# Patient Record
Sex: MALE | Race: BLACK OR AFRICAN AMERICAN | NOT HISPANIC OR LATINO | Employment: OTHER | URBAN - METROPOLITAN AREA
[De-identification: names, ages, dates, MRNs, and addresses within clinical notes are randomized per-mention and may not be internally consistent; named-entity substitution may affect disease eponyms.]

---

## 2019-01-31 ENCOUNTER — HOSPITAL ENCOUNTER (OUTPATIENT)
Dept: CARDIOLOGY | Facility: HOSPITAL | Age: 54
Discharge: HOME | End: 2019-01-31
Attending: SURGERY

## 2019-12-05 ENCOUNTER — OFFICE VISIT CONVERTED (OUTPATIENT)
Dept: SURGERY | Facility: CLINIC | Age: 54
End: 2019-12-05
Attending: SURGERY

## 2019-12-05 ENCOUNTER — CONVERSION ENCOUNTER (OUTPATIENT)
Dept: SURGERY | Facility: CLINIC | Age: 54
End: 2019-12-05

## 2019-12-19 ENCOUNTER — HOSPITAL ENCOUNTER (OUTPATIENT)
Dept: PERIOP | Facility: HOSPITAL | Age: 54
Setting detail: HOSPITAL OUTPATIENT SURGERY
Discharge: HOME | End: 2019-12-19
Attending: SURGERY

## 2019-12-19 LAB
ANION GAP SERPL CALC-SCNC: 20 MMOL/L (ref 8–19)
BASOPHILS # BLD AUTO: 0.06 10*3/UL (ref 0–0.2)
BASOPHILS NFR BLD AUTO: 0.8 % (ref 0–3)
BUN SERPL-MCNC: 77 MG/DL (ref 5–25)
BUN/CREAT SERPL: 12 {RATIO} (ref 6–20)
CALCIUM SERPL-MCNC: 9.7 MG/DL (ref 8.7–10.4)
CHLORIDE SERPL-SCNC: 101 MMOL/L (ref 99–111)
CONV ABS IMM GRAN: 0.02 10*3/UL (ref 0–0.2)
CONV CO2: 18 MMOL/L (ref 22–32)
CONV IMMATURE GRAN: 0.3 % (ref 0–1.8)
CREAT UR-MCNC: 6.26 MG/DL (ref 0.7–1.2)
DEPRECATED RDW RBC AUTO: 57.1 FL (ref 35.1–43.9)
EOSINOPHIL # BLD AUTO: 0.38 10*3/UL (ref 0–0.7)
EOSINOPHIL # BLD AUTO: 4.8 % (ref 0–7)
ERYTHROCYTE [DISTWIDTH] IN BLOOD BY AUTOMATED COUNT: 18.1 % (ref 11.6–14.4)
GFR SERPLBLD BASED ON 1.73 SQ M-ARVRAT: 11 ML/MIN/{1.73_M2}
GLUCOSE BLD-MCNC: 135 MG/DL (ref 70–99)
GLUCOSE SERPL-MCNC: 100 MG/DL (ref 70–99)
HCT VFR BLD AUTO: 25.4 % (ref 42–52)
HGB BLD-MCNC: 7.6 G/DL (ref 14–18)
LYMPHOCYTES # BLD AUTO: 1.38 10*3/UL (ref 1–5)
LYMPHOCYTES NFR BLD AUTO: 17.4 % (ref 20–45)
MCH RBC QN AUTO: 25.8 PG (ref 27–31)
MCHC RBC AUTO-ENTMCNC: 29.9 G/DL (ref 33–37)
MCV RBC AUTO: 86.1 FL (ref 80–96)
MONOCYTES # BLD AUTO: 0.52 10*3/UL (ref 0.2–1.2)
MONOCYTES NFR BLD AUTO: 6.6 % (ref 3–10)
NEUTROPHILS # BLD AUTO: 5.55 10*3/UL (ref 2–8)
NEUTROPHILS NFR BLD AUTO: 70.1 % (ref 30–85)
NRBC CBCN: 0 % (ref 0–0.7)
OSMOLALITY SERPL CALC.SUM OF ELEC: 299 MOSM/KG (ref 273–304)
PLATELET # BLD AUTO: 307 10*3/UL (ref 130–400)
PMV BLD AUTO: 8.5 FL (ref 9.4–12.4)
POTASSIUM SERPL-SCNC: 5.5 MMOL/L (ref 3.5–5.3)
RBC # BLD AUTO: 2.95 10*6/UL (ref 4.7–6.1)
SODIUM SERPL-SCNC: 133 MMOL/L (ref 135–147)
WBC # BLD AUTO: 7.91 10*3/UL (ref 4.8–10.8)

## 2020-12-22 ENCOUNTER — OFFICE VISIT CONVERTED (OUTPATIENT)
Dept: SURGERY | Facility: CLINIC | Age: 55
End: 2020-12-22
Attending: NURSE PRACTITIONER

## 2021-05-06 ENCOUNTER — OFFICE VISIT CONVERTED (OUTPATIENT)
Dept: SURGERY | Facility: CLINIC | Age: 56
End: 2021-05-06
Attending: SURGERY

## 2021-05-10 NOTE — H&P
History and Physical      Patient Name: Chevy Junior   Patient ID: [id number]   Sex: Male   YOB: 1965    Primary Care Provider: Yue COHEN   Referring Provider: Elie Steinberg    Visit Date: December 22, 2020    Provider: VICKI Sloan   Location: [facility] [place] [facility]   Location Address: [address] [place] [address]  [id number]   Location Phone: [phone]          Chief Complaint  · Follow Up      History Of Present Illness  Chevy Junior is a 55 year old /Black male who is here to follow up follow-up exam.      Patient presents today at the request of Dr. Villasenor to have his PD cath check.  Patient reports that he was with a large area around his PD catheter a last week. Reports that pd cath is working well. Patient denied any pain fever or chills.  Ultrasound of this area revealed a small fluid collection that measured 1.3 x 2.5 x 1.5 cm around tube insertion site.  Patient was started on doxycycline for 4 days and he reports that the area has gotten much smaller since starting antibiotics.       Past Medical History  Disease Name Date Onset Notes   Arthritis --  --    Chronic Obstructive Pulmonary Disease --  --    Diabetes --  --    Hepatitis --  --          Past Surgical History  Procedure Name Date Notes   Total knee replacement --  --          Medication List  Name Date Started Instructions   amlodipine 5 mg oral tablet  take 1 tablet by oral route 2 times a day   atenolol 50 mg oral tablet  --    calcitriol 0.25 mcg oral capsule  take 2 capsules by oral route once   Carafate 1 gram oral tablet  take 1 tablet (1 gram) by oral route 2 times per day on an empty stomach   cinacalcet 30 mg oral tablet  take 1 tablet (30 mg) by oral route once daily with food   docusate sodium 100 mg oral capsule  --    doxycycline hyclate 100 mg oral capsule 12/22/2020 take 1 capsule by oral route 2 times a day for 10 days   furosemide 80 mg oral tablet  take 1  tablet (80 mg) by oral route 2 times per day   hydrochlorothiazide 12.5 mg oral tablet  --    Imitrex 25 mg oral tablet  --    Lantus Solostar U-100 Insulin 100 unit/mL (3 mL) subcutaneous insulin pen  --    Lopid 600 mg oral tablet  take 1 tablet (600 mg) by oral route 2 times per day 30 minutes before morning and evening meal   metoclopramide HCl 10 mg oral tablet  take 1 tablet (10 mg) by oral route once daily   metolazone 5 mg oral tablet  take 1 tablet (5 mg) by oral route once daily   metoprolol tartrate 25 mg oral tablet  take 3 tablets by oral route 2 times a day   metoprolol tartrate 50 mg oral tablet  --    Miralax 17 gram/dose oral powder  take 17 gram mixed with 8 oz. water, juice, soda, coffee or tea by oral route once daily   mupirocin 2 % topical ointment  apply a small amount to the affected area by topical route 3 times per day   Nexium 40 mg oral capsule,delayed release(DR/EC)  --    nystatin 500,000 unit oral tablet  take 1 tablet by oral route 4 times a day   rifampin 300 mg oral capsule  take 1 capsule by oral route 2 times a day for 2 days   Suboxone sublingual  Take   Veltassa 8.4 gram oral powder in packet  take 1 packet (8.4 gram) by oral route once daily add to 30 mL water and mix; add additional 60 mL water, stir and drink immediately   Zoloft 25 mg oral tablet  --          Allergy List  Allergen Name Date Reaction Notes   NO KNOWN DRUG ALLERGIES --  --  --        Allergies Reconciled  Family Medical History  Disease Name Relative/Age Notes   Diabetes, unspecified type Father/   Father         Social History  Finding Status Start/Stop Quantity Notes   Tobacco Current every day --/-- --  --          Review of Systems  · Constitutional  o Denies  o : chills, fever  · Eyes  o Denies  o : yellowish discoloration of eyes  · HENT  o Denies  o : difficulty swallowing  · Cardiovascular  o Denies  o : chest pain on exertion  · Respiratory  o Denies  o : shortness of  "breath  · Gastrointestinal  o Denies  o : nausea, vomiting, diarrhea, constipation  · Genitourinary  o Denies  o : abnormal color of urine  · Integument  o Denies  o : rash  · Neurologic  o Denies  o : tingling or numbness  · Musculoskeletal  o Denies  o : joint pain  · Endocrine  o Denies  o : weight gain, weight loss      Vitals  Date Time BP Position Site L\R Cuff Size HR RR TEMP (F) WT  HT  BMI kg/m2 BSA m2 O2 Sat FR L/min FiO2        12/22/2020 02:51 PM       16  201lbs 8oz 5'  7\" 31.56 2.08             Physical Examination  · Constitutional  o Appearance  o : well developed, well-nourished, patient in no apparent distress  · Head and Face  o Head  o :   § Inspection  § : atraumatic, normocephalic  o Face  o :   § Inspection  § : no facial lesions  · Eyes  o Conjunctivae  o : conjunctivae normal  o Sclerae  o : sclerae white  · Neck  o Inspection/Palpation  o : normal appearance, no masses or tenderness, trachea midline  · Respiratory  o Respiratory Effort  o : breathing unlabored  · Skin and Subcutaneous Tissue  o General Inspection  o : no lesions present, no areas of discoloration, skin turgor normal, texture normal  · Neurologic  o Mental Status Examination  o :   § Orientation  § : grossly oriented to person, place and time  § Attention  § : attention normal, concentration abilities normal  § Fund of Knowledge  § : fund of knowledge within normal limits, patient aware of current events  o Gait and Station  o : normal gait, able to stand without difficulty  · Psychiatric  o Judgement and Insight  o : judgment and insight intact  o Mood and Affect  o : mood normal, affect appropriate     Abdomen: soft, non-distended, non-tender. No erythema noted.               Assessment  · Peritoneal dialysis catheter exit site infection, subsequent encounter       Infection and inflammatory reaction due to peritoneal dialysis catheter, subsequent encounter     V58.89/T85.71XD    Problems " Reconciled  Plan  · Medications  o Medications have been Reconciled  o Transition of Care or Provider Policy  · Instructions  o Continue with doxycycline for 10 days. Follow-up with Dr. Gus Whaley in 2 weeks.  o Electronically Identified Patient Education Materials Provided Electronically            Electronically Signed by: VICKI Sloan -Author on December 23, 2020 12:24:23 PM

## 2021-05-14 VITALS — HEIGHT: 67 IN | WEIGHT: 201.5 LBS | BODY MASS INDEX: 31.63 KG/M2 | RESPIRATION RATE: 16 BRPM

## 2021-05-15 VITALS — BODY MASS INDEX: 30.15 KG/M2 | HEIGHT: 67 IN | WEIGHT: 192.12 LBS | RESPIRATION RATE: 14 BRPM

## 2021-06-01 ENCOUNTER — OFFICE VISIT CONVERTED (OUTPATIENT)
Dept: SURGERY | Facility: CLINIC | Age: 56
End: 2021-06-01
Attending: SURGERY

## 2021-06-06 NOTE — PROGRESS NOTES
Progress Note      Patient Name: Chevy Junior   Patient ID: [id number]   Sex: Male   YOB: 1965    Primary Care Provider: Yue COHEN   Referring Provider: Elie Steinberg    Visit Date: May 6, 2021    Provider: Gus Whaley MD   [place] [facility]   Location Address: [address] [place] [address]  [id number]   Location Phone: [phone]          Chief Complaint  · PD Cath concerns      History Of Present Illness  Chevy Junior is a 55 year old /Black male who presents today for a postoperative visit. He follows-up today after a remote history of peritoneal dialysis catheter placement with findings of some infection around the peritoneal dialysis catheter. The patient had been seen in the office a couple of weeks ago with some swelling around the PD catheter. He was given some antibiotics and hat seemed to clear up the issue. Over the course of the past week, he has had the swelling recur and now there is some issue with overlying skin changes. He reports otherwise he is feeling well. No nausea or vomiting. No fever or chills. He is eating and drinking normally and his PD catheter is functioning appropriately.       Past Medical History  Arthritis; Chronic Obstructive Pulmonary Disease; Diabetes; Hepatitis         Past Surgical History  Total knee replacement         Medication List  amlodipine 5 mg oral tablet; atenolol 50 mg oral tablet; Bactrim -160 mg oral tablet; calcitriol 0.25 mcg oral capsule; Carafate 1 gram oral tablet; cinacalcet 30 mg oral tablet; docusate sodium 100 mg oral capsule; doxycycline hyclate 100 mg oral capsule; furosemide 80 mg oral tablet; hydrochlorothiazide 12.5 mg oral tablet; Imitrex 25 mg oral tablet; Lantus Solostar U-100 Insulin 100 unit/mL (3 mL) subcutaneous insulin pen; Lopid 600 mg oral tablet; metoclopramide HCl 10 mg oral tablet; metolazone 5 mg oral tablet; metoprolol tartrate 25 mg oral tablet;  metoprolol tartrate 50 mg oral tablet; Miralax 17 gram/dose oral powder; mupirocin 2 % topical ointment; Nexium 40 mg oral capsule,delayed release(DR/EC); nystatin 500,000 unit oral tablet; rifampin 300 mg oral capsule; Silvadene 1 % topical cream; Suboxone sublingual; Veltassa 8.4 gram oral powder in packet; Zoloft 25 mg oral tablet         Allergy List  NO KNOWN DRUG ALLERGIES       Allergies Reconciled  Family Medical History  Diabetes, unspecified type         Social History  Tobacco (Current every day)         Review of Systems  · Cardiovascular  o Denies  o : chest pain on exertion, shortness of breath, lower extremity swelling  · Respiratory  o Denies  o : shortness of breath, coughing up blood  · Gastrointestinal  o Denies  o : chronic abdominal pain      Physical Examination  · Constitutional  o Appearance  o : well developed, well-nourished, alert and in no acute distress  · Head and Face  o Head  o :   § Inspection  § : no deformities or lesions  · Eyes  o Conjunctivae  o : clear  o Sclerae  o : nonicteric  · Neck  o Inspection/Palpation  o : normal appearance, no masses or tenderness, trachea midline  · Respiratory  o Respiratory Effort  o : breathing unlabored  o Inspection of Chest  o : normal appearance, no retractions  · Cardiovascular  o Heart  o : regular rate and rhythm  · Gastrointestinal  o Abdominal Examination  o :   § Abdomen  § : abdomen is soft. The area around the PD catheter appears normal, where the catheter inserts but just medial to it, there is some swelling and some overlying skin changes with some skin infection. It appears superficial. It does not necessarily appear to be associated with a catheter, as the catheter runs deep and the area around the skin appears to be superficial although it is in the vicinity of the catheter.   · Lymphatic  o Neck  o : no lymphadenopathy present  o Axilla  o : no lymphadenopathy present  o Groin  o : no lymphadenopathy present  · Skin and  Subcutaneous Tissue  o General Inspection  o : no rashes present, no lesions present, no areas of discoloration  · Neurologic  o Cranial Nerves  o : grossly intact  o Sensation  o : grossly intact  o Gait and Station  o :   § Gait Screening  § : normal gait, able to stand without diffculty  o Cerebellar Function  o : no obvious abnormalities  · Psychiatric  o Judgement and Insight  o : judgment and insight intact  o Mood and Affect  o : mood normal, affect appropriate          Assessment  · Encounter for examination following surgery     V67.00/Z09       Patient with infection around the PD catheter although I am not sure it is associated directly with the PD catheter.       Plan  · Medications  o Medications have been Reconciled  o Transition of Care or Provider Policy  · Instructions  o Electronically Identified Patient Education Materials Provided Electronically     I am not exactly why he has this infection here. It certainly could be related to the PD catheter itself but I don't think it is. I think it is more of a skin lesion. It could be an infected sebaceous cyst or a subcutaneous abscess. I don't feel anytning fluctuant and there is nothing draining. It just feels mostly firm around this area. My inclination is to try and treat this with a second course of antibiotics and some topical therapy. If it fails to resolve within one week after this treatment, I will recommend that we take him to the OR for exploration, as I don't want to jeopardize the catheter. I discussed all of this with the patient and his wife in the room extensively. All questions were answered.  They voiced understanding and agreed to proceed with the above plan. He should call if this gets worse, even while on antibiotics.             Electronically Signed by: Lucy Vides-, -Author on May 7, 2021 01:57:22 PM  Electronically Co-signed by: Gus Whaley MD -Reviewer on May 8, 2021 09:51:06 PM

## 2021-06-06 NOTE — PROGRESS NOTES
Progress Note      Patient Name: Chevy Junior   Patient ID: [id number]   Sex: Male   YOB: 1965    Primary Care Provider: Yue COHEN    Visit Date: June 1, 2021    Provider: Gus Whaley MD   [place] [facility]   Location Address: [address] [place] [address]  [id number]   Location Phone: [phone]          Chief Complaint  · Follow Up Office Visit      History Of Present Illness  Chevy Junior is a 55 year old /Black male who presents today for a postoperative visit. He follows-up after an infection in his peritoneal dialysis catheter. The patient reports since the administration of antibiotics, he has done much better. He is not having any drainage from around the catheter. He appears to be healing quite well. There is no erythema. His catheter works appropriately. He is not having any issues.       Past Medical History  Arthritis; Chronic Obstructive Pulmonary Disease; Diabetes; Hepatitis         Past Surgical History  Total knee replacement         Medication List  amlodipine 5 mg oral tablet; atenolol 50 mg oral tablet; Bactrim -160 mg oral tablet; calcitriol 0.25 mcg oral capsule; Carafate 1 gram oral tablet; cinacalcet 30 mg oral tablet; docusate sodium 100 mg oral capsule; doxycycline hyclate 100 mg oral capsule; furosemide 80 mg oral tablet; hydrochlorothiazide 12.5 mg oral tablet; Imitrex 25 mg oral tablet; Lantus Solostar U-100 Insulin 100 unit/mL (3 mL) subcutaneous insulin pen; Lopid 600 mg oral tablet; metoclopramide HCl 10 mg oral tablet; metolazone 5 mg oral tablet; metoprolol tartrate 25 mg oral tablet; metoprolol tartrate 50 mg oral tablet; Miralax 17 gram/dose oral powder; mupirocin 2 % topical ointment; Nexium 40 mg oral capsule,delayed release(DR/EC); nystatin 500,000 unit oral tablet; rifampin 300 mg oral capsule; Silvadene 1 % topical cream; Suboxone sublingual; Veltassa 8.4 gram oral powder in packet;  "Zoloft 25 mg oral tablet         Allergy List  NO KNOWN DRUG ALLERGIES       Allergies Reconciled  Family Medical History  Diabetes, unspecified type         Social History  Tobacco (Current every day)         Review of Systems  · Cardiovascular  o Denies  o : chest pain on exertion, shortness of breath, lower extremity swelling  · Respiratory  o Denies  o : shortness of breath, coughing up blood  · Gastrointestinal  o Denies  o : chronic abdominal pain      Vitals  Date Time BP Position Site L\R Cuff Size HR RR TEMP (F) WT  HT  BMI kg/m2 BSA m2 O2 Sat FR L/min FiO2        06/01/2021 01:47 PM         203lbs 0oz 5'  7\" 31.79 2.09             Physical Examination  · Constitutional  o Appearance  o : well developed, well-nourished, alert and in no acute distress  · Head and Face  o Head  o :   § Inspection  § : no deformities or lesions  · Eyes  o Conjunctivae  o : clear  o Sclerae  o : nonicteric  · Neck  o Inspection/Palpation  o : normal appearance, no masses or tenderness, trachea midline  · Respiratory  o Respiratory Effort  o : breathing unlabored  o Inspection of Chest  o : normal appearance, no retractions  · Cardiovascular  o Heart  o : regular rate and rhythm  · Gastrointestinal  o Abdominal Examination  o :   § Abdomen  § : abdomen is soft. The area around his PD catheter appears much better. There is no purulent drainage. There is no erythema. There is not much induration. I can feel the catheter traversing down towards the abdomen.   · Lymphatic  o Neck  o : no lymphadenopathy present  o Axilla  o : no lymphadenopathy present  o Groin  o : no lymphadenopathy present  · Skin and Subcutaneous Tissue  o General Inspection  o : no rashes present, no lesions present, no areas of discoloration  · Neurologic  o Cranial Nerves  o : grossly intact  o Sensation  o : grossly intact  o Gait and Station  o :   § Gait Screening  § : normal gait, able to stand without diffculty  o Cerebellar Function  o : no obvious " abnormalities  · Psychiatric  o Judgement and Insight  o : judgment and insight intact  o Mood and Affect  o : mood normal, affect appropriate          Assessment  · Encounter for examination following surgery     V67.00/Z09       Patient with a previous infection near his peritoneal dialysis catheter.       Plan  · Medications  o Medications have been Reconciled  o Transition of Care or Provider Policy  · Instructions  o Electronically Identified Patient Education Materials Provided Electronically     The infection seems to be cleared. It doesn't seem to be affecting the catheter and the catheter seems to be functional.     At this point in time, he seems to be improved. If he gets another infection, we may need to consider removing the catheter but he seems to be doing quite well with just the antibiotics. He can follow-up with me as needed. Call with any questions or concerns. I discussed all of this with the patient. All questions were answered. He voiced understanding and agreed to proceed with the above plan.             Electronically Signed by: Lucy Vides-, -Author on June 2, 2021 11:14:45 AM  Electronically Co-signed by: Gus Whaley MD -Reviewer on June 2, 2021 02:44:20 PM

## 2021-07-14 ENCOUNTER — OFFICE VISIT (OUTPATIENT)
Dept: CARDIOLOGY | Facility: CLINIC | Age: 56
End: 2021-07-14

## 2021-07-14 VITALS
HEART RATE: 88 BPM | BODY MASS INDEX: 31.39 KG/M2 | DIASTOLIC BLOOD PRESSURE: 85 MMHG | SYSTOLIC BLOOD PRESSURE: 144 MMHG | OXYGEN SATURATION: 96 % | WEIGHT: 200 LBS | HEIGHT: 67 IN

## 2021-07-14 DIAGNOSIS — I10 ESSENTIAL HYPERTENSION: ICD-10-CM

## 2021-07-14 DIAGNOSIS — I20.9 ANGINA PECTORIS (HCC): Primary | ICD-10-CM

## 2021-07-14 DIAGNOSIS — R06.02 SOB (SHORTNESS OF BREATH): ICD-10-CM

## 2021-07-14 DIAGNOSIS — R07.2 PRECORDIAL PAIN: ICD-10-CM

## 2021-07-14 PROCEDURE — 99204 OFFICE O/P NEW MOD 45 MIN: CPT | Performed by: NURSE PRACTITIONER

## 2021-07-14 RX ORDER — ALBUTEROL SULFATE 90 UG/1
1 AEROSOL, METERED RESPIRATORY (INHALATION) EVERY 4 HOURS PRN
COMMUNITY

## 2021-07-14 RX ORDER — SERTRALINE HYDROCHLORIDE 100 MG/1
100 TABLET, FILM COATED ORAL DAILY
COMMUNITY
Start: 2021-06-16

## 2021-07-14 RX ORDER — CINACALCET 30 MG/1
30 TABLET, FILM COATED ORAL DAILY
COMMUNITY
Start: 2021-07-05

## 2021-07-14 RX ORDER — FUROSEMIDE 80 MG
TABLET ORAL DAILY
COMMUNITY
Start: 2021-06-22 | End: 2022-02-08

## 2021-07-14 RX ORDER — DOCUSATE SODIUM 100 MG/1
100 CAPSULE, LIQUID FILLED ORAL DAILY
COMMUNITY
Start: 2021-06-16

## 2021-07-14 RX ORDER — INSULIN GLARGINE 100 [IU]/ML
30-34 INJECTION, SOLUTION SUBCUTANEOUS DAILY
COMMUNITY
Start: 2021-06-16

## 2021-07-14 RX ORDER — ATORVASTATIN CALCIUM 80 MG/1
TABLET, FILM COATED ORAL DAILY
COMMUNITY
Start: 2021-06-16 | End: 2022-02-08

## 2021-07-14 RX ORDER — FERRIC CITRATE 210 MG/1
TABLET, COATED ORAL 2 TIMES DAILY
COMMUNITY
Start: 2021-06-11 | End: 2022-02-08

## 2021-07-14 RX ORDER — SUCRALFATE 1 G/1
TABLET ORAL
COMMUNITY
Start: 2021-06-16 | End: 2022-02-08

## 2021-07-14 RX ORDER — ESOMEPRAZOLE MAGNESIUM 40 MG/1
40 CAPSULE, DELAYED RELEASE ORAL AS NEEDED
Status: ON HOLD | COMMUNITY
Start: 2021-06-16 | End: 2023-02-13

## 2021-07-14 RX ORDER — POLYETHYLENE GLYCOL 3350 17 G/17G
17 POWDER, FOR SOLUTION ORAL DAILY PRN
COMMUNITY

## 2021-07-14 RX ORDER — AMLODIPINE BESYLATE 5 MG/1
TABLET ORAL 2 TIMES DAILY
COMMUNITY
Start: 2021-06-16 | End: 2022-09-13 | Stop reason: SDUPTHER

## 2021-07-14 RX ORDER — GEMFIBROZIL 600 MG/1
TABLET, FILM COATED ORAL DAILY
COMMUNITY
Start: 2021-06-16 | End: 2022-02-08

## 2021-07-14 RX ORDER — CLONIDINE HYDROCHLORIDE 0.1 MG/1
0.1 TABLET ORAL 3 TIMES DAILY PRN
Qty: 90 TABLET | Refills: 3 | Status: SHIPPED | OUTPATIENT
Start: 2021-07-14 | End: 2022-09-06

## 2021-07-14 RX ORDER — BUPRENORPHINE HYDROCHLORIDE AND NALOXONE HYDROCHLORIDE DIHYDRATE 8; 2 MG/1; MG/1
2 TABLET SUBLINGUAL DAILY
COMMUNITY
Start: 2021-07-01

## 2021-07-14 RX ORDER — METOPROLOL TARTRATE 100 MG/1
100 TABLET ORAL 2 TIMES DAILY
COMMUNITY
Start: 2021-07-12 | End: 2022-09-13 | Stop reason: SDUPTHER

## 2021-07-14 RX ORDER — CALCITRIOL 0.25 UG/1
CAPSULE, LIQUID FILLED ORAL
COMMUNITY
End: 2022-03-09

## 2021-07-14 NOTE — PROGRESS NOTES
Subjective     Chevy Junior is a 55 y.o. male who presents to day for Establish Care (presents for cardiac eval), Chest Pain, Shortness of Breath, and Edema.    CHIEF COMPLIANT  Chief Complaint   Patient presents with   • Establish Care     presents for cardiac eval   • Chest Pain   • Shortness of Breath   • Edema       Active Problems:  Problem List Items Addressed This Visit     None      Visit Diagnoses     Angina pectoris (CMS/HCC)    -  Primary    Relevant Medications    metoprolol tartrate (LOPRESSOR) 25 MG tablet    amLODIPine (NORVASC) 5 MG tablet    Precordial pain        Relevant Orders    Adult Transthoracic Echo Complete W/ Cont if Necessary Per Protocol    Stress Test With Myocardial Perfusion One Day    SOB (shortness of breath)        Relevant Orders    Adult Transthoracic Echo Complete W/ Cont if Necessary Per Protocol    Stress Test With Myocardial Perfusion One Day    Essential hypertension        Relevant Medications    metoprolol tartrate (LOPRESSOR) 25 MG tablet    amLODIPine (NORVASC) 5 MG tablet    furosemide (LASIX) 80 MG tablet    cloNIDine (Catapres) 0.1 MG tablet      Problem list  1.  Chest pain  2.  Shortness of breath  3.  Hypertension  4.  Fatigue    HPI   HPI  Mr. Junior is a 55-year-old male patient who is being seen today to establish care for cardiac evaluation due to chest pain, shortness of breath, and lower extremity edema.  Patient does report chest pain that occurs in his left chest and goes underneath his arms.  This is intermittent and occurs approximately 2 times in no week.  He describes as a pressure.  He says that it occurs both with rest and exertion.  He says he has associated numbness in his bilateral fingers as well.  Patient has been on crutches for over 2 years due to MRSA infection and surgery in his right knee.  However this may be contributing to some of his chest pain but however is gotten to the point where it is scaring him and he actually has been to the  ED 2 times in the last little bit.  Patient does report shortness of breath that occurs with exertion and does not occur at rest.  He does have a history of sleep apnea and a history of COPD.  He also has a history of tobacco use.  He does have right lower extremity edema at the knee and which is contributed to his MSRA infection and surgery.  He does report fatigue where he is tired all the time.  He also has chronic arterial hypertension where his blood pressure is elevated today at 144/85.  He is currently on metoprolol and amlodipine for his chronic arterial hypertension.  He denies any palpitations, dizziness, lightheadedness, headaches, syncope, PND, orthopnea, or other neurological problems.  PRIOR MEDS  Current Outpatient Medications on File Prior to Visit   Medication Sig Dispense Refill   • albuterol sulfate HFA (Ventolin HFA) 108 (90 Base) MCG/ACT inhaler Every 4 (Four) Hours.     • amLODIPine (NORVASC) 5 MG tablet 2 (Two) Times a Day.     • atorvastatin (LIPITOR) 80 MG tablet Daily.     • Auryxia 1  MG(Fe) tablet 2 (two) times a day.     • buprenorphine-naloxone (SUBOXONE) 8-2 MG per SL tablet PLACE 2 TABLET UNDER TONGUE ONCE A DAY AS DIRECTED     • calcitriol (ROCALTROL) 0.25 MCG capsule calcitriol 0.25 mcg oral capsule take 2 capsules by oral route once   Active     • cinacalcet (SENSIPAR) 60 MG tablet Daily.     • Diclofenac Sodium (VOLTAREN) 1 % gel gel      •  MG capsule      • esomeprazole (nexIUM) 40 MG capsule Daily.     • furosemide (LASIX) 80 MG tablet Daily.     • gemfibrozil (LOPID) 600 MG tablet Daily.     • Insulin Glargine (BASAGLAR KWIKPEN) 100 UNIT/ML injection pen      • metoprolol tartrate (LOPRESSOR) 25 MG tablet 3 (Three) Times a Day.     • mupirocin (BACTROBAN) 2 % ointment mupirocin 2 % topical ointment apply a small amount to the affected area by topical route 3 times per day   Active     • Ondansetron 4 MG film Every 8 (Eight) Hours.     • polyethylene glycol  (MiraLax) 17 GM/SCOOP powder Miralax 17 gram oral powder packet   Take 1 packet every day by oral route.     • sertraline (ZOLOFT) 100 MG tablet Daily.     • silver sulfadiazine (SILVADENE, SSD) 1 % cream      • sucralfate (CARAFATE) 1 g tablet        No current facility-administered medications on file prior to visit.       ALLERGIES  Patient has no known allergies.    HISTORY  Past Medical History:   Diagnosis Date   • Chronic kidney disease    • Chronic pain    • COPD (chronic obstructive pulmonary disease) (CMS/[facility])    • Diabetes mellitus (CMS/[facility])    • Hyperlipidemia    • Hypertension    • Osteoarthritis    • Phosphorus metabolic disorder    • Sleep apnea        Social History     Socioeconomic History   • Marital status:      Spouse name: Not on file   • Number of children: Not on file   • Years of education: Not on file   • Highest education level: Not on file   Tobacco Use   • Smoking status: Current Every Day Smoker     Packs/day: 1.00     Years: 35.00     Pack years: 35.00     Types: Cigarettes   • Smokeless tobacco: Never Used   Substance and Sexual Activity   • Alcohol use: Never   • Drug use: Not Currently     Comment: pain pills from previous injuries       Family History   Problem Relation Age of Onset   • Hypertension Mother    • Diabetes Father        Review of Systems   Constitutional: Positive for fatigue. Negative for chills, diaphoresis and fever.   HENT: Negative.         Metal plates in both jaws with exposed wire causing tongue/jaw irritation.   Eyes: Negative.    Respiratory: Positive for apnea (unable to tolerate cpap), shortness of breath (with increased activity) and wheezing. Negative for cough and chest tightness.         Recent rib fracture   Cardiovascular: Positive for chest pain (started after extended use of crutches, from under arms to around chest) and leg swelling (BLE). Negative for palpitations.   Gastrointestinal: Negative.  Negative for blood in stool.  "  Genitourinary: Positive for hematuria.        Dialysis catheter, dialysis 9 hours a day at home   Musculoskeletal: Positive for arthralgias, back pain, gait problem, joint swelling ( R knee, needing 4th replacement), myalgias and neck pain.        Metal plates and rods in both legs. Awaiting 4th knee replacement.   Skin: Negative.    Allergic/Immunologic: Negative.    Neurological: Positive for weakness and numbness (arms, nerve damage from extended use of crutches). Negative for dizziness, syncope, light-headedness and headaches.   Hematological: Negative.  Does not bruise/bleed easily.   Psychiatric/Behavioral: Negative.  Negative for sleep disturbance.       Objective     VITALS: /85 (BP Location: Left arm, Patient Position: Sitting)   Pulse 88   Ht 170.2 cm (67\")   Wt 90.7 kg (200 lb)   SpO2 96%   BMI 31.32 kg/m²     LABS:   Lab Results (most recent)     None          IMAGING:   No Images in the past 120 days found..    EXAM:  Physical Exam  Vitals and nursing note reviewed.   Constitutional:       Appearance: He is well-developed.   HENT:      Head: Normocephalic.   Eyes:      Pupils: Pupils are equal, round, and reactive to light.   Neck:      Thyroid: No thyroid mass.      Vascular: No carotid bruit or JVD.      Trachea: Trachea and phonation normal.   Cardiovascular:      Rate and Rhythm: Normal rate and regular rhythm.      Pulses:           Radial pulses are 2+ on the right side and 2+ on the left side.        Posterior tibial pulses are 2+ on the right side and 2+ on the left side.      Heart sounds: Normal heart sounds. No murmur heard.   No friction rub. No gallop.    Pulmonary:      Effort: Pulmonary effort is normal. No respiratory distress.      Breath sounds: Normal breath sounds. No wheezing or rales.   Abdominal:      General: Bowel sounds are normal.      Palpations: Abdomen is soft.   Musculoskeletal:         General: Swelling present. Normal range of motion.      Cervical back: " Neck supple.   Skin:     General: Skin is warm and dry.      Capillary Refill: Capillary refill takes less than 2 seconds.      Findings: No rash.          Neurological:      Mental Status: He is alert and oriented to person, place, and time.   Psychiatric:         Speech: Speech normal.         Behavior: Behavior normal.         Thought Content: Thought content normal.         Judgment: Judgment normal.         Procedure   Procedures       Assessment/Plan    Diagnosis Plan   1. Angina pectoris (CMS/HCC)     2. Precordial pain  Adult Transthoracic Echo Complete W/ Cont if Necessary Per Protocol    Stress Test With Myocardial Perfusion One Day   3. SOB (shortness of breath)  Adult Transthoracic Echo Complete W/ Cont if Necessary Per Protocol    Stress Test With Myocardial Perfusion One Day   4. Essential hypertension  cloNIDine (Catapres) 0.1 MG tablet   1.  Due to patient's chest pain, shortness of breath, and fatigue as well as comorbidities of hypertension, hyperlipidemia, sleep apnea, and diabetes mellitus I do believe that this warrants an ischemic work-up including a stress test and echocardiogram.  Due to patient's knee in which she has an MRSA infection as well as using crutches over the last 2 years I do not believe he can complete the treadmill portion of the stress test I would like to order a Lexiscan stress test.  The echocardiogram would also help evaluate for hypertensive heart disease.  2.  Due to patient's elevated blood pressure I would like to give him clonidine 0.1 mg 3 times daily as needed as needed for blood pressures greater than 160/90.  We will continue to monitor his blood pressure report any significant highs or lows.  3.  Informed of signs and symptoms of ACS and advised to seek emergent treatment for any new worsening symptoms.  Patient also advised sooner follow-up as needed.  Also advised to follow-up with family doctor as needed  This note is dictated utilizing voice recognition  software.  Although this record has been proof read, transcriptional errors may still be present. If questions occur regarding the content of this record please do not hesitate to call our office.  I have reviewed and confirmed the accuracy of the ROS as documented by the MA/LPN/RN VICKI Ledesma    No follow-ups on file.    Diagnoses and all orders for this visit:    1. Angina pectoris (CMS/HCC) (Primary)    2. Precordial pain  -     Adult Transthoracic Echo Complete W/ Cont if Necessary Per Protocol; Future  -     Stress Test With Myocardial Perfusion One Day; Future    3. SOB (shortness of breath)  -     Adult Transthoracic Echo Complete W/ Cont if Necessary Per Protocol; Future  -     Stress Test With Myocardial Perfusion One Day; Future    4. Essential hypertension  -     cloNIDine (Catapres) 0.1 MG tablet; Take 1 tablet by mouth 3 (Three) Times a Day As Needed for High Blood Pressure (>160/90).  Dispense: 90 tablet; Refill: 3                 MEDS ORDERED DURING VISIT:  New Medications Ordered This Visit   Medications   • cloNIDine (Catapres) 0.1 MG tablet     Sig: Take 1 tablet by mouth 3 (Three) Times a Day As Needed for High Blood Pressure (>160/90).     Dispense:  90 tablet     Refill:  3           This document has been electronically signed by VICKI Ledesma Jr.  July 20, 2021 23:44 EDT

## 2021-07-14 NOTE — PATIENT INSTRUCTIONS
Acute Coronary Syndrome  Acute coronary syndrome (ACS) is a serious problem in which there is suddenly not enough blood and oxygen reaching the heart. ACS can result in chest pain or a heart attack.  This condition is a medical emergency. If you have any symptoms of this condition, get help right away.  What are the causes?  This condition may be caused by:  · A buildup of fat and cholesterol inside the arteries (atherosclerosis). This is the most common cause. The buildup (plaque) can cause blood vessels in the heart (coronary arteries) to become narrow or blocked, which reduces blood flow to the heart. Plaque can also break off and lead to a clot, which can block an artery and cause a heart attack or stroke.  · Sudden tightening of the muscles around the coronary arteries (coronary spasm).  · Tearing of a coronary artery (spontaneous coronary artery dissection).  · Very low blood pressure (hypotension).  · An abnormal heartbeat (arrhythmia).  · Other medical conditions that cause a decrease of oxygen to the heart, such as anemiaorrespiratory failure.  · Using cocaine or methamphetamine.  What increases the risk?  The following factors may make you more likely to develop this condition:  · Age. The risk for ACS increases as you get older.  · History of chest pain, heart attack, peripheral artery disease, or stroke.  · Having taken chemotherapy or immune-suppressing medicines.  · Being male.  · Family history of chest pain, heart disease, or stroke.  · Smoking.  · Not exercising enough.  · Being overweight.  · High cholesterol.  · High blood pressure (hypertension).  · Diabetes.  · Excessive alcohol use.  What are the signs or symptoms?  Common symptoms of this condition include:  · Chest pain. The pain may last a long time, or it may stop and come back (recur). It may feel like:  ? Crushing or squeezing.  ? Tightness, pressure, fullness, or heaviness.  · Arm, neck, jaw, or back pain.  · Heartburn or  indigestion.  · Shortness of breath.  · Nausea.  · Sudden cold sweats.  · Light-headedness.  · Dizziness or passing out.  · Tiredness (fatigue).  Sometimes there are no symptoms.  How is this diagnosed?  This condition may be diagnosed based on:  · Your medical history and symptoms.  · Imaging tests, such as:  ? An electrocardiogram (ECG). This measures the heart's electrical activity.  ? X-rays.  ? CT scan.  ? A coronary angiogram. For this test, dye is injected into the heart arteries and then X-rays are taken.  ? Myocardial perfusion imaging. This test shows how well blood flows through your heart muscle.  · Blood tests. These may be repeated at certain time intervals.  · Exercise stress testing.  · Echocardiogram. This is a test that uses sound waves to produce detailed images of the heart.  How is this treated?  Treatment for this condition may include:  · Oxygen therapy.  · Medicines, such as:  ? Antiplatelet medicines and blood-thinning medicines, such as aspirin. These help prevent blood clots.  ? Medicine that dissolves any blood clots (fibrinolytic therapy).  ? Blood pressure medicines.  ? Nitroglycerin. This helps widen blood vessels to improve blood flow.  ? Pain medicine.  ? Cholesterol-lowering medicine.  · Surgery, such as:  ? Coronary angioplasty with stent placement. This involves placing a small piece of metal that looks like mesh or a spring into a narrow coronary artery. This widens the artery and keeps it open.  ? Coronary artery bypass surgery. This involves taking a section of a blood vessel from a different part of your body and placing it on the blocked coronary artery to allow blood to flow around the blockage.  · Cardiac rehabilitation. This is a program that includes exercise training, education, and counseling to help you recover.  Follow these instructions at home:  Eating and drinking  · Eat a heart-healthy diet that includes whole grains, fruits and vegetables, lean proteins, and  low-fat or nonfat dairy products.  · Limit how much salt (sodium) you eat as told by your health care provider. Follow instructions from your health care provider about any other eating or drinking restrictions, such as limiting foods that are high in fat and processed sugars.  · Use healthy cooking methods such as roasting, grilling, broiling, baking, poaching, steaming, or stir-frying.  · Work with a dietitian to follow a heart-healthy eating plan.  Medicines  · Take over-the-counter and prescription medicines only as told by your health care provider.  · Do not take these medicines unless your health care provider approves:  ? Vitamin supplements that contain vitamin A or vitamin E.  ? NSAIDs, such as ibuprofen, naproxen, or celecoxib.  ? Hormone replacement therapy that contains estrogen.  · If you are taking blood thinners:  ? Talk with your health care provider before you take any medicines that contain aspirin or NSAIDs. These medicines increase your risk for dangerous bleeding.  ? Take your medicine exactly as told, at the same time every day.  ? Avoid activities that could cause injury or bruising, and follow instructions about how to prevent falls.  ? Wear a medical alert bracelet, and carry a card that lists what medicines you take.  Activity  · Follow your cardiac rehabilitation program. Do exercises as told by your physical therapist.  · Ask your health care provider what activities and exercises are safe for you. Follow his or her instructions about lifting, driving, or climbing stairs.  Lifestyle  · Do not use any products that contain nicotine or tobacco, such as cigarettes, e-cigarettes, and chewing tobacco. If you need help quitting, ask your health care provider.  · Do not drink alcohol if your health care provider tells you not to drink.  · If you drink alcohol:  ? Limit how much you have to 0-1 drink a day.  ? Be aware of how much alcohol is in your drink. In the [place] one drink equals one 12 oz  bottle of beer (355 mL), one 5 oz glass of wine (148 mL), or one 1½ oz glass of hard liquor (44 mL).  · Maintain a healthy weight. If you need to lose weight, work with your health care provider to do so safely.  General instructions  · Tell all the health care providers who provide care for you about your heart condition, including your dentist. This may affect the medicines or treatment you receive.  · Manage any other health conditions you have, such as hypertension or diabetes. These conditions affect your heart.  · Pay attention to your mental health. You may be at higher risk for depression.  ? Find ways to manage stress.  ? Talk to your health care provider about depression screening and treatment.  · Keep your vaccinations up to date.  ? Get the flu shot (influenza vaccine) every year.  ? Get the pneumococcal vaccine if you are age 65 or older.  · If directed, monitor your blood pressure at home.  · Keep all follow-up visits as told by your health care provider. This is important.  Contact a health care provider if you:  · Feel overwhelmed or sad.  · Have trouble doing your daily activities.  Get help right away if you:  · Have pain in your chest, neck, arm, jaw, stomach, or back that recurs, and:  ? It lasts for more than a few minutes.  ? It is not relieved by taking the medicineyour health care provider prescribed.  · Have unexplained:  ? Heavy sweating.  ? Heartburn or indigestion.  ? Nausea or vomiting.  ? Shortness of breath.  ? Difficulty breathing.  ? Fatigue.  ? Nervousness or anxiety.  ? Weakness.  ? Diarrhea.  ? Dark stools or blood in your stool.  · Have sudden light-headedness or dizziness.  · Have blood pressure that is higher than 180/120.  · Faint.  · Have thoughts about hurting yourself.  These symptoms may represent a serious problem that is an emergency. Do not wait to see if the symptoms will go away. Get medical help right away. Call your local emergency services (911 in the [place] Do  not drive yourself to the hospital.   Summary  · Acute coronary syndrome (ACS) is when there is not enough blood and oxygen being supplied to the heart. ACS can result in chest pain or a heart attack.  · Acute coronary syndrome is a medical emergency. If you have any symptoms of this condition, get help right away.  · Treatment includes medicines and procedures to open the blocked arteries and restore blood flow.  This information is not intended to replace advice given to you by your health care provider. Make sure you discuss any questions you have with your health care provider.  Document Revised: 05/20/2020 Document Reviewed: 12/30/2019  [employer] Patient Education © 2021 [employer]      Hypertension, Adult  Hypertension is another name for high blood pressure. High blood pressure forces your heart to work harder to pump blood. This can cause problems over time.  There are two numbers in a blood pressure reading. There is a top number (systolic) over a bottom number (diastolic). It is best to have a blood pressure that is below 120/80. Healthy choices can help lower your blood pressure, or you may need medicine to help lower it.  What are the causes?  The cause of this condition is not known. Some conditions may be related to high blood pressure.  What increases the risk?  · Smoking.  · Having type 2 diabetes mellitus, high cholesterol, or both.  · Not getting enough exercise or physical activity.  · Being overweight.  · Having too much fat, sugar, calories, or salt (sodium) in your diet.  · Drinking too much alcohol.  · Having long-term (chronic) kidney disease.  · Having a family history of high blood pressure.  · Age. Risk increases with age.  · Race. You may be at higher risk if you are .  · Gender. Men are at higher risk than women before age 45. After age 65, women are at higher risk than men.  · Having obstructive sleep apnea.  · Stress.  What are the signs or symptoms?  · High blood  pressure may not cause symptoms. Very high blood pressure (hypertensive crisis) may cause:  ? Headache.  ? Feelings of worry or nervousness (anxiety).  ? Shortness of breath.  ? Nosebleed.  ? A feeling of being sick to your stomach (nausea).  ? Throwing up (vomiting).  ? Changes in how you see.  ? Very bad chest pain.  ? Seizures.  How is this treated?  · This condition is treated by making healthy lifestyle changes, such as:  ? Eating healthy foods.  ? Exercising more.  ? Drinking less alcohol.  · Your health care provider may prescribe medicine if lifestyle changes are not enough to get your blood pressure under control, and if:  ? Your top number is above 130.  ? Your bottom number is above 80.  · Your personal target blood pressure may vary.  Follow these instructions at home:  Eating and drinking    · If told, follow the DASH eating plan. To follow this plan:  ? Fill one half of your plate at each meal with fruits and vegetables.  ? Fill one fourth of your plate at each meal with whole grains. Whole grains include whole-wheat pasta, brown rice, and whole-grain bread.  ? Eat or drink low-fat dairy products, such as skim milk or low-fat yogurt.  ? Fill one fourth of your plate at each meal with low-fat (lean) proteins. Low-fat proteins include fish, chicken without skin, eggs, beans, and tofu.  ? Avoid fatty meat, cured and processed meat, or chicken with skin.  ? Avoid pre-made or processed food.  · Eat less than 1,500 mg of salt each day.  · Do not drink alcohol if:  ? Your doctor tells you not to drink.  ? You are pregnant, may be pregnant, or are planning to become pregnant.  · If you drink alcohol:  ? Limit how much you use to:  § 0-1 drink a day for women.  § 0-2 drinks a day for men.  ? Be aware of how much alcohol is in your drink. In the [place] one drink equals one 12 oz bottle of beer (355 mL), one 5 oz glass of wine (148 mL), or one 1½ oz glass of hard liquor (44 mL).  Lifestyle    · Work with your  doctor to stay at a healthy weight or to lose weight. Ask your doctor what the best weight is for you.  · Get at least 30 minutes of exercise most days of the week. This may include walking, swimming, or biking.  · Get at least 30 minutes of exercise that strengthens your muscles (resistance exercise) at least 3 days a week. This may include lifting weights or doing Pilates.  · Do not use any products that contain nicotine or tobacco, such as cigarettes, e-cigarettes, and chewing tobacco. If you need help quitting, ask your doctor.  · Check your blood pressure at home as told by your doctor.  · Keep all follow-up visits as told by your doctor. This is important.  Medicines  · Take over-the-counter and prescription medicines only as told by your doctor. Follow directions carefully.  · Do not skip doses of blood pressure medicine. The medicine does not work as well if you skip doses. Skipping doses also puts you at risk for problems.  · Ask your doctor about side effects or reactions to medicines that you should watch for.  Contact a doctor if you:  · Think you are having a reaction to the medicine you are taking.  · Have headaches that keep coming back (recurring).  · Feel dizzy.  · Have swelling in your ankles.  · Have trouble with your vision.  Get help right away if you:  · Get a very bad headache.  · Start to feel mixed up (confused).  · Feel weak or numb.  · Feel faint.  · Have very bad pain in your:  ? Chest.  ? Belly (abdomen).  · Throw up more than once.  · Have trouble breathing.  Summary  · Hypertension is another name for high blood pressure.  · High blood pressure forces your heart to work harder to pump blood.  · For most people, a normal blood pressure is less than 120/80.  · Making healthy choices can help lower blood pressure. If your blood pressure does not get lower with healthy choices, you may need to take medicine.  This information is not intended to replace advice given to you by your health  care provider. Make sure you discuss any questions you have with your health care provider.  Document Revised: 08/28/2019 Document Reviewed: 08/28/2019  [employer] Patient Education © 2021 [employer]

## 2021-07-15 VITALS — BODY MASS INDEX: 31.86 KG/M2 | HEIGHT: 67 IN | WEIGHT: 203 LBS

## 2021-08-27 ENCOUNTER — APPOINTMENT (OUTPATIENT)
Dept: CARDIOLOGY | Facility: HOSPITAL | Age: 56
End: 2021-08-27

## 2021-11-10 DIAGNOSIS — I10 ESSENTIAL HYPERTENSION: ICD-10-CM

## 2021-11-10 RX ORDER — CLONIDINE HYDROCHLORIDE 0.1 MG/1
TABLET ORAL
Qty: 90 TABLET | Refills: 3 | OUTPATIENT
Start: 2021-11-10

## 2022-02-08 ENCOUNTER — OFFICE VISIT (OUTPATIENT)
Dept: CARDIOLOGY | Facility: CLINIC | Age: 57
End: 2022-02-08

## 2022-02-08 VITALS
OXYGEN SATURATION: 96 % | WEIGHT: 165 LBS | HEIGHT: 67 IN | HEART RATE: 94 BPM | DIASTOLIC BLOOD PRESSURE: 116 MMHG | SYSTOLIC BLOOD PRESSURE: 194 MMHG | BODY MASS INDEX: 25.9 KG/M2

## 2022-02-08 DIAGNOSIS — Z86.16 HISTORY OF COVID-19: ICD-10-CM

## 2022-02-08 DIAGNOSIS — R06.02 SOB (SHORTNESS OF BREATH): ICD-10-CM

## 2022-02-08 DIAGNOSIS — I10 ESSENTIAL HYPERTENSION: Primary | ICD-10-CM

## 2022-02-08 DIAGNOSIS — I20.9 ANGINA PECTORIS: ICD-10-CM

## 2022-02-08 PROCEDURE — 99214 OFFICE O/P EST MOD 30 MIN: CPT | Performed by: NURSE PRACTITIONER

## 2022-02-08 RX ORDER — MINOCYCLINE HYDROCHLORIDE 100 MG/1
100 CAPSULE ORAL DAILY
COMMUNITY
Start: 2022-01-27

## 2022-02-08 RX ORDER — GABAPENTIN 800 MG/1
800 TABLET ORAL 3 TIMES DAILY
COMMUNITY
End: 2023-02-15 | Stop reason: HOSPADM

## 2022-02-08 RX ORDER — PATIROMER 8.4 G/1
8.4 POWDER, FOR SUSPENSION ORAL 3 TIMES WEEKLY
COMMUNITY

## 2022-02-08 RX ORDER — ATORVASTATIN CALCIUM 40 MG/1
40 TABLET, FILM COATED ORAL DAILY
COMMUNITY
End: 2022-09-13 | Stop reason: SDUPTHER

## 2022-02-08 RX ORDER — VANCOMYCIN HCL-SODIUM CHLORIDE IV SOLN 1.25 GM/250ML-0.9% 1.25-0.9/25 GM/ML-%
1250 SOLUTION INTRAVENOUS EVERY 12 HOURS SCHEDULED
COMMUNITY
End: 2022-06-14

## 2022-02-08 RX ORDER — CEFDINIR 300 MG/1
CAPSULE ORAL
COMMUNITY
End: 2022-09-13

## 2022-02-08 RX ORDER — ISOSORBIDE DINITRATE AND HYDRALAZINE HYDROCHLORIDE 37.5; 2 MG/1; MG/1
1 TABLET ORAL 2 TIMES DAILY
Qty: 60 TABLET | Refills: 3 | Status: SHIPPED | OUTPATIENT
Start: 2022-02-08 | End: 2022-02-14 | Stop reason: SDUPTHER

## 2022-02-08 RX ORDER — TAMSULOSIN HYDROCHLORIDE 0.4 MG/1
1 CAPSULE ORAL DAILY
COMMUNITY

## 2022-02-08 RX ORDER — LEVOTHYROXINE SODIUM 0.05 MG/1
50 TABLET ORAL DAILY
COMMUNITY
Start: 2022-02-02

## 2022-02-08 NOTE — PROGRESS NOTES
Subjective     Chevy Junior is a 56 y.o. male who presents to day for Hypertension (presents as hospital f/u), Chest Pain, and Palpitations.    CHIEF COMPLIANT  Chief Complaint   Patient presents with   • Hypertension     presents as hospital f/u   • Chest Pain   • Palpitations       Active Problems:  Problem List Items Addressed This Visit     None      Visit Diagnoses     Essential hypertension    -  Primary    Relevant Orders    Adult Transthoracic Echo Complete W/ Cont if Necessary Per Protocol    Stress Test With Myocardial Perfusion One Day    Angina pectoris (HCC)        Relevant Orders    Adult Transthoracic Echo Complete W/ Cont if Necessary Per Protocol    Stress Test With Myocardial Perfusion One Day    SOB (shortness of breath)        Relevant Orders    Adult Transthoracic Echo Complete W/ Cont if Necessary Per Protocol    Stress Test With Myocardial Perfusion One Day    History of COVID-19        Relevant Orders    Adult Transthoracic Echo Complete W/ Cont if Necessary Per Protocol    Stress Test With Myocardial Perfusion One Day        Problem list  1.  Chest pain  2.  Shortness of breath  3.  Hypertension  4.  Fatigue    HPI  HPI  Mr. Junior is a 56-year-old male patient who is being followed up today for hypertension, chest pain, and palpitations.  Patient is also recently hospitalized which we reviewed the records that identified an EKG with normal sinus rhythm, nonspecific ST changes, and a QTC of 491 ms.  We also reviewed reviewed his labs that identified a microcytic anemia at 13.6 a creatinine of 10.75 which she does home peritoneal dialysis, and a negative troponin.  Patient does report chest pain that mainly occurs in his left arm and radiates into his back and into his neck.  This is occurring intermittently and which she describes as a pressure-like sensation.  He does have associated diaphoresis.  It occurs with rest and activity.  He also has shortness of breath that occurs with minimal  activity but usually does not occur at rest.  He has intermittent palpitations which she describes as a racing-like sensation that occurs in his chest.  He did go under a right leg amputation.  He does report fatigue in which she gets tired easily since he had COVID.  He also has chronic arterial hypertension which is blood pressure is significantly elevated today at 209/121 heart rate of 93.  He was given clonidine in the office and his blood pressure decreased to 159/103 with a heart rate of 95.  He says that lately his blood pressures been going crazy up and down.  He does have associated diaphoresis.  He says 170 mmHg systolically is the best blood pressure that he has had in quite a while.  He is currently being treated with amlodipine, as needed clonidine and metoprolol.  He denies any lower extremity edema, dizziness, lightheadedness, syncope, or strokelike symptoms.  PRIOR MEDS  Current Outpatient Medications on File Prior to Visit   Medication Sig Dispense Refill   • albuterol sulfate HFA (Ventolin HFA) 108 (90 Base) MCG/ACT inhaler Every 4 (Four) Hours.     • amLODIPine (NORVASC) 5 MG tablet 2 (Two) Times a Day.     • atorvastatin (LIPITOR) 40 MG tablet Take 40 mg by mouth Daily.     • buprenorphine-naloxone (SUBOXONE) 8-2 MG per SL tablet PLACE 2 TABLET UNDER TONGUE ONCE A DAY AS DIRECTED     • calcitriol (ROCALTROL) 0.25 MCG capsule calcitriol 0.25 mcg oral capsule take 2 capsules by oral route once   Active     • cefdinir (OMNICEF) 300 MG capsule cefdinir 300 mg capsule     • cinacalcet (SENSIPAR) 60 MG tablet Daily.     • cloNIDine (Catapres) 0.1 MG tablet Take 1 tablet by mouth 3 (Three) Times a Day As Needed for High Blood Pressure (>160/90). 90 tablet 3   • Diclofenac Sodium (VOLTAREN) 1 % gel gel      •  MG capsule      • esomeprazole (nexIUM) 40 MG capsule Daily.     • gabapentin (NEURONTIN) 800 MG tablet 3 (Three) Times a Day.     • Insulin Glargine (BASAGLAR KWIKPEN) 100 UNIT/ML injection  pen      • IRON SUCROSE IV Infuse  into a venous catheter Every 14 (Fourteen) Days. Iron infusion every 2 weeks     • levothyroxine (SYNTHROID, LEVOTHROID) 25 MCG tablet Daily.     • metoprolol tartrate (LOPRESSOR) 25 MG tablet 75 mg 2 (Two) Times a Day.     • minocycline (MINOCIN,DYNACIN) 100 MG capsule Daily.     • mupirocin (BACTROBAN) 2 % ointment mupirocin 2 % topical ointment apply a small amount to the affected area by topical route 3 times per day   Active     • Ondansetron 4 MG film Every 8 (Eight) Hours.     • Patiromer Sorbitex Calcium (Veltassa) 8.4 g pack Take 8.4 g by mouth 3 (Three) Times a Week.     • polyethylene glycol (MiraLax) 17 GM/SCOOP powder Miralax 17 gram oral powder packet   Take 1 packet every day by oral route.     • sertraline (ZOLOFT) 100 MG tablet Daily.     • silver sulfadiazine (SILVADENE, SSD) 1 % cream      • tamsulosin (FLOMAX) 0.4 MG capsule 24 hr capsule Daily.     • vancomycin 1250 mg/ 250mL IVPB Infuse 1,250 mg into a venous catheter Every 12 (Twelve) Hours.       No current facility-administered medications on file prior to visit.       ALLERGIES  Patient has no known allergies.    HISTORY  Past Medical History:   Diagnosis Date   • Anemia    • Chronic kidney disease    • Chronic pain    • COPD (chronic obstructive pulmonary disease) (HCC)    • Diabetes mellitus (HCC)    • Hyperlipidemia    • Hypertension    • Osteoarthritis    • Phosphorus metabolic disorder    • Sleep apnea        Social History     Socioeconomic History   • Marital status:    Tobacco Use   • Smoking status: Current Every Day Smoker     Packs/day: 1.00     Years: 35.00     Pack years: 35.00     Types: Cigarettes   • Smokeless tobacco: Never Used   Substance and Sexual Activity   • Alcohol use: Never   • Drug use: Not Currently     Comment: pain pills from previous injuries       Family History   Problem Relation Age of Onset   • Hypertension Mother    • Diabetes Father        Review of Systems  "  Constitutional: Positive for fatigue (7day stay in hospital for Covid and MRSA). Negative for chills, diaphoresis and fever.   HENT: Positive for rhinorrhea.    Eyes: Negative.  Negative for visual disturbance.   Respiratory: Positive for apnea (unable to tolerate cpap) and shortness of breath (with any activity). Negative for cough, chest tightness and wheezing.         COPD   Cardiovascular: Positive for chest pain (pain radiating to L arm) and palpitations (racing ). Negative for leg swelling.   Gastrointestinal: Positive for constipation and nausea. Negative for abdominal pain, blood in stool, diarrhea and vomiting.   Endocrine: Negative.    Genitourinary: Negative.  Negative for hematuria.        Home dialysis 9 hours a night    Musculoskeletal: Positive for arthralgias, back pain, gait problem (leg amputation due to MRSA in August), myalgias and neck pain.        MRSA in bones   Allergic/Immunologic: Negative.    Neurological: Positive for weakness and numbness (fingertips, nerve damage from extended use of crutches  ). Negative for dizziness, syncope, light-headedness and headaches.   Hematological: Negative.  Does not bruise/bleed easily.   Psychiatric/Behavioral: Positive for sleep disturbance.       Objective     VITALS: BP (!) 194/116 (BP Location: Left arm, Patient Position: Sitting)   Pulse 94   Ht 170.2 cm (67.01\")   Wt 74.8 kg (165 lb)   SpO2 96%   BMI 25.84 kg/m²     LABS:   Lab Results (most recent)     None          IMAGING:   No Images in the past 120 days found..    EXAM:  Physical Exam  Vitals and nursing note reviewed.   Constitutional:       Appearance: He is well-developed.   HENT:      Head: Normocephalic.   Eyes:      Pupils: Pupils are equal, round, and reactive to light.   Neck:      Thyroid: No thyroid mass.      Vascular: No carotid bruit or JVD.      Trachea: Trachea and phonation normal.   Cardiovascular:      Rate and Rhythm: Normal rate and regular rhythm.      Pulses:        "    Radial pulses are 2+ on the right side and 2+ on the left side.        Posterior tibial pulses are 2+ on the left side.      Heart sounds: Normal heart sounds. No murmur heard.  No friction rub. No gallop.    Pulmonary:      Effort: Pulmonary effort is normal. No respiratory distress.      Breath sounds: Normal breath sounds. No wheezing or rales.   Abdominal:      General: Bowel sounds are normal.      Palpations: Abdomen is soft.   Musculoskeletal:         General: Normal range of motion.      Cervical back: Neck supple.   Skin:     General: Skin is warm and dry.      Capillary Refill: Capillary refill takes less than 2 seconds.      Findings: No rash.          Neurological:      Mental Status: He is alert and oriented to person, place, and time.      Motor: Weakness ( Amputation in wheelchair) present.      Gait: Gait abnormal.   Psychiatric:         Speech: Speech normal.         Behavior: Behavior normal.         Thought Content: Thought content normal.         Judgment: Judgment normal.         Procedure   Procedures       Assessment/Plan    Diagnosis Plan   1. Essential hypertension  Adult Transthoracic Echo Complete W/ Cont if Necessary Per Protocol    Stress Test With Myocardial Perfusion One Day   2. Angina pectoris (HCC)  Adult Transthoracic Echo Complete W/ Cont if Necessary Per Protocol    Stress Test With Myocardial Perfusion One Day   3. SOB (shortness of breath)  Adult Transthoracic Echo Complete W/ Cont if Necessary Per Protocol    Stress Test With Myocardial Perfusion One Day   4. History of COVID-19  Adult Transthoracic Echo Complete W/ Cont if Necessary Per Protocol    Stress Test With Myocardial Perfusion One Day   1.  Due to patient's significant elevation of his blood pressure at 209/121 on arrival.  I would like to start patient on BiDil to see if we can better control his blood pressure.  He was giving a log to monitor his blood pressure over the next 2 weeks and will follow him back in  the office in 4 weeks.  #2.  Due to patient's chest pain, shortness of breath, history of COVID, hypertension, hyperlipidemia and sleep apnea I do feel it is appropriate to have patient go under ischemic evaluation including stress test and echocardiogram.  #3.  Informed of signs and symptoms of ACS and advised to seek emergent treatment for any new worsening symptoms.  Patient also advised sooner follow-up as needed.  Also advised to follow-up with family doctor as needed  This note is dictated utilizing voice recognition software.  Although this record has been proof read, transcriptional errors may still be present. If questions occur regarding the content of this record please do not hesitate to call our office.  I have reviewed and confirmed the accuracy of the ROS as documented by the MA/LPN/RN VICKI Ledesma    Return in about 4 weeks (around 3/8/2022), or if symptoms worsen or fail to improve.    Diagnoses and all orders for this visit:    1. Essential hypertension (Primary)  -     Discontinue: isosorbide-hydrALAZINE (BIDIL) 20-37.5 MG per tablet; Take 1 tablet by mouth 2 (Two) Times a Day.  Dispense: 60 tablet; Refill: 3  -     Adult Transthoracic Echo Complete W/ Cont if Necessary Per Protocol; Future  -     Stress Test With Myocardial Perfusion One Day; Future    2. Angina pectoris (HCC)  -     Adult Transthoracic Echo Complete W/ Cont if Necessary Per Protocol; Future  -     Stress Test With Myocardial Perfusion One Day; Future    3. SOB (shortness of breath)  -     Adult Transthoracic Echo Complete W/ Cont if Necessary Per Protocol; Future  -     Stress Test With Myocardial Perfusion One Day; Future    4. History of COVID-19  -     Adult Transthoracic Echo Complete W/ Cont if Necessary Per Protocol; Future  -     Stress Test With Myocardial Perfusion One Day; Future               MEDS ORDERED DURING VISIT:  No orders of the defined types were placed in this encounter.          This document has  been electronically signed by Feng Nielsen Jr., APRN  February 20, 2022 00:19 EST

## 2022-02-14 DIAGNOSIS — I10 ESSENTIAL HYPERTENSION: ICD-10-CM

## 2022-02-14 RX ORDER — ISOSORBIDE MONONITRATE 30 MG/1
30 TABLET, EXTENDED RELEASE ORAL DAILY
Qty: 30 TABLET | Refills: 11 | Status: SHIPPED | OUTPATIENT
Start: 2022-02-14 | End: 2022-09-13 | Stop reason: SDUPTHER

## 2022-02-14 RX ORDER — HYDRALAZINE HYDROCHLORIDE 25 MG/1
25 TABLET, FILM COATED ORAL 3 TIMES DAILY
Qty: 90 TABLET | Refills: 11 | Status: SHIPPED | OUTPATIENT
Start: 2022-02-14 | End: 2022-03-09

## 2022-02-14 RX ORDER — ISOSORBIDE DINITRATE AND HYDRALAZINE HYDROCHLORIDE 37.5; 2 MG/1; MG/1
1 TABLET ORAL 2 TIMES DAILY
Qty: 60 TABLET | Refills: 3 | Status: SHIPPED | OUTPATIENT
Start: 2022-02-14 | End: 2022-02-14

## 2022-02-15 ENCOUNTER — TELEPHONE (OUTPATIENT)
Dept: CARDIOLOGY | Facility: CLINIC | Age: 57
End: 2022-02-15

## 2022-02-15 NOTE — TELEPHONE ENCOUNTER
[place] called about patient he is on Isosorbide and blood pressure 110/66 and heart rate is 70. I tried to ask questions to get further information but she was out in town and did not have time to discuss information. She stated she would follow the [facility] doctors recommendations. She could not tell me what those are. I asked her to call back in am and let us know of any changes.

## 2022-03-09 ENCOUNTER — OFFICE VISIT (OUTPATIENT)
Dept: CARDIOLOGY | Facility: CLINIC | Age: 57
End: 2022-03-09

## 2022-03-09 VITALS — SYSTOLIC BLOOD PRESSURE: 111 MMHG | OXYGEN SATURATION: 98 % | HEART RATE: 82 BPM | DIASTOLIC BLOOD PRESSURE: 72 MMHG

## 2022-03-09 DIAGNOSIS — I10 ESSENTIAL HYPERTENSION: Primary | ICD-10-CM

## 2022-03-09 PROCEDURE — 99212 OFFICE O/P EST SF 10 MIN: CPT | Performed by: NURSE PRACTITIONER

## 2022-03-09 RX ORDER — CALCITRIOL 0.5 UG/1
0.5 CAPSULE, LIQUID FILLED ORAL DAILY
COMMUNITY

## 2022-03-09 RX ORDER — HEPARIN SODIUM 1000 [USP'U]/ML
1000 INJECTION, SOLUTION INTRAVENOUS; SUBCUTANEOUS NIGHTLY
COMMUNITY

## 2022-03-09 RX ORDER — NYSTATIN 500000 [USP'U]/1
1 TABLET, COATED ORAL EVERY 8 HOURS SCHEDULED
Status: ON HOLD | COMMUNITY
End: 2022-11-30

## 2022-03-09 NOTE — PROGRESS NOTES
Subjective     Chevy Junior is a 56 y.o. male who presents to day for Hypertension.    CHIEF COMPLIANT  Chief Complaint   Patient presents with   • Hypertension       Active Problems:  Problem List Items Addressed This Visit    None     Visit Diagnoses     Essential hypertension    -  Primary    Relevant Medications    hydrALAZINE (APRESOLINE) 25 MG tablet         Problem list  1.  Chest pain  2.  Shortness of breath  3.  Hypertension  4.  Fatigue    HPI  HPI  Mr. Junior is a 56-year-old male patient who is being seen today for chronic arterial hypertension.  Patient has a history of chronic arterial hypertension in which she consistently has blood pressures in the 150s and 160s systolically and diastolic pressures there frequently are greater than 100 mmHg.  He was previously placed on isosorbide and hydralazine which did decrease his blood pressure on 2 occasions but did not have his blood pressure completely controlled.  He does have daily peritoneal dialysis at home.  Due to the 2 episodes of hypotension he was advised to stop both the hydralazine and isosorbide.  Today's blood pressure is controlled at 111/72.  He was able to provide a log of his blood pressures in which we will scanned into his chart for further reference.  Other than his blood pressure overall he feels relatively well.  He denies any angina anginal equivalent symptoms.  He denies any chest pain, shortness of breath, lower extremity edema, palpitations, fatigue, dizziness, lightheadedness, syncope, PND, orthopnea, or strokelike symptoms.  PRIOR MEDS  Current Outpatient Medications on File Prior to Visit   Medication Sig Dispense Refill   • albuterol sulfate  (90 Base) MCG/ACT inhaler Every 4 (Four) Hours.     • amLODIPine (NORVASC) 5 MG tablet 2 (Two) Times a Day.     • buprenorphine-naloxone (SUBOXONE) 8-2 MG per SL tablet PLACE 2 TABLET UNDER TONGUE ONCE A DAY AS DIRECTED     • calcitriol (ROCALTROL) 0.5 MCG capsule Take 0.5 mcg by  mouth Daily. 1 Po MWF     • cefdinir (OMNICEF) 300 MG capsule cefdinir 300 mg capsule     • cinacalcet (SENSIPAR) 60 MG tablet Daily.     • cloNIDine (Catapres) 0.1 MG tablet Take 1 tablet by mouth 3 (Three) Times a Day As Needed for High Blood Pressure (>160/90). 90 tablet 3   • Diclofenac Sodium (VOLTAREN) 1 % gel gel      •  MG capsule      • esomeprazole (nexIUM) 40 MG capsule As Needed.     • Ferric Citrate (AURYXIA PO) Take  by mouth.     • gabapentin (NEURONTIN) 800 MG tablet 3 (Three) Times a Day.     • Heparin Sodium, Porcine, (heparin, porcine,) 1000 UNIT/ML injection Infuse 1,000 Units into a venous catheter Every Night.     • Insulin Glargine (BASAGLAR KWIKPEN) 100 UNIT/ML injection pen      • IRON SUCROSE IV Infuse  into a venous catheter Every 14 (Fourteen) Days. Iron infusion every 2 weeks     • isosorbide mononitrate (IMDUR) 30 MG 24 hr tablet Take 1 tablet by mouth Daily. 30 tablet 11   • LACTULOSE PO Take  by mouth.     • levothyroxine (SYNTHROID, LEVOTHROID) 50 MCG tablet 50 mcg Daily.     • metoprolol tartrate (LOPRESSOR) 25 MG tablet 75 mg 2 (Two) Times a Day.     • minocycline (MINOCIN,DYNACIN) 100 MG capsule Daily.     • mupirocin (BACTROBAN) 2 % ointment mupirocin 2 % topical ointment apply a small amount to the affected area by topical route 3 times per day   Active     • nystatin (MYCOSTATIN) 762469 units tablet Take 1 tablet by mouth Every 8 (Eight) Hours.     • Ondansetron 4 MG film Every 8 (Eight) Hours.     • Patiromer Sorbitex Calcium (Veltassa) 8.4 g pack Take 8.4 g by mouth 3 (Three) Times a Week.     • polyethylene glycol (MIRALAX) 17 GM/SCOOP powder Miralax 17 gram oral powder packet   Take 1 packet every day by oral route.     • sertraline (ZOLOFT) 100 MG tablet Daily.     • silver sulfadiazine (SILVADENE, SSD) 1 % cream      • tamsulosin (FLOMAX) 0.4 MG capsule 24 hr capsule Daily.     • vancomycin 1250 mg/ 250mL IVPB Infuse 1,250 mg into a venous catheter Every 12  (Twelve) Hours.     • atorvastatin (LIPITOR) 40 MG tablet Take 40 mg by mouth Daily.       No current facility-administered medications on file prior to visit.       ALLERGIES  Patient has no known allergies.    HISTORY  Past Medical History:   Diagnosis Date   • Anemia    • Chronic kidney disease    • Chronic pain    • COPD (chronic obstructive pulmonary disease) (HCC)    • Diabetes mellitus (HCC)    • Hyperlipidemia    • Hypertension    • Osteoarthritis    • Phosphorus metabolic disorder    • Sleep apnea        Social History     Socioeconomic History   • Marital status:    Tobacco Use   • Smoking status: Current Every Day Smoker     Packs/day: 1.00     Years: 35.00     Pack years: 35.00     Types: Cigarettes   • Smokeless tobacco: Never Used   Substance and Sexual Activity   • Alcohol use: Never   • Drug use: Not Currently     Comment: pain pills from previous injuries       Family History   Problem Relation Age of Onset   • Hypertension Mother    • Diabetes Father        Review of Systems   Constitutional: Negative for activity change, appetite change, chills, fatigue and fever.   HENT: Negative.  Negative for congestion, rhinorrhea, sinus pressure and sinus pain.    Eyes: Negative for visual disturbance.   Respiratory: Negative.  Negative for apnea, cough, chest tightness, shortness of breath and wheezing.    Cardiovascular: Negative.  Negative for chest pain, palpitations and leg swelling.   Gastrointestinal: Negative.  Negative for abdominal pain, blood in stool, constipation, nausea and vomiting.   Endocrine: Negative.  Negative for cold intolerance and heat intolerance.   Genitourinary: Negative.  Negative for difficulty urinating, dysuria, frequency, hematuria and urgency.   Musculoskeletal: Positive for arthralgias (All throughout body), back pain (Lower back pain-Arthritis), gait problem, neck pain (MyRSA in bone) and neck stiffness (Sees Neuro).   Skin: Negative.  Negative for color change, rash  and wound.   Allergic/Immunologic: Negative.  Negative for environmental allergies and food allergies.   Neurological: Negative for dizziness, syncope, weakness, light-headedness and numbness.   Hematological: Does not bruise/bleed easily.   Psychiatric/Behavioral: Negative.  Negative for sleep disturbance.       Objective     VITALS: /72 (BP Location: Left arm, Patient Position: Sitting, Cuff Size: Adult)   Pulse 82   SpO2 98%     LABS:   Lab Results (most recent)     None          IMAGING:   No Images in the past 120 days found..    EXAM:  Physical Exam  Vitals and nursing note reviewed.   Constitutional:       Appearance: He is well-developed.   Eyes:      Pupils: Pupils are equal, round, and reactive to light.   Neck:      Thyroid: No thyroid mass.      Vascular: No carotid bruit or JVD.      Trachea: Trachea and phonation normal.   Cardiovascular:      Rate and Rhythm: Normal rate and regular rhythm.      Pulses:           Radial pulses are 2+ on the right side and 2+ on the left side.      Heart sounds: Normal heart sounds. No murmur heard.    No friction rub. No gallop.   Pulmonary:      Effort: Pulmonary effort is normal. No respiratory distress.      Breath sounds: Normal breath sounds. No wheezing or rales.   Musculoskeletal:         General: Normal range of motion.      Cervical back: Neck supple.   Skin:     General: Skin is warm and dry.      Capillary Refill: Capillary refill takes less than 2 seconds.      Findings: No rash.   Neurological:      Mental Status: He is alert and oriented to person, place, and time.   Psychiatric:         Speech: Speech normal.         Behavior: Behavior normal.         Thought Content: Thought content normal.         Judgment: Judgment normal.         Procedure   Procedures       Assessment/Plan    Diagnosis Plan   1. Essential hypertension  hydrALAZINE (APRESOLINE) 25 MG tablet   1.  Patient does have extensive chronic arterial hypertension.  He has had 2  episodes of hypotension in the recent past and his medications were decreased and advised to be held at the time.  Due to persistent hypertensive readings I do feel that the patient needs antihypertensive therapy.  We will restart patient back on the the hydralazine but with the less frequent dosing.  If patient's blood pressure is low the dose of hydralazine will be held.  They will continue to monitor the blood pressure and keep a log and notify me in 1 to 2 weeks of any significant highs or lows.  2.  Informed of signs and symptoms of ACS and advised to seek emergent treatment for any new worsening symptoms.  Patient also advised sooner follow-up as needed.  Also advised to follow-up with family doctor as needed  This note is dictated utilizing voice recognition software.  Although this record has been proof read, transcriptional errors may still be present. If questions occur regarding the content of this record please do not hesitate to call our office.  I have reviewed and confirmed the accuracy of the ROS as documented by the MA/LPN/RN VICKI Ledesma    No follow-ups on file.    Diagnoses and all orders for this visit:    1. Essential hypertension (Primary)  -     hydrALAZINE (APRESOLINE) 25 MG tablet; Take 1 tablet by mouth 3 (Three) Times a Day.  Dispense: 90 tablet; Refill: 11        Patient brought in medicine list to appointment, it's been reviewed with patient and med list was updated in the chart.          Patient's There is no height or weight on file to calculate BMI.            MEDS ORDERED DURING VISIT:  New Medications Ordered This Visit   Medications   • hydrALAZINE (APRESOLINE) 25 MG tablet     Sig: Take 1 tablet by mouth 3 (Three) Times a Day.     Dispense:  90 tablet     Refill:  11           This document has been electronically signed by VICKI Ledesma Jr.  March 16, 2022 05:39 EDT

## 2022-03-15 ENCOUNTER — HOSPITAL ENCOUNTER (OUTPATIENT)
Dept: CARDIOLOGY | Facility: HOSPITAL | Age: 57
Discharge: HOME | End: 2022-03-15

## 2022-03-15 DIAGNOSIS — I10 ESSENTIAL HYPERTENSION: ICD-10-CM

## 2022-03-15 DIAGNOSIS — R06.02 SOB (SHORTNESS OF BREATH): ICD-10-CM

## 2022-03-15 DIAGNOSIS — I20.9 ANGINA PECTORIS: ICD-10-CM

## 2022-03-15 DIAGNOSIS — Z86.16 HISTORY OF COVID-19: ICD-10-CM

## 2022-03-15 LAB
BH CV REST NUCLEAR ISOTOPE DOSE: 10 MCI
BH CV STRESS COMMENTS STAGE 1: NORMAL
BH CV STRESS DOSE REGADENOSON STAGE 1: 0.4
BH CV STRESS DURATION MIN STAGE 1: 0
BH CV STRESS DURATION SEC STAGE 1: 10
BH CV STRESS NUCLEAR ISOTOPE DOSE: 30 MCI
BH CV STRESS PROTOCOL 1: NORMAL
BH CV STRESS RECOVERY BP: NORMAL MMHG
BH CV STRESS RECOVERY HR: 76 BPM
BH CV STRESS STAGE 1: 1
MAXIMAL PREDICTED HEART RATE: 163 BPM
PERCENT MAX PREDICTED HR: 41.72 %
STRESS BASELINE BP: NORMAL MMHG
STRESS BASELINE HR: 65 BPM
STRESS PERCENT HR: 49 %
STRESS POST PEAK BP: NORMAL MMHG
STRESS POST PEAK HR: 68 BPM
STRESS TARGET HR: 139 BPM

## 2022-03-15 PROCEDURE — 78452 HT MUSCLE IMAGE SPECT MULT: CPT

## 2022-03-15 PROCEDURE — 93018 CV STRESS TEST I&R ONLY: CPT | Performed by: INTERNAL MEDICINE

## 2022-03-15 PROCEDURE — 25010000002 REGADENOSON 0.4 MG/5ML SOLUTION: Performed by: INTERNAL MEDICINE

## 2022-03-15 PROCEDURE — 93306 TTE W/DOPPLER COMPLETE: CPT | Performed by: INTERNAL MEDICINE

## 2022-03-15 PROCEDURE — 78452 HT MUSCLE IMAGE SPECT MULT: CPT | Performed by: INTERNAL MEDICINE

## 2022-03-15 PROCEDURE — A9500 TC99M SESTAMIBI: HCPCS | Performed by: INTERNAL MEDICINE

## 2022-03-15 PROCEDURE — 93306 TTE W/DOPPLER COMPLETE: CPT

## 2022-03-15 PROCEDURE — 0 TECHNETIUM SESTAMIBI: Performed by: INTERNAL MEDICINE

## 2022-03-15 PROCEDURE — 93017 CV STRESS TEST TRACING ONLY: CPT

## 2022-03-15 RX ADMIN — TECHNETIUM TC 99M SESTAMIBI 1 DOSE: 1 INJECTION INTRAVENOUS at 10:02

## 2022-03-15 RX ADMIN — REGADENOSON 0.4 MG: 0.08 INJECTION, SOLUTION INTRAVENOUS at 13:10

## 2022-03-15 RX ADMIN — TECHNETIUM TC 99M SESTAMIBI 1 DOSE: 1 INJECTION INTRAVENOUS at 13:11

## 2022-03-16 RX ORDER — HYDRALAZINE HYDROCHLORIDE 25 MG/1
25 TABLET, FILM COATED ORAL 3 TIMES DAILY
Qty: 90 TABLET | Refills: 11 | Status: SHIPPED | OUTPATIENT
Start: 2022-03-16 | End: 2022-09-13 | Stop reason: SDUPTHER

## 2022-03-17 ENCOUNTER — TELEPHONE (OUTPATIENT)
Dept: CARDIOLOGY | Facility: CLINIC | Age: 57
End: 2022-03-17

## 2022-03-17 NOTE — TELEPHONE ENCOUNTER
Patient returned call and informed of results. Follow up scheduled 3/22 at 10:15, patient not available on 3/23. Princess Simons MA      Left message for patient to return call. Princess Simons MA        Follow up scheduled 3/23 at 3 if patient is available.     ----- Message from VICKI Ledesma sent at 3/17/2022  8:20 AM EDT -----  Positive stress test 1 to 2-week follow-up.  ----- Message -----  From: Hal Wing MD  Sent: 3/15/2022   9:16 PM EDT  To: VICKI Ledesma      Result Text  1.  Scintigraphy demonstrates a moderate sized only mildly dense inferobasilar, basilar diaphragmatic, basilar inferolateral wall ischemic defect.     2.  Preserved post-rest ejection fraction of 56% with mid anterior hypokinesis.     3.  No evidence of pharmacologically induced transient ischemic dilation or increased lung uptake of radiopharmaceutical.

## 2022-03-22 ENCOUNTER — OFFICE VISIT (OUTPATIENT)
Dept: CARDIOLOGY | Facility: CLINIC | Age: 57
End: 2022-03-22

## 2022-03-22 VITALS
WEIGHT: 170 LBS | HEIGHT: 67 IN | OXYGEN SATURATION: 95 % | SYSTOLIC BLOOD PRESSURE: 116 MMHG | HEART RATE: 77 BPM | DIASTOLIC BLOOD PRESSURE: 72 MMHG | BODY MASS INDEX: 26.68 KG/M2

## 2022-03-22 DIAGNOSIS — R06.02 SOB (SHORTNESS OF BREATH): ICD-10-CM

## 2022-03-22 DIAGNOSIS — I10 ESSENTIAL HYPERTENSION: ICD-10-CM

## 2022-03-22 DIAGNOSIS — I20.9 ANGINA PECTORIS: ICD-10-CM

## 2022-03-22 DIAGNOSIS — R94.39 POSITIVE CARDIAC STRESS TEST: Primary | ICD-10-CM

## 2022-03-22 PROCEDURE — 99214 OFFICE O/P EST MOD 30 MIN: CPT | Performed by: NURSE PRACTITIONER

## 2022-03-22 NOTE — PROGRESS NOTES
Subjective     Chevy Junior is a 56 y.o. male who presents to day for Follow-up (Test results ).    CHIEF COMPLIANT  Chief Complaint   Patient presents with   • Follow-up     Test results        Active Problems:  Problem List Items Addressed This Visit    None     Visit Diagnoses     Positive cardiac stress test    -  Primary    Relevant Medications    metoprolol tartrate (LOPRESSOR) 100 MG tablet    nitroglycerin (NITROSTAT) 0.4 MG SL tablet    Other Relevant Orders    CT Angiogram Heart With 3D Image    Essential hypertension        Relevant Medications    metoprolol tartrate (LOPRESSOR) 100 MG tablet    nitroglycerin (NITROSTAT) 0.4 MG SL tablet    Other Relevant Orders    CT Angiogram Heart With 3D Image    Angina pectoris (HCC)        Relevant Medications    metoprolol tartrate (LOPRESSOR) 100 MG tablet    nitroglycerin (NITROSTAT) 0.4 MG SL tablet    Other Relevant Orders    CT Angiogram Heart With 3D Image    SOB (shortness of breath)        Relevant Medications    metoprolol tartrate (LOPRESSOR) 100 MG tablet    nitroglycerin (NITROSTAT) 0.4 MG SL tablet    Other Relevant Orders    CT Angiogram Heart With 3D Image      Problem list  1.  Chest pain  1.1 stress test 3/22: Moderately sized only mildly dense inferior basilar, basilar diaphragmatic, and basilar inferior lateral wall ischemic defect, preserved post-rest EF of 56% with anterior hypokinesis  2.  Shortness of breath  2.1 echocardiogram 3/22: EF 55 to 60%, grade 1A diastolic dysfunction, trivial to mild TR and MR, trivial AI, dilated aortic root of 4.1 cm, pulmonary artery systolic pressures high 20s  3.  Hypertension  4.  Fatigue  5.  Chronic renal failure on daily peritoneal dialysis    HPI  HPI  Mr. Junior is a 56-year-old male patient who is being followed up today for ischemic evaluation and stress test and echocardiogram results.  Patient did have a positive stress test that indicated Moderately sized only mildly dense inferior basilar,  basilar diaphragmatic, and basilar inferior lateral wall ischemic defect, preserved post-rest EF of 56% with anterior hypokinesis.  His echocardiogram was unremarkable.  Patient previously reported chest pain that mainly occurs in his left arm and radiate into his back and his neck.  It occurs intermittently and described as a pressure-like sensation.  He does have associated diaphoresis.  It occurs both with rest and activity.  He also reported shortness of breath that occurs with minimal activity but usually does not occur at rest.  He no longer reports the symptoms at this time.  He denies any fatigue, chest pain, shortness of breath, lower extremity edema, palpitations, dizziness, lightheadedness, syncope, PND, orthopnea, or strokelike symptoms.    PRIOR MEDS  Current Outpatient Medications on File Prior to Visit   Medication Sig Dispense Refill   • albuterol sulfate  (90 Base) MCG/ACT inhaler Every 4 (Four) Hours.     • amLODIPine (NORVASC) 5 MG tablet 2 (Two) Times a Day.     • atorvastatin (LIPITOR) 40 MG tablet Take 40 mg by mouth Daily.     • buprenorphine-naloxone (SUBOXONE) 8-2 MG per SL tablet PLACE 2 TABLET UNDER TONGUE ONCE A DAY AS DIRECTED     • calcitriol (ROCALTROL) 0.5 MCG capsule Take 0.5 mcg by mouth Daily. 1 Po MWF     • cefdinir (OMNICEF) 300 MG capsule cefdinir 300 mg capsule     • cinacalcet (SENSIPAR) 60 MG tablet Daily.     • cloNIDine (Catapres) 0.1 MG tablet Take 1 tablet by mouth 3 (Three) Times a Day As Needed for High Blood Pressure (>160/90). 90 tablet 3   • Diclofenac Sodium (VOLTAREN) 1 % gel gel      •  MG capsule      • esomeprazole (nexIUM) 40 MG capsule As Needed.     • Ferric Citrate (AURYXIA PO) Take  by mouth. 2 tablets twice a day     • gabapentin (NEURONTIN) 800 MG tablet 3 (Three) Times a Day.     • Heparin Sodium, Porcine, (heparin, porcine,) 1000 UNIT/ML injection Infuse 1,000 Units into a venous catheter Every Night.     • hydrALAZINE (APRESOLINE) 25 MG  tablet Take 1 tablet by mouth 3 (Three) Times a Day. 90 tablet 11   • Insulin Glargine (BASAGLAR KWIKPEN) 100 UNIT/ML injection pen Inject 30 Units under the skin into the appropriate area as directed Daily.     • IRON SUCROSE IV Infuse  into a venous catheter Every 14 (Fourteen) Days. Iron infusion every 2 weeks     • isosorbide mononitrate (IMDUR) 30 MG 24 hr tablet Take 1 tablet by mouth Daily. 30 tablet 11   • LACTULOSE PO Take  by mouth.     • levothyroxine (SYNTHROID, LEVOTHROID) 50 MCG tablet 50 mcg Daily.     • metoprolol tartrate (LOPRESSOR) 25 MG tablet 75 mg 2 (Two) Times a Day.     • minocycline (MINOCIN,DYNACIN) 100 MG capsule Daily.     • mupirocin (BACTROBAN) 2 % ointment mupirocin 2 % topical ointment apply a small amount to the affected area by topical route 3 times per day   Active     • NON FORMULARY Velpafa 8.4 gram 2 times a week     • nystatin (MYCOSTATIN) 323450 units tablet Take 1 tablet by mouth Every 8 (Eight) Hours.     • Ondansetron 4 MG film Every 8 (Eight) Hours.     • Patiromer Sorbitex Calcium (Veltassa) 8.4 g pack Take 8.4 g by mouth 3 (Three) Times a Week.     • polyethylene glycol (MIRALAX) 17 GM/SCOOP powder Miralax 17 gram oral powder packet   Take 1 packet every day by oral route.     • sertraline (ZOLOFT) 100 MG tablet Daily.     • silver sulfadiazine (SILVADENE, SSD) 1 % cream      • tamsulosin (FLOMAX) 0.4 MG capsule 24 hr capsule Daily.     • vancomycin 1250 mg/ 250mL IVPB Infuse 1,250 mg into a venous catheter Every 12 (Twelve) Hours.       No current facility-administered medications on file prior to visit.       ALLERGIES  Patient has no known allergies.    HISTORY  Past Medical History:   Diagnosis Date   • Anemia    • Chronic kidney disease    • Chronic pain    • COPD (chronic obstructive pulmonary disease) (HCC)    • Diabetes mellitus (HCC)    • Hyperlipidemia    • Hypertension    • Osteoarthritis    • Phosphorus metabolic disorder    • Sleep apnea        Social  History     Socioeconomic History   • Marital status:    Tobacco Use   • Smoking status: Current Every Day Smoker     Packs/day: 1.00     Years: 35.00     Pack years: 35.00     Types: Cigarettes   • Smokeless tobacco: Never Used   Substance and Sexual Activity   • Alcohol use: Never   • Drug use: Not Currently     Comment: pain pills from previous injuries       Family History   Problem Relation Age of Onset   • Hypertension Mother    • Diabetes Father        Review of Systems   Constitutional: Negative for appetite change, chills, fatigue and fever.   HENT: Negative for congestion, rhinorrhea and sore throat.    Eyes: Negative for visual disturbance.   Respiratory: Negative for cough, chest tightness, shortness of breath and wheezing.    Cardiovascular: Negative for chest pain, palpitations and leg swelling.   Gastrointestinal: Negative for abdominal pain, blood in stool, constipation, diarrhea, nausea and vomiting.   Endocrine: Negative for cold intolerance and heat intolerance.   Genitourinary: Negative for difficulty urinating, dysuria, frequency, hematuria and urgency.   Musculoskeletal: Positive for arthralgias (through out the body ), neck pain (back of the neck ( bone to bone ) he wears a neck collar to help ) and neck stiffness (stiffness when turning the neck either direction to the left and right ). Negative for back pain and joint swelling.   Skin: Negative for color change, pallor, rash and wound.   Allergic/Immunologic: Negative for environmental allergies and food allergies.   Neurological: Positive for weakness (right side weakness ) and numbness (due to the neck and right shoulder ). Negative for dizziness, light-headedness and headaches.   Hematological: Does not bruise/bleed easily.   Psychiatric/Behavioral: Positive for sleep disturbance (cpac machine but can wear the mask ).       Objective     VITALS: /72 (BP Location: Left arm, Patient Position: Sitting)   Pulse 77   Ht 170.2  "cm (67\")   Wt 77.1 kg (170 lb)   SpO2 95%   BMI 26.63 kg/m²     LABS:   Lab Results (most recent)     None          IMAGING:   No Images in the past 120 days found..    EXAM:  Physical Exam  Vitals and nursing note reviewed.   Constitutional:       Appearance: He is well-developed.   HENT:      Head: Normocephalic.   Eyes:      Pupils: Pupils are equal, round, and reactive to light.   Neck:      Thyroid: No thyroid mass.      Vascular: No carotid bruit or JVD.      Trachea: Trachea and phonation normal.   Cardiovascular:      Rate and Rhythm: Normal rate and regular rhythm.      Pulses:           Radial pulses are 2+ on the right side and 2+ on the left side.        Posterior tibial pulses are 2+ on the left side.      Heart sounds: Normal heart sounds. No murmur heard.    No friction rub. No gallop.   Pulmonary:      Effort: Pulmonary effort is normal. No respiratory distress.      Breath sounds: Normal breath sounds. No wheezing or rales.   Abdominal:      General: Bowel sounds are normal.      Palpations: Abdomen is soft.   Musculoskeletal:         General: Normal range of motion.      Cervical back: Neck supple.   Skin:     General: Skin is warm and dry.      Capillary Refill: Capillary refill takes less than 2 seconds.      Findings: No rash.          Neurological:      Mental Status: He is alert and oriented to person, place, and time.   Psychiatric:         Speech: Speech normal.         Behavior: Behavior normal.         Thought Content: Thought content normal.         Judgment: Judgment normal.         Procedure   Procedures       Assessment/Plan    Diagnosis Plan   1. Positive cardiac stress test  CT Angiogram Heart With 3D Image    metoprolol tartrate (LOPRESSOR) 100 MG tablet    nitroglycerin (NITROSTAT) 0.4 MG SL tablet   2. Essential hypertension  CT Angiogram Heart With 3D Image    metoprolol tartrate (LOPRESSOR) 100 MG tablet    nitroglycerin (NITROSTAT) 0.4 MG SL tablet   3. Angina pectoris (HCC) "  CT Angiogram Heart With 3D Image    metoprolol tartrate (LOPRESSOR) 100 MG tablet    nitroglycerin (NITROSTAT) 0.4 MG SL tablet   4. SOB (shortness of breath)  CT Angiogram Heart With 3D Image    metoprolol tartrate (LOPRESSOR) 100 MG tablet    nitroglycerin (NITROSTAT) 0.4 MG SL tablet   1.  Due to positive stress test that identifiedModerately sized only mildly dense inferior basilar, basilar diaphragmatic, and basilar inferior lateral wall ischemic defect, preserved post-rest EF of 56% with anterior hypokinesis we did discuss in detail the options of CTA versus left heart cath and patient wishes to move forth with CTA of the heart.  It was felt that this would deliver a lower contrast load and less likely cause fluid overload.  He is also having minimal symptoms at this time.  Patient has elected to undergo CTA of the heart.  I will prescribe metoprolol 100 mg to be taken the morning of the CTA and to take an additional 100 mg dose with him to the procedure and only take if advised by the radiologist.  Patient also advised to take nitroglycerin with him as well.  In addition I will assess patient's BMP prior to the CTA to evaluate renal function.  2.  Patient's blood pressure is controlled on current blood pressure medication regimen.  No medication changes are warranted at this time.  Patient advised to monitor blood pressure on a daily basis and report any persistent highs or lows.  Set goal blood pressure for patient at 130/80 or below.  3.  Informed of signs and symptoms of ACS and advised to seek emergent treatment for any new worsening symptoms.  Patient also advised sooner follow-up as needed.  Also advised to follow-up with family doctor as needed  This note is dictated utilizing voice recognition software.  Although this record has been proof read, transcriptional errors may still be present. If questions occur regarding the content of this record please do not hesitate to call our office.  I have  reviewed and confirmed the accuracy of the ROS as documented by the MA/LPN/RN VICKI Ledesma      No follow-ups on file.    Diagnoses and all orders for this visit:    1. Positive cardiac stress test (Primary)  -     CT Angiogram Heart With 3D Image; Future  -     metoprolol tartrate (LOPRESSOR) 100 MG tablet; 7am morning of cta and take second dose only if instructed to do so  Dispense: 2 tablet; Refill: 0  -     nitroglycerin (NITROSTAT) 0.4 MG SL tablet; 1 under the tongue as needed for angina, may repeat q5mins for up three doses  Dispense: 30 tablet; Refill: 5    2. Essential hypertension  -     CT Angiogram Heart With 3D Image; Future  -     metoprolol tartrate (LOPRESSOR) 100 MG tablet; 7am morning of cta and take second dose only if instructed to do so  Dispense: 2 tablet; Refill: 0  -     nitroglycerin (NITROSTAT) 0.4 MG SL tablet; 1 under the tongue as needed for angina, may repeat q5mins for up three doses  Dispense: 30 tablet; Refill: 5    3. Angina pectoris (HCC)  -     CT Angiogram Heart With 3D Image; Future  -     metoprolol tartrate (LOPRESSOR) 100 MG tablet; 7am morning of cta and take second dose only if instructed to do so  Dispense: 2 tablet; Refill: 0  -     nitroglycerin (NITROSTAT) 0.4 MG SL tablet; 1 under the tongue as needed for angina, may repeat q5mins for up three doses  Dispense: 30 tablet; Refill: 5    4. SOB (shortness of breath)  -     CT Angiogram Heart With 3D Image; Future  -     metoprolol tartrate (LOPRESSOR) 100 MG tablet; 7am morning of cta and take second dose only if instructed to do so  Dispense: 2 tablet; Refill: 0  -     nitroglycerin (NITROSTAT) 0.4 MG SL tablet; 1 under the tongue as needed for angina, may repeat q5mins for up three doses  Dispense: 30 tablet; Refill: 5        Chevy Junior  reports that he has been smoking cigarettes. He has a 35.00 pack-year smoking history. He has never used smokeless tobacco.. I have educated him on the risk of diseases from  using tobacco products such as cancer, COPD and heart disease.     I advised him to quit and he is not willing to quit.    I spent 3  minutes counseling the patient.                      MEDS ORDERED DURING VISIT:  New Medications Ordered This Visit   Medications   • metoprolol tartrate (LOPRESSOR) 100 MG tablet     [place] morning of cta and take second dose only if instructed to do so     Dispense:  2 tablet     Refill:  0   • nitroglycerin (NITROSTAT) 0.4 MG SL tablet     Si under the tongue as needed for angina, may repeat q5mins for up three doses     Dispense:  30 tablet     Refill:  5           This document has been electronically signed by Feng Nielsen Jr., APRN  2022 00:56 EDT

## 2022-03-27 LAB
BH CV ECHO MEAS - ACS: 2.4 CM
BH CV ECHO MEAS - AO MAX PG (FULL): 7.1 MMHG
BH CV ECHO MEAS - AO MAX PG: 9.5 MMHG
BH CV ECHO MEAS - AO MEAN PG (FULL): 4 MMHG
BH CV ECHO MEAS - AO MEAN PG: 5 MMHG
BH CV ECHO MEAS - AO ROOT AREA (BSA CORRECTED): 2.2
BH CV ECHO MEAS - AO ROOT AREA: 13.2 CM^2
BH CV ECHO MEAS - AO ROOT DIAM: 4.1 CM
BH CV ECHO MEAS - AO V2 MAX: 154.3 CM/SEC
BH CV ECHO MEAS - AO V2 MEAN: 108 CM/SEC
BH CV ECHO MEAS - AO V2 VTI: 30.9 CM
BH CV ECHO MEAS - AVA(I,A): 1.7 CM^2
BH CV ECHO MEAS - AVA(I,D): 1.7 CM^2
BH CV ECHO MEAS - AVA(V,A): 1.6 CM^2
BH CV ECHO MEAS - AVA(V,D): 1.6 CM^2
BH CV ECHO MEAS - BSA(HAYCOCK): 1.9 M^2
BH CV ECHO MEAS - BSA: 1.9 M^2
BH CV ECHO MEAS - BZI_BMI: 25.8 KILOGRAMS/M^2
BH CV ECHO MEAS - BZI_METRIC_HEIGHT: 170.2 CM
BH CV ECHO MEAS - BZI_METRIC_WEIGHT: 74.8 KG
BH CV ECHO MEAS - EDV(CUBED): 55.7 ML
BH CV ECHO MEAS - EDV(MOD-SP4): 80.2 ML
BH CV ECHO MEAS - EDV(TEICH): 62.7 ML
BH CV ECHO MEAS - EF(CUBED): 60.6 %
BH CV ECHO MEAS - EF(MOD-SP4): 66.8 %
BH CV ECHO MEAS - EF(TEICH): 52.9 %
BH CV ECHO MEAS - EF_3D-VOL: 59 %
BH CV ECHO MEAS - ESV(CUBED): 22 ML
BH CV ECHO MEAS - ESV(MOD-SP4): 26.6 ML
BH CV ECHO MEAS - ESV(TEICH): 29.6 ML
BH CV ECHO MEAS - FS: 26.7 %
BH CV ECHO MEAS - IVS/LVPW: 0.94
BH CV ECHO MEAS - IVSD: 1.5 CM
BH CV ECHO MEAS - LA DIMENSION: 3.5 CM
BH CV ECHO MEAS - LA/AO: 0.85
BH CV ECHO MEAS - LV DIASTOLIC VOL/BSA (35-75): 43 ML/M^2
BH CV ECHO MEAS - LV IVRT: 0.11 SEC
BH CV ECHO MEAS - LV MASS(C)D: 226.4 GRAMS
BH CV ECHO MEAS - LV MASS(C)DI: 121.5 GRAMS/M^2
BH CV ECHO MEAS - LV MAX PG: 2.5 MMHG
BH CV ECHO MEAS - LV MEAN PG: 1 MMHG
BH CV ECHO MEAS - LV SYSTOLIC VOL/BSA (12-30): 14.3 ML/M^2
BH CV ECHO MEAS - LV V1 MAX: 78.4 CM/SEC
BH CV ECHO MEAS - LV V1 MEAN: 57.1 CM/SEC
BH CV ECHO MEAS - LV V1 VTI: 16.7 CM
BH CV ECHO MEAS - LVIDD: 3.8 CM
BH CV ECHO MEAS - LVIDS: 2.8 CM
BH CV ECHO MEAS - LVLD AP4: 6.7 CM
BH CV ECHO MEAS - LVLS AP4: 6.7 CM
BH CV ECHO MEAS - LVOT AREA (M): 3.1 CM^2
BH CV ECHO MEAS - LVOT AREA: 3.1 CM^2
BH CV ECHO MEAS - LVOT DIAM: 2 CM
BH CV ECHO MEAS - LVPWD: 1.6 CM
BH CV ECHO MEAS - MV A MAX VEL: 101 CM/SEC
BH CV ECHO MEAS - MV DEC SLOPE: 271.5 CM/SEC^2
BH CV ECHO MEAS - MV E MAX VEL: 60 CM/SEC
BH CV ECHO MEAS - MV E/A: 0.59
BH CV ECHO MEAS - MV MAX PG: 4.2 MMHG
BH CV ECHO MEAS - MV MEAN PG: 2 MMHG
BH CV ECHO MEAS - MV P1/2T MAX VEL: 82.8 CM/SEC
BH CV ECHO MEAS - MV P1/2T: 89.3 MSEC
BH CV ECHO MEAS - MV V2 MAX: 102 CM/SEC
BH CV ECHO MEAS - MV V2 MEAN: 61.1 CM/SEC
BH CV ECHO MEAS - MV V2 VTI: 26.1 CM
BH CV ECHO MEAS - MVA P1/2T LCG: 2.7 CM^2
BH CV ECHO MEAS - MVA(P1/2T): 2.5 CM^2
BH CV ECHO MEAS - MVA(VTI): 2 CM^2
BH CV ECHO MEAS - RAP SYSTOLE: 10 MMHG
BH CV ECHO MEAS - RVDD: 2.5 CM
BH CV ECHO MEAS - RVSP: 28.3 MMHG
BH CV ECHO MEAS - SI(AO): 218.9 ML/M^2
BH CV ECHO MEAS - SI(CUBED): 18.1 ML/M^2
BH CV ECHO MEAS - SI(LVOT): 28.2 ML/M^2
BH CV ECHO MEAS - SI(MOD-SP4): 28.8 ML/M^2
BH CV ECHO MEAS - SI(TEICH): 17.8 ML/M^2
BH CV ECHO MEAS - SV(AO): 408 ML
BH CV ECHO MEAS - SV(CUBED): 33.8 ML
BH CV ECHO MEAS - SV(LVOT): 52.5 ML
BH CV ECHO MEAS - SV(MOD-SP4): 53.6 ML
BH CV ECHO MEAS - SV(TEICH): 33.2 ML
BH CV ECHO MEAS - TR MAX VEL: 214 CM/SEC
MAXIMAL PREDICTED HEART RATE: 163 BPM
STRESS TARGET HR: 139 BPM

## 2022-03-28 RX ORDER — METOPROLOL TARTRATE 100 MG/1
TABLET ORAL
Qty: 2 TABLET | Refills: 0 | Status: SHIPPED | OUTPATIENT
Start: 2022-03-28 | End: 2022-09-13 | Stop reason: SDUPTHER

## 2022-03-28 RX ORDER — NITROGLYCERIN 0.4 MG/1
TABLET SUBLINGUAL
Qty: 30 TABLET | Refills: 5 | Status: SHIPPED | OUTPATIENT
Start: 2022-03-28 | End: 2022-09-13 | Stop reason: SDUPTHER

## 2022-03-29 ENCOUNTER — TELEPHONE (OUTPATIENT)
Dept: CARDIOLOGY | Facility: CLINIC | Age: 57
End: 2022-03-29

## 2022-03-29 DIAGNOSIS — N18.9 ACUTE RENAL FAILURE SUPERIMPOSED ON CHRONIC KIDNEY DISEASE, ON CHRONIC DIALYSIS, UNSPECIFIED ACUTE RENAL FAILURE TYPE: Primary | ICD-10-CM

## 2022-03-29 DIAGNOSIS — Z99.2 ACUTE RENAL FAILURE SUPERIMPOSED ON CHRONIC KIDNEY DISEASE, ON CHRONIC DIALYSIS, UNSPECIFIED ACUTE RENAL FAILURE TYPE: Primary | ICD-10-CM

## 2022-03-29 DIAGNOSIS — N17.9 ACUTE RENAL FAILURE SUPERIMPOSED ON CHRONIC KIDNEY DISEASE, ON CHRONIC DIALYSIS, UNSPECIFIED ACUTE RENAL FAILURE TYPE: Primary | ICD-10-CM

## 2022-03-29 NOTE — TELEPHONE ENCOUNTER
Called pt and left a detailed message (ok per verbal release) advising echo results show no acute findings and keep routine follow up on 06/14/22 @ 1pm.    ----- Message from Kaykay Schwarz sent at 3/28/2022  5:27 PM EDT -----    ----- Message -----  From: Feng Nielsen APRN  Sent: 3/28/2022   9:47 AM EDT  To: Princess Simons MA    There is no acute findings on the echocardiogram.  Keep follow-up.  ----- Message -----  From: Hal Wing MD  Sent: 3/27/2022  10:30 PM EDT  To: VICKI Ledesma

## 2022-03-29 NOTE — PROGRESS NOTES
Called pt and left a detailed message (ok per verbal release) advising echo results show no acute findings and keep routine follow up on 06/14/22 @ 1pm.

## 2022-03-30 DIAGNOSIS — N18.9 ACUTE RENAL FAILURE SUPERIMPOSED ON CHRONIC KIDNEY DISEASE, ON CHRONIC DIALYSIS, UNSPECIFIED ACUTE RENAL FAILURE TYPE: Primary | ICD-10-CM

## 2022-03-30 DIAGNOSIS — R06.02 SOB (SHORTNESS OF BREATH): ICD-10-CM

## 2022-03-30 DIAGNOSIS — N17.9 ACUTE RENAL FAILURE SUPERIMPOSED ON CHRONIC KIDNEY DISEASE, ON CHRONIC DIALYSIS, UNSPECIFIED ACUTE RENAL FAILURE TYPE: Primary | ICD-10-CM

## 2022-03-30 DIAGNOSIS — Z99.2 ACUTE RENAL FAILURE SUPERIMPOSED ON CHRONIC KIDNEY DISEASE, ON CHRONIC DIALYSIS, UNSPECIFIED ACUTE RENAL FAILURE TYPE: Primary | ICD-10-CM

## 2022-03-30 DIAGNOSIS — R94.39 POSITIVE CARDIAC STRESS TEST: ICD-10-CM

## 2022-03-30 DIAGNOSIS — R07.2 PRECORDIAL PAIN: ICD-10-CM

## 2022-03-30 DIAGNOSIS — I10 ESSENTIAL HYPERTENSION: ICD-10-CM

## 2022-03-30 DIAGNOSIS — I20.9 ANGINA PECTORIS: ICD-10-CM

## 2022-04-04 ENCOUNTER — TELEPHONE (OUTPATIENT)
Dept: CARDIOLOGY | Facility: CLINIC | Age: 57
End: 2022-04-04

## 2022-04-04 NOTE — TELEPHONE ENCOUNTER
CALLED [facility] AND ASKED FOR CLEARANCE FOR CTA CORONARY.    Analy Edwards, RegSched Rep

## 2022-04-11 ENCOUNTER — TRANSCRIBE ORDERS (OUTPATIENT)
Dept: VASCULAR SURGERY | Facility: HOSPITAL | Age: 57
End: 2022-04-11

## 2022-04-11 DIAGNOSIS — I65.23 BILATERAL CAROTID ARTERY STENOSIS: Primary | ICD-10-CM

## 2022-04-13 ENCOUNTER — TELEPHONE (OUTPATIENT)
Dept: CARDIOLOGY | Facility: CLINIC | Age: 57
End: 2022-04-13

## 2022-04-13 NOTE — TELEPHONE ENCOUNTER
Patient is on dialysis and was needing Nephrology clearance for a CTA Coronary. I called [facility] and spoke to Kathi last week 04/07, she spoke with Dr. Villasenor and at first he said NO. She was going to get clarification today 04/13/22 because he was going to be in office.  Kathi called back today and Dr. Villasenor said that he had already approved pt to have a CT scan done by [facility]- Dr. Breana Weinberg they seen Carotid stenosis during an eye exam.  Pt is scheduled to have a Caroitd Duplex on 4/26 in [place] and then to see a vascular doctor within [facility] [place] by [facility] directly after, .  Will discuss all this with VICKI ZHENG to see how to proceed.    Kaykay Kim Rep

## 2022-04-19 NOTE — TELEPHONE ENCOUNTER
Need to call patient about proceeding with CATH.    Attempted to call patient on 04/15 and there was no answer, LVM for them to call me back asap.     Attempted to call patient on 04/19 and there was no answer, LVM for them to call back asap.      Kaykay Kim Rep

## 2022-04-21 ENCOUNTER — TELEPHONE (OUTPATIENT)
Dept: CARDIOLOGY | Facility: CLINIC | Age: 57
End: 2022-04-21

## 2022-04-21 DIAGNOSIS — R06.02 SOB (SHORTNESS OF BREATH): ICD-10-CM

## 2022-04-21 DIAGNOSIS — R07.2 PRECORDIAL PAIN: ICD-10-CM

## 2022-04-21 DIAGNOSIS — R94.39 POSITIVE CARDIAC STRESS TEST: Primary | ICD-10-CM

## 2022-04-21 RX ORDER — ASPIRIN 81 MG/1
81 TABLET ORAL DAILY
Qty: 30 TABLET | Refills: 8 | Status: SHIPPED | OUTPATIENT
Start: 2022-04-21 | End: 2022-09-13 | Stop reason: SDUPTHER

## 2022-04-21 RX ORDER — CLOPIDOGREL BISULFATE 75 MG/1
75 TABLET ORAL DAILY
Qty: 30 TABLET | Refills: 11 | Status: SHIPPED | OUTPATIENT
Start: 2022-04-21 | End: 2022-09-13 | Stop reason: SDUPTHER

## 2022-04-25 DIAGNOSIS — I65.23 BILATERAL CAROTID ARTERY STENOSIS: Primary | ICD-10-CM

## 2022-04-26 ENCOUNTER — OFFICE VISIT (OUTPATIENT)
Dept: VASCULAR SURGERY | Facility: HOSPITAL | Age: 57
End: 2022-04-26

## 2022-04-26 ENCOUNTER — HOSPITAL ENCOUNTER (OUTPATIENT)
Dept: CARDIOLOGY | Facility: HOSPITAL | Age: 57
Discharge: HOME | End: 2022-04-26

## 2022-04-26 VITALS
OXYGEN SATURATION: 98 % | HEART RATE: 75 BPM | SYSTOLIC BLOOD PRESSURE: 163 MMHG | TEMPERATURE: 97.4 F | RESPIRATION RATE: 18 BRPM | DIASTOLIC BLOOD PRESSURE: 95 MMHG

## 2022-04-26 DIAGNOSIS — I65.23 BILATERAL CAROTID ARTERY STENOSIS: Primary | ICD-10-CM

## 2022-04-26 DIAGNOSIS — I65.23 BILATERAL CAROTID ARTERY STENOSIS: ICD-10-CM

## 2022-04-26 LAB
BH CV XLRA MEAS LEFT CAROTID BULB EDV: 33 CM/SEC
BH CV XLRA MEAS LEFT CAROTID BULB PSV: 107 CM/SEC
BH CV XLRA MEAS LEFT DIST CCA EDV: 17 CM/SEC
BH CV XLRA MEAS LEFT DIST CCA PSV: 57 CM/SEC
BH CV XLRA MEAS LEFT DIST ICA EDV: 28 CM/SEC
BH CV XLRA MEAS LEFT DIST ICA PSV: 64 CM/SEC
BH CV XLRA MEAS LEFT MID ICA EDV: 39 CM/SEC
BH CV XLRA MEAS LEFT MID ICA PSV: 94 CM/SEC
BH CV XLRA MEAS LEFT PROX CCA EDV: 12 CM/SEC
BH CV XLRA MEAS LEFT PROX CCA PSV: 58 CM/SEC
BH CV XLRA MEAS LEFT PROX ECA EDV: 16 CM/SEC
BH CV XLRA MEAS LEFT PROX ECA PSV: 148 CM/SEC
BH CV XLRA MEAS LEFT PROX ICA EDV: 50 CM/SEC
BH CV XLRA MEAS LEFT PROX ICA PSV: 122 CM/SEC
BH CV XLRA MEAS LEFT VERTEBRAL A EDV: 27 CM/SEC
BH CV XLRA MEAS LEFT VERTEBRAL A PSV: 89 CM/SEC
BH CV XLRA MEAS RIGHT CAROTID BULB EDV: 38 CM/SEC
BH CV XLRA MEAS RIGHT CAROTID BULB PSV: 111 CM/SEC
BH CV XLRA MEAS RIGHT DIST CCA EDV: 21 CM/SEC
BH CV XLRA MEAS RIGHT DIST CCA PSV: 71 CM/SEC
BH CV XLRA MEAS RIGHT DIST ICA EDV: 33 CM/SEC
BH CV XLRA MEAS RIGHT DIST ICA PSV: 68 CM/SEC
BH CV XLRA MEAS RIGHT MID ICA EDV: 41 CM/SEC
BH CV XLRA MEAS RIGHT MID ICA PSV: 98 CM/SEC
BH CV XLRA MEAS RIGHT PROX CCA EDV: 19 CM/SEC
BH CV XLRA MEAS RIGHT PROX CCA PSV: 68 CM/SEC
BH CV XLRA MEAS RIGHT PROX ECA EDV: 13 CM/SEC
BH CV XLRA MEAS RIGHT PROX ECA PSV: 92 CM/SEC
BH CV XLRA MEAS RIGHT PROX ICA EDV: 38 CM/SEC
BH CV XLRA MEAS RIGHT PROX ICA PSV: 94 CM/SEC
BH CV XLRA MEAS RIGHT VERTEBRAL A EDV: 13 CM/SEC
BH CV XLRA MEAS RIGHT VERTEBRAL A PSV: 39 CM/SEC
LEFT ARM BP: NORMAL MMHG
MAXIMAL PREDICTED HEART RATE: 163 BPM
RIGHT ARM BP: NORMAL MMHG
STRESS TARGET HR: 139 BPM

## 2022-04-26 PROCEDURE — 99203 OFFICE O/P NEW LOW 30 MIN: CPT | Performed by: NURSE PRACTITIONER

## 2022-04-26 PROCEDURE — 93880 EXTRACRANIAL BILAT STUDY: CPT | Performed by: SURGERY

## 2022-04-26 PROCEDURE — G0463 HOSPITAL OUTPT CLINIC VISIT: HCPCS | Performed by: NURSE PRACTITIONER

## 2022-04-26 PROCEDURE — 93880 EXTRACRANIAL BILAT STUDY: CPT

## 2022-04-26 NOTE — PROGRESS NOTES
[facility] Vascular Surgery New Patient Office Note     Date of Encounter: 04/26/2022     MRN Number: [id number]  Name: Chevy Junior  Phone Number: [phone]     Referred By: Karey Carballo APRN  PCP: Yue Agrawal APRN    Chief Complaint:    Chief Complaint   Patient presents with   • Carotid Artery Disease     Patient is here as a follow up optometrist to eval symptoms patient was having with peripheral vision.        Subjective      History of Present Illness:    Chevy Junior is a 56 y.o. male presents with a referral from the ophthalmologist and primary for concern of carotid stenosis.  He had a carotid duplex performed today.  He is accompanied today by lady that is considered his significant other.  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  He denies any signs to suggest CVA or TIA.  The lady accompanied with him states that she is currently getting vancomycin daily for MRSA.  He is a current smoker, smokes approximately 1  1/2 packs/day for 35 years.    Review of Systems:  Review of Systems   Constitutional: Negative.   HENT: Negative.    Cardiovascular: Negative.    Respiratory: Negative.    Skin: Negative.    Musculoskeletal:  Right BKA wheelchair and assist for transfer.    Gastrointestinal: Negative.    Neurological: Negative.    Psychiatric/Behavioral: Negative.      I have reviewed the following portions of the patient's history: allergies, current medications, past family history, past medical history, past social history, past surgical history and problem list and confirm it's accurate.    Allergies:  No Known Allergies    Medications:      Current Outpatient Medications:   •  albuterol sulfate  (90 Base) MCG/ACT inhaler, Every 4 (Four) Hours., Disp: , Rfl:   •  amLODIPine (NORVASC) 5 MG tablet, 2 (Two) Times a Day., Disp: , Rfl:   •  aspirin (aspirin) 81 MG EC tablet, Take 1 tablet by mouth Daily., Disp: 30 tablet, Rfl: 8  •   atorvastatin (LIPITOR) 40 MG tablet, Take 40 mg by mouth Daily., Disp: , Rfl:   •  buprenorphine-naloxone (SUBOXONE) 8-2 MG per SL tablet, PLACE 2 TABLET UNDER TONGUE ONCE A DAY AS DIRECTED, Disp: , Rfl:   •  calcitriol (ROCALTROL) 0.5 MCG capsule, Take 0.5 mcg by mouth Daily. 1 Po MWF, Disp: , Rfl:   •  cefdinir (OMNICEF) 300 MG capsule, cefdinir 300 mg capsule, Disp: , Rfl:   •  cinacalcet (SENSIPAR) 60 MG tablet, Daily., Disp: , Rfl:   •  cloNIDine (Catapres) 0.1 MG tablet, Take 1 tablet by mouth 3 (Three) Times a Day As Needed for High Blood Pressure (>160/90)., Disp: 90 tablet, Rfl: 3  •  clopidogrel (PLAVIX) 75 MG tablet, Take 1 tablet by mouth Daily., Disp: 30 tablet, Rfl: 11  •  Diclofenac Sodium (VOLTAREN) 1 % gel gel, , Disp: , Rfl:   •   MG capsule, , Disp: , Rfl:   •  esomeprazole (nexIUM) 40 MG capsule, As Needed., Disp: , Rfl:   •  Ferric Citrate (AURYXIA PO), Take  by mouth. 2 tablets twice a day, Disp: , Rfl:   •  gabapentin (NEURONTIN) 800 MG tablet, 3 (Three) Times a Day., Disp: , Rfl:   •  Heparin Sodium, Porcine, (heparin, porcine,) 1000 UNIT/ML injection, Infuse 1,000 Units into a venous catheter Every Night., Disp: , Rfl:   •  hydrALAZINE (APRESOLINE) 25 MG tablet, Take 1 tablet by mouth 3 (Three) Times a Day., Disp: 90 tablet, Rfl: 11  •  Insulin Glargine (BASAGLAR KWIKPEN) 100 UNIT/ML injection pen, Inject 30 Units under the skin into the appropriate area as directed Daily., Disp: , Rfl:   •  IRON SUCROSE IV, Infuse  into a venous catheter Every 14 (Fourteen) Days. Iron infusion every 2 weeks, Disp: , Rfl:   •  isosorbide mononitrate (IMDUR) 30 MG 24 hr tablet, Take 1 tablet by mouth Daily., Disp: 30 tablet, Rfl: 11  •  LACTULOSE PO, Take  by mouth., Disp: , Rfl:   •  levothyroxine (SYNTHROID, LEVOTHROID) 50 MCG tablet, 50 mcg Daily., Disp: , Rfl:   •  metoprolol tartrate (LOPRESSOR) 100 MG tablet, 7am morning of cta and take second dose only if instructed to do so, Disp: 2 tablet,  Rfl: 0  •  metoprolol tartrate (LOPRESSOR) 25 MG tablet, 75 mg 2 (Two) Times a Day., Disp: , Rfl:   •  minocycline (MINOCIN,DYNACIN) 100 MG capsule, Daily., Disp: , Rfl:   •  mupirocin (BACTROBAN) 2 % ointment, mupirocin 2 % topical ointment apply a small amount to the affected area by topical route 3 times per day   Active, Disp: , Rfl:   •  nitroglycerin (NITROSTAT) 0.4 MG SL tablet, 1 under the tongue as needed for angina, may repeat q5mins for up three doses, Disp: 30 tablet, Rfl: 5  •  NON FORMULARY, Velpafa 8.4 gram 2 times a week, Disp: , Rfl:   •  nystatin (MYCOSTATIN) 621774 units tablet, Take 1 tablet by mouth Every 8 (Eight) Hours., Disp: , Rfl:   •  Ondansetron 4 MG film, Every 8 (Eight) Hours., Disp: , Rfl:   •  Patiromer Sorbitex Calcium (Veltassa) 8.4 g pack, Take 8.4 g by mouth 3 (Three) Times a Week., Disp: , Rfl:   •  polyethylene glycol (MIRALAX) 17 GM/SCOOP powder, Miralax 17 gram oral powder packet  Take 1 packet every day by oral route., Disp: , Rfl:   •  sertraline (ZOLOFT) 100 MG tablet, Daily., Disp: , Rfl:   •  silver sulfadiazine (SILVADENE, SSD) 1 % cream, , Disp: , Rfl:   •  tamsulosin (FLOMAX) 0.4 MG capsule 24 hr capsule, Daily., Disp: , Rfl:   •  vancomycin 1250 mg/ 250mL IVPB, Infuse 1,250 mg into a venous catheter Every 12 (Twelve) Hours., Disp: , Rfl:     History:   Past Medical History:   Diagnosis Date   • Anemia    • Chronic kidney disease    • Chronic pain    • COPD (chronic obstructive pulmonary disease) (HCC)    • Diabetes mellitus (HCC)    • Hyperlipidemia    • Hypertension    • Osteoarthritis    • Phosphorus metabolic disorder    • Sleep apnea        Past Surgical History:   Procedure Laterality Date   • ABOVE KNEE LEG AMPUTATION Right    • HIP SURGERY     • INSERTION PERITONEAL DIALYSIS CATHETER     • LEG SURGERY     • MANDIBLE FRACTURE SURGERY     • REPLACEMENT TOTAL KNEE      x3   • SHOULDER SURGERY         Social History     Socioeconomic History   • Marital status:     Tobacco Use   • Smoking status: Current Every Day Smoker     Packs/day: 1.50     Years: 35.00     Pack years: 52.50     Types: Cigarettes   • Smokeless tobacco: Never Used   Substance and Sexual Activity   • Alcohol use: Never   • Drug use: Not Currently     Comment: pain pills from previous injuries        Family History   Problem Relation Age of Onset   • Hypertension Mother    • Diabetes Father        Objective     Physical Exam:  Vitals:    04/26/22 1113 04/26/22 1115   BP: 155/95 163/95   BP Location: Right arm Left arm   Patient Position: Sitting Sitting   Cuff Size: Large Adult Large Adult   Pulse: 75    Resp: 18    Temp: 97.4 °F (36.3 °C)    TempSrc: Temporal    SpO2: 98%    PainSc:   9    PainLoc: Neck       There is no height or weight on file to calculate BMI.     Physical Exam  Constitutional:       Appearance: Normal appearance.   HENT:      Head: Normocephalic.   Cardiovascular:      Rate and Rhythm: Normal rate.      Pulses: Normal pulses.      Comments: Bilateral upper extremities: +2 palpable radial pulses.  Pulmonary:      Effort: Pulmonary effort is normal.   Musculoskeletal:         General: Normal range of motion.      Cervical back: Normal range of motion.   Skin:     General: Skin is warm and dry.      Capillary Refill: Capillary refill takes less than 2 seconds.   Neurological:      General: No focal deficit present.      Mental Status: He is alert and oriented to person, place, and time.   Psychiatric:         Mood and Affect: Mood normal.         Behavior: Behavior normal.    Imaging/Labs:  I have reviewed the pulmonary results of the carotid duplex performed today, 4/26/2022.  Duplex reveals a velocity of 122 cm/s with end-diastolic of 50 cm/s in the left proximal ICA.  The impression indicates less than 60% stenosis.        Assessment / Plan      Assessment / Plan:  Diagnoses and all orders for this visit:    1. Bilateral carotid artery stenosis (Primary)       Mr. Hess has  mild asymptomatic carotid stenosis, the duplex today indicates less than 60%. Follow-up in 1 year with a repeat carotid duplex, no vascular intervention recommended at this time.  I have explained that should he develop any further concerns or worsening signs or symptoms he may follow-up sooner.  I have answered all of his questions and those of his significant other and they are both in agreement with the plan at this time.    Thank you for allowing me to participate in your patient's care.    Patient Education: Smoking cessation denied at this time.      Follow Up:   No follow-ups on file.   Or sooner for any further concerns or worsening sign and symptoms. If unable to reach us in the office please dial 911 or go to the nearest emergency department.      VICKI Esteban  [facility] Vascular Surgery

## 2022-04-29 ENCOUNTER — TELEPHONE (OUTPATIENT)
Dept: CARDIOLOGY | Facility: CLINIC | Age: 57
End: 2022-04-29

## 2022-04-29 NOTE — TELEPHONE ENCOUNTER
Sofia with [facility] called to report critical lab with creatinine of 12.38. Patient is on dialysis daily. Feng Nielsen made aware and advised I call patient's nephrologist. Analy with Dr Villasenor nephrology made note. Princess Simons MA

## 2022-05-17 ENCOUNTER — OUTSIDE FACILITY SERVICE (OUTPATIENT)
Dept: CARDIOLOGY | Facility: CLINIC | Age: 57
End: 2022-05-17

## 2022-05-17 PROCEDURE — 92943 PRQ TRLUML REVSC CH OCC ANT: CPT | Performed by: INTERNAL MEDICINE

## 2022-05-17 PROCEDURE — 93454 CORONARY ARTERY ANGIO S&I: CPT | Performed by: INTERNAL MEDICINE

## 2022-06-14 ENCOUNTER — OFFICE VISIT (OUTPATIENT)
Dept: CARDIOLOGY | Facility: CLINIC | Age: 57
End: 2022-06-14

## 2022-06-14 VITALS
WEIGHT: 175 LBS | HEART RATE: 81 BPM | DIASTOLIC BLOOD PRESSURE: 78 MMHG | OXYGEN SATURATION: 99 % | SYSTOLIC BLOOD PRESSURE: 129 MMHG | BODY MASS INDEX: 27.47 KG/M2 | HEIGHT: 67 IN

## 2022-06-14 DIAGNOSIS — R53.82 CHRONIC FATIGUE: ICD-10-CM

## 2022-06-14 DIAGNOSIS — Z98.890 STATUS POST LEFT HEART CATHETERIZATION: Primary | ICD-10-CM

## 2022-06-14 DIAGNOSIS — I10 PRIMARY HYPERTENSION: ICD-10-CM

## 2022-06-14 PROCEDURE — 99214 OFFICE O/P EST MOD 30 MIN: CPT | Performed by: NURSE PRACTITIONER

## 2022-06-14 RX ORDER — VALSARTAN 320 MG/1
320 TABLET ORAL DAILY
COMMUNITY
End: 2022-09-13 | Stop reason: SDUPTHER

## 2022-06-14 RX ORDER — FENOFIBRATE 54 MG/1
54 TABLET ORAL DAILY
COMMUNITY
End: 2022-09-13 | Stop reason: SDUPTHER

## 2022-06-14 RX ORDER — FUROSEMIDE 80 MG
80 TABLET ORAL 2 TIMES DAILY
COMMUNITY
End: 2022-09-13 | Stop reason: SDUPTHER

## 2022-06-14 NOTE — PROGRESS NOTES
Subjective     Chevy Junior is a 56 y.o. male who presents to day for cath follow up  and Hypertension.    CHIEF COMPLIANT  Chief Complaint   Patient presents with   • cath follow up    • Hypertension       Active Problems:  Problem List Items Addressed This Visit    None     Visit Diagnoses     Status post left heart catheterization    -  Primary    Primary hypertension        Relevant Medications    furosemide (LASIX) 80 MG tablet    valsartan (DIOVAN) 320 MG tablet    Chronic fatigue          Problem list  1.  Chest pain  1.1 stress test 3/22: Moderately sized only mildly dense inferior basilar, basilar diaphragmatic, and basilar inferior lateral wall ischemic defect, preserved post-rest EF of 56% with anterior hypokinesis   Left heart catheterization 5/22: Left main normal, circumflex diffuse irregularities, LAD diffuse irregularities, RCA  collaterals from LAD, PCI of the RCA with residual 50% stenosis  2.  Shortness of breath  2.1 echocardiogram 3/22: EF 55 to 60%, grade 1A diastolic dysfunction, trivial to mild TR and MR, trivial AI, dilated aortic root of 4.1 cm, pulmonary artery systolic pressures high 20s  3.  Hypertension  4.  Fatigue  5.  Chronic renal failure on daily peritoneal dialysis    HPI  HPI  Mr. Junior is a 56-year-old male patient who is being followed up today status post left heart catheterization where he was found to have diffuse disease of the LAD and the circumflex and a chronic total occlusion of the RCA with collateralization from the LAD.  Dr. Wing performed a PTCA of the RCA establishing 50% residual.  At this time it was determined to avoid further contrast or radiation exposure.  We will allow patient to recover from the procedure and if he had significant improvement further revascularization efforts will be made at revascularization of the RCA.  Patient says that he felt significantly mount better after having the PTCA of the right coronary artery that is chronic totally  occluded.  He says his shortness of breath and fatigue was significantly improved and even felt like a new man.  He denies any complication at the right radial access site.  He denies any numbness or tingling of his hand.  Pulses are palpable proximal and distal to the insertion site.  He does report fatigue where he still gets tired easily.  Patient's blood pressure seems to be relatively controlled today at 129/78 heart rate of 81.  His significant other reports that his blood pressures been running a significantly higher even to the point 200/100.  We did recheck his blood pressures on both his right and left upper extremity.  Right side 115/74, left side 125/81.  He denies any chest pain, shortness of breath, lower extremity edema, palpitations, dizziness, lightheadedness, syncope, PND, orthopnea, or strokelike symptoms.  PRIOR MEDS  Current Outpatient Medications on File Prior to Visit   Medication Sig Dispense Refill   • albuterol sulfate  (90 Base) MCG/ACT inhaler Every 4 (Four) Hours.     • amLODIPine (NORVASC) 5 MG tablet 2 (Two) Times a Day.     • aspirin (aspirin) 81 MG EC tablet Take 1 tablet by mouth Daily. 30 tablet 8   • atorvastatin (LIPITOR) 40 MG tablet Take 40 mg by mouth Daily.     • buprenorphine-naloxone (SUBOXONE) 8-2 MG per SL tablet PLACE 2 TABLET UNDER TONGUE ONCE A DAY AS DIRECTED     • calcitriol (ROCALTROL) 0.5 MCG capsule Take 0.5 mcg by mouth Daily. 1 Po MWF     • cefdinir (OMNICEF) 300 MG capsule cefdinir 300 mg capsule     • cinacalcet (SENSIPAR) 60 MG tablet Daily.     • cloNIDine (Catapres) 0.1 MG tablet Take 1 tablet by mouth 3 (Three) Times a Day As Needed for High Blood Pressure (>160/90). 90 tablet 3   • clopidogrel (PLAVIX) 75 MG tablet Take 1 tablet by mouth Daily. 30 tablet 11   • Diclofenac Sodium (VOLTAREN) 1 % gel gel      •  MG capsule      • esomeprazole (nexIUM) 40 MG capsule As Needed.     • fenofibrate (TRICOR) 54 MG tablet Take 54 mg by mouth Daily.      • Ferric Citrate (AURYXIA PO) Take  by mouth. 2 tablets twice a day     • furosemide (LASIX) 80 MG tablet Take 80 mg by mouth 2 (Two) Times a Day.     • gabapentin (NEURONTIN) 800 MG tablet 3 (Three) Times a Day.     • Heparin Sodium, Porcine, (heparin, porcine,) 1000 UNIT/ML injection Infuse 1,000 Units into a venous catheter Every Night.     • hydrALAZINE (APRESOLINE) 25 MG tablet Take 1 tablet by mouth 3 (Three) Times a Day. 90 tablet 11   • Insulin Glargine (BASAGLAR KWIKPEN) 100 UNIT/ML injection pen Inject 30 Units under the skin into the appropriate area as directed Daily.     • IRON SUCROSE IV Infuse  into a venous catheter Every 14 (Fourteen) Days. Iron infusion every 2 weeks     • isosorbide mononitrate (IMDUR) 30 MG 24 hr tablet Take 1 tablet by mouth Daily. 30 tablet 11   • LACTULOSE PO Take  by mouth.     • levothyroxine (SYNTHROID, LEVOTHROID) 50 MCG tablet 50 mcg Daily.     • metoprolol tartrate (LOPRESSOR) 100 MG tablet 7am morning of cta and take second dose only if instructed to do so 2 tablet 0   • metoprolol tartrate (LOPRESSOR) 25 MG tablet 100 mg 2 (Two) Times a Day.     • minocycline (MINOCIN,DYNACIN) 100 MG capsule Daily.     • mupirocin (BACTROBAN) 2 % ointment mupirocin 2 % topical ointment apply a small amount to the affected area by topical route 3 times per day   Active     • nitroglycerin (NITROSTAT) 0.4 MG SL tablet 1 under the tongue as needed for angina, may repeat q5mins for up three doses 30 tablet 5   • NON FORMULARY Velpafa 8.4 gram 2 times a week     • nystatin (MYCOSTATIN) 187491 units tablet Take 1 tablet by mouth Every 8 (Eight) Hours.     • Ondansetron 4 MG film Every 8 (Eight) Hours.     • Patiromer Sorbitex Calcium (Veltassa) 8.4 g pack Take 8.4 g by mouth 3 (Three) Times a Week.     • polyethylene glycol (MIRALAX) 17 GM/SCOOP powder Miralax 17 gram oral powder packet   Take 1 packet every day by oral route.     • sertraline (ZOLOFT) 100 MG tablet Daily.     • silver  "sulfadiazine (SILVADENE, SSD) 1 % cream      • tamsulosin (FLOMAX) 0.4 MG capsule 24 hr capsule Daily.     • valsartan (DIOVAN) 320 MG tablet Take 320 mg by mouth Daily.       No current facility-administered medications on file prior to visit.       ALLERGIES  Patient has no known allergies.    HISTORY  Past Medical History:   Diagnosis Date   • Anemia    • Chronic kidney disease    • Chronic pain    • COPD (chronic obstructive pulmonary disease) (HCC)    • Diabetes mellitus (HCC)    • Hyperlipidemia    • Hypertension    • Osteoarthritis    • Phosphorus metabolic disorder    • Sleep apnea        Social History     Socioeconomic History   • Marital status:    Tobacco Use   • Smoking status: Current Every Day Smoker     Packs/day: 1.50     Years: 35.00     Pack years: 52.50     Types: Cigarettes   • Smokeless tobacco: Never Used   Substance and Sexual Activity   • Alcohol use: Never   • Drug use: Not Currently     Comment: pain pills from previous injuries       Family History   Problem Relation Age of Onset   • Hypertension Mother    • Diabetes Father        Review of Systems   Constitutional: Positive for fatigue. Negative for chills and fever.   HENT: Positive for rhinorrhea. Negative for congestion and sore throat.    Respiratory: Negative for chest tightness and shortness of breath.    Cardiovascular: Negative for chest pain, palpitations and leg swelling.   Gastrointestinal: Negative for constipation, diarrhea and nausea.   Musculoskeletal: Positive for arthralgias, back pain and neck pain.   Allergic/Immunologic: Positive for environmental allergies. Negative for food allergies.   Neurological: Negative for dizziness, syncope, weakness and light-headedness.   Hematological: Bruises/bleeds easily.   Psychiatric/Behavioral: Negative for sleep disturbance.       Objective     VITALS: /78 (BP Location: Right arm, Patient Position: Sitting)   Pulse 81   Ht 170.2 cm (67.01\")   Wt 79.4 kg (175 lb)   " SpO2 99%   BMI 27.40 kg/m²     LABS:   Lab Results (most recent)     None          IMAGING:   No Images in the past 120 days found..    EXAM:  Physical Exam  Vitals and nursing note reviewed.   Constitutional:       Appearance: He is well-developed.   Eyes:      Pupils: Pupils are equal, round, and reactive to light.   Neck:      Thyroid: No thyroid mass.      Vascular: No carotid bruit or JVD.      Trachea: Trachea and phonation normal.   Cardiovascular:      Rate and Rhythm: Normal rate and regular rhythm.      Pulses:           Radial pulses are 2+ on the right side and 2+ on the left side.      Heart sounds: Normal heart sounds. No murmur heard.    No friction rub. No gallop.   Pulmonary:      Effort: Pulmonary effort is normal. No respiratory distress.      Breath sounds: Normal breath sounds. No wheezing or rales.   Abdominal:      General: Bowel sounds are normal.      Palpations: Abdomen is soft.   Musculoskeletal:         General: Normal range of motion.      Cervical back: Neck supple.   Skin:     General: Skin is warm and dry.      Capillary Refill: Capillary refill takes less than 2 seconds.      Findings: No rash.          Neurological:      Mental Status: He is alert and oriented to person, place, and time.   Psychiatric:         Speech: Speech normal.         Behavior: Behavior normal.         Thought Content: Thought content normal.         Judgment: Judgment normal.         Procedure   Procedures       Assessment & Plan    Diagnosis Plan   1. Status post left heart catheterization     2. Primary hypertension     3. Chronic fatigue     1.  Right radial cath site is without hematoma or exudate.  Right radial pulse is palpable proximal and distal to the insertion site.  Patient denies any loss of sensation, numbness, or tingling.  Capillary refill within 2 seconds.  2.  Due to the fact that patient experienced significant improvement in his fatigue and shortness of breath with PTCA of the RCA it would  be reasonable to revascularize the RCA since a channel has been created with 50% residual.  Patient is in agreements to move forth with known intervention to previous  of the RCA.  3.  Informed of signs and symptoms of ACS and advised to seek emergent treatment for any new worsening symptoms.  Patient also advised sooner follow-up as needed.  Also advised to follow-up with family doctor as needed  This note is dictated utilizing voice recognition software.  Although this record has been proof read, transcriptional errors may still be present. If questions occur regarding the content of this record please do not hesitate to call our office.  I have reviewed and confirmed the accuracy of the ROS as documented by the MA/LPN/RN VICKI Ledesma  No follow-ups on file.    Diagnoses and all orders for this visit:    1. Status post left heart catheterization (Primary)    2. Primary hypertension    3. Chronic fatigue        Chevy Junior  reports that he has been smoking cigarettes. He has a 52.50 pack-year smoking history. He has never used smokeless tobacco.. I have educated him on the risk of diseases from using tobacco products      MEDS ORDERED DURING VISIT:  No orders of the defined types were placed in this encounter.          This document has been electronically signed by Feng Nielsen Jr., VICKI  June 19, 2022 23:03 EDT

## 2022-06-21 ENCOUNTER — TELEPHONE (OUTPATIENT)
Dept: CARDIOLOGY | Facility: CLINIC | Age: 57
End: 2022-06-21

## 2022-06-21 NOTE — TELEPHONE ENCOUNTER
I called and left message on voicemail informing patient about referral to Dr Acevedo at [facility] per Dr Wing  Recommendation. I will wait for return call before moving forward with referral.      Marianna ROBA

## 2022-06-28 ENCOUNTER — TELEPHONE (OUTPATIENT)
Dept: CARDIOLOGY | Facility: CLINIC | Age: 57
End: 2022-06-28

## 2022-06-28 DIAGNOSIS — T82.855D STENOSIS OF CORONARY ARTERY STENT, SUBSEQUENT ENCOUNTER: Primary | ICD-10-CM

## 2022-06-28 NOTE — TELEPHONE ENCOUNTER
[place] Junior called in she is listed on patient hippa form. She would like for us to send in referral to Dr Acevedo for  RCA  Re-approach.

## 2022-09-06 DIAGNOSIS — I10 ESSENTIAL HYPERTENSION: ICD-10-CM

## 2022-09-06 RX ORDER — CLONIDINE HYDROCHLORIDE 0.1 MG/1
TABLET ORAL
Qty: 90 TABLET | Refills: 3 | Status: SHIPPED | OUTPATIENT
Start: 2022-09-06 | End: 2022-09-13 | Stop reason: SDUPTHER

## 2022-09-06 NOTE — TELEPHONE ENCOUNTER
Patients wife called and states patient needs a refill of clondine, as w/o it bp is ranging 210/103. We had discussed ER,She states if it stays elevated she will take him.    Per VICKI Ledesma   Send in medication, if still elevated or if develops sx go to ER.    Patients wife verbally understands.    MAAME FIORE MA    
Yes

## 2022-09-12 PROBLEM — I25.10 CORONARY ARTERY DISEASE INVOLVING NATIVE CORONARY ARTERY OF NATIVE HEART: Status: ACTIVE | Noted: 2022-09-12

## 2022-09-12 PROBLEM — I10 ESSENTIAL HYPERTENSION: Status: ACTIVE | Noted: 2022-09-12

## 2022-09-12 PROBLEM — E10.9 DM (DIABETES MELLITUS), TYPE 1: Status: ACTIVE | Noted: 2022-09-12

## 2022-09-12 PROBLEM — E78.5 HYPERLIPIDEMIA LDL GOAL <70: Status: ACTIVE | Noted: 2022-09-12
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
## 2022-09-13 ENCOUNTER — OFFICE VISIT (OUTPATIENT)
Dept: CARDIOLOGY | Facility: CLINIC | Age: 57
End: 2022-09-13

## 2022-09-13 ENCOUNTER — HOSPITAL ENCOUNTER (OUTPATIENT)
Dept: CARDIOLOGY | Facility: HOSPITAL | Age: 57
Discharge: HOME | End: 2022-09-13

## 2022-09-13 VITALS
DIASTOLIC BLOOD PRESSURE: 84 MMHG | OXYGEN SATURATION: 92 % | WEIGHT: 175 LBS | SYSTOLIC BLOOD PRESSURE: 148 MMHG | HEIGHT: 67 IN | BODY MASS INDEX: 27.47 KG/M2 | HEART RATE: 73 BPM

## 2022-09-13 DIAGNOSIS — E78.5 HYPERLIPIDEMIA LDL GOAL <70: ICD-10-CM

## 2022-09-13 DIAGNOSIS — N18.6 ESRD ON PERITONEAL DIALYSIS: ICD-10-CM

## 2022-09-13 DIAGNOSIS — F17.200 CURRENT SMOKER: ICD-10-CM

## 2022-09-13 DIAGNOSIS — Z79.4 TYPE 2 DIABETES MELLITUS WITH CHRONIC KIDNEY DISEASE ON CHRONIC DIALYSIS, WITH LONG-TERM CURRENT USE OF INSULIN: ICD-10-CM

## 2022-09-13 DIAGNOSIS — E11.22 TYPE 2 DIABETES MELLITUS WITH CHRONIC KIDNEY DISEASE ON CHRONIC DIALYSIS, WITH LONG-TERM CURRENT USE OF INSULIN: ICD-10-CM

## 2022-09-13 DIAGNOSIS — Z99.2 TYPE 2 DIABETES MELLITUS WITH CHRONIC KIDNEY DISEASE ON CHRONIC DIALYSIS, WITH LONG-TERM CURRENT USE OF INSULIN: ICD-10-CM

## 2022-09-13 DIAGNOSIS — Z99.2 ESRD ON PERITONEAL DIALYSIS: ICD-10-CM

## 2022-09-13 DIAGNOSIS — I25.119 CORONARY ARTERY DISEASE INVOLVING NATIVE CORONARY ARTERY OF NATIVE HEART WITH ANGINA PECTORIS: Primary | ICD-10-CM

## 2022-09-13 DIAGNOSIS — I10 ESSENTIAL HYPERTENSION: ICD-10-CM

## 2022-09-13 DIAGNOSIS — N18.6 TYPE 2 DIABETES MELLITUS WITH CHRONIC KIDNEY DISEASE ON CHRONIC DIALYSIS, WITH LONG-TERM CURRENT USE OF INSULIN: ICD-10-CM

## 2022-09-13 DIAGNOSIS — I50.30 HEART FAILURE WITH PRESERVED LEFT VENTRICULAR FUNCTION (HFPEF): ICD-10-CM

## 2022-09-13 DIAGNOSIS — Z00.6 EXAMINATION FOR NORMAL COMPARISON OR CONTROL IN CLINICAL RESEARCH: ICD-10-CM

## 2022-09-13 PROBLEM — E11.9 TYPE 2 DIABETES MELLITUS, WITH LONG-TERM CURRENT USE OF INSULIN: Status: ACTIVE | Noted: 2022-09-12

## 2022-09-13 PROCEDURE — 99214 OFFICE O/P EST MOD 30 MIN: CPT | Performed by: INTERNAL MEDICINE

## 2022-09-13 RX ORDER — ISOSORBIDE MONONITRATE 60 MG/1
60 TABLET, EXTENDED RELEASE ORAL DAILY
Qty: 90 TABLET | Refills: 1 | Status: SHIPPED | OUTPATIENT
Start: 2022-09-13

## 2022-09-13 RX ORDER — FUROSEMIDE 80 MG
80 TABLET ORAL 2 TIMES DAILY
Start: 2022-09-13 | End: 2023-01-10

## 2022-09-13 RX ORDER — AMLODIPINE BESYLATE 5 MG/1
5 TABLET ORAL 2 TIMES DAILY
Qty: 180 TABLET | Refills: 3
Start: 2022-09-13

## 2022-09-13 RX ORDER — NITROGLYCERIN 0.4 MG/1
0.4 TABLET SUBLINGUAL
Qty: 25 TABLET | Refills: 5
Start: 2022-09-13

## 2022-09-13 RX ORDER — CLONIDINE HYDROCHLORIDE 0.1 MG/1
0.1 TABLET ORAL 2 TIMES DAILY
Qty: 180 TABLET | Refills: 3 | Status: ON HOLD
Start: 2022-09-13 | End: 2022-11-30

## 2022-09-13 RX ORDER — VALSARTAN 320 MG/1
320 TABLET ORAL DAILY
Start: 2022-09-13

## 2022-09-13 RX ORDER — FENOFIBRATE 54 MG/1
54 TABLET ORAL DAILY
Status: ON HOLD
Start: 2022-09-13 | End: 2023-02-13

## 2022-09-13 RX ORDER — ATORVASTATIN CALCIUM 40 MG/1
40 TABLET, FILM COATED ORAL DAILY
Qty: 90 TABLET
Start: 2022-09-13

## 2022-09-13 RX ORDER — HYDRALAZINE HYDROCHLORIDE 100 MG/1
100 TABLET, FILM COATED ORAL 3 TIMES DAILY
Qty: 270 TABLET | Refills: 3
Start: 2022-09-13

## 2022-09-13 RX ORDER — METOPROLOL TARTRATE 100 MG/1
100 TABLET ORAL 2 TIMES DAILY
Start: 2022-09-13

## 2022-09-13 RX ORDER — ASPIRIN 81 MG/1
81 TABLET ORAL DAILY
Qty: 30 TABLET | Refills: 8 | Status: SHIPPED | OUTPATIENT
Start: 2022-09-13

## 2022-09-13 RX ORDER — CLOPIDOGREL BISULFATE 75 MG/1
75 TABLET ORAL DAILY
Qty: 30 TABLET | Refills: 11
Start: 2022-09-13 | End: 2023-03-28

## 2022-09-13 NOTE — ASSESSMENT & PLAN NOTE
· Patient had clear improvement in symptoms following his angioplasty in May and I suspect will benefit symptomatically if  PCI is successful  · Patient understands that the primary goal of  PCI is improvement in symptoms.  Possible, but unproven, secondary benefit may be ability to tolerate myocardial infarction in non- arteries if this were to occur.  · Patient understands that if  PCI is successful, he will need to be on DAPT at least for 3-6 months uninterrupted.  · I think patient is at acceptable cardiovascular risk to undergo nonvascular surgery of his buttock with his present coronary anatomy.  I think that  PCI could be attempted both prior to or after buttock surgery, but clopidogrel must be continued at least 3 to 6 months following PCI.  · Will medicate with the patient's surgeon regarding surgical plan

## 2022-09-13 NOTE — ASSESSMENT & PLAN NOTE
· Will contact patient's nephrologist via [employer] text to discuss danger of contrast administration with  PCI   deferred impoverished history of mood disorder hyponatremia

## 2022-09-26 ENCOUNTER — TELEPHONE (OUTPATIENT)
Dept: CARDIOLOGY | Facility: CLINIC | Age: 57
End: 2022-09-26

## 2022-09-26 NOTE — TELEPHONE ENCOUNTER
[facility] is calling for cardiac clearance for a gluteal mass under general anesthesia. You had cleared him at his OV 9/13/2022 when you saw him for possible  PCI. He is on ASA and Plavix. Can he hold temporarily for surgery?

## 2022-09-27 NOTE — TELEPHONE ENCOUNTER
Yes.    H. C. Christopher Acevedo MD FAC, [facility]  Interventional and General Cardiology

## 2022-11-16 ENCOUNTER — TELEPHONE (OUTPATIENT)
Dept: CARDIOLOGY | Facility: CLINIC | Age: 57
End: 2022-11-16

## 2022-11-16 DIAGNOSIS — I25.119 CORONARY ARTERY DISEASE INVOLVING NATIVE CORONARY ARTERY OF NATIVE HEART WITH ANGINA PECTORIS: Primary | ICD-10-CM

## 2022-11-16 NOTE — TELEPHONE ENCOUNTER
Patient's wife called to let you know that the patient ended up not needing his buttocks wound surgery. It had shrunk on its own. They were wanting to proceed with  PCI.     I can place orders. Anything specific you needed for the case?

## 2022-11-18 ENCOUNTER — PREP FOR SURGERY (OUTPATIENT)
Dept: OTHER | Facility: HOSPITAL | Age: 57
End: 2022-11-18

## 2022-11-18 DIAGNOSIS — I20.0 UNSTABLE ANGINA: Primary | ICD-10-CM

## 2022-11-18 RX ORDER — ACETAMINOPHEN 325 MG/1
650 TABLET ORAL EVERY 4 HOURS PRN
Status: CANCELLED | OUTPATIENT
Start: 2022-11-18

## 2022-11-18 RX ORDER — SODIUM CHLORIDE 0.9 % (FLUSH) 0.9 %
10 SYRINGE (ML) INJECTION EVERY 12 HOURS SCHEDULED
Status: CANCELLED | OUTPATIENT
Start: 2022-11-18

## 2022-11-18 RX ORDER — NITROGLYCERIN 0.4 MG/1
0.4 TABLET SUBLINGUAL
Status: CANCELLED | OUTPATIENT
Start: 2022-11-18

## 2022-11-18 RX ORDER — SODIUM CHLORIDE 9 MG/ML
40 INJECTION, SOLUTION INTRAVENOUS AS NEEDED
Status: CANCELLED | OUTPATIENT
Start: 2022-11-18

## 2022-11-18 RX ORDER — SODIUM CHLORIDE 0.9 % (FLUSH) 0.9 %
10 SYRINGE (ML) INJECTION AS NEEDED
Status: CANCELLED | OUTPATIENT
Start: 2022-11-18

## 2022-11-30 ENCOUNTER — HOSPITAL ENCOUNTER (OUTPATIENT)
Facility: HOSPITAL | Age: 57
Discharge: HOME | End: 2022-12-01
Attending: INTERNAL MEDICINE | Admitting: INTERNAL MEDICINE

## 2022-11-30 DIAGNOSIS — I20.0 UNSTABLE ANGINA: ICD-10-CM

## 2022-11-30 DIAGNOSIS — I10 ESSENTIAL HYPERTENSION: Primary | ICD-10-CM

## 2022-11-30 DIAGNOSIS — I25.119 CORONARY ARTERY DISEASE INVOLVING NATIVE CORONARY ARTERY OF NATIVE HEART WITH ANGINA PECTORIS: ICD-10-CM

## 2022-11-30 LAB
ACT BLD: 248 SECONDS (ref 82–152)
ACT BLD: 254 SECONDS (ref 82–152)
ACT BLD: 300 SECONDS (ref 82–152)
ACT BLD: 318 SECONDS (ref 82–152)
ALBUMIN SERPL-MCNC: 3.4 G/DL (ref 3.5–5.2)
ALBUMIN/GLOB SERPL: 0.9 G/DL
ALP SERPL-CCNC: 118 U/L (ref 39–117)
ALT SERPL W P-5'-P-CCNC: <5 U/L (ref 1–41)
ANION GAP SERPL CALCULATED.3IONS-SCNC: 25 MMOL/L (ref 5–15)
AST SERPL-CCNC: 11 U/L (ref 1–40)
BASOPHILS # BLD AUTO: 0.11 10*3/MM3 (ref 0–0.2)
BASOPHILS NFR BLD AUTO: 1.2 % (ref 0–1.5)
BILIRUB SERPL-MCNC: 0.2 MG/DL (ref 0–1.2)
BUN SERPL-MCNC: 70 MG/DL (ref 6–20)
BUN/CREAT SERPL: 4.2 (ref 7–25)
CALCIUM SPEC-SCNC: 8.7 MG/DL (ref 8.6–10.5)
CHLORIDE SERPL-SCNC: 96 MMOL/L (ref 98–107)
CHOLEST SERPL-MCNC: 150 MG/DL (ref 0–200)
CO2 SERPL-SCNC: 21 MMOL/L (ref 22–29)
CREAT SERPL-MCNC: 16.73 MG/DL (ref 0.76–1.27)
DEPRECATED RDW RBC AUTO: 58.9 FL (ref 37–54)
EGFRCR SERPLBLD CKD-EPI 2021: 3 ML/MIN/1.73
EOSINOPHIL # BLD AUTO: 0.25 10*3/MM3 (ref 0–0.4)
EOSINOPHIL NFR BLD AUTO: 2.8 % (ref 0.3–6.2)
ERYTHROCYTE [DISTWIDTH] IN BLOOD BY AUTOMATED COUNT: 16 % (ref 12.3–15.4)
GLOBULIN UR ELPH-MCNC: 4 GM/DL
GLUCOSE BLDC GLUCOMTR-MCNC: 132 MG/DL (ref 70–130)
GLUCOSE SERPL-MCNC: 138 MG/DL (ref 65–99)
HBA1C MFR BLD: 5.7 % (ref 4.8–5.6)
HCT VFR BLD AUTO: 33.2 % (ref 37.5–51)
HDLC SERPL-MCNC: 36 MG/DL (ref 40–60)
HGB BLD-MCNC: 10.2 G/DL (ref 13–17.7)
IMM GRANULOCYTES # BLD AUTO: 0.05 10*3/MM3 (ref 0–0.05)
IMM GRANULOCYTES NFR BLD AUTO: 0.6 % (ref 0–0.5)
LDLC SERPL CALC-MCNC: 84 MG/DL (ref 0–100)
LDLC/HDLC SERPL: 2.18 {RATIO}
LYMPHOCYTES # BLD AUTO: 1 10*3/MM3 (ref 0.7–3.1)
LYMPHOCYTES NFR BLD AUTO: 11 % (ref 19.6–45.3)
MCH RBC QN AUTO: 30.6 PG (ref 26.6–33)
MCHC RBC AUTO-ENTMCNC: 30.7 G/DL (ref 31.5–35.7)
MCV RBC AUTO: 99.7 FL (ref 79–97)
MONOCYTES # BLD AUTO: 0.5 10*3/MM3 (ref 0.1–0.9)
MONOCYTES NFR BLD AUTO: 5.5 % (ref 5–12)
NEUTROPHILS NFR BLD AUTO: 7.15 10*3/MM3 (ref 1.7–7)
NEUTROPHILS NFR BLD AUTO: 78.9 % (ref 42.7–76)
NRBC BLD AUTO-RTO: 0 /100 WBC (ref 0–0.2)
PLATELET # BLD AUTO: 246 10*3/MM3 (ref 140–450)
PMV BLD AUTO: 10.6 FL (ref 6–12)
POTASSIUM SERPL-SCNC: 4.2 MMOL/L (ref 3.5–5.2)
PROT SERPL-MCNC: 7.4 G/DL (ref 6–8.5)
RBC # BLD AUTO: 3.33 10*6/MM3 (ref 4.14–5.8)
SODIUM SERPL-SCNC: 142 MMOL/L (ref 136–145)
TRIGL SERPL-MCNC: 177 MG/DL (ref 0–150)
VLDLC SERPL-MCNC: 30 MG/DL (ref 5–40)
WBC NRBC COR # BLD: 9.06 10*3/MM3 (ref 3.4–10.8)

## 2022-11-30 PROCEDURE — 85347 COAGULATION TIME ACTIVATED: CPT

## 2022-11-30 PROCEDURE — 25010000002 FENTANYL CITRATE (PF) 50 MCG/ML SOLUTION: Performed by: INTERNAL MEDICINE

## 2022-11-30 PROCEDURE — C1753 CATH, INTRAVAS ULTRASOUND: HCPCS | Performed by: INTERNAL MEDICINE

## 2022-11-30 PROCEDURE — C1769 GUIDE WIRE: HCPCS | Performed by: INTERNAL MEDICINE

## 2022-11-30 PROCEDURE — C1725 CATH, TRANSLUMIN NON-LASER: HCPCS | Performed by: INTERNAL MEDICINE

## 2022-11-30 PROCEDURE — C1894 INTRO/SHEATH, NON-LASER: HCPCS | Performed by: INTERNAL MEDICINE

## 2022-11-30 PROCEDURE — 0 IOPAMIDOL PER 1 ML: Performed by: INTERNAL MEDICINE

## 2022-11-30 PROCEDURE — 80061 LIPID PANEL: CPT | Performed by: NURSE PRACTITIONER

## 2022-11-30 PROCEDURE — G0378 HOSPITAL OBSERVATION PER HR: HCPCS

## 2022-11-30 PROCEDURE — S0260 H&P FOR SURGERY: HCPCS | Performed by: PHYSICIAN ASSISTANT

## 2022-11-30 PROCEDURE — 92928 PRQ TCAT PLMT NTRAC ST 1 LES: CPT | Performed by: INTERNAL MEDICINE

## 2022-11-30 PROCEDURE — C9600 PERC DRUG-EL COR STENT SING: HCPCS | Performed by: INTERNAL MEDICINE

## 2022-11-30 PROCEDURE — 92978 ENDOLUMINL IVUS OCT C 1ST: CPT | Performed by: INTERNAL MEDICINE

## 2022-11-30 PROCEDURE — 25010000002 PROTAMINE SULFATE PER 10 MG: Performed by: INTERNAL MEDICINE

## 2022-11-30 PROCEDURE — C1887 CATHETER, GUIDING: HCPCS | Performed by: INTERNAL MEDICINE

## 2022-11-30 PROCEDURE — 85025 COMPLETE CBC W/AUTO DIFF WBC: CPT | Performed by: NURSE PRACTITIONER

## 2022-11-30 PROCEDURE — 83036 HEMOGLOBIN GLYCOSYLATED A1C: CPT | Performed by: NURSE PRACTITIONER

## 2022-11-30 PROCEDURE — 80053 COMPREHEN METABOLIC PANEL: CPT | Performed by: NURSE PRACTITIONER

## 2022-11-30 PROCEDURE — C1874 STENT, COATED/COV W/DEL SYS: HCPCS | Performed by: INTERNAL MEDICINE

## 2022-11-30 PROCEDURE — 25010000002 DIPHENHYDRAMINE PER 50 MG: Performed by: INTERNAL MEDICINE

## 2022-11-30 PROCEDURE — 25010000002 MIDAZOLAM PER 1 MG: Performed by: INTERNAL MEDICINE

## 2022-11-30 PROCEDURE — 82962 GLUCOSE BLOOD TEST: CPT

## 2022-11-30 PROCEDURE — 25010000002 HEPARIN (PORCINE) PER 1000 UNITS: Performed by: INTERNAL MEDICINE

## 2022-11-30 DEVICE — XIENCE SKYPOINT™ EVEROLIMUS ELUTING CORONARY STENT SYSTEM 3.25 MM X 38 MM / RAPID-EXCHANGE
Type: IMPLANTABLE DEVICE | Site: HEART | Status: FUNCTIONAL
Brand: XIENCE SKYPOINT™

## 2022-11-30 DEVICE — XIENCE SKYPOINT™ EVEROLIMUS ELUTING CORONARY STENT SYSTEM 4.00 MM X 33 MM / RAPID-EXCHANGE
Type: IMPLANTABLE DEVICE | Site: HEART | Status: FUNCTIONAL
Brand: XIENCE SKYPOINT™

## 2022-11-30 RX ORDER — ONDANSETRON 2 MG/ML
4 INJECTION INTRAMUSCULAR; INTRAVENOUS EVERY 6 HOURS PRN
Status: DISCONTINUED | OUTPATIENT
Start: 2022-11-30 | End: 2022-12-01 | Stop reason: HOSPADM

## 2022-11-30 RX ORDER — METOCLOPRAMIDE 5 MG/1
5 TABLET ORAL NIGHTLY
COMMUNITY

## 2022-11-30 RX ORDER — ASPIRIN 81 MG/1
81 TABLET ORAL DAILY
Status: DISCONTINUED | OUTPATIENT
Start: 2022-12-01 | End: 2022-12-01 | Stop reason: HOSPADM

## 2022-11-30 RX ORDER — MIDAZOLAM HYDROCHLORIDE 1 MG/ML
INJECTION INTRAMUSCULAR; INTRAVENOUS
Status: DISCONTINUED | OUTPATIENT
Start: 2022-11-30 | End: 2022-11-30 | Stop reason: HOSPADM

## 2022-11-30 RX ORDER — METOCLOPRAMIDE 5 MG/1
5 TABLET ORAL NIGHTLY
Status: DISCONTINUED | OUTPATIENT
Start: 2022-11-30 | End: 2022-12-01 | Stop reason: HOSPADM

## 2022-11-30 RX ORDER — CALCIUM ACETATE 667 MG/1
1334 CAPSULE ORAL
Status: DISCONTINUED | OUTPATIENT
Start: 2022-12-01 | End: 2022-12-01 | Stop reason: HOSPADM

## 2022-11-30 RX ORDER — AMLODIPINE BESYLATE 5 MG/1
5 TABLET ORAL 2 TIMES DAILY
Status: DISCONTINUED | OUTPATIENT
Start: 2022-11-30 | End: 2022-12-01 | Stop reason: HOSPADM

## 2022-11-30 RX ORDER — BUPRENORPHINE HYDROCHLORIDE AND NALOXONE HYDROCHLORIDE DIHYDRATE 8; 2 MG/1; MG/1
1 TABLET SUBLINGUAL DAILY
Status: DISCONTINUED | OUTPATIENT
Start: 2022-12-01 | End: 2022-12-01 | Stop reason: HOSPADM

## 2022-11-30 RX ORDER — NITROGLYCERIN 0.4 MG/1
0.4 TABLET SUBLINGUAL
Status: DISCONTINUED | OUTPATIENT
Start: 2022-11-30 | End: 2022-11-30 | Stop reason: HOSPADM

## 2022-11-30 RX ORDER — CLONIDINE 0.2 MG/24H
1 PATCH, EXTENDED RELEASE TRANSDERMAL WEEKLY
COMMUNITY
End: 2023-01-10

## 2022-11-30 RX ORDER — ISOSORBIDE MONONITRATE 60 MG/1
60 TABLET, EXTENDED RELEASE ORAL DAILY
Status: DISCONTINUED | OUTPATIENT
Start: 2022-12-01 | End: 2022-12-01 | Stop reason: HOSPADM

## 2022-11-30 RX ORDER — SODIUM CHLORIDE 9 MG/ML
3 INJECTION, SOLUTION INTRAVENOUS CONTINUOUS
Status: ACTIVE | OUTPATIENT
Start: 2022-11-30 | End: 2022-11-30

## 2022-11-30 RX ORDER — SODIUM CHLORIDE 0.9 % (FLUSH) 0.9 %
10 SYRINGE (ML) INJECTION EVERY 12 HOURS SCHEDULED
Status: DISCONTINUED | OUTPATIENT
Start: 2022-11-30 | End: 2022-11-30 | Stop reason: HOSPADM

## 2022-11-30 RX ORDER — PROTAMINE SULFATE 10 MG/ML
INJECTION, SOLUTION INTRAVENOUS
Status: DISCONTINUED | OUTPATIENT
Start: 2022-11-30 | End: 2022-11-30 | Stop reason: HOSPADM

## 2022-11-30 RX ORDER — VALSARTAN 160 MG/1
320 TABLET ORAL DAILY
Status: DISCONTINUED | OUTPATIENT
Start: 2022-12-01 | End: 2022-12-01 | Stop reason: HOSPADM

## 2022-11-30 RX ORDER — GABAPENTIN 400 MG/1
800 CAPSULE ORAL EVERY 8 HOURS SCHEDULED
Status: DISCONTINUED | OUTPATIENT
Start: 2022-11-30 | End: 2022-12-01 | Stop reason: HOSPADM

## 2022-11-30 RX ORDER — CLOPIDOGREL BISULFATE 75 MG/1
75 TABLET ORAL DAILY
Status: DISCONTINUED | OUTPATIENT
Start: 2022-12-01 | End: 2022-12-01 | Stop reason: HOSPADM

## 2022-11-30 RX ORDER — CALCITRIOL 0.25 UG/1
0.5 CAPSULE, LIQUID FILLED ORAL DAILY
Status: DISCONTINUED | OUTPATIENT
Start: 2022-11-30 | End: 2022-11-30

## 2022-11-30 RX ORDER — SODIUM CHLORIDE 9 MG/ML
1.5 INJECTION, SOLUTION INTRAVENOUS CONTINUOUS
Status: ACTIVE | OUTPATIENT
Start: 2022-11-30 | End: 2022-11-30

## 2022-11-30 RX ORDER — NICOTINE 21 MG/24HR
1 PATCH, TRANSDERMAL 24 HOURS TRANSDERMAL
Status: DISCONTINUED | OUTPATIENT
Start: 2022-11-30 | End: 2022-12-01 | Stop reason: HOSPADM

## 2022-11-30 RX ORDER — NITROGLYCERIN 20 MG/100ML
10-50 INJECTION INTRAVENOUS
Status: DISCONTINUED | OUTPATIENT
Start: 2022-11-30 | End: 2022-12-01

## 2022-11-30 RX ORDER — SODIUM CHLORIDE 9 MG/ML
40 INJECTION, SOLUTION INTRAVENOUS AS NEEDED
Status: DISCONTINUED | OUTPATIENT
Start: 2022-11-30 | End: 2022-11-30 | Stop reason: HOSPADM

## 2022-11-30 RX ORDER — CINACALCET 30 MG/1
60 TABLET, FILM COATED ORAL
Status: DISCONTINUED | OUTPATIENT
Start: 2022-12-01 | End: 2022-12-01 | Stop reason: HOSPADM

## 2022-11-30 RX ORDER — DIPHENHYDRAMINE HYDROCHLORIDE 50 MG/ML
INJECTION INTRAMUSCULAR; INTRAVENOUS
Status: DISCONTINUED | OUTPATIENT
Start: 2022-11-30 | End: 2022-11-30 | Stop reason: HOSPADM

## 2022-11-30 RX ORDER — HYDRALAZINE HYDROCHLORIDE 50 MG/1
100 TABLET, FILM COATED ORAL 3 TIMES DAILY
Status: DISCONTINUED | OUTPATIENT
Start: 2022-11-30 | End: 2022-12-01 | Stop reason: HOSPADM

## 2022-11-30 RX ORDER — SODIUM CHLORIDE 0.9 % (FLUSH) 0.9 %
10 SYRINGE (ML) INJECTION AS NEEDED
Status: DISCONTINUED | OUTPATIENT
Start: 2022-11-30 | End: 2022-11-30 | Stop reason: HOSPADM

## 2022-11-30 RX ORDER — FUROSEMIDE 40 MG/1
80 TABLET ORAL
Status: DISCONTINUED | OUTPATIENT
Start: 2022-11-30 | End: 2022-12-01 | Stop reason: HOSPADM

## 2022-11-30 RX ORDER — POLYETHYLENE GLYCOL 3350 17 G/17G
17 POWDER, FOR SOLUTION ORAL DAILY
Status: DISCONTINUED | OUTPATIENT
Start: 2022-12-01 | End: 2022-12-01 | Stop reason: HOSPADM

## 2022-11-30 RX ORDER — ACETAMINOPHEN 325 MG/1
650 TABLET ORAL EVERY 4 HOURS PRN
Status: DISCONTINUED | OUTPATIENT
Start: 2022-11-30 | End: 2022-11-30 | Stop reason: HOSPADM

## 2022-11-30 RX ORDER — HEPARIN SODIUM 1000 [USP'U]/ML
INJECTION, SOLUTION INTRAVENOUS; SUBCUTANEOUS
Status: DISCONTINUED | OUTPATIENT
Start: 2022-11-30 | End: 2022-11-30 | Stop reason: HOSPADM

## 2022-11-30 RX ORDER — PANTOPRAZOLE SODIUM 40 MG/1
40 TABLET, DELAYED RELEASE ORAL
Status: DISCONTINUED | OUTPATIENT
Start: 2022-12-01 | End: 2022-12-01 | Stop reason: HOSPADM

## 2022-11-30 RX ORDER — CLOPIDOGREL BISULFATE 75 MG/1
TABLET ORAL
Status: DISCONTINUED | OUTPATIENT
Start: 2022-11-30 | End: 2022-11-30 | Stop reason: HOSPADM

## 2022-11-30 RX ORDER — LEVOTHYROXINE SODIUM 0.05 MG/1
50 TABLET ORAL
Status: DISCONTINUED | OUTPATIENT
Start: 2022-12-01 | End: 2022-12-01 | Stop reason: HOSPADM

## 2022-11-30 RX ORDER — METOPROLOL TARTRATE 100 MG/1
100 TABLET ORAL 2 TIMES DAILY
Status: DISCONTINUED | OUTPATIENT
Start: 2022-11-30 | End: 2022-12-01 | Stop reason: HOSPADM

## 2022-11-30 RX ORDER — ASPIRIN 325 MG
325 TABLET ORAL ONCE
Status: COMPLETED | OUTPATIENT
Start: 2022-11-30 | End: 2022-11-30

## 2022-11-30 RX ORDER — ATORVASTATIN CALCIUM 40 MG/1
40 TABLET, FILM COATED ORAL NIGHTLY
Status: DISCONTINUED | OUTPATIENT
Start: 2022-11-30 | End: 2022-12-01 | Stop reason: HOSPADM

## 2022-11-30 RX ORDER — CLONIDINE 0.2 MG/24H
1 PATCH, EXTENDED RELEASE TRANSDERMAL WEEKLY
Status: DISCONTINUED | OUTPATIENT
Start: 2022-12-07 | End: 2022-12-01 | Stop reason: HOSPADM

## 2022-11-30 RX ORDER — LIDOCAINE HYDROCHLORIDE 10 MG/ML
INJECTION, SOLUTION EPIDURAL; INFILTRATION; INTRACAUDAL; PERINEURAL
Status: DISCONTINUED | OUTPATIENT
Start: 2022-11-30 | End: 2022-11-30 | Stop reason: HOSPADM

## 2022-11-30 RX ORDER — SODIUM CHLORIDE 9 MG/ML
250 INJECTION, SOLUTION INTRAVENOUS ONCE AS NEEDED
Status: DISCONTINUED | OUTPATIENT
Start: 2022-11-30 | End: 2022-12-01

## 2022-11-30 RX ORDER — CALCITRIOL 0.25 UG/1
0.5 CAPSULE, LIQUID FILLED ORAL 3 TIMES WEEKLY
Status: DISCONTINUED | OUTPATIENT
Start: 2022-12-02 | End: 2022-12-01 | Stop reason: HOSPADM

## 2022-11-30 RX ORDER — NITROGLYCERIN 0.4 MG/1
0.4 TABLET SUBLINGUAL
Status: DISCONTINUED | OUTPATIENT
Start: 2022-11-30 | End: 2022-12-01 | Stop reason: HOSPADM

## 2022-11-30 RX ORDER — TAMSULOSIN HYDROCHLORIDE 0.4 MG/1
0.4 CAPSULE ORAL NIGHTLY
Status: DISCONTINUED | OUTPATIENT
Start: 2022-11-30 | End: 2022-12-01 | Stop reason: HOSPADM

## 2022-11-30 RX ORDER — FENTANYL CITRATE 50 UG/ML
INJECTION, SOLUTION INTRAMUSCULAR; INTRAVENOUS
Status: DISCONTINUED | OUTPATIENT
Start: 2022-11-30 | End: 2022-11-30 | Stop reason: HOSPADM

## 2022-11-30 RX ADMIN — ATORVASTATIN CALCIUM 40 MG: 40 TABLET, FILM COATED ORAL at 22:33

## 2022-11-30 RX ADMIN — GABAPENTIN 800 MG: 400 CAPSULE ORAL at 22:33

## 2022-11-30 RX ADMIN — ASPIRIN 325 MG: 325 TABLET ORAL at 14:44

## 2022-11-30 RX ADMIN — Medication 1 PATCH: at 22:38

## 2022-11-30 RX ADMIN — HYDRALAZINE HYDROCHLORIDE 100 MG: 50 TABLET, FILM COATED ORAL at 22:32

## 2022-11-30 RX ADMIN — METOCLOPRAMIDE 5 MG: 5 TABLET ORAL at 22:33

## 2022-11-30 RX ADMIN — FUROSEMIDE 80 MG: 40 TABLET ORAL at 22:33

## 2022-11-30 RX ADMIN — SODIUM CHLORIDE 3 ML/KG/HR: 9 INJECTION, SOLUTION INTRAVENOUS at 13:27

## 2022-11-30 RX ADMIN — AMLODIPINE BESYLATE 5 MG: 5 TABLET ORAL at 22:33

## 2022-11-30 RX ADMIN — METOPROLOL TARTRATE 100 MG: 100 TABLET, FILM COATED ORAL at 22:33

## 2022-11-30 RX ADMIN — TAMSULOSIN HYDROCHLORIDE 0.4 MG: 0.4 CAPSULE ORAL at 22:33

## 2022-12-01 ENCOUNTER — READMISSION MANAGEMENT (OUTPATIENT)
Dept: CALL CENTER | Facility: HOSPITAL | Age: 57
End: 2022-12-01

## 2022-12-01 VITALS
TEMPERATURE: 98.2 F | DIASTOLIC BLOOD PRESSURE: 89 MMHG | WEIGHT: 183.4 LBS | HEIGHT: 67 IN | RESPIRATION RATE: 16 BRPM | HEART RATE: 86 BPM | OXYGEN SATURATION: 95 % | BODY MASS INDEX: 28.79 KG/M2 | SYSTOLIC BLOOD PRESSURE: 133 MMHG

## 2022-12-01 LAB
ACT BLD: 167 SECONDS (ref 82–152)
ANION GAP SERPL CALCULATED.3IONS-SCNC: 24 MMOL/L (ref 5–15)
BUN SERPL-MCNC: 80 MG/DL (ref 6–20)
BUN/CREAT SERPL: 4.7 (ref 7–25)
CALCIUM SPEC-SCNC: 8.9 MG/DL (ref 8.6–10.5)
CHLORIDE SERPL-SCNC: 94 MMOL/L (ref 98–107)
CO2 SERPL-SCNC: 20 MMOL/L (ref 22–29)
CREAT SERPL-MCNC: 17.13 MG/DL (ref 0.76–1.27)
DEPRECATED RDW RBC AUTO: 57.3 FL (ref 37–54)
EGFRCR SERPLBLD CKD-EPI 2021: 2.9 ML/MIN/1.73
ERYTHROCYTE [DISTWIDTH] IN BLOOD BY AUTOMATED COUNT: 15.9 % (ref 12.3–15.4)
GLUCOSE SERPL-MCNC: 120 MG/DL (ref 65–99)
HCT VFR BLD AUTO: 29 % (ref 37.5–51)
HGB BLD-MCNC: 9.1 G/DL (ref 13–17.7)
MCH RBC QN AUTO: 30.8 PG (ref 26.6–33)
MCHC RBC AUTO-ENTMCNC: 31.4 G/DL (ref 31.5–35.7)
MCV RBC AUTO: 98.3 FL (ref 79–97)
PLATELET # BLD AUTO: 214 10*3/MM3 (ref 140–450)
PMV BLD AUTO: 10.7 FL (ref 6–12)
POTASSIUM SERPL-SCNC: 4.5 MMOL/L (ref 3.5–5.2)
RBC # BLD AUTO: 2.95 10*6/MM3 (ref 4.14–5.8)
SODIUM SERPL-SCNC: 138 MMOL/L (ref 136–145)
WBC NRBC COR # BLD: 7.77 10*3/MM3 (ref 3.4–10.8)

## 2022-12-01 PROCEDURE — 63710000001 INSULIN DETEMIR PER 5 UNITS: Performed by: INTERNAL MEDICINE

## 2022-12-01 PROCEDURE — 80048 BASIC METABOLIC PNL TOTAL CA: CPT | Performed by: INTERNAL MEDICINE

## 2022-12-01 PROCEDURE — 85027 COMPLETE CBC AUTOMATED: CPT | Performed by: INTERNAL MEDICINE

## 2022-12-01 PROCEDURE — G0378 HOSPITAL OBSERVATION PER HR: HCPCS

## 2022-12-01 PROCEDURE — 99214 OFFICE O/P EST MOD 30 MIN: CPT | Performed by: INTERNAL MEDICINE

## 2022-12-01 RX ADMIN — GABAPENTIN 800 MG: 400 CAPSULE ORAL at 05:10

## 2022-12-01 RX ADMIN — PANTOPRAZOLE SODIUM 40 MG: 40 TABLET, DELAYED RELEASE ORAL at 05:10

## 2022-12-01 RX ADMIN — INSULIN DETEMIR 34 UNITS: 100 INJECTION, SOLUTION SUBCUTANEOUS at 08:13

## 2022-12-01 RX ADMIN — CLOPIDOGREL BISULFATE 75 MG: 75 TABLET ORAL at 08:16

## 2022-12-01 RX ADMIN — BUPRENORPHINE AND NALOXONE 1 TABLET: 8; 2 TABLET SUBLINGUAL at 08:14

## 2022-12-01 RX ADMIN — ASPIRIN 81 MG: 81 TABLET, COATED ORAL at 08:14

## 2022-12-01 RX ADMIN — VALSARTAN 320 MG: 160 TABLET, FILM COATED ORAL at 08:13

## 2022-12-01 RX ADMIN — Medication 1 PATCH: at 08:17

## 2022-12-01 RX ADMIN — HYDRALAZINE HYDROCHLORIDE 100 MG: 50 TABLET, FILM COATED ORAL at 05:10

## 2022-12-01 RX ADMIN — FUROSEMIDE 80 MG: 40 TABLET ORAL at 08:14

## 2022-12-01 RX ADMIN — METOPROLOL TARTRATE 100 MG: 100 TABLET, FILM COATED ORAL at 08:13

## 2022-12-01 RX ADMIN — CALCIUM ACETATE 1334 MG: 667 CAPSULE ORAL at 08:14

## 2022-12-01 RX ADMIN — LEVOTHYROXINE SODIUM 50 MCG: 50 TABLET ORAL at 05:10

## 2022-12-01 RX ADMIN — NITROGLYCERIN 40 MCG/MIN: 20 INJECTION INTRAVENOUS at 07:25

## 2022-12-01 RX ADMIN — AMLODIPINE BESYLATE 5 MG: 5 TABLET ORAL at 08:14

## 2022-12-01 RX ADMIN — ISOSORBIDE MONONITRATE 60 MG: 60 TABLET, EXTENDED RELEASE ORAL at 08:14

## 2022-12-01 NOTE — NURSING NOTE
Pt. Referred for Phase II Cardiac Rehab. Staff discussed benefits of exercise, program protocol, and educational material provided. Teach back verified.  Patient states he will consider enrolling in [place] if all goes well with his prosthesis for his right AKA.

## 2022-12-01 NOTE — PLAN OF CARE
Goal Outcome Evaluation:  Plan of Care Reviewed With: patient   Pt arrived to the floor at 2218 on 11/30/ Pt denies pain, bilateral groin sites CDI, Pt denies chest pain. Nitro drip continued due to high BP after procedure. Sp02 97% on room air. Pt asking for dose of Suboxone, explained that dose is not due till day shift, pt states that is should be given twice a day.      Progress: improving

## 2022-12-01 NOTE — NURSING NOTE
Pt to be discharged. VSS, room air. No complaints of pain or SOA. IV and telemetry removed. Groin sites CDI, no hematoma, swelling pain or drainage. Discharge instructions provided and pt verbalized understanding.

## 2022-12-01 NOTE — DISCHARGE SUMMARY
Discharge Summary  [place], [place]    Patient Name: Chevy Junior  : 1965  MRN: [id number]    Date of Admission: 2022  Date of Discharge:  2022    IDENTIFICATION:  Chevy Junior is a 57 y.o. male who lives in [facility] Problems    Diagnosis  POA   • **Coronary artery disease involving native coronary artery of native heart with angina pectoris ([facility]) [I25.119]  Yes     Priority: High     · Cardiac catherization at [facility] (2022): Severe 1-vessel CAD with chronic total occlusion of the mid RCA.  Unsuccessful  angioplasty of the mid RCA into the RV marginal branch.  · Cardiac cath (2022): Successful staged PCI of distal RCA  with overlapping JOSE     • Heart failure with preserved left ventricular function (HFpEF) ([facility]) [I50.30]  Yes   • ESRD on peritoneal dialysis ([facility]) [N18.6, Z99.2]  Not Applicable   • Current smoker [F17.200]  Yes   • Essential hypertension [I10]  Yes     • Target blood pressure <130/80 mmHg     • Hyperlipidemia LDL goal <70 [E78.5]  Yes   • Type 2 diabetes mellitus, with long-term current use of insulin ([facility]) [E11.9, Z79.4]  Not Applicable       Summary of Hospitalization:  Patient is a 57-year-old gentleman with end-stage renal disease, and type 2 diabetes mellitus who underwent cardiac catheterization at [facility] in May of this year and went PCI to the RV branch was performed but intervention to the distal chronic occlusion of the right coronary artery was unsuccessful.  He was referred to [facility].  He was referred to [facility] for PCI of the  of the distal RCA which was performed successfully yesterday.  He has been observed overnight and has remained hemodynamically stable.    The patient is on peritoneal dialysis.  He will need to take peritoneal dialysis today when he returns from home.        Procedures Performed  Optical  Coherent Tomography  PCI of proximal to distal RCA         Pertinent Test Results:  Results from last 7 days   Lab Units 12/01/22  0444 11/30/22  1305 11/30/22  1212   WBC 10*3/mm3 7.77  --  9.06   HEMOGLOBIN g/dL 9.1*  --  10.2*   HEMATOCRIT % 29.0*  --  33.2*   PLATELETS 10*3/mm3 214  --  246   SODIUM mmol/L 138 142  --    POTASSIUM mmol/L 4.5 4.2  --    CHLORIDE mmol/L 94* 96*  --    CO2 mmol/L 20.0* 21.0*  --    BUN mg/dL 80* 70*  --    CREATININE mg/dL 17.13* 16.73*  --    GLUCOSE mg/dL 120* 138*  --    CALCIUM mg/dL 8.9 8.7  --      Results from last 7 days   Lab Units 11/30/22  1305   BILIRUBIN mg/dL 0.2   ALK PHOS U/L 118*   ALT (SGPT) U/L <5   AST (SGOT) U/L 11      Results from last 7 days   Lab Units 11/30/22  1305   CHOLESTEROL mg/dL 150   TRIGLYCERIDES mg/dL 177*   HDL CHOL mg/dL 36*   LDL CHOL mg/dL 84     Results from last 7 days   Lab Units 11/30/22  1212   HEMOGLOBIN A1C % 5.70*                Results for orders placed during the hospital encounter of 03/15/22    Adult Transthoracic Echo Complete W/ Cont if Necessary Per Protocol    Interpretation Summary  Excellent technical quality.    1.  LV size, function, and wall motion are normal.  Mild concentric left ventricular hypertrophy.  Visually estimated ejection fraction is 55 to 60%.  3D ejection fraction is 59%.  Grade 1A diastolic dysfunction.  Borderline left atrial enlargement.  Right heart chambers are normal.  No septal defect or intracavitary mass or thrombus.    2.  There is very minimal mitral annular calcification.  There is no mitral stenosis and there is trivial to mild MR.  The aortic valve is tricuspid and mildly sclerotic.  There is mild calcification of the right and noncoronary cusps.  There is no aortic stenosis and there is trivial AI.  Trivial to mild TR is demonstrated from a morphologically normal valve.    3.  There is no pericardial effusion present.  The proximal aortic root is mildly dilated at 4.1 cm with no dissection or  aneurysm.    4.  Pulmonary artery systolic pressures are estimated in the high 20s.        Physical Exam at Discharge    Vital Signs  Temp:  [97.7 °F (36.5 °C)-98.2 °F (36.8 °C)] 98.2 °F (36.8 °C)  Heart Rate:  [] 86  Resp:  [16-20] 16  BP: ()/() 133/89    Constitutional:       Appearance: Healthy appearance. Well-developed.   Eyes:      General: Lids are normal. No scleral icterus.  HENT:      Head: Normocephalic and atraumatic.   Neck:      Thyroid: No thyroid mass.      Vascular: No carotid bruit or JVD. JVD normal.   Pulmonary:      Effort: Pulmonary effort is normal.      Breath sounds: Normal breath sounds.   Cardiovascular:      Normal rate. Regular rhythm.      Murmurs: There is no murmur.      No gallop.      Comments: Bilateral femoral artery access sites without hematoma  Musculoskeletal:      Extremities: No clubbing present.     Right Lower Extremity: Right leg is amputated above knee. Skin:     General: Skin is warm and dry. There is no cyanosis.   Neurological:      General: No focal deficit present.      Mental Status: Alert.   Psychiatric:         Attention and Perception: Attention normal.         Behavior: Behavior normal. Behavior is cooperative.          Discharge Disposition  Home         Discharge Medications      Continue These Medications      Instructions Start Date   albuterol sulfate  (90 Base) MCG/ACT inhaler  Commonly known as: PROVENTIL HFA;VENTOLIN HFA;PROAIR HFA   Every 4 Hours PRN      amLODIPine 5 MG tablet  Commonly known as: NORVASC   5 mg, Oral, 2 Times Daily      aspirin 81 MG EC tablet   81 mg, Oral, Daily      atorvastatin 40 MG tablet  Commonly known as: LIPITOR   40 mg, Oral, Daily      AURYXIA PO   Oral, 2 tablets twice a day      BASAGLAR KWIKPEN 100 UNIT/ML injection pen   34 Units, Subcutaneous, Daily      buprenorphine-naloxone 8-2 MG per SL tablet  Commonly known as: SUBOXONE   PLACE 2 TABLET UNDER TONGUE ONCE A DAY AS DIRECTED      calcitriol  0.5 MCG capsule  Commonly known as: ROCALTROL   0.5 mcg, Oral, Daily, 1 Po MWF      cinacalcet 60 MG tablet  Commonly known as: SENSIPAR   Daily      cloNIDine 0.2 MG/24HR patch  Commonly known as: CATAPRES-TTS   1 patch, Transdermal, Weekly      clopidogrel 75 MG tablet  Commonly known as: PLAVIX   75 mg, Oral, Daily      Diclofenac Sodium 1 % gel gel  Commonly known as: VOLTAREN   As Needed       MG capsule  Generic drug: docusate sodium   100 mg, 2 Times Daily PRN      esomeprazole 40 MG capsule  Commonly known as: nexIUM   As Needed      fenofibrate 54 MG tablet  Commonly known as: TRICOR   54 mg, Oral, Daily      furosemide 80 MG tablet  Commonly known as: LASIX   80 mg, Oral, 2 Times Daily      gabapentin 800 MG tablet  Commonly known as: NEURONTIN   3 Times Daily      heparin (porcine) 1000 UNIT/ML injection   1,000 Units, Intravenous, Nightly      hydrALAZINE 100 MG tablet  Commonly known as: APRESOLINE   100 mg, Oral, 3 Times Daily      IRON SUCROSE IV   Intravenous, Every 14 Days, Iron infusion every 2 weeks       isosorbide mononitrate 60 MG 24 hr tablet  Commonly known as: IMDUR   60 mg, Oral, Daily      LACTULOSE PO   Oral, As Needed      levothyroxine 50 MCG tablet  Commonly known as: SYNTHROID, LEVOTHROID   50 mcg, Daily      metoclopramide 5 MG tablet  Commonly known as: REGLAN   5 mg, Oral, Nightly      metoprolol tartrate 100 MG tablet  Commonly known as: LOPRESSOR   100 mg, Oral, 2 Times Daily      minocycline 100 MG capsule  Commonly known as: MINOCIN,DYNACIN   100 mg, Oral, Daily      mupirocin 2 % ointment  Commonly known as: BACTROBAN   mupirocin 2 % topical ointment apply a small amount to the affected area by topical route 3 times per day   Active      nitroglycerin 0.4 MG SL tablet  Commonly known as: NITROSTAT   0.4 mg, Sublingual, Every 5 Minutes PRN      NON FORMULARY   Velpafa 8.4 gram 2 times a week      polyethylene glycol 17 GM/SCOOP powder  Commonly known as: MIRALAX    Miralax 17 gram oral powder packet   Take 1 packet every day by oral route.      sertraline 100 MG tablet  Commonly known as: ZOLOFT   Daily      tamsulosin 0.4 MG capsule 24 hr capsule  Commonly known as: FLOMAX   Daily      valsartan 320 MG tablet  Commonly known as: DIOVAN   320 mg, Oral, Daily      Veltassa 8.4 g pack  Generic drug: Patiromer Sorbitex Calcium   8.4 g, Oral, 3 Times Weekly             Discharge Diet: Renal    Activity at Discharge: Ad rei.      Additional Instructions for the Follow-ups that You Need to Schedule     Discharge Follow-up with Specialty: Feng Nielsen; 1 Month   As directed      Specialty: Feng Nielsen    Follow Up: 1 Month    Follow Up Details: Hospital FU for PCI                  Electronically signed by River Acevedo IV, MD, 12/01/22, 9:27 AM EST.    Time: Discharge 25 min

## 2022-12-01 NOTE — CASE MANAGEMENT/SOCIAL WORK
Discharge Planning Assessment  [facility]     Patient Name: Chevy Junior  MRN: [id number]  Today's Date: 12/1/2022    Admit Date: 11/30/2022    Plan: Home   Discharge Needs Assessment     Row Name 12/01/22 0910       Living Environment    People in Home other (see comments)  ex-wife    Current Living Arrangements home    Primary Care Provided by self    Provides Primary Care For no one    Family Caregiver if Needed other (see comments)    Quality of Family Relationships unable to assess    Able to Return to Prior Arrangements yes       Resource/Environmental Concerns    Resource/Environmental Concerns none       Transition Planning    Patient/Family Anticipates Transition to home    Patient/Family Anticipated Services at Transition     Transportation Anticipated family or friend will provide       Discharge Needs Assessment    Readmission Within the Last 30 Days no previous admission in last 30 days    Equipment Currently Used at Home wheelchair;other (see comments)  PD supplies    Concerns to be Addressed discharge planning    Anticipated Changes Related to Illness none    Equipment Needed After Discharge none               Discharge Plan     Row Name 12/01/22 0912       Plan    Plan Home    Patient/Family in Agreement with Plan yes    Plan Comments Spoke with patient in room to initiate discharge planning.  He lives with his ex-wife in [facility].  He is independent with ADL's, using a wheelchair for mobility.  He also has a shower chair and bedside commode at home.  He is not current with home health.  His PCP is Yue Agrawal.  He does not have an advanced directive.  Mr. Junior has RX coverage and has his scripts filled at [facility].  He does PD at home.  His plan is to return home at discharge.  He denies any needs at this time.  Cm will continue to follow.    Final Discharge Disposition Code 01 - home or self-care              Continued Care and Services - Admitted Since 11/30/2022     Coordination has not been started for this encounter.       Expected Discharge Date and Time     Expected Discharge Date Expected Discharge Time    Dec 2, 2022          Demographic Summary     Row Name 12/01/22 0910       General Information    Admission Type other (see comments)  outpatient    Arrived From home    Referral Source admission list    Reason for Consult discharge planning    Preferred Language English               Functional Status     Row Name 12/01/22 0910       Functional Status    Usual Activity Tolerance fair    Current Activity Tolerance fair       Functional Status, IADL    Medications assistive equipment    Meal Preparation completely dependent    Housekeeping completely dependent    Laundry completely dependent    Shopping completely dependent               Psychosocial    No documentation.                Abuse/Neglect    No documentation.                Legal    No documentation.                Substance Abuse    No documentation.                Patient Forms    No documentation.                   Vanna Hassan RN

## 2022-12-02 NOTE — OUTREACH NOTE
Prep Survey    Flowsheet Row Responses   [facility] facility patient discharged from? [facility]   Is LACE score < 7 ? Yes   Emergency Room discharge w/ pulse ox? No   Eligibility Readm Mgmt   Discharge diagnosis CAD s/p PCI of proximal to distal RCA   Does the patient have one of the following disease processes/diagnoses(primary or secondary)? Other   Does the patient have Home health ordered? No   Is there a DME ordered? No   Prep survey completed? Yes          MARY RAJPUT - Registered Nurse

## 2022-12-14 ENCOUNTER — HOSPITAL ENCOUNTER (EMERGENCY)
Facility: HOSPITAL | Age: 57
Discharge: HOME | End: 2022-12-15
Attending: EMERGENCY MEDICINE | Admitting: EMERGENCY MEDICINE

## 2022-12-14 ENCOUNTER — APPOINTMENT (OUTPATIENT)
Dept: GENERAL RADIOLOGY | Facility: HOSPITAL | Age: 57
End: 2022-12-14

## 2022-12-14 ENCOUNTER — APPOINTMENT (OUTPATIENT)
Dept: ULTRASOUND IMAGING | Facility: HOSPITAL | Age: 57
End: 2022-12-14

## 2022-12-14 VITALS
HEART RATE: 95 BPM | WEIGHT: 175 LBS | HEIGHT: 67 IN | SYSTOLIC BLOOD PRESSURE: 131 MMHG | DIASTOLIC BLOOD PRESSURE: 79 MMHG | TEMPERATURE: 97.8 F | RESPIRATION RATE: 14 BRPM | BODY MASS INDEX: 27.47 KG/M2 | OXYGEN SATURATION: 90 %

## 2022-12-14 DIAGNOSIS — R11.2 NAUSEA AND VOMITING, UNSPECIFIED VOMITING TYPE: ICD-10-CM

## 2022-12-14 DIAGNOSIS — N18.9 CHRONIC RENAL FAILURE, UNSPECIFIED CKD STAGE: Primary | ICD-10-CM

## 2022-12-14 DIAGNOSIS — R10.9 ABDOMINAL PAIN, UNSPECIFIED ABDOMINAL LOCATION: ICD-10-CM

## 2022-12-14 LAB
ALBUMIN SERPL-MCNC: 3.3 G/DL (ref 3.5–5.2)
ALBUMIN/GLOB SERPL: 0.6 G/DL
ALP SERPL-CCNC: 111 U/L (ref 39–117)
ALT SERPL W P-5'-P-CCNC: 6 U/L (ref 1–41)
ANION GAP SERPL CALCULATED.3IONS-SCNC: 21.1 MMOL/L (ref 5–15)
APPEARANCE FLD: ABNORMAL
APTT PPP: 42.3 SECONDS (ref 24.2–34.2)
ARTERIAL PATENCY WRIST A: POSITIVE
AST SERPL-CCNC: 12 U/L (ref 1–40)
BASE EXCESS BLDA CALC-SCNC: -1.9 MMOL/L (ref -2–2)
BASOPHILS # BLD AUTO: 0.05 10*3/MM3 (ref 0–0.2)
BASOPHILS NFR BLD AUTO: 0.8 % (ref 0–1.5)
BDY SITE: ABNORMAL
BILIRUB SERPL-MCNC: 0.3 MG/DL (ref 0–1.2)
BUN SERPL-MCNC: 71 MG/DL (ref 6–20)
BUN/CREAT SERPL: 4.7 (ref 7–25)
CALCIUM SPEC-SCNC: 9.5 MG/DL (ref 8.6–10.5)
CHLORIDE SERPL-SCNC: 93 MMOL/L (ref 98–107)
CO2 SERPL-SCNC: 23.9 MMOL/L (ref 22–29)
COHGB MFR BLD: 4.6 % (ref 0–1.5)
COLOR FLD: ABNORMAL
CREAT SERPL-MCNC: 14.95 MG/DL (ref 0.76–1.27)
CRP SERPL-MCNC: 15.69 MG/DL (ref 0–0.5)
D-LACTATE SERPL-SCNC: 0.9 MMOL/L (ref 0.5–2)
DEPRECATED RDW RBC AUTO: 57.7 FL (ref 37–54)
EGFRCR SERPLBLD CKD-EPI 2021: 3.4 ML/MIN/1.73
EOSINOPHIL # BLD AUTO: 0.26 10*3/MM3 (ref 0–0.4)
EOSINOPHIL NFR BLD AUTO: 3.9 % (ref 0.3–6.2)
ERYTHROCYTE [DISTWIDTH] IN BLOOD BY AUTOMATED COUNT: 15.7 % (ref 12.3–15.4)
FHHB: 10.2 % (ref 0–5)
GLOBULIN UR ELPH-MCNC: 5.4 GM/DL
GLUCOSE SERPL-MCNC: 131 MG/DL (ref 65–99)
HCO3 BLDA-SCNC: 23.9 MMOL/L (ref 22–26)
HCT VFR BLD AUTO: 29.9 % (ref 37.5–51)
HGB BLD-MCNC: 9.2 G/DL (ref 13–17.7)
HGB BLDA-MCNC: 9.8 G/DL (ref 13.8–16.4)
HOLD SPECIMEN: NORMAL
HOLD SPECIMEN: NORMAL
IMM GRANULOCYTES # BLD AUTO: 0.03 10*3/MM3 (ref 0–0.05)
IMM GRANULOCYTES NFR BLD AUTO: 0.5 % (ref 0–0.5)
INHALED O2 CONCENTRATION: 21 %
INR PPP: 1.6 (ref 0.86–1.15)
LACTATE BLDA-SCNC: ABNORMAL MMOL/L
LYMPHOCYTES # BLD AUTO: 0.71 10*3/MM3 (ref 0.7–3.1)
LYMPHOCYTES NFR BLD AUTO: 10.7 % (ref 19.6–45.3)
LYMPHOCYTES NFR FLD MANUAL: 75 %
MACROPHAGE FLUID: 1 %
MAGNESIUM SERPL-MCNC: 2.1 MG/DL (ref 1.6–2.6)
MCH RBC QN AUTO: 30.6 PG (ref 26.6–33)
MCHC RBC AUTO-ENTMCNC: 30.8 G/DL (ref 31.5–35.7)
MCV RBC AUTO: 99.3 FL (ref 79–97)
METHGB BLD QL: 0.1 % (ref 0–1.5)
MODALITY: ABNORMAL
MONOCYTES # BLD AUTO: 0.38 10*3/MM3 (ref 0.1–0.9)
MONOCYTES NFR BLD AUTO: 5.7 % (ref 5–12)
MONOCYTES NFR FLD: 8 %
NEUTROPHILS NFR BLD AUTO: 5.19 10*3/MM3 (ref 1.7–7)
NEUTROPHILS NFR BLD AUTO: 78.4 % (ref 42.7–76)
NEUTROPHILS NFR FLD MANUAL: 16 %
NRBC BLD AUTO-RTO: 0 /100 WBC (ref 0–0.2)
NT-PROBNP SERPL-MCNC: 942 PG/ML (ref 0–900)
NUC CELL # FLD: 42 /MM3
OXYHGB MFR BLDV: 85.1 % (ref 94–99)
PCO2 BLDA: 45.2 MM HG (ref 35–45)
PH BLDA: 7.34 PH UNITS (ref 7.35–7.45)
PHOSPHATE SERPL-MCNC: 11 MG/DL (ref 2.5–4.5)
PLATELET # BLD AUTO: 206 10*3/MM3 (ref 140–450)
PMV BLD AUTO: 10 FL (ref 6–12)
PO2 BLD: 289 MM[HG] (ref 0–500)
PO2 BLDA: 60.7 MM HG (ref 80–100)
POTASSIUM SERPL-SCNC: 3.8 MMOL/L (ref 3.5–5.2)
PROT SERPL-MCNC: 8.7 G/DL (ref 6–8.5)
PROTHROMBIN TIME: 19.3 SECONDS (ref 11.8–14.9)
RBC # BLD AUTO: 3.01 10*6/MM3 (ref 4.14–5.8)
RBC # FLD AUTO: <2000 /MM3
SAO2 % BLDCOA: 89.3 % (ref 95–99)
SODIUM SERPL-SCNC: 138 MMOL/L (ref 136–145)
WBC NRBC COR # BLD: 6.62 10*3/MM3 (ref 3.4–10.8)
WHOLE BLOOD HOLD COAG: NORMAL
WHOLE BLOOD HOLD SPECIMEN: NORMAL

## 2022-12-14 PROCEDURE — 96365 THER/PROPH/DIAG IV INF INIT: CPT

## 2022-12-14 PROCEDURE — 25010000002 VANCOMYCIN 5 G RECONSTITUTED SOLUTION: Performed by: NURSE PRACTITIONER

## 2022-12-14 PROCEDURE — 76705 ECHO EXAM OF ABDOMEN: CPT

## 2022-12-14 PROCEDURE — 93010 ELECTROCARDIOGRAM REPORT: CPT | Performed by: INTERNAL MEDICINE

## 2022-12-14 PROCEDURE — 71045 X-RAY EXAM CHEST 1 VIEW: CPT

## 2022-12-14 PROCEDURE — 83735 ASSAY OF MAGNESIUM: CPT

## 2022-12-14 PROCEDURE — 86140 C-REACTIVE PROTEIN: CPT

## 2022-12-14 PROCEDURE — 82805 BLOOD GASES W/O2 SATURATION: CPT

## 2022-12-14 PROCEDURE — 83605 ASSAY OF LACTIC ACID: CPT

## 2022-12-14 PROCEDURE — 84100 ASSAY OF PHOSPHORUS: CPT

## 2022-12-14 PROCEDURE — 99284 EMERGENCY DEPT VISIT MOD MDM: CPT

## 2022-12-14 PROCEDURE — 85610 PROTHROMBIN TIME: CPT

## 2022-12-14 PROCEDURE — 83050 HGB METHEMOGLOBIN QUAN: CPT

## 2022-12-14 PROCEDURE — 36600 WITHDRAWAL OF ARTERIAL BLOOD: CPT

## 2022-12-14 PROCEDURE — 85025 COMPLETE CBC W/AUTO DIFF WBC: CPT

## 2022-12-14 PROCEDURE — 82375 ASSAY CARBOXYHB QUANT: CPT

## 2022-12-14 PROCEDURE — 87205 SMEAR GRAM STAIN: CPT | Performed by: EMERGENCY MEDICINE

## 2022-12-14 PROCEDURE — 93005 ELECTROCARDIOGRAM TRACING: CPT | Performed by: EMERGENCY MEDICINE

## 2022-12-14 PROCEDURE — 25010000002 PIPERACILLIN SOD-TAZOBACTAM PER 1 G: Performed by: NURSE PRACTITIONER

## 2022-12-14 PROCEDURE — 25010000002 ONDANSETRON PER 1 MG: Performed by: EMERGENCY MEDICINE

## 2022-12-14 PROCEDURE — 87040 BLOOD CULTURE FOR BACTERIA: CPT

## 2022-12-14 PROCEDURE — 87070 CULTURE OTHR SPECIMN AEROBIC: CPT | Performed by: EMERGENCY MEDICINE

## 2022-12-14 PROCEDURE — 96375 TX/PRO/DX INJ NEW DRUG ADDON: CPT

## 2022-12-14 PROCEDURE — 89051 BODY FLUID CELL COUNT: CPT | Performed by: EMERGENCY MEDICINE

## 2022-12-14 PROCEDURE — 36415 COLL VENOUS BLD VENIPUNCTURE: CPT

## 2022-12-14 PROCEDURE — 80053 COMPREHEN METABOLIC PANEL: CPT

## 2022-12-14 PROCEDURE — 83880 ASSAY OF NATRIURETIC PEPTIDE: CPT | Performed by: NURSE PRACTITIONER

## 2022-12-14 PROCEDURE — 25010000002 MORPHINE PER 10 MG: Performed by: EMERGENCY MEDICINE

## 2022-12-14 PROCEDURE — 93005 ELECTROCARDIOGRAM TRACING: CPT

## 2022-12-14 PROCEDURE — 85730 THROMBOPLASTIN TIME PARTIAL: CPT

## 2022-12-14 RX ORDER — MORPHINE SULFATE 2 MG/ML
2 INJECTION, SOLUTION INTRAMUSCULAR; INTRAVENOUS ONCE
Status: COMPLETED | OUTPATIENT
Start: 2022-12-14 | End: 2022-12-14

## 2022-12-14 RX ORDER — ONDANSETRON 2 MG/ML
4 INJECTION INTRAMUSCULAR; INTRAVENOUS ONCE
Status: COMPLETED | OUTPATIENT
Start: 2022-12-14 | End: 2022-12-14

## 2022-12-14 RX ORDER — SODIUM CHLORIDE 0.9 % (FLUSH) 0.9 %
10 SYRINGE (ML) INJECTION AS NEEDED
Status: DISCONTINUED | OUTPATIENT
Start: 2022-12-14 | End: 2022-12-15 | Stop reason: HOSPADM

## 2022-12-14 RX ADMIN — SODIUM CHLORIDE 1000 ML: 9 INJECTION, SOLUTION INTRAVENOUS at 19:17

## 2022-12-14 RX ADMIN — TAZOBACTAM SODIUM AND PIPERACILLIN SODIUM 3.38 G: 375; 3 INJECTION, SOLUTION INTRAVENOUS at 19:19

## 2022-12-14 RX ADMIN — VANCOMYCIN HYDROCHLORIDE 1500 MG: 5 INJECTION, POWDER, LYOPHILIZED, FOR SOLUTION INTRAVENOUS at 19:46

## 2022-12-14 RX ADMIN — ONDANSETRON 4 MG: 2 INJECTION INTRAMUSCULAR; INTRAVENOUS at 20:42

## 2022-12-14 RX ADMIN — MORPHINE SULFATE 2 MG: 2 INJECTION, SOLUTION INTRAMUSCULAR; INTRAVENOUS at 20:41

## 2022-12-14 NOTE — ED PROVIDER NOTES
Time: 8:06 PM EST  Arrived by: private car  Chief Complaint: abdominal pain  History provided by: pt, family  History is limited by: N/A     History of Present Illness:  Patient is a 57 y.o. year old male that presents to the emergency department with abdominal pain.    Pt has been having intermittent abdominal pain for the past month. He describes the location as in the midde and the left of his abdomen. He has been having more frequent abdominal pain in the past 2 weeks. He has been nauseated without emesis. He denies any myalgias, fever, or rigors. He says he has been fatigued and decreased appetitie. His last BM was a few days ago and was normal. Pt notes normal BM, denies diarrhea, hematochezia and melena.     Pt does not make urine due to kidney failure. Pt is on peritoneal dialysis. He transfers 11 hours at night.    He was seen in the ED yesterday where an ultrasound of his gallbladder was done. He was diagnosed with sludge in the gallbladder. Dr. Villasenor saw the report and wanted the pt to be seen in the ED.           Similar Symptoms Previously: No  Recently seen: Yes, 11/30/22 for HTN      Patient Care Team  Primary Care Provider: Yue Agrawal APRN    Past Medical History:     No Known Allergies  Past Medical History:   Diagnosis Date   • Anemia    • Chronic kidney disease    • Chronic pain    • COPD (chronic obstructive pulmonary disease) (HCC)    • Diabetes mellitus (HCC)    • Hyperlipidemia    • Hypertension    • RIC (obstructive sleep apnea)    • Osteoarthritis    • Phosphorus metabolic disorder    • Sleep apnea      Past Surgical History:   Procedure Laterality Date   • ABOVE KNEE LEG AMPUTATION Right    • CARDIAC CATHETERIZATION     • CARDIAC CATHETERIZATION N/A 11/30/2022    Procedure: Percutaneous Coronary Intervention;  Surgeon: River Acevedo IV, MD;  [facility];  Service: Cardiovascular;  Laterality: N/A;   • CARDIAC CATHETERIZATION N/A 11/30/2022     Procedure: Optical Coherent Tomography;  Surgeon: River Acevedo IV, MD;  Location:  [facility] CATH INVASIVE LOCATION;  Service: Cardiovascular;  Laterality: N/A;   • CARDIAC CATHETERIZATION N/A 11/30/2022    Procedure: Stent JOSE coronary;  Surgeon: River Acevedo IV, MD;  Location:  [facility] CATH INVASIVE LOCATION;  Service: Cardiovascular;  Laterality: N/A;   • CATH LAB PROCEDURE      unable to stent   • HIP SURGERY     • INSERTION PERITONEAL DIALYSIS CATHETER     • LEG SURGERY     • MANDIBLE FRACTURE SURGERY     • REPLACEMENT TOTAL KNEE      x3   • SHOULDER SURGERY       Family History   Problem Relation Age of Onset   • Hypertension Mother    • Diabetes Father        Home Medications:  Prior to Admission medications    Medication Sig Start Date End Date Taking? Authorizing Provider   albuterol sulfate  (90 Base) MCG/ACT inhaler Every 4 (Four) Hours As Needed.    Janie Lynch MD   amLODIPine (NORVASC) 5 MG tablet Take 1 tablet by mouth 2 (Two) Times a Day. 9/13/22   River Acevedo IV, MD   aspirin (aspirin) 81 MG EC tablet Take 1 tablet by mouth Daily. 9/13/22   River Acevedo IV, MD   atorvastatin (LIPITOR) 40 MG tablet Take 1 tablet by mouth Daily. 9/13/22   River Acevedo IV, MD   buprenorphine-naloxone (SUBOXONE) 8-2 MG per SL tablet PLACE 2 TABLET UNDER TONGUE ONCE A DAY AS DIRECTED 7/1/21   Janie Lynch MD   calcitriol (ROCALTROL) 0.5 MCG capsule Take 0.5 mcg by mouth Daily. 1 Po MWF    Janie Lynch MD   cinacalcet (SENSIPAR) 60 MG tablet Daily. 7/5/21   Janie Lynch MD   cloNIDine (CATAPRES-TTS) 0.2 MG/24HR patch Place 1 patch on the skin as directed by provider 1 (One) Time Per Week.    Janie Lynch MD   clopidogrel (PLAVIX) 75 MG tablet Take 1 tablet by mouth Daily. 9/13/22   River Acevedo IV, MD   Diclofenac Sodium (VOLTAREN) 1 % gel gel As Needed. 7/13/21   Janie Lynch MD     MG capsule 100 mg 2 (Two) Times a Day As Needed. 6/16/21   Janie Lynch MD   esomeprazole (nexIUM) 40 MG capsule As Needed. 6/16/21   Janie Lynch MD   fenofibrate (TRICOR) 54 MG tablet Take 1 tablet by mouth Daily. 9/13/22   River Acevedo IV, MD   Ferric Citrate (AURYXIA PO) Take  by mouth. 2 tablets twice a day    Janie Lynch MD   furosemide (LASIX) 80 MG tablet Take 1 tablet by mouth 2 (Two) Times a Day. 9/13/22   River Acevedo IV, MD   gabapentin (NEURONTIN) 800 MG tablet 3 (Three) Times a Day.    Janie Lynch MD   Heparin Sodium, Porcine, (heparin, porcine,) 1000 UNIT/ML injection Infuse 1,000 Units into a venous catheter Every Night.    Janie Lynch MD   hydrALAZINE (APRESOLINE) 100 MG tablet Take 1 tablet by mouth 3 (Three) Times a Day. 9/13/22   River Acevedo IV, MD   Insulin Glargine (BASAGLAR KWIKPEN) 100 UNIT/ML injection pen Inject 34 Units under the skin into the appropriate area as directed Daily. 6/16/21   Janie Lynch MD   IRON SUCROSE IV Infuse  into a venous catheter Every 14 (Fourteen) Days. Iron infusion every 2 weeks    Janie Lynch MD   isosorbide mononitrate (IMDUR) 60 MG 24 hr tablet Take 1 tablet by mouth Daily. 9/13/22   River Acevedo IV, MD   LACTULOSE PO Take  by mouth As Needed.    Janie Lynch MD   levothyroxine (SYNTHROID, LEVOTHROID) 50 MCG tablet 50 mcg Daily. 2/2/22   Janie Lynch MD   metoclopramide (REGLAN) 5 MG tablet Take 5 mg by mouth Every Night.    Janie Lynch MD   metoprolol tartrate (LOPRESSOR) 100 MG tablet Take 1 tablet by mouth 2 (Two) Times a Day. 9/13/22   River Acevedo IV, MD   minocycline (MINOCIN,DYNACIN) 100 MG capsule Take 100 mg by mouth Daily. 1/27/22   Janie Lynch MD   mupirocin (BACTROBAN) 2 % ointment mupirocin 2 % topical ointment apply a small amount to the affected area by topical route 3 times  per day   Active    Janie Lynch MD   nitroglycerin (NITROSTAT) 0.4 MG SL tablet Place 1 tablet under the tongue Every 5 (Five) Minutes As Needed for Chest Pain. 9/13/22   River Acevedo IV, MD   NON FORMULARY Velpafa 8.4 gram 2 times a week    Janie Lynch MD   Patiromer Sorbitex Calcium (Veltassa) 8.4 g pack Take 8.4 g by mouth 3 (Three) Times a Week.    Janie Lynch MD   polyethylene glycol (MIRALAX) 17 GM/SCOOP powder Miralax 17 gram oral powder packet   Take 1 packet every day by oral route.    Janie Lynch MD   sertraline (ZOLOFT) 100 MG tablet Daily. 6/16/21   Janie Lynch MD   tamsulosin (FLOMAX) 0.4 MG capsule 24 hr capsule Daily.    Janie Lynch MD   valsartan (DIOVAN) 320 MG tablet Take 1 tablet by mouth Daily. 9/13/22   River Acveedo IV, MD        Social History:   Social History     Tobacco Use   • Smoking status: Every Day     Packs/day: 1.50     Years: 35.00     Pack years: 52.50     Types: Cigarettes   • Smokeless tobacco: Never   Substance Use Topics   • Alcohol use: Never   • Drug use: Not Currently     Comment: pain pills from previous injuries         Review of Systems:  Review of Systems   Constitutional: Positive for appetite change and fatigue. Negative for chills, diaphoresis and fever.   HENT: Negative for congestion, postnasal drip, rhinorrhea and sore throat.    Eyes: Negative for photophobia.   Respiratory: Negative for cough, chest tightness and shortness of breath.    Cardiovascular: Negative for chest pain, palpitations and leg swelling.   Gastrointestinal: Positive for abdominal pain and nausea. Negative for diarrhea and vomiting.   Genitourinary: Negative for difficulty urinating, dysuria, flank pain, frequency, hematuria and urgency.   Musculoskeletal: Negative for neck pain and neck stiffness.   Skin: Negative for pallor and rash.   Neurological: Negative for dizziness, syncope, weakness, numbness and  "headaches.   Hematological: Negative for adenopathy. Does not bruise/bleed easily.   Psychiatric/Behavioral: Negative.         Physical Exam:  /79   Pulse 95   Temp 97.8 °F (36.6 °C) (Oral)   Resp 14   Ht 170.2 cm (67\")   Wt 79.4 kg (175 lb)   SpO2 90%   BMI 27.41 kg/m²     Physical Exam  Vitals and nursing note reviewed.   Constitutional:       General: He is not in acute distress.     Appearance: Normal appearance. He is not ill-appearing, toxic-appearing or diaphoretic.   HENT:      Head: Normocephalic and atraumatic.      Mouth/Throat:      Mouth: Mucous membranes are dry.   Eyes:      Pupils: Pupils are equal, round, and reactive to light.   Cardiovascular:      Rate and Rhythm: Normal rate and regular rhythm.      Pulses: Decreased pulses.           Carotid pulses are 2+ on the right side and 2+ on the left side.       Radial pulses are 2+ on the right side and 2+ on the left side.        Femoral pulses are 2+ on the right side and 2+ on the left side.       Popliteal pulses are 2+ on the right side and 2+ on the left side.        Dorsalis pedis pulses are 2+ on the right side and 1+ on the left side.        Posterior tibial pulses are 2+ on the right side and 2+ on the left side.      Heart sounds: Normal heart sounds. No murmur heard.  Pulmonary:      Effort: Pulmonary effort is normal. No accessory muscle usage, respiratory distress or retractions.      Breath sounds: Normal breath sounds. No wheezing, rhonchi or rales.   Abdominal:      General: Abdomen is flat. There is no distension.      Palpations: Abdomen is soft. There is no mass.      Tenderness: There is generalized abdominal tenderness. There is no right CVA tenderness, left CVA tenderness, guarding or rebound.      Comments: No rigidity. Peritoneal dialysis catheter in the Right middle abdomen with ascites present.    Musculoskeletal:         General: No swelling, tenderness or deformity.      Cervical back: Neck supple. No " tenderness.      Right lower leg: No edema.      Left lower leg: Edema present.      Comments: Trace edema in Left leg,       Right Lower Extremity: Right leg is amputated above knee.   Skin:     General: Skin is warm and dry.      Capillary Refill: Capillary refill takes less than 2 seconds.      Coloration: Skin is not jaundiced or pale.      Findings: No erythema.   Neurological:      General: No focal deficit present.      Mental Status: He is alert and oriented to person, place, and time. Mental status is at baseline.      Sensory: No sensory deficit.      Motor: No weakness.   Psychiatric:         Mood and Affect: Mood normal.         Behavior: Behavior normal.                Medications in the Emergency Department:  Medications   sodium chloride 0.9 % bolus 1,000 mL (0 mL Intravenous Stopped 12/14/22 1947)   vancomycin 1500 mg/250 mL 0.9% NS IVPB ([facility]) (1,500 mg Intravenous Given 12/14/22 1946)   piperacillin-tazobactam (ZOSYN) 3.375 g in iso-osmotic dextrose 50 ml (premix) (0 g Intravenous Stopped 12/14/22 1949)   morphine injection 2 mg (2 mg Intravenous Given 12/14/22 2041)   ondansetron (ZOFRAN) injection 4 mg (4 mg Intravenous Given 12/14/22 2042)        Labs  Lab Results (last 24 hours)     Procedure Component Value Units Date/Time    Body Fluid Culture - Body Fluid, Peritoneum [[id number]] Collected: 12/14/22 2201    Specimen: Body Fluid from Peritoneum Updated: 12/14/22 2254     Gram Stain No organisms seen    Body Fluid Cell Count With Differential - Body Fluid, Peritoneum [[id number]]  (Abnormal) Collected: 12/14/22 2201    Specimen: Body Fluid from Peritoneum Updated: 12/14/22 2324    Narrative:      The following orders were created for panel order Body Fluid Cell Count With Differential - Body Fluid, Peritoneum.  Procedure                               Abnormality         Status                     ---------                               -----------         ------                     Body fluid  cell count - ...[[id number]]  Abnormal            Final result               Body fluid differential ...[[id number]]                      Final result                 Please view results for these tests on the individual orders.    Body fluid cell count - Body Fluid, Peritoneum [[id number]]  (Abnormal) Collected: 12/14/22 2201    Specimen: Body Fluid from Peritoneum Updated: 12/14/22 2224     Color, Fluid Light Yellow     Appearance, Fluid Slightly Hazy     Nucleated Cells, Fluid 42 /mm3      RBC, Fluid <2,000 /mm3     Narrative:      No reference range established. Physician to interpret results with clinical findings.    Body fluid differential - Body Fluid, Peritoneum [[id number]] Collected: 12/14/22 2201    Specimen: Body Fluid from Peritoneum Updated: 12/14/22 2324     Neutrophils, Fluid 16 %      Lymphocytes, Fluid 75 %      Monocytes, Fluid 8 %      Macrophage, Fluid 1 %            Imaging:  No Radiology Exams Resulted Within Past 24 Hours    Procedures:  Procedures    Progress                            Medical Decision Making:  MDM  Number of Diagnoses or Management Options  Abdominal pain, unspecified abdominal location  Chronic renal failure, unspecified CKD stage  Nausea and vomiting, unspecified vomiting type  Diagnosis management comments:   The patient was afebrile.  The patient's peritoneal fluid demonstrates 42 nucleated cells, less than 2000 red blood cells, 16 neutrophils, 75 lymphocytes, gram stain was negative.  The patient's C-reactive protein was 15,000.  The patient's chemistry demonstrates a BUN of 71 and creatinine of 14.95.  The patient's potassium and bicarb were normal.  The patient had a normal lactate.  Patient's magnesium was normal.  The patient's pro time was 19.3.  Patient's CBC demonstrates a hemoglobin 9.2 and a hematocrit of 29.9.  The patient does have chronic anemia.  The patient's white blood cell count was normal.  The patient had an ultrasound of the gallbladder which  demonstrates no evidence for acute cholecystitis, gallstones there was no biliary ductal dilatation.  There was gallbladder sludge.  Otherwise normal findings.  The patient was pain-free at the time of discharge.  Patient states that he feels much better and feels comfortable go home.  The patient was given very specific instructions on when and why to return to the emergency room.  The patient voiced understanding and felt comfortable with the discharge instructions.  They would return to the emergency room if necessary.  The patient appears appropriate for discharge and outpatient follow-up.        CT Abdomen and Pelvis w/o Contrast Clinical: Noncontributory  COMPARISON: 7/2/2021, 7/15/2020  Findings:   Examination limited secondary to lack of oral and intravenous contrast. Lung bases demonstrate slight increase in interstitial lung markings. Remote right-sided rib fractures are present. Atherosclerotic calcifications are present involving the coronary arteries. There is free air under the hemidiaphragm, adjacent to the liver. The liver demonstrates grossly normal noncontrast attenuation. Gallbladder has increased density. Kidneys are atrophic without hydronephrosis. Right kidney demonstrates 2 rounded areas of low attenuation measuring up to 1.4 cm suggestive of cysts. The adrenal glands, pancreas spleen appear grossly normal. There is a drain present which may represent a peritoneal dialysis catheter within the pelvis. Postsurgical changes are noted of the left hip and right inferior pubic ramus. There is metallic density present adjacent to the inferior pubic rami on the left,. There is soft tissue calcification present posteriorly involving the right gluteal musculature and subcutaneous fat could be secondary to renal dystrophy or myositis ossificans but are nonspecific. Similar calcifications are noted anterior and posterior to the left hip replacement. Vascular calcifications are noted. There is diffuse  sclerosis of the osseous structures. There is soft tissue fullness involving the paraspinal soft tissues involving T7-T12.    IMPRESSION:¨  1. Free air within the abdomen indeterminate etiology of which there is a peritoneal catheter.  2. Increased density present within the gallbladder suggesting sludge or milk of calcium however.  3. Soft tissue calcifications involving the gluteal musculature is on the right and subcutaneous fat of the right gluteal region as well as adjacent to the left hip. Findings may be postsurgical, related to renal failure or posttraumatic. Clinical correlation recommended. Calcifications were present on the prior examination 7/15/2022 and appear unchanged  4. Atrophic kidneys with right renal cysts   5. Diffuse sclerosis of the osseous structures suggestive of renal osteodystrophy.  6. Soft tissue thickening involving the anterior paraspinal musculature of the lower thoracic spine which could be related to extra medullary hematopoiesis or potentially discitis and osteomyelitis. Clinical correlation is recommended. Up-to-date CT equipment and radiation dose reduction techniques were employed. CTDIvol: 14.6 mGy. DLP: 844 mGy-cm.    ¨Electronically signed by: Dr Garrett Puentes MD 12/13/2022 1:44 PM       Amount and/or Complexity of Data Reviewed  Clinical lab tests: reviewed  Tests in the radiology section of CPT®: reviewed  Tests in the medicine section of CPT®: reviewed  Decide to obtain previous medical records or to obtain history from someone other than the patient: yes  Discuss the patient with other providers: yes (I discussed the case with Dr. Hicks, on-call for the patient's primary care physician.  He feels the patient can be managed on outpatient basis now the fact that the patient has had peritonitis ruled out)                 Final diagnoses:   Chronic renal failure, unspecified CKD stage   Abdominal pain, unspecified abdominal location   Nausea and vomiting, unspecified  vomiting type        Disposition:  ED Disposition     ED Disposition   Discharge    Condition   Stable    Comment   --             Documentation assistance provided by Cresencio Tierney acting as scribe for Pranav Johnson DO. Information recorded by the scribe was done at my direction and has been verified and validated by me.        Cresencio Tierney  12/14/22 2017       Pranav Johnson DO  12/15/22 [id number]

## 2022-12-15 RX ORDER — ONDANSETRON 4 MG/1
8 TABLET, ORALLY DISINTEGRATING ORAL EVERY 8 HOURS PRN
Qty: 15 TABLET | Refills: 0 | Status: SHIPPED | OUTPATIENT
Start: 2022-12-15 | End: 2022-12-20

## 2022-12-15 NOTE — ED NOTES
Patient alert and oriented x4, but does take some time for him to recall answers.  Good mood and affect.  Wife at bedside.

## 2022-12-15 NOTE — DISCHARGE INSTRUCTIONS
Liquid diet only for the next 24 hours and then advance your diet very slowly as tolerated    Please push oral fluids      Please follow-up with your doctor tomorrow for serial reexamination the abdomen.  Return to the emergency room immediately for intractable pain, intractable vomiting, fever, shaking chills, muscle aches, near passing out, passing out or any new symptoms you are concerned with

## 2022-12-18 LAB
BACTERIA FLD CULT: NORMAL
GRAM STN SPEC: NORMAL

## 2022-12-19 LAB
BACTERIA SPEC AEROBE CULT: NORMAL
BACTERIA SPEC AEROBE CULT: NORMAL

## 2022-12-21 ENCOUNTER — TELEPHONE (OUTPATIENT)
Dept: SURGERY | Facility: CLINIC | Age: 57
End: 2022-12-21

## 2022-12-21 ENCOUNTER — OFFICE VISIT (OUTPATIENT)
Dept: SURGERY | Facility: CLINIC | Age: 57
End: 2022-12-21

## 2022-12-21 ENCOUNTER — PREP FOR SURGERY (OUTPATIENT)
Dept: OTHER | Facility: HOSPITAL | Age: 57
End: 2022-12-21

## 2022-12-21 VITALS — RESPIRATION RATE: 16 BRPM | HEIGHT: 66 IN | BODY MASS INDEX: 28.12 KG/M2 | WEIGHT: 175 LBS

## 2022-12-21 DIAGNOSIS — R10.13 EPIGASTRIC PAIN: Primary | ICD-10-CM

## 2022-12-21 DIAGNOSIS — R10.9 ABDOMINAL PAIN: Primary | ICD-10-CM

## 2022-12-21 PROCEDURE — 99203 OFFICE O/P NEW LOW 30 MIN: CPT | Performed by: SURGERY

## 2022-12-21 RX ORDER — SODIUM CHLORIDE 0.9 % (FLUSH) 0.9 %
10 SYRINGE (ML) INJECTION AS NEEDED
Status: CANCELLED | OUTPATIENT
Start: 2022-12-21

## 2022-12-21 RX ORDER — SODIUM CHLORIDE 9 MG/ML
40 INJECTION, SOLUTION INTRAVENOUS AS NEEDED
Status: CANCELLED | OUTPATIENT
Start: 2022-12-21

## 2022-12-21 RX ORDER — GABAPENTIN 800 MG/1
800 TABLET ORAL
COMMUNITY
Start: 2022-12-13 | End: 2023-01-10

## 2022-12-21 RX ORDER — SODIUM CHLORIDE 0.9 % (FLUSH) 0.9 %
10 SYRINGE (ML) INJECTION EVERY 12 HOURS SCHEDULED
Status: CANCELLED | OUTPATIENT
Start: 2022-12-21

## 2022-12-21 RX ORDER — LEVOTHYROXINE SODIUM 0.05 MG/1
50 TABLET ORAL
COMMUNITY
Start: 2022-12-13 | End: 2023-01-10

## 2022-12-21 RX ORDER — LANTHANUM CARBONATE 1000 MG/1
1000 TABLET, CHEWABLE ORAL
COMMUNITY
Start: 2022-12-13

## 2022-12-21 NOTE — PROGRESS NOTES
Chief Complaint: Nausea and Vomiting    Subjective         History of Present Illness  Chevy Junior is a 57 y.o. male presents to [facility] GENERAL SURGERY. The patient is to be seen for abdominal pain and gallbladder sludge. He is accompanied by his wife.    Abdominal pain and gallbladder sludge  The patient was seen in the emergency room for abdominal pain. He localizes the pain to the top of his abdomen, which radiates around to his ribs and back. His wife states that the patient screams sometimes when he is moving. She states that the patient is nauseated all the time. She states that the patient goes through [facility] before his prescriptions are due for a refill. She states that the patient was also put on Reglan. She states that the patient had gotten dehydrated, that is the reason why they sent him to the emergency room and she also notes the patient's blood pressure was very low at 68/40mmHg when he first went in. She states that the patient was experiencing high blood pressure until he had 3 stents put in and then it started dropping. She states that the patient was not eating or drinking much and then his pain around his abdomen. She states that the nausea is every single day, but the pain is intermittently every day.The patient spouse states that they were praying to get a scope or something to see, because they did a gallbladder ultrasound and stuff showing a little sludge. She states that according to the doctor over there, the little muscular one would not cause the pain radiating. She states that the patient was checked for peritonitis and he did a bag of fluid and they clifford from it and did a culture and it was not that. She states that the patient had a CT scan as well and it is not cancer. She states that it is like a fatty lipoma. She states that they were going to do surgery before the stents were put in, but the surgeon looked at it and it had shrunk back to the corner.  She states that it is back and it is hurting him. 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 She states that it was quarter size lump a month ago, and now it is larger. She notes that he can hardly sit on the lipoma. She states that it feels like it is gathering on to some tissue. She states that the patient has been on vancomycin for 1 year. The patient spouse states that he just got him on oral antibiotic minocycline and that they are ordering another CT scan every 6 months. She states that they do labs all the time to see if the white blood count levels are going down. She states that the patient has never had MRSA on the outside of his skin. She states that the patient is on Plavix and aspirin.    The patient spouse states that the patient has sees 9 different specialist for different health issues.    Objective     Past Medical History:   Diagnosis Date   • Anemia    • Chronic kidney disease    • Chronic pain    • COPD (chronic obstructive pulmonary disease) (HCC)    • Diabetes mellitus (HCC)    • Hyperlipidemia    • Hypertension    • Infectious viral hepatitis 5 years ago was treated   • RIC (obstructive sleep apnea)    • Osteoarthritis    • Phosphorus metabolic disorder    • Sleep apnea    • Substance abuse (HCC) 20 years ago from [facility]       Past Surgical History:   Procedure Laterality Date   • ABOVE KNEE LEG AMPUTATION Right    • CARDIAC CATHETERIZATION     • CARDIAC CATHETERIZATION N/A 11/30/2022    Procedure: Percutaneous Coronary Intervention;  Surgeon: River Acevedo IV, MD;  Location:  [facility] CATH INVASIVE LOCATION;  Service: Cardiovascular;  Laterality: N/A;   • CARDIAC CATHETERIZATION N/A 11/30/2022    Procedure: Optical Coherent Tomography;  Surgeon: River Acevedo IV, MD;  Location:  [facility] CATH INVASIVE  LOCATION;  Service: Cardiovascular;  Laterality: N/A;   • CARDIAC CATHETERIZATION N/A 11/30/2022    Procedure: Stent JOSE coronary;  Surgeon: River Acevedo IV, MD;  Location: [facility] CATH INVASIVE LOCATION;  Service: Cardiovascular;  Laterality: N/A;   • CATH LAB PROCEDURE      unable to stent   • HIP SURGERY     • INSERTION PERITONEAL DIALYSIS CATHETER     • LEG SURGERY     • MANDIBLE FRACTURE SURGERY     • REPLACEMENT TOTAL KNEE      x3   • SHOULDER SURGERY           Current Outpatient Medications:   •  albuterol sulfate  (90 Base) MCG/ACT inhaler, Every 4 (Four) Hours As Needed., Disp: , Rfl:   •  amLODIPine (NORVASC) 5 MG tablet, Take 1 tablet by mouth 2 (Two) Times a Day., Disp: 180 tablet, Rfl: 3  •  aspirin (aspirin) 81 MG EC tablet, Take 1 tablet by mouth Daily., Disp: 30 tablet, Rfl: 8  •  atorvastatin (LIPITOR) 40 MG tablet, Take 1 tablet by mouth Daily., Disp: 90 tablet, Rfl:   •  buprenorphine-naloxone (SUBOXONE) 8-2 MG per SL tablet, PLACE 2 TABLET UNDER TONGUE ONCE A DAY AS DIRECTED, Disp: , Rfl:   •  calcitriol (ROCALTROL) 0.5 MCG capsule, Take 0.5 mcg by mouth Daily. 1 Po MWF, Disp: , Rfl:   •  cinacalcet (SENSIPAR) 60 MG tablet, Daily., Disp: , Rfl:   •  cloNIDine (CATAPRES-TTS) 0.2 MG/24HR patch, Place 1 patch on the skin as directed by provider 1 (One) Time Per Week., Disp: , Rfl:   •  clopidogrel (PLAVIX) 75 MG tablet, Take 1 tablet by mouth Daily., Disp: 30 tablet, Rfl: 11  •  Diclofenac Sodium (VOLTAREN) 1 % gel gel, As Needed., Disp: , Rfl:   •  Diclofenac Sodium (VOLTAREN) 1 % gel gel, Apply  topically to the appropriate area as directed., Disp: , Rfl:   •   MG capsule, 100 mg 2 (Two) Times a Day As Needed., Disp: , Rfl:   •  esomeprazole (nexIUM) 40 MG capsule, As Needed., Disp: , Rfl:   •  fenofibrate (TRICOR) 54 MG tablet, Take 1 tablet by mouth Daily., Disp: , Rfl:   •  Ferric Citrate (AURYXIA PO), Take  by mouth. 2 tablets twice a day, Disp: , Rfl:   •  furosemide  (LASIX) 80 MG tablet, Take 1 tablet by mouth 2 (Two) Times a Day., Disp: , Rfl:   •  gabapentin (NEURONTIN) 800 MG tablet, 3 (Three) Times a Day., Disp: , Rfl:   •  Heparin Sodium, Porcine, (heparin, porcine,) 1000 UNIT/ML injection, Infuse 1,000 Units into a venous catheter Every Night., Disp: , Rfl:   •  hydrALAZINE (APRESOLINE) 100 MG tablet, Take 1 tablet by mouth 3 (Three) Times a Day., Disp: 270 tablet, Rfl: 3  •  Insulin Glargine (BASAGLAR KWIKPEN) 100 UNIT/ML injection pen, Inject 34 Units under the skin into the appropriate area as directed Daily., Disp: , Rfl:   •  IRON SUCROSE IV, Infuse  into a venous catheter Every 14 (Fourteen) Days. Iron infusion every 2 weeks, Disp: , Rfl:   •  isosorbide mononitrate (IMDUR) 60 MG 24 hr tablet, Take 1 tablet by mouth Daily., Disp: 90 tablet, Rfl: 1  •  LACTULOSE PO, Take  by mouth As Needed., Disp: , Rfl:   •  lanthanum (FOSRENOL) 1000 MG chewable tablet, 1,000 mg., Disp: , Rfl:   •  levothyroxine (SYNTHROID, LEVOTHROID) 50 MCG tablet, 50 mcg Daily., Disp: , Rfl:   •  metoclopramide (REGLAN) 5 MG tablet, Take 5 mg by mouth Every Night., Disp: , Rfl:   •  metoprolol tartrate (LOPRESSOR) 100 MG tablet, Take 1 tablet by mouth 2 (Two) Times a Day., Disp: , Rfl:   •  minocycline (MINOCIN,DYNACIN) 100 MG capsule, Take 100 mg by mouth Daily., Disp: , Rfl:   •  mupirocin (BACTROBAN) 2 % ointment, mupirocin 2 % topical ointment apply a small amount to the affected area by topical route 3 times per day   Active, Disp: , Rfl:   •  nitroglycerin (NITROSTAT) 0.4 MG SL tablet, Place 1 tablet under the tongue Every 5 (Five) Minutes As Needed for Chest Pain., Disp: 25 tablet, Rfl: 5  •  NON FORMULARY, Velpafa 8.4 gram 2 times a week, Disp: , Rfl:   •  polyethylene glycol (MIRALAX) 17 GM/SCOOP powder, Miralax 17 gram oral powder packet  Take 1 packet every day by oral route., Disp: , Rfl:   •  sertraline (ZOLOFT) 100 MG tablet, Daily., Disp: , Rfl:   •  tamsulosin (FLOMAX) 0.4 MG  "capsule 24 hr capsule, Daily., Disp: , Rfl:   •  valsartan (DIOVAN) 320 MG tablet, Take 1 tablet by mouth Daily., Disp: , Rfl:   •  gabapentin (NEURONTIN) 800 MG tablet, Take 800 mg by mouth., Disp: , Rfl:   •  levothyroxine (SYNTHROID, LEVOTHROID) 50 MCG tablet, Take 50 mcg by mouth., Disp: , Rfl:   •  Patiromer Sorbitex Calcium (Veltassa) 8.4 g pack, Take 8.4 g by mouth 3 (Three) Times a Week., Disp: , Rfl:     No Known Allergies     Family History   Problem Relation Age of Onset   • Hypertension Mother    • Arthritis Mother    • Diabetes Father        Social History     Socioeconomic History   • Marital status:    Tobacco Use   • Smoking status: Every Day     Packs/day: 1.50     Years: 40.00     Pack years: 60.00     Types: Cigarettes   • Smokeless tobacco: Never   Substance and Sexual Activity   • Alcohol use: Not Currently     Comment: None   • Drug use: Not Currently     Types: Hydrocodone     Comment: pain pills from previous injuries   • Sexual activity: Not Currently     Partners: Female       Vital Signs:   Resp 16   Ht 167.6 cm (66\")   Wt 79.4 kg (175 lb)   BMI 28.25 kg/m²      Physical Exam   Post Surgical Incision  Surgical wound:    No acute distress. Nonlabored breathing. Abdomen is soft. Mildly tender in the upper abdomen.    Result Review :            Procedures        Assessment and Plan    There are no diagnoses linked to this encounter.     1. Upper abdominal pain and associated nausea.  He has an ultrasound from the ER which shows gallbladder sludge. He has a CT showing no signs of cholecystitis, some mild pneumoperitoneum likely from peritoneal dialysis catheter and buttock lesion. Secondary to the upper abdominal pain, we will plan for an EGD in the future. Risks, benefits, alternatives of that procedure were discussed extensively. All questions were answered. We will also plan on trying to give him some ursodiol for the biliary sludge. We will see if that does not help with some of " his symptoms. If his EGD is negative, we may need to consider pursuing further gallbladder imaging. I discussed with the patient and his wife at bedside. They voiced understanding and agreed to proceed with the above plan.        Follow Up   No follow-ups on file.  Patient was given instructions and counseling regarding his condition or for health maintenance advice. Please see specific information pulled into the AVS if appropriate.       Transcribed from ambient dictation for Gus Whaley MD by Tana Arredondo.  12/21/22   14:09 EST    Patient or patient representative verbalized consent to the visit recording.  I have personally performed the services described in this document as transcribed by the above individual, and it is both accurate and complete.

## 2023-01-05 LAB — QT INTERVAL: 424 MS

## 2023-01-10 ENCOUNTER — OFFICE VISIT (OUTPATIENT)
Dept: CARDIOLOGY | Facility: CLINIC | Age: 58
End: 2023-01-10
Payer: MEDICAID

## 2023-01-10 VITALS
HEIGHT: 66 IN | WEIGHT: 180 LBS | SYSTOLIC BLOOD PRESSURE: 166 MMHG | DIASTOLIC BLOOD PRESSURE: 85 MMHG | HEART RATE: 99 BPM | BODY MASS INDEX: 28.93 KG/M2 | OXYGEN SATURATION: 96 %

## 2023-01-10 DIAGNOSIS — I10 ESSENTIAL HYPERTENSION: ICD-10-CM

## 2023-01-10 DIAGNOSIS — N18.6 ESRD ON PERITONEAL DIALYSIS: ICD-10-CM

## 2023-01-10 DIAGNOSIS — E78.5 HYPERLIPIDEMIA LDL GOAL <70: ICD-10-CM

## 2023-01-10 DIAGNOSIS — I50.30 HEART FAILURE WITH PRESERVED LEFT VENTRICULAR FUNCTION (HFPEF): ICD-10-CM

## 2023-01-10 DIAGNOSIS — Z99.2 ESRD ON PERITONEAL DIALYSIS: ICD-10-CM

## 2023-01-10 DIAGNOSIS — I25.119 CORONARY ARTERY DISEASE INVOLVING NATIVE CORONARY ARTERY OF NATIVE HEART WITH ANGINA PECTORIS: Primary | ICD-10-CM

## 2023-01-10 PROCEDURE — 99214 OFFICE O/P EST MOD 30 MIN: CPT | Performed by: NURSE PRACTITIONER

## 2023-01-10 NOTE — PROGRESS NOTES
Subjective     Chevy Junior is a 57 y.o. male who presents to [facility] for [facility] follow up  ([facility] stenting x 3).    CHIEF COMPLIANT  Chief Complaint   Patient presents with   • [facility] follow up      [facility] stenting x 3       Active Problems:  Problem List Items Addressed This Visit        Cardiac and Vasculature    Coronary artery disease involving native coronary artery of native heart with angina pectoris ([facility]) - Primary    Overview     · Cardiac catherization at [facility] (5/17/2022): Severe 1-vessel CAD with chronic total occlusion of the mid RCA.  Unsuccessful  angioplasty of the mid RCA into the RV marginal branch.  · Cardiac cath (11/30/2022): Successful staged PCI of distal RCA  with overlapping JOSE         Hyperlipidemia LDL goal <70    Essential hypertension    Overview     • Target blood pressure <130/80 mmHg         Heart failure with preserved left ventricular function (HFpEF) ([facility])       Genitourinary and Reproductive     ESRD on peritoneal dialysis ([facility])   Problem list  1.  Chest pain  1.1 stress test 3/22: Moderately sized only mildly dense inferior basilar, basilar diaphragmatic, and basilar inferior lateral wall ischemic defect, preserved post-rest EF of 56% with anterior hypokinesis   Left heart catheterization 5/22: Left main normal, circumflex diffuse irregularities, LAD diffuse irregularities, RCA  collaterals from LAD, PCI of the RCA with residual 50% stenosis  2.  Shortness of breath  2.1 echocardiogram 3/22: EF 55 to 60%, grade 1A diastolic dysfunction, trivial to mild TR and MR, trivial AI, dilated aortic root of 4.1 cm, pulmonary artery systolic pressures high 20s  3.  Hypertension  4.  Fatigue  5.  Chronic renal failure on daily peritoneal dialysis    HPI  The patient is accompanied by an adult female.    Mr. Chevy Junior is a 57-year-old male patient who presents for follow up for coronary artery disease in which he went under a left heart  catheterization with stent x3 to his right coronary artery due to previous .  He did go under left heart catheterization at [facility] in which they were only able to revascularize about 50%.  Due to continued symptoms he was referred for  intervention.     The adult female states the patient has been having complications with his blood pressure. She states that his blood pressure reading last night was normal, but he has not eaten today to be able to take his medication. The adult female states that his blood pressure readings over the past week were 137/90 mm Hg, 180/97 mm Hg, 121/72 mm Hg, 144/93 mm Hg and 113/90 mm Hg. She states that last month the patient became dehydrated and his blood pressure decreased to 99/79 mm Hg, 110/78 mm Hg and 94/67 mm Hg. The adult female states the patient was taken to a hospital in [place] on 12/13/2022 to see if he was indeed dehydrated and they discovered biliary sludge. She states if she takes his blood pressure in the morning and his systolic blood pressure is in the 70s, she does not give him the medication. The adult female states the patient has been prescribed hydralazine, valsartan, amlodipine and isosorbide, but she is \"scared\" to give them to him due to his blood pressure readings. She states he has discontinued clonidine because his blood pressure has not been as elevated. The adult female states the patient was placed on the clonidine patch and his blood pressure decreased to 88/58 mm Hg.    The adult female stated the patient has fallen out of his wheelchair  2X in the last 3 days. She states he is sore all over and he has an appointment scheduled for tomorrow.    The patient states he is able to do more activities after his stent placement. She states that prior to the stents, the patient was depressed because he was unable to ambulate for 2 years. The adult female states they were referred to Dr. Acevedo because the patient's right coronary artery would  not open.  He denies any complications at the right groin access site.      PRIOR MEDS  Current Outpatient Medications on File Prior to Visit   Medication Sig Dispense Refill   • albuterol sulfate  (90 Base) MCG/ACT inhaler Every 4 (Four) Hours As Needed.     • amLODIPine (NORVASC) 5 MG tablet Take 1 tablet by mouth 2 (Two) Times a Day. 180 tablet 3   • aspirin (aspirin) 81 MG EC tablet Take 1 tablet by mouth Daily. 30 tablet 8   • atorvastatin (LIPITOR) 40 MG tablet Take 1 tablet by mouth Daily. 90 tablet    • buprenorphine-naloxone (SUBOXONE) 8-2 MG per SL tablet PLACE 2 TABLET UNDER TONGUE ONCE A DAY AS DIRECTED     • calcitriol (ROCALTROL) 0.5 MCG capsule Take 0.5 mcg by mouth Daily. 1 Po MWF     • cinacalcet (SENSIPAR) 60 MG tablet Daily.     • clopidogrel (PLAVIX) 75 MG tablet Take 1 tablet by mouth Daily. 30 tablet 11   • Diclofenac Sodium (VOLTAREN) 1 % gel gel As Needed.     • Diclofenac Sodium (VOLTAREN) 1 % gel gel Apply  topically to the appropriate area as directed.     •  MG capsule 100 mg 2 (Two) Times a Day As Needed.     • esomeprazole (nexIUM) 40 MG capsule As Needed.     • fenofibrate (TRICOR) 54 MG tablet Take 1 tablet by mouth Daily.     • Ferric Citrate (AURYXIA PO) Take  by mouth. 2 tablets twice a day     • gabapentin (NEURONTIN) 800 MG tablet 3 (Three) Times a Day.     • Heparin Sodium, Porcine, (heparin, porcine,) 1000 UNIT/ML injection Infuse 1,000 Units into a venous catheter Every Night.     • hydrALAZINE (APRESOLINE) 100 MG tablet Take 1 tablet by mouth 3 (Three) Times a Day. 270 tablet 3   • Insulin Glargine (BASAGLAR KWIKPEN) 100 UNIT/ML injection pen Inject 34 Units under the skin into the appropriate area as directed Daily.     • IRON SUCROSE IV Infuse  into a venous catheter Every 14 (Fourteen) Days. Iron infusion every 2 weeks     • isosorbide mononitrate (IMDUR) 60 MG 24 hr tablet Take 1 tablet by mouth Daily. 90 tablet 1   • LACTULOSE PO Take  by mouth As Needed.      • lanthanum (FOSRENOL) 1000 MG chewable tablet 1,000 mg.     • levothyroxine (SYNTHROID, LEVOTHROID) 50 MCG tablet 50 mcg Daily.     • metoclopramide (REGLAN) 5 MG tablet Take 5 mg by mouth Every Night.     • metoprolol tartrate (LOPRESSOR) 100 MG tablet Take 1 tablet by mouth 2 (Two) Times a Day.     • minocycline (MINOCIN,DYNACIN) 100 MG capsule Take 100 mg by mouth Daily.     • mupirocin (BACTROBAN) 2 % ointment mupirocin 2 % topical ointment apply a small amount to the affected area by topical route 3 times per day   Active     • nitroglycerin (NITROSTAT) 0.4 MG SL tablet Place 1 tablet under the tongue Every 5 (Five) Minutes As Needed for Chest Pain. 25 tablet 5   • NON FORMULARY Velpafa 8.4 gram 2 times a week     • Patiromer Sorbitex Calcium (Veltassa) 8.4 g pack Take 8.4 g by mouth 3 (Three) Times a Week.     • polyethylene glycol (MIRALAX) 17 GM/SCOOP powder Miralax 17 gram oral powder packet   Take 1 packet every day by oral route.     • sertraline (ZOLOFT) 100 MG tablet Daily.     • tamsulosin (FLOMAX) 0.4 MG capsule 24 hr capsule Daily.     • valsartan (DIOVAN) 320 MG tablet Take 1 tablet by mouth Daily.     • [DISCONTINUED] cloNIDine (CATAPRES-TTS) 0.2 MG/24HR patch Place 1 patch on the skin as directed by provider 1 (One) Time Per Week.     • [DISCONTINUED] furosemide (LASIX) 80 MG tablet Take 1 tablet by mouth 2 (Two) Times a Day.     • [DISCONTINUED] gabapentin (NEURONTIN) 800 MG tablet Take 800 mg by mouth.     • [DISCONTINUED] levothyroxine (SYNTHROID, LEVOTHROID) 50 MCG tablet Take 50 mcg by mouth.       No current facility-administered medications on file prior to visit.       ALLERGIES  Patient has no known allergies.    HISTORY  Past Medical History:   Diagnosis Date   • Anemia    • Chronic kidney disease    • Chronic pain    • COPD (chronic obstructive pulmonary disease) (HCC)    • Diabetes mellitus (HCC)    • Hyperlipidemia    • Hypertension    • Infectious viral hepatitis 5 years ago was  treated   • RIC (obstructive sleep apnea)    • Osteoarthritis    • Phosphorus metabolic disorder    • Sleep apnea    • Substance abuse (HCC) 20 years ago from [facility]       Social History     Socioeconomic History   • Marital status:    Tobacco Use   • Smoking status: Every Day     Packs/day: 1.50     Years: 40.00     Pack years: 60.00     Types: Cigarettes   • Smokeless tobacco: Never   Substance and Sexual Activity   • Alcohol use: Not Currently     Comment: None   • Drug use: Not Currently     Types: Hydrocodone     Comment: pain pills from previous injuries   • Sexual activity: Not Currently     Partners: Female       Family History   Problem Relation Age of Onset   • Hypertension Mother    • Arthritis Mother    • Diabetes Father        Review of Systems   Constitutional: Positive for fatigue. Negative for chills and fever.   HENT: Positive for rhinorrhea. Negative for congestion and sore throat.    Respiratory: Positive for shortness of breath. Negative for chest tightness.    Cardiovascular: Positive for chest pain (every now and then rubs it and it goes away) and leg swelling (minimal is on dialysis now). Negative for palpitations.   Gastrointestinal: Positive for diarrhea. Negative for constipation and nausea.   Musculoskeletal: Positive for arthralgias, back pain and neck pain.   Allergic/Immunologic: Positive for environmental allergies. Negative for food allergies.   Neurological: Positive for weakness. Negative for dizziness, syncope and light-headedness.   Hematological: Does not bruise/bleed easily.   Psychiatric/Behavioral: Negative for sleep disturbance.       Objective     VITALS: /85 (BP Location: Left arm, Patient Position: Sitting, Cuff Size: Adult)   Pulse 99   Ht 167.6 cm (65.98\")   Wt 81.6 kg (180 lb)   SpO2 96%   BMI 29.07 kg/m²     LABS:   Lab Results (most recent)     None          IMAGING:   US Gallbladder    Result Date: 12/15/2022    No acute cholecystitis. No  gallstones. No biliary ductal dilatation.  Gallbladder sludge is suggested.  Right renal atrophy is seen with cortical scarring and probable multiple renal cysts.  These findings suggest chronic medical renal disease.  There is suspected hepatomegaly.  The other findings are as detailed above.    Please note that portions of this note were completed with a voice recognition program.  LENIN GAYLE JR, MD       Electronically Signed and Approved By: LENIN GAYLE JR, MD on 12/15/2022 at 0:01              XR Chest 1 View    Result Date: 12/14/2022   Low lung volumes with no definite acute abnormality       GISELLE VALLEJO MD       Electronically Signed and Approved By: GISELLE VALLEJO MD on 12/14/2022 at 18:36             CT neck soft tissue with IV contrast    Result Date: 1/5/2023  Collapse with endplate irregularity at C5-C6 and C6-C7 consistent with discitis/osteomyelitis. Images reviewed, interpreted, and dictated by Dr. Jude Arteaga. Transcribed by ZENAIDA Singh (R).      EXAM:  Physical Exam  Vitals and nursing note reviewed.   Constitutional:       General: He is not in acute distress.     Appearance: He is well-developed. He is not diaphoretic.   HENT:      Head: Normocephalic and atraumatic.      Nose: Nose normal.   Eyes:      General:         Right eye: No discharge.         Left eye: No discharge.      Conjunctiva/sclera: Conjunctivae normal.   Neck:      Thyroid: No thyromegaly.      Trachea: No tracheal deviation.   Cardiovascular:      Rate and Rhythm: Normal rate and regular rhythm.      Heart sounds: Normal heart sounds.   Pulmonary:      Effort: Pulmonary effort is normal. No respiratory distress.      Breath sounds: Normal breath sounds. No stridor. No wheezing.   Chest:      Chest wall: No tenderness.   Abdominal:      Comments: Peritoneal dialysis catheter in place   Musculoskeletal:         General: Normal range of motion.      Cervical back: Normal range of motion.      Comments: Right BKA      Lymphadenopathy:      Cervical: No cervical adenopathy.   Skin:     General: Skin is warm and dry.   Neurological:      Mental Status: He is alert and oriented to person, place, and time.      Motor: No abnormal muscle tone.      Gait: Gait abnormal ( Utilizes wheelchair).   Psychiatric:         Behavior: Behavior normal.         Thought Content: Thought content normal.         Judgment: Judgment normal.         Procedure   Procedures       Assessment & Plan    Diagnosis Plan   1. Coronary artery disease involving native coronary artery of native heart with angina pectoris ([facility])        2. Heart failure with preserved left ventricular function (HFpEF) ([facility])        3. Essential hypertension        4. Hyperlipidemia LDL goal <70        5. ESRD on peritoneal dialysis ([facility])             Plan:  1. Advised the adult female to log the patient's blood pressure for the next 2 weeks. Advised her to hold the patient's valsartan, isosorbide and amlodipine for now, but we will not discontinue the medications. Prescribing metoprolol for the patient and sending the prescription to the pharmacy. The patient will take metoprolol daily along with hydralazine. Advised is the patient's blood pressure becomes elevated again, we will reintroduce isosorbide.  2.  Patient denies any complications at the right femoral access site.  He does report improvement in symptoms after revascularization of his RCA.  3.  Patient will continue dual antiplatelet therapy of aspirin and Plavix.  4.  Informed of signs and symptoms of ACS and advised to seek emergent treatment for any new worsening symptoms.  Patient also advised sooner follow-up as needed.  Also advised to follow-up with family doctor as needed  This note is dictated utilizing voice recognition software.  Although this record has been proof read, transcriptional errors may still be present. If questions occur regarding the content of this record please do not hesitate to call our  office.  I have reviewed and confirmed the accuracy of the ROS as documented by the MA/LPN/RN VICKI Ledesma    Assessment:  1. Hypertension     Return in about 3 months (around 4/10/2023), or if symptoms worsen or fail to improve.    Diagnoses and all orders for this visit:    1. Coronary artery disease involving native coronary artery of native heart with angina pectoris (HCC) (Primary)    2. Heart failure with preserved left ventricular function (HFpEF) (HCC)    3. Essential hypertension    4. Hyperlipidemia LDL goal <70    5. ESRD on peritoneal dialysis (HCC)        Chevy Junior  reports that he has been smoking cigarettes. He has a 60.00 pack-year smoking history. He has never used smokeless tobacco.. I have educated him on the risk of diseases from using tobacco products.          MEDS ORDERED DURING VISIT:  No orders of the defined types were placed in this encounter.          This document has been electronically signed by VICKI Ledesma Jr.  January 10, 2023 23:11 EST

## 2023-01-12 ENCOUNTER — TELEPHONE (OUTPATIENT)
Dept: SURGERY | Facility: CLINIC | Age: 58
End: 2023-01-12
Payer: MEDICAID

## 2023-01-12 NOTE — TELEPHONE ENCOUNTER
Gisselle called from [facility].  Patient is scheduled for EGD on 02/03/23, and he wanted them to ask if there is any sooner appointment available.

## 2023-01-12 NOTE — TELEPHONE ENCOUNTER
SPOKE TO PATIENT'S WIFE WHO IS ON RELEASE AND TOLD HER WE DID NOT HAVE ANY SOONER APPOINTMENT'S BUT THAT I COULD TALK TO DR GONZÁLES ON Tuesday AFTERNOON AND SEE IF WE COULD ADD HIM ON SOMEWHERE. SHE STATES HE IS HAVING A LOT OF NAUSEA.

## 2023-01-17 NOTE — TELEPHONE ENCOUNTER
SPOKE WITH PATIENT'S WIFE AND LET HER KNOW THAT PER DR. GONZÁLES, THERE IS NOTHING SOONER AT THIS TIME. ADVISED PATIENT'S WIFE THAT THEY ARE WELCOME TO CALL BACK TO SEE IF THERE HAVE BEEN ANY CANCELLATIONS

## 2023-01-27 ENCOUNTER — DOCUMENTATION (OUTPATIENT)
Dept: SURGERY | Facility: CLINIC | Age: 58
End: 2023-01-27
Payer: MEDICAID

## 2023-01-27 NOTE — LETTER
January 27, 2023     River Acevedo IV, MD  [address]  [address]  [facility] [address]    Patient: Chevy Junior  YOB: 1965        This patient is waiting to have a Esophagogastroduodenoscopy which Dr Gus Whaley  will perform at [facility] on 2/3/23.    Please respond to this request noting your recommendations regarding clearance from a cardiology standpoint.  You may contact our office at [phone] (option 2) with any questions.    I appreciate your prompt response in this matter. Please return this form to our office as soon as possible to [phone].    ____ I approve my patient from a cardiolgy standpoint    ____ I do NOT approve my patient from a cardiology standpoint at this time    Approving physician name (please print):     _____________________________________________    Approving physician signature:       ________________________________      Date:________________          Sincerely,  [facility]   General/Urology surgery- [facility]

## 2023-01-27 NOTE — LETTER
January 27, 2023     Hal Wing MD  [address] [place] [address] [place] [address]    Patient: Chevy Junior  YOB: 1965      This patient is waiting to have a  Esophagogastroduodenoscopy which Dr Gus Cook will perform at [facility] on 2/3/23 .     Our records indicate this patient is currently taking Plavix 75mg. This procedure requires the patient to suspend their anticoagulant medication for 5 days  prior to surgery.     Please respond to this request noting your recommendations. You may contact our office at [phone] Option 2 with any questions. I appreciate your prompt response in this matter.     Please return this form to our office as soon as possible to [phone]    ____ I approve my patient to stop taking their Anticoagulant Therapy medication _____ days prior to the scheduled      procedure.    ____ I do NOT approve my patient to stop taking their Anticoagulant Therapy medication at this time.    ____ I approve my patient from a cardiac standpoint.    ____ I do NOT approve my patient from a cardiac standpoint at this time.    Approving physician name (please print):     _____________________________________________    Approving physician signature:     ________________________________    Date:________________      Sincerely,  [facility]   General/Urololgy surgery [facility]

## 2023-02-01 ENCOUNTER — TELEPHONE (OUTPATIENT)
Dept: CARDIOLOGY | Facility: CLINIC | Age: 58
End: 2023-02-01
Payer: MEDICAID

## 2023-02-01 NOTE — TELEPHONE ENCOUNTER
Received cardiac clearance request from  stating pt has esophagogastroduodenoscopy scheduled for 02/03/2023 and is requiring a cardiac clearance. Placed cardiac clearance request in Marianna's inbox to review and address with provider.

## 2023-02-03 ENCOUNTER — HOSPITAL ENCOUNTER (OUTPATIENT)
Facility: HOSPITAL | Age: 58
Setting detail: HOSPITAL OUTPATIENT SURGERY
Discharge: HOME | End: 2023-02-03
Attending: SURGERY | Admitting: SURGERY
Payer: MEDICAID

## 2023-02-03 ENCOUNTER — ANESTHESIA EVENT (OUTPATIENT)
Dept: GASTROENTEROLOGY | Facility: HOSPITAL | Age: 58
End: 2023-02-03
Payer: MEDICAID

## 2023-02-03 ENCOUNTER — ANESTHESIA (OUTPATIENT)
Dept: GASTROENTEROLOGY | Facility: HOSPITAL | Age: 58
End: 2023-02-03
Payer: MEDICAID

## 2023-02-03 VITALS
BODY MASS INDEX: 28.25 KG/M2 | HEIGHT: 67 IN | SYSTOLIC BLOOD PRESSURE: 114 MMHG | WEIGHT: 180 LBS | HEART RATE: 96 BPM | TEMPERATURE: 97.1 F | OXYGEN SATURATION: 100 % | DIASTOLIC BLOOD PRESSURE: 81 MMHG | RESPIRATION RATE: 13 BRPM

## 2023-02-03 DIAGNOSIS — R10.9 ABDOMINAL PAIN: ICD-10-CM

## 2023-02-03 LAB — GLUCOSE BLDC GLUCOMTR-MCNC: 88 MG/DL (ref 70–99)

## 2023-02-03 PROCEDURE — 25010000002 PROPOFOL 10 MG/ML EMULSION: Performed by: ANESTHESIOLOGY

## 2023-02-03 PROCEDURE — 88305 TISSUE EXAM BY PATHOLOGIST: CPT | Performed by: SURGERY

## 2023-02-03 PROCEDURE — 82962 GLUCOSE BLOOD TEST: CPT

## 2023-02-03 RX ORDER — PANTOPRAZOLE SODIUM 40 MG/1
40 TABLET, DELAYED RELEASE ORAL DAILY
Qty: 30 TABLET | Refills: 1 | Status: SHIPPED | OUTPATIENT
Start: 2023-02-03 | End: 2023-03-28

## 2023-02-03 RX ORDER — SODIUM CHLORIDE 9 MG/ML
40 INJECTION, SOLUTION INTRAVENOUS AS NEEDED
Status: DISCONTINUED | OUTPATIENT
Start: 2023-02-03 | End: 2023-02-03 | Stop reason: HOSPADM

## 2023-02-03 RX ORDER — SODIUM CHLORIDE 0.9 % (FLUSH) 0.9 %
10 SYRINGE (ML) INJECTION EVERY 12 HOURS SCHEDULED
Status: DISCONTINUED | OUTPATIENT
Start: 2023-02-03 | End: 2023-02-03 | Stop reason: HOSPADM

## 2023-02-03 RX ORDER — LIDOCAINE HYDROCHLORIDE 20 MG/ML
INJECTION, SOLUTION EPIDURAL; INFILTRATION; INTRACAUDAL; PERINEURAL AS NEEDED
Status: DISCONTINUED | OUTPATIENT
Start: 2023-02-03 | End: 2023-02-03 | Stop reason: SURG

## 2023-02-03 RX ORDER — SODIUM CHLORIDE, SODIUM LACTATE, POTASSIUM CHLORIDE, CALCIUM CHLORIDE 600; 310; 30; 20 MG/100ML; MG/100ML; MG/100ML; MG/100ML
30 INJECTION, SOLUTION INTRAVENOUS CONTINUOUS
Status: DISCONTINUED | OUTPATIENT
Start: 2023-02-03 | End: 2023-02-03 | Stop reason: HOSPADM

## 2023-02-03 RX ORDER — PROPOFOL 10 MG/ML
VIAL (ML) INTRAVENOUS CONTINUOUS PRN
Status: DISCONTINUED | OUTPATIENT
Start: 2023-02-03 | End: 2023-02-03 | Stop reason: SURG

## 2023-02-03 RX ORDER — PHENYLEPHRINE HCL IN 0.9% NACL 1 MG/10 ML
SYRINGE (ML) INTRAVENOUS AS NEEDED
Status: DISCONTINUED | OUTPATIENT
Start: 2023-02-03 | End: 2023-02-03 | Stop reason: SURG

## 2023-02-03 RX ORDER — SUCRALFATE 1 G/1
1 TABLET ORAL 4 TIMES DAILY
Qty: 120 TABLET | Refills: 1 | Status: SHIPPED | OUTPATIENT
Start: 2023-02-03 | End: 2024-02-03

## 2023-02-03 RX ORDER — SODIUM CHLORIDE 0.9 % (FLUSH) 0.9 %
10 SYRINGE (ML) INJECTION AS NEEDED
Status: DISCONTINUED | OUTPATIENT
Start: 2023-02-03 | End: 2023-02-03 | Stop reason: HOSPADM

## 2023-02-03 RX ADMIN — PROPOFOL 100 MCG/KG/MIN: 10 INJECTION, EMULSION INTRAVENOUS at 10:31

## 2023-02-03 RX ADMIN — Medication 200 MCG: at 10:57

## 2023-02-03 RX ADMIN — SODIUM CHLORIDE, POTASSIUM CHLORIDE, SODIUM LACTATE AND CALCIUM CHLORIDE 30 ML/HR: 600; 310; 30; 20 INJECTION, SOLUTION INTRAVENOUS at 10:14

## 2023-02-03 RX ADMIN — Medication 100 MCG: at 10:47

## 2023-02-03 RX ADMIN — LIDOCAINE HYDROCHLORIDE 100 MG: 20 INJECTION, SOLUTION EPIDURAL; INFILTRATION; INTRACAUDAL; PERINEURAL at 10:31

## 2023-02-03 NOTE — ANESTHESIA POSTPROCEDURE EVALUATION
Patient: Chevy Junior    Procedure Summary     Date: 02/03/23 Room / Location: [facility] END[facility] 5 / [facility]    Anesthesia Start: 1027 Anesthesia Stop: 1104    Procedure: ESOPHAGOGASTRODUODENOSCOPY with biopsy Diagnosis:       Abdominal pain      (Abdominal pain [R10.9])    Surgeons: Gus Whaley MD Provider: Nael Lewis CRNA    Anesthesia Type: general ASA Status: 4          Anesthesia Type: general    Vitals  Vitals Value Taken Time   /81 02/03/23 1129   Temp 36.2 °C (97.1 °F) 02/03/23 1128   Pulse 94 02/03/23 1130   Resp 13 02/03/23 1128   SpO2 96 % 02/03/23 1130   Vitals shown include unvalidated device data.        Post Anesthesia Care and Evaluation    Patient location during evaluation: bedside  Patient participation: complete - patient participated  Level of consciousness: awake  Pain management: adequate    Airway patency: patent  Anesthetic complications: No anesthetic complications  PONV Status: none  Cardiovascular status: acceptable and stable  Respiratory status: acceptable  Hydration status: acceptable    Comments: An Anesthesiologist personally participated in the most demanding procedures (including induction and emergence if applicable) in the anesthesia plan, monitored the course of anesthesia administration at frequent intervals and remained physically present and available for immediate diagnosis and treatment of emergencies.

## 2023-02-03 NOTE — H&P
History of Present Illness  Chevy Junior is a 57 y.o. male presents to [facility] GENERAL SURGERY. The patient is to be seen for abdominal pain and gallbladder sludge. He is accompanied by his wife.     Abdominal pain and gallbladder sludge  The patient was seen in the emergency room for abdominal pain. He localizes the pain to the top of his abdomen, which radiates around to his ribs and back. His wife states that the patient screams sometimes when he is moving. She states that the patient is nauseated all the time. She states that the patient goes through Zofran before his prescriptions are due for a refill. She states that the patient was also put on Reglan. She states that the patient had gotten dehydrated, that is the reason why they sent him to the emergency room and she also notes the patient's blood pressure was very low at 68/40mmHg when he first went in. She states that the patient was experiencing high blood pressure until he had 3 stents put in and then it started dropping. She states that the patient was not eating or drinking much and then his pain around his abdomen. She states that the nausea is every single day, but the pain is intermittently every day.The patient spouse states that they were praying to get a scope or something to see, because they did a gallbladder ultrasound and stuff showing a little sludge. She states that according to the doctor over there, the little muscular one would not cause the pain radiating. She states that the patient was checked for peritonitis and he did a bag of fluid and they clifford from it and did a culture and it was not that. She states that the patient had a CT scan as well and it is not cancer. She states that it is like a fatty lipoma. She states that they were going to do surgery before the stents were put in, but the surgeon looked at it and it had shrunk back to the corner. She states that it is back and it is hurting him. 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 She states that it was quarter size lump a month ago, and now it is larger. She notes that he can hardly sit on the lipoma. She states that it feels like it is gathering on to some tissue. She states that the patient has been on vancomycin for 1 year. The patient spouse states that he just got him on oral antibiotic minocycline and that they are ordering another CT scan every 6 months. She states that they do labs all the time to see if the white blood count levels are going down. She states that the patient has never had MRSA on the outside of his skin. She states that the patient is on Plavix and aspirin.     The patient spouse states that the patient has sees 9 different specialist for different health issues.           Objective         Medical History        Past Medical History:   Diagnosis Date   • Anemia     • Chronic kidney disease     • Chronic pain     • COPD (chronic obstructive pulmonary disease) (HCC)     • Diabetes mellitus (HCC)     • Hyperlipidemia     • Hypertension     • Infectious viral hepatitis 5 years ago was treated   • RIC (obstructive sleep apnea)     • Osteoarthritis     • Phosphorus metabolic disorder     • Sleep apnea     • Substance abuse (HCC) 20 years ago from [facility]            Surgical History         Past Surgical History:   Procedure Laterality Date   • ABOVE KNEE LEG AMPUTATION Right     • CARDIAC CATHETERIZATION       • CARDIAC CATHETERIZATION N/A 11/30/2022     Procedure: Percutaneous Coronary Intervention;  Surgeon: River Acevedo IV, MD;  Location: [facility] INVASIVE LOCATION;  Service: Cardiovascular;  Laterality: N/A;   • CARDIAC CATHETERIZATION N/A 11/30/2022     Procedure: Optical Coherent Tomography;  Surgeon: River Acevedo IV, MD;   Location:  [facility] C[facility] INVASIVE LOCATION;  Service: Cardiovascular;  Laterality: N/A;   • CARDIAC CATHETERIZATION N/A 11/30/2022     Procedure: Stent JOSE coronary;  Surgeon: River Acevedo IV, MD;  Location:  [facility] C[facility] INVASIVE LOCATION;  Service: Cardiovascular;  Laterality: N/A;   • CATH LAB PROCEDURE         unable to stent   • HIP SURGERY       • INSERTION PERITONEAL DIALYSIS CATHETER       • LEG SURGERY       • MANDIBLE FRACTURE SURGERY       • REPLACEMENT TOTAL KNEE         x3   • SHOULDER SURGERY                   Current Outpatient Medications:   •  albuterol sulfate  (90 Base) MCG/ACT inhaler, Every 4 (Four) Hours As Needed., Disp: , Rfl:   •  amLODIPine (NORVASC) 5 MG tablet, Take 1 tablet by mouth 2 (Two) Times a Day., Disp: 180 tablet, Rfl: 3  •  aspirin (aspirin) 81 MG EC tablet, Take 1 tablet by mouth Daily., Disp: 30 tablet, Rfl: 8  •  atorvastatin (LIPITOR) 40 MG tablet, Take 1 tablet by mouth Daily., Disp: 90 tablet, Rfl:   •  buprenorphine-naloxone (SUBOXONE) 8-2 MG per SL tablet, PLACE 2 TABLET UNDER TONGUE ONCE A DAY AS DIRECTED, Disp: , Rfl:   •  calcitriol (ROCALTROL) 0.5 MCG capsule, Take 0.5 mcg by mouth Daily. 1 Po MWF, Disp: , Rfl:   •  cinacalcet (SENSIPAR) 60 MG tablet, Daily., Disp: , Rfl:   •  cloNIDine (CATAPRES-TTS) 0.2 MG/24HR patch, Place 1 patch on the skin as directed by provider 1 (One) Time Per Week., Disp: , Rfl:   •  clopidogrel (PLAVIX) 75 MG tablet, Take 1 tablet by mouth Daily., Disp: 30 tablet, Rfl: 11  •  Diclofenac Sodium (VOLTAREN) 1 % gel gel, As Needed., Disp: , Rfl:   •  Diclofenac Sodium (VOLTAREN) 1 % gel gel, Apply  topically to the appropriate area as directed., Disp: , Rfl:   •   MG capsule, 100 mg 2 (Two) Times a Day As Needed., Disp: , Rfl:   •  esomeprazole (nexIUM) 40 MG capsule, As Needed., Disp: , Rfl:   •  fenofibrate (TRICOR) 54 MG tablet, Take 1 tablet by mouth Daily., Disp: , Rfl:   •  Ferric Citrate (AURYXIA PO), Take  by  mouth. 2 tablets twice a day, Disp: , Rfl:   •  furosemide (LASIX) 80 MG tablet, Take 1 tablet by mouth 2 (Two) Times a Day., Disp: , Rfl:   •  gabapentin (NEURONTIN) 800 MG tablet, 3 (Three) Times a Day., Disp: , Rfl:   •  Heparin Sodium, Porcine, (heparin, porcine,) 1000 UNIT/ML injection, Infuse 1,000 Units into a venous catheter Every Night., Disp: , Rfl:   •  hydrALAZINE (APRESOLINE) 100 MG tablet, Take 1 tablet by mouth 3 (Three) Times a Day., Disp: 270 tablet, Rfl: 3  •  Insulin Glargine (BASAGLAR KWIKPEN) 100 UNIT/ML injection pen, Inject 34 Units under the skin into the appropriate area as directed Daily., Disp: , Rfl:   •  IRON SUCROSE IV, Infuse  into a venous catheter Every 14 (Fourteen) Days. Iron infusion every 2 weeks, Disp: , Rfl:   •  isosorbide mononitrate (IMDUR) 60 MG 24 hr tablet, Take 1 tablet by mouth Daily., Disp: 90 tablet, Rfl: 1  •  LACTULOSE PO, Take  by mouth As Needed., Disp: , Rfl:   •  lanthanum (FOSRENOL) 1000 MG chewable tablet, 1,000 mg., Disp: , Rfl:   •  levothyroxine (SYNTHROID, LEVOTHROID) 50 MCG tablet, 50 mcg Daily., Disp: , Rfl:   •  metoclopramide (REGLAN) 5 MG tablet, Take 5 mg by mouth Every Night., Disp: , Rfl:   •  metoprolol tartrate (LOPRESSOR) 100 MG tablet, Take 1 tablet by mouth 2 (Two) Times a Day., Disp: , Rfl:   •  minocycline (MINOCIN,DYNACIN) 100 MG capsule, Take 100 mg by mouth Daily., Disp: , Rfl:   •  mupirocin (BACTROBAN) 2 % ointment, mupirocin 2 % topical ointment apply a small amount to the affected area by topical route 3 times per day   Active, Disp: , Rfl:   •  nitroglycerin (NITROSTAT) 0.4 MG SL tablet, Place 1 tablet under the tongue Every 5 (Five) Minutes As Needed for Chest Pain., Disp: 25 tablet, Rfl: 5  •  NON FORMULARY, Velpafa 8.4 gram 2 times a week, Disp: , Rfl:   •  polyethylene glycol (MIRALAX) 17 GM/SCOOP powder, Miralax 17 gram oral powder packet  Take 1 packet every day by oral route., Disp: , Rfl:   •  sertraline (ZOLOFT) 100 MG  "tablet, Daily., Disp: , Rfl:   •  tamsulosin (FLOMAX) 0.4 MG capsule 24 hr capsule, Daily., Disp: , Rfl:   •  valsartan (DIOVAN) 320 MG tablet, Take 1 tablet by mouth Daily., Disp: , Rfl:   •  gabapentin (NEURONTIN) 800 MG tablet, Take 800 mg by mouth., Disp: , Rfl:   •  levothyroxine (SYNTHROID, LEVOTHROID) 50 MCG tablet, Take 50 mcg by mouth., Disp: , Rfl:   •  Patiromer Sorbitex Calcium (Veltassa) 8.4 g pack, Take 8.4 g by mouth 3 (Three) Times a Week., Disp: , Rfl:      No Known Allergies            Family History   Problem Relation Age of Onset   • Hypertension Mother     • Arthritis Mother     • Diabetes Father           Social History   Social History            Socioeconomic History   • Marital status:    Tobacco Use   • Smoking status: Every Day       Packs/day: 1.50       Years: 40.00       Pack years: 60.00       Types: Cigarettes   • Smokeless tobacco: Never   Substance and Sexual Activity   • Alcohol use: Not Currently       Comment: None   • Drug use: Not Currently       Types: Hydrocodone       Comment: pain pills from previous injuries   • Sexual activity: Not Currently       Partners: Female            Vital Signs:   Resp 16   Ht 167.6 cm (66\")   Wt 79.4 kg (175 lb)   BMI 28.25 kg/m²      Physical Exam   Post Surgical Incision  Surgical wound:     No acute distress. Nonlabored breathing. Abdomen is soft. Mildly tender in the upper abdomen.           Result Review    :               Procedures            Assessment      Assessment and Plan    There are no diagnoses linked to this encounter.      1. Upper abdominal pain and associated nausea.  He has an ultrasound from the ER which shows gallbladder sludge. He has a CT showing no signs of cholecystitis, some mild pneumoperitoneum likely from peritoneal dialysis catheter and buttock lesion. Secondary to the upper abdominal pain, we will plan for an EGD in the future. Risks, benefits, alternatives of that procedure were discussed extensively. " All questions were answered. We will also plan on trying to give him some ursodiol for the biliary sludge. We will see if that does not help with some of his symptoms. If his EGD is negative, we may need to consider pursuing further gallbladder imaging. I discussed with the patient and his wife at bedside. They voiced understanding and agreed to proceed with the above plan.           Follow Up   No follow-ups on file.  Patient was given instructions and counseling regarding his condition or for health maintenance advice. Please see specific information pulled into the AVS if appropriate.         Transcribed from ambient dictation for Gus Whaley MD by Tana Arredondo.  12/21/22   14:09 EST     Patient or patient representative verbalized consent to the visit recording.  I have personally performed the services described in this document as transcribed by the above individual, and it is both accurate and complete.

## 2023-02-03 NOTE — ANESTHESIA PREPROCEDURE EVALUATION
Anesthesia Evaluation     Patient summary reviewed and Nursing notes reviewed   no history of anesthetic complications:  NPO Solid Status: > 8 hours  NPO Liquid Status: > 2 hours           Airway   Mallampati: III  TM distance: >3 FB  Neck ROM: full  Possible difficult intubation  Dental    (+) edentulous    Pulmonary - normal exam    breath sounds clear to auscultation  (+) COPD, sleep apnea,   Cardiovascular - normal exam  Exercise tolerance: good (4-7 METS)    Rhythm: regular  Rate: normal    (+) hypertension, cardiac stents within the past 12 months       Neuro/Psych- negative ROS  GI/Hepatic/Renal/Endo    (+)   hepatitis, renal disease CRI, diabetes mellitus,     Musculoskeletal (-) negative ROS    Abdominal    Substance History - negative use     OB/GYN negative ob/gyn ROS         Other - negative ROS       ROS/Med Hx Other: PAT Nursing Notes unavailable.                   Anesthesia Plan    ASA 4     general       Anesthetic plan, risks, benefits, and alternatives have been provided, discussed and informed consent has been obtained with: patient and other.        CODE STATUS:

## 2023-02-03 NOTE — PROGRESS NOTES
SURGERY    In preop area family updated me that imaging for back pain showed evidence of possible metastatic disease. I only learned this in preop area. Will proceed with EGD to r/o abd pain issues and now possible UGI cancers.

## 2023-02-03 NOTE — H&P
History of Present Illness  Chevy Junior is a 57 y.o. male presents to [facility] GENERAL SURGERY. The patient is to be seen for abdominal pain and gallbladder sludge. He is accompanied by his wife.     Abdominal pain and gallbladder sludge  The patient was seen in the emergency room for abdominal pain. He localizes the pain to the top of his abdomen, which radiates around to his ribs and back. His wife states that the patient screams sometimes when he is moving. She states that the patient is nauseated all the time. She states that the patient goes through Zofran before his prescriptions are due for a refill. She states that the patient was also put on Reglan. She states that the patient had gotten dehydrated, that is the reason why they sent him to the emergency room and she also notes the patient's blood pressure was very low at 68/40mmHg when he first went in. She states that the patient was experiencing high blood pressure until he had 3 stents put in and then it started dropping. She states that the patient was not eating or drinking much and then his pain around his abdomen. She states that the nausea is every single day, but the pain is intermittently every day.The patient spouse states that they were praying to get a scope or something to see, because they did a gallbladder ultrasound and stuff showing a little sludge. She states that according to the doctor over there, the little muscular one would not cause the pain radiating. She states that the patient was checked for peritonitis and he did a bag of fluid and they clifford from it and did a culture and it was not that. She states that the patient had a CT scan as well and it is not cancer. She states that it is like a fatty lipoma. She states that they were going to do surgery before the stents were put in, but the surgeon looked at it and it had shrunk back to the corner. She states that it is back and it is hurting him. 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 She states that it was quarter size lump a month ago, and now it is larger. She notes that he can hardly sit on the lipoma. She states that it feels like it is gathering on to some tissue. She states that the patient has been on vancomycin for 1 year. The patient spouse states that he just got him on oral antibiotic minocycline and that they are ordering another CT scan every 6 months. She states that they do labs all the time to see if the white blood count levels are going down. She states that the patient has never had MRSA on the outside of his skin. She states that the patient is on Plavix and aspirin.     The patient spouse states that the patient has sees 9 different specialist for different health issues.           Objective         Medical History        Past Medical History:   Diagnosis Date   • Anemia     • Chronic kidney disease     • Chronic pain     • COPD (chronic obstructive pulmonary disease) (HCC)     • Diabetes mellitus (HCC)     • Hyperlipidemia     • Hypertension     • Infectious viral hepatitis 5 years ago was treated   • RIC (obstructive sleep apnea)     • Osteoarthritis     • Phosphorus metabolic disorder     • Sleep apnea     • Substance abuse (HCC) 20 years ago from [facility]            Surgical History         Past Surgical History:   Procedure Laterality Date   • ABOVE KNEE LEG AMPUTATION Right     • CARDIAC CATHETERIZATION       • CARDIAC CATHETERIZATION N/A 11/30/2022     Procedure: Percutaneous Coronary Intervention;  Surgeon: River Acevedo IV, MD;  Location: [facility] INVASIVE LOCATION;  Service: Cardiovascular;  Laterality: N/A;   • CARDIAC CATHETERIZATION N/A 11/30/2022     Procedure: Optical Coherent Tomography;  Surgeon: River Acevedo IV, MD;   Location:  [facility] CATH INVASIVE LOCATION;  Service: Cardiovascular;  Laterality: N/A;   • CARDIAC CATHETERIZATION N/A 11/30/2022     Procedure: Stent JOSE coronary;  Surgeon: River Acevedo IV, MD;  Location:  [facility] CATH INVASIVE LOCATION;  Service: Cardiovascular;  Laterality: N/A;   • CATH LAB PROCEDURE         unable to stent   • HIP SURGERY       • INSERTION PERITONEAL DIALYSIS CATHETER       • LEG SURGERY       • MANDIBLE FRACTURE SURGERY       • REPLACEMENT TOTAL KNEE         x3   • SHOULDER SURGERY                   Current Outpatient Medications:   •  albuterol sulfate  (90 Base) MCG/ACT inhaler, Every 4 (Four) Hours As Needed., Disp: , Rfl:   •  amLODIPine (NORVASC) 5 MG tablet, Take 1 tablet by mouth 2 (Two) Times a Day., Disp: 180 tablet, Rfl: 3  •  aspirin (aspirin) 81 MG EC tablet, Take 1 tablet by mouth Daily., Disp: 30 tablet, Rfl: 8  •  atorvastatin (LIPITOR) 40 MG tablet, Take 1 tablet by mouth Daily., Disp: 90 tablet, Rfl:   •  buprenorphine-naloxone (SUBOXONE) 8-2 MG per SL tablet, PLACE 2 TABLET UNDER TONGUE ONCE A DAY AS DIRECTED, Disp: , Rfl:   •  calcitriol (ROCALTROL) 0.5 MCG capsule, Take 0.5 mcg by mouth Daily. 1 Po MWF, Disp: , Rfl:   •  cinacalcet (SENSIPAR) 60 MG tablet, Daily., Disp: , Rfl:   •  cloNIDine (CATAPRES-TTS) 0.2 MG/24HR patch, Place 1 patch on the skin as directed by provider 1 (One) Time Per Week., Disp: , Rfl:   •  clopidogrel (PLAVIX) 75 MG tablet, Take 1 tablet by mouth Daily., Disp: 30 tablet, Rfl: 11  •  Diclofenac Sodium (VOLTAREN) 1 % gel gel, As Needed., Disp: , Rfl:   •  Diclofenac Sodium (VOLTAREN) 1 % gel gel, Apply  topically to the appropriate area as directed., Disp: , Rfl:   •   MG capsule, 100 mg 2 (Two) Times a Day As Needed., Disp: , Rfl:   •  esomeprazole (nexIUM) 40 MG capsule, As Needed., Disp: , Rfl:   •  fenofibrate (TRICOR) 54 MG tablet, Take 1 tablet by mouth Daily., Disp: , Rfl:   •  Ferric Citrate (AURYXIA PO), Take  by  mouth. 2 tablets twice a day, Disp: , Rfl:   •  furosemide (LASIX) 80 MG tablet, Take 1 tablet by mouth 2 (Two) Times a Day., Disp: , Rfl:   •  gabapentin (NEURONTIN) 800 MG tablet, 3 (Three) Times a Day., Disp: , Rfl:   •  Heparin Sodium, Porcine, (heparin, porcine,) 1000 UNIT/ML injection, Infuse 1,000 Units into a venous catheter Every Night., Disp: , Rfl:   •  hydrALAZINE (APRESOLINE) 100 MG tablet, Take 1 tablet by mouth 3 (Three) Times a Day., Disp: 270 tablet, Rfl: 3  •  Insulin Glargine (BASAGLAR KWIKPEN) 100 UNIT/ML injection pen, Inject 34 Units under the skin into the appropriate area as directed Daily., Disp: , Rfl:   •  IRON SUCROSE IV, Infuse  into a venous catheter Every 14 (Fourteen) Days. Iron infusion every 2 weeks, Disp: , Rfl:   •  isosorbide mononitrate (IMDUR) 60 MG 24 hr tablet, Take 1 tablet by mouth Daily., Disp: 90 tablet, Rfl: 1  •  LACTULOSE PO, Take  by mouth As Needed., Disp: , Rfl:   •  lanthanum (FOSRENOL) 1000 MG chewable tablet, 1,000 mg., Disp: , Rfl:   •  levothyroxine (SYNTHROID, LEVOTHROID) 50 MCG tablet, 50 mcg Daily., Disp: , Rfl:   •  metoclopramide (REGLAN) 5 MG tablet, Take 5 mg by mouth Every Night., Disp: , Rfl:   •  metoprolol tartrate (LOPRESSOR) 100 MG tablet, Take 1 tablet by mouth 2 (Two) Times a Day., Disp: , Rfl:   •  minocycline (MINOCIN,DYNACIN) 100 MG capsule, Take 100 mg by mouth Daily., Disp: , Rfl:   •  mupirocin (BACTROBAN) 2 % ointment, mupirocin 2 % topical ointment apply a small amount to the affected area by topical route 3 times per day   Active, Disp: , Rfl:   •  nitroglycerin (NITROSTAT) 0.4 MG SL tablet, Place 1 tablet under the tongue Every 5 (Five) Minutes As Needed for Chest Pain., Disp: 25 tablet, Rfl: 5  •  NON FORMULARY, Velpafa 8.4 gram 2 times a week, Disp: , Rfl:   •  polyethylene glycol (MIRALAX) 17 GM/SCOOP powder, Miralax 17 gram oral powder packet  Take 1 packet every day by oral route., Disp: , Rfl:   •  sertraline (ZOLOFT) 100 MG  "tablet, Daily., Disp: , Rfl:   •  tamsulosin (FLOMAX) 0.4 MG capsule 24 hr capsule, Daily., Disp: , Rfl:   •  valsartan (DIOVAN) 320 MG tablet, Take 1 tablet by mouth Daily., Disp: , Rfl:   •  gabapentin (NEURONTIN) 800 MG tablet, Take 800 mg by mouth., Disp: , Rfl:   •  levothyroxine (SYNTHROID, LEVOTHROID) 50 MCG tablet, Take 50 mcg by mouth., Disp: , Rfl:   •  Patiromer Sorbitex Calcium (Veltassa) 8.4 g pack, Take 8.4 g by mouth 3 (Three) Times a Week., Disp: , Rfl:      No Known Allergies            Family History   Problem Relation Age of Onset   • Hypertension Mother     • Arthritis Mother     • Diabetes Father           Social History   Social History            Socioeconomic History   • Marital status:    Tobacco Use   • Smoking status: Every Day       Packs/day: 1.50       Years: 40.00       Pack years: 60.00       Types: Cigarettes   • Smokeless tobacco: Never   Substance and Sexual Activity   • Alcohol use: Not Currently       Comment: None   • Drug use: Not Currently       Types: Hydrocodone       Comment: pain pills from previous injuries   • Sexual activity: Not Currently       Partners: Female            Vital Signs:   Resp 16   Ht 167.6 cm (66\")   Wt 79.4 kg (175 lb)   BMI 28.25 kg/m²      Physical Exam   Post Surgical Incision  Surgical wound:     No acute distress. Nonlabored breathing. Abdomen is soft. Mildly tender in the upper abdomen.           Result Review    :               Procedures            Assessment      Assessment and Plan    There are no diagnoses linked to this encounter.      1. Upper abdominal pain and associated nausea.  He has an ultrasound from the ER which shows gallbladder sludge. He has a CT showing no signs of cholecystitis, some mild pneumoperitoneum likely from peritoneal dialysis catheter and buttock lesion. Secondary to the upper abdominal pain, we will plan for an EGD in the future. Risks, benefits, alternatives of that procedure were discussed extensively. " All questions were answered. We will also plan on trying to give him some ursodiol for the biliary sludge. We will see if that does not help with some of his symptoms. If his EGD is negative, we may need to consider pursuing further gallbladder imaging. I discussed with the patient and his wife at bedside. They voiced understanding and agreed to proceed with the above plan.           Follow Up   No follow-ups on file.  Patient was given instructions and counseling regarding his condition or for health maintenance advice. Please see specific information pulled into the AVS if appropriate.         Transcribed from ambient dictation for Gus Whaley MD by Tana Arredondo.  12/21/22   14:09 EST     Patient or patient representative verbalized consent to the visit recording.  I have personally performed the services described in this document as transcribed by the above individual, and it is both accurate and complete.

## 2023-02-06 LAB
CYTO UR: NORMAL
LAB AP CASE REPORT: NORMAL
LAB AP CLINICAL INFORMATION: NORMAL
PATH REPORT.FINAL DX SPEC: NORMAL
PATH REPORT.GROSS SPEC: NORMAL

## 2023-02-07 ENCOUNTER — OFFICE VISIT (OUTPATIENT)
Dept: NEUROSURGERY | Facility: CLINIC | Age: 58
End: 2023-02-07
Payer: MEDICAID

## 2023-02-07 VITALS
WEIGHT: 190 LBS | HEIGHT: 67 IN | DIASTOLIC BLOOD PRESSURE: 77 MMHG | SYSTOLIC BLOOD PRESSURE: 119 MMHG | BODY MASS INDEX: 29.82 KG/M2 | HEART RATE: 91 BPM

## 2023-02-07 DIAGNOSIS — M84.58XA PATHOLOGICAL FRACTURE OF VERTEBRA DUE TO NEOPLASTIC DISEASE, INITIAL ENCOUNTER: Primary | ICD-10-CM

## 2023-02-07 PROCEDURE — 99215 OFFICE O/P EST HI 40 MIN: CPT | Performed by: NURSE PRACTITIONER

## 2023-02-07 NOTE — PROGRESS NOTES
Chief Complaint  Back Pain (Low back pain. MRSA in cervical bones)    Subjective          Chevy Junior who is a 57 y.o. year old male who presents to [facility] for evaluation of neck and back pain.     Pt presenting for evaluation of progressing neck and back pain. He has a history of multiple comorbid conditions, ESRD on dialysis, DMII, heart disease. In August of last year he underwent right TKA in [place]. He had concerns of pain and swelling. 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 Treated with IV Vancomycin. Infection did not approve and required amputation. He was then found to have discitis in the cervical spine. He has continue Vancomycin for this. He is followed by infectious disease.    Over the past three months he has had worsening pain in the neck and back. Pain is severe. He is treated with Suboxone and Gabapentin for his chronic pain. This is not providing any relief for the current neck and back pain. Wife will apply topical lidocaine which provides transient relief. Wife is concerned that over the past few weeks he has had a change in mental status. He is hallucinating and speaking incoherently. He is falling out of his wheelchair. The wife took him to the ED at [facility]. She reports they gave him fluids and his labs were stable. We do not have the ED report. He had a CT Thoracic Spine demonstrating multiple fractures throughout the cervical and thoracic spine with diffuse sclerosis, consistent with possible metastatic disease.     History of Previous Spinal Surgery?: No    Nicotine use: smoker  (1.5 ppd)    BMI: Body mass index is 29.76 kg/m².      Review of Systems   Musculoskeletal: Positive for arthralgias, back pain, myalgias, neck pain and neck stiffness.   Neurological: Positive for weakness and confusion.   Psychiatric/Behavioral: Positive for hallucinations.   All other systems reviewed and are negative.       Objective   Vital  "Signs:   /77 (BP Location: Left arm, Patient Position: Sitting, Cuff Size: Adult)   Pulse 91   Ht 170.2 cm (67\")   Wt 86.2 kg (190 lb)   BMI 29.76 kg/m²       Physical Exam  Vitals reviewed.   Constitutional:       Appearance: Normal appearance.   Musculoskeletal:      Right shoulder: No tenderness. Normal range of motion.      Left shoulder: No tenderness. Normal range of motion.      Cervical back: Tenderness present. Pain with movement present. Decreased range of motion.      Lumbar back: No tenderness. Negative right straight leg raise test and negative left straight leg raise test.      Right hip: No tenderness. Normal range of motion.      Left hip: No tenderness. Normal range of motion.   Neurological:      Mental Status: He is alert and oriented to person, place, and time.      Motor: Motor strength is normal.      Deep Tendon Reflexes:      Reflex Scores:       Tricep reflexes are 2+ on the right side and 2+ on the left side.       Bicep reflexes are 2+ on the right side and 2+ on the left side.       Brachioradialis reflexes are 2+ on the right side and 2+ on the left side.       Patellar reflexes are 2+ on the left side.       Achilles reflexes are 2+ on the left side.       Neurologic Exam     Mental Status   Oriented to person, place, and time.   Level of consciousness: alert    Motor Exam   Muscle bulk: normal  Overall muscle tone: normal    Strength   Strength 5/5 throughout.     Sensory Exam   Light touch normal.     Gait, Coordination, and Reflexes     Reflexes   Right brachioradialis: 2+  Left brachioradialis: 2+  Right biceps: 2+  Left biceps: 2+  Right triceps: 2+  Left triceps: 2+  Right patellar reflex grade: Right AKA.  Left patellar: 2+  Right achilles reflex grade: AKA.  Left achilles: 2+Wheelchair bound         Result Review :       Data reviewed: Radiologic studies CT Thoracic Spine on 2/1/23 at [facility] personally reviewed. Multiple compression fractures in the lower " cervical and thoracic spine with diffuce sclerosis consistent with possible extensive metastatic disease.          Assessment and Plan    Diagnoses and all orders for this visit:    1. 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 We reviewed his CT Thoracic Spine, demonstrating multiple pathological fractures throughout the cervical and thoracic spine. Will proceed with stat whole body bone scan to evaluate for possible metastatic disease. We discussed that he is high risk for pathologic fracture due to his history of chronic dialysis, nicotine use, and recent discitis/osteomyelitis. He is in severe pain, unrelieved by his current medications of Suboxone and Gabapentin. We discussed that should his symptoms worsen he should go to the emergency department in [place] or [place] for further evaluation and management.      I spent 45 minutes caring for Chevy on this date of service. This time includes time spent by me in the following activities:preparing for the visit, reviewing tests, obtaining and/or reviewing a separately obtained history, performing a medically appropriate examination and/or evaluation , counseling and educating the patient/family/caregiver, ordering medications, tests, or procedures, documenting information in the medical record and independently interpreting results and communicating that information with the patient/family/caregiver.    Follow Up   Return if symptoms worsen or fail to improve.  Patient was given instructions and counseling regarding his condition or for health maintenance advice.

## 2023-02-10 ENCOUNTER — TELEPHONE (OUTPATIENT)
Dept: SURGERY | Facility: CLINIC | Age: 58
End: 2023-02-10
Payer: MEDICAID

## 2023-02-10 NOTE — TELEPHONE ENCOUNTER
SPOKE WITH TYE HESTER AND LET HER KNOW THAT THE PATIENT WOULD NEED TO COME IN FOR HIS FOLLOW UP ON HIS EGD AND AT THAT TIME COULD DISCUSS A COLONOSCOPY WITH DR GONZÁLES. I ALSO EXPLAINED TO HER THAT [facility] WAS BOOKED OUT TIL MAY FOR COLONOSCOPY'S AND THAT THE PATIENT WOULD HAVE TO HAVE CARDIAC CLEARANCE ALSO.

## 2023-02-10 NOTE — TELEPHONE ENCOUNTER
TYE CALLED FROM [facility].      PATIENT IS SCHEDULED FOR 2-WEEK F/U FROM EGD ON 02/15/23.  SHE WANTS TO KNOW IF THIS CAN BE DONE AS TELEHEALTH.    SHE WANTS TO KNOW IF DR. GONZÁLES WILL DO A COLONOSCOPY FOR PATIENT, AND SET THIS UP VIA PHONE.  SHE SAID DR. ZARATE WANTS HIM TO HAVE DONE.  CONCERN FOR CANCER.  PATIENT HAD PET CT.

## 2023-02-13 ENCOUNTER — HOSPITAL ENCOUNTER (OUTPATIENT)
Dept: NUCLEAR MEDICINE | Facility: HOSPITAL | Age: 58
Discharge: HOME | End: 2023-02-13
Payer: MEDICAID

## 2023-02-13 ENCOUNTER — TELEPHONE (OUTPATIENT)
Dept: NEUROSURGERY | Facility: CLINIC | Age: 58
End: 2023-02-13

## 2023-02-13 ENCOUNTER — HOSPITAL ENCOUNTER (OUTPATIENT)
Facility: HOSPITAL | Age: 58
Discharge: HOME | End: 2023-02-15
Attending: INTERNAL MEDICINE | Admitting: INTERNAL MEDICINE
Payer: MEDICAID

## 2023-02-13 DIAGNOSIS — R10.13 EPIGASTRIC PAIN: Primary | ICD-10-CM

## 2023-02-13 DIAGNOSIS — R97.0 ELEVATED CARCINOEMBRYONIC ANTIGEN (CEA): ICD-10-CM

## 2023-02-13 DIAGNOSIS — R10.84 GENERALIZED ABDOMINAL PAIN: ICD-10-CM

## 2023-02-13 DIAGNOSIS — M84.58XA PATHOLOGICAL FRACTURE OF VERTEBRA DUE TO NEOPLASTIC DISEASE, INITIAL ENCOUNTER: ICD-10-CM

## 2023-02-13 PROBLEM — R63.4 WEIGHT LOSS: Status: ACTIVE | Noted: 2023-02-13

## 2023-02-13 LAB
ALBUMIN SERPL-MCNC: 3.1 G/DL (ref 3.5–5.2)
ALBUMIN/GLOB SERPL: 0.6 G/DL
ALP SERPL-CCNC: 139 U/L (ref 39–117)
ALT SERPL W P-5'-P-CCNC: <5 U/L (ref 1–41)
ANION GAP SERPL CALCULATED.3IONS-SCNC: 20.6 MMOL/L (ref 5–15)
AST SERPL-CCNC: 9 U/L (ref 1–40)
BASOPHILS # BLD AUTO: 0.06 10*3/MM3 (ref 0–0.2)
BASOPHILS NFR BLD AUTO: 0.9 % (ref 0–1.5)
BILIRUB SERPL-MCNC: 0.3 MG/DL (ref 0–1.2)
BUN SERPL-MCNC: 51 MG/DL (ref 6–20)
BUN/CREAT SERPL: 4.3 (ref 7–25)
CALCIUM SPEC-SCNC: 8.7 MG/DL (ref 8.6–10.5)
CEA SERPL-MCNC: 9.01 NG/ML
CHLORIDE SERPL-SCNC: 92 MMOL/L (ref 98–107)
CO2 SERPL-SCNC: 23.4 MMOL/L (ref 22–29)
CREAT SERPL-MCNC: 11.91 MG/DL (ref 0.76–1.27)
DEPRECATED RDW RBC AUTO: 58.6 FL (ref 37–54)
EGFRCR SERPLBLD CKD-EPI 2021: 4.5 ML/MIN/1.73
EOSINOPHIL # BLD AUTO: 0.27 10*3/MM3 (ref 0–0.4)
EOSINOPHIL NFR BLD AUTO: 4.2 % (ref 0.3–6.2)
ERYTHROCYTE [DISTWIDTH] IN BLOOD BY AUTOMATED COUNT: 16.8 % (ref 12.3–15.4)
GLOBULIN UR ELPH-MCNC: 4.9 GM/DL
GLUCOSE SERPL-MCNC: 160 MG/DL (ref 65–99)
HCT VFR BLD AUTO: 24.4 % (ref 37.5–51)
HGB BLD-MCNC: 7.7 G/DL (ref 13–17.7)
IMM GRANULOCYTES # BLD AUTO: 0.07 10*3/MM3 (ref 0–0.05)
IMM GRANULOCYTES NFR BLD AUTO: 1.1 % (ref 0–0.5)
LYMPHOCYTES # BLD AUTO: 0.69 10*3/MM3 (ref 0.7–3.1)
LYMPHOCYTES NFR BLD AUTO: 10.6 % (ref 19.6–45.3)
MCH RBC QN AUTO: 31.2 PG (ref 26.6–33)
MCHC RBC AUTO-ENTMCNC: 31.6 G/DL (ref 31.5–35.7)
MCV RBC AUTO: 98.8 FL (ref 79–97)
MONOCYTES # BLD AUTO: 0.34 10*3/MM3 (ref 0.1–0.9)
MONOCYTES NFR BLD AUTO: 5.2 % (ref 5–12)
NEUTROPHILS NFR BLD AUTO: 5.07 10*3/MM3 (ref 1.7–7)
NEUTROPHILS NFR BLD AUTO: 78 % (ref 42.7–76)
NRBC BLD AUTO-RTO: 0 /100 WBC (ref 0–0.2)
PLATELET # BLD AUTO: 186 10*3/MM3 (ref 140–450)
PMV BLD AUTO: 9.9 FL (ref 6–12)
POTASSIUM SERPL-SCNC: 3.4 MMOL/L (ref 3.5–5.2)
PROT SERPL-MCNC: 8 G/DL (ref 6–8.5)
RBC # BLD AUTO: 2.47 10*6/MM3 (ref 4.14–5.8)
SODIUM SERPL-SCNC: 136 MMOL/L (ref 136–145)
WBC NRBC COR # BLD: 6.5 10*3/MM3 (ref 3.4–10.8)

## 2023-02-13 PROCEDURE — 82378 CARCINOEMBRYONIC ANTIGEN: CPT | Performed by: INTERNAL MEDICINE

## 2023-02-13 PROCEDURE — G0257 UNSCHED DIALYSIS ESRD PT HOS: HCPCS

## 2023-02-13 PROCEDURE — A9503 TC99M MEDRONATE: HCPCS | Performed by: NURSE PRACTITIONER

## 2023-02-13 PROCEDURE — 85025 COMPLETE CBC W/AUTO DIFF WBC: CPT | Performed by: INTERNAL MEDICINE

## 2023-02-13 PROCEDURE — 80053 COMPREHEN METABOLIC PANEL: CPT | Performed by: INTERNAL MEDICINE

## 2023-02-13 PROCEDURE — 78306 BONE IMAGING WHOLE BODY: CPT

## 2023-02-13 PROCEDURE — G0378 HOSPITAL OBSERVATION PER HR: HCPCS

## 2023-02-13 PROCEDURE — 0 TECHNETIUM MEDRONATE KIT: Performed by: NURSE PRACTITIONER

## 2023-02-13 RX ORDER — POLYETHYLENE GLYCOL 3350 17 G/17G
17 POWDER, FOR SOLUTION ORAL DAILY PRN
Status: DISCONTINUED | OUTPATIENT
Start: 2023-02-13 | End: 2023-02-14

## 2023-02-13 RX ORDER — ACETAMINOPHEN 650 MG/1
650 SUPPOSITORY RECTAL EVERY 4 HOURS PRN
Status: DISCONTINUED | OUTPATIENT
Start: 2023-02-13 | End: 2023-02-14

## 2023-02-13 RX ORDER — SODIUM CHLORIDE 0.9 % (FLUSH) 0.9 %
10 SYRINGE (ML) INJECTION EVERY 12 HOURS SCHEDULED
Status: DISCONTINUED | OUTPATIENT
Start: 2023-02-13 | End: 2023-02-15 | Stop reason: HOSPADM

## 2023-02-13 RX ORDER — ENOXAPARIN SODIUM 100 MG/ML
30 INJECTION SUBCUTANEOUS EVERY 24 HOURS
Status: DISCONTINUED | OUTPATIENT
Start: 2023-02-13 | End: 2023-02-13

## 2023-02-13 RX ORDER — ACETAMINOPHEN 325 MG/1
650 TABLET ORAL EVERY 4 HOURS PRN
Status: DISCONTINUED | OUTPATIENT
Start: 2023-02-13 | End: 2023-02-14

## 2023-02-13 RX ORDER — CHOLECALCIFEROL (VITAMIN D3) 125 MCG
5 CAPSULE ORAL NIGHTLY PRN
Status: DISCONTINUED | OUTPATIENT
Start: 2023-02-13 | End: 2023-02-14

## 2023-02-13 RX ORDER — FAMOTIDINE 20 MG/1
40 TABLET, FILM COATED ORAL DAILY
Status: DISCONTINUED | OUTPATIENT
Start: 2023-02-13 | End: 2023-02-14

## 2023-02-13 RX ORDER — POTASSIUM CHLORIDE 750 MG/1
10 TABLET, EXTENDED RELEASE ORAL DAILY
COMMUNITY

## 2023-02-13 RX ORDER — ONDANSETRON 4 MG/1
4 TABLET, ORALLY DISINTEGRATING ORAL EVERY 6 HOURS PRN
COMMUNITY

## 2023-02-13 RX ORDER — SODIUM CHLORIDE, SODIUM LACTATE, CALCIUM CHLORIDE, MAGNESIUM CHLORIDE AND DEXTROSE 2.5; 538; 448; 18.3; 5.08 G/100ML; MG/100ML; MG/100ML; MG/100ML; MG/100ML
2000 INJECTION, SOLUTION INTRAPERITONEAL EVERY 6 HOURS SCHEDULED
Status: DISCONTINUED | OUTPATIENT
Start: 2023-02-13 | End: 2023-02-15 | Stop reason: HOSPADM

## 2023-02-13 RX ORDER — BISACODYL 10 MG
10 SUPPOSITORY, RECTAL RECTAL DAILY PRN
Status: DISCONTINUED | OUTPATIENT
Start: 2023-02-13 | End: 2023-02-14

## 2023-02-13 RX ORDER — ACETAMINOPHEN 160 MG/5ML
650 SOLUTION ORAL EVERY 4 HOURS PRN
Status: DISCONTINUED | OUTPATIENT
Start: 2023-02-13 | End: 2023-02-14

## 2023-02-13 RX ORDER — ALUMINA, MAGNESIA, AND SIMETHICONE 2400; 2400; 240 MG/30ML; MG/30ML; MG/30ML
15 SUSPENSION ORAL EVERY 6 HOURS PRN
Status: DISCONTINUED | OUTPATIENT
Start: 2023-02-13 | End: 2023-02-14

## 2023-02-13 RX ORDER — AMOXICILLIN 250 MG
2 CAPSULE ORAL 2 TIMES DAILY
Status: DISCONTINUED | OUTPATIENT
Start: 2023-02-13 | End: 2023-02-14

## 2023-02-13 RX ORDER — ONDANSETRON 2 MG/ML
4 INJECTION INTRAMUSCULAR; INTRAVENOUS EVERY 6 HOURS PRN
Status: DISCONTINUED | OUTPATIENT
Start: 2023-02-13 | End: 2023-02-15 | Stop reason: HOSPADM

## 2023-02-13 RX ORDER — SODIUM CHLORIDE 9 MG/ML
40 INJECTION, SOLUTION INTRAVENOUS AS NEEDED
Status: DISCONTINUED | OUTPATIENT
Start: 2023-02-13 | End: 2023-02-15 | Stop reason: HOSPADM

## 2023-02-13 RX ORDER — FUROSEMIDE 80 MG
80 TABLET ORAL 2 TIMES DAILY
COMMUNITY

## 2023-02-13 RX ORDER — SODIUM CHLORIDE, SODIUM LACTATE, CALCIUM CHLORIDE, MAGNESIUM CHLORIDE AND DEXTROSE 2.5; 538; 448; 18.3; 5.08 G/100ML; MG/100ML; MG/100ML; MG/100ML; MG/100ML
2000 INJECTION, SOLUTION INTRAPERITONEAL AS NEEDED
Status: DISCONTINUED | OUTPATIENT
Start: 2023-02-13 | End: 2023-02-13

## 2023-02-13 RX ORDER — CLONIDINE 0.2 MG/24H
1 PATCH, EXTENDED RELEASE TRANSDERMAL WEEKLY
Status: ON HOLD | COMMUNITY
End: 2023-02-13

## 2023-02-13 RX ORDER — TC 99M MEDRONATE 20 MG/10ML
21.1 INJECTION, POWDER, LYOPHILIZED, FOR SOLUTION INTRAVENOUS
Status: COMPLETED | OUTPATIENT
Start: 2023-02-13 | End: 2023-02-13

## 2023-02-13 RX ORDER — SODIUM CHLORIDE 0.9 % (FLUSH) 0.9 %
10 SYRINGE (ML) INJECTION AS NEEDED
Status: DISCONTINUED | OUTPATIENT
Start: 2023-02-13 | End: 2023-02-15 | Stop reason: HOSPADM

## 2023-02-13 RX ORDER — HYDROCODONE BITARTRATE AND ACETAMINOPHEN 7.5; 325 MG/1; MG/1
1 TABLET ORAL EVERY 6 HOURS PRN
Status: DISCONTINUED | OUTPATIENT
Start: 2023-02-13 | End: 2023-02-14

## 2023-02-13 RX ORDER — BISACODYL 5 MG/1
5 TABLET, DELAYED RELEASE ORAL DAILY PRN
Status: DISCONTINUED | OUTPATIENT
Start: 2023-02-13 | End: 2023-02-14

## 2023-02-13 RX ADMIN — Medication 10 ML: at 13:00

## 2023-02-13 RX ADMIN — POLYETHYLENE GLYCOL 3350, SODIUM SULFATE ANHYDROUS, SODIUM BICARBONATE, SODIUM CHLORIDE, POTASSIUM CHLORIDE 4000 ML: 236; 22.74; 6.74; 5.86; 2.97 POWDER, FOR SOLUTION ORAL at 13:01

## 2023-02-13 RX ADMIN — Medication 10 ML: at 20:17

## 2023-02-13 RX ADMIN — HYDROCODONE BITARTRATE AND ACETAMINOPHEN 1 TABLET: 7.5; 325 TABLET ORAL at 13:15

## 2023-02-13 RX ADMIN — FAMOTIDINE 40 MG: 20 TABLET, FILM COATED ORAL at 13:00

## 2023-02-13 RX ADMIN — SENNOSIDES AND DOCUSATE SODIUM 2 TABLET: 8.6; 5 TABLET ORAL at 13:00

## 2023-02-13 RX ADMIN — TC 99M MEDRONATE 21.1 MILLICURIE: 20 INJECTION, POWDER, LYOPHILIZED, FOR SOLUTION INTRAVENOUS at 09:15

## 2023-02-13 RX ADMIN — SODIUM CHLORIDE, SODIUM LACTATE, CALCIUM CHLORIDE, MAGNESIUM CHLORIDE AND DEXTROSE 2000 ML: 2.5; 538; 448; 18.3; 5.08 INJECTION, SOLUTION INTRAPERITONEAL at 17:29

## 2023-02-13 RX ADMIN — SODIUM CHLORIDE, SODIUM LACTATE, CALCIUM CHLORIDE, MAGNESIUM CHLORIDE AND DEXTROSE 2000 ML: 2.5; 538; 448; 18.3; 5.08 INJECTION, SOLUTION INTRAPERITONEAL at 12:58

## 2023-02-13 RX ADMIN — HYDROCODONE BITARTRATE AND ACETAMINOPHEN 1 TABLET: 7.5; 325 TABLET ORAL at 20:36

## 2023-02-13 NOTE — TELEPHONE ENCOUNTER
RECEIVED AND INDEXED THE FOLLOWING RADIOLOGY REPORTS TO PATIENT CHART, RECEIVED VIA FAX.    FROM [facility] (PH: [phone] FX: [phone]).    ATTN: DR. JARRET REAL / SUSANA LIEBERMAN APRN FOLLOWING 2-7-2023 NEW PATIENT APPT:    CT SOFT TISSUE NECK WITH CON_[id number]_34-    CT CERVICAL SPINE WO_[id number]_12-    CR MYELOGRAM CERVICAL SPINE WITH_[id number]_42-    CT CSPINE WITH_[id number]-    NO FOLLOW-UP ACTION WAS REQUESTED. REPORTS ARE NOW AVAILABLE IN PATIENT'S CHART > IMAGING.

## 2023-02-14 ENCOUNTER — ANESTHESIA (OUTPATIENT)
Dept: GASTROENTEROLOGY | Facility: HOSPITAL | Age: 58
End: 2023-02-14
Payer: MEDICAID

## 2023-02-14 ENCOUNTER — ANESTHESIA EVENT (OUTPATIENT)
Dept: GASTROENTEROLOGY | Facility: HOSPITAL | Age: 58
End: 2023-02-14
Payer: MEDICAID

## 2023-02-14 LAB — GLUCOSE BLDC GLUCOMTR-MCNC: 148 MG/DL (ref 70–99)

## 2023-02-14 PROCEDURE — G0378 HOSPITAL OBSERVATION PER HR: HCPCS

## 2023-02-14 PROCEDURE — 82962 GLUCOSE BLOOD TEST: CPT

## 2023-02-14 PROCEDURE — 25010000002 ONDANSETRON PER 1 MG: Performed by: INTERNAL MEDICINE

## 2023-02-14 PROCEDURE — 25010000002 PROPOFOL 10 MG/ML EMULSION

## 2023-02-14 PROCEDURE — G0257 UNSCHED DIALYSIS ESRD PT HOS: HCPCS

## 2023-02-14 RX ORDER — PROPOFOL 10 MG/ML
VIAL (ML) INTRAVENOUS AS NEEDED
Status: DISCONTINUED | OUTPATIENT
Start: 2023-02-14 | End: 2023-02-14 | Stop reason: SURG

## 2023-02-14 RX ORDER — CHOLECALCIFEROL (VITAMIN D3) 125 MCG
5 CAPSULE ORAL NIGHTLY PRN
Status: DISCONTINUED | OUTPATIENT
Start: 2023-02-14 | End: 2023-02-15

## 2023-02-14 RX ORDER — BISACODYL 10 MG
10 SUPPOSITORY, RECTAL RECTAL DAILY PRN
Status: DISCONTINUED | OUTPATIENT
Start: 2023-02-14 | End: 2023-02-14

## 2023-02-14 RX ORDER — LEVOTHYROXINE SODIUM 0.05 MG/1
50 TABLET ORAL DAILY
Status: DISCONTINUED | OUTPATIENT
Start: 2023-02-14 | End: 2023-02-15

## 2023-02-14 RX ORDER — FAMOTIDINE 20 MG/1
40 TABLET, FILM COATED ORAL DAILY
Status: DISCONTINUED | OUTPATIENT
Start: 2023-02-15 | End: 2023-02-15

## 2023-02-14 RX ORDER — SODIUM CHLORIDE 9 MG/ML
9 INJECTION, SOLUTION INTRAVENOUS CONTINUOUS
Status: DISCONTINUED | OUTPATIENT
Start: 2023-02-14 | End: 2023-02-14

## 2023-02-14 RX ORDER — SERTRALINE HYDROCHLORIDE 25 MG/1
100 TABLET, FILM COATED ORAL DAILY
Status: DISCONTINUED | OUTPATIENT
Start: 2023-02-14 | End: 2023-02-14

## 2023-02-14 RX ORDER — ACETAMINOPHEN 650 MG/1
650 SUPPOSITORY RECTAL EVERY 4 HOURS PRN
Status: DISCONTINUED | OUTPATIENT
Start: 2023-02-14 | End: 2023-02-15

## 2023-02-14 RX ORDER — BISACODYL 10 MG
10 SUPPOSITORY, RECTAL RECTAL DAILY PRN
Status: DISCONTINUED | OUTPATIENT
Start: 2023-02-14 | End: 2023-02-15

## 2023-02-14 RX ORDER — POLYETHYLENE GLYCOL 3350 17 G/17G
17 POWDER, FOR SOLUTION ORAL DAILY PRN
Status: DISCONTINUED | OUTPATIENT
Start: 2023-02-14 | End: 2023-02-15

## 2023-02-14 RX ORDER — ACETAMINOPHEN 325 MG/1
650 TABLET ORAL EVERY 4 HOURS PRN
Status: DISCONTINUED | OUTPATIENT
Start: 2023-02-14 | End: 2023-02-15

## 2023-02-14 RX ORDER — HYDROCODONE BITARTRATE AND ACETAMINOPHEN 7.5; 325 MG/1; MG/1
1 TABLET ORAL EVERY 6 HOURS PRN
Status: DISCONTINUED | OUTPATIENT
Start: 2023-02-14 | End: 2023-02-15

## 2023-02-14 RX ORDER — AMOXICILLIN 250 MG
2 CAPSULE ORAL 2 TIMES DAILY
Status: DISCONTINUED | OUTPATIENT
Start: 2023-02-14 | End: 2023-02-15

## 2023-02-14 RX ORDER — POLYETHYLENE GLYCOL 3350 17 G/17G
17 POWDER, FOR SOLUTION ORAL DAILY PRN
Status: DISCONTINUED | OUTPATIENT
Start: 2023-02-14 | End: 2023-02-14

## 2023-02-14 RX ORDER — ALUMINA, MAGNESIA, AND SIMETHICONE 2400; 2400; 240 MG/30ML; MG/30ML; MG/30ML
15 SUSPENSION ORAL EVERY 6 HOURS PRN
Status: DISCONTINUED | OUTPATIENT
Start: 2023-02-14 | End: 2023-02-15

## 2023-02-14 RX ORDER — LEVOTHYROXINE SODIUM 0.05 MG/1
50 TABLET ORAL DAILY
Status: DISCONTINUED | OUTPATIENT
Start: 2023-02-14 | End: 2023-02-14

## 2023-02-14 RX ORDER — ACETAMINOPHEN 160 MG/5ML
650 SOLUTION ORAL EVERY 4 HOURS PRN
Status: DISCONTINUED | OUTPATIENT
Start: 2023-02-14 | End: 2023-02-15

## 2023-02-14 RX ORDER — AMOXICILLIN 250 MG
2 CAPSULE ORAL 2 TIMES DAILY
Status: DISCONTINUED | OUTPATIENT
Start: 2023-02-14 | End: 2023-02-14

## 2023-02-14 RX ORDER — SERTRALINE HYDROCHLORIDE 25 MG/1
100 TABLET, FILM COATED ORAL DAILY
Status: DISCONTINUED | OUTPATIENT
Start: 2023-02-14 | End: 2023-02-15

## 2023-02-14 RX ORDER — PHENYLEPHRINE HCL IN 0.9% NACL 1 MG/10 ML
SYRINGE (ML) INTRAVENOUS AS NEEDED
Status: DISCONTINUED | OUTPATIENT
Start: 2023-02-14 | End: 2023-02-14 | Stop reason: SURG

## 2023-02-14 RX ADMIN — HYDROCODONE BITARTRATE AND ACETAMINOPHEN 1 TABLET: 7.5; 325 TABLET ORAL at 16:03

## 2023-02-14 RX ADMIN — PROPOFOL 50 MG: 10 INJECTION, EMULSION INTRAVENOUS at 09:11

## 2023-02-14 RX ADMIN — HYDROCODONE BITARTRATE AND ACETAMINOPHEN 1 TABLET: 7.5; 325 TABLET ORAL at 06:29

## 2023-02-14 RX ADMIN — Medication 100 MCG: at 09:16

## 2023-02-14 RX ADMIN — SODIUM CHLORIDE, SODIUM LACTATE, CALCIUM CHLORIDE, MAGNESIUM CHLORIDE AND DEXTROSE 2000 ML: 2.5; 538; 448; 18.3; 5.08 INJECTION, SOLUTION INTRAPERITONEAL at 06:24

## 2023-02-14 RX ADMIN — HYDROCODONE BITARTRATE AND ACETAMINOPHEN 1 TABLET: 7.5; 325 TABLET ORAL at 22:16

## 2023-02-14 RX ADMIN — FAMOTIDINE 40 MG: 20 TABLET, FILM COATED ORAL at 11:52

## 2023-02-14 RX ADMIN — POLYETHYLENE GLYCOL 3350, SODIUM SULFATE ANHYDROUS, SODIUM BICARBONATE, SODIUM CHLORIDE, POTASSIUM CHLORIDE 2000 ML: 236; 22.74; 6.74; 5.86; 2.97 POWDER, FOR SOLUTION ORAL at 16:19

## 2023-02-14 RX ADMIN — METOPROLOL TARTRATE 50 MG: 25 TABLET, FILM COATED ORAL at 19:10

## 2023-02-14 RX ADMIN — POLYETHYLENE GLYCOL 3350, SODIUM SULFATE ANHYDROUS, SODIUM BICARBONATE, SODIUM CHLORIDE, POTASSIUM CHLORIDE 2000 ML: 236; 22.74; 6.74; 5.86; 2.97 POWDER, FOR SOLUTION ORAL at 21:01

## 2023-02-14 RX ADMIN — Medication 100 MCG: at 09:15

## 2023-02-14 RX ADMIN — Medication 100 MCG: at 09:19

## 2023-02-14 RX ADMIN — Medication 100 MCG: at 09:21

## 2023-02-14 RX ADMIN — ONDANSETRON 4 MG: 2 INJECTION INTRAMUSCULAR; INTRAVENOUS at 19:04

## 2023-02-14 RX ADMIN — SODIUM CHLORIDE: 9 INJECTION, SOLUTION INTRAVENOUS at 09:08

## 2023-02-14 RX ADMIN — SERTRALINE 100 MG: 25 TABLET, FILM COATED ORAL at 18:24

## 2023-02-14 RX ADMIN — SODIUM CHLORIDE, SODIUM LACTATE, CALCIUM CHLORIDE, MAGNESIUM CHLORIDE AND DEXTROSE 2000 ML: 2.5; 538; 448; 18.3; 5.08 INJECTION, SOLUTION INTRAPERITONEAL at 18:21

## 2023-02-14 RX ADMIN — Medication 100 MCG: at 09:13

## 2023-02-14 RX ADMIN — SODIUM CHLORIDE, SODIUM LACTATE, CALCIUM CHLORIDE, MAGNESIUM CHLORIDE AND DEXTROSE 2000 ML: 2.5; 538; 448; 18.3; 5.08 INJECTION, SOLUTION INTRAPERITONEAL at 00:36

## 2023-02-14 RX ADMIN — PROPOFOL 100 MCG/KG/MIN: 10 INJECTION, EMULSION INTRAVENOUS at 09:11

## 2023-02-14 RX ADMIN — Medication 10 ML: at 21:01

## 2023-02-14 RX ADMIN — LEVOTHYROXINE SODIUM 50 MCG: 50 TABLET ORAL at 18:24

## 2023-02-14 RX ADMIN — SODIUM CHLORIDE, SODIUM LACTATE, CALCIUM CHLORIDE, MAGNESIUM CHLORIDE AND DEXTROSE 2000 ML: 2.5; 538; 448; 18.3; 5.08 INJECTION, SOLUTION INTRAPERITONEAL at 11:53

## 2023-02-14 RX ADMIN — SENNOSIDES AND DOCUSATE SODIUM 2 TABLET: 8.6; 5 TABLET ORAL at 11:52

## 2023-02-14 NOTE — ANESTHESIA PREPROCEDURE EVALUATION
Anesthesia Evaluation     Patient summary reviewed and Nursing notes reviewed   no history of anesthetic complications:  NPO Solid Status: > 8 hours  NPO Liquid Status: > 2 hours           Airway   Mallampati: II  TM distance: >3 FB  Neck ROM: full  No difficulty expected  Dental    (+) edentulous    Pulmonary - normal exam    breath sounds clear to auscultation  (+) COPD, sleep apnea,   Cardiovascular - normal exam  Exercise tolerance: good (4-7 METS)    Rhythm: regular  Rate: normal    (+) hypertension, CAD, cardiac stents hyperlipidemia,       Neuro/Psych- negative ROS  GI/Hepatic/Renal/Endo    (+)  GI bleeding , liver disease, renal disease (PD) ESRD and dialysis, diabetes mellitus,     Musculoskeletal     Abdominal    Substance History      OB/GYN          Other        ROS/Med Hx Other: PAT Nursing Notes unavailable.            Excellent technical quality.     1.  LV size, function, and wall motion are normal.  Mild concentric left ventricular hypertrophy.  Visually estimated ejection fraction is 55 to 60%.  3D ejection fraction is 59%.  Grade 1A diastolic dysfunction.  Borderline left atrial enlargement.  Right heart chambers are normal.  No septal defect or intracavitary mass or thrombus.     2.  There is very minimal mitral annular calcification.  There is no mitral stenosis and there is trivial to mild MR.  The aortic valve is tricuspid and mildly sclerotic.  There is mild calcification of the right and noncoronary cusps.  There is no aortic stenosis and there is trivial AI.  Trivial to mild TR is demonstrated from a morphologically normal valve.     3.  There is no pericardial effusion present.  The proximal aortic root is mildly dilated at 4.1 cm with no dissection or aneurysm.     4.  Pulmonary artery systolic pressures are estimated in the high 20s.         Anesthesia Plan    ASA 4     general   total IV anesthesia    Anesthetic plan, risks, benefits, and alternatives have been provided, discussed and  informed consent has been obtained with: patient.        CODE STATUS:    Code Status (Patient has no pulse and is not breathing): CPR (Attempt to Resuscitate)  Medical Interventions (Patient has pulse or is breathing): Full Support

## 2023-02-15 ENCOUNTER — TELEPHONE (OUTPATIENT)
Dept: NEUROSURGERY | Facility: CLINIC | Age: 58
End: 2023-02-15
Payer: MEDICAID

## 2023-02-15 ENCOUNTER — READMISSION MANAGEMENT (OUTPATIENT)
Dept: CALL CENTER | Facility: HOSPITAL | Age: 58
End: 2023-02-15
Payer: MEDICAID

## 2023-02-15 ENCOUNTER — ANESTHESIA EVENT (OUTPATIENT)
Dept: GASTROENTEROLOGY | Facility: HOSPITAL | Age: 58
End: 2023-02-15
Payer: MEDICAID

## 2023-02-15 ENCOUNTER — ANESTHESIA (OUTPATIENT)
Dept: GASTROENTEROLOGY | Facility: HOSPITAL | Age: 58
End: 2023-02-15
Payer: MEDICAID

## 2023-02-15 VITALS
TEMPERATURE: 97.7 F | WEIGHT: 176.81 LBS | RESPIRATION RATE: 16 BRPM | BODY MASS INDEX: 27.75 KG/M2 | HEART RATE: 97 BPM | SYSTOLIC BLOOD PRESSURE: 130 MMHG | OXYGEN SATURATION: 98 % | HEIGHT: 67 IN | DIASTOLIC BLOOD PRESSURE: 90 MMHG

## 2023-02-15 PROCEDURE — 25010000002 AMPICILLIN PER 500 MG: Performed by: INTERNAL MEDICINE

## 2023-02-15 PROCEDURE — G0257 UNSCHED DIALYSIS ESRD PT HOS: HCPCS

## 2023-02-15 PROCEDURE — G0378 HOSPITAL OBSERVATION PER HR: HCPCS

## 2023-02-15 PROCEDURE — 25010000002 ONDANSETRON PER 1 MG: Performed by: INTERNAL MEDICINE

## 2023-02-15 PROCEDURE — 25010000002 PROPOFOL 10 MG/ML EMULSION

## 2023-02-15 PROCEDURE — 88305 TISSUE EXAM BY PATHOLOGIST: CPT | Performed by: INTERNAL MEDICINE

## 2023-02-15 PROCEDURE — 45385 COLONOSCOPY W/LESION REMOVAL: CPT | Performed by: INTERNAL MEDICINE

## 2023-02-15 RX ORDER — SODIUM CHLORIDE, SODIUM LACTATE, POTASSIUM CHLORIDE, CALCIUM CHLORIDE 600; 310; 30; 20 MG/100ML; MG/100ML; MG/100ML; MG/100ML
30 INJECTION, SOLUTION INTRAVENOUS CONTINUOUS
Status: DISCONTINUED | OUTPATIENT
Start: 2023-02-15 | End: 2023-02-15 | Stop reason: HOSPADM

## 2023-02-15 RX ORDER — CHOLECALCIFEROL (VITAMIN D3) 125 MCG
5 CAPSULE ORAL NIGHTLY PRN
Status: DISCONTINUED | OUTPATIENT
Start: 2023-02-15 | End: 2023-02-15 | Stop reason: HOSPADM

## 2023-02-15 RX ORDER — PROPOFOL 10 MG/ML
VIAL (ML) INTRAVENOUS AS NEEDED
Status: DISCONTINUED | OUTPATIENT
Start: 2023-02-15 | End: 2023-02-15 | Stop reason: SURG

## 2023-02-15 RX ORDER — LIDOCAINE HYDROCHLORIDE 20 MG/ML
INJECTION, SOLUTION EPIDURAL; INFILTRATION; INTRACAUDAL; PERINEURAL AS NEEDED
Status: DISCONTINUED | OUTPATIENT
Start: 2023-02-15 | End: 2023-02-15 | Stop reason: SURG

## 2023-02-15 RX ORDER — POLYETHYLENE GLYCOL 3350 17 G/17G
17 POWDER, FOR SOLUTION ORAL DAILY PRN
Status: DISCONTINUED | OUTPATIENT
Start: 2023-02-15 | End: 2023-02-15 | Stop reason: HOSPADM

## 2023-02-15 RX ORDER — ALUMINA, MAGNESIA, AND SIMETHICONE 2400; 2400; 240 MG/30ML; MG/30ML; MG/30ML
15 SUSPENSION ORAL EVERY 6 HOURS PRN
Status: DISCONTINUED | OUTPATIENT
Start: 2023-02-15 | End: 2023-02-15 | Stop reason: HOSPADM

## 2023-02-15 RX ORDER — ACETAMINOPHEN 160 MG/5ML
650 SOLUTION ORAL EVERY 4 HOURS PRN
Status: DISCONTINUED | OUTPATIENT
Start: 2023-02-15 | End: 2023-02-15 | Stop reason: HOSPADM

## 2023-02-15 RX ORDER — ACETAMINOPHEN 325 MG/1
650 TABLET ORAL EVERY 4 HOURS PRN
Status: DISCONTINUED | OUTPATIENT
Start: 2023-02-15 | End: 2023-02-15 | Stop reason: HOSPADM

## 2023-02-15 RX ORDER — AMOXICILLIN 250 MG
2 CAPSULE ORAL 2 TIMES DAILY
Status: DISCONTINUED | OUTPATIENT
Start: 2023-02-15 | End: 2023-02-15 | Stop reason: HOSPADM

## 2023-02-15 RX ORDER — HYDROCODONE BITARTRATE AND ACETAMINOPHEN 7.5; 325 MG/1; MG/1
1 TABLET ORAL EVERY 6 HOURS PRN
Status: DISCONTINUED | OUTPATIENT
Start: 2023-02-15 | End: 2023-02-15 | Stop reason: HOSPADM

## 2023-02-15 RX ORDER — SERTRALINE HYDROCHLORIDE 25 MG/1
100 TABLET, FILM COATED ORAL DAILY
Status: DISCONTINUED | OUTPATIENT
Start: 2023-02-15 | End: 2023-02-15 | Stop reason: HOSPADM

## 2023-02-15 RX ORDER — EPHEDRINE SULFATE 50 MG/ML
INJECTION, SOLUTION INTRAVENOUS AS NEEDED
Status: DISCONTINUED | OUTPATIENT
Start: 2023-02-15 | End: 2023-02-15 | Stop reason: SURG

## 2023-02-15 RX ORDER — LEVOTHYROXINE SODIUM 0.05 MG/1
50 TABLET ORAL DAILY
Status: DISCONTINUED | OUTPATIENT
Start: 2023-02-15 | End: 2023-02-15 | Stop reason: HOSPADM

## 2023-02-15 RX ORDER — PHENYLEPHRINE HCL IN 0.9% NACL 1 MG/10 ML
SYRINGE (ML) INTRAVENOUS AS NEEDED
Status: DISCONTINUED | OUTPATIENT
Start: 2023-02-15 | End: 2023-02-15 | Stop reason: SURG

## 2023-02-15 RX ORDER — BISACODYL 10 MG
10 SUPPOSITORY, RECTAL RECTAL DAILY PRN
Status: DISCONTINUED | OUTPATIENT
Start: 2023-02-15 | End: 2023-02-15 | Stop reason: HOSPADM

## 2023-02-15 RX ORDER — FAMOTIDINE 20 MG/1
40 TABLET, FILM COATED ORAL DAILY
Status: DISCONTINUED | OUTPATIENT
Start: 2023-02-15 | End: 2023-02-15 | Stop reason: HOSPADM

## 2023-02-15 RX ORDER — ACETAMINOPHEN 650 MG/1
650 SUPPOSITORY RECTAL EVERY 4 HOURS PRN
Status: DISCONTINUED | OUTPATIENT
Start: 2023-02-15 | End: 2023-02-15 | Stop reason: HOSPADM

## 2023-02-15 RX ADMIN — ONDANSETRON 4 MG: 2 INJECTION INTRAMUSCULAR; INTRAVENOUS at 09:02

## 2023-02-15 RX ADMIN — SODIUM CHLORIDE, POTASSIUM CHLORIDE, SODIUM LACTATE AND CALCIUM CHLORIDE: 600; 310; 30; 20 INJECTION, SOLUTION INTRAVENOUS at 08:07

## 2023-02-15 RX ADMIN — SODIUM CHLORIDE, SODIUM LACTATE, CALCIUM CHLORIDE, MAGNESIUM CHLORIDE AND DEXTROSE 2000 ML: 2.5; 538; 448; 18.3; 5.08 INJECTION, SOLUTION INTRAPERITONEAL at 00:11

## 2023-02-15 RX ADMIN — PROPOFOL 100 MCG/KG/MIN: 10 INJECTION, EMULSION INTRAVENOUS at 08:07

## 2023-02-15 RX ADMIN — SODIUM CHLORIDE, SODIUM LACTATE, CALCIUM CHLORIDE, MAGNESIUM CHLORIDE AND DEXTROSE 2000 ML: 2.5; 538; 448; 18.3; 5.08 INJECTION, SOLUTION INTRAPERITONEAL at 06:07

## 2023-02-15 RX ADMIN — PROPOFOL 50 MG: 10 INJECTION, EMULSION INTRAVENOUS at 08:07

## 2023-02-15 RX ADMIN — EPHEDRINE SULFATE 5 MG: 50 INJECTION INTRAVENOUS at 08:37

## 2023-02-15 RX ADMIN — Medication 100 MCG: at 08:18

## 2023-02-15 RX ADMIN — Medication 200 MCG: at 08:34

## 2023-02-15 RX ADMIN — Medication 200 MCG: at 08:15

## 2023-02-15 RX ADMIN — LIDOCAINE HYDROCHLORIDE 50 MG: 20 INJECTION, SOLUTION EPIDURAL; INFILTRATION; INTRACAUDAL; PERINEURAL at 08:07

## 2023-02-15 RX ADMIN — Medication 200 MCG: at 08:28

## 2023-02-15 RX ADMIN — Medication 100 MCG: at 08:20

## 2023-02-15 NOTE — ANESTHESIA PREPROCEDURE EVALUATION
Anesthesia Evaluation     Patient summary reviewed and Nursing notes reviewed   no history of anesthetic complications:  NPO Solid Status: > 8 hours  NPO Liquid Status: > 2 hours           Airway   Mallampati: II  TM distance: >3 FB  Neck ROM: full  No difficulty expected  Dental    (+) edentulous    Pulmonary - normal exam    breath sounds clear to auscultation  (+) a smoker Current Abstained day of surgery, COPD, sleep apnea,   Cardiovascular - normal exam  Exercise tolerance: poor (<4 METS)    ECG reviewed  Rhythm: regular  Rate: normal    (+) hypertension, CAD, cardiac stents (new stent 11/2022) Drug eluting stent within the past 12 months angina, hyperlipidemia,     ROS comment: Sinus rhythm  Prolonged QT interval    Neuro/Psych- negative ROS  GI/Hepatic/Renal/Endo    (+)   hepatitis, liver disease (hx alcohol use-- sober over 10 years), renal disease (peritoneal) dialysis and ESRD, diabetes mellitus type 2,     Musculoskeletal     Abdominal    Substance History   (+) alcohol use (sober over 10 years),      OB/GYN negative ob/gyn ROS         Other   arthritis,      ROS/Med Hx Other: PAT Nursing Notes unavailable.                 Anesthesia Plan    ASA 4     general     (Patient understands anesthesia not responsible for dental damage.)  intravenous induction     Anesthetic plan, risks, benefits, and alternatives have been provided, discussed and informed consent has been obtained with: patient.  Pre-procedure education provided  Use of blood products discussed with patient .   Plan discussed with CRNA.        CODE STATUS:    Code Status (Patient has no pulse and is not breathing): CPR (Attempt to Resuscitate)  Medical Interventions (Patient has pulse or is breathing): Full Support

## 2023-02-15 NOTE — TELEPHONE ENCOUNTER
Bone scan showed no evidence of acute fracture or concern for metastatic disease.   WIFE, BRENDA INFORMED.    Patient has been inpatient since Monday, apparently due to some lab work there was concern for colon cancer, they performed colonoscopy and removed 2 polyps to send to pathology. Wife said they ran other tests, unsure what. She is going to follow up with PCP,  And go from there. Discussed possible MRI, but concerns with metal. Wife states that was the knee replacement, but is no long in. She will discuss this with PCP.

## 2023-02-15 NOTE — ANESTHESIA POSTPROCEDURE EVALUATION
Patient: Chevy Junior    Procedure Summary     Date: 02/14/23 Room / Location: [facility] ENDOSCOPY 5 / [facility]    Anesthesia Start: 0908 Anesthesia Stop: 0930    Procedure: COLONOSCOPY Diagnosis:       Epigastric pain      Elevated carcinoembryonic antigen (CEA)      (Epigastric pain [R10.13])      (Elevated carcinoembryonic antigen (CEA) [R97.0])    Surgeons: David Glass MD Provider: Pranav Matos MD    Anesthesia Type: general ASA Status: 4          Anesthesia Type: general    Vitals  Vitals Value Taken Time   /95 02/14/23 0945   Temp 36.8 °C (98.3 °F) 02/14/23 0945   Pulse 96 02/14/23 0946   Resp 17 02/14/23 0945   SpO2 100 % 02/14/23 0946   Vitals shown include unvalidated device data.        Post Anesthesia Care and Evaluation    Patient location during evaluation: bedside  Patient participation: complete - patient participated  Level of consciousness: awake  Pain management: adequate    Airway patency: patent  Anesthetic complications: No anesthetic complications  PONV Status: none  Cardiovascular status: acceptable and stable  Respiratory status: acceptable  Hydration status: acceptable    Comments: An Anesthesiologist personally participated in the most demanding procedures (including induction and emergence if applicable) in the anesthesia plan, monitored the course of anesthesia administration at frequent intervals and remained physically present and available for immediate diagnosis and treatment of emergencies.

## 2023-02-15 NOTE — ANESTHESIA POSTPROCEDURE EVALUATION
Patient: Chevy Junior    Procedure Summary     Date: 02/15/23 Room / Location: [facility] ENDOSCOPY 5 / [facility] ENDOSCOPY    Anesthesia Start: 0802 Anesthesia Stop: 0843    Procedure: COLONOSCOPY WITH POLYPECTOMY Diagnosis:       Elevated carcinoembryonic antigen (CEA)      Generalized abdominal pain      (Elevated carcinoembryonic antigen (CEA) [R97.0])      (Generalized abdominal pain [R10.84])    Surgeons: David Glass MD Provider: Reyes, Mirabelle, DO    Anesthesia Type: general ASA Status: 4          Anesthesia Type: general    Vitals  Vitals Value Taken Time   /65 02/15/23 0847   Temp     Pulse 89 02/15/23 0851   Resp     SpO2 100 % 02/15/23 0850   Vitals shown include unvalidated device data.        Post Anesthesia Care and Evaluation    Patient location during evaluation: bedside  Patient participation: complete - patient participated  Level of consciousness: awake  Pain management: adequate    Airway patency: patent  PONV Status: controlled  Cardiovascular status: acceptable  Respiratory status: acceptable  Hydration status: acceptable

## 2023-02-16 NOTE — OUTREACH NOTE
Prep Survey    Flowsheet Row Responses   [facility] facility patient discharged from? [facility]   Is LACE score < 7 ? No   Eligibility Readm Mgmt   Discharge diagnosis Abdominal pain   Does the patient have one of the following disease processes/diagnoses(primary or secondary)? Other   Does the patient have Home health ordered? No   Is there a DME ordered? No   Prep survey completed? Yes          Ginny MARIA - Registered Nurse

## 2023-02-20 ENCOUNTER — TELEPHONE (OUTPATIENT)
Dept: NEUROSURGERY | Facility: CLINIC | Age: 58
End: 2023-02-20
Payer: MEDICAID

## 2023-02-20 NOTE — TELEPHONE ENCOUNTER
Returned Osvaldo's call. Patients wife asked her to call. Wanting to know if they should follow up here or what was going to be done about the fractures. I told her we offered an MRI if he is capable of doing this (possible metal, multiple places in his body) but his wife told me she will follow up with PCP to find out what to do next. Due to chronic fractures this was not surgical. I told them to call the office if they needed something further. No follow up necessary at this time.

## 2023-02-23 ENCOUNTER — READMISSION MANAGEMENT (OUTPATIENT)
Dept: CALL CENTER | Facility: HOSPITAL | Age: 58
End: 2023-02-23
Payer: MEDICAID

## 2023-02-23 NOTE — OUTREACH NOTE
Medical Week 2 Survey    Flowsheet Row Responses   [facility] facility patient discharged from? [facility]   Does the patient have one of the following disease processes/diagnoses(primary or secondary)? Other   Week 2 attempt successful? No   Unsuccessful attempts Attempt 1          WILLIAM Trejo Registered Nurse

## 2023-02-28 ENCOUNTER — READMISSION MANAGEMENT (OUTPATIENT)
Dept: CALL CENTER | Facility: HOSPITAL | Age: 58
End: 2023-02-28
Payer: MEDICAID

## 2023-02-28 ENCOUNTER — APPOINTMENT (OUTPATIENT)
Dept: CT IMAGING | Facility: HOSPITAL | Age: 58
End: 2023-02-28
Payer: MEDICAID

## 2023-02-28 ENCOUNTER — HOSPITAL ENCOUNTER (OUTPATIENT)
Facility: HOSPITAL | Age: 58
Discharge: HOME | End: 2023-03-01
Attending: INTERNAL MEDICINE | Admitting: INTERNAL MEDICINE
Payer: MEDICAID

## 2023-02-28 DIAGNOSIS — Z99.2 ESRD ON PERITONEAL DIALYSIS: Primary | ICD-10-CM

## 2023-02-28 DIAGNOSIS — N18.6 ESRD ON PERITONEAL DIALYSIS: Primary | ICD-10-CM

## 2023-02-28 PROBLEM — R62.51 FAILURE TO THRIVE (CHILD): Status: ACTIVE | Noted: 2023-02-28

## 2023-02-28 LAB
ALBUMIN SERPL-MCNC: 2.5 G/DL (ref 3.5–5.2)
ALBUMIN/GLOB SERPL: 0.5 G/DL
ALP SERPL-CCNC: 162 U/L (ref 39–117)
ALT SERPL W P-5'-P-CCNC: 7 U/L (ref 1–41)
ANION GAP SERPL CALCULATED.3IONS-SCNC: 16.6 MMOL/L (ref 5–15)
APTT PPP: 57.9 SECONDS (ref 24.2–34.2)
AST SERPL-CCNC: 9 U/L (ref 1–40)
BASOPHILS # BLD AUTO: 0.01 10*3/MM3 (ref 0–0.2)
BASOPHILS NFR BLD AUTO: 0.2 % (ref 0–1.5)
BILIRUB SERPL-MCNC: 0.3 MG/DL (ref 0–1.2)
BUN SERPL-MCNC: 43 MG/DL (ref 6–20)
BUN/CREAT SERPL: 6 (ref 7–25)
CALCIUM SPEC-SCNC: 8.6 MG/DL (ref 8.6–10.5)
CHLORIDE SERPL-SCNC: 88 MMOL/L (ref 98–107)
CO2 SERPL-SCNC: 25.4 MMOL/L (ref 22–29)
CREAT SERPL-MCNC: 7.22 MG/DL (ref 0.76–1.27)
DEPRECATED RDW RBC AUTO: 52.4 FL (ref 37–54)
EGFRCR SERPLBLD CKD-EPI 2021: 8.1 ML/MIN/1.73
EOSINOPHIL # BLD AUTO: 0.06 10*3/MM3 (ref 0–0.4)
EOSINOPHIL NFR BLD AUTO: 1.4 % (ref 0.3–6.2)
ERYTHROCYTE [DISTWIDTH] IN BLOOD BY AUTOMATED COUNT: 15.1 % (ref 12.3–15.4)
GLOBULIN UR ELPH-MCNC: 4.9 GM/DL
GLUCOSE SERPL-MCNC: 117 MG/DL (ref 65–99)
HCT VFR BLD AUTO: 25.6 % (ref 37.5–51)
HGB BLD-MCNC: 8.3 G/DL (ref 13–17.7)
HYPOCHROMIA BLD QL: NORMAL
IMM GRANULOCYTES # BLD AUTO: 0.02 10*3/MM3 (ref 0–0.05)
IMM GRANULOCYTES NFR BLD AUTO: 0.5 % (ref 0–0.5)
INR PPP: 1.73 (ref 0.86–1.15)
LARGE PLATELETS: NORMAL
LYMPHOCYTES # BLD AUTO: 0.24 10*3/MM3 (ref 0.7–3.1)
LYMPHOCYTES NFR BLD AUTO: 5.6 % (ref 19.6–45.3)
MCH RBC QN AUTO: 30.5 PG (ref 26.6–33)
MCHC RBC AUTO-ENTMCNC: 32.4 G/DL (ref 31.5–35.7)
MCV RBC AUTO: 94.1 FL (ref 79–97)
MONOCYTES # BLD AUTO: 0.23 10*3/MM3 (ref 0.1–0.9)
MONOCYTES NFR BLD AUTO: 5.3 % (ref 5–12)
NEUTROPHILS NFR BLD AUTO: 3.76 10*3/MM3 (ref 1.7–7)
NEUTROPHILS NFR BLD AUTO: 87 % (ref 42.7–76)
NRBC BLD AUTO-RTO: 0 /100 WBC (ref 0–0.2)
PLATELET # BLD AUTO: 261 10*3/MM3 (ref 140–450)
PMV BLD AUTO: 9.2 FL (ref 6–12)
POTASSIUM SERPL-SCNC: 3.8 MMOL/L (ref 3.5–5.2)
PROT SERPL-MCNC: 7.4 G/DL (ref 6–8.5)
PROTHROMBIN TIME: 20.5 SECONDS (ref 11.8–14.9)
RBC # BLD AUTO: 2.72 10*6/MM3 (ref 4.14–5.8)
SMALL PLATELETS BLD QL SMEAR: ADEQUATE
SODIUM SERPL-SCNC: 130 MMOL/L (ref 136–145)
WBC MORPH BLD: NORMAL
WBC NRBC COR # BLD: 4.32 10*3/MM3 (ref 3.4–10.8)

## 2023-02-28 PROCEDURE — 99214 OFFICE O/P EST MOD 30 MIN: CPT | Performed by: SURGERY

## 2023-02-28 PROCEDURE — 74177 CT ABD & PELVIS W/CONTRAST: CPT

## 2023-02-28 PROCEDURE — 85025 COMPLETE CBC W/AUTO DIFF WBC: CPT | Performed by: INTERNAL MEDICINE

## 2023-02-28 PROCEDURE — G0378 HOSPITAL OBSERVATION PER HR: HCPCS

## 2023-02-28 PROCEDURE — 85730 THROMBOPLASTIN TIME PARTIAL: CPT | Performed by: SURGERY

## 2023-02-28 PROCEDURE — 25510000001 IOPAMIDOL PER 1 ML: Performed by: INTERNAL MEDICINE

## 2023-02-28 PROCEDURE — 85610 PROTHROMBIN TIME: CPT | Performed by: SURGERY

## 2023-02-28 PROCEDURE — 85007 BL SMEAR W/DIFF WBC COUNT: CPT | Performed by: INTERNAL MEDICINE

## 2023-02-28 PROCEDURE — 86900 BLOOD TYPING SEROLOGIC ABO: CPT

## 2023-02-28 PROCEDURE — 0 IOHEXOL 12 MG/ML SOLUTION: Performed by: INTERNAL MEDICINE

## 2023-02-28 PROCEDURE — 0 PHYTONADIONE 10 MG/ML SOLUTION 1 ML AMPULE: Performed by: INTERNAL MEDICINE

## 2023-02-28 PROCEDURE — 80053 COMPREHEN METABOLIC PANEL: CPT | Performed by: SURGERY

## 2023-02-28 PROCEDURE — 86901 BLOOD TYPING SEROLOGIC RH(D): CPT

## 2023-02-28 RX ORDER — ACETAMINOPHEN 325 MG/1
650 TABLET ORAL EVERY 4 HOURS PRN
Status: DISCONTINUED | OUTPATIENT
Start: 2023-02-28 | End: 2023-03-01 | Stop reason: HOSPADM

## 2023-02-28 RX ORDER — SODIUM CHLORIDE 0.9 % (FLUSH) 0.9 %
10 SYRINGE (ML) INJECTION AS NEEDED
Status: DISCONTINUED | OUTPATIENT
Start: 2023-02-28 | End: 2023-03-01 | Stop reason: HOSPADM

## 2023-02-28 RX ORDER — ONDANSETRON 2 MG/ML
4 INJECTION INTRAMUSCULAR; INTRAVENOUS EVERY 6 HOURS PRN
Status: DISCONTINUED | OUTPATIENT
Start: 2023-02-28 | End: 2023-03-01 | Stop reason: HOSPADM

## 2023-02-28 RX ORDER — ACETAMINOPHEN 160 MG/5ML
650 SOLUTION ORAL EVERY 4 HOURS PRN
Status: DISCONTINUED | OUTPATIENT
Start: 2023-02-28 | End: 2023-03-01 | Stop reason: HOSPADM

## 2023-02-28 RX ORDER — ALUMINA, MAGNESIA, AND SIMETHICONE 2400; 2400; 240 MG/30ML; MG/30ML; MG/30ML
15 SUSPENSION ORAL EVERY 6 HOURS PRN
Status: DISCONTINUED | OUTPATIENT
Start: 2023-02-28 | End: 2023-03-01 | Stop reason: HOSPADM

## 2023-02-28 RX ORDER — CHOLECALCIFEROL (VITAMIN D3) 125 MCG
5 CAPSULE ORAL NIGHTLY PRN
Status: DISCONTINUED | OUTPATIENT
Start: 2023-02-28 | End: 2023-03-01 | Stop reason: HOSPADM

## 2023-02-28 RX ORDER — SODIUM CHLORIDE 9 MG/ML
40 INJECTION, SOLUTION INTRAVENOUS AS NEEDED
Status: DISCONTINUED | OUTPATIENT
Start: 2023-02-28 | End: 2023-03-01 | Stop reason: HOSPADM

## 2023-02-28 RX ORDER — BISACODYL 10 MG
10 SUPPOSITORY, RECTAL RECTAL DAILY PRN
Status: DISCONTINUED | OUTPATIENT
Start: 2023-02-28 | End: 2023-03-01 | Stop reason: HOSPADM

## 2023-02-28 RX ORDER — FAMOTIDINE 20 MG/1
40 TABLET, FILM COATED ORAL DAILY
Status: DISCONTINUED | OUTPATIENT
Start: 2023-02-28 | End: 2023-03-01 | Stop reason: HOSPADM

## 2023-02-28 RX ORDER — OXYCODONE AND ACETAMINOPHEN 7.5; 325 MG/1; MG/1
1 TABLET ORAL EVERY 6 HOURS PRN
Status: DISCONTINUED | OUTPATIENT
Start: 2023-02-28 | End: 2023-03-01 | Stop reason: HOSPADM

## 2023-02-28 RX ORDER — ENOXAPARIN SODIUM 100 MG/ML
30 INJECTION SUBCUTANEOUS EVERY 24 HOURS
Status: DISCONTINUED | OUTPATIENT
Start: 2023-02-28 | End: 2023-03-01 | Stop reason: HOSPADM

## 2023-02-28 RX ORDER — SODIUM CHLORIDE 0.9 % (FLUSH) 0.9 %
10 SYRINGE (ML) INJECTION EVERY 12 HOURS SCHEDULED
Status: DISCONTINUED | OUTPATIENT
Start: 2023-02-28 | End: 2023-03-01 | Stop reason: HOSPADM

## 2023-02-28 RX ORDER — NICOTINE 21 MG/24HR
1 PATCH, TRANSDERMAL 24 HOURS TRANSDERMAL
Status: DISCONTINUED | OUTPATIENT
Start: 2023-02-28 | End: 2023-03-01 | Stop reason: HOSPADM

## 2023-02-28 RX ORDER — POLYETHYLENE GLYCOL 3350 17 G/17G
17 POWDER, FOR SOLUTION ORAL DAILY PRN
Status: DISCONTINUED | OUTPATIENT
Start: 2023-02-28 | End: 2023-03-01 | Stop reason: HOSPADM

## 2023-02-28 RX ORDER — BISACODYL 5 MG/1
5 TABLET, DELAYED RELEASE ORAL DAILY PRN
Status: DISCONTINUED | OUTPATIENT
Start: 2023-02-28 | End: 2023-03-01 | Stop reason: HOSPADM

## 2023-02-28 RX ORDER — AMOXICILLIN 250 MG
2 CAPSULE ORAL 2 TIMES DAILY
Status: DISCONTINUED | OUTPATIENT
Start: 2023-02-28 | End: 2023-03-01 | Stop reason: HOSPADM

## 2023-02-28 RX ORDER — ACETAMINOPHEN 650 MG/1
650 SUPPOSITORY RECTAL EVERY 4 HOURS PRN
Status: DISCONTINUED | OUTPATIENT
Start: 2023-02-28 | End: 2023-03-01 | Stop reason: HOSPADM

## 2023-02-28 RX ADMIN — Medication 10 ML: at 12:09

## 2023-02-28 RX ADMIN — Medication 1 PATCH: at 13:38

## 2023-02-28 RX ADMIN — PHYTONADIONE 10 MG: 10 INJECTION, EMULSION INTRAMUSCULAR; INTRAVENOUS; SUBCUTANEOUS at 13:58

## 2023-02-28 RX ADMIN — IOPAMIDOL 100 ML: 755 INJECTION, SOLUTION INTRAVENOUS at 12:48

## 2023-02-28 RX ADMIN — IOHEXOL 500 ML: 12 SOLUTION ORAL at 10:21

## 2023-02-28 RX ADMIN — OXYCODONE HYDROCHLORIDE AND ACETAMINOPHEN 1 TABLET: 7.5; 325 TABLET ORAL at 15:48

## 2023-02-28 RX ADMIN — Medication 10 ML: at 22:10

## 2023-02-28 RX ADMIN — ACETAMINOPHEN 650 MG: 325 TABLET ORAL at 13:00

## 2023-02-28 RX ADMIN — OXYCODONE HYDROCHLORIDE AND ACETAMINOPHEN 1 TABLET: 7.5; 325 TABLET ORAL at 22:10

## 2023-03-01 ENCOUNTER — APPOINTMENT (OUTPATIENT)
Dept: GENERAL RADIOLOGY | Facility: HOSPITAL | Age: 58
End: 2023-03-01
Payer: MEDICAID

## 2023-03-01 VITALS
TEMPERATURE: 97.4 F | HEART RATE: 108 BPM | WEIGHT: 180.56 LBS | SYSTOLIC BLOOD PRESSURE: 151 MMHG | RESPIRATION RATE: 18 BRPM | BODY MASS INDEX: 28.34 KG/M2 | OXYGEN SATURATION: 96 % | DIASTOLIC BLOOD PRESSURE: 80 MMHG | HEIGHT: 67 IN

## 2023-03-01 LAB
ABO GROUP BLD: NORMAL
ABO GROUP BLD: NORMAL
ALBUMIN SERPL-MCNC: 2.2 G/DL (ref 3.5–5.2)
ALBUMIN/GLOB SERPL: 0.5 G/DL
ALP SERPL-CCNC: 164 U/L (ref 39–117)
ALT SERPL W P-5'-P-CCNC: 6 U/L (ref 1–41)
ANION GAP SERPL CALCULATED.3IONS-SCNC: 17.3 MMOL/L (ref 5–15)
AST SERPL-CCNC: 9 U/L (ref 1–40)
BILIRUB SERPL-MCNC: 0.3 MG/DL (ref 0–1.2)
BUN SERPL-MCNC: 55 MG/DL (ref 6–20)
BUN/CREAT SERPL: 6.8 (ref 7–25)
CALCIUM SPEC-SCNC: 8.1 MG/DL (ref 8.6–10.5)
CHLORIDE SERPL-SCNC: 86 MMOL/L (ref 98–107)
CO2 SERPL-SCNC: 22.7 MMOL/L (ref 22–29)
CREAT SERPL-MCNC: 8.08 MG/DL (ref 0.76–1.27)
EGFRCR SERPLBLD CKD-EPI 2021: 7.1 ML/MIN/1.73
GLOBULIN UR ELPH-MCNC: 4.3 GM/DL
GLUCOSE SERPL-MCNC: 115 MG/DL (ref 65–99)
HAV IGM SERPL QL IA: ABNORMAL
HBV CORE IGM SERPL QL IA: ABNORMAL
HBV SURFACE AG SERPL QL IA: ABNORMAL
HCV AB SER DONR QL: REACTIVE
INR PPP: 1.65 (ref 0.86–1.15)
IRON 24H UR-MRATE: 26 MCG/DL (ref 59–158)
IRON SATN MFR SERPL: 19 % (ref 20–50)
POTASSIUM SERPL-SCNC: 4.5 MMOL/L (ref 3.5–5.2)
PROT SERPL-MCNC: 6.5 G/DL (ref 6–8.5)
PROTHROMBIN TIME: 19.5 SECONDS (ref 11.8–14.9)
RH BLD: POSITIVE
RH BLD: POSITIVE
SODIUM SERPL-SCNC: 126 MMOL/L (ref 136–145)
TIBC SERPL-MCNC: 134 MCG/DL (ref 298–536)
TRANSFERRIN SERPL-MCNC: 90 MG/DL (ref 200–360)

## 2023-03-01 PROCEDURE — 77001 FLUOROGUIDE FOR VEIN DEVICE: CPT | Performed by: SURGERY

## 2023-03-01 PROCEDURE — P9059 PLASMA, FRZ BETWEEN 8-24HOUR: HCPCS

## 2023-03-01 PROCEDURE — 76937 US GUIDE VASCULAR ACCESS: CPT | Performed by: SURGERY

## 2023-03-01 PROCEDURE — 86901 BLOOD TYPING SEROLOGIC RH(D): CPT | Performed by: SURGERY

## 2023-03-01 PROCEDURE — 85610 PROTHROMBIN TIME: CPT | Performed by: INTERNAL MEDICINE

## 2023-03-01 PROCEDURE — 71045 X-RAY EXAM CHEST 1 VIEW: CPT

## 2023-03-01 PROCEDURE — G0257 UNSCHED DIALYSIS ESRD PT HOS: HCPCS

## 2023-03-01 PROCEDURE — 84466 ASSAY OF TRANSFERRIN: CPT | Performed by: INTERNAL MEDICINE

## 2023-03-01 PROCEDURE — C1750 CATH, HEMODIALYSIS,LONG-TERM: HCPCS | Performed by: SURGERY

## 2023-03-01 PROCEDURE — 80074 ACUTE HEPATITIS PANEL: CPT | Performed by: INTERNAL MEDICINE

## 2023-03-01 PROCEDURE — 83540 ASSAY OF IRON: CPT | Performed by: INTERNAL MEDICINE

## 2023-03-01 PROCEDURE — C1769 GUIDE WIRE: HCPCS | Performed by: SURGERY

## 2023-03-01 PROCEDURE — C1894 INTRO/SHEATH, NON-LASER: HCPCS | Performed by: SURGERY

## 2023-03-01 PROCEDURE — 86900 BLOOD TYPING SEROLOGIC ABO: CPT | Performed by: SURGERY

## 2023-03-01 PROCEDURE — 99152 MOD SED SAME PHYS/QHP 5/>YRS: CPT | Performed by: SURGERY

## 2023-03-01 PROCEDURE — 36558 INSERT TUNNELED CV CATH: CPT | Performed by: SURGERY

## 2023-03-01 PROCEDURE — 25010000002 MIDAZOLAM PER 1 MG: Performed by: SURGERY

## 2023-03-01 PROCEDURE — 80053 COMPREHEN METABOLIC PANEL: CPT | Performed by: INTERNAL MEDICINE

## 2023-03-01 PROCEDURE — 86927 PLASMA FRESH FROZEN: CPT

## 2023-03-01 PROCEDURE — 36430 TRANSFUSION BLD/BLD COMPNT: CPT

## 2023-03-01 PROCEDURE — 99153 MOD SED SAME PHYS/QHP EA: CPT | Performed by: SURGERY

## 2023-03-01 PROCEDURE — G0378 HOSPITAL OBSERVATION PER HR: HCPCS

## 2023-03-01 PROCEDURE — 25010000002 CEFAZOLIN PER 500 MG: Performed by: SURGERY

## 2023-03-01 PROCEDURE — 25010000002 FENTANYL CITRATE (PF) 50 MCG/ML SOLUTION: Performed by: SURGERY

## 2023-03-01 RX ORDER — LIDOCAINE HYDROCHLORIDE 20 MG/ML
INJECTION, SOLUTION INFILTRATION; PERINEURAL
Status: DISCONTINUED | OUTPATIENT
Start: 2023-03-01 | End: 2023-03-01 | Stop reason: HOSPADM

## 2023-03-01 RX ORDER — MIDAZOLAM HYDROCHLORIDE 1 MG/ML
INJECTION INTRAMUSCULAR; INTRAVENOUS
Status: DISCONTINUED | OUTPATIENT
Start: 2023-03-01 | End: 2023-03-01 | Stop reason: HOSPADM

## 2023-03-01 RX ORDER — BUPIVACAINE HCL/0.9 % NACL/PF 0.1 %
2 PLASTIC BAG, INJECTION (ML) EPIDURAL ONCE
Status: COMPLETED | OUTPATIENT
Start: 2023-03-01 | End: 2023-03-01

## 2023-03-01 RX ORDER — FENTANYL CITRATE 50 UG/ML
INJECTION, SOLUTION INTRAMUSCULAR; INTRAVENOUS
Status: DISCONTINUED | OUTPATIENT
Start: 2023-03-01 | End: 2023-03-01 | Stop reason: HOSPADM

## 2023-03-01 RX ADMIN — Medication 2 G: at 10:09

## 2023-03-01 RX ADMIN — Medication 1 PATCH: at 12:57

## 2023-03-01 NOTE — NURSING NOTE
Pt educated against leaving AMA. Pt refused second and third bag of FFP. Educated pt on risk of bleeding with INR of 1.65. Notified 's NP of pt leaving AMA.   Tessa Casiano RN

## 2023-03-01 NOTE — CASE MANAGEMENT/SOCIAL WORK
Discharge Planning Assessment  [facility]     Patient Name: Chevy Junior  MRN: [id number]  Today's Date: 3/1/2023    Admit Date: 2/28/2023    Plan: Patient with a history of ESRD on peritoneal dialysis. RNCM met with patient at bedside, facesheet reviewed, RN CM role and discharge planning discussed. Patient lives at home with his significant other, does report he needs help with ADL's. Patient reports his children also are of help. Patient does use a wheelchair and os usually a 1 assist for most task, significant other handles shopping, cleaning, cooking, nd total homecare. Patient will need HD outpatient diaylsys set up, SW chava aware. RNCM will follow   Discharge Needs Assessment     Row Name 03/01/23 1139       Discharge Needs Assessment    Equipment Currently Used at Home grab bar;shower chair    Row Name 03/01/23 1129       Living Environment    People in Home significant other    Current Living Arrangements home    Primary Care Provided by self    Provides Primary Care For no one, unable/limited ability to care for self    Family Caregiver if Needed significant other    Quality of Family Relationships helpful;involved;supportive    Able to Return to Prior Arrangements yes       Resource/Environmental Concerns    Resource/Environmental Concerns none    Transportation Concerns none       Food Insecurity    Within the past 12 months, you worried that your food would run out before you got the money to buy more. Never true    Within the past 12 months, the food you bought just didn't last and you didn't have money to get more. Never true       Transition Planning    Patient/Family Anticipates Transition to home with family    Patient/Family Anticipated Services at Transition ;other (see comments)    Transportation Anticipated family or friend will provide       Discharge Needs Assessment    Readmission Within the Last 30 Days no previous admission in last 30 days    Equipment Currently Used at  Home bath bench;shower chair;wheelchair;ramp    Concerns to be Addressed discharge planning    Anticipated Changes Related to Illness none    Equipment Needed After Discharge none    Provided Post Acute Provider List? N/A    Provided Post Acute Provider Quality & Resource List? N/A               Discharge Plan     Row Name 03/01/23 1136       Plan    Plan Patient with a history of ESRD on peritoneal dialysis. RNCM met with patient at bedside, facesheet reviewed, RN CM role and discharge planning discussed. Patient lives at home with his significant other, does report he needs help with ADL's. Patient reports his children also are of help. Patient does use a wheelchair and os usually a 1 assist for most task, significant other handles shopping, cleaning, cooking, nd total homecare. Patient will need HD outpatient diaylsys set up, SW chava aware. RNCM will follow    Final Discharge Disposition Code 01 - home or self-care              Continued Care and Services - Admitted Since 2/28/2023    Coordination has not been started for this encounter.       Expected Discharge Date and Time     Expected Discharge Date Expected Discharge Time    Mar 1, 2023          Demographic Summary     Row Name 03/01/23 1125       General Information    Admission Type observation    Arrived From home;emergency department    Referral Source admission list    Reason for Consult discharge planning    Preferred Language English       Contact Information    Permission Granted to Share Info With family/designee    Contact Information Obtained for                Functional Status     Row Name 03/01/23 1125       Functional Status    Usual Activity Tolerance fair    Current Activity Tolerance poor       Assessment of Health Literacy    How often do you have someone help you read hospital materials? Occasionally    How often do you have problems learning about your medical condition because of difficulty understanding written information?  Never    How often do you have a problem understanding what is told to you about your medical condition? Occasionally    How confident are you filling out medical forms by yourself? Quite a bit    Health Literacy Moderate       Functional Status, IADL    Medications independent;assistive person    Meal Preparation assistive person    Housekeeping assistive person    Laundry assistive person    Shopping assistive person       Mental Status    General Appearance WDL WDL       Mental Status Summary    Recent Changes in Mental Status/Cognitive Functioning no changes               Psychosocial    No documentation.                Abuse/Neglect    No documentation.                Legal    No documentation.                Substance Abuse    No documentation.                Patient Forms    No documentation.                   Caitlin Tavarez RN

## 2023-03-01 NOTE — NURSING NOTE
"Treatment terminated by pt ama with 1:46 remaining, stating his \"back hurts\" but denies the need for pain med. 700 ml uf, last bp 148/94 hr 108. Dr. Villasenor notified.   "

## 2023-03-01 NOTE — DISCHARGE SUMMARY
[facility]   DISCHARGE SUMMARY    Patient Name: Chevy Junior  : 1965  MRN: [id number]    Date of Admission: 2023  Date of Discharge: 3/1/2023  Primary Care Physician: Janeth Le APRN    Consults     Date and Time Order Name Status Description    2023 11:52 AM Inpatient Vascular Surgery Consult Completed           Hospital Course     Presenting Problem:   Abdominal pain [R10.9]    Active and resolved problems  Principal Problem:    Abdominal pain  Overview:  Active Problems:    Coronary artery disease involving native coronary artery of native heart with angina pectoris (HCC)  Overview:    ESRD on peritoneal dialysis (HCC)  Overview:    Failure to thrive (child)  Overview:  Resolved Problems:    * No resolved hospital problems. *       Hospital Course:  Chevy Junior is a 57 y.o. male With past medical history significant for ESRD on peritoneal dialysis has lost significant amount of weight and the details are given H&P.  Patient had CT of the abdomen pelvis with IV and p.o. contrast which showed ileus and it has improved today.  Patient is being switched to hemodialysis to see how he does as on peritoneal dialysis he is not doing well.  We will keep the peritoneal dialysis catheter in for next 3 to 4 weeks and see if patient does better on hemodialysis then he will be permanently switched to hemodialysis however if it does not make a difference then we might go back on peritoneal dialysis  Patient will get first dialysis treatment in the hospital      Vital Signs:  Temp:  [97.5 °F (36.4 °C)-98.8 °F (37.1 °C)] 97.5 °F (36.4 °C)  Heart Rate:  [101-113] 101  Resp:  [16-18] 16  BP: (146-171)/(85-94) 171/94  Flow (L/min):  [3] 3      Discharge Details        Discharge Medications      Continue These Medications      Instructions Start Date   albuterol sulfate  (90 Base) MCG/ACT inhaler  Commonly known as: PROVENTIL HFA;VENTOLIN HFA;PROAIR HFA   1 puff, Inhalation, Every 4  Hours PRN      amLODIPine 5 MG tablet  Commonly known as: NORVASC   5 mg, Oral, 2 Times Daily      aspirin 81 MG EC tablet   81 mg, Oral, Daily      atorvastatin 40 MG tablet  Commonly known as: LIPITOR   40 mg, Oral, Daily      AURYXIA PO   Oral, 2 tablets twice a day      BASAGLAR KWIKPEN 100 UNIT/ML injection pen   30-34 Units, Subcutaneous, Daily      buprenorphine-naloxone 8-2 MG per SL tablet  Commonly known as: SUBOXONE   2 tablets, Sublingual, Daily      calcitriol 0.5 MCG capsule  Commonly known as: ROCALTROL   0.5 mcg, Oral, Daily      cinacalcet 30 MG tablet  Commonly known as: SENSIPAR   30 mg, Oral, Daily      clopidogrel 75 MG tablet  Commonly known as: PLAVIX   75 mg, Oral, Daily      Diclofenac Sodium 1 % gel gel  Commonly known as: VOLTAREN   2 g, Topical, 4 Times Daily PRN       MG capsule  Generic drug: docusate sodium   100 mg, Oral, Daily      furosemide 80 MG tablet  Commonly known as: LASIX   80 mg, Oral, 2 Times Daily      heparin (porcine) 1000 UNIT/ML injection   1,000 Units, Intravenous, Nightly      hydrALAZINE 100 MG tablet  Commonly known as: APRESOLINE   100 mg, Oral, 3 Times Daily      IRON SUCROSE IV   Intravenous, Every 14 Days, Iron infusion every 2 weeks       isosorbide mononitrate 60 MG 24 hr tablet  Commonly known as: IMDUR   60 mg, Oral, Daily      LACTULOSE PO   Oral, As Needed      lanthanum 1000 MG chewable tablet  Commonly known as: FOSRENOL   1,000 mg, Oral, 3 Times Daily With Meals      levothyroxine 50 MCG tablet  Commonly known as: SYNTHROID, LEVOTHROID   50 mcg, Oral, Daily      metoclopramide 5 MG tablet  Commonly known as: REGLAN   5 mg, Oral, Nightly      metoprolol tartrate 100 MG tablet  Commonly known as: LOPRESSOR   100 mg, Oral, 2 Times Daily      minocycline 100 MG capsule  Commonly known as: MINOCIN,DYNACIN   100 mg, Oral, Daily      nitroglycerin 0.4 MG SL tablet  Commonly known as: NITROSTAT   0.4 mg, Sublingual, Every 5 Minutes PRN      ondansetron  ODT 4 MG disintegrating tablet  Commonly known as: ZOFRAN-ODT   4 mg, Translingual, Every 6 Hours PRN      pantoprazole 40 MG EC tablet  Commonly known as: Protonix   40 mg, Oral, Daily      polyethylene glycol 17 GM/SCOOP powder  Commonly known as: MIRALAX   Take 17 g by mouth Daily As Needed.      potassium chloride 10 MEQ CR tablet  Commonly known as: K-DUR,KLOR-CON   10 mEq, Oral, Daily      sertraline 100 MG tablet  Commonly known as: ZOLOFT   100 mg, Oral, Daily      sucralfate 1 g tablet  Commonly known as: Carafate   1 g, Oral, 4 Times Daily      tamsulosin 0.4 MG capsule 24 hr capsule  Commonly known as: FLOMAX   1 capsule, Oral, Daily      valsartan 320 MG tablet  Commonly known as: DIOVAN   320 mg, Oral, Daily      Veltassa 8.4 g pack  Generic drug: Patiromer Sorbitex Calcium   8.4 g, Oral, 3 Times Weekly             No Known Allergies      Discharge Disposition:  Home or Self Care    Diet:        Discharge Activity:         CODE STATUS:    Code Status and Medical Interventions:   Ordered at: 02/28/23 1317     Code Status (Patient has no pulse and is not breathing):    CPR (Attempt to Resuscitate)     Medical Interventions (Patient has pulse or is breathing):    Full Support     Release to patient:    Routine Release         Future Appointments   Date Time Provider Department Center   4/20/2023  1:15 PM Feng Nielsen APRN [facility]           Time spent on Discharge including face to face service:  35 minutes    Electronically signed by Nikita Villasenor MD, 03/01/23, 10:52 AM EST.

## 2023-03-01 NOTE — SIGNIFICANT NOTE
03/01/23 1425   Plan   Plan MD notified ANDRA that chair time for hemo dialysis does not need to be arranged at this time. [facility] ANDRA confirmed that patient has an appointment with them on Friday at 11:00AM. ANDRA notified RN and RN to notify family.   Final Discharge Disposition Code 01 - home or self-care

## 2023-03-01 NOTE — PROGRESS NOTES
Tunneled dialysis catheter placement performed.  Catheter is okay for use.  Please transfuse FFP's x3.

## 2023-03-02 ENCOUNTER — READMISSION MANAGEMENT (OUTPATIENT)
Dept: CALL CENTER | Facility: HOSPITAL | Age: 58
End: 2023-03-02
Payer: MEDICAID

## 2023-03-02 NOTE — OUTREACH NOTE
Prep Survey    Flowsheet Row Responses   [facility] facility patient discharged from? [facility]   Is LACE score < 7 ? No   Eligibility Readm Mgmt   Discharge diagnosis Failure to thrive    Does the patient have one of the following disease processes/diagnoses(primary or secondary)? Other   Does the patient have Home health ordered? No   Is there a DME ordered? No   Prep survey completed? Yes          ZACKERY DODSON - Registered Nurse

## 2023-03-03 LAB
BH BB BLOOD EXPIRATION DATE: NORMAL
BH BB BLOOD EXPIRATION DATE: NORMAL
BH BB BLOOD TYPE BARCODE: 5100
BH BB BLOOD TYPE BARCODE: 9500
BH BB DISPENSE STATUS: NORMAL
BH BB DISPENSE STATUS: NORMAL
BH BB PRODUCT CODE: NORMAL
BH BB PRODUCT CODE: NORMAL
BH BB UNIT NUMBER: NORMAL
BH BB UNIT NUMBER: NORMAL
UNIT  ABO: NORMAL
UNIT  ABO: NORMAL
UNIT  RH: NORMAL
UNIT  RH: NORMAL

## 2023-03-21 ENCOUNTER — READMISSION MANAGEMENT (OUTPATIENT)
Dept: CALL CENTER | Facility: HOSPITAL | Age: 58
End: 2023-03-21
Payer: MEDICAID

## 2023-03-21 NOTE — OUTREACH NOTE
Medical Week 3 Survey    Flowsheet Row Responses   [facility] patient discharged from? [facility]   Does the patient have one of the following disease processes/diagnoses(primary or secondary)? Other   Week 3 attempt successful? Yes   Call start time 1117   Call end time 1118   Is patient permission given to speak with other caregiver? Yes   List who call center can speak with BRENDA FNAG   Person spoke with today (if not patient) and relationship BRENDA FANG- WIFE   Is the patient taking all medications as directed (includes completed medication regime)? Yes   Does the patient have a primary care provider?  Yes   Has the patient kept scheduled appointments due by today? Yes   What is the patient's perception of their health status since discharge? Improving   Week 3 Call Completed? Yes   Revoked No further contact(revokes)-requires comment   Graduated/Revoked comments WIFE KEPT CALL VERY BRIEF, STATING HE IS DOING WELL AND NO NEED FOR THESE CALLS.           Whitney MARIA - Licensed Nurse

## 2023-03-28 DIAGNOSIS — I25.119 CORONARY ARTERY DISEASE INVOLVING NATIVE CORONARY ARTERY OF NATIVE HEART WITH ANGINA PECTORIS: ICD-10-CM

## 2023-03-28 RX ORDER — PANTOPRAZOLE SODIUM 40 MG/1
40 TABLET, DELAYED RELEASE ORAL DAILY
Qty: 30 TABLET | Refills: 1 | Status: SHIPPED | OUTPATIENT
Start: 2023-03-28 | End: 2024-03-27

## 2023-03-28 RX ORDER — CLOPIDOGREL BISULFATE 75 MG/1
75 TABLET ORAL DAILY
Qty: 30 TABLET | Refills: 11 | Status: SHIPPED | OUTPATIENT
Start: 2023-03-28

## 2023-04-03 ENCOUNTER — TRANSCRIBE ORDERS (OUTPATIENT)
Dept: VASCULAR SURGERY | Facility: HOSPITAL | Age: 58
End: 2023-04-03
Payer: MEDICAID

## 2023-04-03 DIAGNOSIS — I65.23 BILATERAL CAROTID ARTERY OCCLUSION: Primary | ICD-10-CM

## 2023-04-10 DIAGNOSIS — I25.119 CORONARY ARTERY DISEASE INVOLVING NATIVE CORONARY ARTERY OF NATIVE HEART WITH ANGINA PECTORIS: ICD-10-CM

## 2023-04-10 RX ORDER — CLOPIDOGREL BISULFATE 75 MG/1
75 TABLET ORAL DAILY
Qty: 30 TABLET | Refills: 11 | Status: SHIPPED | OUTPATIENT
Start: 2023-04-10

## 2023-04-11 RX ORDER — PANTOPRAZOLE SODIUM 40 MG/1
40 TABLET, DELAYED RELEASE ORAL DAILY
Qty: 30 TABLET | Refills: 1 | Status: SHIPPED | OUTPATIENT
Start: 2023-04-11 | End: 2024-04-10

## 2023-05-09 ENCOUNTER — HOSPITAL ENCOUNTER (OUTPATIENT)
Dept: CARDIOLOGY | Facility: HOSPITAL | Age: 58
Discharge: HOME | End: 2023-05-09
Payer: MEDICAID

## 2023-05-09 ENCOUNTER — OFFICE VISIT (OUTPATIENT)
Dept: VASCULAR SURGERY | Facility: HOSPITAL | Age: 58
End: 2023-05-09
Payer: MEDICAID

## 2023-05-09 VITALS
DIASTOLIC BLOOD PRESSURE: 82 MMHG | TEMPERATURE: 97.4 F | HEART RATE: 54 BPM | RESPIRATION RATE: 18 BRPM | OXYGEN SATURATION: 98 % | SYSTOLIC BLOOD PRESSURE: 141 MMHG

## 2023-05-09 DIAGNOSIS — I65.23 BILATERAL CAROTID ARTERY OCCLUSION: ICD-10-CM

## 2023-05-09 DIAGNOSIS — I65.23 BILATERAL CAROTID ARTERY STENOSIS: Primary | ICD-10-CM

## 2023-05-09 LAB
BH CV XLRA MEAS LEFT CAROTID BULB EDV: 17 CM/SEC
BH CV XLRA MEAS LEFT CAROTID BULB PSV: 47 CM/SEC
BH CV XLRA MEAS LEFT DIST CCA EDV: 15.9 CM/SEC
BH CV XLRA MEAS LEFT DIST CCA PSV: 50.5 CM/SEC
BH CV XLRA MEAS LEFT DIST ICA EDV: 27.3 CM/SEC
BH CV XLRA MEAS LEFT DIST ICA PSV: 71.5 CM/SEC
BH CV XLRA MEAS LEFT ICA/CCA RATIO: -1.33
BH CV XLRA MEAS LEFT MID ICA EDV: -27.3 CM/SEC
BH CV XLRA MEAS LEFT MID ICA PSV: -56 CM/SEC
BH CV XLRA MEAS LEFT PROX CCA EDV: -13.2 CM/SEC
BH CV XLRA MEAS LEFT PROX CCA PSV: -61.5 CM/SEC
BH CV XLRA MEAS LEFT PROX ECA EDV: -7 CM/SEC
BH CV XLRA MEAS LEFT PROX ECA PSV: -91.1 CM/SEC
BH CV XLRA MEAS LEFT PROX ICA EDV: -28.7 CM/SEC
BH CV XLRA MEAS LEFT PROX ICA PSV: -67.3 CM/SEC
BH CV XLRA MEAS LEFT VERTEBRAL A EDV: 25.9 CM/SEC
BH CV XLRA MEAS LEFT VERTEBRAL A PSV: 60.2 CM/SEC
BH CV XLRA MEAS RIGHT CAROTID BULB EDV: 21 CM/SEC
BH CV XLRA MEAS RIGHT CAROTID BULB PSV: 69 CM/SEC
BH CV XLRA MEAS RIGHT DIST CCA EDV: 16.5 CM/SEC
BH CV XLRA MEAS RIGHT DIST CCA PSV: 60.9 CM/SEC
BH CV XLRA MEAS RIGHT DIST ICA EDV: -29.6 CM/SEC
BH CV XLRA MEAS RIGHT DIST ICA PSV: -65.9 CM/SEC
BH CV XLRA MEAS RIGHT ICA/CCA RATIO: -1.06
BH CV XLRA MEAS RIGHT MID ICA EDV: -20.3 CM/SEC
BH CV XLRA MEAS RIGHT MID ICA PSV: -50.5 CM/SEC
BH CV XLRA MEAS RIGHT PROX CCA EDV: 19.3 CM/SEC
BH CV XLRA MEAS RIGHT PROX CCA PSV: 57.8 CM/SEC
BH CV XLRA MEAS RIGHT PROX ECA EDV: -10.4 CM/SEC
BH CV XLRA MEAS RIGHT PROX ECA PSV: -81.8 CM/SEC
BH CV XLRA MEAS RIGHT PROX ICA EDV: -15.4 CM/SEC
BH CV XLRA MEAS RIGHT PROX ICA PSV: -64.8 CM/SEC
BH CV XLRA MEAS RIGHT VERTEBRAL A EDV: 12.1 CM/SEC
BH CV XLRA MEAS RIGHT VERTEBRAL A PSV: 45 CM/SEC
LEFT ARM BP: NORMAL MMHG
MAXIMAL PREDICTED HEART RATE: 162 BPM
RIGHT ARM BP: NORMAL MMHG
STRESS TARGET HR: 138 BPM

## 2023-05-09 PROCEDURE — 93880 EXTRACRANIAL BILAT STUDY: CPT

## 2023-05-09 RX ORDER — HYDROCODONE BITARTRATE AND ACETAMINOPHEN 10; 325 MG/1; MG/1
1 TABLET ORAL EVERY 6 HOURS PRN
COMMUNITY

## 2023-05-09 NOTE — PROGRESS NOTES
[facility] Vascular Surgery Office Follow Up Note     Date of Encounter: 05/09/2023     MRN Number: [id number]  Name: Chevy Junior  Phone Number: [phone]     Referred By: Karey Carballo APRN  PCP: Janeth Le APRN    Chief Complaint:    Chief Complaint   Patient presents with   • Carotid Artery Disease     PATIENT IS HERE AS AN ANNUAL FOLLOW UP WITH CAROTID STUDIES TODAY.        Subjective      History of Present Illness:    Chevy Junior is a 57 y.o. male presents for scheduled annual follow-up for carotid stenosis.  He denies any current signs/symptoms to suggest CVA, TIA or amorous fugax.  He has an extensive medical history, he has a right AKA, compression fractures and chronic diabetes.  He had a TDC placed by Dr. Phillips.  He uses electric wheelchair for mobility.  He is a everyday smoker smokes approximately 1 to 1/2 pack/day.     Review of Systems:  ROS  Review of Systems   Constitutional: Negative.   HENT: Negative.    Cardiovascular: Negative.    Respiratory: Negative.    Skin: Negative.    Musculoskeletal:  Back and neck pain.    Gastrointestinal: Negative.    Neurological: Negative.    Psychiatric/Behavioral: Negative.      I have reviewed the following portions of the patient's history: allergies, current medications, past family history, past medical history, past social history, past surgical history and problem list and confirm it's accurate.    Allergies:  No Known Allergies    Medications:      Current Outpatient Medications:   •  albuterol sulfate  (90 Base) MCG/ACT inhaler, Inhale 1 puff Every 4 (Four) Hours As Needed for Wheezing or Shortness of Air., Disp: , Rfl:   •  amLODIPine (NORVASC) 5 MG tablet, Take 1 tablet by mouth 2 (Two) Times a Day., Disp: 180 tablet, Rfl: 3  •  aspirin (aspirin) 81 MG EC tablet, Take 1 tablet by mouth Daily., Disp: 30 tablet, Rfl: 8  •  atorvastatin (LIPITOR) 40 MG tablet, Take 1 tablet by mouth Daily., Disp: 90 tablet,  Rfl:   •  calcitriol (ROCALTROL) 0.5 MCG capsule, Take 1 capsule by mouth Daily., Disp: , Rfl:   •  cinacalcet (SENSIPAR) 30 MG tablet, Take 1 tablet by mouth Daily., Disp: , Rfl:   •  clopidogrel (PLAVIX) 75 MG tablet, TAKE 1 TABLET BY MOUTH DAILY., Disp: 30 tablet, Rfl: 11  •  Diclofenac Sodium (VOLTAREN) 1 % gel gel, Apply 2 g topically to the appropriate area as directed 4 (Four) Times a Day As Needed., Disp: , Rfl:   •   MG capsule, Take 1 capsule by mouth Daily., Disp: , Rfl:   •  Ferric Citrate (AURYXIA PO), Take  by mouth. 2 tablets twice a day, Disp: , Rfl:   •  furosemide (LASIX) 80 MG tablet, Take 1 tablet by mouth 2 (Two) Times a Day., Disp: , Rfl:   •  Heparin Sodium, Porcine, (heparin, porcine,) 1000 UNIT/ML injection, Infuse 1 mL into a venous catheter Every Night., Disp: , Rfl:   •  hydrALAZINE (APRESOLINE) 100 MG tablet, Take 1 tablet by mouth 3 (Three) Times a Day., Disp: 270 tablet, Rfl: 3  •  HYDROcodone-acetaminophen (NORCO)  MG per tablet, Take 1 tablet by mouth Every 6 (Six) Hours As Needed for Moderate Pain., Disp: , Rfl:   •  Insulin Glargine (BASAGLAR KWIKPEN) 100 UNIT/ML injection pen, Inject 30-34 Units under the skin into the appropriate area as directed Daily., Disp: , Rfl:   •  IRON SUCROSE IV, Infuse  into a venous catheter Every 14 (Fourteen) Days. Iron infusion every 2 weeks, Disp: , Rfl:   •  isosorbide mononitrate (IMDUR) 60 MG 24 hr tablet, Take 1 tablet by mouth Daily., Disp: 90 tablet, Rfl: 1  •  LACTULOSE PO, Take  by mouth As Needed., Disp: , Rfl:   •  lanthanum (FOSRENOL) 1000 MG chewable tablet, Chew 1 tablet 3 (Three) Times a Day With Meals., Disp: , Rfl:   •  levothyroxine (SYNTHROID, LEVOTHROID) 50 MCG tablet, Take 1 tablet by mouth Daily., Disp: , Rfl:   •  metoclopramide (REGLAN) 5 MG tablet, Take 1 tablet by mouth Every Night., Disp: , Rfl:   •  metoprolol tartrate (LOPRESSOR) 100 MG tablet, Take 1 tablet by mouth 2 (Two) Times a Day., Disp: , Rfl:   •   nitroglycerin (NITROSTAT) 0.4 MG SL tablet, Place 1 tablet under the tongue Every 5 (Five) Minutes As Needed for Chest Pain., Disp: 25 tablet, Rfl: 5  •  ondansetron ODT (ZOFRAN-ODT) 4 MG disintegrating tablet, Place 1 tablet on the tongue Every 6 (Six) Hours As Needed for Nausea or Vomiting., Disp: , Rfl:   •  pantoprazole (PROTONIX) 40 MG EC tablet, TAKE 1 TABLET BY MOUTH DAILY, Disp: 30 tablet, Rfl: 1  •  Patiromer Sorbitex Calcium (Veltassa) 8.4 g pack, Take 1 packet by mouth 3 (Three) Times a Week., Disp: , Rfl:   •  polyethylene glycol (MIRALAX) 17 GM/SCOOP powder, Take 17 g by mouth Daily As Needed., Disp: , Rfl:   •  potassium chloride (K-DUR,KLOR-CON) 10 MEQ CR tablet, Take 1 tablet by mouth Daily., Disp: , Rfl:   •  sertraline (ZOLOFT) 100 MG tablet, Take 1 tablet by mouth Daily., Disp: , Rfl:   •  sucralfate (Carafate) 1 g tablet, Take 1 tablet by mouth 4 (Four) Times a Day., Disp: 120 tablet, Rfl: 1  •  tamsulosin (FLOMAX) 0.4 MG capsule 24 hr capsule, Take 1 capsule by mouth Daily., Disp: , Rfl:   •  valsartan (DIOVAN) 320 MG tablet, Take 1 tablet by mouth Daily., Disp: , Rfl:   •  buprenorphine-naloxone (SUBOXONE) 8-2 MG per SL tablet, Place 2 tablets under the tongue Daily. (Patient not taking: Reported on 5/9/2023), Disp: , Rfl:   •  minocycline (MINOCIN,DYNACIN) 100 MG capsule, Take 1 capsule by mouth Daily., Disp: , Rfl:     History:   Past Medical History:   Diagnosis Date   • Anemia    • Chronic kidney disease    • Chronic pain    • COPD (chronic obstructive pulmonary disease)    • Diabetes mellitus    • Hyperlipidemia    • Hypertension    • Infectious viral hepatitis 5 years ago was treated   • RIC (obstructive sleep apnea)    • Osteoarthritis    • Phosphorus metabolic disorder    • Sleep apnea    • Substance abuse 20 years ago from [facility]       Past Surgical History:   Procedure Laterality Date   • ABOVE KNEE LEG AMPUTATION Right    • CARDIAC CATHETERIZATION     • CARDIAC CATHETERIZATION N/A  11/30/2022    Procedure: Percutaneous Coronary Intervention;  Surgeon: River Acevedo IV, MD;  Location: [facility] CATH INVASIVE LOCATION;  Service: Cardiovascular;  Laterality: N/A;   • CARDIAC CATHETERIZATION N/A 11/30/2022    Procedure: Optical Coherent Tomography;  Surgeon: River Acevedo IV, MD;  Location: [facility] CATH INVASIVE LOCATION;  Service: Cardiovascular;  Laterality: N/A;   • CARDIAC CATHETERIZATION N/A 11/30/2022    Procedure: Stent JOSE coronary;  Surgeon: River Acevedo IV, MD;  Location: [facility] CATH INVASIVE LOCATION;  Service: Cardiovascular;  Laterality: N/A;   • CATH LAB PROCEDURE      unable to stent   • COLONOSCOPY N/A 2/15/2023    Procedure: COLONOSCOPY WITH POLYPECTOMY;  Surgeon: David Glass MD;  Location: [facility] ENDOSCOPY;  Service: Gastroenterology;  Laterality: N/A;  COLON POLYP, MELANOSIS COLI   • ENDOSCOPY N/A 2/3/2023    Procedure: ESOPHAGOGASTRODUODENOSCOPY with biopsy;  Surgeon: Gus Whaley MD;  Location: [facility] ENDOSCOPY;  Service: General;  Laterality: N/A;  gastritis,    • HIP SURGERY     • INSERTION PERITONEAL DIALYSIS CATHETER     • LEG SURGERY     • MANDIBLE FRACTURE SURGERY     • REPLACEMENT TOTAL KNEE      x3   • SHOULDER SURGERY         Social History     Socioeconomic History   • Marital status:    Tobacco Use   • Smoking status: Every Day     Packs/day: 1.50     Years: 40.00     Pack years: 60.00     Types: Cigarettes   • Smokeless tobacco: Never   Vaping Use   • Vaping Use: Never used   Substance and Sexual Activity   • Alcohol use: Not Currently     Comment: None   • Drug use: Not Currently     Types: Hydrocodone     Comment: pain pills from previous injuries   • Sexual activity: Not Currently     Partners: Female        Family History   Problem Relation Age of Onset   • Hypertension Mother    • Arthritis Mother    • Diabetes Father        Objective     Physical Exam:  Vitals:    05/09/23 1503 05/09/23 1504   BP: 141/87  141/82   BP Location: Right arm Left arm   Patient Position: Lying Sitting   Cuff Size: Large Adult Large Adult   Pulse: 54    Resp: 18    Temp: 97.4 °F (36.3 °C)    TempSrc: Temporal    SpO2: 98%    PainSc: 0-No pain       There is no height or weight on file to calculate BMI.    Physical Exam  Physical Exam  Constitutional:       Appearance: Normal appearance.   HENT:      Head: Normocephalic.   Cardiovascular:      Rate and Rhythm: Normal rate.      Pulses: Normal pulses.   Pulmonary:      Effort: Pulmonary effort is normal.   Musculoskeletal:         General: Normal range of motion.      Cervical back: Normal range of motion.   Skin:     General: Skin is warm and dry.      Capillary Refill: Capillary refill takes less than 2 seconds.      Comments:   Neurological:      General: No focal deficit present.      Mental Status: Alert and oriented to person, place, and time.   Psychiatric:         Mood and Affect: Mood normal.         Behavior: Behavior normal.  Imaging/Labs:  I have reviewed the preliminary results of the carotid duplex performed today, 5/9/2023.  The duplex reveals no significant changes when compared to the previous study performed in April 2022.  In 2022 it was recommended to repeat the carotid duplex in 1 year due to the dense amount of plaque noted.        Assessment / Plan      Assessment / Plan:  Diagnoses and all orders for this visit:    1. Bilateral carotid artery stenosis (Primary)     Mr. Junior has mild asymptomatic carotid stenosis, the duplex today does not replicate the velocities shown in the previous duplex that in April 2022.  He was originally referred by his ophthalmologist.  I recommend a follow-up in 1 year with a repeat carotid duplex.    Thank you for allowing me to participate in your patient's care.    Patient Education: Smoking cessation declined        Follow Up:   Return for Annual Carotid.   Or sooner for any further concerns or worsening sign and symptoms. If unable to  reach us in the office please dial 911 or go to the nearest emergency department.      Karey Carballo APRERIKA  [facility] Vascular Surgery

## 2023-10-18 ENCOUNTER — OFFICE VISIT (OUTPATIENT)
Dept: VASCULAR SURGERY | Facility: HOSPITAL | Age: 58
End: 2023-10-18
Payer: MEDICAID

## 2023-10-18 VITALS
TEMPERATURE: 98.2 F | SYSTOLIC BLOOD PRESSURE: 110 MMHG | RESPIRATION RATE: 18 BRPM | DIASTOLIC BLOOD PRESSURE: 70 MMHG | OXYGEN SATURATION: 98 % | HEART RATE: 60 BPM

## 2023-10-18 DIAGNOSIS — I70.269 ATHEROSCLEROSIS OF NATIVE ARTERY OF LOWER EXTREMITY WITH GANGRENE, UNSPECIFIED LATERALITY: Primary | ICD-10-CM

## 2023-10-18 RX ORDER — CARVEDILOL 6.25 MG/1
6.25 TABLET ORAL 2 TIMES DAILY WITH MEALS
COMMUNITY

## 2023-10-18 RX ORDER — OXYCODONE HYDROCHLORIDE 10 MG/1
20 TABLET ORAL EVERY 4 HOURS PRN
COMMUNITY

## 2023-10-18 RX ORDER — GABAPENTIN 800 MG/1
800 TABLET ORAL 3 TIMES DAILY
COMMUNITY

## 2023-10-18 RX ORDER — AMIODARONE HYDROCHLORIDE 200 MG/1
200 TABLET ORAL DAILY
COMMUNITY

## 2023-10-18 NOTE — PROGRESS NOTES
[facility]   Follow up Office    Patient Name: Chevy Junior  : 1965  MRN: [id number]  Primary Care Physician:  Janeth Le APRN      Subjective   Subjective     HPI:    Chevy Junior is a 58 y.o. male in end-stage renal disease undergoing hemodialysis every .  He was recently very sick and in the hospital with sepsis and intubated on pressors.  He developed gangrenous changes of the left fifth toe.  He comes to see us for further evaluation of his toe.  He has previously undergone a right above-knee amputation.      Objective     Vitals:   Temp:  [98.2 °F (36.8 °C)] 98.2 °F (36.8 °C)  Heart Rate:  [60] 60  Resp:  [18] 18  BP: (110)/(70) 110/70    Physical Exam      General: Alert, no acute distress.  HEENT: PERRLA  Abdomen: Benign  Extremities: Status post right AKA, left fifth toe gangrene..  Neuro: No gross deficits   Pulses: Nonpalpable right femoral pulse, nonpalpable left pedal pulse.    Assessment & Plan   Assessment / Plan     Diagnoses and all orders for this visit:    1. Atherosclerosis of native artery of lower extremity with gangrene, unspecified laterality (Primary)  -     CT angio abdominal aorta bilat iliofem runoff w wo contrast; Future       Assessment/Plan:   Mr. Junior has evidence of significant peripheral vascular disease.  I would recommend that we proceed to angiography with possible endovascular intervention however I am unable to palpate his right femoral pulse.  At this time I am recommending that we obtain instead a CTA to better define his status and plan further management.  He will follow-up with me after the above.        Electronically signed by Luis Fernando Rawls MD, 10/18/23, 10:32 AM EDT.

## 2023-10-19 ENCOUNTER — TELEPHONE (OUTPATIENT)
Dept: VASCULAR SURGERY | Facility: HOSPITAL | Age: 58
End: 2023-10-19
Payer: MEDICAID

## 2023-10-19 NOTE — TELEPHONE ENCOUNTER
Caller: BRENDA FANG    Relationship: Emergency Contact    Best call back number: [phone] (home)       What form or medical record are you requesting: TRANSPORTATION REFERRAL     Who is requesting this form or medical record from you: [facility] TRANSPORT[facility]    How would you like to receive the form or medical records (pick-up, mail, fax): FAX  If fax, what is the fax number: UNKNOWN      Timeframe paperwork needed: PRIOR TO 10/26/23    Additional notes:     PT'S WIFE CALLED AND SAID SHE NEEDS A REFERRAL FOR TRANSPORTATION TO BE FAXED TO [facility]SP[facility] SO THE PT HAS TRANSPORTATION TO [facility]PT[facility] ON 10/31 AND 11/01/23. DOCUMENT MUST HAVE THE ADDRESS, DATE AND TIME LISTED FOR BOTH UPCOMING APPTS. CALL PT HOME NUMBER WITH ANY QUESTIONS.

## 2023-10-19 NOTE — TELEPHONE ENCOUNTER
Called patient to respond to inquiry regarding referral to transportation company. This office can print the reminders of the visits and fax to the company. Need fax number.

## 2023-10-31 ENCOUNTER — HOSPITAL ENCOUNTER (OUTPATIENT)
Dept: CT IMAGING | Facility: HOSPITAL | Age: 58
Discharge: HOME | End: 2023-10-31
Admitting: SURGERY
Payer: MEDICAID

## 2023-10-31 DIAGNOSIS — I70.269 ATHEROSCLEROSIS OF NATIVE ARTERY OF LOWER EXTREMITY WITH GANGRENE, UNSPECIFIED LATERALITY: ICD-10-CM

## 2023-10-31 PROCEDURE — 75635 CT ANGIO ABDOMINAL ARTERIES: CPT

## 2023-10-31 PROCEDURE — 25510000001 IOPAMIDOL PER 1 ML: Performed by: SURGERY

## 2023-10-31 RX ADMIN — IOPAMIDOL 100 ML: 755 INJECTION, SOLUTION INTRAVENOUS at 13:19

## 2023-11-01 ENCOUNTER — OFFICE VISIT (OUTPATIENT)
Dept: VASCULAR SURGERY | Facility: HOSPITAL | Age: 58
End: 2023-11-01
Payer: MEDICAID

## 2023-11-01 VITALS
DIASTOLIC BLOOD PRESSURE: 88 MMHG | OXYGEN SATURATION: 98 % | SYSTOLIC BLOOD PRESSURE: 132 MMHG | HEART RATE: 97 BPM | TEMPERATURE: 96.9 F | RESPIRATION RATE: 18 BRPM

## 2023-11-01 DIAGNOSIS — I70.269 ATHEROSCLEROSIS OF NATIVE ARTERY OF LOWER EXTREMITY WITH GANGRENE, UNSPECIFIED LATERALITY: Primary | ICD-10-CM

## 2023-11-01 PROCEDURE — 99214 OFFICE O/P EST MOD 30 MIN: CPT | Performed by: SURGERY

## 2023-11-01 PROCEDURE — 1160F RVW MEDS BY RX/DR IN RCRD: CPT | Performed by: SURGERY

## 2023-11-01 PROCEDURE — G0463 HOSPITAL OUTPT CLINIC VISIT: HCPCS | Performed by: SURGERY

## 2023-11-01 PROCEDURE — 1159F MED LIST DOCD IN RCRD: CPT | Performed by: SURGERY

## 2023-11-01 PROCEDURE — 3079F DIAST BP 80-89 MM HG: CPT | Performed by: SURGERY

## 2023-11-01 PROCEDURE — 3075F SYST BP GE 130 - 139MM HG: CPT | Performed by: SURGERY

## 2023-11-01 RX ORDER — CEFAZOLIN SODIUM 2 G/100ML
2000 INJECTION, SOLUTION INTRAVENOUS ONCE
OUTPATIENT
Start: 2023-11-01 | End: 2023-11-01

## 2023-11-01 NOTE — PROGRESS NOTES
[facility]   Follow up Office    Patient Name: Chevy Junior  : 1965  MRN: [id number]  Primary Care Physician:  Janeth Le APRN      Subjective   Subjective     HPI:    Chevy Junior is a 58 y.o. male here for follow-up after a CTA.  He has left fifth toe gangrene.      Objective     Vitals:   Temp:  [96.9 °F (36.1 °C)] 96.9 °F (36.1 °C)  Heart Rate:  [97] 97  Resp:  [18] 18  BP: (132)/(88) 132/88    Physical Exam      General: Alert, no acute distress.  HEENT: PERRLA  Abdomen: Benign  Extremities: Status post right AKA, left fifth toe dry gangrene.  Neuro: No gross deficits    Diagnostic studies: A CTA dated 10/31/2023 has been reviewed.  The right common femoral artery is patent which was a concern for access.  Severe left SFA disease, left popliteal artery occlusion.  Diffuse calcified vessels.    Assessment & Plan   Assessment / Plan     Diagnoses and all orders for this visit:    1. Atherosclerosis of native artery of lower extremity with gangrene, unspecified laterality (Primary)  -     Case Request; Standing  -     ceFAZolin in dextrose (ANCEF) IVPB solution 2,000 mg  -     Case Request    Other orders  -     Follow Anesthesia Guidelines / Protocol; Future  -     Follow Anesthesia Guidelines / Protocol; Standing  -     Verify NPO Status; Standing  -     Obtain Informed Consent; Standing  -     CBC & Differential; Standing  -     Basic Metabolic Panel; Standing  -     Insert Peripheral IV; Standing  -     Saline Lock & Maintain IV Access; Standing       Assessment/Plan:   Mr. Junior has dry gangrene of the left fifth toe.  Plan angiography with possible endovascular intervention.  I have discussed with the patient in detail the mechanics of the procedure, the indications, benefits, risks, alternatives as well as potential complications to include but not limited to infection, bleeding, inability to cross the occlusion, vascular injury requiring surgical repair.  He appears to  understand and desires to proceed.        Electronically signed by Luis Fernando Rawls MD, 11/01/23, 9:03 AM EDT.

## 2023-11-08 ENCOUNTER — DOCUMENTATION (OUTPATIENT)
Dept: VASCULAR SURGERY | Facility: HOSPITAL | Age: 58
End: 2023-11-08
Payer: MEDICAID

## 2023-11-08 NOTE — H&P (VIEW-ONLY)
[facility]   HISTORY AND PHYSICAL    Patient Name: Chevy Junior  : 1965  MRN: [id number]  Primary Care Physician:  Janeth Le APRN  Date of admission: (Not on file)    Subjective   Subjective     Chief Complaint: Gangrene of left fifth toe    HPI:    Chevy Junior is a 58 y.o. male with gangrene of the left fifth toe.    Review of Systems    Non contributory except for the History of Present Illness    Personal History     Past Medical History:   Diagnosis Date    Anemia     Chronic kidney disease     Chronic pain     COPD (chronic obstructive pulmonary disease)     Diabetes mellitus     Hyperlipidemia     Hypertension     Infectious viral hepatitis 5 years ago was treated    RIC (obstructive sleep apnea)     Osteoarthritis     Phosphorus metabolic disorder     Sleep apnea     Substance abuse 20 years ago from [facility]       Past Surgical History:   Procedure Laterality Date    ABOVE KNEE LEG AMPUTATION Right     CARDIAC CATHETERIZATION      CARDIAC CATHETERIZATION N/A 2022    Procedure: Percutaneous Coronary Intervention;  Surgeon: River Acevedo IV, MD;  Location: [facility] CATH INVASIVE LOCATION;  Service: Cardiovascular;  Laterality: N/A;    CARDIAC CATHETERIZATION N/A 2022    Procedure: Optical Coherent Tomography;  Surgeon: River Acevedo IV, MD;  Location: [facility] CATH INVASIVE LOCATION;  Service: Cardiovascular;  Laterality: N/A;    CARDIAC CATHETERIZATION N/A 2022    Procedure: Stent JOSE coronary;  Surgeon: River Acevedo IV, MD;  Location: [facility] CATH INVASIVE LOCATION;  Service: Cardiovascular;  Laterality: N/A;    CATH LAB PROCEDURE      unable to stent    COLONOSCOPY N/A 2/15/2023    Procedure: COLONOSCOPY WITH POLYPECTOMY;  Surgeon: David Glass MD;  Location: [facility] ENDOSCOPY;  Service: Gastroenterology;  Laterality: N/A;  COLON POLYP, MELANOSIS COLI    ENDOSCOPY N/A 2/3/2023    Procedure: ESOPHAGOGASTRODUODENOSCOPY  with biopsy;  Surgeon: Gus Whaley MD;  Location: [facility] ENDOSCOPY;  Service: General;  Laterality: N/A;  gastritis,     HIP SURGERY      INSERTION PERITONEAL DIALYSIS CATHETER      LEG SURGERY      MANDIBLE FRACTURE SURGERY      REPLACEMENT TOTAL KNEE      x3    SHOULDER SURGERY         Family History: family history includes Arthritis in his mother; Diabetes in his father; Hypertension in his mother. Otherwise pertinent FHx was reviewed and not pertinent to current issue.    Social History:  reports that he has been smoking cigarettes. He has a 80.00 pack-year smoking history. He has never used smokeless tobacco. He reports that he does not currently use alcohol. He reports that he does not currently use drugs after having used the following drugs: Hydrocodone.    Home Medications:  Current Outpatient Medications on File Prior to Visit   Medication Sig    albuterol sulfate  (90 Base) MCG/ACT inhaler Inhale 1 puff Every 4 (Four) Hours As Needed for Wheezing or Shortness of Air.    amiodarone (PACERONE) 200 MG tablet Take 1 tablet by mouth Daily.    amLODIPine (NORVASC) 5 MG tablet Take 1 tablet by mouth 2 (Two) Times a Day.    aspirin (aspirin) 81 MG EC tablet Take 1 tablet by mouth Daily.    atorvastatin (LIPITOR) 40 MG tablet Take 1 tablet by mouth Daily.    buprenorphine-naloxone (SUBOXONE) 8-2 MG per SL tablet Place 2 tablets under the tongue Daily. (Patient not taking: Reported on 5/9/2023)    calcitriol (ROCALTROL) 0.5 MCG capsule Take 1 capsule by mouth Daily.    carvedilol (COREG) 6.25 MG tablet Take 1 tablet by mouth 2 (Two) Times a Day With Meals.    cinacalcet (SENSIPAR) 30 MG tablet Take 1 tablet by mouth Daily.    clopidogrel (PLAVIX) 75 MG tablet TAKE 1 TABLET BY MOUTH DAILY.    Diclofenac Sodium (VOLTAREN) 1 % gel gel Apply 2 g topically to the appropriate area as directed 4 (Four) Times a Day As Needed.     MG capsule Take 1 capsule by mouth Daily.    Ferric Citrate (AURYXIA PO)  Take  by mouth. 2 tablets twice a day    furosemide (LASIX) 80 MG tablet Take 1 tablet by mouth 2 (Two) Times a Day.    gabapentin (NEURONTIN) 800 MG tablet Take 1 tablet by mouth 3 (Three) Times a Day.    Heparin Sodium, Porcine, (heparin, porcine,) 1000 UNIT/ML injection Infuse 1 mL into a venous catheter Every Night.    hydrALAZINE (APRESOLINE) 100 MG tablet Take 1 tablet by mouth 3 (Three) Times a Day.    HYDROcodone-acetaminophen (NORCO)  MG per tablet Take 1 tablet by mouth Every 6 (Six) Hours As Needed for Moderate Pain.    Insulin Glargine (BASAGLAR KWIKPEN) 100 UNIT/ML injection pen Inject 30-34 Units under the skin into the appropriate area as directed Daily.    IRON SUCROSE IV Infuse  into a venous catheter Every 14 (Fourteen) Days. Iron infusion every 2 weeks    isosorbide mononitrate (IMDUR) 60 MG 24 hr tablet Take 1 tablet by mouth Daily.    LACTULOSE PO Take  by mouth As Needed.    lanthanum (FOSRENOL) 1000 MG chewable tablet Chew 1 tablet 3 (Three) Times a Day With Meals.    levothyroxine (SYNTHROID, LEVOTHROID) 50 MCG tablet Take 1 tablet by mouth Daily.    metoclopramide (REGLAN) 5 MG tablet Take 1 tablet by mouth Every Night.    metoprolol tartrate (LOPRESSOR) 100 MG tablet Take 1 tablet by mouth 2 (Two) Times a Day.    minocycline (MINOCIN,DYNACIN) 100 MG capsule Take 1 capsule by mouth Daily.    nitroglycerin (NITROSTAT) 0.4 MG SL tablet Place 1 tablet under the tongue Every 5 (Five) Minutes As Needed for Chest Pain.    ondansetron ODT (ZOFRAN-ODT) 4 MG disintegrating tablet Place 1 tablet on the tongue Every 6 (Six) Hours As Needed for Nausea or Vomiting.    oxyCODONE (ROXICODONE) 10 MG tablet Take 2 tablets by mouth Every 4 (Four) Hours As Needed for Moderate Pain.    pantoprazole (PROTONIX) 40 MG EC tablet TAKE 1 TABLET BY MOUTH DAILY    Patiromer Sorbitex Calcium (Veltassa) 8.4 g pack Take 1 packet by mouth 3 (Three) Times a Week.    polyethylene glycol (MIRALAX) 17 GM/SCOOP powder  Take 17 g by mouth Daily As Needed.    potassium chloride (K-DUR,KLOR-CON) 10 MEQ CR tablet Take 1 tablet by mouth Daily.    sertraline (ZOLOFT) 100 MG tablet Take 1 tablet by mouth Daily.    sucralfate (Carafate) 1 g tablet Take 1 tablet by mouth 4 (Four) Times a Day.    tamsulosin (FLOMAX) 0.4 MG capsule 24 hr capsule Take 1 capsule by mouth Daily.    valsartan (DIOVAN) 320 MG tablet Take 1 tablet by mouth Daily.     No current facility-administered medications on file prior to visit.          Allergies:  No Known Allergies    Objective   Objective     Vitals:        Physical Exam    General: Awake, alert, NAD   Eyes:  YUNIER   Neck: Supple   Lungs: Clear   Heart: RRR   Abdomen: benign   Musculoskeletal:  normal motor tone, symmetric   Skin: warm, normal turgor   Neuro: strength 5/5 all extremities, status post right AKA.   Pulses: Nonpalpable left pedal pulses.    Diagnostic studies:   A CTA dated 10/31/2023 has been reviewed.  The right common femoral artery is patent which was a concern for access.  Severe left SFA disease, left popliteal artery occlusion.  Diffuse calcified vessels.     Assessment & Plan   Assessment / Plan     Active Hospital Problems:  There are no active hospital problems to display for this patient.      There are no diagnoses linked to this encounter.    Assessment/plan:   Mr. Junior has dry gangrene of the left fifth toe.  Plan angiography with possible endovascular intervention.  I have discussed with the patient in detail the mechanics of the procedure, the indications, benefits, risks, alternatives as well as potential complications to include but not limited to infection, bleeding, inability to cross the occlusion, vascular injury requiring surgical repair.  He appears to understand and desires to proceed.       Electronically signed by Luis Fernando Rawls MD, 11/08/23, 1:06 PM EST.

## 2023-11-08 NOTE — PROGRESS NOTES
[facility]   HISTORY AND PHYSICAL    Patient Name: Chevy Junior  : 1965  MRN: [id number]  Primary Care Physician:  Janeth Le APRN  Date of admission: (Not on file)    Subjective   Subjective     Chief Complaint: Gangrene of left fifth toe    HPI:    Chevy Junior is a 58 y.o. male with gangrene of the left fifth toe.    Review of Systems    Non contributory except for the History of Present Illness    Personal History     Past Medical History:   Diagnosis Date    Anemia     Chronic kidney disease     Chronic pain     COPD (chronic obstructive pulmonary disease)     Diabetes mellitus     Hyperlipidemia     Hypertension     Infectious viral hepatitis 5 years ago was treated    RIC (obstructive sleep apnea)     Osteoarthritis     Phosphorus metabolic disorder     Sleep apnea     Substance abuse 20 years ago from [facility]       Past Surgical History:   Procedure Laterality Date    ABOVE KNEE LEG AMPUTATION Right     CARDIAC CATHETERIZATION      CARDIAC CATHETERIZATION N/A 2022    Procedure: Percutaneous Coronary Intervention;  Surgeon: River Acevedo IV, MD;  Location: [facility] CATH INVASIVE LOCATION;  Service: Cardiovascular;  Laterality: N/A;    CARDIAC CATHETERIZATION N/A 2022    Procedure: Optical Coherent Tomography;  Surgeon: River Acevedo IV, MD;  Location: [facility] CATH INVASIVE LOCATION;  Service: Cardiovascular;  Laterality: N/A;    CARDIAC CATHETERIZATION N/A 2022    Procedure: Stent JOSE coronary;  Surgeon: River Acevedo IV, MD;  Location: [facility] CATH INVASIVE LOCATION;  Service: Cardiovascular;  Laterality: N/A;    CATH LAB PROCEDURE      unable to stent    COLONOSCOPY N/A 2/15/2023    Procedure: COLONOSCOPY WITH POLYPECTOMY;  Surgeon: David Glass MD;  Location: [facility] ENDOSCOPY;  Service: Gastroenterology;  Laterality: N/A;  COLON POLYP, MELANOSIS COLI    ENDOSCOPY N/A 2/3/2023    Procedure: ESOPHAGOGASTRODUODENOSCOPY  with biopsy;  Surgeon: Gus Whaley MD;  Location: [facility] ENDOSCOPY;  Service: General;  Laterality: N/A;  gastritis,     HIP SURGERY      INSERTION PERITONEAL DIALYSIS CATHETER      LEG SURGERY      MANDIBLE FRACTURE SURGERY      REPLACEMENT TOTAL KNEE      x3    SHOULDER SURGERY         Family History: family history includes Arthritis in his mother; Diabetes in his father; Hypertension in his mother. Otherwise pertinent FHx was reviewed and not pertinent to current issue.    Social History:  reports that he has been smoking cigarettes. He has a 80.00 pack-year smoking history. He has never used smokeless tobacco. He reports that he does not currently use alcohol. He reports that he does not currently use drugs after having used the following drugs: Hydrocodone.    Home Medications:  Current Outpatient Medications on File Prior to Visit   Medication Sig    albuterol sulfate  (90 Base) MCG/ACT inhaler Inhale 1 puff Every 4 (Four) Hours As Needed for Wheezing or Shortness of Air.    amiodarone (PACERONE) 200 MG tablet Take 1 tablet by mouth Daily.    amLODIPine (NORVASC) 5 MG tablet Take 1 tablet by mouth 2 (Two) Times a Day.    aspirin (aspirin) 81 MG EC tablet Take 1 tablet by mouth Daily.    atorvastatin (LIPITOR) 40 MG tablet Take 1 tablet by mouth Daily.    buprenorphine-naloxone (SUBOXONE) 8-2 MG per SL tablet Place 2 tablets under the tongue Daily. (Patient not taking: Reported on 5/9/2023)    calcitriol (ROCALTROL) 0.5 MCG capsule Take 1 capsule by mouth Daily.    carvedilol (COREG) 6.25 MG tablet Take 1 tablet by mouth 2 (Two) Times a Day With Meals.    cinacalcet (SENSIPAR) 30 MG tablet Take 1 tablet by mouth Daily.    clopidogrel (PLAVIX) 75 MG tablet TAKE 1 TABLET BY MOUTH DAILY.    Diclofenac Sodium (VOLTAREN) 1 % gel gel Apply 2 g topically to the appropriate area as directed 4 (Four) Times a Day As Needed.     MG capsule Take 1 capsule by mouth Daily.    Ferric Citrate (AURYXIA PO)  Take  by mouth. 2 tablets twice a day    furosemide (LASIX) 80 MG tablet Take 1 tablet by mouth 2 (Two) Times a Day.    gabapentin (NEURONTIN) 800 MG tablet Take 1 tablet by mouth 3 (Three) Times a Day.    Heparin Sodium, Porcine, (heparin, porcine,) 1000 UNIT/ML injection Infuse 1 mL into a venous catheter Every Night.    hydrALAZINE (APRESOLINE) 100 MG tablet Take 1 tablet by mouth 3 (Three) Times a Day.    HYDROcodone-acetaminophen (NORCO)  MG per tablet Take 1 tablet by mouth Every 6 (Six) Hours As Needed for Moderate Pain.    Insulin Glargine (BASAGLAR KWIKPEN) 100 UNIT/ML injection pen Inject 30-34 Units under the skin into the appropriate area as directed Daily.    IRON SUCROSE IV Infuse  into a venous catheter Every 14 (Fourteen) Days. Iron infusion every 2 weeks    isosorbide mononitrate (IMDUR) 60 MG 24 hr tablet Take 1 tablet by mouth Daily.    LACTULOSE PO Take  by mouth As Needed.    lanthanum (FOSRENOL) 1000 MG chewable tablet Chew 1 tablet 3 (Three) Times a Day With Meals.    levothyroxine (SYNTHROID, LEVOTHROID) 50 MCG tablet Take 1 tablet by mouth Daily.    metoclopramide (REGLAN) 5 MG tablet Take 1 tablet by mouth Every Night.    metoprolol tartrate (LOPRESSOR) 100 MG tablet Take 1 tablet by mouth 2 (Two) Times a Day.    minocycline (MINOCIN,DYNACIN) 100 MG capsule Take 1 capsule by mouth Daily.    nitroglycerin (NITROSTAT) 0.4 MG SL tablet Place 1 tablet under the tongue Every 5 (Five) Minutes As Needed for Chest Pain.    ondansetron ODT (ZOFRAN-ODT) 4 MG disintegrating tablet Place 1 tablet on the tongue Every 6 (Six) Hours As Needed for Nausea or Vomiting.    oxyCODONE (ROXICODONE) 10 MG tablet Take 2 tablets by mouth Every 4 (Four) Hours As Needed for Moderate Pain.    pantoprazole (PROTONIX) 40 MG EC tablet TAKE 1 TABLET BY MOUTH DAILY    Patiromer Sorbitex Calcium (Veltassa) 8.4 g pack Take 1 packet by mouth 3 (Three) Times a Week.    polyethylene glycol (MIRALAX) 17 GM/SCOOP powder  Take 17 g by mouth Daily As Needed.    potassium chloride (K-DUR,KLOR-CON) 10 MEQ CR tablet Take 1 tablet by mouth Daily.    sertraline (ZOLOFT) 100 MG tablet Take 1 tablet by mouth Daily.    sucralfate (Carafate) 1 g tablet Take 1 tablet by mouth 4 (Four) Times a Day.    tamsulosin (FLOMAX) 0.4 MG capsule 24 hr capsule Take 1 capsule by mouth Daily.    valsartan (DIOVAN) 320 MG tablet Take 1 tablet by mouth Daily.     No current facility-administered medications on file prior to visit.          Allergies:  No Known Allergies    Objective   Objective     Vitals:        Physical Exam    General: Awake, alert, NAD   Eyes:  YUNIER   Neck: Supple   Lungs: Clear   Heart: RRR   Abdomen: benign   Musculoskeletal:  normal motor tone, symmetric   Skin: warm, normal turgor   Neuro: strength 5/5 all extremities, status post right AKA.   Pulses: Nonpalpable left pedal pulses.    Diagnostic studies:   A CTA dated 10/31/2023 has been reviewed.  The right common femoral artery is patent which was a concern for access.  Severe left SFA disease, left popliteal artery occlusion.  Diffuse calcified vessels.     Assessment & Plan   Assessment / Plan     Active Hospital Problems:  There are no active hospital problems to display for this patient.      There are no diagnoses linked to this encounter.    Assessment/plan:   Mr. Junior has dry gangrene of the left fifth toe.  Plan angiography with possible endovascular intervention.  I have discussed with the patient in detail the mechanics of the procedure, the indications, benefits, risks, alternatives as well as potential complications to include but not limited to infection, bleeding, inability to cross the occlusion, vascular injury requiring surgical repair.  He appears to understand and desires to proceed.       Electronically signed by Luis Fernando Rawls MD, 11/08/23, 1:06 PM EST.

## 2023-11-09 ENCOUNTER — HOSPITAL ENCOUNTER (OUTPATIENT)
Facility: HOSPITAL | Age: 58
Setting detail: HOSPITAL OUTPATIENT SURGERY
Discharge: LEFT BEFORE DISCHARGE | End: 2023-11-09
Attending: SURGERY | Admitting: SURGERY
Payer: MEDICAID

## 2023-11-09 VITALS
HEIGHT: 67 IN | SYSTOLIC BLOOD PRESSURE: 125 MMHG | RESPIRATION RATE: 16 BRPM | HEART RATE: 91 BPM | WEIGHT: 175 LBS | DIASTOLIC BLOOD PRESSURE: 84 MMHG | TEMPERATURE: 96.9 F | BODY MASS INDEX: 27.47 KG/M2 | OXYGEN SATURATION: 97 %

## 2023-11-09 DIAGNOSIS — I70.269 ATHEROSCLEROSIS OF NATIVE ARTERY OF LOWER EXTREMITY WITH GANGRENE, UNSPECIFIED LATERALITY: ICD-10-CM

## 2023-11-09 LAB
ANION GAP SERPL CALCULATED.3IONS-SCNC: 14.4 MMOL/L (ref 5–15)
BASOPHILS # BLD AUTO: 0.07 10*3/MM3 (ref 0–0.2)
BASOPHILS NFR BLD AUTO: 1.2 % (ref 0–1.5)
BUN SERPL-MCNC: 40 MG/DL (ref 6–20)
BUN/CREAT SERPL: 7.6 (ref 7–25)
CALCIUM SPEC-SCNC: 9.6 MG/DL (ref 8.6–10.5)
CHLORIDE SERPL-SCNC: 95 MMOL/L (ref 98–107)
CO2 SERPL-SCNC: 25.6 MMOL/L (ref 22–29)
CREAT SERPL-MCNC: 5.28 MG/DL (ref 0.76–1.27)
DEPRECATED RDW RBC AUTO: 75.2 FL (ref 37–54)
EGFRCR SERPLBLD CKD-EPI 2021: 11.8 ML/MIN/1.73
EOSINOPHIL # BLD AUTO: 0.2 10*3/MM3 (ref 0–0.4)
EOSINOPHIL NFR BLD AUTO: 3.4 % (ref 0.3–6.2)
ERYTHROCYTE [DISTWIDTH] IN BLOOD BY AUTOMATED COUNT: 21.1 % (ref 12.3–15.4)
GLUCOSE SERPL-MCNC: 140 MG/DL (ref 65–99)
HCT VFR BLD AUTO: 37 % (ref 37.5–51)
HGB BLD-MCNC: 11.3 G/DL (ref 13–17.7)
IMM GRANULOCYTES # BLD AUTO: 0.03 10*3/MM3 (ref 0–0.05)
IMM GRANULOCYTES NFR BLD AUTO: 0.5 % (ref 0–0.5)
LYMPHOCYTES # BLD AUTO: 1.12 10*3/MM3 (ref 0.7–3.1)
LYMPHOCYTES NFR BLD AUTO: 19.2 % (ref 19.6–45.3)
MCH RBC QN AUTO: 29.9 PG (ref 26.6–33)
MCHC RBC AUTO-ENTMCNC: 30.5 G/DL (ref 31.5–35.7)
MCV RBC AUTO: 97.9 FL (ref 79–97)
MONOCYTES # BLD AUTO: 0.48 10*3/MM3 (ref 0.1–0.9)
MONOCYTES NFR BLD AUTO: 8.2 % (ref 5–12)
NEUTROPHILS NFR BLD AUTO: 3.93 10*3/MM3 (ref 1.7–7)
NEUTROPHILS NFR BLD AUTO: 67.5 % (ref 42.7–76)
NRBC BLD AUTO-RTO: 0 /100 WBC (ref 0–0.2)
PLATELET # BLD AUTO: 247 10*3/MM3 (ref 140–450)
PMV BLD AUTO: 11.3 FL (ref 6–12)
POTASSIUM SERPL-SCNC: 4.4 MMOL/L (ref 3.5–5.2)
RBC # BLD AUTO: 3.78 10*6/MM3 (ref 4.14–5.8)
SODIUM SERPL-SCNC: 135 MMOL/L (ref 136–145)
WBC NRBC COR # BLD: 5.83 10*3/MM3 (ref 3.4–10.8)

## 2023-11-09 PROCEDURE — C1894 INTRO/SHEATH, NON-LASER: HCPCS | Performed by: SURGERY

## 2023-11-09 PROCEDURE — 76937 US GUIDE VASCULAR ACCESS: CPT | Performed by: SURGERY

## 2023-11-09 PROCEDURE — 36246 INS CATH ABD/L-EXT ART 2ND: CPT | Performed by: SURGERY

## 2023-11-09 PROCEDURE — 85025 COMPLETE CBC W/AUTO DIFF WBC: CPT | Performed by: SURGERY

## 2023-11-09 PROCEDURE — C1769 GUIDE WIRE: HCPCS | Performed by: SURGERY

## 2023-11-09 PROCEDURE — 80048 BASIC METABOLIC PNL TOTAL CA: CPT | Performed by: SURGERY

## 2023-11-09 PROCEDURE — 75710 ARTERY X-RAYS ARM/LEG: CPT | Performed by: SURGERY

## 2023-11-09 PROCEDURE — 25010000002 MIDAZOLAM PER 1MG: Performed by: SURGERY

## 2023-11-09 PROCEDURE — 99152 MOD SED SAME PHYS/QHP 5/>YRS: CPT | Performed by: SURGERY

## 2023-11-09 PROCEDURE — C1887 CATHETER, GUIDING: HCPCS | Performed by: SURGERY

## 2023-11-09 PROCEDURE — 0 IODIXANOL PER 1 ML: Performed by: SURGERY

## 2023-11-09 PROCEDURE — 25010000002 CEFAZOLIN IN DEXTROSE 2000 MG/ 100 ML SOLUTION: Performed by: SURGERY

## 2023-11-09 PROCEDURE — 25010000002 FENTANYL CITRATE (PF) 50 MCG/ML SOLUTION: Performed by: SURGERY

## 2023-11-09 RX ORDER — CEFAZOLIN SODIUM 2 G/100ML
2000 INJECTION, SOLUTION INTRAVENOUS ONCE
Status: DISCONTINUED | OUTPATIENT
Start: 2023-11-09 | End: 2023-11-09 | Stop reason: HOSPADM

## 2023-11-09 RX ORDER — MIDAZOLAM HYDROCHLORIDE 2 MG/2ML
INJECTION, SOLUTION INTRAMUSCULAR; INTRAVENOUS
Status: DISCONTINUED | OUTPATIENT
Start: 2023-11-09 | End: 2023-11-09 | Stop reason: HOSPADM

## 2023-11-09 RX ORDER — FENTANYL CITRATE 50 UG/ML
INJECTION, SOLUTION INTRAMUSCULAR; INTRAVENOUS
Status: DISCONTINUED | OUTPATIENT
Start: 2023-11-09 | End: 2023-11-09 | Stop reason: HOSPADM

## 2023-11-09 RX ORDER — IODIXANOL 320 MG/ML
INJECTION, SOLUTION INTRAVASCULAR
Status: DISCONTINUED | OUTPATIENT
Start: 2023-11-09 | End: 2023-11-09 | Stop reason: HOSPADM

## 2023-11-09 RX ORDER — LIDOCAINE HYDROCHLORIDE 20 MG/ML
INJECTION, SOLUTION INFILTRATION; PERINEURAL
Status: DISCONTINUED | OUTPATIENT
Start: 2023-11-09 | End: 2023-11-09 | Stop reason: HOSPADM

## 2023-11-09 RX ORDER — ENOXAPARIN SODIUM 100 MG/ML
40 INJECTION SUBCUTANEOUS DAILY
Status: DISCONTINUED | OUTPATIENT
Start: 2023-11-10 | End: 2023-11-09

## 2023-11-09 RX ORDER — CEFAZOLIN SODIUM 2 G/100ML
INJECTION, SOLUTION INTRAVENOUS
Status: COMPLETED | OUTPATIENT
Start: 2023-11-09 | End: 2023-11-09

## 2023-11-09 NOTE — NURSING NOTE
Patient requested to leave AMA. Spouse and patient were unhappy about the time it took for the procedure and lack of information regarding the time the patient would need to be at the hospital. Mainly because they had been transported via commercial service. Educated patient about risk of leaving early. Contacted MD who also agreed patient would be leaving AMA. Patient and spouse agreed and signed AMA papers.

## 2023-11-09 NOTE — Clinical Note
Hemostasis started on the right femoral artery. Manual pressure applied to vessel. Manual pressure was held for 20 min. Hemostasis achieved successfully.

## 2023-11-09 NOTE — PROGRESS NOTES
Angiogram performed.  Severe occlusive disease not amenable to endovascular intervention.  Severe left common femoral artery stenosis, focal.  Extensive SFA disease with long segment occlusion.  Reconstitutes in the proximal above-the-knee popliteal.  Satisfactory popliteal and via the peroneal artery.  For details find full report under chart review, cardiology tab.

## 2023-11-14 ENCOUNTER — TELEPHONE (OUTPATIENT)
Dept: CARDIOLOGY | Facility: CLINIC | Age: 58
End: 2023-11-14
Payer: MEDICAID

## 2023-11-14 NOTE — TELEPHONE ENCOUNTER
Patient would be at high risk but not prohibitive risk.  Patient is on dual antiplatelet therapy of aspirin and Plavix.  If deemed necessary he could hold the Plavix for 5 to 7 days and restart ASAP.  He would need to continue aspirin.

## 2023-11-14 NOTE — LETTER
November 14, 2023       Patient: Chevy Junior   YOB: 1965   Date of Visit: 11/14/2023     To Whom It May Concern:    We build our practice on integrity and patient care. The highest compliment we can receive is the referral of friends, family and patients to our office. We want to take this time to thank you for considering our group as part of your care team.    The medical records for Chevy Junior 1965 were reviewed for pre-operative clearance on 11/14/23. It has been determined that this patient has:  Increased, but not prohibitive risk.     If deemed necessary, he can hold his Plavix for 5-7 days prior to procedure. He would need to restart Plavix ASAP after procedure.     This pre-operative evaluation has been completed based on patient reported medical conditions at the date of this letter, this evaluation may have considered in part results of additional testing, completion of an EKG and patient reported symptoms at the time of their visit.    Chevy Junior's pre-operative clearance is good for thirty(30) days from the date of this letter with no reported changes in health, symptoms or diagnosis from the patient, their primary care provider or your office.    Your office has reported the patient will under go leg bypass on an undetermined date with Dr. Rawls. If this appointment is changed or moved outside of thirty(30) days from 11/14/23 this pre-operative clearance is void.    If you have any further questions please give our office a call.    Thank you for your trust,      VICKI Ledesma

## 2023-11-14 NOTE — TELEPHONE ENCOUNTER
"Received a call from vascular surgery stating pt needs clearance for leg bypass.   For additional info review OV note from 11/9/23 w/ Dr. Rawls.   OV note states:  \"Assessment/plan:   Mr. Junior has dry gangrene of the left fifth toe.  Plan angiography with possible endovascular intervention.  I have discussed with the patient in detail the mechanics of the procedure, the indications, benefits, risks, alternatives as well as potential complications to include but not limited to infection, bleeding, inability to cross the occlusion, vascular injury requiring surgical repair.  He appears to understand and desires to proceed\"      Need ASAP due to gangrene on foot.   Fax to [phone] or contact Srikanth \"Froyaln\" Vicki through [employer] once clearance letter is ready.     "

## 2023-11-27 ENCOUNTER — PREP FOR SURGERY (OUTPATIENT)
Dept: OTHER | Facility: HOSPITAL | Age: 58
End: 2023-11-27
Payer: MEDICAID

## 2023-11-27 DIAGNOSIS — I70.269 ATHEROSCLEROSIS OF NATIVE ARTERY OF LOWER EXTREMITY WITH GANGRENE, UNSPECIFIED LATERALITY: Primary | ICD-10-CM

## 2023-11-27 RX ORDER — CEFAZOLIN SODIUM 2 G/100ML
2 INJECTION, SOLUTION INTRAVENOUS ONCE
OUTPATIENT
Start: 2023-11-27 | End: 2023-11-27

## 2023-12-08 RX ORDER — OXYCODONE HYDROCHLORIDE 20 MG/1
20 TABLET ORAL EVERY 4 HOURS PRN
COMMUNITY

## 2023-12-08 RX ORDER — GABAPENTIN 800 MG/1
800 TABLET ORAL 3 TIMES DAILY
COMMUNITY

## 2023-12-08 RX ORDER — FENTANYL 50 UG/1
1 PATCH TRANSDERMAL
COMMUNITY

## 2023-12-08 NOTE — PRE-PROCEDURE INSTRUCTIONS
IMPORTANT INSTRUCTIONS - PRE-ADMISSION TESTING  DO NOT EAT OR CHEW anything after midnight the night before your procedure.    You may have CLEAR liquids up to 2 hours prior to ARRIVAL time.   Take the following medications the morning of your procedure with JUST A SIP OF WATER: SERTRALINE, TAMSULOSIN, PLAVIX, METOPROLOL IF NEEDED, INHALER, CARVEDILOL, ZOFRAN IF NEEDED PROTONIX, AMIODARONE, AMLODIPINE IF NEEDED, 1/2 DOSE BASAGLAR, ASPIRIN, ATORVASTATIN, FENTANYL PATCH, ISOSORBIDE IF NEEDED, GABAPENTIN, HYDRALAZINE IF NEEDED, LEVOTHYROXINE,  OXYCODONE IF NEEDED    DO NOT BRING your medications to the hospital with you, UNLESS something has changed since your PRE-Admission Testing appointment.  Hold all vitamins, supplements, and NSAIDS (Non- steroidal anti-inflammatory meds) for one week prior to surgery (you MAY take Tylenol or Acetaminophen).  If you are diabetic, check your blood sugar the morning of your procedure. If it is less than 70 or if you are feeling symptomatic, call the following number for further instructions: [phone] SAME DAY SURGERY.  Use your inhalers/nebulizers as usual, the morning of your procedure. BRING YOUR INHALERS with you.   Bring your CPAP or BIPAP to hospital, ONLY IF YOU WILL BE SPENDING THE NIGHT.   Make sure you have a ride home and have someone who will stay with you the day of your procedure after you go home.  If you have any questions, please call your Pre-Admission Testing [facility] at [phone]_.   Per anesthesia request, do not smoke for 24 hours before your procedure or as instructed by your surgeon.  Clear Liquid Diet        Find out when you need to start a clear liquid diet.   Think of “clear liquids” as anything you could read a newspaper through. This includes things like water, broth, sports drinks, or tea WITHOUT any kind of milk or cream.           Once you are told to start a clear liquid diet, only drink these things until 2 hours before arrival to the  hospital or when the hospital says to stop. Total volume limitation: 8 oz.       Clear liquids you CAN drink:   Water   Clear broth: beef, chicken, vegetable, or bone broth with nothing in it   Gatorade   Lemonade or Howard-aid   Soda   Tea, coffee (NO cream or honey)   Jell-O (without fruit)   Popsicles (without fruit or cream)   Italian ices   Juice without pulp: apple, white, grape   You may use salt, pepper, and sugar    Do NOT drink:   Milk or cream   Soy milk, almond milk, coconut milk, or other non-dairy drinks and   creamers   Milkshakes or smoothies   Tomato juice   Orange juice   Grapefruit juice   Cream soups or any other than broth         Clear Liquid Diet:  Do NOT eat any solid food.  Do NOT eat or suck on mints or candy.  Do NOT chew gum.  Do NOT drink thick liquids like milk or juice with pulp in it.  Do NOT add milk, cream, or anything like soy milk or almond milk to coffee or tea.   PREOPERATIVE (BEFORE SURGERY)              BATHING INSTRUCTIONS  Instructions:    You will need to shower 1 TIME   Wash your hair and face with normal shampoo and soap, rinse it well before using the surgical soap.      In the shower, wet the skin completely with water from your neck to your feet. Apply the cleanser to your   body ONLY FROM THE NECK TO YOUR FEET.     Do NOT USE THE CLEANSER ON YOUR FACE, HEAD, OR GENITAL (PRIVATE) AREAS.   Keep it out of your eyes, ears, and mouth because of the risk of injury to those areas.      Scrub with a clean washcloth for each bath utilizing the soap provided from the top of your body to the   bottom starting at the neck area.      Pay close attention to your armpits, groin area, and the site of surgery.      Wash your body gently for 5 minutes. Stand outside the stream or turn off the water while scrubbing your   body. Do NOT wash with your regular soap after the surgical cleanser is used.      RINSE THE CLEANSER OFF COMPLETELY with plenty of water. Rinse the area again  thoroughly.      Dry off with a clean towel. The surgical soap can cause dryness; however do NOT APPLY LOTION,   CREAM, POWDER, and/or DEODORANT AFTER SHOWERING.     Be sure to where clean clothes after showering.      Ensure CLEAN BED LINENS AFTER FIRST wash with the surgical soap.      NO PETS ALLOWED IN THE BED with you after utilizing the surgical soap.

## 2023-12-11 PROCEDURE — S0260 H&P FOR SURGERY: HCPCS | Performed by: SURGERY

## 2023-12-11 NOTE — H&P
[facility]   HISTORY AND PHYSICAL    Patient Name: Chevy Junior  : 1965  MRN: [id number]  Primary Care Physician:  Janeth Le APRN  Date of admission: (Not on file)    Subjective   Subjective     Chief Complaint: Left fifth toe gangrene    HPI:    Chevy Junior is a 58 y.o. male with left fifth toe gangrene.    Review of Systems    Non contributory except for the History of Present Illness    Personal History     Past Medical History:   Diagnosis Date    Anemia     Atherosclerosis of native artery of left lower extremity with gangrene     Chronic pain     COPD (chronic obstructive pulmonary disease)     INHALER    Coronary artery disease involving native coronary artery of native heart with angina pectoris 2022    Diabetes mellitus     ESRD on dialysis     M,W,F-FISTULA LEFT ARM    Heart failure with preserved left ventricular function (HFpEF) 2022    FOLLOWS W/REECE-NO CP    Hyperlipidemia     Hypertension     WIFE STATED B/P HAS BEEN RUNNING LOW LATELY    Infectious viral hepatitis 5 years ago was treated    RIC (obstructive sleep apnea)     Osteoarthritis     Phosphorus metabolic disorder     Sleep apnea     NO MACHINE    Substance abuse 20 years ago from [facility]       Past Surgical History:   Procedure Laterality Date    ABOVE KNEE LEG AMPUTATION Right     CARDIAC CATHETERIZATION      CARDIAC CATHETERIZATION N/A 2022    Procedure: Percutaneous Coronary Intervention;  Surgeon: River Acevedo IV, MD;  Location:  [facility] CATH INVASIVE LOCATION;  Service: Cardiovascular;  Laterality: N/A;    CARDIAC CATHETERIZATION N/A 2022    Procedure: Optical Coherent Tomography;  Surgeon: River Acevedo IV, MD;  Location:  [facility] CATH INVASIVE LOCATION;  Service: Cardiovascular;  Laterality: N/A;    CARDIAC CATHETERIZATION N/A 2022    Procedure: Stent JOSE coronary;  Surgeon: River Acevedo IV, MD;  Location:  [facility] CATH INVASIVE LOCATION;   Service: Cardiovascular;  Laterality: N/A;    CARDIAC CATHETERIZATION Left 11/9/2023    Procedure: Aortogram with left leg angiogram, possible angioplasty or stenting;  Surgeon: Luis Fernando Rawls MD;  Location: [facility] CATH INVASIVE LOCATION;  Service: Vascular;  Laterality: Left;    CATH LAB PROCEDURE      unable to stent    COLONOSCOPY N/A 2/15/2023    Procedure: COLONOSCOPY WITH POLYPECTOMY;  Surgeon: David Glass MD;  Location: [facility] ENDOSCOPY;  Service: Gastroenterology;  Laterality: N/A;  COLON POLYP, MELANOSIS COLI    ENDOSCOPY N/A 2/3/2023    Procedure: ESOPHAGOGASTRODUODENOSCOPY with biopsy;  Surgeon: Gus Whaley MD;  Location: [facility] ENDOSCOPY;  Service: General;  Laterality: N/A;  gastritis,     HIP SURGERY      INSERTION PERITONEAL DIALYSIS CATHETER      LEG SURGERY      MANDIBLE FRACTURE SURGERY      REPLACEMENT TOTAL KNEE      x3    SHOULDER SURGERY         Family History: family history includes Arthritis in his mother; Diabetes in his father; Hypertension in his mother. Otherwise pertinent FHx was reviewed and not pertinent to current issue.    Social History:  reports that he has been smoking cigarettes. He has a 80.00 pack-year smoking history. He has never used smokeless tobacco. He reports that he does not currently use alcohol. He reports that he does not currently use drugs after having used the following drugs: Hydrocodone.    Home Medications:  No current facility-administered medications on file prior to encounter.     Current Outpatient Medications on File Prior to Encounter   Medication Sig    albuterol sulfate  (90 Base) MCG/ACT inhaler Inhale 1 puff Every 4 (Four) Hours As Needed for Wheezing or Shortness of Air.    amiodarone (PACERONE) 200 MG tablet Take 1 tablet by mouth Daily.    amLODIPine (NORVASC) 5 MG tablet Take 1 tablet by mouth 2 (Two) Times a Day.    aspirin (aspirin) 81 MG EC tablet Take 1 tablet by mouth Daily.    atorvastatin (LIPITOR) 40 MG tablet Take  1 tablet by mouth Daily.    calcitriol (ROCALTROL) 0.5 MCG capsule Take 1 capsule by mouth Daily.    carvedilol (COREG) 6.25 MG tablet Take 1 tablet by mouth 2 (Two) Times a Day With Meals.    cinacalcet (SENSIPAR) 30 MG tablet Take 1 tablet by mouth Daily.    clopidogrel (PLAVIX) 75 MG tablet TAKE 1 TABLET BY MOUTH DAILY.    Diclofenac Sodium (VOLTAREN) 1 % gel gel Apply 2 g topically to the appropriate area as directed 4 (Four) Times a Day As Needed.     MG capsule Take 1 capsule by mouth Daily.    fentaNYL (DURAGESIC) 50 MCG/HR patch Place 1 patch on the skin as directed by provider Every 72 (Seventy-Two) Hours.    Ferric Citrate (AURYXIA PO) Take  by mouth. 2 tablets twice a day    furosemide (LASIX) 80 MG tablet Take 1 tablet by mouth 2 (Two) Times a Day.    gabapentin (NEURONTIN) 600 MG tablet Take 1 tablet by mouth 2 (Two) Times a Day.    Heparin Sodium, Porcine, (heparin, porcine,) 1000 UNIT/ML injection Infuse 1 mL into a venous catheter Every Night.    hydrALAZINE (APRESOLINE) 100 MG tablet Take 1 tablet by mouth 3 (Three) Times a Day.    Insulin Glargine (BASAGLAR KWIKPEN) 100 UNIT/ML injection pen Inject 15 Units under the skin into the appropriate area as directed Daily.    IRON SUCROSE IV Infuse  into a venous catheter Every 14 (Fourteen) Days. Iron infusion every 2 weeks    isosorbide mononitrate (IMDUR) 60 MG 24 hr tablet Take 1 tablet by mouth Daily.    LACTULOSE PO Take  by mouth As Needed.    lanthanum (FOSRENOL) 1000 MG chewable tablet Chew 1 tablet 3 (Three) Times a Day With Meals.    levothyroxine (SYNTHROID, LEVOTHROID) 50 MCG tablet Take 1 tablet by mouth Daily.    metoclopramide (REGLAN) 5 MG tablet Take 1 tablet by mouth Every Night.    metoprolol tartrate (LOPRESSOR) 100 MG tablet Take 1 tablet by mouth 2 (Two) Times a Day.    nitroglycerin (NITROSTAT) 0.4 MG SL tablet Place 1 tablet under the tongue Every 5 (Five) Minutes As Needed for Chest Pain. (Patient not taking: Reported on  11/9/2023)    ondansetron ODT (ZOFRAN-ODT) 4 MG disintegrating tablet Place 1 tablet on the tongue Every 6 (Six) Hours As Needed for Nausea or Vomiting.    oxyCODONE (ROXICODONE) 20 MG tablet Take 1 tablet by mouth Every 4 (Four) Hours As Needed for Moderate Pain.    pantoprazole (PROTONIX) 40 MG EC tablet TAKE 1 TABLET BY MOUTH DAILY    Patiromer Sorbitex Calcium (Veltassa) 8.4 g pack Take 1 packet by mouth 3 (Three) Times a Week.    polyethylene glycol (MIRALAX) 17 GM/SCOOP powder Take 17 g by mouth Daily As Needed.    potassium chloride (K-DUR,KLOR-CON) 10 MEQ CR tablet Take 1 tablet by mouth Daily. (Patient not taking: Reported on 12/8/2023)    sertraline (ZOLOFT) 100 MG tablet Take 1 tablet by mouth Daily.    sucralfate (Carafate) 1 g tablet Take 1 tablet by mouth 4 (Four) Times a Day.    tamsulosin (FLOMAX) 0.4 MG capsule 24 hr capsule Take 1 capsule by mouth Daily.    valsartan (DIOVAN) 320 MG tablet Take 1 tablet by mouth Daily.          Allergies:  No Known Allergies    Objective   Objective     Vitals:        Physical Exam    General: Awake, alert, NAD   Eyes:  YUNIER   Neck: Supple   Lungs: Clear   Heart: RRR   Abdomen: benign   Musculoskeletal:  normal motor tone, symmetric   Skin: warm, normal turgor   Neuro: strength 5/5 all extremities   Pulses: Nonpalpable left pedal pulses.    Diagnostic studies:   Angiography dated 11/9/2023 demonstrates severe left common femoral artery stenosis, extensive left SFA disease and lox segment occlusion.  Satisfactory below the knee popliteal for target.    Assessment & Plan   Assessment / Plan     Active Hospital Problems:  Active Hospital Problems    Diagnosis     **Atherosclerosis of native artery of lower extremity with gangrene        Diagnoses and all orders for this visit:    1. Encounter for preadmission testing (Primary)  -     Comprehensive metabolic panel; Future    Other orders  -     Follow Anesthesia Guidelines / Protocol; Standing        Assessment/plan:    Mr. Junior has severe peripheral vascular disease of the left lower extremity resulting in left fifth toe gangrene.  At this time I am recommending that we proceed to a left femoral to below the knee popliteal bypass graft.  I have discussed with him in detail the mechanics of the procedure, the indications, benefits, risks, alternatives, as well as potential complications to include but not limited to infection, bleeding, hematoma, transfusion, reoperation.  He appears to understand and desires to proceed.      Electronically signed by Luis Fernando Rawls MD, 12/11/23, 10:13 AM EST.

## 2023-12-12 ENCOUNTER — ANESTHESIA EVENT (OUTPATIENT)
Dept: PERIOP | Facility: HOSPITAL | Age: 58
End: 2023-12-12
Payer: MEDICAID

## 2023-12-12 ENCOUNTER — ANESTHESIA (OUTPATIENT)
Dept: PERIOP | Facility: HOSPITAL | Age: 58
End: 2023-12-12
Payer: MEDICAID

## 2023-12-12 ENCOUNTER — HOSPITAL ENCOUNTER (INPATIENT)
Facility: HOSPITAL | Age: 58
LOS: 1 days | Discharge: HOME | DRG: 270 | End: 2023-12-13
Attending: SURGERY | Admitting: SURGERY
Payer: MEDICAID

## 2023-12-12 DIAGNOSIS — Z01.818 ENCOUNTER FOR PREADMISSION TESTING: Primary | ICD-10-CM

## 2023-12-12 DIAGNOSIS — I70.269 ATHEROSCLEROSIS OF NATIVE ARTERY OF LOWER EXTREMITY WITH GANGRENE, UNSPECIFIED LATERALITY: ICD-10-CM

## 2023-12-12 LAB
ABO GROUP BLD: NORMAL
ALBUMIN SERPL-MCNC: 3.8 G/DL (ref 3.5–5.2)
ALBUMIN/GLOB SERPL: 0.9 G/DL
ALP SERPL-CCNC: 128 U/L (ref 39–117)
ALT SERPL W P-5'-P-CCNC: 7 U/L (ref 1–41)
ANION GAP SERPL CALCULATED.3IONS-SCNC: 12.9 MMOL/L (ref 5–15)
AST SERPL-CCNC: 11 U/L (ref 1–40)
BILIRUB SERPL-MCNC: 0.3 MG/DL (ref 0–1.2)
BLD GP AB SCN SERPL QL: POSITIVE
BUN SERPL-MCNC: 33 MG/DL (ref 6–20)
BUN/CREAT SERPL: 5.8 (ref 7–25)
CALCIUM SPEC-SCNC: 9.2 MG/DL (ref 8.6–10.5)
CHLORIDE SERPL-SCNC: 98 MMOL/L (ref 98–107)
CO2 SERPL-SCNC: 25.1 MMOL/L (ref 22–29)
CREAT SERPL-MCNC: 5.72 MG/DL (ref 0.76–1.27)
DEPRECATED RDW RBC AUTO: 67.1 FL (ref 37–54)
EGFRCR SERPLBLD CKD-EPI 2021: 10.7 ML/MIN/1.73
ERYTHROCYTE [DISTWIDTH] IN BLOOD BY AUTOMATED COUNT: 19 % (ref 12.3–15.4)
GLOBULIN UR ELPH-MCNC: 4.4 GM/DL
GLUCOSE BLDC GLUCOMTR-MCNC: 142 MG/DL (ref 70–99)
GLUCOSE BLDC GLUCOMTR-MCNC: 157 MG/DL (ref 70–99)
GLUCOSE BLDC GLUCOMTR-MCNC: 86 MG/DL (ref 70–99)
GLUCOSE SERPL-MCNC: 134 MG/DL (ref 65–99)
HCT VFR BLD AUTO: 38.2 % (ref 37.5–51)
HGB BLD-MCNC: 11.3 G/DL (ref 13–17.7)
MCH RBC QN AUTO: 28 PG (ref 26.6–33)
MCHC RBC AUTO-ENTMCNC: 29.6 G/DL (ref 31.5–35.7)
MCV RBC AUTO: 94.8 FL (ref 79–97)
PLATELET # BLD AUTO: 141 10*3/MM3 (ref 140–450)
PMV BLD AUTO: 9.5 FL (ref 6–12)
POTASSIUM SERPL-SCNC: 4.1 MMOL/L (ref 3.5–5.2)
PROT SERPL-MCNC: 8.2 G/DL (ref 6–8.5)
RBC # BLD AUTO: 4.03 10*6/MM3 (ref 4.14–5.8)
RH BLD: POSITIVE
SODIUM SERPL-SCNC: 136 MMOL/L (ref 136–145)
T&S EXPIRATION DATE: NORMAL
WBC NRBC COR # BLD AUTO: 4.76 10*3/MM3 (ref 3.4–10.8)

## 2023-12-12 PROCEDURE — 25010000002 MIDAZOLAM PER 1MG: Performed by: ANESTHESIOLOGY

## 2023-12-12 PROCEDURE — 25010000002 FENTANYL CITRATE (PF) 50 MCG/ML SOLUTION

## 2023-12-12 PROCEDURE — 25010000002 CEFAZOLIN PER 500 MG: Performed by: SURGERY

## 2023-12-12 PROCEDURE — 35656 BPG FEMORAL-POPLITEAL: CPT | Performed by: SURGERY

## 2023-12-12 PROCEDURE — 25010000002 CEFAZOLIN IN DEXTROSE 2-4 GM/100ML-% SOLUTION: Performed by: SURGERY

## 2023-12-12 PROCEDURE — 86870 RBC ANTIBODY IDENTIFICATION: CPT

## 2023-12-12 PROCEDURE — 35656 BPG FEMORAL-POPLITEAL: CPT

## 2023-12-12 PROCEDURE — 25010000002 DEXAMETHASONE PER 1 MG

## 2023-12-12 PROCEDURE — 86850 RBC ANTIBODY SCREEN: CPT

## 2023-12-12 PROCEDURE — 25010000002 PROPOFOL 10 MG/ML EMULSION

## 2023-12-12 PROCEDURE — 88307 TISSUE EXAM BY PATHOLOGIST: CPT | Performed by: SURGERY

## 2023-12-12 PROCEDURE — 80053 COMPREHEN METABOLIC PANEL: CPT | Performed by: SURGERY

## 2023-12-12 PROCEDURE — C1768 GRAFT, VASCULAR: HCPCS | Performed by: SURGERY

## 2023-12-12 PROCEDURE — 04CJ0ZZ EXTIRPATION OF MATTER FROM LEFT EXTERNAL ILIAC ARTERY, OPEN APPROACH: ICD-10-PCS | Performed by: SURGERY

## 2023-12-12 PROCEDURE — 86901 BLOOD TYPING SEROLOGIC RH(D): CPT | Performed by: SURGERY

## 2023-12-12 PROCEDURE — 04CL0ZZ EXTIRPATION OF MATTER FROM LEFT FEMORAL ARTERY, OPEN APPROACH: ICD-10-PCS | Performed by: SURGERY

## 2023-12-12 PROCEDURE — 25010000002 LIDOCAINE 1 % SOLUTION 20 ML VIAL: Performed by: SURGERY

## 2023-12-12 PROCEDURE — 86906 BLD TYPING SEROLOGIC RH PHNT: CPT

## 2023-12-12 PROCEDURE — 04UL0JZ SUPPLEMENT LEFT FEMORAL ARTERY WITH SYNTHETIC SUBSTITUTE, OPEN APPROACH: ICD-10-PCS | Performed by: SURGERY

## 2023-12-12 PROCEDURE — 25010000002 PHENYLEPHRINE 10 MG/ML SOLUTION 1 ML VIAL

## 2023-12-12 PROCEDURE — 25010000002 SUGAMMADEX 200 MG/2ML SOLUTION: Performed by: ANESTHESIOLOGY

## 2023-12-12 PROCEDURE — 25010000002 HYDROMORPHONE 1 MG/ML SOLUTION: Performed by: SURGERY

## 2023-12-12 PROCEDURE — 86900 BLOOD TYPING SEROLOGIC ABO: CPT

## 2023-12-12 PROCEDURE — 25010000002 ONDANSETRON PER 1 MG

## 2023-12-12 PROCEDURE — 63710000001 INSULIN LISPRO (HUMAN) PER 5 UNITS: Performed by: SURGERY

## 2023-12-12 PROCEDURE — 25010000002 HYDROMORPHONE 1 MG/ML SOLUTION

## 2023-12-12 PROCEDURE — 25010000002 HEPARIN (PORCINE) PER 1000 UNITS: Performed by: SURGERY

## 2023-12-12 PROCEDURE — 85027 COMPLETE CBC AUTOMATED: CPT | Performed by: SURGERY

## 2023-12-12 PROCEDURE — 25010000002 ESMOLOL 100 MG/10ML SOLUTION

## 2023-12-12 PROCEDURE — 25810000003 SODIUM CHLORIDE 0.9 % SOLUTION 250 ML FLEX CONT

## 2023-12-12 PROCEDURE — 88311 DECALCIFY TISSUE: CPT | Performed by: SURGERY

## 2023-12-12 PROCEDURE — 88304 TISSUE EXAM BY PATHOLOGIST: CPT | Performed by: SURGERY

## 2023-12-12 PROCEDURE — 82948 REAGENT STRIP/BLOOD GLUCOSE: CPT

## 2023-12-12 PROCEDURE — 25010000002 HEPARIN (PORCINE) PER 1000 UNITS

## 2023-12-12 PROCEDURE — 86850 RBC ANTIBODY SCREEN: CPT | Performed by: SURGERY

## 2023-12-12 PROCEDURE — 25010000002 BUPIVACAINE (PF) 0.5 % SOLUTION 10 ML VIAL: Performed by: SURGERY

## 2023-12-12 PROCEDURE — 86900 BLOOD TYPING SEROLOGIC ABO: CPT | Performed by: SURGERY

## 2023-12-12 PROCEDURE — 25810000003 SODIUM CHLORIDE 0.9 % SOLUTION 1,000 ML FLEX CONT: Performed by: ANESTHESIOLOGY

## 2023-12-12 PROCEDURE — 86860 RBC ANTIBODY ELUTION: CPT

## 2023-12-12 PROCEDURE — 25010000002 CEFAZOLIN IN DEXTROSE 2000 MG/ 100 ML SOLUTION: Performed by: SURGERY

## 2023-12-12 PROCEDURE — 25010000002 VASOPRESSIN 20 UNIT/ML SOLUTION

## 2023-12-12 PROCEDURE — 041L0JL BYPASS LEFT FEMORAL ARTERY TO POPLITEAL ARTERY WITH SYNTHETIC SUBSTITUTE, OPEN APPROACH: ICD-10-PCS | Performed by: SURGERY

## 2023-12-12 PROCEDURE — 86870 RBC ANTIBODY IDENTIFICATION: CPT | Performed by: SURGERY

## 2023-12-12 DEVICE — LIGACLIP MCA MULTIPLE CLIP APPLIERS, 20 MEDIUM CLIPS
Type: IMPLANTABLE DEVICE | Site: LEG | Status: FUNCTIONAL
Brand: LIGACLIP

## 2023-12-12 DEVICE — LIGACLIP MCA MULTIPLE CLIP APPLIERS, 20 SMALL CLIPS
Type: IMPLANTABLE DEVICE | Site: LEG | Status: FUNCTIONAL
Brand: LIGACLIP

## 2023-12-12 DEVICE — GRFT VASC PROPAT THNSTRCH REMVRNG6X70X60: Type: IMPLANTABLE DEVICE | Site: LEG | Status: FUNCTIONAL

## 2023-12-12 RX ORDER — VASOPRESSIN 20 U/ML
INJECTION PARENTERAL AS NEEDED
Status: DISCONTINUED | OUTPATIENT
Start: 2023-12-12 | End: 2023-12-12 | Stop reason: SURG

## 2023-12-12 RX ORDER — METOCLOPRAMIDE 5 MG/1
5 TABLET ORAL NIGHTLY
Status: DISCONTINUED | OUTPATIENT
Start: 2023-12-12 | End: 2023-12-13 | Stop reason: HOSPADM

## 2023-12-12 RX ORDER — DEXMEDETOMIDINE HYDROCHLORIDE 100 UG/ML
INJECTION, SOLUTION INTRAVENOUS AS NEEDED
Status: DISCONTINUED | OUTPATIENT
Start: 2023-12-12 | End: 2023-12-12 | Stop reason: SURG

## 2023-12-12 RX ORDER — LANTHANUM CARBONATE 500 MG/1
1000 TABLET, CHEWABLE ORAL
Status: DISCONTINUED | OUTPATIENT
Start: 2023-12-13 | End: 2023-12-13 | Stop reason: HOSPADM

## 2023-12-12 RX ORDER — FENTANYL CITRATE 50 UG/ML
INJECTION, SOLUTION INTRAMUSCULAR; INTRAVENOUS AS NEEDED
Status: DISCONTINUED | OUTPATIENT
Start: 2023-12-12 | End: 2023-12-12 | Stop reason: SURG

## 2023-12-12 RX ORDER — NITROGLYCERIN 0.4 MG/1
0.4 TABLET SUBLINGUAL
Status: DISCONTINUED | OUTPATIENT
Start: 2023-12-12 | End: 2023-12-13 | Stop reason: HOSPADM

## 2023-12-12 RX ORDER — HEPARIN SODIUM 5000 [USP'U]/ML
INJECTION, SOLUTION INTRAVENOUS; SUBCUTANEOUS AS NEEDED
Status: DISCONTINUED | OUTPATIENT
Start: 2023-12-12 | End: 2023-12-12 | Stop reason: SURG

## 2023-12-12 RX ORDER — ROCURONIUM BROMIDE 10 MG/ML
INJECTION, SOLUTION INTRAVENOUS AS NEEDED
Status: DISCONTINUED | OUTPATIENT
Start: 2023-12-12 | End: 2023-12-12 | Stop reason: SURG

## 2023-12-12 RX ORDER — PHENYLEPHRINE HCL IN 0.9% NACL 1 MG/10 ML
SYRINGE (ML) INTRAVENOUS AS NEEDED
Status: DISCONTINUED | OUTPATIENT
Start: 2023-12-12 | End: 2023-12-12 | Stop reason: SURG

## 2023-12-12 RX ORDER — OXYCODONE AND ACETAMINOPHEN 10; 325 MG/1; MG/1
1 TABLET ORAL EVERY 6 HOURS PRN
Status: DISCONTINUED | OUTPATIENT
Start: 2023-12-12 | End: 2023-12-13 | Stop reason: HOSPADM

## 2023-12-12 RX ORDER — CEFAZOLIN SODIUM 2 G/100ML
2 INJECTION, SOLUTION INTRAVENOUS EVERY 8 HOURS
Qty: 200 ML | Refills: 0 | Status: COMPLETED | OUTPATIENT
Start: 2023-12-12 | End: 2023-12-13

## 2023-12-12 RX ORDER — OXYCODONE HYDROCHLORIDE AND ACETAMINOPHEN 5; 325 MG/1; MG/1
1 TABLET ORAL EVERY 6 HOURS PRN
Status: DISCONTINUED | OUTPATIENT
Start: 2023-12-12 | End: 2023-12-13 | Stop reason: HOSPADM

## 2023-12-12 RX ORDER — SODIUM CHLORIDE, SODIUM LACTATE, POTASSIUM CHLORIDE, CALCIUM CHLORIDE 600; 310; 30; 20 MG/100ML; MG/100ML; MG/100ML; MG/100ML
9 INJECTION, SOLUTION INTRAVENOUS CONTINUOUS PRN
Status: DISCONTINUED | OUTPATIENT
Start: 2023-12-12 | End: 2023-12-12 | Stop reason: HOSPADM

## 2023-12-12 RX ORDER — LIDOCAINE HYDROCHLORIDE 20 MG/ML
INJECTION, SOLUTION EPIDURAL; INFILTRATION; INTRACAUDAL; PERINEURAL AS NEEDED
Status: DISCONTINUED | OUTPATIENT
Start: 2023-12-12 | End: 2023-12-12 | Stop reason: SURG

## 2023-12-12 RX ORDER — CALCITRIOL 0.25 UG/1
0.5 CAPSULE, LIQUID FILLED ORAL DAILY
Status: DISCONTINUED | OUTPATIENT
Start: 2023-12-13 | End: 2023-12-13 | Stop reason: HOSPADM

## 2023-12-12 RX ORDER — DEXAMETHASONE SODIUM PHOSPHATE 4 MG/ML
INJECTION, SOLUTION INTRA-ARTICULAR; INTRALESIONAL; INTRAMUSCULAR; INTRAVENOUS; SOFT TISSUE AS NEEDED
Status: DISCONTINUED | OUTPATIENT
Start: 2023-12-12 | End: 2023-12-12 | Stop reason: SURG

## 2023-12-12 RX ORDER — ACETAMINOPHEN 500 MG
1000 TABLET ORAL ONCE
Status: COMPLETED | OUTPATIENT
Start: 2023-12-12 | End: 2023-12-12

## 2023-12-12 RX ORDER — PROPOFOL 10 MG/ML
VIAL (ML) INTRAVENOUS AS NEEDED
Status: DISCONTINUED | OUTPATIENT
Start: 2023-12-12 | End: 2023-12-12 | Stop reason: SURG

## 2023-12-12 RX ORDER — FUROSEMIDE 80 MG
80 TABLET ORAL 2 TIMES DAILY
Status: DISCONTINUED | OUTPATIENT
Start: 2023-12-12 | End: 2023-12-13 | Stop reason: HOSPADM

## 2023-12-12 RX ORDER — HEPARIN SODIUM 5000 [USP'U]/ML
5000 INJECTION, SOLUTION INTRAVENOUS; SUBCUTANEOUS EVERY 12 HOURS SCHEDULED
Status: DISCONTINUED | OUTPATIENT
Start: 2023-12-13 | End: 2023-12-13 | Stop reason: HOSPADM

## 2023-12-12 RX ORDER — ONDANSETRON 2 MG/ML
INJECTION INTRAMUSCULAR; INTRAVENOUS AS NEEDED
Status: DISCONTINUED | OUTPATIENT
Start: 2023-12-12 | End: 2023-12-12 | Stop reason: SURG

## 2023-12-12 RX ORDER — ONDANSETRON 2 MG/ML
4 INJECTION INTRAMUSCULAR; INTRAVENOUS ONCE AS NEEDED
Status: DISCONTINUED | OUTPATIENT
Start: 2023-12-12 | End: 2023-12-12 | Stop reason: HOSPADM

## 2023-12-12 RX ORDER — DOCUSATE SODIUM 100 MG/1
100 CAPSULE, LIQUID FILLED ORAL DAILY
Status: DISCONTINUED | OUTPATIENT
Start: 2023-12-13 | End: 2023-12-13 | Stop reason: HOSPADM

## 2023-12-12 RX ORDER — DEXTROSE MONOHYDRATE 25 G/50ML
25 INJECTION, SOLUTION INTRAVENOUS
Status: DISCONTINUED | OUTPATIENT
Start: 2023-12-12 | End: 2023-12-13 | Stop reason: HOSPADM

## 2023-12-12 RX ORDER — NICOTINE 21 MG/24HR
1 PATCH, TRANSDERMAL 24 HOURS TRANSDERMAL
Status: DISCONTINUED | OUTPATIENT
Start: 2023-12-12 | End: 2023-12-13 | Stop reason: HOSPADM

## 2023-12-12 RX ORDER — SUCRALFATE 1 G/1
1 TABLET ORAL 4 TIMES DAILY
Status: DISCONTINUED | OUTPATIENT
Start: 2023-12-12 | End: 2023-12-13

## 2023-12-12 RX ORDER — LEVOTHYROXINE SODIUM 0.05 MG/1
50 TABLET ORAL
Status: DISCONTINUED | OUTPATIENT
Start: 2023-12-13 | End: 2023-12-13 | Stop reason: HOSPADM

## 2023-12-12 RX ORDER — CLOPIDOGREL BISULFATE 75 MG/1
75 TABLET ORAL DAILY
Status: DISCONTINUED | OUTPATIENT
Start: 2023-12-13 | End: 2023-12-13 | Stop reason: HOSPADM

## 2023-12-12 RX ORDER — ALBUTEROL SULFATE 2.5 MG/3ML
2.5 SOLUTION RESPIRATORY (INHALATION) EVERY 4 HOURS PRN
Status: DISCONTINUED | OUTPATIENT
Start: 2023-12-12 | End: 2023-12-13 | Stop reason: HOSPADM

## 2023-12-12 RX ORDER — POLYETHYLENE GLYCOL 3350 17 G/17G
17 POWDER, FOR SOLUTION ORAL DAILY PRN
Status: DISCONTINUED | OUTPATIENT
Start: 2023-12-12 | End: 2023-12-13 | Stop reason: HOSPADM

## 2023-12-12 RX ORDER — PROMETHAZINE HYDROCHLORIDE 25 MG/1
25 SUPPOSITORY RECTAL ONCE AS NEEDED
Status: DISCONTINUED | OUTPATIENT
Start: 2023-12-12 | End: 2023-12-12 | Stop reason: HOSPADM

## 2023-12-12 RX ORDER — PROMETHAZINE HYDROCHLORIDE 12.5 MG/1
25 TABLET ORAL ONCE AS NEEDED
Status: DISCONTINUED | OUTPATIENT
Start: 2023-12-12 | End: 2023-12-12 | Stop reason: HOSPADM

## 2023-12-12 RX ORDER — HYDRALAZINE HYDROCHLORIDE 20 MG/ML
10 INJECTION INTRAMUSCULAR; INTRAVENOUS EVERY 6 HOURS PRN
Status: DISCONTINUED | OUTPATIENT
Start: 2023-12-12 | End: 2023-12-13 | Stop reason: HOSPADM

## 2023-12-12 RX ORDER — LABETALOL HYDROCHLORIDE 5 MG/ML
10 INJECTION, SOLUTION INTRAVENOUS EVERY 6 HOURS PRN
Status: DISCONTINUED | OUTPATIENT
Start: 2023-12-13 | End: 2023-12-12 | Stop reason: SDUPTHER

## 2023-12-12 RX ORDER — CARVEDILOL 6.25 MG/1
6.25 TABLET ORAL 2 TIMES DAILY WITH MEALS
Status: DISCONTINUED | OUTPATIENT
Start: 2023-12-12 | End: 2023-12-13 | Stop reason: HOSPADM

## 2023-12-12 RX ORDER — NICOTINE 21 MG/24HR
1 PATCH, TRANSDERMAL 24 HOURS TRANSDERMAL
Status: DISCONTINUED | OUTPATIENT
Start: 2023-12-12 | End: 2023-12-12

## 2023-12-12 RX ORDER — INSULIN LISPRO 100 [IU]/ML
2-9 INJECTION, SOLUTION INTRAVENOUS; SUBCUTANEOUS
Status: DISCONTINUED | OUTPATIENT
Start: 2023-12-12 | End: 2023-12-13 | Stop reason: HOSPADM

## 2023-12-12 RX ORDER — TAMSULOSIN HYDROCHLORIDE 0.4 MG/1
0.4 CAPSULE ORAL DAILY
Status: DISCONTINUED | OUTPATIENT
Start: 2023-12-13 | End: 2023-12-13 | Stop reason: HOSPADM

## 2023-12-12 RX ORDER — LABETALOL HYDROCHLORIDE 5 MG/ML
10 INJECTION, SOLUTION INTRAVENOUS EVERY 6 HOURS PRN
Status: DISCONTINUED | OUTPATIENT
Start: 2023-12-12 | End: 2023-12-13

## 2023-12-12 RX ORDER — OXYCODONE HYDROCHLORIDE 5 MG/1
5 TABLET ORAL
Status: DISCONTINUED | OUTPATIENT
Start: 2023-12-12 | End: 2023-12-12 | Stop reason: HOSPADM

## 2023-12-12 RX ORDER — SODIUM CHLORIDE 9 MG/ML
40 INJECTION, SOLUTION INTRAVENOUS AS NEEDED
Status: DISCONTINUED | OUTPATIENT
Start: 2023-12-12 | End: 2023-12-12 | Stop reason: HOSPADM

## 2023-12-12 RX ORDER — SERTRALINE HYDROCHLORIDE 100 MG/1
100 TABLET, FILM COATED ORAL DAILY
Status: DISCONTINUED | OUTPATIENT
Start: 2023-12-13 | End: 2023-12-13 | Stop reason: HOSPADM

## 2023-12-12 RX ORDER — FENTANYL 50 UG/1
1 PATCH TRANSDERMAL
Qty: 3 PATCH | Refills: 0 | Status: DISCONTINUED | OUTPATIENT
Start: 2023-12-15 | End: 2023-12-13 | Stop reason: HOSPADM

## 2023-12-12 RX ORDER — LACTULOSE 10 G/15ML
10 SOLUTION ORAL 2 TIMES DAILY PRN
Status: DISCONTINUED | OUTPATIENT
Start: 2023-12-12 | End: 2023-12-13 | Stop reason: HOSPADM

## 2023-12-12 RX ORDER — ATORVASTATIN CALCIUM 40 MG/1
40 TABLET, FILM COATED ORAL NIGHTLY
Status: DISCONTINUED | OUTPATIENT
Start: 2023-12-12 | End: 2023-12-13 | Stop reason: HOSPADM

## 2023-12-12 RX ORDER — ASPIRIN 81 MG/1
81 TABLET ORAL DAILY
Status: DISCONTINUED | OUTPATIENT
Start: 2023-12-13 | End: 2023-12-13 | Stop reason: HOSPADM

## 2023-12-12 RX ORDER — MIDAZOLAM HYDROCHLORIDE 2 MG/2ML
1 INJECTION, SOLUTION INTRAMUSCULAR; INTRAVENOUS ONCE
Status: COMPLETED | OUTPATIENT
Start: 2023-12-12 | End: 2023-12-12

## 2023-12-12 RX ORDER — CINACALCET 30 MG/1
30 TABLET, FILM COATED ORAL DAILY
Status: DISCONTINUED | OUTPATIENT
Start: 2023-12-13 | End: 2023-12-13 | Stop reason: HOSPADM

## 2023-12-12 RX ORDER — GABAPENTIN 300 MG/1
600 CAPSULE ORAL EVERY 12 HOURS SCHEDULED
Status: DISCONTINUED | OUTPATIENT
Start: 2023-12-12 | End: 2023-12-13 | Stop reason: HOSPADM

## 2023-12-12 RX ORDER — POTASSIUM CHLORIDE 750 MG/1
10 CAPSULE, EXTENDED RELEASE ORAL DAILY
Status: DISCONTINUED | OUTPATIENT
Start: 2023-12-13 | End: 2023-12-13

## 2023-12-12 RX ORDER — EPHEDRINE SULFATE 50 MG/ML
INJECTION, SOLUTION INTRAVENOUS AS NEEDED
Status: DISCONTINUED | OUTPATIENT
Start: 2023-12-12 | End: 2023-12-12 | Stop reason: SURG

## 2023-12-12 RX ORDER — METOPROLOL TARTRATE 1 MG/ML
INJECTION, SOLUTION INTRAVENOUS AS NEEDED
Status: DISCONTINUED | OUTPATIENT
Start: 2023-12-12 | End: 2023-12-12 | Stop reason: SURG

## 2023-12-12 RX ORDER — ONDANSETRON 4 MG/1
4 TABLET, ORALLY DISINTEGRATING ORAL EVERY 6 HOURS PRN
Status: DISCONTINUED | OUTPATIENT
Start: 2023-12-12 | End: 2023-12-13 | Stop reason: HOSPADM

## 2023-12-12 RX ORDER — IBUPROFEN 600 MG/1
1 TABLET ORAL
Status: DISCONTINUED | OUTPATIENT
Start: 2023-12-12 | End: 2023-12-13 | Stop reason: HOSPADM

## 2023-12-12 RX ORDER — CEFAZOLIN SODIUM 2 G/100ML
2 INJECTION, SOLUTION INTRAVENOUS ONCE
Status: COMPLETED | OUTPATIENT
Start: 2023-12-12 | End: 2023-12-12

## 2023-12-12 RX ORDER — PANTOPRAZOLE SODIUM 40 MG/1
40 TABLET, DELAYED RELEASE ORAL DAILY
Status: DISCONTINUED | OUTPATIENT
Start: 2023-12-13 | End: 2023-12-13 | Stop reason: HOSPADM

## 2023-12-12 RX ORDER — ESMOLOL HYDROCHLORIDE 10 MG/ML
INJECTION INTRAVENOUS AS NEEDED
Status: DISCONTINUED | OUTPATIENT
Start: 2023-12-12 | End: 2023-12-12 | Stop reason: SURG

## 2023-12-12 RX ORDER — NICOTINE POLACRILEX 4 MG
15 LOZENGE BUCCAL
Status: DISCONTINUED | OUTPATIENT
Start: 2023-12-12 | End: 2023-12-13 | Stop reason: HOSPADM

## 2023-12-12 RX ORDER — AMIODARONE HYDROCHLORIDE 200 MG/1
200 TABLET ORAL DAILY
Status: DISCONTINUED | OUTPATIENT
Start: 2023-12-13 | End: 2023-12-13

## 2023-12-12 RX ORDER — ISOSORBIDE MONONITRATE 60 MG/1
60 TABLET, EXTENDED RELEASE ORAL DAILY
Status: DISCONTINUED | OUTPATIENT
Start: 2023-12-13 | End: 2023-12-13 | Stop reason: HOSPADM

## 2023-12-12 RX ADMIN — HYDROMORPHONE HYDROCHLORIDE 0.5 MG: 1 INJECTION, SOLUTION INTRAMUSCULAR; INTRAVENOUS; SUBCUTANEOUS at 22:08

## 2023-12-12 RX ADMIN — ATORVASTATIN CALCIUM 40 MG: 40 TABLET, FILM COATED ORAL at 21:49

## 2023-12-12 RX ADMIN — METOPROLOL TARTRATE 1 MG: 1 INJECTION, SOLUTION INTRAVENOUS at 18:22

## 2023-12-12 RX ADMIN — ONDANSETRON 4 MG: 2 INJECTION INTRAMUSCULAR; INTRAVENOUS at 16:27

## 2023-12-12 RX ADMIN — SODIUM CHLORIDE 700 ML: 9 INJECTION, SOLUTION INTRAVENOUS at 19:11

## 2023-12-12 RX ADMIN — ESMOLOL HYDROCHLORIDE 20 MG: 100 INJECTION, SOLUTION INTRAVENOUS at 18:06

## 2023-12-12 RX ADMIN — HYDROMORPHONE HYDROCHLORIDE 0.5 MG: 1 INJECTION, SOLUTION INTRAMUSCULAR; INTRAVENOUS; SUBCUTANEOUS at 17:14

## 2023-12-12 RX ADMIN — PHENYLEPHRINE HYDROCHLORIDE 2 MCG/KG/MIN: 10 INJECTION INTRAVENOUS at 16:05

## 2023-12-12 RX ADMIN — ESMOLOL HYDROCHLORIDE 10 MG: 100 INJECTION, SOLUTION INTRAVENOUS at 17:42

## 2023-12-12 RX ADMIN — Medication 200 MCG: at 16:04

## 2023-12-12 RX ADMIN — Medication 200 MCG: at 19:28

## 2023-12-12 RX ADMIN — PROPOFOL 150 MG: 10 INJECTION, EMULSION INTRAVENOUS at 15:37

## 2023-12-12 RX ADMIN — INSULIN LISPRO 2 UNITS: 100 INJECTION, SOLUTION INTRAVENOUS; SUBCUTANEOUS at 22:13

## 2023-12-12 RX ADMIN — VASOPRESSIN 2 UNITS: 20 INJECTION INTRAVENOUS at 16:04

## 2023-12-12 RX ADMIN — ESMOLOL HYDROCHLORIDE 20 MG: 100 INJECTION, SOLUTION INTRAVENOUS at 17:28

## 2023-12-12 RX ADMIN — DEXMEDETOMIDINE 20 MCG: 100 INJECTION, SOLUTION, CONCENTRATE INTRAVENOUS at 16:06

## 2023-12-12 RX ADMIN — ESMOLOL HYDROCHLORIDE 10 MG: 100 INJECTION, SOLUTION INTRAVENOUS at 17:55

## 2023-12-12 RX ADMIN — ACETAMINOPHEN 1000 MG: 500 TABLET ORAL at 14:43

## 2023-12-12 RX ADMIN — ROCURONIUM BROMIDE 20 MG: 50 INJECTION INTRAVENOUS at 18:12

## 2023-12-12 RX ADMIN — Medication 100 MCG: at 15:57

## 2023-12-12 RX ADMIN — ROCURONIUM BROMIDE 50 MG: 50 INJECTION INTRAVENOUS at 15:37

## 2023-12-12 RX ADMIN — SODIUM CHLORIDE 1000 ML: 9 INJECTION, SOLUTION INTRAVENOUS at 19:26

## 2023-12-12 RX ADMIN — SODIUM CHLORIDE 40 ML: 9 INJECTION, SOLUTION INTRAVENOUS at 14:45

## 2023-12-12 RX ADMIN — CEFAZOLIN SODIUM 2 G: 2 INJECTION, SOLUTION INTRAVENOUS at 22:47

## 2023-12-12 RX ADMIN — CARVEDILOL 6.25 MG: 6.25 TABLET, FILM COATED ORAL at 21:49

## 2023-12-12 RX ADMIN — Medication 200 MCG: at 16:03

## 2023-12-12 RX ADMIN — LIDOCAINE HYDROCHLORIDE 100 MG: 20 INJECTION, SOLUTION EPIDURAL; INFILTRATION; INTRACAUDAL; PERINEURAL at 16:06

## 2023-12-12 RX ADMIN — Medication 200 MCG: at 16:02

## 2023-12-12 RX ADMIN — Medication 100 MCG: at 15:54

## 2023-12-12 RX ADMIN — DEXMEDETOMIDINE 20 MCG: 100 INJECTION, SOLUTION, CONCENTRATE INTRAVENOUS at 15:29

## 2023-12-12 RX ADMIN — HEPARIN SODIUM 1000 UNITS: 5000 INJECTION INTRAVENOUS; SUBCUTANEOUS at 17:47

## 2023-12-12 RX ADMIN — FENTANYL CITRATE 100 MCG: 50 INJECTION, SOLUTION INTRAMUSCULAR; INTRAVENOUS at 16:13

## 2023-12-12 RX ADMIN — VASOPRESSIN 2 UNITS: 20 INJECTION INTRAVENOUS at 16:07

## 2023-12-12 RX ADMIN — DEXAMETHASONE SODIUM PHOSPHATE 4 MG: 4 INJECTION, SOLUTION INTRAMUSCULAR; INTRAVENOUS at 16:27

## 2023-12-12 RX ADMIN — VASOPRESSIN 2 UNITS: 20 INJECTION INTRAVENOUS at 16:10

## 2023-12-12 RX ADMIN — Medication 200 MCG: at 16:05

## 2023-12-12 RX ADMIN — HEPARIN SODIUM 7000 UNITS: 5000 INJECTION INTRAVENOUS; SUBCUTANEOUS at 16:52

## 2023-12-12 RX ADMIN — PROPOFOL 40 MG: 10 INJECTION, EMULSION INTRAVENOUS at 17:27

## 2023-12-12 RX ADMIN — DEXMEDETOMIDINE 20 MCG: 100 INJECTION, SOLUTION, CONCENTRATE INTRAVENOUS at 17:56

## 2023-12-12 RX ADMIN — LIDOCAINE HYDROCHLORIDE 100 MG: 20 INJECTION, SOLUTION EPIDURAL; INFILTRATION; INTRACAUDAL; PERINEURAL at 15:37

## 2023-12-12 RX ADMIN — ROCURONIUM BROMIDE 20 MG: 50 INJECTION INTRAVENOUS at 17:18

## 2023-12-12 RX ADMIN — SUGAMMADEX 20 MG: 100 INJECTION, SOLUTION INTRAVENOUS at 19:20

## 2023-12-12 RX ADMIN — METOCLOPRAMIDE 5 MG: 5 TABLET ORAL at 21:49

## 2023-12-12 RX ADMIN — GABAPENTIN 600 MG: 300 CAPSULE ORAL at 21:49

## 2023-12-12 RX ADMIN — FUROSEMIDE 80 MG: 80 TABLET ORAL at 21:49

## 2023-12-12 RX ADMIN — MIDAZOLAM HYDROCHLORIDE 1 MG: 1 INJECTION, SOLUTION INTRAMUSCULAR; INTRAVENOUS at 14:43

## 2023-12-12 RX ADMIN — EPHEDRINE SULFATE 10 MG: 50 INJECTION INTRAVENOUS at 15:57

## 2023-12-12 RX ADMIN — FENTANYL CITRATE 100 MCG: 50 INJECTION, SOLUTION INTRAMUSCULAR; INTRAVENOUS at 15:37

## 2023-12-12 RX ADMIN — METOPROLOL TARTRATE 2 MG: 1 INJECTION, SOLUTION INTRAVENOUS at 18:24

## 2023-12-12 RX ADMIN — CEFAZOLIN SODIUM 2 G: 2 INJECTION, SOLUTION INTRAVENOUS at 15:44

## 2023-12-12 RX ADMIN — NICOTINE 1 PATCH: 21 PATCH, EXTENDED RELEASE TRANSDERMAL at 22:07

## 2023-12-12 RX ADMIN — VASOPRESSIN 2 UNITS: 20 INJECTION INTRAVENOUS at 16:14

## 2023-12-12 RX ADMIN — METOPROLOL TARTRATE 2 MG: 1 INJECTION, SOLUTION INTRAVENOUS at 18:26

## 2023-12-12 NOTE — ANESTHESIA PREPROCEDURE EVALUATION
Anesthesia Evaluation     Patient summary reviewed and Nursing notes reviewed   no history of anesthetic complications:   NPO Solid Status: > 8 hours  NPO Liquid Status: > 2 hours           Airway   Mallampati: II  TM distance: >3 FB  Neck ROM: full  No difficulty expected  Dental    (+) edentulous    Pulmonary - normal exam    breath sounds clear to auscultation  (+) a smoker Current, Abstained day of surgery, COPD,sleep apnea  Cardiovascular - normal exam  Exercise tolerance: poor (<4 METS)    ECG reviewed  Rhythm: regular  Rate: normal    (+) hypertension, CAD, cardiac stents (new stent 11/2022) Drug eluting stent within the past 12 months , angina, PVD, hyperlipidemia    ROS comment: Sinus rhythm  Prolonged QT interval    Neuro/Psych- negative ROS  GI/Hepatic/Renal/Endo    (+) hepatitis, liver disease (hx alcohol use-- sober over 10 years), renal disease (peritoneal)- dialysis and ESRD, diabetes mellitus type 2    Musculoskeletal     Abdominal    Substance History   (+) alcohol use (sober over 10 years)     OB/GYN negative ob/gyn ROS         Other   arthritis,     ROS/Med Hx Other: PAT Nursing Notes unavailable.                     Anesthesia Plan    ASA 4     general and Ralph     (Patient understands anesthesia not responsible for dental damage.)  intravenous induction     Anesthetic plan, risks, benefits, and alternatives have been provided, discussed and informed consent has been obtained with: patient.  Pre-procedure education provided  Use of blood products discussed with patient .        CODE STATUS:

## 2023-12-12 NOTE — NURSING NOTE
Left Groin Lymph Node taken for specimen during procedure, pathologist gone for day.  Sera, Charge Nurse called Abdiel, the pathologist on call and stated to put lymph node in formalin. Taken to lab by OTONIEL Weber. Placed in Formalin by lab at 1700.   Renard Eckert RN

## 2023-12-13 ENCOUNTER — READMISSION MANAGEMENT (OUTPATIENT)
Dept: CALL CENTER | Facility: HOSPITAL | Age: 58
End: 2023-12-13
Payer: MEDICAID

## 2023-12-13 VITALS
BODY MASS INDEX: 29.2 KG/M2 | TEMPERATURE: 97.4 F | OXYGEN SATURATION: 97 % | SYSTOLIC BLOOD PRESSURE: 111 MMHG | HEART RATE: 103 BPM | WEIGHT: 186.07 LBS | DIASTOLIC BLOOD PRESSURE: 71 MMHG | HEIGHT: 67 IN | RESPIRATION RATE: 13 BRPM

## 2023-12-13 LAB
ANION GAP SERPL CALCULATED.3IONS-SCNC: 16.6 MMOL/L (ref 5–15)
BASOPHILS # BLD AUTO: 0.01 10*3/MM3 (ref 0–0.2)
BASOPHILS NFR BLD AUTO: 0.1 % (ref 0–1.5)
BUN SERPL-MCNC: 41 MG/DL (ref 6–20)
BUN/CREAT SERPL: 6.5 (ref 7–25)
CALCIUM SPEC-SCNC: 8.6 MG/DL (ref 8.6–10.5)
CHLORIDE SERPL-SCNC: 98 MMOL/L (ref 98–107)
CO2 SERPL-SCNC: 21.4 MMOL/L (ref 22–29)
CREAT SERPL-MCNC: 6.33 MG/DL (ref 0.76–1.27)
DEPRECATED RDW RBC AUTO: 64.9 FL (ref 37–54)
EGFRCR SERPLBLD CKD-EPI 2021: 9.5 ML/MIN/1.73
EOSINOPHIL # BLD AUTO: 0.03 10*3/MM3 (ref 0–0.4)
EOSINOPHIL NFR BLD AUTO: 0.4 % (ref 0.3–6.2)
ERYTHROCYTE [DISTWIDTH] IN BLOOD BY AUTOMATED COUNT: 18.8 % (ref 12.3–15.4)
GLUCOSE BLDC GLUCOMTR-MCNC: 116 MG/DL (ref 70–99)
GLUCOSE BLDC GLUCOMTR-MCNC: 122 MG/DL (ref 70–99)
GLUCOSE SERPL-MCNC: 123 MG/DL (ref 65–99)
HBV SURFACE AG SERPL QL IA: NORMAL
HCT VFR BLD AUTO: 35.2 % (ref 37.5–51)
HGB BLD-MCNC: 10.6 G/DL (ref 13–17.7)
IMM GRANULOCYTES # BLD AUTO: 0.05 10*3/MM3 (ref 0–0.05)
IMM GRANULOCYTES NFR BLD AUTO: 0.6 % (ref 0–0.5)
LYMPHOCYTES # BLD AUTO: 0.56 10*3/MM3 (ref 0.7–3.1)
LYMPHOCYTES NFR BLD AUTO: 6.5 % (ref 19.6–45.3)
MAGNESIUM SERPL-MCNC: 1.9 MG/DL (ref 1.6–2.6)
MCH RBC QN AUTO: 28.4 PG (ref 26.6–33)
MCHC RBC AUTO-ENTMCNC: 30.1 G/DL (ref 31.5–35.7)
MCV RBC AUTO: 94.4 FL (ref 79–97)
MONOCYTES # BLD AUTO: 0.46 10*3/MM3 (ref 0.1–0.9)
MONOCYTES NFR BLD AUTO: 5.4 % (ref 5–12)
NEUTROPHILS NFR BLD AUTO: 7.46 10*3/MM3 (ref 1.7–7)
NEUTROPHILS NFR BLD AUTO: 87 % (ref 42.7–76)
NRBC BLD AUTO-RTO: 0 /100 WBC (ref 0–0.2)
PHOSPHATE SERPL-MCNC: 7 MG/DL (ref 2.5–4.5)
PLATELET # BLD AUTO: 150 10*3/MM3 (ref 140–450)
PMV BLD AUTO: 10 FL (ref 6–12)
POTASSIUM SERPL-SCNC: 4.8 MMOL/L (ref 3.5–5.2)
RBC # BLD AUTO: 3.73 10*6/MM3 (ref 4.14–5.8)
SODIUM SERPL-SCNC: 136 MMOL/L (ref 136–145)
WBC NRBC COR # BLD AUTO: 8.57 10*3/MM3 (ref 3.4–10.8)

## 2023-12-13 PROCEDURE — 87340 HEPATITIS B SURFACE AG IA: CPT | Performed by: STUDENT IN AN ORGANIZED HEALTH CARE EDUCATION/TRAINING PROGRAM

## 2023-12-13 PROCEDURE — 25010000002 HEPARIN (PORCINE) PER 1000 UNITS: Performed by: SURGERY

## 2023-12-13 PROCEDURE — 85025 COMPLETE CBC W/AUTO DIFF WBC: CPT | Performed by: SURGERY

## 2023-12-13 PROCEDURE — 83735 ASSAY OF MAGNESIUM: CPT | Performed by: SURGERY

## 2023-12-13 PROCEDURE — 84100 ASSAY OF PHOSPHORUS: CPT | Performed by: SURGERY

## 2023-12-13 PROCEDURE — 99222 1ST HOSP IP/OBS MODERATE 55: CPT | Performed by: STUDENT IN AN ORGANIZED HEALTH CARE EDUCATION/TRAINING PROGRAM

## 2023-12-13 PROCEDURE — 25010000002 CEFAZOLIN IN DEXTROSE 2-4 GM/100ML-% SOLUTION: Performed by: SURGERY

## 2023-12-13 PROCEDURE — 99024 POSTOP FOLLOW-UP VISIT: CPT | Performed by: SURGERY

## 2023-12-13 PROCEDURE — 82948 REAGENT STRIP/BLOOD GLUCOSE: CPT

## 2023-12-13 PROCEDURE — 80048 BASIC METABOLIC PNL TOTAL CA: CPT | Performed by: SURGERY

## 2023-12-13 PROCEDURE — 25010000002 HYDROMORPHONE 1 MG/ML SOLUTION: Performed by: SURGERY

## 2023-12-13 PROCEDURE — 5A1D70Z PERFORMANCE OF URINARY FILTRATION, INTERMITTENT, LESS THAN 6 HOURS PER DAY: ICD-10-PCS | Performed by: STUDENT IN AN ORGANIZED HEALTH CARE EDUCATION/TRAINING PROGRAM

## 2023-12-13 RX ORDER — LACTULOSE 10 G/15ML
20 SOLUTION ORAL 2 TIMES DAILY PRN
COMMUNITY

## 2023-12-13 RX ORDER — RIFAXIMIN 550 MG/1
550 TABLET ORAL EVERY 12 HOURS SCHEDULED
COMMUNITY
Start: 2023-12-01

## 2023-12-13 RX ORDER — OXYCODONE HYDROCHLORIDE AND ACETAMINOPHEN 5; 325 MG/1; MG/1
1 TABLET ORAL EVERY 6 HOURS PRN
Qty: 24 TABLET | Refills: 0 | Status: SHIPPED | OUTPATIENT
Start: 2023-12-13 | End: 2023-12-19

## 2023-12-13 RX ORDER — HYDRALAZINE HYDROCHLORIDE 10 MG/1
10 TABLET, FILM COATED ORAL 3 TIMES DAILY
COMMUNITY

## 2023-12-13 RX ORDER — NALOXONE HYDROCHLORIDE 4 MG/.1ML
4 SPRAY NASAL TAKE AS DIRECTED
COMMUNITY

## 2023-12-13 RX ADMIN — ASPIRIN 81 MG: 81 TABLET, COATED ORAL at 12:47

## 2023-12-13 RX ADMIN — DOCUSATE SODIUM 100 MG: 100 CAPSULE, LIQUID FILLED ORAL at 08:20

## 2023-12-13 RX ADMIN — CEFAZOLIN SODIUM 2 G: 2 INJECTION, SOLUTION INTRAVENOUS at 06:32

## 2023-12-13 RX ADMIN — TAMSULOSIN HYDROCHLORIDE 0.4 MG: 0.4 CAPSULE ORAL at 08:20

## 2023-12-13 RX ADMIN — PANTOPRAZOLE SODIUM 40 MG: 40 TABLET, DELAYED RELEASE ORAL at 08:20

## 2023-12-13 RX ADMIN — LEVOTHYROXINE SODIUM 50 MCG: 50 TABLET ORAL at 06:32

## 2023-12-13 RX ADMIN — GABAPENTIN 600 MG: 300 CAPSULE ORAL at 08:20

## 2023-12-13 RX ADMIN — LANTHANUM CARBONATE 1000 MG: 500 TABLET, CHEWABLE ORAL at 12:49

## 2023-12-13 RX ADMIN — LANTHANUM CARBONATE 1000 MG: 500 TABLET, CHEWABLE ORAL at 08:20

## 2023-12-13 RX ADMIN — CINACALCET 30 MG: 30 TABLET, FILM COATED ORAL at 08:20

## 2023-12-13 RX ADMIN — CALCITRIOL CAPSULES 0.25 MCG 0.5 MCG: 0.25 CAPSULE ORAL at 12:47

## 2023-12-13 RX ADMIN — FUROSEMIDE 80 MG: 80 TABLET ORAL at 08:20

## 2023-12-13 RX ADMIN — OXYCODONE AND ACETAMINOPHEN 1 TABLET: 10; 325 TABLET ORAL at 09:13

## 2023-12-13 RX ADMIN — OXYCODONE AND ACETAMINOPHEN 1 TABLET: 10; 325 TABLET ORAL at 00:59

## 2023-12-13 RX ADMIN — SERTRALINE HYDROCHLORIDE 100 MG: 100 TABLET ORAL at 08:20

## 2023-12-13 RX ADMIN — CLOPIDOGREL BISULFATE 75 MG: 75 TABLET ORAL at 08:20

## 2023-12-13 RX ADMIN — HYDROMORPHONE HYDROCHLORIDE 0.5 MG: 1 INJECTION, SOLUTION INTRAMUSCULAR; INTRAVENOUS; SUBCUTANEOUS at 05:10

## 2023-12-13 RX ADMIN — HEPARIN SODIUM 5000 UNITS: 5000 INJECTION INTRAVENOUS; SUBCUTANEOUS at 08:19

## 2023-12-13 NOTE — CONSULTS
Pulmonary / Critical Care Consult Note      Patient Name: Chevy Junior  : 1965  MRN: [id number]  Primary Care Physician:  Janeth Le APRN  Referring Physician: Luis Fernando Ralws MD  Date of admission: 2023    Subjective   Subjective     Reason for Consult/ Chief Complaint: Postoperative hypotension requiring critical care management    HPI:  Chevy Junior is a 58 y.o. male with past medical history of peripheral vascular disease, COPD, CAD, ESRD on HD, diabetes mellitus, heart failure with preserved ejection fraction, RIC who presented to vascular surgery as outpatient with left fifth toe gangrene for evaluation.  Patient's treatment recommendations were to proceed with left femoral to below the knee popliteal bypass graft.  Patient was in agreement with this procedure.  Patient had scheduled procedure on .  Postoperatively patient transferred to CCU for critical care management postoperatively.  Patient was noted overnight to be with hypotension.  Patient did not require pressor therapy.  Oral home hypertension medications were placed on hold.  Pulmonary critical care has been consulted while in CCU for ongoing critical care management    Review of Systems  General: Fatigue, weakness; otherwise denied complaints  HEENT: Denied complaints  Respiratory: Denied complaints  Cardiovascular: MALIK; otherwise denied complaints  GI: Denied complaints  Neurologic: Denied complaints  Skin: Gangrene left fifth toe      Personal History     Past Medical History:   Diagnosis Date    Anemia     Atherosclerosis of native artery of left lower extremity with gangrene     CHF (congestive heart failure)     Chronic pain     COPD (chronic obstructive pulmonary disease)     INHALER    Coronary artery disease involving native coronary artery of native heart with angina pectoris 2022    Diabetes mellitus     ESRD on dialysis     M,W,F-FISTULA LEFT ARM    Heart failure with preserved left ventricular  function (HFpEF) 09/13/2022    FOLLOWS JUNAID-NO CP    Hyperlipidemia     Hypertension     WIFE STATED B/P HAS BEEN RUNNING LOW LATELY    Infectious viral hepatitis 5 years ago was treated    RIC (obstructive sleep apnea)     Osteoarthritis     Phosphorus metabolic disorder     Sleep apnea     NO MACHINE    Substance abuse 20 years ago from [facility]       Past Surgical History:   Procedure Laterality Date    ABOVE KNEE LEG AMPUTATION Right     CARDIAC CATHETERIZATION      CARDIAC CATHETERIZATION N/A 11/30/2022    Procedure: Percutaneous Coronary Intervention;  Surgeon: River Acevedo IV, MD;  Location:  [facility] CATH INVASIVE LOCATION;  Service: Cardiovascular;  Laterality: N/A;    CARDIAC CATHETERIZATION N/A 11/30/2022    Procedure: Optical Coherent Tomography;  Surgeon: River Acevedo IV, MD;  Location:  [facility] CATH INVASIVE LOCATION;  Service: Cardiovascular;  Laterality: N/A;    CARDIAC CATHETERIZATION N/A 11/30/2022    Procedure: Stent JOSE coronary;  Surgeon: River Acevedo IV, MD;  Location:  [facility] CATH INVASIVE LOCATION;  Service: Cardiovascular;  Laterality: N/A;    CARDIAC CATHETERIZATION Left 11/09/2023    Procedure: Aortogram with left leg angiogram, possible angioplasty or stenting;  Surgeon: Luis Fernando Rawls MD;  Location: [facility] CATH INVASIVE LOCATION;  Service: Vascular;  Laterality: Left;    CATH LAB PROCEDURE      unable to stent    COLONOSCOPY N/A 02/15/2023    Procedure: COLONOSCOPY WITH POLYPECTOMY;  Surgeon: David Glass MD;  Location: [facility] ENDOSCOPY;  Service: Gastroenterology;  Laterality: N/A;  COLON POLYP, MELANOSIS COLI    ENDOSCOPY N/A 02/03/2023    Procedure: ESOPHAGOGASTRODUODENOSCOPY with biopsy;  Surgeon: Gus Whaley MD;  Location: [facility] ENDOSCOPY;  Service: General;  Laterality: N/A;  gastritis,     FEMORAL POPLITEAL BYPASS Left 12/12/2023    Procedure: Left femoral edarterectomy, left femoral to below the knee popliteal bypass graft;   Surgeon: Luis Fernando Rawls MD;  Location: [facility] MAIN OR;  Service: Vascular;  Laterality: Left;    HIP SURGERY      INSERTION PERITONEAL DIALYSIS CATHETER      LEG SURGERY      MANDIBLE FRACTURE SURGERY      REPLACEMENT TOTAL KNEE Right     x3    SHOULDER SURGERY Right     SPINE SURGERY      unknown procudure- completed at [facility] in [facility].       Family History: family history includes Arthritis in his mother; Diabetes in his father; Hypertension in his mother. Otherwise pertinent FHx was reviewed and not pertinent to current issue.    Social History:  reports that he has been smoking cigarettes. He has a 80.00 pack-year smoking history. He has never used smokeless tobacco. He reports that he does not currently use alcohol. He reports that he does not currently use drugs after having used the following drugs: Hydrocodone.    Home Medications:  BASAGLAR KWIKPEN, Diclofenac Sodium, Iron Sucrose, albuterol sulfate HFA, amLODIPine, aspirin, atorvastatin, cinacalcet, clopidogrel, docusate sodium, fentaNYL, furosemide, gabapentin, heparin (porcine), hydrALAZINE, isosorbide mononitrate, lactulose, lanthanum, levothyroxine, metoclopramide, metoprolol tartrate, nitroglycerin, ondansetron ODT, oxyCODONE, pantoprazole, polyethylene glycol, potassium chloride, riFAXIMin, sertraline, sucralfate, tamsulosin, and valsartan    Allergies:  No Known Allergies    Objective    Objective     Vitals:   Temp:  [97.2 °F (36.2 °C)-99 °F (37.2 °C)] 97.9 °F (36.6 °C)  Heart Rate:  [80-99] 91  Resp:  [10-20] 13  BP: ()/(45-90) 100/66  Flow (L/min):  [0-2] 0    Physical Exam:  Vital Signs Reviewed   General:  Alert, NAD.    HEENT:  PERRL, EOMI.    Neck:  No JVD, no thyromegaly  Lymph: no axillary, cervical, supraclavicular lymphadenopathy noted bilaterally  Chest:  Clear to auscultation bilaterally, no work of breathing noted  CV: RRR, no M/G/R, pulses 2+  Abd:  Soft, NT, ND, +BS  EXT:  no clubbing, no cyanosis, angriness left fifth toe,  right BKA, incision site without erythema or drainage  Neuro:  A&Ox3, CN grossly intact, no focal deficits.  Skin: No rashes or lesions noted    Result Review    Result Review:  I have personally reviewed the results from the time of this admission to 12/13/2023 11:14 EST and agree with these findings:  []  Laboratory  []  Microbiology  []  Radiology  []  EKG/Telemetry   []  Cardiology/Vascular   []  Pathology  []  Old records   []  Other:  Most notable findings include:       Lab 12/13/23  0306 12/12/23  1323 12/12/23  1322   WBC 8.57  --  4.76   HEMOGLOBIN 10.6*  --  11.3*   HEMATOCRIT 35.2*  --  38.2   PLATELETS 150  --  141   SODIUM 136 136  --    POTASSIUM 4.8 4.1  --    CHLORIDE 98 98  --    CO2 21.4* 25.1  --    BUN 41* 33*  --    CREATININE 6.33* 5.72*  --    GLUCOSE 123* 134*  --    CALCIUM 8.6 9.2  --    PHOSPHORUS 7.0*  --   --    TOTAL PROTEIN  --  8.2  --    ALBUMIN  --  3.8  --    GLOBULIN  --  4.4  --          Assessment & Plan   Assessment / Plan     Active Hospital Problems:  Active Hospital Problems    Diagnosis     **Atherosclerosis of native artery of lower extremity with gangrene     Atherosclerosis of native arteries of the extremities with gangrene     ESRD on hemodialysis     Heart failure with preserved left ventricular function (HFpEF)     Essential hypertension          Impression:    Hypertension at baseline requiring outpatient treatment regimen, currently hypotensive with BP meds on hold  Foot gangrene status post left external iliac and left femoral endarterectomy, left femoral to below the knee popliteal bypass graft  Peripheral vascular disease  ESRD on HD  COPD without exacerbation  Congestive heart failure with preserved ejection fraction  Anemia of chronic disease  Diabetes mellitus  Hyperlipidemia  RIC, does not use NIPPV at home    Plan:    Nephrology on board.  HD per them.    Home hypertensive medicines on hold, except Lasix which has been continued.  Further BP management per  them.  Appreciate their assistance  Has not required pressor therapy  Vascular surgery on board.  Postop care per them  Continue ASA, statin, Plavix, levothyroxine  Continue HEMPHILL and SSI to optimize glucose control  Trend electrolytes.  Replace as needed  GI prophylaxis with Protonix  DVT prophylaxis with heparin    This patient was seen by both a physician and a NP. I, Wei Steele MD, spent >50% of time in accordance with split shared billing. This included personally reviewing all pertinent labs, imaging, microbiology and documentation. Also discussing the case with the patient and any available family, the admitting physician and any available ancillary staff.   Electronically signed by Wei Steele MD, 12/13/23, 3:30 PM EST.

## 2023-12-13 NOTE — DISCHARGE SUMMARY
[facility]         DISCHARGE SUMMARY    Patient Name: Chevy Junior  : 1965  MRN: [id number]    Date of Admission: 2023  Date of Discharge:  2023  Primary Care Physician: Janeth Le APRN    Consults       Date and Time Order Name Status Description    2023  8:49 PM Inpatient Nephrology Consult      2023  8:14 PM Inpatient Nephrology Consult Completed             Presenting Problem:   Atherosclerosis of native artery of lower extremity with gangrene, unspecified laterality [I70.269]  Atherosclerosis of native arteries of the extremities with gangrene [I70.269]    Active and Resolved Hospital Problems:  Active Hospital Problems    Diagnosis POA    **Atherosclerosis of native artery of lower extremity with gangrene [I70.269] Unknown    Atherosclerosis of native arteries of the extremities with gangrene [I70.269] Yes    ESRD on hemodialysis [N18.6, Z99.2] Not Applicable    Heart failure with preserved left ventricular function (HFpEF) [I50.30] Yes    Essential hypertension [I10] Yes      Resolved Hospital Problems   No resolved problems to display.         Hospital Course     Hospital Course:  Chevy Junior is a 58 y.o. male electively admitted for left leg revascularization due to gangrene.  Surgery was uneventful.  He was transferred postoperatively to recovery room then ICU overnight.  Dialysis performed in the morning of postop day #1 as he is a dialysis patient.  No significant events.  Wants to go home.            Day of Discharge     Vital Signs:  Temp:  [97.2 °F (36.2 °C)-99 °F (37.2 °C)] 97.9 °F (36.6 °C)  Heart Rate:  [80-99] 91  Resp:  [10-20] 13  BP: ()/(45-90) 100/66  Flow (L/min):  [0-2] 0    Physical Exam:  General: Alert, no acute distress  Wounds: Healing well, no signs of infection.  Left foot: Warm, well-perfused.      Pertinent  and/or Most Recent Results     LAB RESULTS:      Lab 23  0306 23  1322   WBC 8.57 4.76    HEMOGLOBIN 10.6* 11.3*   HEMATOCRIT 35.2* 38.2   PLATELETS 150 141   NEUTROS ABS 7.46*  --    IMMATURE GRANS (ABS) 0.05  --    LYMPHS ABS 0.56*  --    MONOS ABS 0.46  --    EOS ABS 0.03  --    MCV 94.4 94.8         Lab 12/13/23  0306 12/12/23  1323   SODIUM 136 136   POTASSIUM 4.8 4.1   CHLORIDE 98 98   CO2 21.4* 25.1   ANION GAP 16.6* 12.9   BUN 41* 33*   CREATININE 6.33* 5.72*   EGFR 9.5* 10.7*   GLUCOSE 123* 134*   CALCIUM 8.6 9.2   MAGNESIUM 1.9  --    PHOSPHORUS 7.0*  --          Lab 12/12/23  1323   TOTAL PROTEIN 8.2   ALBUMIN 3.8   GLOBULIN 4.4   ALT (SGPT) 7   AST (SGOT) 11   BILIRUBIN 0.3   ALK PHOS 128*                 Lab 12/12/23  1321   ABO TYPING O   RH TYPING Positive   ANTIBODY SCREEN Positive         Brief Urine Lab Results       None          Microbiology Results (last 10 days)       ** No results found for the last 240 hours. **                 Results for orders placed during the hospital encounter of 05/09/23    Duplex Carotid Ultrasound CAR    Interpretation Summary    Proximal right internal carotid artery plaque without significant stenosis.    Proximal left internal carotid artery plaque without significant stenosis.    Dense calcified plaque is present in the bifurcations bilaterally.  No stenosis detected.    Vertebral flow is antegrade bilaterally.    No significant change from prior study dated 4/26/2022.      Results for orders placed during the hospital encounter of 05/09/23    Duplex Carotid Ultrasound CAR    Interpretation Summary    Proximal right internal carotid artery plaque without significant stenosis.    Proximal left internal carotid artery plaque without significant stenosis.    Dense calcified plaque is present in the bifurcations bilaterally.  No stenosis detected.    Vertebral flow is antegrade bilaterally.    No significant change from prior study dated 4/26/2022.      Results for orders placed during the hospital encounter of 03/15/22    Adult Transthoracic Echo  Complete W/ Cont if Necessary Per Protocol    Interpretation Summary  Excellent technical quality.    1.  LV size, function, and wall motion are normal.  Mild concentric left ventricular hypertrophy.  Visually estimated ejection fraction is 55 to 60%.  3D ejection fraction is 59%.  Grade 1A diastolic dysfunction.  Borderline left atrial enlargement.  Right heart chambers are normal.  No septal defect or intracavitary mass or thrombus.    2.  There is very minimal mitral annular calcification.  There is no mitral stenosis and there is trivial to mild MR.  The aortic valve is tricuspid and mildly sclerotic.  There is mild calcification of the right and noncoronary cusps.  There is no aortic stenosis and there is trivial AI.  Trivial to mild TR is demonstrated from a morphologically normal valve.    3.  There is no pericardial effusion present.  The proximal aortic root is mildly dilated at 4.1 cm with no dissection or aneurysm.    4.  Pulmonary artery systolic pressures are estimated in the high 20s.      Labs Pending at Discharge:  Pending Labs       Order Current Status    BB REFERENCE LAB SENDOUT In process    Hepatitis B Surface Antigen In process    Tissue Pathology Exam In process              Discharge Details        Discharge Medications        New Medications        Instructions Start Date   oxyCODONE-acetaminophen 5-325 MG per tablet  Commonly known as: PERCOCET   1 tablet, Oral, Every 6 Hours PRN             Changes to Medications        Instructions Start Date   atorvastatin 40 MG tablet  Commonly known as: LIPITOR  What changed: when to take this   40 mg, Oral, Daily             Continue These Medications        Instructions Start Date   albuterol sulfate  (90 Base) MCG/ACT inhaler  Commonly known as: PROVENTIL HFA;VENTOLIN HFA;PROAIR HFA   1 puff, Inhalation, Every 4 Hours PRN      amLODIPine 5 MG tablet  Commonly known as: NORVASC   5 mg, Oral, 2 Times Daily      aspirin 81 MG EC tablet   81 mg,  Oral, Daily      BASAGLAR KWIKPEN 100 UNIT/ML injection pen   15 Units, Subcutaneous, Daily      cinacalcet 30 MG tablet  Commonly known as: SENSIPAR   30 mg, Oral, Daily      clopidogrel 75 MG tablet  Commonly known as: PLAVIX   75 mg, Oral, Daily      Diclofenac Sodium 1 % gel gel  Commonly known as: VOLTAREN   2 g, Topical, 4 Times Daily PRN      docusate sodium 100 MG capsule  Commonly known as: COLACE   100 mg, Oral, 2 Times Daily PRN      fentaNYL 50 MCG/HR patch  Commonly known as: DURAGESIC   1 patch, Transdermal, Every 72 Hours      furosemide 80 MG tablet  Commonly known as: LASIX   80 mg, Oral, 2 Times Daily      gabapentin 800 MG tablet  Commonly known as: NEURONTIN   800 mg, Oral, 3 Times Daily      heparin (porcine) 1000 UNIT/ML injection   1,000 Units, Intravenous, Nightly      hydrALAZINE 10 MG tablet  Commonly known as: APRESOLINE   10 mg, Oral, 3 Times Daily      IRON SUCROSE IV   Intravenous, Every 14 Days, Iron infusion every 2 weeks       isosorbide mononitrate 60 MG 24 hr tablet  Commonly known as: IMDUR   60 mg, Oral, Daily      lactulose 10 GM/15ML solution  Commonly known as: CHRONULAC   20 g, Oral, 2 Times Daily PRN      lanthanum 1000 MG chewable tablet  Commonly known as: FOSRENOL   1,000 mg, Oral, 3 Times Daily With Meals      levothyroxine 75 MCG tablet  Commonly known as: SYNTHROID, LEVOTHROID   75 mcg, Oral, Daily      metoclopramide 5 MG tablet  Commonly known as: REGLAN   5 mg, Oral, Nightly      metoprolol tartrate 100 MG tablet  Commonly known as: LOPRESSOR   100 mg, Oral, 2 Times Daily      naloxone 4 MG/0.1ML nasal spray  Commonly known as: NARCAN   4 mg, Nasal, Take As Directed      nitroglycerin 0.4 MG SL tablet  Commonly known as: NITROSTAT   0.4 mg, Sublingual, Every 5 Minutes PRN      ondansetron ODT 4 MG disintegrating tablet  Commonly known as: ZOFRAN-ODT   4 mg, Translingual, Every 6 Hours PRN      oxyCODONE 20 MG tablet  Commonly known as: ROXICODONE   20 mg, Oral,  Every 4 Hours PRN      pantoprazole 40 MG EC tablet  Commonly known as: PROTONIX   40 mg, Oral, Daily      polyethylene glycol 17 GM/SCOOP powder  Commonly known as: MIRALAX   Take 17 g by mouth Daily As Needed.      potassium chloride 10 MEQ CR tablet  Commonly known as: K-DUR,KLOR-CON   10 mEq, Oral, Daily      sertraline 100 MG tablet  Commonly known as: ZOLOFT   150 mg, Oral, Daily      sucralfate 1 g tablet  Commonly known as: Carafate   1 g, Oral, 4 Times Daily      tamsulosin 0.4 MG capsule 24 hr capsule  Commonly known as: FLOMAX   1 capsule, Oral, Daily      valsartan 320 MG tablet  Commonly known as: DIOVAN   320 mg, Oral, Daily      Xifaxan 550 MG tablet  Generic drug: riFAXIMin   550 mg, Oral, Every 12 Hours Scheduled               No Known Allergies      Discharge Disposition:  Home or Self Care    Diet:    Resume home diet.    Condition:  Satisfactory      Discharge Activity:     As tolerated    CODE STATUS:  Code Status and Medical Interventions:   Ordered at: 12/12/23 2049     Level Of Support Discussed With:    Patient     Code Status (Patient has no pulse and is not breathing):    CPR (Attempt to Resuscitate)     Medical Interventions (Patient has pulse or is breathing):    Full         No future appointments.    Additional Instructions for the Follow-ups that You Need to Schedule       Comprehensive metabolic panel    Dec 12, 2023 (Approximate)      Release to patient: Routine Release                  Electronically signed by Luis Fernando Rawls MD, 12/13/23, 12:43 PM EST.

## 2023-12-13 NOTE — NURSING NOTE
Upon arrival to see patient for wound consult, Patient is already dressing and up in scooter awaiting transport for discharge.   Patient has been treating wound to left 5th toe prior to surgery with what he believes is an ointment. He is uncertain who prescribed the ointment or who the instructions were from.   Encouraged patient to reach out once home to [facility] and inquire about preferred treatment to toe going forward to ensure continued care while recovering from surgery.   He verbalized understanding.   Amara Daley RN
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
## 2023-12-13 NOTE — ANESTHESIA POSTPROCEDURE EVALUATION
Patient: Chevy Junior    Procedure Summary       Date: 12/12/23 Room / Location: [facility] OR 01 / [facility] MA[facility] OR    Anesthesia Start: 1526 Anesthesia Stop: 1951    Procedure: Left femoral edarterectomy, left femoral to below the knee popliteal bypass graft (Left: Thigh) Diagnosis:       Atherosclerosis of native artery of lower extremity with gangrene, unspecified laterality      (Atherosclerosis of native artery of lower extremity with gangrene, unspecified laterality [I70.269])    Surgeons: Luis Fernando Rawls MD Provider: Judi Goss MD    Anesthesia Type: general, Delphine ASA Status: 4            Anesthesia Type: general, Delphine    Vitals  Vitals Value Taken Time   BP 97/69 12/12/23 2011   Temp     Pulse 94 12/12/23 2015   Resp     SpO2 95 % 12/12/23 2015   Vitals shown include unfiled device data.        Post Anesthesia Care and Evaluation    Patient location during evaluation: bedside  Patient participation: complete - patient participated  Level of consciousness: awake  Pain management: adequate    Airway patency: patent  PONV Status: none  Cardiovascular status: acceptable and stable  Respiratory status: acceptable  Hydration status: acceptable    Comments: An Anesthesiologist personally participated in the most demanding procedures (including induction and emergence if applicable) in the anesthesia plan, monitored the course of anesthesia administration at frequent intervals and remained physically present and available for immediate diagnosis and treatment of emergencies.          
No

## 2023-12-13 NOTE — DISCHARGE INSTRUCTIONS
May shower beginning Thursday, 12/14/2023.  Follow-up in the office in 2 weeks, call for appointment.  Resume home diet.  Resume home medications.  No lifting greater than 15 pounds for 2 weeks.

## 2023-12-13 NOTE — OUTREACH NOTE
Prep Survey      Flowsheet Row Responses   [facility] facility patient discharged from? [facility]   Is LACE score < 7 ? No   Eligibility Readm Mgmt   Discharge diagnosis Left femoral edarterectomy, left femoral to below the knee popliteal bypass graft, ESRD on HD   Does the patient have one of the following disease processes/diagnoses(primary or secondary)? General Surgery   Does the patient have Home health ordered? No   Is there a DME ordered? No   Prep survey completed? Yes            Kina Trejo Registered Nurse

## 2023-12-13 NOTE — PLAN OF CARE
Goal Outcome Evaluation:              Outcome Evaluation: VSS on room air, complaint of pain- prn medications given. Incision wnl, pulses present, dialysis called and left message for 12/13/2023 treatment. No other complaints this evening. Patient hoping to go home soon.

## 2023-12-13 NOTE — CONSULTS
[facility]   Consult Note    Patient Name: Chevy Junior  : 1965  MRN: [id number]  Primary Care Physician:  Janeth Le APRN  Referring Physician: Luis Fernando Rawls MD  Date of admission: 2023    Subjective   Subjective     Reason for Consult/ Chief Complaint: ESRD    HPI:  Chevy Junior is a 58 y.o. male 58-year-old male with past medical history of ESRD on hemodialysis, peripheral vascular disease, hypothyroidism, hyperlipidemia, GERD, who comes in for planned endarterectomy and popliteal bypass graft due to gangrene of his fifth toe who post surgically was hypotensive and being monitored in the ICU.    This morning patient appears to be stable.  His blood pressure was 100/60.  Does not have any active complaints.  Nephrology has been consulted for ESRD management    Review of Systems  Constitutional:        Weakness tiredness fatigue  Eyes:                       No blurry vision, eye discharge, eye irritation, eye pain  HEENT:                   No acute hair loss, earache and discharge, nasal congestion or discharge, sore throat, postnasal drip  Respiratory:           No shortness of breath coughing sputum production wheezing hemoptysis pleuritic chest pain  Cardiovascular:     No chest pain, orthopnea, PND, dizziness, palpitation, lower extremity edema  Gastrointestinal:   No nausea vomiting diarrhea abdominal pain constipation  Genitourinary:       No urinary incontinence, hesitancy, frequency, urgency, dysuria  Neurological:        No confusion, headache, focal weakness, numbness, dysphasia  Hematologic:         No bruising, bleeding, pallor, lymphadenopathy  Endocrine:            No coldness, hot flashes, polyuria, abnormal hair growth  Musculoskeletal:  No body pains, aches, arthritic pains, muscle pain ,muscle wasting  Psychiatric:          No low or high mood, anxiety, hallucinations, delusions  Skin.                      No rash, ulcers, bruising, itching    Personal  History     Past Medical History:   Diagnosis Date    Anemia     Atherosclerosis of native artery of left lower extremity with gangrene     CHF (congestive heart failure)     Chronic pain     COPD (chronic obstructive pulmonary disease)     INHALER    Coronary artery disease involving native coronary artery of native heart with angina pectoris 09/12/2022    Diabetes mellitus     ESRD on dialysis     M,W,F-FISTULA LEFT ARM    Heart failure with preserved left ventricular function (HFpEF) 09/13/2022    FOLLOWS W/REECE-NO CP    Hyperlipidemia     Hypertension     WIFE STATED B/P HAS BEEN RUNNING LOW LATELY    Infectious viral hepatitis 5 years ago was treated    RIC (obstructive sleep apnea)     Osteoarthritis     Phosphorus metabolic disorder     Sleep apnea     NO MACHINE    Substance abuse 20 years ago from [employer]       Past Surgical History:   Procedure Laterality Date    ABOVE KNEE LEG AMPUTATION Right     CARDIAC CATHETERIZATION      CARDIAC CATHETERIZATION N/A 11/30/2022    Procedure: Percutaneous Coronary Intervention;  Surgeon: River Acevedo IV, MD;  Location:  [facility];  Service: Cardiovascular;  Laterality: N/A;    CARDIAC CATHETERIZATION N/A 11/30/2022    Procedure: Optical Coherent Tomography;  Surgeon: River Acevedo IV, MD;  Location:  [facility];  Service: Cardiovascular;  Laterality: N/A;    CARDIAC CATHETERIZATION N/A 11/30/2022    Procedure: Stent JOSE coronary;  Surgeon: River Acevedo IV, MD;  Location:  [facility];  Service: Cardiovascular;  Laterality: N/A;    CARDIAC CATHETERIZATION Left 11/09/2023    Procedure: Aortogram with left leg angiogram, possible angioplasty or stenting;  Surgeon: Luis Fernando Rawls MD;  Location:  [facility];  Service: Vascular;  Laterality: Left;    CATH LAB PROCEDURE      unable to stent    COLONOSCOPY N/A 02/15/2023    Procedure: COLONOSCOPY WITH POLYPECTOMY;   Surgeon: David Glass MD;  Location: [facility] ENDOSCOPY;  Service: Gastroenterology;  Laterality: N/A;  COLON POLYP, MELANOSIS COLI    ENDOSCOPY N/A 02/03/2023    Procedure: ESOPHAGOGASTRODUODENOSCOPY with biopsy;  Surgeon: Gus Whaley MD;  Location: [facility] ENDOSCOPY;  Service: General;  Laterality: N/A;  gastritis,     FEMORAL POPLITEAL BYPASS Left 12/12/2023    Procedure: Left femoral edarterectomy, left femoral to below the knee popliteal bypass graft;  Surgeon: Luis Fernando Rawls MD;  Location: [facility] MAIN OR;  Service: Vascular;  Laterality: Left;    HIP SURGERY      INSERTION PERITONEAL DIALYSIS CATHETER      LEG SURGERY      MANDIBLE FRACTURE SURGERY      REPLACEMENT TOTAL KNEE Right     x3    SHOULDER SURGERY Right     SPINE SURGERY      unknown procudure- completed at [facility] in [facility].       Family History: family history includes Arthritis in his mother; Diabetes in his father; Hypertension in his mother. Otherwise pertinent FHx was reviewed and not pertinent to current issue.    Social History:  reports that he has been smoking cigarettes. He has a 80.00 pack-year smoking history. He has never used smokeless tobacco. He reports that he does not currently use alcohol. He reports that he does not currently use drugs after having used the following drugs: Hydrocodone.    Home Medications:  BASAGLAR KWIKPEN, Diclofenac Sodium, Ferric Citrate, Iron Sucrose, Lactulose, Patiromer Sorbitex Calcium, albuterol sulfate HFA, amLODIPine, amiodarone, aspirin, atorvastatin, calcitriol, carvedilol, cinacalcet, clopidogrel, docusate sodium, fentaNYL, furosemide, gabapentin, heparin (porcine), hydrALAZINE, isosorbide mononitrate, lanthanum, levothyroxine, metoclopramide, metoprolol tartrate, nitroglycerin, ondansetron ODT, oxyCODONE, pantoprazole, polyethylene glycol, potassium chloride, sertraline, sucralfate, tamsulosin, and valsartan    Allergies:  No Known Allergies    Objective    Objective     Vitals:    Temp:  [97.2 °F (36.2 °C)-99 °F (37.2 °C)] 97.9 °F (36.6 °C)  Heart Rate:  [80-99] 87  Resp:  [10-20] 13  BP: ()/(45-72) 80/53  Flow (L/min):  [0-2] 0    Physical Exam:             Constitutional:         Awake, alert responsive, conversant, no obvious distress   Eyes:                       PERRLA, sclerae anicteric, no conjunctival injection   HEENT:                   Moist mucous membranes, no nasal or eye discharge, no throat congestion   Neck:                      Supple, no thyromegaly, no lymphadenopathy, trachea midline, no elevated JVD   Respiratory:           Clear to auscultation bilaterally, nonlabored respirations    Cardiovascular:     RRR, no murmurs, rubs, or gallops, palpable pedal pulses bilaterally, No bilateral ankle edema   Gastrointestinal:   Positive bowel sounds, soft, nontender, non-distended, no organomegaly   Musculoskeletal:  No clubbing or cyanosis to extremities, muscle wasting, joint swelling, muscle weakness   Psychiatric:              Appropriate affect, cooperative   Neurologic:            Awake alert, oriented x 3, strength symmetric in all extremities, Cranial Nerves grossly intact to confrontation, speech clear   Skin:                      No rashes, bruising, skin ulcers, petechiae or ecchymosis    Result Review    Result Review:  I have personally reviewed the results from the time of this admission to 12/13/2023 08:12 EST and agree with these findings:  []  Laboratory  []  Microbiology  []  Radiology  []  EKG/Telemetry   []  Cardiology/Vascular   []  Pathology  []  Old records  []  Other:      Assessment & Plan   Assessment / Plan     Active Hospital Problems:  Active Hospital Problems    Diagnosis     **Atherosclerosis of native artery of lower extremity with gangrene     Atherosclerosis of native arteries of the extremities with gangrene     ESRD on hemodialysis     Heart failure with preserved left ventricular function (HFpEF)     Essential hypertension       58-year-old male with past medical history of ESRD on hemodialysis, peripheral vascular disease, hypothyroidism, hyperlipidemia, GERD, who comes in for planned endarterectomy and popliteal bypass graft due to gangrene of his fifth toe who post surgically was hypotensive and being monitored in the ICU.    Plan:   Will plan to dialyze the patient Monday Wednesday Friday while inpatient.  Plan to remove 0 to 2 L with dialysis  Patient's hemoglobin is stable and will hold off on EPO at this time  Renal diet when able to tolerate  Lanthanum 1 g with meals and sucralfate 1 g 4 times daily  Continue Sensipar 60 with meals as well as calcitriol 0.5 mg daily  Discontinue potassium  Hold off antihypertensives but would continue Lasix 80 mg twice daily

## 2023-12-13 NOTE — PROGRESS NOTES
[facility]     Progress Note    Patient Name: Chevy Junior  : 1965  MRN: [id number]  Primary Care Physician:  Janeth Le APRN  Date of admission: 2023    Subjective   Subjective     POD #1 status post left iliofemoral endarterectomy, left femoropopliteal bypass graft    Doing well.  No significant events overnight.  Wants to go home.    Objective   Objective     Vitals:   Temp:  [97.2 °F (36.2 °C)-99 °F (37.2 °C)] 97.9 °F (36.6 °C)  Heart Rate:  [80-99] 91  Resp:  [10-20] 13  BP: ()/(45-90) 100/66  Flow (L/min):  [0-2] 0    Physical Exam   General: Alert, no acute distress  Wounds: Healing well, no signs of infection.  Left foot: Warm, well-perfused.    Hgb: 10.6        Assessment & Plan   Assessment / Plan     Assessment/Plan:    Satisfactory progress following left leg revascularization.  Home.    Active Hospital Problems:  Active Hospital Problems    Diagnosis     **Atherosclerosis of native artery of lower extremity with gangrene     Atherosclerosis of native arteries of the extremities with gangrene     ESRD on hemodialysis     Heart failure with preserved left ventricular function (HFpEF)     Essential hypertension            Electronically signed by Luis Fernando Rawls MD, 23, 12:38 PM EST.

## 2023-12-14 LAB
ANTI-C: NORMAL
ANTI-CW: NORMAL
ANTI-E: NORMAL
CYTO UR: NORMAL
LAB AP CASE REPORT: NORMAL
LAB AP CLINICAL INFORMATION: NORMAL
NONSPECIFIC ANTIBODY: NORMAL
PATH REPORT.FINAL DX SPEC: NORMAL
PATH REPORT.GROSS SPEC: NORMAL

## 2023-12-19 ENCOUNTER — READMISSION MANAGEMENT (OUTPATIENT)
Dept: CALL CENTER | Facility: HOSPITAL | Age: 58
End: 2023-12-19
Payer: MEDICAID

## 2023-12-19 NOTE — OUTREACH NOTE
"General Surgery Week 1 Survey      Flowsheet Row Responses   [facility] patient discharged from? [facility]   Does the patient have one of the following disease processes/diagnoses(primary or secondary)? General Surgery   Week 1 attempt successful? Yes   Call start time 1243   Call end time 1245   Discharge diagnosis Left femoral edarterectomy, left femoral to below the knee popliteal bypass graft, ESRD on HD   Person spoke with today (if not patient) and relationship [place]   Is the patient taking all medications as directed (includes completed medication regime)? Yes   Medication comments States he was already on pain medication and didn't pick anything new up.   Does the patient have a follow up appointment scheduled with their surgeon? No   What is preventing the patient from scheduling follow up appointments? Haven't had time   Nursing Interventions Advised patient to make appointment   Has the patient kept scheduled appointments due by today? N/A   Psychosocial issues? No   Did the patient receive a copy of their discharge instructions? Yes   Nursing interventions Reviewed instructions with patient   What is the patient's perception of their health status since discharge? Improving   Is the patient/caregiver able to teach back signs and symptoms of incisional infection? Fever, Pus or odor from incision, Incisional warmth, Increased drainage or bleeding, Increased redness, swelling or pain at the incisonal site   Is the patient/caregiver able to teach back the hierarchy of who to call/visit for symptoms/problems? PCP, Specialist, Home health nurse, Urgent Care, ED, 911 Yes   Additional teach back comments Call was brief with wife.  Pt was resting.  States he is doing \"ok\"   Week 1 call completed? Yes   Call end time 1245            Kimberly ALMONTE - Licensed Nurse  "

## 2023-12-22 LAB — BB REFERENCE LAB SENDOUT: NORMAL

## 2023-12-29 ENCOUNTER — READMISSION MANAGEMENT (OUTPATIENT)
Dept: CALL CENTER | Facility: HOSPITAL | Age: 58
End: 2023-12-29
Payer: MEDICAID

## 2023-12-29 NOTE — OUTREACH NOTE
General Surgery Week 2 Survey      Flowsheet Row Responses   [facility] patient discharged from? [facility]   Does the patient have one of the following disease processes/diagnoses(primary or secondary)? General Surgery   Week 2 attempt successful? Yes   Call start time 0930   Call end time 0931   Discharge diagnosis Left femoral edarterectomy, left femoral to below the knee popliteal bypass graft, ESRD on HD   Person spoke with today (if not patient) and relationship [place]   Meds reviewed with patient/caregiver? Yes   Is the patient having any side effects they believe may be caused by any medication additions or changes? No   Does the patient have all medications related to this admission filled (includes all antibiotics, pain medications, etc.) Yes   Is the patient taking all medications as directed (includes completed medication regime)? Yes   Does the patient have a follow up appointment scheduled with their surgeon? Yes   Has the patient kept scheduled appointments due by today? N/A   Comments Wife will call surgeons office to schedule followup   Psychosocial issues? No   Did the patient receive a copy of their discharge instructions? Yes   Nursing interventions Reviewed instructions with patient   What is the patient's perception of their health status since discharge? Improving   Is the patient/caregiver able to teach back signs and symptoms of incisional infection? Fever, Pus or odor from incision, Incisional warmth, Increased drainage or bleeding, Increased redness, swelling or pain at the incisonal site   Is the patient/caregiver able to teach back steps to recovery at home? Set small, achievable goals for return to baseline health, Rest and rebuild strength, gradually increase activity   Is the patient/caregiver able to teach back the hierarchy of who to call/visit for symptoms/problems? PCP, Specialist, Home health nurse, Urgent Care, ED, 911 Yes   Week 2 call completed? Yes   Graduated Yes   Is the  patient interested in additional calls from an ambulatory ? No   Would this patient benefit from a Referral to [facility] Social Work? No   Call end time 0931            Mati ESQUEDA - Registered Nurse

## 2024-01-03 LAB — BB REFERENCE LAB SENDOUT: NORMAL

## 2024-01-10 ENCOUNTER — OFFICE VISIT (OUTPATIENT)
Dept: VASCULAR SURGERY | Facility: HOSPITAL | Age: 59
End: 2024-01-10
Payer: MEDICAID

## 2024-01-10 VITALS
TEMPERATURE: 97.4 F | DIASTOLIC BLOOD PRESSURE: 72 MMHG | SYSTOLIC BLOOD PRESSURE: 128 MMHG | HEART RATE: 103 BPM | OXYGEN SATURATION: 98 % | RESPIRATION RATE: 18 BRPM

## 2024-01-10 DIAGNOSIS — I70.269 ATHEROSCLEROSIS OF NATIVE ARTERY OF LOWER EXTREMITY WITH GANGRENE, UNSPECIFIED LATERALITY: Primary | ICD-10-CM

## 2024-01-10 DIAGNOSIS — Z95.828 S/P VASCULAR BYPASS: ICD-10-CM

## 2024-01-10 PROCEDURE — G0463 HOSPITAL OUTPT CLINIC VISIT: HCPCS | Performed by: SURGERY

## 2024-01-10 PROCEDURE — 99024 POSTOP FOLLOW-UP VISIT: CPT | Performed by: SURGERY

## 2024-01-10 PROCEDURE — 1160F RVW MEDS BY RX/DR IN RCRD: CPT | Performed by: SURGERY

## 2024-01-10 PROCEDURE — 1159F MED LIST DOCD IN RCRD: CPT | Performed by: SURGERY

## 2024-01-10 PROCEDURE — 3078F DIAST BP <80 MM HG: CPT | Performed by: SURGERY

## 2024-01-10 PROCEDURE — 3074F SYST BP LT 130 MM HG: CPT | Performed by: SURGERY

## 2024-01-10 RX ORDER — CEPHALEXIN 500 MG/1
500 CAPSULE ORAL 3 TIMES DAILY
COMMUNITY
Start: 2024-01-03

## 2024-01-10 NOTE — PROGRESS NOTES
[facility]   Follow up Office    Patient Name: Chevy Junior  : 1965  MRN: [id number]  Primary Care Physician:  Janeth Le APRN      Subjective   Subjective     HPI:    Chevy Junior is a 58 y.o. male here for follow-up after left leg bypass graft 2023.  Doing well.  No complaints.      Objective     Vitals:   Temp:  [97.4 °F (36.3 °C)] 97.4 °F (36.3 °C)  Heart Rate:  [103] 103  Resp:  [18] 18  BP: (128)/(72) 128/72    Physical Exam      General: Alert, no acute distress  Wounds: Small area of superficial separation in the groin, no surrounding erythema, no tenderness, no drainage.  Left calf with satisfactory healing, no evidence of infection.  Left foot: Warm, well-perfused.    Assessment & Plan   Assessment / Plan     Diagnoses and all orders for this visit:    1. Atherosclerosis of native artery of lower extremity with gangrene, unspecified laterality (Primary)  -     Duplex Lower Extremity Art / Grafts - Left CAR; Future    2. S/P vascular bypass  -     Duplex Lower Extremity Art / Grafts - Left CAR; Future       Assessment/Plan:   Satisfactory progress following left leg bypass graft.  Wound care to left groin, Neosporin, cover, daily.  Follow-up with a left leg duplex in 2 months.        Electronically signed by Luis Fernando Rawls MD, 01/10/24, 8:53 AM EST.

## 2024-02-13 ENCOUNTER — OFFICE VISIT (OUTPATIENT)
Dept: CARDIOLOGY | Facility: CLINIC | Age: 59
End: 2024-02-13
Payer: MEDICAID

## 2024-02-13 ENCOUNTER — TELEPHONE (OUTPATIENT)
Dept: CARDIOLOGY | Facility: CLINIC | Age: 59
End: 2024-02-13
Payer: MEDICAID

## 2024-02-13 ENCOUNTER — TELEPHONE (OUTPATIENT)
Dept: CARDIOLOGY | Facility: CLINIC | Age: 59
End: 2024-02-13

## 2024-02-13 VITALS
SYSTOLIC BLOOD PRESSURE: 120 MMHG | DIASTOLIC BLOOD PRESSURE: 70 MMHG | WEIGHT: 200 LBS | HEART RATE: 95 BPM | HEIGHT: 67 IN | BODY MASS INDEX: 31.39 KG/M2 | OXYGEN SATURATION: 96 %

## 2024-02-13 DIAGNOSIS — N18.6 ESRD ON HEMODIALYSIS: ICD-10-CM

## 2024-02-13 DIAGNOSIS — I47.20 VT (VENTRICULAR TACHYCARDIA): ICD-10-CM

## 2024-02-13 DIAGNOSIS — I50.30 HEART FAILURE WITH PRESERVED LEFT VENTRICULAR FUNCTION (HFPEF): ICD-10-CM

## 2024-02-13 DIAGNOSIS — I25.119 CORONARY ARTERY DISEASE INVOLVING NATIVE CORONARY ARTERY OF NATIVE HEART WITH ANGINA PECTORIS: ICD-10-CM

## 2024-02-13 DIAGNOSIS — I10 ESSENTIAL HYPERTENSION: Primary | ICD-10-CM

## 2024-02-13 DIAGNOSIS — Z99.2 ESRD ON HEMODIALYSIS: ICD-10-CM

## 2024-02-13 PROCEDURE — 1160F RVW MEDS BY RX/DR IN RCRD: CPT | Performed by: NURSE PRACTITIONER

## 2024-02-13 PROCEDURE — 1159F MED LIST DOCD IN RCRD: CPT | Performed by: NURSE PRACTITIONER

## 2024-02-13 PROCEDURE — 3074F SYST BP LT 130 MM HG: CPT | Performed by: NURSE PRACTITIONER

## 2024-02-13 PROCEDURE — 3078F DIAST BP <80 MM HG: CPT | Performed by: NURSE PRACTITIONER

## 2024-02-13 PROCEDURE — 99214 OFFICE O/P EST MOD 30 MIN: CPT | Performed by: NURSE PRACTITIONER

## 2024-02-13 PROCEDURE — 93000 ELECTROCARDIOGRAM COMPLETE: CPT | Performed by: NURSE PRACTITIONER

## 2024-02-13 RX ORDER — AMIODARONE HYDROCHLORIDE 200 MG/1
200 TABLET ORAL DAILY
COMMUNITY

## 2024-02-13 RX ORDER — CARVEDILOL 6.25 MG/1
6.25 TABLET ORAL 2 TIMES DAILY WITH MEALS
COMMUNITY

## 2024-02-14 ENCOUNTER — TELEPHONE (OUTPATIENT)
Dept: CARDIOLOGY | Facility: CLINIC | Age: 59
End: 2024-02-14
Payer: MEDICAID

## 2024-02-22 ENCOUNTER — TELEPHONE (OUTPATIENT)
Dept: CARDIOLOGY | Facility: CLINIC | Age: 59
End: 2024-02-22
Payer: MEDICAID

## 2024-02-22 NOTE — TELEPHONE ENCOUNTER
Called [facility], tried to schedule transportation for our basement for 3/5/24 at 3pm. They stated they need a new referral from whomever initially referred pt before they can schedule the transportation.       I called PCP's office and they will send an updated referral w/ the above info to [facility] on our behalf.     Called [employer] and made her aware that the referral/order if being updated for pt, she verbalized understanding.

## 2024-02-22 NOTE — TELEPHONE ENCOUNTER
Caller: BRENDA FANG    Relationship: Emergency Contact    Best call back number: [phone]    What is the best time to reach you: ANYTIME    Who are you requesting to speak with (clinical staff, provider,  specific staff member): CLINICAL    What was the call regarding: PATIENT JUAN FANG IS CALLING TO LET VICKI FERREIRA'S OFFICE KNOW PATIENT WILL NEED THE  REFERRING DR TO SEND IN A REQUEST FOR TRANSPORTATION TO [facility] PHONE NUMBER:[phone] THIS NEEDS TO BE DONE 3 DAYS PRIOR TO APPOINTMENT AND PATIENTS PHYSICAL ADDRESS: [address] [place] [address] . THIS IS FOR PATIENTS TESTING APPOINTMENT ON 3-5-24  @ 3 PM.    Is it okay if the provider responds through [employer]: PREFER A CALL.

## 2024-03-12 ENCOUNTER — HOSPITAL ENCOUNTER (OUTPATIENT)
Dept: CARDIOLOGY | Facility: HOSPITAL | Age: 59
Discharge: HOME | End: 2024-03-12
Admitting: SURGERY
Payer: MEDICAID

## 2024-03-12 DIAGNOSIS — Z95.828 S/P VASCULAR BYPASS: ICD-10-CM

## 2024-03-12 DIAGNOSIS — I70.269 ATHEROSCLEROSIS OF NATIVE ARTERY OF LOWER EXTREMITY WITH GANGRENE, UNSPECIFIED LATERALITY: ICD-10-CM

## 2024-03-12 LAB
BH CV GRAFT 1 - DISTAL ANASTAMOSIS PSV-LEFT: 62 CM/S
BH CV GRAFT 1 - DISTAL GRAFT PSV-LEFT: 75 CM/S
BH CV GRAFT 1 - INFLOW ARTERY PSV- LEFT: 101 CM/S
BH CV GRAFT 1 - MID DISTAL GRAFT PSV-LEFT: 62 CM/S
BH CV GRAFT 1 - MID GRAFT PSV-LEFT: 49 CM/S
BH CV GRAFT 1 - PROX GRAFT PSV-LEFT: 52 CM/S
BH CV GRAFT 1 - PROX MID GRAFT PSV-LEFT: 43 CM/S
BH CV GRAFT 1 - PROXIMAL ANASTAMOSIS PSV-LEFT: 81 CM/S
BH CV GRAFT 1- DISTAL ANASTAMOSIS EDV-LEFT: 33 CM/S
BH CV GRAFT 1- DISTAL GRAFT EDV-LEFT: 39 CM/S
BH CV GRAFT 1- INFLOW ARTERY EDV- LEFT: 40 CM/S
BH CV GRAFT 1- MID DISTAL GRAFT EDV-LEFT: 34 CM/S
BH CV GRAFT 1- MID GRAFT EDV-LEFT: 223 CM/S
BH CV GRAFT 1- PROX GRAFT EDV-LEFT: 28 CM/S
BH CV GRAFT 1- PROX MID GRAFT EDV-LEFT: 22 CM/S
BH CV GRAFT 1- PROXIMAL ANASTAMOSIS EDV-LEFT: 17 CM/S
BH CV GRAFT BRACHIAL PRESSURE RIGHT: 100 MMHG
BH CV LEA LEFT CFA DISTAL EDV: 26 CM/S
BH CV LEA LEFT CFA DISTAL PSV: 76 CM/S
BH CV LEA LEFT CFA PROX EDV: 40 CM/S
BH CV LEA LEFT CFA PROX PSV: 101 CM/S
BH CV LEA LEFT DFA PROX EDV: 16 CM/S
BH CV LEA LEFT DFA PROX PSV: 57 CM/S
BH CV LEA LEFT DPA PRESSURE: 62 MMHG
BH CV LEA LEFT PERONEAL  MID EDV: 10.5 CM/S
BH CV LEA LEFT PERONEAL  MID PSV: 15.8 CM/S
BH CV LEA LEFT POPITEAL A  DISTAL EDV: 15.4 CM/S
BH CV LEA LEFT POPITEAL A  DISTAL PSV: 31.2 CM/S
BH CV LEA LEFT POPITEAL A  PROX EDV: 28.5 CM/S
BH CV LEA LEFT POPITEAL A  PROX PSV: 61.9 CM/S
BH CV LEA LEFT PTA DISTAL EDV: 6.1 CM/S
BH CV LEA LEFT PTA DISTAL PSV: 18.4 CM/S
BH CV LEA LEFT PTA MID EDV: 7.5 CM/S
BH CV LEA LEFT PTA MID PSV: 16.2 CM/S
BH CV LEA LEFT PTA PRESSURE: 79 MMHG
BH CV LEA LEFT PTA PROX EDV: 16 CM/S
BH CV LEA LEFT PTA PROX PSV: 55 CM/S
BH CV LEA LEFT SFA DISTAL EDV: 0 CM/S
BH CV LEA LEFT SFA DISTAL PSV: 0 CM/S
BH CV LEA LEFT SFA MID EDV: 0 CM/S
BH CV LEA LEFT SFA MID PSV: 0 CM/S
BH CV LEA LEFT SFA PROX EDV: -15.8 CM/S
BH CV LEA LEFT SFA PROX PSV: -48.7 CM/S
BH CV LOWER ARTERIAL LEFT ABI RATIO: 0.79
BH CV VAS LEA LEFT HIDDEN GRAFT ALERT: 33
LEFT GROIN CFA SYS: 101.3 CM/SEC

## 2024-03-12 PROCEDURE — 93926 LOWER EXTREMITY STUDY: CPT | Performed by: SURGERY

## 2024-03-12 PROCEDURE — 93926 LOWER EXTREMITY STUDY: CPT

## 2024-03-20 ENCOUNTER — TELEPHONE (OUTPATIENT)
Dept: VASCULAR SURGERY | Facility: HOSPITAL | Age: 59
End: 2024-03-20
Payer: MEDICAID

## 2024-03-20 DIAGNOSIS — Z95.828 S/P VASCULAR BYPASS: ICD-10-CM

## 2024-03-20 DIAGNOSIS — I70.269 ATHEROSCLEROSIS OF NATIVE ARTERY OF LOWER EXTREMITY WITH GANGRENE, UNSPECIFIED LATERALITY: Primary | ICD-10-CM

## 2024-03-20 NOTE — TELEPHONE ENCOUNTER
Called Mr. Junior to inform him of his  Duplex results from 3/12/2024.  Was not able to reach him, left a voicemail.  Indicated that the duplex appears satisfactory with a patent bypass graft and without any evidence of stenosis or problems.  Will schedule him for a follow-up with a repeat duplex in 3 months.

## 2024-03-25 ENCOUNTER — TELEPHONE (OUTPATIENT)
Dept: CARDIOLOGY | Facility: CLINIC | Age: 59
End: 2024-03-25
Payer: MEDICAID

## 2024-03-25 NOTE — TELEPHONE ENCOUNTER
HOLTER  Pt notified of no acute findings. Provider will discuss results at f/u. Pt reminded of appt date and time.  ----- Message from Omayra Rebolledo MA sent at 3/25/2024  8:41 AM EDT -----  Regarding: FW:    ----- Message -----  From: Feng Nielsen APRN  Sent: 3/24/2024   9:50 PM EDT  To: Omayra Rebolledo MA  Subject: FW:                                              No reported symptoms.  No sustained ectopy, dysrhythmia, or block.  Keep follow-up.  ----- Message -----  From: Hal Wing MD  Sent: 3/24/2024   1:45 PM EDT  To: VICKI Ledesma

## 2024-03-29 DIAGNOSIS — I25.119 CORONARY ARTERY DISEASE INVOLVING NATIVE CORONARY ARTERY OF NATIVE HEART WITH ANGINA PECTORIS: ICD-10-CM

## 2024-03-29 RX ORDER — CLOPIDOGREL BISULFATE 75 MG/1
75 TABLET ORAL DAILY
Qty: 30 TABLET | Refills: 11 | Status: SHIPPED | OUTPATIENT
Start: 2024-03-29

## 2024-04-18 ENCOUNTER — HOSPITAL ENCOUNTER (INPATIENT)
Facility: HOSPITAL | Age: 59
LOS: 7 days | Discharge: HOME | End: 2024-04-25
Attending: EMERGENCY MEDICINE | Admitting: STUDENT IN AN ORGANIZED HEALTH CARE EDUCATION/TRAINING PROGRAM

## 2024-04-18 ENCOUNTER — APPOINTMENT (OUTPATIENT)
Dept: GENERAL RADIOLOGY | Facility: HOSPITAL | Age: 59
End: 2024-04-18

## 2024-04-18 DIAGNOSIS — S81.801A OPEN WOUND OF RIGHT LOWER EXTREMITY, INITIAL ENCOUNTER: Primary | ICD-10-CM

## 2024-04-18 DIAGNOSIS — Z79.4 TYPE 2 DIABETES MELLITUS WITH CHRONIC KIDNEY DISEASE ON CHRONIC DIALYSIS, WITH LONG-TERM CURRENT USE OF INSULIN: ICD-10-CM

## 2024-04-18 DIAGNOSIS — R26.2 DIFFICULTY WALKING: ICD-10-CM

## 2024-04-18 DIAGNOSIS — E11.22 TYPE 2 DIABETES MELLITUS WITH CHRONIC KIDNEY DISEASE ON CHRONIC DIALYSIS, WITH LONG-TERM CURRENT USE OF INSULIN: ICD-10-CM

## 2024-04-18 DIAGNOSIS — Z99.2 TYPE 2 DIABETES MELLITUS WITH CHRONIC KIDNEY DISEASE ON CHRONIC DIALYSIS, WITH LONG-TERM CURRENT USE OF INSULIN: ICD-10-CM

## 2024-04-18 DIAGNOSIS — N18.6 TYPE 2 DIABETES MELLITUS WITH CHRONIC KIDNEY DISEASE ON CHRONIC DIALYSIS, WITH LONG-TERM CURRENT USE OF INSULIN: ICD-10-CM

## 2024-04-18 DIAGNOSIS — T87.50: ICD-10-CM

## 2024-04-18 PROBLEM — S81.809A OPEN LEG WOUND: Status: ACTIVE | Noted: 2024-04-18

## 2024-04-18 PROBLEM — S81.809A: Status: ACTIVE | Noted: 2024-04-18

## 2024-04-18 PROBLEM — E87.1 HYPONATREMIA: Status: ACTIVE | Noted: 2024-04-18

## 2024-04-18 LAB
ALBUMIN SERPL-MCNC: 3.2 G/DL (ref 3.5–5.2)
ALBUMIN/GLOB SERPL: 0.6 G/DL
ALP SERPL-CCNC: 162 U/L (ref 39–117)
ALT SERPL W P-5'-P-CCNC: 10 U/L (ref 1–41)
ANION GAP SERPL CALCULATED.3IONS-SCNC: 16.4 MMOL/L (ref 5–15)
AST SERPL-CCNC: 14 U/L (ref 1–40)
BASOPHILS # BLD AUTO: 0.06 10*3/MM3 (ref 0–0.2)
BASOPHILS NFR BLD AUTO: 0.9 % (ref 0–1.5)
BILIRUB SERPL-MCNC: 0.2 MG/DL (ref 0–1.2)
BUN SERPL-MCNC: 43 MG/DL (ref 6–20)
BUN/CREAT SERPL: 6.4 (ref 7–25)
CALCIUM SPEC-SCNC: 8.8 MG/DL (ref 8.6–10.5)
CHLORIDE SERPL-SCNC: 91 MMOL/L (ref 98–107)
CO2 SERPL-SCNC: 24.6 MMOL/L (ref 22–29)
CREAT SERPL-MCNC: 6.71 MG/DL (ref 0.76–1.27)
D-LACTATE SERPL-SCNC: 1.1 MMOL/L (ref 0.5–2)
DEPRECATED RDW RBC AUTO: 57.1 FL (ref 37–54)
EGFRCR SERPLBLD CKD-EPI 2021: 8.8 ML/MIN/1.73
EOSINOPHIL # BLD AUTO: 0.32 10*3/MM3 (ref 0–0.4)
EOSINOPHIL NFR BLD AUTO: 4.8 % (ref 0.3–6.2)
ERYTHROCYTE [DISTWIDTH] IN BLOOD BY AUTOMATED COUNT: 18.2 % (ref 12.3–15.4)
GLOBULIN UR ELPH-MCNC: 5.3 GM/DL
GLUCOSE BLDC GLUCOMTR-MCNC: 122 MG/DL (ref 70–99)
GLUCOSE BLDC GLUCOMTR-MCNC: 148 MG/DL (ref 70–99)
GLUCOSE SERPL-MCNC: 110 MG/DL (ref 65–99)
HCT VFR BLD AUTO: 34.5 % (ref 37.5–51)
HGB BLD-MCNC: 10.2 G/DL (ref 13–17.7)
HOLD SPECIMEN: NORMAL
HOLD SPECIMEN: NORMAL
IMM GRANULOCYTES # BLD AUTO: 0.03 10*3/MM3 (ref 0–0.05)
IMM GRANULOCYTES NFR BLD AUTO: 0.5 % (ref 0–0.5)
LYMPHOCYTES # BLD AUTO: 0.82 10*3/MM3 (ref 0.7–3.1)
LYMPHOCYTES NFR BLD AUTO: 12.4 % (ref 19.6–45.3)
MCH RBC QN AUTO: 26 PG (ref 26.6–33)
MCHC RBC AUTO-ENTMCNC: 29.6 G/DL (ref 31.5–35.7)
MCV RBC AUTO: 88 FL (ref 79–97)
MONOCYTES # BLD AUTO: 0.38 10*3/MM3 (ref 0.1–0.9)
MONOCYTES NFR BLD AUTO: 5.7 % (ref 5–12)
NEUTROPHILS NFR BLD AUTO: 5.02 10*3/MM3 (ref 1.7–7)
NEUTROPHILS NFR BLD AUTO: 75.7 % (ref 42.7–76)
NRBC BLD AUTO-RTO: 0 /100 WBC (ref 0–0.2)
PLATELET # BLD AUTO: 207 10*3/MM3 (ref 140–450)
PMV BLD AUTO: 10.1 FL (ref 6–12)
POTASSIUM SERPL-SCNC: 4.9 MMOL/L (ref 3.5–5.2)
PROT SERPL-MCNC: 8.5 G/DL (ref 6–8.5)
RBC # BLD AUTO: 3.92 10*6/MM3 (ref 4.14–5.8)
SODIUM SERPL-SCNC: 132 MMOL/L (ref 136–145)
VANCOMYCIN SERPL-MCNC: 7.42 MCG/ML (ref 5–40)
WBC NRBC COR # BLD AUTO: 6.63 10*3/MM3 (ref 3.4–10.8)
WHOLE BLOOD HOLD COAG: NORMAL
WHOLE BLOOD HOLD SPECIMEN: NORMAL

## 2024-04-18 PROCEDURE — 80053 COMPREHEN METABOLIC PANEL: CPT

## 2024-04-18 PROCEDURE — 85025 COMPLETE CBC W/AUTO DIFF WBC: CPT

## 2024-04-18 PROCEDURE — 80202 ASSAY OF VANCOMYCIN: CPT | Performed by: EMERGENCY MEDICINE

## 2024-04-18 PROCEDURE — 63710000001 INSULIN DETEMIR PER 5 UNITS: Performed by: STUDENT IN AN ORGANIZED HEALTH CARE EDUCATION/TRAINING PROGRAM

## 2024-04-18 PROCEDURE — 87077 CULTURE AEROBIC IDENTIFY: CPT | Performed by: EMERGENCY MEDICINE

## 2024-04-18 PROCEDURE — 83605 ASSAY OF LACTIC ACID: CPT

## 2024-04-18 PROCEDURE — 99285 EMERGENCY DEPT VISIT HI MDM: CPT

## 2024-04-18 PROCEDURE — 99222 1ST HOSP IP/OBS MODERATE 55: CPT | Performed by: STUDENT IN AN ORGANIZED HEALTH CARE EDUCATION/TRAINING PROGRAM

## 2024-04-18 PROCEDURE — 25010000002 HEPARIN (PORCINE) PER 1000 UNITS: Performed by: STUDENT IN AN ORGANIZED HEALTH CARE EDUCATION/TRAINING PROGRAM

## 2024-04-18 PROCEDURE — 82948 REAGENT STRIP/BLOOD GLUCOSE: CPT

## 2024-04-18 PROCEDURE — 25010000002 ONDANSETRON PER 1 MG: Performed by: EMERGENCY MEDICINE

## 2024-04-18 PROCEDURE — 87205 SMEAR GRAM STAIN: CPT | Performed by: EMERGENCY MEDICINE

## 2024-04-18 PROCEDURE — 87186 SC STD MICRODIL/AGAR DIL: CPT | Performed by: EMERGENCY MEDICINE

## 2024-04-18 PROCEDURE — 63710000001 INSULIN LISPRO (HUMAN) PER 5 UNITS: Performed by: STUDENT IN AN ORGANIZED HEALTH CARE EDUCATION/TRAINING PROGRAM

## 2024-04-18 PROCEDURE — 87070 CULTURE OTHR SPECIMN AEROBIC: CPT | Performed by: EMERGENCY MEDICINE

## 2024-04-18 PROCEDURE — 87040 BLOOD CULTURE FOR BACTERIA: CPT

## 2024-04-18 PROCEDURE — 25010000002 VANCOMYCIN 5 G RECONSTITUTED SOLUTION: Performed by: EMERGENCY MEDICINE

## 2024-04-18 PROCEDURE — 73552 X-RAY EXAM OF FEMUR 2/>: CPT

## 2024-04-18 PROCEDURE — 25010000002 HYDROMORPHONE 1 MG/ML SOLUTION: Performed by: EMERGENCY MEDICINE

## 2024-04-18 PROCEDURE — 36415 COLL VENOUS BLD VENIPUNCTURE: CPT

## 2024-04-18 RX ORDER — ONDANSETRON 2 MG/ML
4 INJECTION INTRAMUSCULAR; INTRAVENOUS EVERY 6 HOURS PRN
Status: DISCONTINUED | OUTPATIENT
Start: 2024-04-18 | End: 2024-04-25 | Stop reason: SDUPTHER

## 2024-04-18 RX ORDER — BISACODYL 10 MG
10 SUPPOSITORY, RECTAL RECTAL DAILY PRN
Status: DISCONTINUED | OUTPATIENT
Start: 2024-04-18 | End: 2024-04-18

## 2024-04-18 RX ORDER — BISACODYL 10 MG
10 SUPPOSITORY, RECTAL RECTAL DAILY PRN
Status: DISCONTINUED | OUTPATIENT
Start: 2024-04-18 | End: 2024-04-25 | Stop reason: HOSPADM

## 2024-04-18 RX ORDER — DEXTROSE MONOHYDRATE 25 G/50ML
10-50 INJECTION, SOLUTION INTRAVENOUS
Status: DISCONTINUED | OUTPATIENT
Start: 2024-04-18 | End: 2024-04-25 | Stop reason: HOSPADM

## 2024-04-18 RX ORDER — SODIUM CHLORIDE 0.9 % (FLUSH) 0.9 %
10 SYRINGE (ML) INJECTION EVERY 12 HOURS SCHEDULED
Status: DISCONTINUED | OUTPATIENT
Start: 2024-04-18 | End: 2024-04-25 | Stop reason: HOSPADM

## 2024-04-18 RX ORDER — IBUPROFEN 600 MG/1
1 TABLET ORAL
Status: DISCONTINUED | OUTPATIENT
Start: 2024-04-18 | End: 2024-04-18

## 2024-04-18 RX ORDER — ACETAMINOPHEN 325 MG/1
650 TABLET ORAL EVERY 4 HOURS PRN
Status: DISCONTINUED | OUTPATIENT
Start: 2024-04-18 | End: 2024-04-25 | Stop reason: SDUPTHER

## 2024-04-18 RX ORDER — INSULIN LISPRO 100 [IU]/ML
1-200 INJECTION, SOLUTION INTRAVENOUS; SUBCUTANEOUS
Status: DISCONTINUED | OUTPATIENT
Start: 2024-04-18 | End: 2024-04-25 | Stop reason: HOSPADM

## 2024-04-18 RX ORDER — AMOXICILLIN 250 MG
2 CAPSULE ORAL 2 TIMES DAILY PRN
Status: DISCONTINUED | OUTPATIENT
Start: 2024-04-18 | End: 2024-04-25

## 2024-04-18 RX ORDER — SODIUM CHLORIDE 0.9 % (FLUSH) 0.9 %
10 SYRINGE (ML) INJECTION AS NEEDED
Status: DISCONTINUED | OUTPATIENT
Start: 2024-04-18 | End: 2024-04-18

## 2024-04-18 RX ORDER — MIDODRINE HYDROCHLORIDE 10 MG/1
10 TABLET ORAL AS NEEDED
COMMUNITY
Start: 2024-02-23 | End: 2024-04-25 | Stop reason: HOSPADM

## 2024-04-18 RX ORDER — NICOTINE POLACRILEX 4 MG
15 LOZENGE BUCCAL
Status: DISCONTINUED | OUTPATIENT
Start: 2024-04-18 | End: 2024-04-25 | Stop reason: HOSPADM

## 2024-04-18 RX ORDER — HEPARIN SODIUM 5000 [USP'U]/ML
5000 INJECTION, SOLUTION INTRAVENOUS; SUBCUTANEOUS EVERY 12 HOURS SCHEDULED
Status: DISCONTINUED | OUTPATIENT
Start: 2024-04-18 | End: 2024-04-18

## 2024-04-18 RX ORDER — INSULIN LISPRO 100 [IU]/ML
1-200 INJECTION, SOLUTION INTRAVENOUS; SUBCUTANEOUS AS NEEDED
Status: DISCONTINUED | OUTPATIENT
Start: 2024-04-18 | End: 2024-04-25 | Stop reason: HOSPADM

## 2024-04-18 RX ORDER — SODIUM CHLORIDE 9 MG/ML
40 INJECTION, SOLUTION INTRAVENOUS AS NEEDED
Status: DISCONTINUED | OUTPATIENT
Start: 2024-04-18 | End: 2024-04-18

## 2024-04-18 RX ORDER — ONDANSETRON 2 MG/ML
4 INJECTION INTRAMUSCULAR; INTRAVENOUS ONCE
Status: COMPLETED | OUTPATIENT
Start: 2024-04-18 | End: 2024-04-18

## 2024-04-18 RX ORDER — SODIUM CHLORIDE 9 MG/ML
40 INJECTION, SOLUTION INTRAVENOUS AS NEEDED
Status: DISCONTINUED | OUTPATIENT
Start: 2024-04-18 | End: 2024-04-25 | Stop reason: HOSPADM

## 2024-04-18 RX ORDER — BISACODYL 5 MG/1
5 TABLET, DELAYED RELEASE ORAL DAILY PRN
Status: DISCONTINUED | OUTPATIENT
Start: 2024-04-18 | End: 2024-04-25 | Stop reason: HOSPADM

## 2024-04-18 RX ORDER — LIDOCAINE AND PRILOCAINE 25; 25 MG/G; MG/G
1 CREAM TOPICAL AS NEEDED
COMMUNITY
Start: 2024-04-15

## 2024-04-18 RX ORDER — ACETAMINOPHEN 160 MG/5ML
650 SOLUTION ORAL EVERY 4 HOURS PRN
Status: DISCONTINUED | OUTPATIENT
Start: 2024-04-18 | End: 2024-04-25 | Stop reason: HOSPADM

## 2024-04-18 RX ORDER — DEXTROSE MONOHYDRATE 25 G/50ML
10-50 INJECTION, SOLUTION INTRAVENOUS
Status: DISCONTINUED | OUTPATIENT
Start: 2024-04-18 | End: 2024-04-18

## 2024-04-18 RX ORDER — IBUPROFEN 600 MG/1
1 TABLET ORAL
Status: DISCONTINUED | OUTPATIENT
Start: 2024-04-18 | End: 2024-04-25 | Stop reason: HOSPADM

## 2024-04-18 RX ORDER — SODIUM CHLORIDE 0.9 % (FLUSH) 0.9 %
10 SYRINGE (ML) INJECTION EVERY 12 HOURS SCHEDULED
Status: DISCONTINUED | OUTPATIENT
Start: 2024-04-18 | End: 2024-04-18

## 2024-04-18 RX ORDER — FERRIC CITRATE 210 MG/1
420 TABLET, COATED ORAL 3 TIMES DAILY
Status: ON HOLD | COMMUNITY
Start: 2024-03-29 | End: 2024-04-25

## 2024-04-18 RX ORDER — ONDANSETRON 4 MG/1
4 TABLET, ORALLY DISINTEGRATING ORAL EVERY 6 HOURS PRN
Status: DISCONTINUED | OUTPATIENT
Start: 2024-04-18 | End: 2024-04-25 | Stop reason: HOSPADM

## 2024-04-18 RX ORDER — HEPARIN SODIUM 5000 [USP'U]/ML
5000 INJECTION, SOLUTION INTRAVENOUS; SUBCUTANEOUS EVERY 12 HOURS SCHEDULED
Status: DISCONTINUED | OUTPATIENT
Start: 2024-04-18 | End: 2024-04-25 | Stop reason: HOSPADM

## 2024-04-18 RX ORDER — SODIUM CHLORIDE 0.9 % (FLUSH) 0.9 %
10 SYRINGE (ML) INJECTION AS NEEDED
Status: DISCONTINUED | OUTPATIENT
Start: 2024-04-18 | End: 2024-04-25 | Stop reason: HOSPADM

## 2024-04-18 RX ORDER — NICOTINE POLACRILEX 4 MG
15 LOZENGE BUCCAL
Status: DISCONTINUED | OUTPATIENT
Start: 2024-04-18 | End: 2024-04-18

## 2024-04-18 RX ORDER — POLYETHYLENE GLYCOL 3350 17 G/17G
17 POWDER, FOR SOLUTION ORAL DAILY PRN
Status: DISCONTINUED | OUTPATIENT
Start: 2024-04-18 | End: 2024-04-25 | Stop reason: HOSPADM

## 2024-04-18 RX ORDER — AMOXICILLIN 250 MG
2 CAPSULE ORAL 2 TIMES DAILY PRN
Status: DISCONTINUED | OUTPATIENT
Start: 2024-04-18 | End: 2024-04-18

## 2024-04-18 RX ORDER — POLYETHYLENE GLYCOL 3350 17 G/17G
17 POWDER, FOR SOLUTION ORAL DAILY PRN
Status: DISCONTINUED | OUTPATIENT
Start: 2024-04-18 | End: 2024-04-18

## 2024-04-18 RX ORDER — SERTRALINE HYDROCHLORIDE 100 MG/1
1 TABLET, FILM COATED ORAL DAILY
COMMUNITY
End: 2024-04-18

## 2024-04-18 RX ORDER — BISACODYL 5 MG/1
5 TABLET, DELAYED RELEASE ORAL DAILY PRN
Status: DISCONTINUED | OUTPATIENT
Start: 2024-04-18 | End: 2024-04-18

## 2024-04-18 RX ORDER — NALOXONE HCL 0.4 MG/ML
0.4 VIAL (ML) INJECTION
Status: DISCONTINUED | OUTPATIENT
Start: 2024-04-18 | End: 2024-04-25 | Stop reason: HOSPADM

## 2024-04-18 RX ORDER — INSULIN LISPRO 100 [IU]/ML
1-200 INJECTION, SOLUTION INTRAVENOUS; SUBCUTANEOUS
Status: DISCONTINUED | OUTPATIENT
Start: 2024-04-18 | End: 2024-04-18

## 2024-04-18 RX ORDER — NICOTINE 21 MG/24HR
1 PATCH, TRANSDERMAL 24 HOURS TRANSDERMAL ONCE
Status: COMPLETED | OUTPATIENT
Start: 2024-04-18 | End: 2024-04-19

## 2024-04-18 RX ORDER — ACETAMINOPHEN 650 MG/1
650 SUPPOSITORY RECTAL EVERY 4 HOURS PRN
Status: DISCONTINUED | OUTPATIENT
Start: 2024-04-18 | End: 2024-04-25 | Stop reason: HOSPADM

## 2024-04-18 RX ORDER — INSULIN LISPRO 100 [IU]/ML
1-200 INJECTION, SOLUTION INTRAVENOUS; SUBCUTANEOUS AS NEEDED
Status: DISCONTINUED | OUTPATIENT
Start: 2024-04-18 | End: 2024-04-18

## 2024-04-18 RX ADMIN — VANCOMYCIN HYDROCHLORIDE 500 MG: 5 INJECTION, POWDER, LYOPHILIZED, FOR SOLUTION INTRAVENOUS at 16:39

## 2024-04-18 RX ADMIN — ONDANSETRON 4 MG: 2 INJECTION INTRAMUSCULAR; INTRAVENOUS at 17:03

## 2024-04-18 RX ADMIN — HYDROMORPHONE HYDROCHLORIDE 0.5 MG: 1 INJECTION, SOLUTION INTRAMUSCULAR; INTRAVENOUS; SUBCUTANEOUS at 17:03

## 2024-04-18 RX ADMIN — INSULIN DETEMIR 23 UNITS: 100 INJECTION, SOLUTION SUBCUTANEOUS at 22:46

## 2024-04-18 RX ADMIN — NICOTINE 1 PATCH: 21 PATCH, EXTENDED RELEASE TRANSDERMAL at 16:45

## 2024-04-18 RX ADMIN — INSULIN LISPRO 3 UNITS: 100 INJECTION, SOLUTION INTRAVENOUS; SUBCUTANEOUS at 22:56

## 2024-04-18 RX ADMIN — Medication 10 ML: at 22:48

## 2024-04-18 RX ADMIN — HEPARIN SODIUM 5000 UNITS: 5000 INJECTION INTRAVENOUS; SUBCUTANEOUS at 22:47

## 2024-04-18 NOTE — Clinical Note
Level of Care: Med/Surg [1]   Diagnosis: Open leg wound [667010]   Admitting Physician: LC BERRY [[id number]]   Certification: I Certify That Inpatient Hospital Services Are Medically Necessary For Greater Than 2 Midnights

## 2024-04-18 NOTE — CONSULTS
[facility]   Consult Note    Patient Name: Chevy Junior  : 1965  MRN: [id number]  Primary Care Physician:  Janeth Le APRN  Referring Physician: No ref. provider found  Date of admission: 2024    Subjective   Subjective     Reason for Consult/ Chief Complaint: wound    HPI:  Chevy Junior is a 58 y.o. male with PMH significant for ESRD on HD, CHF, COPD, DM, HTN, HLD, Right AKA.  He presented to ER due to a wound to his right LE stump.  Patient was being transported by a transport service a in February and fell out of his electric w/c causing an abrasion to his right stump.  The wound has progressively gotten worse and he was seen at dialysis yesterday, wound was open with large amount of necrotic tissue, area of tunneling and foul smelling drainage.  He also has a surgical wound to his right buttocks that is open and requiring dressing changes but it is clean. he had been ordered Vanc however he has been ending HD early due to pain and not receiving his doses.  Patient was advised to go to ER for further evaluation and management of wounds.    Labs in ER chemistry consistent with ESRD, Lactic 1.1, WBC 6.6, Hgb 10.2  Xray of the right femur with no evidence of osteomyelitis  Right stump is necrotic and very foul-smelling    Review of Systems  Review of Systems   Constitutional:  Positive for activity change and fatigue. Negative for appetite change and fever.   Cardiovascular:  Positive for leg swelling.   Musculoskeletal:  Positive for arthralgias and myalgias.   Skin:  Positive for wound.   Neurological:  Positive for weakness.   All other systems reviewed and are negative.       Personal History     Past Medical History:   Diagnosis Date    Anemia     Atherosclerosis of native artery of left lower extremity with gangrene     Cellulitis and abscess of buttock     CHF (congestive heart failure)     Chronic pain     COPD (chronic obstructive pulmonary disease)     INHALER     Coronary artery disease involving native coronary artery of native heart with angina pectoris 09/12/2022    Diabetes mellitus     ESRD on dialysis     M,W,F-FISTULA LEFT ARM    Heart failure with preserved left ventricular function (HFpEF) 09/13/2022    FOLLOWS W/REECE-NO CP    Hyperlipidemia     Hypertension     WIFE STATED B/P HAS BEEN RUNNING LOW LATELY    Infectious viral hepatitis 5 years ago was treated    RIC (obstructive sleep apnea)     Osteoarthritis     Phosphorus metabolic disorder     Sleep apnea     NO MACHINE    Substance abuse 20 years ago from [facility]       Past Surgical History:   Procedure Laterality Date    ABOVE KNEE LEG AMPUTATION Right     CARDIAC CATHETERIZATION      CARDIAC CATHETERIZATION N/A 11/30/2022    Procedure: Percutaneous Coronary Intervention;  Surgeon: River Acevedo IV, MD;  Location: [facility] CATH INVASIVE LOCATION;  Service: Cardiovascular;  Laterality: N/A;    CARDIAC CATHETERIZATION N/A 11/30/2022    Procedure: Optical Coherent Tomography;  Surgeon: River Acevedo IV, MD;  Location: [facility] CATH INVASIVE LOCATION;  Service: Cardiovascular;  Laterality: N/A;    CARDIAC CATHETERIZATION N/A 11/30/2022    Procedure: Stent JOSE coronary;  Surgeon: River Acevedo IV, MD;  Location: [facility] CATH INVASIVE LOCATION;  Service: Cardiovascular;  Laterality: N/A;    CARDIAC CATHETERIZATION Left 11/09/2023    Procedure: Aortogram with left leg angiogram, possible angioplasty or stenting;  Surgeon: Luis Fernando Rawls MD;  Location: [facility] CATH INVASIVE LOCATION;  Service: Vascular;  Laterality: Left;    CATH LAB PROCEDURE      unable to stent    COLONOSCOPY N/A 02/15/2023    Procedure: COLONOSCOPY WITH POLYPECTOMY;  Surgeon: David Glass MD;  Location: [facility] ENDOSCOPY;  Service: Gastroenterology;  Laterality: N/A;  COLON POLYP, MELANOSIS COLI    ENDOSCOPY N/A 02/03/2023    Procedure: ESOPHAGOGASTRODUODENOSCOPY with biopsy;  Surgeon: Gus Whaley MD;   Location: [facility] ENDOSCOPY;  Service: General;  Laterality: N/A;  gastritis,     FEMORAL POPLITEAL BYPASS Left 12/12/2023    Procedure: Left femoral edarterectomy, left femoral to below the knee popliteal bypass graft;  Surgeon: Luis Fernando Rawls MD;  Location: [facility] MAIN OR;  Service: Vascular;  Laterality: Left;    HIP SURGERY      INSERTION PERITONEAL DIALYSIS CATHETER      LEG SURGERY      MANDIBLE FRACTURE SURGERY      REPLACEMENT TOTAL KNEE Right     x3    SHOULDER SURGERY Right     SPINE SURGERY      unknown procudure- completed at [facility] in [facility].       Family History: family history includes Arthritis in his mother; Diabetes in his father; Hypertension in his mother. Otherwise pertinent FHx was reviewed and not pertinent to current issue.    Social History:  reports that he has been smoking cigarettes. He has a 80 pack-year smoking history. He has never used smokeless tobacco. He reports that he does not currently use alcohol. He reports that he does not currently use drugs after having used the following drugs: Hydrocodone.    Home Medications:  BASAGLAR KWIKPEN, Ferric Citrate, Iron Sucrose, albuterol sulfate HFA, aspirin, atorvastatin, cinacalcet, clopidogrel, docusate sodium, gabapentin, heparin (porcine), lactulose, lanthanum, levothyroxine, lidocaine-prilocaine, metoclopramide, midodrine, naloxone, nitroglycerin, ondansetron ODT, oxyCODONE, and sertraline    Allergies:  No Known Allergies    Objective    Objective     Vitals:   Temp:  [98.6 °F (37 °C)] 98.6 °F (37 °C)  Heart Rate:  [] 101  Resp:  [18] 18  BP: (113-149)/(60-89) 149/89    Physical Exam:             Constitutional:         Awake, alert responsive, conversant, no obvious distress   Eyes:                       PERRLA, sclerae anicteric, no conjunctival injection   HEENT:                   Moist mucous membranes, no nasal or eye discharge, no throat congestion   Neck:                      Supple, no thyromegaly, no lymphadenopathy,  trachea midline, no elevated JVD   Respiratory:           Clear to auscultation bilaterally, nonlabored respirations    Cardiovascular:     RRR, no murmurs, rubs, or gallops, palpable pedal pulses bilaterally, No bilateral ankle edema   Gastrointestinal:   Positive bowel sounds, soft, nontender, non-distended, no organomegaly   Musculoskeletal:  No clubbing or cyanosis to extremities, muscle wasting, joint swelling, muscle weakness   Psychiatric:              Appropriate affect, cooperative   Neurologic:            Awake alert, oriented x 3, strength symmetric in all extremities, Cranial Nerves grossly intact to confrontation, speech clear   Skin:                      No rashes, bruising, wound to right stump and right buttocks    Result Review    Result Review:  I have personally reviewed the results from the time of this admission to 4/18/2024 19:36 EDT and agree with these findings:  []  Laboratory  []  Microbiology  []  Radiology  []  EKG/Telemetry   []  Cardiology/Vascular   []  Pathology  []  Old records  []  Other:    Results from last 7 days   Lab Units 04/18/24  1432   WBC 10*3/mm3 6.63   HEMOGLOBIN g/dL 10.2*   PLATELETS 10*3/mm3 207     Results from last 7 days   Lab Units 04/18/24  1432   SODIUM mmol/L 132*   POTASSIUM mmol/L 4.9   CHLORIDE mmol/L 91*   CO2 mmol/L 24.6   ANION GAP mmol/L 16.4*   BUN mg/dL 43*   CREATININE mg/dL 6.71*   GLUCOSE mg/dL 110*       Assessment & Plan   Assessment / Plan     Active Hospital Problems:  Active Hospital Problems    Diagnosis     **Open leg wound     Open leg wound, unspecified laterality, initial encounter     Amputation stump necrosis     ESRD on hemodialysis     Heart failure with preserved left ventricular function (HFpEF)     Current smoker     Type 2 diabetes mellitus, with long-term current use of insulin     Hyperlipidemia LDL goal <70     Coronary artery disease involving native coronary artery of native heart with angina pectoris     Essential  hypertension        Plan:   Patient will need vascular surgery evaluation, possible debridement  Broad-spectrum IV antibiotics  Hemodialysis Monday Wednesday Friday  Pain control      Electronically signed by Nikita Villasenor MD, 04/18/24, 4:28 PM EDT.

## 2024-04-18 NOTE — Clinical Note
The left DP pulse is detected w/ doppler. The left PT pulse is non-palpable. Patient has AKA on right

## 2024-04-18 NOTE — Clinical Note
Hemostasis started on the left femoral artery. Manual pressure applied to vessel. Manual pressure was held by Yannick. Manual pressure was held for 15 min. Hemostasis achieved successfully.

## 2024-04-18 NOTE — ED PROVIDER NOTES
Time: 3:58 PM EDT  Date of encounter:  4/18/2024  Independent Historian/Clinical History and Information was obtained by:   Patient    History is limited by: N/A    Chief Complaint: Wound right leg      History of Present Illness:  Patient is a 58 y.o. year old male who presents to the emergency department for evaluation of right leg wound.  Patient has a right above-the-knee amputation.  He has been seeing his primary care for longstanding wound on the stump of the right leg.  Patient has been on antibiotics but the wound is worsening.  There is no eschar present and increasing pain with erythema and warmth concerning for infection.  Patient is a dialysis patient under Dr. Rose and has been receiving vancomycin for his Monday, Wednesday, Friday dialysis treatments.  Patient was unable to receive his vancomycin on the last 2 dialysis treatments because they were both cut short due to the patient complaining of increasing pain of his leg.    HPI    Patient Care Team  Primary Care Provider: Janeth Le APRN    Past Medical History:     No Known Allergies  Past Medical History:   Diagnosis Date    Anemia     Atherosclerosis of native artery of left lower extremity with gangrene     Cellulitis and abscess of buttock 2023    CHF (congestive heart failure)     Chronic pain     COPD (chronic obstructive pulmonary disease)     INHALER    Coronary artery disease involving native coronary artery of native heart with angina pectoris 09/12/2022    Diabetes mellitus     ESRD on dialysis     M,W,F-FISTULA LEFT ARM    Heart failure with preserved left ventricular function (HFpEF) 09/13/2022    FOLLOWS JUNAID-NO CP    Hyperlipidemia     Hypertension     WIFE STATED B/P HAS BEEN RUNNING LOW LATELY    Infectious viral hepatitis 5 years ago was treated    RIC (obstructive sleep apnea)     Osteoarthritis     Phosphorus metabolic disorder     Sleep apnea     NO MACHINE    Substance abuse 20 years ago from [facility]     Past Surgical  History:   Procedure Laterality Date    ABOVE KNEE LEG AMPUTATION Right     CARDIAC CATHETERIZATION      CARDIAC CATHETERIZATION N/A 11/30/2022    Procedure: Percutaneous Coronary Intervention;  Surgeon: River Acevedo IV, MD;  Location: [facility] CATH INVASIVE LOCATION;  Service: Cardiovascular;  Laterality: N/A;    CARDIAC CATHETERIZATION N/A 11/30/2022    Procedure: Optical Coherent Tomography;  Surgeon: River Acevedo IV, MD;  Location: [facility] CATH INVASIVE LOCATION;  Service: Cardiovascular;  Laterality: N/A;    CARDIAC CATHETERIZATION N/A 11/30/2022    Procedure: Stent JOSE coronary;  Surgeon: River Acevedo IV, MD;  Location: [facility] CATH INVASIVE LOCATION;  Service: Cardiovascular;  Laterality: N/A;    CARDIAC CATHETERIZATION Left 11/09/2023    Procedure: Aortogram with left leg angiogram, possible angioplasty or stenting;  Surgeon: Luis Fernando Rawls MD;  Location: [facility] CATH INVASIVE LOCATION;  Service: Vascular;  Laterality: Left;    CATH LAB PROCEDURE      unable to stent    COLONOSCOPY N/A 02/15/2023    Procedure: COLONOSCOPY WITH POLYPECTOMY;  Surgeon: David Glass MD;  Location: [facility] ENDOSCOPY;  Service: Gastroenterology;  Laterality: N/A;  COLON POLYP, MELANOSIS COLI    ENDOSCOPY N/A 02/03/2023    Procedure: ESOPHAGOGASTRODUODENOSCOPY with biopsy;  Surgeon: Gus Whaley MD;  Location: [facility] ENDOSCOPY;  Service: General;  Laterality: N/A;  gastritis,     FEMORAL POPLITEAL BYPASS Left 12/12/2023    Procedure: Left femoral edarterectomy, left femoral to below the knee popliteal bypass graft;  Surgeon: Luis Fernando Rawls MD;  Location: [facility] MAIN OR;  Service: Vascular;  Laterality: Left;    HIP SURGERY      INSERTION PERITONEAL DIALYSIS CATHETER      LEG SURGERY      MANDIBLE FRACTURE SURGERY      REPLACEMENT TOTAL KNEE Right     x3    SHOULDER SURGERY Right     SPINE SURGERY      unknown procudure- completed at [facility] in [place].     Family History   Problem  Relation Age of Onset    Hypertension Mother     Arthritis Mother     Diabetes Father        Home Medications:  Prior to Admission medications    Medication Sig Start Date End Date Taking? Authorizing Provider   albuterol sulfate  (90 Base) MCG/ACT inhaler Inhale 1 puff Every 4 (Four) Hours As Needed for Wheezing or Shortness of Air.    Janie Lynch MD   amiodarone (PACERONE) 200 MG tablet Take 1 tablet by mouth Daily.    Janie Lynch MD   amLODIPine (NORVASC) 5 MG tablet Take 1 tablet by mouth 2 (Two) Times a Day. 9/13/22   River Acevedo IV, MD   aspirin (aspirin) 81 MG EC tablet Take 1 tablet by mouth Daily. 9/13/22   River Acevedo IV, MD   atorvastatin (LIPITOR) 40 MG tablet Take 1 tablet by mouth Daily.  Patient taking differently: Take 1 tablet by mouth Every Night. 9/13/22   River Acevedo IV, MD   carvedilol (COREG) 6.25 MG tablet Take 1 tablet by mouth 2 (Two) Times a Day With Meals.    Janie Lynch MD   cephalexin (KEFLEX) 500 MG capsule Take 1 capsule by mouth 3 (Three) Times a Day. 1/3/24   Janie Lynch MD   cinacalcet (SENSIPAR) 30 MG tablet Take 1 tablet by mouth Daily. 7/5/21   Janie Lynch MD   clopidogrel (PLAVIX) 75 MG tablet TAKE 1 TABLET BY MOUTH DAILY 3/29/24   Feng Nielsen APRN   Diclofenac Sodium (VOLTAREN) 1 % gel gel Apply 2 g topically to the appropriate area as directed 4 (Four) Times a Day As Needed. 7/13/21   Janie Lynch MD   docusate sodium (COLACE) 100 MG capsule Take 1 capsule by mouth 2 (Two) Times a Day As Needed for Constipation. 6/16/21   Janie Lynch MD   fentaNYL (DURAGESIC) 50 MCG/HR patch Place 1 patch on the skin as directed by provider Every 72 (Seventy-Two) Hours.    Janie Lynch MD   furosemide (LASIX) 80 MG tablet Take 1 tablet by mouth 2 (Two) Times a Day.    Janie Lynch MD   gabapentin (NEURONTIN) 800 MG tablet Take 1 tablet by mouth 3 (Three)  Times a Day.    Janie Lynch MD   Heparin Sodium, Porcine, (heparin, porcine,) 1000 UNIT/ML injection Infuse 1 mL into a venous catheter Every Night.    Janie Lynch MD   hydrALAZINE (APRESOLINE) 10 MG tablet Take 1 tablet by mouth 3 (Three) Times a Day.    Janie Lynch MD   Insulin Glargine (BASAGLAR KWIKPEN) 100 UNIT/ML injection pen Inject 15 Units under the skin into the appropriate area as directed Daily. 6/16/21   Janie Lynch MD   IRON SUCROSE IV Infuse  into a venous catheter Every 14 (Fourteen) Days. Iron infusion every 2 weeks    Janie Lynch MD   isosorbide mononitrate (IMDUR) 60 MG 24 hr tablet Take 1 tablet by mouth Daily. 9/13/22   River Acevedo IV, MD   lactulose (CHRONULAC) 10 GM/15ML solution Take 30 mL by mouth 2 (Two) Times a Day As Needed.    Janie Lynch MD   lanthanum (FOSRENOL) 1000 MG chewable tablet Chew 1 tablet 3 (Three) Times a Day With Meals. 12/13/22   Janie Lynch MD   levothyroxine (SYNTHROID, LEVOTHROID) 75 MCG tablet Take 1 tablet by mouth Daily. 2/2/22   Janie Lynch MD   metoclopramide (REGLAN) 5 MG tablet Take 1 tablet by mouth Every Night.    Janie Lynch MD   metoprolol tartrate (LOPRESSOR) 100 MG tablet Take 1 tablet by mouth 2 (Two) Times a Day. 9/13/22   River Acevedo IV, MD   naloxone (NARCAN) 4 MG/0.1ML nasal spray 1 spray into the nostril(s) as directed by provider Take As Directed.    Janie Lynch MD   nitroglycerin (NITROSTAT) 0.4 MG SL tablet Place 1 tablet under the tongue Every 5 (Five) Minutes As Needed for Chest Pain. 9/13/22   River Acevedo IV, MD   ondansetron ODT (ZOFRAN-ODT) 4 MG disintegrating tablet Place 1 tablet on the tongue Every 6 (Six) Hours As Needed for Nausea or Vomiting.    Janie Lynch MD   oxyCODONE (ROXICODONE) 20 MG tablet Take 1 tablet by mouth Every 4 (Four) Hours As Needed for Moderate Pain.    Syed  "MD Janie   polyethylene glycol (MIRALAX) 17 GM/SCOOP powder Take 17 g by mouth Daily As Needed.    Janie Lynch MD   potassium chloride (K-DUR,KLOR-CON) 10 MEQ CR tablet Take 1 tablet by mouth Daily.    Janie Lynch MD   sertraline (ZOLOFT) 100 MG tablet Take 1.5 tablets by mouth Daily. 6/16/21   Janie Lynch MD   tamsulosin (FLOMAX) 0.4 MG capsule 24 hr capsule Take 1 capsule by mouth Daily.    Janie Lynch MD   valsartan (DIOVAN) 320 MG tablet Take 1 tablet by mouth Daily. 9/13/22   River Acevedo IV, MD   Xifaxan 550 MG tablet Take 1 tablet by mouth Every 12 (Twelve) Hours. 12/1/23   Janie Lynch MD        Social History:   Social History     Tobacco Use    Smoking status: Every Day     Current packs/day: 2.00     Average packs/day: 2.0 packs/day for 40.0 years (80.0 ttl pk-yrs)     Types: Cigarettes    Smokeless tobacco: Never   Vaping Use    Vaping status: Never Used   Substance Use Topics    Alcohol use: Not Currently     Comment: None    Drug use: Not Currently     Types: Hydrocodone     Comment: pain pills from previous injuries         Review of Systems:  Review of Systems   Musculoskeletal:  Positive for myalgias.        Physical Exam:  /88 (BP Location: Right arm, Patient Position: Lying)   Pulse 101   Temp 98.9 °F (37.2 °C) (Oral)   Resp 16   Ht 170.2 cm (67\")   Wt 90.7 kg (199 lb 15.3 oz)   SpO2 95%   BMI 31.32 kg/m²     Physical Exam  Vitals and nursing note reviewed.   Constitutional:       General: He is not in acute distress.  Cardiovascular:      Rate and Rhythm: Normal rate and regular rhythm.   Pulmonary:      Effort: Pulmonary effort is normal. No respiratory distress.      Breath sounds: Normal breath sounds.   Abdominal:      Palpations: Abdomen is soft.   Musculoskeletal:         General: Normal range of motion.      Cervical back: Normal range of motion and neck supple.      Comments: Right above knee amputation.  " There is an open wound with purulent drainage overlying the distal stump.  There is warmth, tenderness and erythema present.   Skin:     General: Skin is warm and dry.   Neurological:      Mental Status: He is alert and oriented to person, place, and time.                  Procedures:  Procedures      Medical Decision Making:      Comorbidities that affect care:    Congestive Heart Failure, COPD, Coronary Artery Disease, Hypertension    External Notes reviewed:    Previous Clinic Note: Patient seen 2/13/2024 for primary care visit for hypertension      The following orders were placed and all results were independently analyzed by me:  Orders Placed This Encounter   Procedures    Wound Culture - Wound, Leg, Right    Blood Culture - Blood,    Blood Culture - Blood,    XR Femur 2 View Right    Comprehensive Metabolic Panel    [place] Draw    CBC Auto Differential    Lactic Acid, Plasma    Vancomycin, Random    Basic Metabolic Panel    CBC Auto Differential    Diet: Cardiac, Diabetic; Healthy Heart (2-3 Na+); Consistent Carbohydrate; Fluid Consistency: Thin (IDDSI 0)    Vital Signs    Intake & Output    Weigh Patient    Oral Care    Saline Lock & Maintain IV Access    Up in Chair    Notify Provider (With Default Parameters)    Initiate Glucommander™ SQ    RN to Order STAT Glucose (Lab Performed) for POC Glucose <10 or >600    Discontinue Cardiac Monitoring    Vital Signs Per Hospital Policy    Weigh Patient Before & After Each Dialysis Treatment    Central Line Care    Code Status and Medical Interventions:    Inpatient Nephrology Consult    Inpatient Hospitalist Consult    Inpatient Vascular Surgery Consult    Patient is on Glucommander    Pulse Oximetry,  Spot    POC Glucose Once    POC Glucose PRN    POC Glucose 4x Daily Before Meals & at Bedtime    POC Glucose Once    Insert Peripheral IV    Hemodialysis Inpatient    Inpatient Admission    CBC & Differential    Green Top (Gel)    Lavender Top    Gold Top - SST     Light Blue Top    CBC & Differential       Medications Given in the Emergency Department:  Medications   nicotine (NICODERM CQ) 21 MG/24HR patch 1 patch (1 patch Transdermal Medication Applied 4/18/24 0085)   sodium chloride 0.9 % flush 10 mL (has no administration in time range)   sodium chloride 0.9 % flush 10 mL (has no administration in time range)   sodium chloride 0.9 % infusion 40 mL (has no administration in time range)   heparin (porcine) 5000 UNIT/ML injection 5,000 Units (has no administration in time range)   sennosides-docusate (PERICOLACE) 8.6-50 MG per tablet 2 tablet (has no administration in time range)     And   polyethylene glycol (MIRALAX) packet 17 g (has no administration in time range)     And   bisacodyl (DULCOLAX) EC tablet 5 mg (has no administration in time range)     And   bisacodyl (DULCOLAX) suppository 10 mg (has no administration in time range)   acetaminophen (TYLENOL) tablet 650 mg (has no administration in time range)     Or   acetaminophen (TYLENOL) 160 MG/5ML oral solution 650 mg (has no administration in time range)     Or   acetaminophen (TYLENOL) suppository 650 mg (has no administration in time range)   HYDROmorphone (DILAUDID) injection 1 mg (has no administration in time range)     And   naloxone (NARCAN) injection 0.4 mg (has no administration in time range)   ondansetron ODT (ZOFRAN-ODT) disintegrating tablet 4 mg (has no administration in time range)     Or   ondansetron (ZOFRAN) injection 4 mg (has no administration in time range)   insulin detemir (LEVEMIR) injection 1-200 Units (has no administration in time range)   insulin lispro (humaLOG) injection 1-200 Units (has no administration in time range)   insulin lispro (humaLOG) injection 1-200 Units (has no administration in time range)   dextrose (GLUTOSE) oral gel 15 g (has no administration in time range)   dextrose (D50W) (25 g/50 mL) IV injection 10-50 mL (has no administration in time range)   glucagon (GLUCAGEN)  injection 1 mg (has no administration in time range)   vancomycin 500 mg/100 mL 0.9% NS IVPB ([facility]) (0 mg Intravenous Stopped 4/18/24 1728)   HYDROmorphone (DILAUDID) injection 0.5 mg (0.5 mg Intravenous Given 4/18/24 1703)   ondansetron (ZOFRAN) injection 4 mg (4 mg Intravenous Given 4/18/24 1703)        ED Course:         Labs:    Lab Results (last 24 hours)       Procedure Component Value Units Date/Time    Blood Culture - Blood, Arm, Right [[id number]] Collected: 04/18/24 1357    Specimen: Blood from Arm, Right Updated: 04/18/24 1409    CBC & Differential [[id number]]  (Abnormal) Collected: 04/18/24 1432    Specimen: Blood Updated: 04/18/24 1505    Narrative:      The following orders were created for panel order CBC & Differential.  Procedure                               Abnormality         Status                     ---------                               -----------         ------                     CBC Auto Differential[[id number]]        Abnormal            Final result                 Please view results for these tests on the individual orders.    Comprehensive Metabolic Panel [[id number]]  (Abnormal) Collected: 04/18/24 1432    Specimen: Blood Updated: 04/18/24 1509     Glucose 110 mg/dL      BUN 43 mg/dL      Creatinine 6.71 mg/dL      Sodium 132 mmol/L      Potassium 4.9 mmol/L      Comment: Slight hemolysis detected by analyzer. Result may be falsely elevated.        Chloride 91 mmol/L      CO2 24.6 mmol/L      Calcium 8.8 mg/dL      Total Protein 8.5 g/dL      Albumin 3.2 g/dL      ALT (SGPT) 10 U/L      AST (SGOT) 14 U/L      Alkaline Phosphatase 162 U/L      Total Bilirubin 0.2 mg/dL      Globulin 5.3 gm/dL      A/G Ratio 0.6 g/dL      BUN/Creatinine Ratio 6.4     Anion Gap 16.4 mmol/L      eGFR 8.8 mL/min/1.73      Comment: <15 Indicative of kidney failure       Narrative:      GFR Normal >60  Chronic Kidney Disease <60  Kidney Failure <15      CBC Auto Differential [[id number]]  (Abnormal) Collected:  04/18/24 1432    Specimen: Blood Updated: 04/18/24 1505     WBC 6.63 10*3/mm3      RBC 3.92 10*6/mm3      Hemoglobin 10.2 g/dL      Hematocrit 34.5 %      MCV 88.0 fL      MCH 26.0 pg      MCHC 29.6 g/dL      RDW 18.2 %      RDW-SD 57.1 fl      MPV 10.1 fL      Platelets 207 10*3/mm3      Neutrophil % 75.7 %      Lymphocyte % 12.4 %      Monocyte % 5.7 %      Eosinophil % 4.8 %      Basophil % 0.9 %      Immature Grans % 0.5 %      Neutrophils, Absolute 5.02 10*3/mm3      Lymphocytes, Absolute 0.82 10*3/mm3      Monocytes, Absolute 0.38 10*3/mm3      Eosinophils, Absolute 0.32 10*3/mm3      Basophils, Absolute 0.06 10*3/mm3      Immature Grans, Absolute 0.03 10*3/mm3      nRBC 0.0 /100 WBC     Lactic Acid, Plasma [[id number]]  (Normal) Collected: 04/18/24 1432    Specimen: Blood Updated: 04/18/24 1506     Lactate 1.1 mmol/L     Vancomycin, Random [[id number]]  (Normal) Collected: 04/18/24 1432    Specimen: Blood Updated: 04/18/24 1618     Vancomycin Random 7.42 mcg/mL     Narrative:      Therapeutic Ranges for Vancomycin    Vancomycin Random   5.0-40.0 mcg/mL  Vancomycin Trough   5.0-20.0 mcg/mL  Vancomycin Peak     20.0-40.0 mcg/mL    Blood Culture - Blood, Arm, Right [[id number]] Collected: 04/18/24 1507    Specimen: Blood from Arm, Right Updated: 04/18/24 1511    Wound Culture - Wound, Leg, Right [[id number]] Collected: 04/18/24 1536    Specimen: Wound from Leg, Right Updated: 04/18/24 1647     Gram Stain Few (2+) WBCs seen      Moderate (3+) Gram negative bacilli    POC Glucose Once [[id number]]  (Abnormal) Collected: 04/18/24 1731    Specimen: Blood Updated: 04/18/24 1736     Glucose 122 mg/dL      Comment: Serial Number: [id number]:  [id number]       POC Glucose Once [[id number]]  (Abnormal) Collected: 04/18/24 2034    Specimen: Blood Updated: 04/18/24 2035     Glucose 148 mg/dL      Comment: Serial Number: [id number]:  [id number]                Imaging:    XR Femur 2 View Right    Result Date:  4/18/2024  XR FEMUR 2 VW RIGHT-  Date of Exam: 4/18/2024 2:18 PM  Indication: stump wound- possible infection  Comparison: None available.  Findings: Patient is status post above-knee amputation. There is metallic plate and multiple surgical screws in place along the right acetabulum. There is no evidence of acute fracture or dislocation. There is heterotopic bone formation at the amputation site along the distal femur. Dystrophic lobular calcifications are seen within the soft tissues near the right ischial tuberosity. Vascular calcifications are also seen within the soft tissues.      Impression:  1. No conventional radiographic evidence of osteomyelitis. 2. Status post above-knee amputation with heterotopic bone formation at the amputation site.   Electronically Signed By-Edd Adams MD On:4/18/2024 2:24 PM         Differential Diagnosis and Discussion:    Extremity Pain: Differential diagnosis includes but is not limited to soft tissue sprain, tendonitis, tendon injury, dislocation, fracture, deep vein thrombosis, arterial insufficiency, osteoarthritis, bursitis, and ligamentous damage.    All labs were reviewed and interpreted by me.  All X-rays impressions were independently interpreted by me.    MDM  Patient started on IV antibiotics.  He will be admitted to the hospitalist for failed outpatient antibiotic treatment        Patient Care Considerations:          Consultants/Shared Management Plan:    Hospitalist: I have discussed the case with Dr. Jimenes who agrees to accept the patient for admission.    Patient discussed with Dr. Rose, nephrology, who will arrange for dialysis    Social Determinants of Health:    Patient is independent, reliable, and has access to care.       Disposition and Care Coordination:    Admit:   Through independent evaluation of the patient's history, physical, and imperical data, the patient meets criteria for inpatient admission to the hospital.      Final diagnoses:   Open  wound of right lower extremity, initial encounter        ED Disposition       ED Disposition   Decision to Admit    Condition   --    Comment   Level of Care: Med/Surg [1]   Diagnosis: Open leg wound [731272]   Admitting Physician: LC BERRY [[id number]]   Isolate for COVID?: No [0]   Certification: I Certify That Inpatient Hospital Services Are Medically Necessary For Greater Than 2 Midnights                 This medical record created using voice recognition software.             Bridger Allen DO  04/18/24 1204

## 2024-04-19 LAB
ANION GAP SERPL CALCULATED.3IONS-SCNC: 16.4 MMOL/L (ref 5–15)
BASOPHILS # BLD AUTO: 0.05 10*3/MM3 (ref 0–0.2)
BASOPHILS NFR BLD AUTO: 0.7 % (ref 0–1.5)
BUN SERPL-MCNC: 51 MG/DL (ref 6–20)
BUN/CREAT SERPL: 6.8 (ref 7–25)
CALCIUM SPEC-SCNC: 8.7 MG/DL (ref 8.6–10.5)
CHLORIDE SERPL-SCNC: 94 MMOL/L (ref 98–107)
CO2 SERPL-SCNC: 22.6 MMOL/L (ref 22–29)
CREAT SERPL-MCNC: 7.5 MG/DL (ref 0.76–1.27)
DEPRECATED RDW RBC AUTO: 57.8 FL (ref 37–54)
EGFRCR SERPLBLD CKD-EPI 2021: 7.7 ML/MIN/1.73
EOSINOPHIL # BLD AUTO: 0.23 10*3/MM3 (ref 0–0.4)
EOSINOPHIL NFR BLD AUTO: 3.3 % (ref 0.3–6.2)
ERYTHROCYTE [DISTWIDTH] IN BLOOD BY AUTOMATED COUNT: 17.7 % (ref 12.3–15.4)
GLUCOSE BLDC GLUCOMTR-MCNC: 106 MG/DL (ref 70–99)
GLUCOSE BLDC GLUCOMTR-MCNC: 116 MG/DL (ref 70–99)
GLUCOSE BLDC GLUCOMTR-MCNC: 122 MG/DL (ref 70–99)
GLUCOSE BLDC GLUCOMTR-MCNC: 127 MG/DL (ref 70–99)
GLUCOSE BLDC GLUCOMTR-MCNC: 80 MG/DL (ref 70–99)
GLUCOSE SERPL-MCNC: 66 MG/DL (ref 65–99)
HCT VFR BLD AUTO: 33.6 % (ref 37.5–51)
HGB BLD-MCNC: 9.7 G/DL (ref 13–17.7)
IMM GRANULOCYTES # BLD AUTO: 0.02 10*3/MM3 (ref 0–0.05)
IMM GRANULOCYTES NFR BLD AUTO: 0.3 % (ref 0–0.5)
LYMPHOCYTES # BLD AUTO: 0.87 10*3/MM3 (ref 0.7–3.1)
LYMPHOCYTES NFR BLD AUTO: 12.5 % (ref 19.6–45.3)
MCH RBC QN AUTO: 25.8 PG (ref 26.6–33)
MCHC RBC AUTO-ENTMCNC: 28.9 G/DL (ref 31.5–35.7)
MCV RBC AUTO: 89.4 FL (ref 79–97)
MONOCYTES # BLD AUTO: 0.38 10*3/MM3 (ref 0.1–0.9)
MONOCYTES NFR BLD AUTO: 5.5 % (ref 5–12)
NEUTROPHILS NFR BLD AUTO: 5.4 10*3/MM3 (ref 1.7–7)
NEUTROPHILS NFR BLD AUTO: 77.7 % (ref 42.7–76)
NRBC BLD AUTO-RTO: 0 /100 WBC (ref 0–0.2)
PLATELET # BLD AUTO: 169 10*3/MM3 (ref 140–450)
PMV BLD AUTO: 9 FL (ref 6–12)
POTASSIUM SERPL-SCNC: 5.5 MMOL/L (ref 3.5–5.2)
RBC # BLD AUTO: 3.76 10*6/MM3 (ref 4.14–5.8)
SODIUM SERPL-SCNC: 133 MMOL/L (ref 136–145)
VANCOMYCIN SERPL-MCNC: 14.36 MCG/ML (ref 5–40)
WBC NRBC COR # BLD AUTO: 6.95 10*3/MM3 (ref 3.4–10.8)

## 2024-04-19 PROCEDURE — 94799 UNLISTED PULMONARY SVC/PX: CPT

## 2024-04-19 PROCEDURE — 99221 1ST HOSP IP/OBS SF/LOW 40: CPT | Performed by: SURGERY

## 2024-04-19 PROCEDURE — 80202 ASSAY OF VANCOMYCIN: CPT | Performed by: STUDENT IN AN ORGANIZED HEALTH CARE EDUCATION/TRAINING PROGRAM

## 2024-04-19 PROCEDURE — 80048 BASIC METABOLIC PNL TOTAL CA: CPT | Performed by: STUDENT IN AN ORGANIZED HEALTH CARE EDUCATION/TRAINING PROGRAM

## 2024-04-19 PROCEDURE — 25010000002 CEFEPIME PER 500 MG: Performed by: INTERNAL MEDICINE

## 2024-04-19 PROCEDURE — 25010000002 HEPARIN (PORCINE) PER 1000 UNITS: Performed by: STUDENT IN AN ORGANIZED HEALTH CARE EDUCATION/TRAINING PROGRAM

## 2024-04-19 PROCEDURE — 25010000002 HYDROMORPHONE 1 MG/ML SOLUTION: Performed by: STUDENT IN AN ORGANIZED HEALTH CARE EDUCATION/TRAINING PROGRAM

## 2024-04-19 PROCEDURE — 63710000001 INSULIN LISPRO (HUMAN) PER 5 UNITS: Performed by: STUDENT IN AN ORGANIZED HEALTH CARE EDUCATION/TRAINING PROGRAM

## 2024-04-19 PROCEDURE — 94761 N-INVAS EAR/PLS OXIMETRY MLT: CPT

## 2024-04-19 PROCEDURE — 63710000001 INSULIN DETEMIR PER 5 UNITS: Performed by: STUDENT IN AN ORGANIZED HEALTH CARE EDUCATION/TRAINING PROGRAM

## 2024-04-19 PROCEDURE — 85025 COMPLETE CBC W/AUTO DIFF WBC: CPT | Performed by: STUDENT IN AN ORGANIZED HEALTH CARE EDUCATION/TRAINING PROGRAM

## 2024-04-19 PROCEDURE — 94760 N-INVAS EAR/PLS OXIMETRY 1: CPT

## 2024-04-19 PROCEDURE — 99233 SBSQ HOSP IP/OBS HIGH 50: CPT | Performed by: INTERNAL MEDICINE

## 2024-04-19 PROCEDURE — 82948 REAGENT STRIP/BLOOD GLUCOSE: CPT | Performed by: STUDENT IN AN ORGANIZED HEALTH CARE EDUCATION/TRAINING PROGRAM

## 2024-04-19 PROCEDURE — 25010000002 VANCOMYCIN 5 G RECONSTITUTED SOLUTION: Performed by: STUDENT IN AN ORGANIZED HEALTH CARE EDUCATION/TRAINING PROGRAM

## 2024-04-19 PROCEDURE — 82948 REAGENT STRIP/BLOOD GLUCOSE: CPT

## 2024-04-19 RX ORDER — SERTRALINE HYDROCHLORIDE 100 MG/1
100 TABLET, FILM COATED ORAL DAILY
Status: DISCONTINUED | OUTPATIENT
Start: 2024-04-19 | End: 2024-04-25 | Stop reason: HOSPADM

## 2024-04-19 RX ORDER — METOCLOPRAMIDE 5 MG/1
5 TABLET ORAL NIGHTLY
Status: DISCONTINUED | OUTPATIENT
Start: 2024-04-19 | End: 2024-04-25 | Stop reason: HOSPADM

## 2024-04-19 RX ORDER — ASPIRIN 81 MG/1
81 TABLET ORAL DAILY
Status: DISCONTINUED | OUTPATIENT
Start: 2024-04-19 | End: 2024-04-25 | Stop reason: HOSPADM

## 2024-04-19 RX ORDER — OXYCODONE HYDROCHLORIDE 5 MG/1
20 TABLET ORAL EVERY 4 HOURS PRN
Status: DISCONTINUED | OUTPATIENT
Start: 2024-04-19 | End: 2024-04-25 | Stop reason: HOSPADM

## 2024-04-19 RX ORDER — MIDODRINE HYDROCHLORIDE 10 MG/1
10 TABLET ORAL
Status: DISCONTINUED | OUTPATIENT
Start: 2024-04-19 | End: 2024-04-25 | Stop reason: HOSPADM

## 2024-04-19 RX ORDER — GABAPENTIN 400 MG/1
800 CAPSULE ORAL EVERY 8 HOURS SCHEDULED
Status: DISCONTINUED | OUTPATIENT
Start: 2024-04-19 | End: 2024-04-21

## 2024-04-19 RX ORDER — ATORVASTATIN CALCIUM 40 MG/1
40 TABLET, FILM COATED ORAL NIGHTLY
Status: DISCONTINUED | OUTPATIENT
Start: 2024-04-19 | End: 2024-04-25 | Stop reason: HOSPADM

## 2024-04-19 RX ORDER — LEVOTHYROXINE SODIUM 0.07 MG/1
75 TABLET ORAL
Status: DISCONTINUED | OUTPATIENT
Start: 2024-04-20 | End: 2024-04-25 | Stop reason: HOSPADM

## 2024-04-19 RX ORDER — CLOPIDOGREL BISULFATE 75 MG/1
75 TABLET ORAL DAILY
Status: DISCONTINUED | OUTPATIENT
Start: 2024-04-19 | End: 2024-04-25 | Stop reason: HOSPADM

## 2024-04-19 RX ORDER — AMMONIUM LACTATE 12 G/100G
LOTION TOPICAL
Status: DISCONTINUED | OUTPATIENT
Start: 2024-04-19 | End: 2024-04-25 | Stop reason: HOSPADM

## 2024-04-19 RX ADMIN — MIDODRINE HYDROCHLORIDE 10 MG: 10 TABLET ORAL at 16:30

## 2024-04-19 RX ADMIN — HEPARIN SODIUM 5000 UNITS: 5000 INJECTION INTRAVENOUS; SUBCUTANEOUS at 22:57

## 2024-04-19 RX ADMIN — INSULIN LISPRO 2 UNITS: 100 INJECTION, SOLUTION INTRAVENOUS; SUBCUTANEOUS at 18:28

## 2024-04-19 RX ADMIN — Medication: at 23:42

## 2024-04-19 RX ADMIN — OXYCODONE HYDROCHLORIDE 20 MG: 5 TABLET ORAL at 17:35

## 2024-04-19 RX ADMIN — HYDROMORPHONE HYDROCHLORIDE 1 MG: 1 INJECTION, SOLUTION INTRAMUSCULAR; INTRAVENOUS; SUBCUTANEOUS at 07:32

## 2024-04-19 RX ADMIN — INSULIN DETEMIR 16 UNITS: 100 INJECTION, SOLUTION SUBCUTANEOUS at 22:57

## 2024-04-19 RX ADMIN — Medication 10 ML: at 22:59

## 2024-04-19 RX ADMIN — CEFEPIME HYDROCHLORIDE 1000 MG: 1 INJECTION, POWDER, FOR SOLUTION INTRAMUSCULAR; INTRAVENOUS at 14:51

## 2024-04-19 RX ADMIN — OXYCODONE HYDROCHLORIDE 20 MG: 5 TABLET ORAL at 21:46

## 2024-04-19 RX ADMIN — CEFEPIME HYDROCHLORIDE 1000 MG: 1 INJECTION, POWDER, FOR SOLUTION INTRAMUSCULAR; INTRAVENOUS at 23:42

## 2024-04-19 RX ADMIN — VANCOMYCIN HYDROCHLORIDE 500 MG: 5 INJECTION, POWDER, LYOPHILIZED, FOR SOLUTION INTRAVENOUS at 22:57

## 2024-04-19 RX ADMIN — Medication 10 ML: at 08:00

## 2024-04-19 RX ADMIN — ASPIRIN 81 MG: 81 TABLET, COATED ORAL at 16:25

## 2024-04-19 RX ADMIN — HYDROMORPHONE HYDROCHLORIDE 1 MG: 1 INJECTION, SOLUTION INTRAMUSCULAR; INTRAVENOUS; SUBCUTANEOUS at 16:25

## 2024-04-19 RX ADMIN — ATORVASTATIN CALCIUM 40 MG: 40 TABLET, FILM COATED ORAL at 22:57

## 2024-04-19 RX ADMIN — HYDROMORPHONE HYDROCHLORIDE 1 MG: 1 INJECTION, SOLUTION INTRAMUSCULAR; INTRAVENOUS; SUBCUTANEOUS at 14:05

## 2024-04-19 RX ADMIN — GABAPENTIN 800 MG: 400 CAPSULE ORAL at 22:57

## 2024-04-19 RX ADMIN — CLOPIDOGREL BISULFATE 75 MG: 75 TABLET, FILM COATED ORAL at 16:25

## 2024-04-19 RX ADMIN — SERTRALINE HYDROCHLORIDE 100 MG: 100 TABLET, FILM COATED ORAL at 16:25

## 2024-04-19 RX ADMIN — HYDROMORPHONE HYDROCHLORIDE 1 MG: 1 INJECTION, SOLUTION INTRAMUSCULAR; INTRAVENOUS; SUBCUTANEOUS at 00:42

## 2024-04-19 RX ADMIN — METOCLOPRAMIDE 5 MG: 5 TABLET ORAL at 22:57
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
## 2024-04-19 NOTE — PROGRESS NOTES
"[facility] Clinical Pharmacy Services: Vancomycin Pharmacokinetic Initial Consult Note    Chevy Junior is a 58 y.o. male who is on day 2 of pharmacy to dose vancomycin for Skin and Soft Tissue.    Consult Information  Consulting Provider: Gus Jimenes MD   Planned Duration of Therapy: 14 days (end date of 5/3/24)  Was Patient Receiving Prior to Admission/Consult?: Yes, last dose received 500 mg at 1639.  PK/PD Target: Dose by Levels  Relevant ID History: Patient receiving outpatient vancomycin with dialysis prior to admission       Imaging Reviewed?: Yes    Microbiology Data  MRSA PCR performed: No; Result: Not ordered due to excluded indication or presence of suspected abscess  Culture/Source:   -Wound culture: Preliminary result   -Blood culture(s): In process     Vitals/Labs  Ht: 170.2 cm (67\"); Wt: 77.4 kg (170 lb 10.2 oz)  Temp (24hrs), Av.3 °F (36.8 °C), Min:97.3 °F (36.3 °C), Max:98.9 °F (37.2 °C)   Estimated Creatinine Clearance: 11.6 mL/min (A) (by C-G formula based on SCr of 7.5 mg/dL (H)).  Hemodialysis - MWF schedule     Results from last 7 days   Lab Units 24  0509 24  1432   VANCOMYCIN RM mcg/mL 14.36 7.42   CREATININE mg/dL 7.50* 6.71*   WBC 10*3/mm3 6.95 6.63     Assessment/Plan:    Patient received 500 mg dose yesterday. Random level this morning of 14.36 mcg/mL as pre-hemodialysis level. Hemodialysis scheduled for today. Will schedule one time dose of 500 mg to be given after hemodialysis     Vancomycin Dose: 500 mg ordered for after hemodialysis today  No level currently ordered. Pre-hemodialysis level planned prior to next HD session   Patient has order for Basic Metabolic Panel    Pharmacy will follow patient's kidney function and will adjust doses and obtain levels as necessary. Thank you for involving pharmacy in this patient's care. Please contact pharmacy with any questions or concerns.                           Cale Calvert [facility]  Clinical Pharmacist "
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
## 2024-04-19 NOTE — PLAN OF CARE
Goal Outcome Evaluation:  Plan of Care Reviewed With: patient           Outcome Evaluation: Patient off to unit going dialysis. Report given to Rosita LEES.

## 2024-04-19 NOTE — NURSING NOTE
Duration of Treatment 4.0 Hours                               Completed treatment   Access Site AVF                                          Sites held 5 min each post treatment   Dialyzer Revaclear    mL/min   Dialysate Temperature (C) 36   BFR-As tolerated to a maximum of: 400 mL/min   Dialysate Solution Bath: K+ = 2 mEq, Ca = 2.5mEq   Bicarb 30 mEq   Na+ 138 mEq   Fluid Removal: Remove 2-4 L if tolerates            Able to remove 3L     Patient treatment completed. While on treatment discussed with patient the importance of being compliant with out patient regimen so that he is able to receive the antibiotic therapy that he needs for his stump. Also to not touch or scratch open stump area without wearing a glove due to the amount of bacteria that could be on his hands. Patient verbalized understanding.     Post treatment access de cannulated and sites held for about 5 minutes each. Each access dressing C/D/I. Post treatment B/P was 132/81.     Report given via secure chat to NABEEL Hu.

## 2024-04-19 NOTE — PLAN OF CARE
Goal Outcome Evaluation:  Plan of Care Reviewed With: patient           Outcome Evaluation: Patient Alert oriented x3, disoriented  to time. VSS. Patient 2L N/C. Pt on continues pulse ox.. Prn dilaudid given once for C/0 of right AKA stump 8/10 pain. Patient expresses no other needs at this time. Call light within reach                                 gait, locomotion, and balance/aerobic capacity/endurance/muscle strength aerobic capacity/endurance/gross motor/gait, locomotion, and balance/muscle strength/ROM
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
## 2024-04-19 NOTE — PROGRESS NOTES
[facility]     Progress Note    Patient Name: Chevy Junior  : 1965  MRN: [id number]  Primary Care Physician:  Janeth Le APRN  Date of admission: 2024    Subjective patient is fully awake alert responsive interactive not in any acute distress.  He is afebrile.  No leukocytosis    Review of Systems  All review of systems are negative except as mentioned in subjective complaints.    Objective   Objective     Vitals:   Temp:  [97.3 °F (36.3 °C)-98.9 °F (37.2 °C)] 98.2 °F (36.8 °C)  Heart Rate:  [] 105  Resp:  [16-18] 18  BP: (113-150)/(60-89) 143/89  Flow (L/min):  [1-2] 1  Physical Exam    Constitutional: Awake, alert responsive, conversant, no obvious distress              Psychiatric:  Appropriate affect, cooperative   Neurologic:  Awake alert ,oriented x 3, strength symmetric in all extremities, Cranial Nerves grossly intact to confrontation, speech clear   Eyes:   PERRLA, sclerae anicteric, no conjunctival injection   HEENT:  Moist mucous membranes, no nasal or eye discharge, no throat congestion   Neck:   Supple, no thyromegaly, no lymphadenopathy, trachea midline, no elevated JVD   Respiratory:  Clear to auscultation bilaterally, nonlabored respirations    Cardiovascular: RRR, no murmurs, rubs, or gallops, palpable pedal pulses bilaterally, No bilateral ankle edema   Gastrointestinal: Positive bowel sounds, soft, nontender, nondistended, no organomegaly   Musculoskeletal:  No clubbing or cyanosis to extremities,muscle wasting, joint swelling, muscle weakness             Skin:                      No rashes, bruising, skin ulcers, petechiae or ecchymosis    Result Review    Result Review:  I have personally reviewed the results from the time of this admission to 2024 08:12 EDT and agree with these findings:  []  Laboratory  []  Microbiology  []  Radiology  []  EKG/Telemetry   []  Cardiology/Vascular   []  Pathology  []  Old records  []  Other:    Results from last 7 days    Lab Units 04/19/24  0509 04/18/24  1432   WBC 10*3/mm3 6.95 6.63   HEMOGLOBIN g/dL 9.7* 10.2*   PLATELETS 10*3/mm3 169 207     Results from last 7 days   Lab Units 04/19/24  0509 04/18/24  1432   SODIUM mmol/L 133* 132*   POTASSIUM mmol/L 5.5* 4.9   CHLORIDE mmol/L 94* 91*   CO2 mmol/L 22.6 24.6   ANION GAP mmol/L 16.4* 16.4*   BUN mg/dL 51* 43*   CREATININE mg/dL 7.50* 6.71*   GLUCOSE mg/dL 66 110*       Assessment & Plan   Assessment / Plan       Active Hospital Problems:    Active Hospital Problems    Diagnosis  POA    **Open leg wound [S81.809A]  Yes    Open leg wound, unspecified laterality, initial encounter [S81.809A]  Unknown    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]  Unknown     Foul-smelling discharge  Most likely need debridement      Hyponatremia [E87.1]  Unknown    ESRD on hemodialysis [N18.6, Z99.2]  Not Applicable    Heart failure with preserved left ventricular function (HFpEF) [I50.30]  Yes    Current smoker [F17.200]  Yes    Type 2 diabetes mellitus, with long-term current use of insulin [E11.9, Z79.4]  Not Applicable    Hyperlipidemia LDL goal <70 [E78.5]  Yes    Coronary artery disease involving native coronary artery of native heart with angina pectoris [I25.119]  Yes     Cardiac catherization at [facility] (5/17/2022): Severe 1-vessel CAD with chronic total occlusion of the mid RCA.  Unsuccessful  angioplasty of the mid RCA into the RV marginal branch.  Cardiac cath (11/30/2022): Successful staged PCI of distal RCA  with overlapping JOSE      Essential hypertension [I10]  Yes     Target blood pressure <130/80 mmHg         Plan:   Hemodialysis today that will take care of hyperkalemia  Patient need debridement of foul-smelling right necrotic stump  Continue antibiotics       Electronically signed by Nikita Villasenor MD, 04/19/24, 8:12 AM EDT.

## 2024-04-20 LAB
ALBUMIN SERPL-MCNC: 3 G/DL (ref 3.5–5.2)
ALBUMIN/GLOB SERPL: 0.5 G/DL
ALP SERPL-CCNC: 172 U/L (ref 39–117)
ALT SERPL W P-5'-P-CCNC: 8 U/L (ref 1–41)
ANION GAP SERPL CALCULATED.3IONS-SCNC: 17.2 MMOL/L (ref 5–15)
AST SERPL-CCNC: 13 U/L (ref 1–40)
BACTERIA SPEC AEROBE CULT: ABNORMAL
BACTERIA SPEC AEROBE CULT: ABNORMAL
BASOPHILS # BLD AUTO: 0.06 10*3/MM3 (ref 0–0.2)
BASOPHILS NFR BLD AUTO: 0.7 % (ref 0–1.5)
BILIRUB SERPL-MCNC: 0.3 MG/DL (ref 0–1.2)
BUN SERPL-MCNC: 38 MG/DL (ref 6–20)
BUN/CREAT SERPL: 6.8 (ref 7–25)
CALCIUM SPEC-SCNC: 9.5 MG/DL (ref 8.6–10.5)
CHLORIDE SERPL-SCNC: 99 MMOL/L (ref 98–107)
CO2 SERPL-SCNC: 16.8 MMOL/L (ref 22–29)
CREAT SERPL-MCNC: 5.57 MG/DL (ref 0.76–1.27)
DEPRECATED RDW RBC AUTO: 58.3 FL (ref 37–54)
EGFRCR SERPLBLD CKD-EPI 2021: 11 ML/MIN/1.73
EOSINOPHIL # BLD AUTO: 0.25 10*3/MM3 (ref 0–0.4)
EOSINOPHIL NFR BLD AUTO: 3 % (ref 0.3–6.2)
ERYTHROCYTE [DISTWIDTH] IN BLOOD BY AUTOMATED COUNT: 17.8 % (ref 12.3–15.4)
GLOBULIN UR ELPH-MCNC: 5.6 GM/DL
GLUCOSE BLDC GLUCOMTR-MCNC: 100 MG/DL (ref 70–99)
GLUCOSE BLDC GLUCOMTR-MCNC: 89 MG/DL (ref 70–99)
GLUCOSE BLDC GLUCOMTR-MCNC: 91 MG/DL (ref 70–99)
GLUCOSE BLDC GLUCOMTR-MCNC: 92 MG/DL (ref 70–99)
GLUCOSE SERPL-MCNC: 90 MG/DL (ref 65–99)
GRAM STN SPEC: ABNORMAL
GRAM STN SPEC: ABNORMAL
HCT VFR BLD AUTO: 37.3 % (ref 37.5–51)
HGB BLD-MCNC: 10.6 G/DL (ref 13–17.7)
IMM GRANULOCYTES # BLD AUTO: 0.03 10*3/MM3 (ref 0–0.05)
IMM GRANULOCYTES NFR BLD AUTO: 0.4 % (ref 0–0.5)
LYMPHOCYTES # BLD AUTO: 0.89 10*3/MM3 (ref 0.7–3.1)
LYMPHOCYTES NFR BLD AUTO: 10.5 % (ref 19.6–45.3)
MAGNESIUM SERPL-MCNC: 2.3 MG/DL (ref 1.6–2.6)
MCH RBC QN AUTO: 25.4 PG (ref 26.6–33)
MCHC RBC AUTO-ENTMCNC: 28.4 G/DL (ref 31.5–35.7)
MCV RBC AUTO: 89.4 FL (ref 79–97)
MONOCYTES # BLD AUTO: 0.52 10*3/MM3 (ref 0.1–0.9)
MONOCYTES NFR BLD AUTO: 6.1 % (ref 5–12)
NEUTROPHILS NFR BLD AUTO: 6.71 10*3/MM3 (ref 1.7–7)
NEUTROPHILS NFR BLD AUTO: 79.3 % (ref 42.7–76)
NRBC BLD AUTO-RTO: 0 /100 WBC (ref 0–0.2)
PHOSPHATE SERPL-MCNC: 5.4 MG/DL (ref 2.5–4.5)
PLATELET # BLD AUTO: 195 10*3/MM3 (ref 140–450)
PMV BLD AUTO: 9.4 FL (ref 6–12)
POTASSIUM SERPL-SCNC: 5.5 MMOL/L (ref 3.5–5.2)
PROT SERPL-MCNC: 8.6 G/DL (ref 6–8.5)
RBC # BLD AUTO: 4.17 10*6/MM3 (ref 4.14–5.8)
SODIUM SERPL-SCNC: 133 MMOL/L (ref 136–145)
WBC NRBC COR # BLD AUTO: 8.46 10*3/MM3 (ref 3.4–10.8)

## 2024-04-20 PROCEDURE — 63710000001 INSULIN DETEMIR PER 5 UNITS: Performed by: STUDENT IN AN ORGANIZED HEALTH CARE EDUCATION/TRAINING PROGRAM

## 2024-04-20 PROCEDURE — 25010000002 HYDROMORPHONE 1 MG/ML SOLUTION: Performed by: STUDENT IN AN ORGANIZED HEALTH CARE EDUCATION/TRAINING PROGRAM

## 2024-04-20 PROCEDURE — 82948 REAGENT STRIP/BLOOD GLUCOSE: CPT | Performed by: STUDENT IN AN ORGANIZED HEALTH CARE EDUCATION/TRAINING PROGRAM

## 2024-04-20 PROCEDURE — 80053 COMPREHEN METABOLIC PANEL: CPT | Performed by: INTERNAL MEDICINE

## 2024-04-20 PROCEDURE — 84100 ASSAY OF PHOSPHORUS: CPT | Performed by: INTERNAL MEDICINE

## 2024-04-20 PROCEDURE — 25010000002 HEPARIN (PORCINE) PER 1000 UNITS: Performed by: STUDENT IN AN ORGANIZED HEALTH CARE EDUCATION/TRAINING PROGRAM

## 2024-04-20 PROCEDURE — 25010000002 CEFEPIME PER 500 MG: Performed by: INTERNAL MEDICINE

## 2024-04-20 PROCEDURE — 63710000001 INSULIN LISPRO (HUMAN) PER 5 UNITS: Performed by: STUDENT IN AN ORGANIZED HEALTH CARE EDUCATION/TRAINING PROGRAM

## 2024-04-20 PROCEDURE — 99233 SBSQ HOSP IP/OBS HIGH 50: CPT | Performed by: INTERNAL MEDICINE

## 2024-04-20 PROCEDURE — 94761 N-INVAS EAR/PLS OXIMETRY MLT: CPT

## 2024-04-20 PROCEDURE — 94799 UNLISTED PULMONARY SVC/PX: CPT

## 2024-04-20 PROCEDURE — 85025 COMPLETE CBC W/AUTO DIFF WBC: CPT | Performed by: STUDENT IN AN ORGANIZED HEALTH CARE EDUCATION/TRAINING PROGRAM

## 2024-04-20 PROCEDURE — 82948 REAGENT STRIP/BLOOD GLUCOSE: CPT

## 2024-04-20 PROCEDURE — 83735 ASSAY OF MAGNESIUM: CPT | Performed by: INTERNAL MEDICINE

## 2024-04-20 RX ORDER — POLYETHYLENE GLYCOL 3350 17 G
4 POWDER IN PACKET (EA) ORAL
Status: DISCONTINUED | OUTPATIENT
Start: 2024-04-20 | End: 2024-04-25 | Stop reason: HOSPADM

## 2024-04-20 RX ORDER — LORAZEPAM 2 MG/ML
1 INJECTION INTRAMUSCULAR EVERY 4 HOURS PRN
Status: ACTIVE | OUTPATIENT
Start: 2024-04-20 | End: 2024-04-25

## 2024-04-20 RX ORDER — NICOTINE 21 MG/24HR
1 PATCH, TRANSDERMAL 24 HOURS TRANSDERMAL
Status: DISCONTINUED | OUTPATIENT
Start: 2024-04-20 | End: 2024-04-25 | Stop reason: HOSPADM

## 2024-04-20 RX ADMIN — GABAPENTIN 800 MG: 400 CAPSULE ORAL at 13:21

## 2024-04-20 RX ADMIN — OXYCODONE HYDROCHLORIDE 20 MG: 5 TABLET ORAL at 06:05

## 2024-04-20 RX ADMIN — Medication: at 10:59

## 2024-04-20 RX ADMIN — HEPARIN SODIUM 5000 UNITS: 5000 INJECTION INTRAVENOUS; SUBCUTANEOUS at 22:28

## 2024-04-20 RX ADMIN — NICOTINE 1 PATCH: 21 PATCH, EXTENDED RELEASE TRANSDERMAL at 09:12

## 2024-04-20 RX ADMIN — INSULIN LISPRO 1 UNITS: 100 INJECTION, SOLUTION INTRAVENOUS; SUBCUTANEOUS at 18:14

## 2024-04-20 RX ADMIN — ASPIRIN 81 MG: 81 TABLET, COATED ORAL at 09:12

## 2024-04-20 RX ADMIN — METOCLOPRAMIDE 5 MG: 5 TABLET ORAL at 22:28

## 2024-04-20 RX ADMIN — Medication: at 22:29

## 2024-04-20 RX ADMIN — SERTRALINE HYDROCHLORIDE 100 MG: 100 TABLET, FILM COATED ORAL at 09:12

## 2024-04-20 RX ADMIN — HEPARIN SODIUM 5000 UNITS: 5000 INJECTION INTRAVENOUS; SUBCUTANEOUS at 09:12

## 2024-04-20 RX ADMIN — INSULIN LISPRO 2 UNITS: 100 INJECTION, SOLUTION INTRAVENOUS; SUBCUTANEOUS at 13:21

## 2024-04-20 RX ADMIN — OXYCODONE HYDROCHLORIDE 20 MG: 5 TABLET ORAL at 01:50

## 2024-04-20 RX ADMIN — CLOPIDOGREL BISULFATE 75 MG: 75 TABLET, FILM COATED ORAL at 09:12

## 2024-04-20 RX ADMIN — Medication 10 ML: at 22:29

## 2024-04-20 RX ADMIN — OXYCODONE HYDROCHLORIDE 20 MG: 5 TABLET ORAL at 16:42

## 2024-04-20 RX ADMIN — OXYCODONE HYDROCHLORIDE 20 MG: 5 TABLET ORAL at 10:56

## 2024-04-20 RX ADMIN — LEVOTHYROXINE SODIUM 75 MCG: 0.07 TABLET ORAL at 06:05

## 2024-04-20 RX ADMIN — BISACODYL 10 MG: 10 SUPPOSITORY RECTAL at 09:12

## 2024-04-20 RX ADMIN — GABAPENTIN 800 MG: 400 CAPSULE ORAL at 06:05

## 2024-04-20 RX ADMIN — INSULIN DETEMIR 13 UNITS: 100 INJECTION, SOLUTION SUBCUTANEOUS at 22:28

## 2024-04-20 RX ADMIN — GABAPENTIN 800 MG: 400 CAPSULE ORAL at 22:28

## 2024-04-20 RX ADMIN — CEFEPIME HYDROCHLORIDE 1000 MG: 1 INJECTION, POWDER, FOR SOLUTION INTRAMUSCULAR; INTRAVENOUS at 22:27

## 2024-04-20 RX ADMIN — INSULIN LISPRO 2 UNITS: 100 INJECTION, SOLUTION INTRAVENOUS; SUBCUTANEOUS at 09:13

## 2024-04-20 RX ADMIN — ATORVASTATIN CALCIUM 40 MG: 40 TABLET, FILM COATED ORAL at 22:28

## 2024-04-20 RX ADMIN — MIDODRINE HYDROCHLORIDE 10 MG: 10 TABLET ORAL at 06:43

## 2024-04-20 RX ADMIN — OXYCODONE HYDROCHLORIDE 20 MG: 5 TABLET ORAL at 22:27

## 2024-04-20 RX ADMIN — HYDROMORPHONE HYDROCHLORIDE 1 MG: 1 INJECTION, SOLUTION INTRAMUSCULAR; INTRAVENOUS; SUBCUTANEOUS at 03:21

## 2024-04-20 NOTE — PLAN OF CARE
Goal Outcome Evaluation:               Vitals stable during shift. Complaints of pain. See MAR. Wound care completed per orders. Pt transferred to bedside commode. Tolerated well. Family at bedside throughout shift. Continue care plan.

## 2024-04-20 NOTE — PROGRESS NOTES
[facility]     Progress Note    Patient Name: Chevy Junior  : 1965  MRN: [id number]  Primary Care Physician:  Janeth Le APRN  Date of admission: 2024    Subjective patient was very irritable this morning and he is insisting to go home.  I have discussion with his wife and patient will stay here as she is not going to take him home and I have talked to him he needs IV antibiotics and debridement of his stump    Review of Systems  All review of systems are negative except as mentioned in subjective complaints.    Objective   Objective     Vitals:   Temp:  [98.2 °F (36.8 °C)-99.1 °F (37.3 °C)] 98.6 °F (37 °C)  Heart Rate:  [106-123] 107  Resp:  [18-20] 20  BP: ()/(64-92) 155/90  Flow (L/min):  [1] 1  Physical Exam    Constitutional: Awake, alert responsive, conversant, no obvious distress              Psychiatric:  Appropriate affect, cooperative   Neurologic:  Awake alert ,oriented x 3, strength symmetric in all extremities, Cranial Nerves grossly intact to confrontation, speech clear   Eyes:   PERRLA, sclerae anicteric, no conjunctival injection   HEENT:  Moist mucous membranes, no nasal or eye discharge, no throat congestion   Neck:   Supple, no thyromegaly, no lymphadenopathy, trachea midline, no elevated JVD   Respiratory:  Clear to auscultation bilaterally, nonlabored respirations    Cardiovascular: RRR, no murmurs, rubs, or gallops, palpable pedal pulses bilaterally, No bilateral ankle edema   Gastrointestinal: Positive bowel sounds, soft, nontender, nondistended, no organomegaly   Musculoskeletal:  No clubbing or cyanosis to extremities,muscle wasting, joint swelling, muscle weakness             Skin:                      Patient has skin necrosis of right above-knee amputation stump site with a foul-smelling discharge  Result Review    Result Review:  I have personally reviewed the results from the time of this admission to 2024 10:09 EDT and agree with these  findings:  []  Laboratory  []  Microbiology  []  Radiology  []  EKG/Telemetry   []  Cardiology/Vascular   []  Pathology  []  Old records  []  Other:    Results from last 7 days   Lab Units 04/20/24  0610 04/19/24  0509 04/18/24  1432   WBC 10*3/mm3 8.46 6.95 6.63   HEMOGLOBIN g/dL 10.6* 9.7* 10.2*   PLATELETS 10*3/mm3 195 169 207     Results from last 7 days   Lab Units 04/20/24  0610 04/19/24  0509 04/18/24  1432   SODIUM mmol/L 133* 133* 132*   POTASSIUM mmol/L 5.5* 5.5* 4.9   CHLORIDE mmol/L 99 94* 91*   CO2 mmol/L 16.8* 22.6 24.6   ANION GAP mmol/L 17.2* 16.4* 16.4*   BUN mg/dL 38* 51* 43*   CREATININE mg/dL 5.57* 7.50* 6.71*   GLUCOSE mg/dL 90 66 110*       Assessment & Plan   Assessment / Plan       Active Hospital Problems:    Active Hospital Problems    Diagnosis  POA    **Open leg wound [S81.809A]  Yes    Open leg wound, unspecified laterality, initial encounter [S81.809A]  Unknown    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]  Unknown     Foul-smelling discharge  Most likely need debridement      Hyponatremia [E87.1]  Unknown    ESRD on hemodialysis [N18.6, Z99.2]  Not Applicable    Heart failure with preserved left ventricular function (HFpEF) [I50.30]  Yes    Current smoker [F17.200]  Yes    Type 2 diabetes mellitus, with long-term current use of insulin [E11.9, Z79.4]  Not Applicable    Hyperlipidemia LDL goal <70 [E78.5]  Yes    Coronary artery disease involving native coronary artery of native heart with angina pectoris [I25.119]  Yes     Cardiac catherization at [facility] (5/17/2022): Severe 1-vessel CAD with chronic total occlusion of the mid RCA.  Unsuccessful  angioplasty of the mid RCA into the RV marginal branch.  Cardiac cath (11/30/2022): Successful staged PCI of distal RCA  with overlapping JOSE      Essential hypertension [I10]  Yes     Target blood pressure <130/80 mmHg         Plan:   Try nicotine patch  Give Ativan 2 get him relax  Continue antibiotics  Hopefully debridement will  be done soon as patient has necrotic tissue       Electronically signed by Nikita Villasenor MD, 04/20/24, 10:09 AM EDT.
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
## 2024-04-21 LAB
ALBUMIN SERPL-MCNC: 3 G/DL (ref 3.5–5.2)
ALBUMIN/GLOB SERPL: 0.6 G/DL
ALP SERPL-CCNC: 154 U/L (ref 39–117)
ALT SERPL W P-5'-P-CCNC: 5 U/L (ref 1–41)
ANION GAP SERPL CALCULATED.3IONS-SCNC: 17.4 MMOL/L (ref 5–15)
AST SERPL-CCNC: 10 U/L (ref 1–40)
BASOPHILS # BLD AUTO: 0.06 10*3/MM3 (ref 0–0.2)
BASOPHILS NFR BLD AUTO: 0.8 % (ref 0–1.5)
BILIRUB SERPL-MCNC: 0.3 MG/DL (ref 0–1.2)
BUN SERPL-MCNC: 56 MG/DL (ref 6–20)
BUN/CREAT SERPL: 7.8 (ref 7–25)
CALCIUM SPEC-SCNC: 9.1 MG/DL (ref 8.6–10.5)
CHLORIDE SERPL-SCNC: 95 MMOL/L (ref 98–107)
CO2 SERPL-SCNC: 17.6 MMOL/L (ref 22–29)
CREAT SERPL-MCNC: 7.16 MG/DL (ref 0.76–1.27)
DEPRECATED RDW RBC AUTO: 59.8 FL (ref 37–54)
EGFRCR SERPLBLD CKD-EPI 2021: 8.2 ML/MIN/1.73
EOSINOPHIL # BLD AUTO: 0.32 10*3/MM3 (ref 0–0.4)
EOSINOPHIL NFR BLD AUTO: 4.5 % (ref 0.3–6.2)
ERYTHROCYTE [DISTWIDTH] IN BLOOD BY AUTOMATED COUNT: 18 % (ref 12.3–15.4)
GLOBULIN UR ELPH-MCNC: 5 GM/DL
GLUCOSE BLDC GLUCOMTR-MCNC: 124 MG/DL (ref 70–99)
GLUCOSE BLDC GLUCOMTR-MCNC: 172 MG/DL (ref 70–99)
GLUCOSE BLDC GLUCOMTR-MCNC: 73 MG/DL (ref 70–99)
GLUCOSE BLDC GLUCOMTR-MCNC: 86 MG/DL (ref 70–99)
GLUCOSE SERPL-MCNC: 79 MG/DL (ref 65–99)
HCT VFR BLD AUTO: 35.2 % (ref 37.5–51)
HGB BLD-MCNC: 9.9 G/DL (ref 13–17.7)
IMM GRANULOCYTES # BLD AUTO: 0.02 10*3/MM3 (ref 0–0.05)
IMM GRANULOCYTES NFR BLD AUTO: 0.3 % (ref 0–0.5)
LYMPHOCYTES # BLD AUTO: 0.81 10*3/MM3 (ref 0.7–3.1)
LYMPHOCYTES NFR BLD AUTO: 11.3 % (ref 19.6–45.3)
MAGNESIUM SERPL-MCNC: 2.4 MG/DL (ref 1.6–2.6)
MCH RBC QN AUTO: 25.6 PG (ref 26.6–33)
MCHC RBC AUTO-ENTMCNC: 28.1 G/DL (ref 31.5–35.7)
MCV RBC AUTO: 91 FL (ref 79–97)
MONOCYTES # BLD AUTO: 0.48 10*3/MM3 (ref 0.1–0.9)
MONOCYTES NFR BLD AUTO: 6.7 % (ref 5–12)
NEUTROPHILS NFR BLD AUTO: 5.45 10*3/MM3 (ref 1.7–7)
NEUTROPHILS NFR BLD AUTO: 76.4 % (ref 42.7–76)
NRBC BLD AUTO-RTO: 0 /100 WBC (ref 0–0.2)
PHOSPHATE SERPL-MCNC: 6.5 MG/DL (ref 2.5–4.5)
PLATELET # BLD AUTO: 187 10*3/MM3 (ref 140–450)
PMV BLD AUTO: 8.9 FL (ref 6–12)
POTASSIUM SERPL-SCNC: 5.6 MMOL/L (ref 3.5–5.2)
PROT SERPL-MCNC: 8 G/DL (ref 6–8.5)
RBC # BLD AUTO: 3.87 10*6/MM3 (ref 4.14–5.8)
SODIUM SERPL-SCNC: 130 MMOL/L (ref 136–145)
WBC NRBC COR # BLD AUTO: 7.14 10*3/MM3 (ref 3.4–10.8)

## 2024-04-21 PROCEDURE — 94799 UNLISTED PULMONARY SVC/PX: CPT

## 2024-04-21 PROCEDURE — 85025 COMPLETE CBC W/AUTO DIFF WBC: CPT | Performed by: INTERNAL MEDICINE

## 2024-04-21 PROCEDURE — 99233 SBSQ HOSP IP/OBS HIGH 50: CPT | Performed by: INTERNAL MEDICINE

## 2024-04-21 PROCEDURE — 25010000002 CEFEPIME PER 500 MG: Performed by: INTERNAL MEDICINE

## 2024-04-21 PROCEDURE — 82948 REAGENT STRIP/BLOOD GLUCOSE: CPT | Performed by: STUDENT IN AN ORGANIZED HEALTH CARE EDUCATION/TRAINING PROGRAM

## 2024-04-21 PROCEDURE — 63710000001 INSULIN DETEMIR PER 5 UNITS: Performed by: STUDENT IN AN ORGANIZED HEALTH CARE EDUCATION/TRAINING PROGRAM

## 2024-04-21 PROCEDURE — 83735 ASSAY OF MAGNESIUM: CPT | Performed by: INTERNAL MEDICINE

## 2024-04-21 PROCEDURE — 82948 REAGENT STRIP/BLOOD GLUCOSE: CPT

## 2024-04-21 PROCEDURE — 25010000002 HEPARIN (PORCINE) PER 1000 UNITS: Performed by: STUDENT IN AN ORGANIZED HEALTH CARE EDUCATION/TRAINING PROGRAM

## 2024-04-21 PROCEDURE — 84100 ASSAY OF PHOSPHORUS: CPT | Performed by: INTERNAL MEDICINE

## 2024-04-21 PROCEDURE — 63710000001 INSULIN LISPRO (HUMAN) PER 5 UNITS: Performed by: STUDENT IN AN ORGANIZED HEALTH CARE EDUCATION/TRAINING PROGRAM

## 2024-04-21 PROCEDURE — 80053 COMPREHEN METABOLIC PANEL: CPT | Performed by: INTERNAL MEDICINE

## 2024-04-21 PROCEDURE — 94761 N-INVAS EAR/PLS OXIMETRY MLT: CPT

## 2024-04-21 RX ORDER — GABAPENTIN 400 MG/1
400 CAPSULE ORAL EVERY 8 HOURS SCHEDULED
Status: DISCONTINUED | OUTPATIENT
Start: 2024-04-21 | End: 2024-04-23

## 2024-04-21 RX ADMIN — Medication: at 23:18

## 2024-04-21 RX ADMIN — Medication 10 ML: at 23:17

## 2024-04-21 RX ADMIN — INSULIN DETEMIR 10 UNITS: 100 INJECTION, SOLUTION SUBCUTANEOUS at 23:15

## 2024-04-21 RX ADMIN — HEPARIN SODIUM 5000 UNITS: 5000 INJECTION INTRAVENOUS; SUBCUTANEOUS at 08:38

## 2024-04-21 RX ADMIN — SERTRALINE HYDROCHLORIDE 100 MG: 100 TABLET, FILM COATED ORAL at 08:38

## 2024-04-21 RX ADMIN — CEFEPIME HYDROCHLORIDE 1000 MG: 1 INJECTION, POWDER, FOR SOLUTION INTRAMUSCULAR; INTRAVENOUS at 23:14

## 2024-04-21 RX ADMIN — GABAPENTIN 800 MG: 400 CAPSULE ORAL at 06:43

## 2024-04-21 RX ADMIN — ASPIRIN 81 MG: 81 TABLET, COATED ORAL at 08:38

## 2024-04-21 RX ADMIN — GABAPENTIN 400 MG: 400 CAPSULE ORAL at 23:14

## 2024-04-21 RX ADMIN — OXYCODONE HYDROCHLORIDE 20 MG: 5 TABLET ORAL at 02:33

## 2024-04-21 RX ADMIN — ATORVASTATIN CALCIUM 40 MG: 40 TABLET, FILM COATED ORAL at 23:16

## 2024-04-21 RX ADMIN — GABAPENTIN 800 MG: 400 CAPSULE ORAL at 13:31

## 2024-04-21 RX ADMIN — MIDODRINE HYDROCHLORIDE 10 MG: 10 TABLET ORAL at 06:43

## 2024-04-21 RX ADMIN — INSULIN LISPRO 1 UNITS: 100 INJECTION, SOLUTION INTRAVENOUS; SUBCUTANEOUS at 23:15

## 2024-04-21 RX ADMIN — LEVOTHYROXINE SODIUM 75 MCG: 0.07 TABLET ORAL at 06:43

## 2024-04-21 RX ADMIN — HEPARIN SODIUM 5000 UNITS: 5000 INJECTION INTRAVENOUS; SUBCUTANEOUS at 23:16

## 2024-04-21 RX ADMIN — OXYCODONE HYDROCHLORIDE 20 MG: 5 TABLET ORAL at 06:42

## 2024-04-21 RX ADMIN — OXYCODONE HYDROCHLORIDE 20 MG: 5 TABLET ORAL at 20:52

## 2024-04-21 RX ADMIN — NICOTINE 1 PATCH: 21 PATCH, EXTENDED RELEASE TRANSDERMAL at 08:37

## 2024-04-21 RX ADMIN — CLOPIDOGREL BISULFATE 75 MG: 75 TABLET, FILM COATED ORAL at 08:38

## 2024-04-21 RX ADMIN — Medication 10 ML: at 08:38

## 2024-04-21 RX ADMIN — Medication: at 13:31

## 2024-04-21 RX ADMIN — METOCLOPRAMIDE 5 MG: 5 TABLET ORAL at 23:16

## 2024-04-21 RX ADMIN — INSULIN LISPRO 3 UNITS: 100 INJECTION, SOLUTION INTRAVENOUS; SUBCUTANEOUS at 13:31
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
## 2024-04-21 NOTE — PROGRESS NOTES
[facility]     Progress Note    Patient Name: Chevy Junior  : 1965  MRN: [id number]  Primary Care Physician:  Janeth Le APRN  Date of admission: 2024    Subjective patient is in much better mood today and he is not restless and is not insisting to go home.  He was eating his breakfast and has no new issues    Review of Systems  All review of systems are negative except as mentioned in subjective complaints.    Objective   Objective     Vitals:   Temp:  [97.7 °F (36.5 °C)-98.6 °F (37 °C)] 98.1 °F (36.7 °C)  Heart Rate:  [] 103  Resp:  [18-20] 18  BP: (134-162)/(82-89) 134/84  Physical Exam    Constitutional: Awake, alert responsive, conversant, no obvious distress              Psychiatric:  Appropriate affect, cooperative   Neurologic:  Awake alert ,oriented x 3, strength symmetric in all extremities, Cranial Nerves grossly intact to confrontation, speech clear   Eyes:   PERRLA, sclerae anicteric, no conjunctival injection   HEENT:  Moist mucous membranes, no nasal or eye discharge, no throat congestion   Neck:   Supple, no thyromegaly, no lymphadenopathy, trachea midline, no elevated JVD   Respiratory:  Clear to auscultation bilaterally, nonlabored respirations    Cardiovascular: RRR, no murmurs, rubs, or gallops, palpable pedal pulses bilaterally, No bilateral ankle edema   Gastrointestinal: Positive bowel sounds, soft, nontender, nondistended, no organomegaly   Musculoskeletal:  No clubbing or cyanosis to extremities,muscle wasting, joint swelling, muscle weakness             Skin:                      No rashes, bruising, skin ulcers, petechiae or ecchymosis    Result Review    Result Review:  I have personally reviewed the results from the time of this admission to 2024 10:04 EDT and agree with these findings:  []  Laboratory  []  Microbiology  []  Radiology  []  EKG/Telemetry   []  Cardiology/Vascular   []  Pathology  []  Old records  []  Other:    Results from last 7  days   Lab Units 04/21/24  0618 04/20/24  0610 04/19/24  0509 04/18/24  1432   WBC 10*3/mm3 7.14 8.46 6.95 6.63   HEMOGLOBIN g/dL 9.9* 10.6* 9.7* 10.2*   PLATELETS 10*3/mm3 187 195 169 207     Results from last 7 days   Lab Units 04/21/24  0618 04/20/24  0610 04/19/24  0509 04/18/24  1432   SODIUM mmol/L 130* 133* 133* 132*   POTASSIUM mmol/L 5.6* 5.5* 5.5* 4.9   CHLORIDE mmol/L 95* 99 94* 91*   CO2 mmol/L 17.6* 16.8* 22.6 24.6   ANION GAP mmol/L 17.4* 17.2* 16.4* 16.4*   BUN mg/dL 56* 38* 51* 43*   CREATININE mg/dL 7.16* 5.57* 7.50* 6.71*   GLUCOSE mg/dL 79 90 66 110*       Assessment & Plan   Assessment / Plan       Active Hospital Problems:    Active Hospital Problems    Diagnosis  POA    **Open leg wound [S81.809A]  Yes    Open leg wound, unspecified laterality, initial encounter [S81.809A]  Unknown    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]  Unknown     Foul-smelling discharge  Most likely need debridement      Hyponatremia [E87.1]  Unknown    ESRD on hemodialysis [N18.6, Z99.2]  Not Applicable    Heart failure with preserved left ventricular function (HFpEF) [I50.30]  Yes    Current smoker [F17.200]  Yes    Type 2 diabetes mellitus, with long-term current use of insulin [E11.9, Z79.4]  Not Applicable    Hyperlipidemia LDL goal <70 [E78.5]  Yes    Coronary artery disease involving native coronary artery of native heart with angina pectoris [I25.119]  Yes     Cardiac catherization at [facility] (5/17/2022): Severe 1-vessel CAD with chronic total occlusion of the mid RCA.  Unsuccessful  angioplasty of the mid RCA into the RV marginal branch.  Cardiac cath (11/30/2022): Successful staged PCI of distal RCA  with overlapping JOSE      Essential hypertension [I10]  Yes     Target blood pressure <130/80 mmHg     Cultures positive for E. coli patient is on cefepime and vancomycin    Plan:   Hemodialysis tomorrow  Continue antibiotics  Patient need right stump intervention specially  debridement    Electronically signed by Nikita Villasenor MD, 04/21/24, 10:04 AM EDT.

## 2024-04-21 NOTE — PLAN OF CARE
Goal Outcome Evaluation:   No issues this shift. Wound care complete per order. POA updated on plan of care.

## 2024-04-22 LAB
ALBUMIN SERPL-MCNC: 3.3 G/DL (ref 3.5–5.2)
ALBUMIN/GLOB SERPL: 0.6 G/DL
ALP SERPL-CCNC: 163 U/L (ref 39–117)
ALT SERPL W P-5'-P-CCNC: 9 U/L (ref 1–41)
ANION GAP SERPL CALCULATED.3IONS-SCNC: 18 MMOL/L (ref 5–15)
AST SERPL-CCNC: 18 U/L (ref 1–40)
BASOPHILS # BLD AUTO: 0.06 10*3/MM3 (ref 0–0.2)
BASOPHILS NFR BLD AUTO: 0.8 % (ref 0–1.5)
BILIRUB SERPL-MCNC: 0.2 MG/DL (ref 0–1.2)
BUN SERPL-MCNC: 73 MG/DL (ref 6–20)
BUN/CREAT SERPL: 8.8 (ref 7–25)
CALCIUM SPEC-SCNC: 9.1 MG/DL (ref 8.6–10.5)
CHLORIDE SERPL-SCNC: 97 MMOL/L (ref 98–107)
CO2 SERPL-SCNC: 18 MMOL/L (ref 22–29)
CREAT SERPL-MCNC: 8.27 MG/DL (ref 0.76–1.27)
DEPRECATED RDW RBC AUTO: 58.3 FL (ref 37–54)
EGFRCR SERPLBLD CKD-EPI 2021: 6.9 ML/MIN/1.73
EOSINOPHIL # BLD AUTO: 0.33 10*3/MM3 (ref 0–0.4)
EOSINOPHIL NFR BLD AUTO: 4.6 % (ref 0.3–6.2)
ERYTHROCYTE [DISTWIDTH] IN BLOOD BY AUTOMATED COUNT: 17.7 % (ref 12.3–15.4)
GLOBULIN UR ELPH-MCNC: 5.6 GM/DL
GLUCOSE BLDC GLUCOMTR-MCNC: 100 MG/DL (ref 70–99)
GLUCOSE BLDC GLUCOMTR-MCNC: 123 MG/DL (ref 70–99)
GLUCOSE BLDC GLUCOMTR-MCNC: 132 MG/DL (ref 70–99)
GLUCOSE BLDC GLUCOMTR-MCNC: 94 MG/DL (ref 70–99)
GLUCOSE BLDC GLUCOMTR-MCNC: 99 MG/DL (ref 70–99)
GLUCOSE SERPL-MCNC: 102 MG/DL (ref 65–99)
HCT VFR BLD AUTO: 33.5 % (ref 37.5–51)
HGB BLD-MCNC: 9.5 G/DL (ref 13–17.7)
IMM GRANULOCYTES # BLD AUTO: 0.04 10*3/MM3 (ref 0–0.05)
IMM GRANULOCYTES NFR BLD AUTO: 0.6 % (ref 0–0.5)
LYMPHOCYTES # BLD AUTO: 0.93 10*3/MM3 (ref 0.7–3.1)
LYMPHOCYTES NFR BLD AUTO: 12.9 % (ref 19.6–45.3)
MAGNESIUM SERPL-MCNC: 2.5 MG/DL (ref 1.6–2.6)
MCH RBC QN AUTO: 25.5 PG (ref 26.6–33)
MCHC RBC AUTO-ENTMCNC: 28.4 G/DL (ref 31.5–35.7)
MCV RBC AUTO: 90.1 FL (ref 79–97)
MONOCYTES # BLD AUTO: 0.47 10*3/MM3 (ref 0.1–0.9)
MONOCYTES NFR BLD AUTO: 6.5 % (ref 5–12)
NEUTROPHILS NFR BLD AUTO: 5.4 10*3/MM3 (ref 1.7–7)
NEUTROPHILS NFR BLD AUTO: 74.6 % (ref 42.7–76)
NRBC BLD AUTO-RTO: 0 /100 WBC (ref 0–0.2)
PHOSPHATE SERPL-MCNC: 7.4 MG/DL (ref 2.5–4.5)
PLATELET # BLD AUTO: 177 10*3/MM3 (ref 140–450)
PMV BLD AUTO: 9 FL (ref 6–12)
POTASSIUM SERPL-SCNC: 6 MMOL/L (ref 3.5–5.2)
PROT SERPL-MCNC: 8.9 G/DL (ref 6–8.5)
RBC # BLD AUTO: 3.72 10*6/MM3 (ref 4.14–5.8)
SODIUM SERPL-SCNC: 133 MMOL/L (ref 136–145)
VANCOMYCIN SERPL-MCNC: 13.5 MCG/ML (ref 5–40)
WBC NRBC COR # BLD AUTO: 7.23 10*3/MM3 (ref 3.4–10.8)

## 2024-04-22 PROCEDURE — 94761 N-INVAS EAR/PLS OXIMETRY MLT: CPT

## 2024-04-22 PROCEDURE — 82948 REAGENT STRIP/BLOOD GLUCOSE: CPT

## 2024-04-22 PROCEDURE — 25010000002 EPOETIN ALFA PER 1000 UNITS: Performed by: STUDENT IN AN ORGANIZED HEALTH CARE EDUCATION/TRAINING PROGRAM

## 2024-04-22 PROCEDURE — 99232 SBSQ HOSP IP/OBS MODERATE 35: CPT | Performed by: SURGERY

## 2024-04-22 PROCEDURE — 25010000002 VANCOMYCIN 5 G RECONSTITUTED SOLUTION: Performed by: INTERNAL MEDICINE

## 2024-04-22 PROCEDURE — 25810000003 SODIUM CHLORIDE 0.9 % SOLUTION: Performed by: INTERNAL MEDICINE

## 2024-04-22 PROCEDURE — 80202 ASSAY OF VANCOMYCIN: CPT | Performed by: STUDENT IN AN ORGANIZED HEALTH CARE EDUCATION/TRAINING PROGRAM

## 2024-04-22 PROCEDURE — 94799 UNLISTED PULMONARY SVC/PX: CPT

## 2024-04-22 PROCEDURE — 63710000001 INSULIN LISPRO (HUMAN) PER 5 UNITS: Performed by: STUDENT IN AN ORGANIZED HEALTH CARE EDUCATION/TRAINING PROGRAM

## 2024-04-22 PROCEDURE — 83735 ASSAY OF MAGNESIUM: CPT | Performed by: INTERNAL MEDICINE

## 2024-04-22 PROCEDURE — 63710000001 INSULIN DETEMIR PER 5 UNITS: Performed by: STUDENT IN AN ORGANIZED HEALTH CARE EDUCATION/TRAINING PROGRAM

## 2024-04-22 PROCEDURE — 99233 SBSQ HOSP IP/OBS HIGH 50: CPT | Performed by: INTERNAL MEDICINE

## 2024-04-22 PROCEDURE — 80053 COMPREHEN METABOLIC PANEL: CPT | Performed by: INTERNAL MEDICINE

## 2024-04-22 PROCEDURE — 25010000002 HYDROMORPHONE 1 MG/ML SOLUTION: Performed by: STUDENT IN AN ORGANIZED HEALTH CARE EDUCATION/TRAINING PROGRAM

## 2024-04-22 PROCEDURE — 25010000002 CEFEPIME PER 500 MG: Performed by: INTERNAL MEDICINE

## 2024-04-22 PROCEDURE — 85025 COMPLETE CBC W/AUTO DIFF WBC: CPT | Performed by: INTERNAL MEDICINE

## 2024-04-22 PROCEDURE — 25010000002 HEPARIN (PORCINE) PER 1000 UNITS: Performed by: STUDENT IN AN ORGANIZED HEALTH CARE EDUCATION/TRAINING PROGRAM

## 2024-04-22 PROCEDURE — 82948 REAGENT STRIP/BLOOD GLUCOSE: CPT | Performed by: STUDENT IN AN ORGANIZED HEALTH CARE EDUCATION/TRAINING PROGRAM

## 2024-04-22 PROCEDURE — 84100 ASSAY OF PHOSPHORUS: CPT | Performed by: INTERNAL MEDICINE

## 2024-04-22 RX ADMIN — OXYCODONE HYDROCHLORIDE 20 MG: 5 TABLET ORAL at 05:30

## 2024-04-22 RX ADMIN — NICOTINE 1 PATCH: 21 PATCH, EXTENDED RELEASE TRANSDERMAL at 08:28

## 2024-04-22 RX ADMIN — LEVOTHYROXINE SODIUM 75 MCG: 0.07 TABLET ORAL at 05:31

## 2024-04-22 RX ADMIN — Medication 1 APPLICATION: at 22:40

## 2024-04-22 RX ADMIN — CEFEPIME HYDROCHLORIDE 1000 MG: 1 INJECTION, POWDER, FOR SOLUTION INTRAMUSCULAR; INTRAVENOUS at 20:52

## 2024-04-22 RX ADMIN — Medication: at 15:21

## 2024-04-22 RX ADMIN — ATORVASTATIN CALCIUM 40 MG: 40 TABLET, FILM COATED ORAL at 20:52

## 2024-04-22 RX ADMIN — Medication 10 ML: at 08:28

## 2024-04-22 RX ADMIN — INSULIN LISPRO 3 UNITS: 100 INJECTION, SOLUTION INTRAVENOUS; SUBCUTANEOUS at 08:27

## 2024-04-22 RX ADMIN — ASPIRIN 81 MG: 81 TABLET, COATED ORAL at 08:21

## 2024-04-22 RX ADMIN — HEPARIN SODIUM 5000 UNITS: 5000 INJECTION INTRAVENOUS; SUBCUTANEOUS at 08:28

## 2024-04-22 RX ADMIN — HYDROMORPHONE HYDROCHLORIDE 1 MG: 1 INJECTION, SOLUTION INTRAMUSCULAR; INTRAVENOUS; SUBCUTANEOUS at 08:27

## 2024-04-22 RX ADMIN — OXYCODONE HYDROCHLORIDE 20 MG: 5 TABLET ORAL at 15:19

## 2024-04-22 RX ADMIN — INSULIN LISPRO 5 UNITS: 100 INJECTION, SOLUTION INTRAVENOUS; SUBCUTANEOUS at 14:23

## 2024-04-22 RX ADMIN — INSULIN DETEMIR 8 UNITS: 100 INJECTION, SOLUTION SUBCUTANEOUS at 22:40

## 2024-04-22 RX ADMIN — OXYCODONE HYDROCHLORIDE 20 MG: 5 TABLET ORAL at 01:22

## 2024-04-22 RX ADMIN — SERTRALINE HYDROCHLORIDE 100 MG: 100 TABLET, FILM COATED ORAL at 08:21

## 2024-04-22 RX ADMIN — ERYTHROPOIETIN 10000 UNITS: 10000 INJECTION, SOLUTION INTRAVENOUS; SUBCUTANEOUS at 08:27

## 2024-04-22 RX ADMIN — Medication 10 ML: at 20:52

## 2024-04-22 RX ADMIN — INSULIN LISPRO 5 UNITS: 100 INJECTION, SOLUTION INTRAVENOUS; SUBCUTANEOUS at 17:21

## 2024-04-22 RX ADMIN — CLOPIDOGREL BISULFATE 75 MG: 75 TABLET, FILM COATED ORAL at 08:22

## 2024-04-22 RX ADMIN — METOCLOPRAMIDE 5 MG: 5 TABLET ORAL at 20:52

## 2024-04-22 RX ADMIN — VANCOMYCIN HYDROCHLORIDE 750 MG: 5 INJECTION, POWDER, LYOPHILIZED, FOR SOLUTION INTRAVENOUS at 22:39

## 2024-04-22 RX ADMIN — OXYCODONE HYDROCHLORIDE 20 MG: 5 TABLET ORAL at 20:52

## 2024-04-22 RX ADMIN — INSULIN LISPRO 1 UNITS: 100 INJECTION, SOLUTION INTRAVENOUS; SUBCUTANEOUS at 22:39

## 2024-04-22 RX ADMIN — HEPARIN SODIUM 5000 UNITS: 5000 INJECTION INTRAVENOUS; SUBCUTANEOUS at 20:53

## 2024-04-22 NOTE — SIGNIFICANT NOTE
04/22/24 1000   OTHER   Discipline physical therapist   Rehab Time/Intention   Session Not Performed patient unavailable for evaluation  (out of his room for dialysis)
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
## 2024-04-22 NOTE — NURSING NOTE
Duration of Treatment 4.0 Hours Pt  ran entire tx    Access Site AVF Pt access ran good no issues   Dialyzer Revaclear    mL/min   Dialysate Temperature (C) 36   BFR-As tolerated to a maximum of: 400 mL/min   Dialysate Solution Bath: K+ = 2 mEq, Ca = 2.5mEq   Bicarb 30 mEq   Na+ 138 mEq   Fluid Removal: Removed 2 to 3 L if tolerates  Pt remove 2 liters of fluid.   Pt tolerated tx well pt b/p was within limits post b/p was 128/78  lil elevated RN notified. Blood returned hemostasis achieved pt voice no issues blood sugar taken it was 100. Report given to Cristin LEES. Pt removed 2 liters of fluid.

## 2024-04-22 NOTE — PROGRESS NOTES
"[facility] Clinical Pharmacy Services: Vancomycin Monitoring Note    Chevy Junior is a 58 y.o. male who is on day  of pharmacy to dose vancomycin for Skin and Soft Tissue.    Previous Vancomycin Dose:   500 mg IV   @22:57  Imaging Reviewed?: Yes    Updated Cultures and Sensitivities:    wound right leg-E coli, Proteus mirabilis   blood cx  NGTD    Vitals/Labs  Ht: 170.2 cm (67\"); Wt: 77.4 kg (170 lb 10.2 oz)   Temp (24hrs), Av.7 °F (37.1 °C), Min:98.1 °F (36.7 °C), Max:99.9 °F (37.7 °C)   Estimated Creatinine Clearance: 10.5 mL/min (A) (by C-G formula based on SCr of 8.27 mg/dL (H)).   HD -MWF  Results from last 7 days   Lab Units 24  0518 24  0618 24  0610 24  0509 24  1432   VANCOMYCIN RM mcg/mL 13.50  --   --  14.36 7.42   CREATININE mg/dL 8.27* 7.16* 5.57* 7.50* 6.71*   WBC 10*3/mm3 7.23 7.14 8.46 6.95 6.63     Assessment/Plan  Vancomycin level today reported as 13.5.   Will provide vancomycin 750 mg once one post HD today.  Recommend follow up level prior to next HD.     Thank you for involving pharmacy in this patient's care. Please contact pharmacy with any questions or concerns.    Amandeep Freeman  Clinical Pharmacist  "

## 2024-04-22 NOTE — PROGRESS NOTES
[facility]     Progress Note    Patient Name: Chevy Junior  : 1965  MRN: [id number]  Primary Care Physician:  Janeth Le APRN  Date of admission: 2024    Subjective   No major acute events overnight  Plan for hemodialysis this morning    Scheduled Meds:ammonium lactate, , Topical, 2 times per day  aspirin, 81 mg, Oral, Daily  atorvastatin, 40 mg, Oral, Nightly  cefepime, 1,000 mg, Intravenous, Q24H  clopidogrel, 75 mg, Oral, Daily  gabapentin, 400 mg, Oral, Q8H  heparin (porcine), 5,000 Units, Subcutaneous, Q12H  insulin detemir, 1-200 Units, Subcutaneous, Nightly - Glucommander  insulin lispro, 1-200 Units, Subcutaneous, 4x Daily With Meals & Nightly  levothyroxine, 75 mcg, Oral, Q AM  metoclopramide, 5 mg, Oral, Nightly  midodrine, 10 mg, Oral, TID AC  nicotine, 1 patch, Transdermal, Q24H  sertraline, 100 mg, Oral, Daily  sodium chloride, 10 mL, Intravenous, Q12H  Vancomycin Pharmacy Intermittent/Pulse Dosing, , Does not apply, Daily      Continuous Infusions:Pharmacy to Dose Cefepime,   Pharmacy to dose vancomycin,       PRN Meds:.  acetaminophen **OR** acetaminophen **OR** acetaminophen    senna-docusate sodium **AND** polyethylene glycol **AND** bisacodyl **AND** bisacodyl    dextrose    dextrose    glucagon (human recombinant)    HYDROmorphone **AND** naloxone    insulin lispro    LORazepam    nicotine polacrilex    ondansetron ODT **OR** ondansetron    oxyCODONE    Pharmacy to Dose Cefepime    Pharmacy to dose vancomycin    sodium chloride    sodium chloride       Review of Systems  Constitutional:        Weakness tiredness fatigue  Eyes:                       No blurry vision, eye discharge, eye irritation, eye pain  HEENT:                   No acute hair loss, earache and discharge, nasal congestion or discharge, sore throat, postnasal drip  Respiratory:           No shortness of breath coughing sputum production wheezing hemoptysis pleuritic chest pain  Cardiovascular:     No  chest pain, orthopnea, PND, dizziness, palpitation, lower extremity edema  Gastrointestinal:   No nausea vomiting diarrhea abdominal pain constipation  Genitourinary:       No urinary incontinence, hesitancy, frequency, urgency, dysuria  Hematologic:         No bruising, bleeding, pallor, lymphadenopathy  Endocrine:            No coldness, hot flashes, polyuria, abnormal hair growth  Musculoskeletal:    No body pains, aches, arthritic pains, muscle pain ,muscle wasting  Psychiatric:          No low or high mood, anxiety, hallucinations, delusions  Skin.                      No rash, ulcers, bruising, itching  Neurological:        No confusion, headache, focal weakness, numbness, dysphasia    Objective   Objective     Vitals:   Temp:  [98.1 °F (36.7 °C)-99.9 °F (37.7 °C)] 99.9 °F (37.7 °C)  Heart Rate:  [] 95  Resp:  [18-20] 20  BP: (134-162)/() 143/85  Physical Exam    Constitutional: Awake, alert responsive, conversant, no obvious distress              Psychiatric:  Appropriate affect, cooperative   Neurologic:  Awake alert ,oriented x 3, strength symmetric in all extremities, Cranial Nerves grossly intact to confrontation, speech clear   Eyes:   PERRLA, sclerae anicteric, no conjunctival injection   HEENT:  Moist mucous membranes, no nasal or eye discharge, no throat congestion   Neck:   Supple, no thyromegaly, no lymphadenopathy, trachea midline, no elevated JVD   Respiratory:  Clear to auscultation bilaterally, nonlabored respirations    Cardiovascular: RRR, no murmurs, rubs, or gallops, palpable pedal pulses bilaterally, No bilateral ankle edema   Gastrointestinal: Positive bowel sounds, soft, nontender, nondistended, no organomegaly   Musculoskeletal:  No clubbing or cyanosis to extremities,muscle wasting, joint swelling, muscle weakness             Skin:                      No rashes, bruising, skin ulcers, petechiae or ecchymosis    Result Review    Result Review:  I have personally reviewed the  results from the time of this admission to 4/22/2024 07:23 EDT and agree with these findings:  []  Laboratory  []  Microbiology  []  Radiology  []  EKG/Telemetry   []  Cardiology/Vascular   []  Pathology  []  Old records  []  Other:    Assessment & Plan   Assessment / Plan       Active Hospital Problems:  Active Hospital Problems    Diagnosis     **Open leg wound     Open leg wound, unspecified laterality, initial encounter     Amputation stump necrosis     Hyponatremia     ESRD on hemodialysis     Heart failure with preserved left ventricular function (HFpEF)     Current smoker     Type 2 diabetes mellitus, with long-term current use of insulin     Hyperlipidemia LDL goal <70     Coronary artery disease involving native coronary artery of native heart with angina pectoris     Essential hypertension      58-year-old male with past medical history of ESRD on hemodialysis, hypothyroidism, peripheral vascular disease status post amputation, hypothyroidism, hyperlipidemia, GERD, diabetes who was admitted due to infection of his right stump with progressively worsening wound seen on dialysis with large amount of necrotic tissue tunneling and foul drainage.  Wound cultures growing E. coli and Proteus    Plan:   Will dialyze the patient on Monday Wednesday Friday.  Will dialyze the patient for 2 to 3 L UF on 2K bath  Patient on antibiotics for wound infection  Patient may need debridement  Will hold off on midodrine  Renal diet as tolerated  EPO 10K units with dialysis    Patient seen on dialysis at 10:14 AM    Electronically signed by Roxanna Hicks MD, 04/22/24, 7:23 AM EDT.

## 2024-04-22 NOTE — PROGRESS NOTES
[facility]     Progress Note    Patient Name: Chevy Junior  : 1965  MRN: [id number]  Primary Care Physician:  Janeth Le APRN  Date of admission: 2024    Subjective   Subjective     The patient has decided to stay and undergo wound care and other necessary interventions.    Doing okay.  No complaints.    Objective   Objective     Vitals:   Temp:  [97.2 °F (36.2 °C)-99.9 °F (37.7 °C)] 97.7 °F (36.5 °C)  Heart Rate:  [] 96  Resp:  [16-20] 16  BP: (137-162)/() 141/62    Physical Exam   General: Alert, no acute distress.  HEENT: PERRLA  Abdomen: Benign  Right AKA: Dressed.  Neuro: No gross deficits        Assessment & Plan   Assessment / Plan     Assessment/Plan:    Mr. Junior has a that his right AKA stump wound with areas of necrotic tissue.  He has known significant peripheral vascular disease.  Plan angiography with possible endovascular intervention.  I have discussed with the patient in detail the mechanics of the procedure, the indications, benefits, risks, alternatives as well as potential complications to include but not limited to infection, bleeding, inability to cross the occlusion, vascular injury requiring surgical repair.  He appears to understand and desires to proceed.  Currently scheduled for Thursday, 2024.    Active Hospital Problems:  Active Hospital Problems    Diagnosis     **Open leg wound     Open leg wound, unspecified laterality, initial encounter     Amputation stump necrosis     Hyponatremia     ESRD on hemodialysis     Heart failure with preserved left ventricular function (HFpEF)     Current smoker     Type 2 diabetes mellitus, with long-term current use of insulin     Hyperlipidemia LDL goal <70     Coronary artery disease involving native coronary artery of native heart with angina pectoris     Essential hypertension            Electronically signed by Luis Fernando Rawls MD, 24, 9:40 AM EDT.

## 2024-04-22 NOTE — PLAN OF CARE
Goal Outcome Evaluation:   VSS. Patient treated for pain twice during shift. Patient had no other complaints or acute events. Patient tolerated dialysis well. Patient tolerated dressing change. Patient had no other complaints or acute events. Blood sugar  during shift.

## 2024-04-23 LAB
ALBUMIN SERPL-MCNC: 3.3 G/DL (ref 3.5–5.2)
ALBUMIN/GLOB SERPL: 0.6 G/DL
ALP SERPL-CCNC: 170 U/L (ref 39–117)
ALT SERPL W P-5'-P-CCNC: 6 U/L (ref 1–41)
ANION GAP SERPL CALCULATED.3IONS-SCNC: 19.4 MMOL/L (ref 5–15)
AST SERPL-CCNC: 11 U/L (ref 1–40)
BACTERIA SPEC AEROBE CULT: NORMAL
BACTERIA SPEC AEROBE CULT: NORMAL
BASOPHILS # BLD AUTO: 0.06 10*3/MM3 (ref 0–0.2)
BASOPHILS NFR BLD AUTO: 1 % (ref 0–1.5)
BILIRUB SERPL-MCNC: 0.3 MG/DL (ref 0–1.2)
BUN SERPL-MCNC: 37 MG/DL (ref 6–20)
BUN/CREAT SERPL: 6.3 (ref 7–25)
CALCIUM SPEC-SCNC: 9.4 MG/DL (ref 8.6–10.5)
CHLORIDE SERPL-SCNC: 99 MMOL/L (ref 98–107)
CO2 SERPL-SCNC: 16.6 MMOL/L (ref 22–29)
CREAT SERPL-MCNC: 5.85 MG/DL (ref 0.76–1.27)
DEPRECATED RDW RBC AUTO: 58.8 FL (ref 37–54)
EGFRCR SERPLBLD CKD-EPI 2021: 10.4 ML/MIN/1.73
EOSINOPHIL # BLD AUTO: 0.21 10*3/MM3 (ref 0–0.4)
EOSINOPHIL NFR BLD AUTO: 3.3 % (ref 0.3–6.2)
ERYTHROCYTE [DISTWIDTH] IN BLOOD BY AUTOMATED COUNT: 17.8 % (ref 12.3–15.4)
GLOBULIN UR ELPH-MCNC: 5.2 GM/DL
GLUCOSE BLDC GLUCOMTR-MCNC: 109 MG/DL (ref 70–99)
GLUCOSE BLDC GLUCOMTR-MCNC: 110 MG/DL (ref 70–99)
GLUCOSE BLDC GLUCOMTR-MCNC: 110 MG/DL (ref 70–99)
GLUCOSE BLDC GLUCOMTR-MCNC: 123 MG/DL (ref 70–99)
GLUCOSE SERPL-MCNC: 85 MG/DL (ref 65–99)
HCT VFR BLD AUTO: 37.6 % (ref 37.5–51)
HGB BLD-MCNC: 10.5 G/DL (ref 13–17.7)
IMM GRANULOCYTES # BLD AUTO: 0.04 10*3/MM3 (ref 0–0.05)
IMM GRANULOCYTES NFR BLD AUTO: 0.6 % (ref 0–0.5)
LYMPHOCYTES # BLD AUTO: 0.84 10*3/MM3 (ref 0.7–3.1)
LYMPHOCYTES NFR BLD AUTO: 13.3 % (ref 19.6–45.3)
MAGNESIUM SERPL-MCNC: 2.1 MG/DL (ref 1.6–2.6)
MCH RBC QN AUTO: 25.1 PG (ref 26.6–33)
MCHC RBC AUTO-ENTMCNC: 27.9 G/DL (ref 31.5–35.7)
MCV RBC AUTO: 89.7 FL (ref 79–97)
MONOCYTES # BLD AUTO: 0.39 10*3/MM3 (ref 0.1–0.9)
MONOCYTES NFR BLD AUTO: 6.2 % (ref 5–12)
NEUTROPHILS NFR BLD AUTO: 4.77 10*3/MM3 (ref 1.7–7)
NEUTROPHILS NFR BLD AUTO: 75.6 % (ref 42.7–76)
NRBC BLD AUTO-RTO: 0 /100 WBC (ref 0–0.2)
PHOSPHATE SERPL-MCNC: 4.9 MG/DL (ref 2.5–4.5)
PLATELET # BLD AUTO: 194 10*3/MM3 (ref 140–450)
PMV BLD AUTO: 8.9 FL (ref 6–12)
POTASSIUM SERPL-SCNC: 5.2 MMOL/L (ref 3.5–5.2)
PROT SERPL-MCNC: 8.5 G/DL (ref 6–8.5)
RBC # BLD AUTO: 4.19 10*6/MM3 (ref 4.14–5.8)
SODIUM SERPL-SCNC: 135 MMOL/L (ref 136–145)
WBC NRBC COR # BLD AUTO: 6.31 10*3/MM3 (ref 3.4–10.8)

## 2024-04-23 PROCEDURE — 25010000002 CEFEPIME PER 500 MG: Performed by: INTERNAL MEDICINE

## 2024-04-23 PROCEDURE — 99232 SBSQ HOSP IP/OBS MODERATE 35: CPT | Performed by: INTERNAL MEDICINE

## 2024-04-23 PROCEDURE — 97161 PT EVAL LOW COMPLEX 20 MIN: CPT

## 2024-04-23 PROCEDURE — 85025 COMPLETE CBC W/AUTO DIFF WBC: CPT | Performed by: INTERNAL MEDICINE

## 2024-04-23 PROCEDURE — 83735 ASSAY OF MAGNESIUM: CPT | Performed by: INTERNAL MEDICINE

## 2024-04-23 PROCEDURE — 80053 COMPREHEN METABOLIC PANEL: CPT | Performed by: INTERNAL MEDICINE

## 2024-04-23 PROCEDURE — 25010000002 HEPARIN (PORCINE) PER 1000 UNITS: Performed by: STUDENT IN AN ORGANIZED HEALTH CARE EDUCATION/TRAINING PROGRAM

## 2024-04-23 PROCEDURE — 63710000001 INSULIN LISPRO (HUMAN) PER 5 UNITS: Performed by: STUDENT IN AN ORGANIZED HEALTH CARE EDUCATION/TRAINING PROGRAM

## 2024-04-23 PROCEDURE — 82948 REAGENT STRIP/BLOOD GLUCOSE: CPT | Performed by: STUDENT IN AN ORGANIZED HEALTH CARE EDUCATION/TRAINING PROGRAM

## 2024-04-23 PROCEDURE — 84100 ASSAY OF PHOSPHORUS: CPT | Performed by: INTERNAL MEDICINE

## 2024-04-23 RX ORDER — GABAPENTIN 100 MG/1
100 CAPSULE ORAL NIGHTLY
Status: DISCONTINUED | OUTPATIENT
Start: 2024-04-23 | End: 2024-04-25 | Stop reason: HOSPADM

## 2024-04-23 RX ADMIN — HEPARIN SODIUM 5000 UNITS: 5000 INJECTION INTRAVENOUS; SUBCUTANEOUS at 23:58

## 2024-04-23 RX ADMIN — ASPIRIN 81 MG: 81 TABLET, COATED ORAL at 08:30

## 2024-04-23 RX ADMIN — CEFEPIME HYDROCHLORIDE 1000 MG: 1 INJECTION, POWDER, FOR SOLUTION INTRAMUSCULAR; INTRAVENOUS at 23:58

## 2024-04-23 RX ADMIN — METOCLOPRAMIDE 5 MG: 5 TABLET ORAL at 23:58

## 2024-04-23 RX ADMIN — INSULIN LISPRO 1 UNITS: 100 INJECTION, SOLUTION INTRAVENOUS; SUBCUTANEOUS at 09:04

## 2024-04-23 RX ADMIN — Medication 10 ML: at 23:56

## 2024-04-23 RX ADMIN — NICOTINE 1 PATCH: 21 PATCH, EXTENDED RELEASE TRANSDERMAL at 08:33

## 2024-04-23 RX ADMIN — Medication 10 ML: at 08:28

## 2024-04-23 RX ADMIN — SERTRALINE HYDROCHLORIDE 100 MG: 100 TABLET, FILM COATED ORAL at 08:31

## 2024-04-23 RX ADMIN — LEVOTHYROXINE SODIUM 75 MCG: 0.07 TABLET ORAL at 05:50

## 2024-04-23 RX ADMIN — DOCUSATE SODIUM 50MG AND SENNOSIDES 8.6MG 2 TABLET: 8.6; 5 TABLET, FILM COATED ORAL at 08:30

## 2024-04-23 RX ADMIN — OXYCODONE HYDROCHLORIDE 20 MG: 5 TABLET ORAL at 04:04

## 2024-04-23 RX ADMIN — INSULIN LISPRO 3 UNITS: 100 INJECTION, SOLUTION INTRAVENOUS; SUBCUTANEOUS at 13:00

## 2024-04-23 RX ADMIN — Medication: at 11:19

## 2024-04-23 RX ADMIN — Medication: at 23:59

## 2024-04-23 RX ADMIN — OXYCODONE HYDROCHLORIDE 20 MG: 5 TABLET ORAL at 14:54

## 2024-04-23 RX ADMIN — HEPARIN SODIUM 5000 UNITS: 5000 INJECTION INTRAVENOUS; SUBCUTANEOUS at 08:28

## 2024-04-23 RX ADMIN — ATORVASTATIN CALCIUM 40 MG: 40 TABLET, FILM COATED ORAL at 23:57

## 2024-04-23 RX ADMIN — OXYCODONE HYDROCHLORIDE 20 MG: 5 TABLET ORAL at 08:29

## 2024-04-23 RX ADMIN — CLOPIDOGREL BISULFATE 75 MG: 75 TABLET, FILM COATED ORAL at 08:30

## 2024-04-23 NOTE — PROGRESS NOTES
[facility]     Progress Note    Patient Name: Chevy Junior  : 1965  MRN: [id number]  Primary Care Physician:  Janeth Le APRN  Date of admission: 2024    Subjective   Patient tolerated hemodialysis well  Dialysis flowsheet reviewed  No major acute events overnight      Scheduled Meds:ammonium lactate, , Topical, 2 times per day  aspirin, 81 mg, Oral, Daily  atorvastatin, 40 mg, Oral, Nightly  cefepime, 1,000 mg, Intravenous, Q24H  clopidogrel, 75 mg, Oral, Daily  epoetin jacoby/jacoby-epbx, 10,000 Units, Subcutaneous, Once per day on   gabapentin, 400 mg, Oral, Q8H  heparin (porcine), 5,000 Units, Subcutaneous, Q12H  insulin detemir, 1-200 Units, Subcutaneous, Nightly - Glucommander  insulin lispro, 1-200 Units, Subcutaneous, 4x Daily With Meals & Nightly  levothyroxine, 75 mcg, Oral, Q AM  metoclopramide, 5 mg, Oral, Nightly  [Held by provider] midodrine, 10 mg, Oral, TID AC  nicotine, 1 patch, Transdermal, Q24H  sertraline, 100 mg, Oral, Daily  sodium chloride, 10 mL, Intravenous, Q12H  Vancomycin Pharmacy Intermittent/Pulse Dosing, , Does not apply, Daily      Continuous Infusions:Pharmacy to Dose Cefepime,   Pharmacy to dose vancomycin,       PRN Meds:.  acetaminophen **OR** acetaminophen **OR** acetaminophen    senna-docusate sodium **AND** polyethylene glycol **AND** bisacodyl **AND** bisacodyl    dextrose    dextrose    glucagon (human recombinant)    HYDROmorphone **AND** naloxone    insulin lispro    LORazepam    nicotine polacrilex    ondansetron ODT **OR** ondansetron    oxyCODONE    Pharmacy to Dose Cefepime    Pharmacy to dose vancomycin    sodium chloride    sodium chloride       Review of Systems  Constitutional:        Weakness tiredness fatigue  Eyes:                       No blurry vision, eye discharge, eye irritation, eye pain  HEENT:                   No acute hair loss, earache and discharge, nasal congestion or discharge, sore throat, postnasal  drip  Respiratory:           No shortness of breath coughing sputum production wheezing hemoptysis pleuritic chest pain  Cardiovascular:     No chest pain, orthopnea, PND, dizziness, palpitation, lower extremity edema  Gastrointestinal:   No nausea vomiting diarrhea abdominal pain constipation  Genitourinary:       No urinary incontinence, hesitancy, frequency, urgency, dysuria  Hematologic:         No bruising, bleeding, pallor, lymphadenopathy  Endocrine:            No coldness, hot flashes, polyuria, abnormal hair growth  Musculoskeletal:    No body pains, aches, arthritic pains, muscle pain ,muscle wasting  Psychiatric:          No low or high mood, anxiety, hallucinations, delusions  Skin.                      No rash, ulcers, bruising, itching  Neurological:        No confusion, headache, focal weakness, numbness, dysphasia    Objective   Objective     Vitals:   Temp:  [97.7 °F (36.5 °C)-98.5 °F (36.9 °C)] 98.4 °F (36.9 °C)  Heart Rate:  [] 101  Resp:  [16-18] 18  BP: ()/(57-98) 164/88  Physical Exam    Constitutional: Awake, alert responsive, conversant, no obvious distress              Psychiatric:  Appropriate affect, cooperative   Neurologic:  Awake alert ,oriented x 3, strength symmetric in all extremities, Cranial Nerves grossly intact to confrontation, speech clear   Eyes:   PERRLA, sclerae anicteric, no conjunctival injection   HEENT:  Moist mucous membranes, no nasal or eye discharge, no throat congestion   Neck:   Supple, no thyromegaly, no lymphadenopathy, trachea midline, no elevated JVD   Respiratory:  Clear to auscultation bilaterally, nonlabored respirations    Cardiovascular: RRR, no murmurs, rubs, or gallops, palpable pedal pulses bilaterally, No bilateral ankle edema   Gastrointestinal: Positive bowel sounds, soft, nontender, nondistended, no organomegaly   Musculoskeletal:  No clubbing or cyanosis to extremities,muscle wasting, joint swelling, muscle weakness             Skin:                       No rashes, bruising, skin ulcers, petechiae or ecchymosis    Result Review    Result Review:  I have personally reviewed the results from the time of this admission to 4/23/2024 07:30 EDT and agree with these findings:  []  Laboratory  []  Microbiology  []  Radiology  []  EKG/Telemetry   []  Cardiology/Vascular   []  Pathology  []  Old records  []  Other:    Assessment & Plan   Assessment / Plan       Active Hospital Problems:  Active Hospital Problems    Diagnosis     **Open leg wound     Open leg wound, unspecified laterality, initial encounter     Amputation stump necrosis     Hyponatremia     ESRD on hemodialysis     Heart failure with preserved left ventricular function (HFpEF)     Current smoker     Type 2 diabetes mellitus, with long-term current use of insulin     Hyperlipidemia LDL goal <70     Coronary artery disease involving native coronary artery of native heart with angina pectoris     Essential hypertension      58-year-old male with past medical history of ESRD on hemodialysis, hypothyroidism, peripheral vascular disease status post amputation, hypothyroidism, hyperlipidemia, GERD, diabetes who was admitted due to infection of his right stump with progressively worsening wound seen on dialysis with large amount of necrotic tissue tunneling and foul drainage.  Wound cultures growing E. coli and Proteus. Plan for angiogram on 4/25    Plan:   Will dialyze the patient on Monday Wednesday Friday.   Patient on antibiotics for wound infection  Vascular on board and plan for intervention  Will hold off on midodrine  Renal diet as tolerated  EPO 10K units with dialysis    Electronically signed by Roxanna Hicks MD, 04/22/24, 7:23 AM EDT.

## 2024-04-23 NOTE — PLAN OF CARE
Goal Outcome Evaluation:  Progress: no change  Outcome Evaluation: At start of shift pt's BP was elevated at 182/92 with HR of 108, pt's pain at that time was rated 8/10. PRN Oxycodone was given at that time and follow up BP was 150/98. Pt received two doses of PRN Oxycodone during the shift. Pt is refusing scheduled doses of Neurontin as he feels that medication may be inducing his muscle jerks/spastic muscle activity. Pt appeared to rest well between care. O2 sats in mid to upper 90s on room air.

## 2024-04-23 NOTE — PLAN OF CARE
Goal Outcome Evaluation:  Plan of Care Reviewed With: patient, other (see comments) (Ex-wife)        Progress: no change  Outcome Evaluation: Pt presents with limitations that impede their ability to safely and independently transfer and ambulate. The skills of a therapist will be required to safely and effectively implement the following treatment plan to restore maximal level of function.      Anticipated Discharge Disposition (PT): sub acute care setting
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
## 2024-04-23 NOTE — PLAN OF CARE
Goal Outcome Evaluation:  Plan of Care Reviewed With: patient Blood glucose range 109 - 123 this shift. Wound care completed. No s/s of distress noted. Continue POC.

## 2024-04-24 LAB
ALBUMIN SERPL-MCNC: 3.4 G/DL (ref 3.5–5.2)
ALBUMIN/GLOB SERPL: 0.6 G/DL
ALP SERPL-CCNC: 156 U/L (ref 39–117)
ALT SERPL W P-5'-P-CCNC: 6 U/L (ref 1–41)
ANION GAP SERPL CALCULATED.3IONS-SCNC: 16.4 MMOL/L (ref 5–15)
AST SERPL-CCNC: 10 U/L (ref 1–40)
BASOPHILS # BLD AUTO: 0.05 10*3/MM3 (ref 0–0.2)
BASOPHILS NFR BLD AUTO: 0.9 % (ref 0–1.5)
BILIRUB SERPL-MCNC: 0.2 MG/DL (ref 0–1.2)
BUN SERPL-MCNC: 59 MG/DL (ref 6–20)
BUN/CREAT SERPL: 8.4 (ref 7–25)
CALCIUM SPEC-SCNC: 9.4 MG/DL (ref 8.6–10.5)
CHLORIDE SERPL-SCNC: 99 MMOL/L (ref 98–107)
CO2 SERPL-SCNC: 17.6 MMOL/L (ref 22–29)
CREAT SERPL-MCNC: 7.06 MG/DL (ref 0.76–1.27)
DEPRECATED RDW RBC AUTO: 57.4 FL (ref 37–54)
EGFRCR SERPLBLD CKD-EPI 2021: 8.3 ML/MIN/1.73
EOSINOPHIL # BLD AUTO: 0.3 10*3/MM3 (ref 0–0.4)
EOSINOPHIL NFR BLD AUTO: 5.3 % (ref 0.3–6.2)
ERYTHROCYTE [DISTWIDTH] IN BLOOD BY AUTOMATED COUNT: 18.2 % (ref 12.3–15.4)
GLOBULIN UR ELPH-MCNC: 5.3 GM/DL
GLUCOSE BLDC GLUCOMTR-MCNC: 127 MG/DL (ref 70–99)
GLUCOSE BLDC GLUCOMTR-MCNC: 129 MG/DL (ref 70–99)
GLUCOSE BLDC GLUCOMTR-MCNC: 146 MG/DL (ref 70–99)
GLUCOSE BLDC GLUCOMTR-MCNC: 148 MG/DL (ref 70–99)
GLUCOSE BLDC GLUCOMTR-MCNC: 88 MG/DL (ref 70–99)
GLUCOSE SERPL-MCNC: 116 MG/DL (ref 65–99)
HCT VFR BLD AUTO: 33.3 % (ref 37.5–51)
HGB BLD-MCNC: 9.9 G/DL (ref 13–17.7)
IMM GRANULOCYTES # BLD AUTO: 0.03 10*3/MM3 (ref 0–0.05)
IMM GRANULOCYTES NFR BLD AUTO: 0.5 % (ref 0–0.5)
LYMPHOCYTES # BLD AUTO: 1.12 10*3/MM3 (ref 0.7–3.1)
LYMPHOCYTES NFR BLD AUTO: 19.8 % (ref 19.6–45.3)
MAGNESIUM SERPL-MCNC: 2.4 MG/DL (ref 1.6–2.6)
MCH RBC QN AUTO: 25.8 PG (ref 26.6–33)
MCHC RBC AUTO-ENTMCNC: 29.7 G/DL (ref 31.5–35.7)
MCV RBC AUTO: 86.9 FL (ref 79–97)
MONOCYTES # BLD AUTO: 0.47 10*3/MM3 (ref 0.1–0.9)
MONOCYTES NFR BLD AUTO: 8.3 % (ref 5–12)
NEUTROPHILS NFR BLD AUTO: 3.68 10*3/MM3 (ref 1.7–7)
NEUTROPHILS NFR BLD AUTO: 65.2 % (ref 42.7–76)
NRBC BLD AUTO-RTO: 0 /100 WBC (ref 0–0.2)
PHOSPHATE SERPL-MCNC: 6.2 MG/DL (ref 2.5–4.5)
PLATELET # BLD AUTO: 182 10*3/MM3 (ref 140–450)
PMV BLD AUTO: 9.3 FL (ref 6–12)
POTASSIUM SERPL-SCNC: 5.9 MMOL/L (ref 3.5–5.2)
PROT SERPL-MCNC: 8.7 G/DL (ref 6–8.5)
RBC # BLD AUTO: 3.83 10*6/MM3 (ref 4.14–5.8)
SODIUM SERPL-SCNC: 133 MMOL/L (ref 136–145)
VANCOMYCIN SERPL-MCNC: 21.19 MCG/ML (ref 5–40)
WBC NRBC COR # BLD AUTO: 5.65 10*3/MM3 (ref 3.4–10.8)

## 2024-04-24 PROCEDURE — 82948 REAGENT STRIP/BLOOD GLUCOSE: CPT

## 2024-04-24 PROCEDURE — 63710000001 INSULIN LISPRO (HUMAN) PER 5 UNITS: Performed by: STUDENT IN AN ORGANIZED HEALTH CARE EDUCATION/TRAINING PROGRAM

## 2024-04-24 PROCEDURE — 85025 COMPLETE CBC W/AUTO DIFF WBC: CPT | Performed by: INTERNAL MEDICINE

## 2024-04-24 PROCEDURE — 82948 REAGENT STRIP/BLOOD GLUCOSE: CPT | Performed by: STUDENT IN AN ORGANIZED HEALTH CARE EDUCATION/TRAINING PROGRAM

## 2024-04-24 PROCEDURE — 25010000002 CEFEPIME PER 500 MG: Performed by: INTERNAL MEDICINE

## 2024-04-24 PROCEDURE — 99232 SBSQ HOSP IP/OBS MODERATE 35: CPT | Performed by: INTERNAL MEDICINE

## 2024-04-24 PROCEDURE — 25010000002 HEPARIN (PORCINE) PER 1000 UNITS: Performed by: STUDENT IN AN ORGANIZED HEALTH CARE EDUCATION/TRAINING PROGRAM

## 2024-04-24 PROCEDURE — 80202 ASSAY OF VANCOMYCIN: CPT | Performed by: STUDENT IN AN ORGANIZED HEALTH CARE EDUCATION/TRAINING PROGRAM

## 2024-04-24 PROCEDURE — 84100 ASSAY OF PHOSPHORUS: CPT | Performed by: INTERNAL MEDICINE

## 2024-04-24 PROCEDURE — 25010000002 EPOETIN ALFA PER 1000 UNITS: Performed by: STUDENT IN AN ORGANIZED HEALTH CARE EDUCATION/TRAINING PROGRAM

## 2024-04-24 PROCEDURE — 25010000002 VANCOMYCIN 5 G RECONSTITUTED SOLUTION: Performed by: STUDENT IN AN ORGANIZED HEALTH CARE EDUCATION/TRAINING PROGRAM

## 2024-04-24 PROCEDURE — 80053 COMPREHEN METABOLIC PANEL: CPT | Performed by: INTERNAL MEDICINE

## 2024-04-24 PROCEDURE — 83735 ASSAY OF MAGNESIUM: CPT | Performed by: INTERNAL MEDICINE

## 2024-04-24 PROCEDURE — 63710000001 INSULIN DETEMIR PER 5 UNITS: Performed by: STUDENT IN AN ORGANIZED HEALTH CARE EDUCATION/TRAINING PROGRAM

## 2024-04-24 RX ORDER — SEVELAMER CARBONATE 800 MG/1
1600 TABLET, FILM COATED ORAL
Status: DISCONTINUED | OUTPATIENT
Start: 2024-04-24 | End: 2024-04-25 | Stop reason: HOSPADM

## 2024-04-24 RX ADMIN — Medication: at 14:46

## 2024-04-24 RX ADMIN — INSULIN DETEMIR 6 UNITS: 100 INJECTION, SOLUTION SUBCUTANEOUS at 21:58

## 2024-04-24 RX ADMIN — INSULIN DETEMIR 6 UNITS: 100 INJECTION, SOLUTION SUBCUTANEOUS at 00:20

## 2024-04-24 RX ADMIN — Medication: at 21:48

## 2024-04-24 RX ADMIN — CLOPIDOGREL BISULFATE 75 MG: 75 TABLET, FILM COATED ORAL at 14:43

## 2024-04-24 RX ADMIN — ERYTHROPOIETIN 10000 UNITS: 10000 INJECTION, SOLUTION INTRAVENOUS; SUBCUTANEOUS at 09:31

## 2024-04-24 RX ADMIN — OXYCODONE HYDROCHLORIDE 20 MG: 5 TABLET ORAL at 21:58

## 2024-04-24 RX ADMIN — NICOTINE 1 PATCH: 21 PATCH, EXTENDED RELEASE TRANSDERMAL at 09:33

## 2024-04-24 RX ADMIN — OXYCODONE HYDROCHLORIDE 20 MG: 5 TABLET ORAL at 11:39

## 2024-04-24 RX ADMIN — METOCLOPRAMIDE 5 MG: 5 TABLET ORAL at 21:47

## 2024-04-24 RX ADMIN — SERTRALINE HYDROCHLORIDE 100 MG: 100 TABLET, FILM COATED ORAL at 14:43

## 2024-04-24 RX ADMIN — INSULIN LISPRO 1 UNITS: 100 INJECTION, SOLUTION INTRAVENOUS; SUBCUTANEOUS at 07:43

## 2024-04-24 RX ADMIN — Medication 10 ML: at 14:49

## 2024-04-24 RX ADMIN — ASPIRIN 81 MG: 81 TABLET, COATED ORAL at 14:43

## 2024-04-24 RX ADMIN — INSULIN LISPRO 3 UNITS: 100 INJECTION, SOLUTION INTRAVENOUS; SUBCUTANEOUS at 14:42

## 2024-04-24 RX ADMIN — OXYCODONE HYDROCHLORIDE 20 MG: 5 TABLET ORAL at 03:57

## 2024-04-24 RX ADMIN — SEVELAMER CARBONATE 1600 MG: 800 TABLET, FILM COATED ORAL at 18:09

## 2024-04-24 RX ADMIN — LEVOTHYROXINE SODIUM 75 MCG: 0.07 TABLET ORAL at 05:36

## 2024-04-24 RX ADMIN — VANCOMYCIN HYDROCHLORIDE 500 MG: 5 INJECTION, POWDER, LYOPHILIZED, FOR SOLUTION INTRAVENOUS at 15:51

## 2024-04-24 RX ADMIN — SEVELAMER CARBONATE 1600 MG: 800 TABLET, FILM COATED ORAL at 15:25

## 2024-04-24 RX ADMIN — ATORVASTATIN CALCIUM 40 MG: 40 TABLET, FILM COATED ORAL at 21:47

## 2024-04-24 RX ADMIN — INSULIN LISPRO 2 UNITS: 100 INJECTION, SOLUTION INTRAVENOUS; SUBCUTANEOUS at 18:08

## 2024-04-24 RX ADMIN — CEFEPIME HYDROCHLORIDE 1000 MG: 1 INJECTION, POWDER, FOR SOLUTION INTRAMUSCULAR; INTRAVENOUS at 21:58

## 2024-04-24 RX ADMIN — OXYCODONE HYDROCHLORIDE 20 MG: 5 TABLET ORAL at 07:32

## 2024-04-24 RX ADMIN — SEVELAMER CARBONATE 1600 MG: 800 TABLET, FILM COATED ORAL at 07:41

## 2024-04-24 RX ADMIN — Medication 10 ML: at 21:48

## 2024-04-24 RX ADMIN — OXYCODONE HYDROCHLORIDE 20 MG: 5 TABLET ORAL at 15:52

## 2024-04-24 RX ADMIN — HEPARIN SODIUM 5000 UNITS: 5000 INJECTION INTRAVENOUS; SUBCUTANEOUS at 21:47

## 2024-04-24 NOTE — NURSING NOTE
Duration of Treatment 4.0 Hours                       Completed entire tx   Access Site AVF   Dialyzer Revaclear    mL/min   Dialysate Temperature (C) 36   BFR-As tolerated to a maximum of: 400 mL/min   Dialysate Solution Bath: K+ 2 mEq, Ca 2.5mEq   Bicarb 30 mEq   Na+ 138 mEq   Fluid Removal: 2-3                                   Removed 3L       Patient completed 4 hour dialysis treatment. Removed 3L. AVF functioned well. No complaints from patient. Post /83, . Gave pain meds x1.     Educated on fluid management.     Report given to NABEEL Gimenez.

## 2024-04-24 NOTE — PLAN OF CARE
Goal Outcome Evaluation:  Plan of Care Reviewed With: patient has been up throughout the night asking for coffee, Pt dressing has been changed on this shift, all new lines has been replaced and flush bags hung. Plan of care on going.

## 2024-04-24 NOTE — SIGNIFICANT NOTE
04/24/24 0825   OTHER   Discipline occupational therapist   Rehab Time/Intention   Session Not Performed patient unavailable for evaluation  (dialysis)

## 2024-04-24 NOTE — PROGRESS NOTES
[facility]     Progress Note    Patient Name: Chevy Junior  : 1965  MRN: [id number]  Primary Care Physician:  Janeth Le APRN  Date of admission: 2024    Subjective   No recurrence overnight  Plan for hemodialysis today    Scheduled Meds:ammonium lactate, , Topical, 2 times per day  aspirin, 81 mg, Oral, Daily  atorvastatin, 40 mg, Oral, Nightly  cefepime, 1,000 mg, Intravenous, Q24H  clopidogrel, 75 mg, Oral, Daily  epoetin jacoby/jacoby-epbx, 10,000 Units, Subcutaneous, Once per day on   gabapentin, 100 mg, Oral, Nightly  heparin (porcine), 5,000 Units, Subcutaneous, Q12H  insulin detemir, 1-200 Units, Subcutaneous, Nightly - Glucommander  insulin lispro, 1-200 Units, Subcutaneous, 4x Daily With Meals & Nightly  levothyroxine, 75 mcg, Oral, Q AM  metoclopramide, 5 mg, Oral, Nightly  [Held by provider] midodrine, 10 mg, Oral, TID AC  nicotine, 1 patch, Transdermal, Q24H  sertraline, 100 mg, Oral, Daily  sodium chloride, 10 mL, Intravenous, Q12H  Vancomycin Pharmacy Intermittent/Pulse Dosing, , Does not apply, Daily      Continuous Infusions:Pharmacy to Dose Cefepime,   Pharmacy to dose vancomycin,       PRN Meds:.  acetaminophen **OR** acetaminophen **OR** acetaminophen    senna-docusate sodium **AND** polyethylene glycol **AND** bisacodyl **AND** bisacodyl    dextrose    dextrose    glucagon (human recombinant)    insulin lispro    LORazepam    [] HYDROmorphone **AND** naloxone    nicotine polacrilex    ondansetron ODT **OR** ondansetron    oxyCODONE    Pharmacy to Dose Cefepime    Pharmacy to dose vancomycin    sodium chloride    sodium chloride       Review of Systems  Constitutional:        Weakness tiredness fatigue  Eyes:                       No blurry vision, eye discharge, eye irritation, eye pain  HEENT:                   No acute hair loss, earache and discharge, nasal congestion or discharge, sore throat, postnasal drip  Respiratory:           No  shortness of breath coughing sputum production wheezing hemoptysis pleuritic chest pain  Cardiovascular:     No chest pain, orthopnea, PND, dizziness, palpitation, lower extremity edema  Gastrointestinal:   No nausea vomiting diarrhea abdominal pain constipation  Genitourinary:       No urinary incontinence, hesitancy, frequency, urgency, dysuria  Hematologic:         No bruising, bleeding, pallor, lymphadenopathy  Endocrine:            No coldness, hot flashes, polyuria, abnormal hair growth  Musculoskeletal:    No body pains, aches, arthritic pains, muscle pain ,muscle wasting  Psychiatric:          No low or high mood, anxiety, hallucinations, delusions  Skin.                      No rash, ulcers, bruising, itching  Neurological:        No confusion, headache, focal weakness, numbness, dysphasia    Objective   Objective     Vitals:   Temp:  [97.6 °F (36.4 °C)-97.9 °F (36.6 °C)] 97.9 °F (36.6 °C)  Heart Rate:  [] 96  Resp:  [18] 18  BP: (100-170)/(72-85) 149/81  Flow (L/min):  [1] 1  Physical Exam    Constitutional: Awake, alert responsive, conversant, no obvious distress              Psychiatric:  Appropriate affect, cooperative   Neurologic:  Awake alert ,oriented x 3, strength symmetric in all extremities, Cranial Nerves grossly intact to confrontation, speech clear   Eyes:   PERRLA, sclerae anicteric, no conjunctival injection   HEENT:  Moist mucous membranes, no nasal or eye discharge, no throat congestion   Neck:   Supple, no thyromegaly, no lymphadenopathy, trachea midline, no elevated JVD   Respiratory:  Clear to auscultation bilaterally, nonlabored respirations    Cardiovascular: RRR, no murmurs, rubs, or gallops, palpable pedal pulses bilaterally, No bilateral ankle edema   Gastrointestinal: Positive bowel sounds, soft, nontender, nondistended, no organomegaly   Musculoskeletal:  No clubbing or cyanosis to extremities,muscle wasting, joint swelling, muscle weakness             Skin:                       No rashes, bruising, skin ulcers, petechiae or ecchymosis    Result Review    Result Review:  I have personally reviewed the results from the time of this admission to 4/24/2024 07:31 EDT and agree with these findings:  []  Laboratory  []  Microbiology  []  Radiology  []  EKG/Telemetry   []  Cardiology/Vascular   []  Pathology  []  Old records  []  Other:    Assessment & Plan   Assessment / Plan       Active Hospital Problems:  Active Hospital Problems    Diagnosis     **Open leg wound     Open leg wound, unspecified laterality, initial encounter     Amputation stump necrosis     Hyponatremia     ESRD on hemodialysis     Heart failure with preserved left ventricular function (HFpEF)     Current smoker     Type 2 diabetes mellitus, with long-term current use of insulin     Hyperlipidemia LDL goal <70     Coronary artery disease involving native coronary artery of native heart with angina pectoris     Essential hypertension      58-year-old male with past medical history of ESRD on hemodialysis, hypothyroidism, peripheral vascular disease status post amputation, hypothyroidism, hyperlipidemia, GERD, diabetes who was admitted due to infection of his right stump with progressively worsening wound seen on dialysis with large amount of necrotic tissue tunneling and foul drainage.  Wound cultures growing E. coli and Proteus. Plan for angiogram on 4/25    Plan:   Will dialyze the patient on Monday Wednesday Friday.  Plan to dialyze the patient for 2 to 3 L UF  Patient on antibiotics for wound infection  Vascular on board and plan for intervention  Will hold off on midodrine  Renal diet as tolerated  EPO 10K units with dialysis    Patient seen on dialysis at 1002 AM

## 2024-04-24 NOTE — PLAN OF CARE
Goal Outcome Evaluation:  Plan of Care Reviewed With: patient   Dialysis completed this shift. Pt refuses DSG change. No acute change this shift. Continue POC.

## 2024-04-24 NOTE — PROGRESS NOTES
"[facility]: Vancomycin Monitoring Note    hCevy Junior is a 58 y.o. male who is on day  of pharmacy to dose vancomycin for Skin and Soft Tissue.    Previous Vancomycin Dose:   750 mg IV administered @  Imaging Reviewed?: Yes  Updated Cultures and Sensitivities:    Blood cx: NGTD   Wound cx, right leg: E.coli, Proteus mirabilis    Vitals/Labs  Ht: 170.2 cm (67\"); Wt: 75.8 kg (167 lb 1.7 oz)   Temp (24hrs), Av.8 °F (36.6 °C), Min:97.6 °F (36.4 °C), Max:97.9 °F (36.6 °C)   Estimated Creatinine Clearance: 12.1 mL/min (A) (by C-G formula based on SCr of 7.06 mg/dL (H)).   Hemodialysis - MWF schedule    Results from last 7 days   Lab Units 24  0538 24  0550 24  0518 24  0610 24  0509 24  1432   VANCOMYCIN RM mcg/mL 21.19  --  13.50  --  14.36 7.42   CREATININE mg/dL 7.06* 5.85* 8.27*   < > 7.50* 6.71*   WBC 10*3/mm3 5.65 6.31 7.23   < > 6.95 6.63    < > = values in this interval not displayed.     Assessment/Plan    Current Vancomycin Dose: Pulse dosing; patient completed 4 hour dialysis session today , ordering 500 mg vanc based off pre-HD level.  Next Vanc Random ordered for  at AM labs (prior to next anticipated HD session)  We will continue to monitor patient changes and renal function     Thank you for involving pharmacy in this patient's care. Please contact pharmacy with any questions or concerns.    Prabhakar [facility]  Clinical Pharmacist    "
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
## 2024-04-25 ENCOUNTER — READMISSION MANAGEMENT (OUTPATIENT)
Dept: CALL CENTER | Facility: HOSPITAL | Age: 59
End: 2024-04-25
Payer: MEDICAID

## 2024-04-25 VITALS
OXYGEN SATURATION: 100 % | SYSTOLIC BLOOD PRESSURE: 149 MMHG | WEIGHT: 167.11 LBS | BODY MASS INDEX: 26.23 KG/M2 | HEIGHT: 67 IN | DIASTOLIC BLOOD PRESSURE: 90 MMHG | HEART RATE: 97 BPM | RESPIRATION RATE: 16 BRPM | TEMPERATURE: 98.2 F

## 2024-04-25 LAB
ALBUMIN SERPL-MCNC: 3.3 G/DL (ref 3.5–5.2)
ALBUMIN/GLOB SERPL: 0.6 G/DL
ALP SERPL-CCNC: 155 U/L (ref 39–117)
ALT SERPL W P-5'-P-CCNC: 5 U/L (ref 1–41)
ANION GAP SERPL CALCULATED.3IONS-SCNC: 14.4 MMOL/L (ref 5–15)
AST SERPL-CCNC: 8 U/L (ref 1–40)
BASOPHILS # BLD AUTO: 0.06 10*3/MM3 (ref 0–0.2)
BASOPHILS NFR BLD AUTO: 1 % (ref 0–1.5)
BILIRUB SERPL-MCNC: 0.2 MG/DL (ref 0–1.2)
BUN SERPL-MCNC: 34 MG/DL (ref 6–20)
BUN/CREAT SERPL: 6.2 (ref 7–25)
CALCIUM SPEC-SCNC: 9.7 MG/DL (ref 8.6–10.5)
CHLORIDE SERPL-SCNC: 99 MMOL/L (ref 98–107)
CO2 SERPL-SCNC: 19.6 MMOL/L (ref 22–29)
CREAT SERPL-MCNC: 5.46 MG/DL (ref 0.76–1.27)
DEPRECATED RDW RBC AUTO: 57.3 FL (ref 37–54)
EGFRCR SERPLBLD CKD-EPI 2021: 11.3 ML/MIN/1.73
EOSINOPHIL # BLD AUTO: 0.3 10*3/MM3 (ref 0–0.4)
EOSINOPHIL NFR BLD AUTO: 5.1 % (ref 0.3–6.2)
ERYTHROCYTE [DISTWIDTH] IN BLOOD BY AUTOMATED COUNT: 17.9 % (ref 12.3–15.4)
GLOBULIN UR ELPH-MCNC: 5.1 GM/DL
GLUCOSE BLDC GLUCOMTR-MCNC: 101 MG/DL (ref 70–99)
GLUCOSE BLDC GLUCOMTR-MCNC: 104 MG/DL (ref 70–99)
GLUCOSE SERPL-MCNC: 94 MG/DL (ref 65–99)
HCT VFR BLD AUTO: 36 % (ref 37.5–51)
HGB BLD-MCNC: 10.3 G/DL (ref 13–17.7)
IMM GRANULOCYTES # BLD AUTO: 0.04 10*3/MM3 (ref 0–0.05)
IMM GRANULOCYTES NFR BLD AUTO: 0.7 % (ref 0–0.5)
LYMPHOCYTES # BLD AUTO: 1.01 10*3/MM3 (ref 0.7–3.1)
LYMPHOCYTES NFR BLD AUTO: 17.1 % (ref 19.6–45.3)
MAGNESIUM SERPL-MCNC: 2.1 MG/DL (ref 1.6–2.6)
MCH RBC QN AUTO: 25.4 PG (ref 26.6–33)
MCHC RBC AUTO-ENTMCNC: 28.6 G/DL (ref 31.5–35.7)
MCV RBC AUTO: 88.7 FL (ref 79–97)
MONOCYTES # BLD AUTO: 0.52 10*3/MM3 (ref 0.1–0.9)
MONOCYTES NFR BLD AUTO: 8.8 % (ref 5–12)
NEUTROPHILS NFR BLD AUTO: 3.99 10*3/MM3 (ref 1.7–7)
NEUTROPHILS NFR BLD AUTO: 67.3 % (ref 42.7–76)
NRBC BLD AUTO-RTO: 0 /100 WBC (ref 0–0.2)
PHOSPHATE SERPL-MCNC: 4.9 MG/DL (ref 2.5–4.5)
PLATELET # BLD AUTO: 197 10*3/MM3 (ref 140–450)
PMV BLD AUTO: 9.8 FL (ref 6–12)
POTASSIUM SERPL-SCNC: 5.1 MMOL/L (ref 3.5–5.2)
PROT SERPL-MCNC: 8.4 G/DL (ref 6–8.5)
RBC # BLD AUTO: 4.06 10*6/MM3 (ref 4.14–5.8)
SODIUM SERPL-SCNC: 133 MMOL/L (ref 136–145)
WBC NRBC COR # BLD AUTO: 5.92 10*3/MM3 (ref 3.4–10.8)

## 2024-04-25 PROCEDURE — 94761 N-INVAS EAR/PLS OXIMETRY MLT: CPT

## 2024-04-25 PROCEDURE — 97165 OT EVAL LOW COMPLEX 30 MIN: CPT

## 2024-04-25 PROCEDURE — 99152 MOD SED SAME PHYS/QHP 5/>YRS: CPT | Performed by: SURGERY

## 2024-04-25 PROCEDURE — 84100 ASSAY OF PHOSPHORUS: CPT | Performed by: INTERNAL MEDICINE

## 2024-04-25 PROCEDURE — 25010000002 MIDAZOLAM PER 1MG: Performed by: SURGERY

## 2024-04-25 PROCEDURE — 75716 ARTERY X-RAYS ARMS/LEGS: CPT | Performed by: SURGERY

## 2024-04-25 PROCEDURE — 76937 US GUIDE VASCULAR ACCESS: CPT | Performed by: SURGERY

## 2024-04-25 PROCEDURE — C1769 GUIDE WIRE: HCPCS | Performed by: SURGERY

## 2024-04-25 PROCEDURE — C1894 INTRO/SHEATH, NON-LASER: HCPCS | Performed by: SURGERY

## 2024-04-25 PROCEDURE — 0 IODIXANOL PER 1 ML: Performed by: SURGERY

## 2024-04-25 PROCEDURE — 83735 ASSAY OF MAGNESIUM: CPT | Performed by: INTERNAL MEDICINE

## 2024-04-25 PROCEDURE — 99153 MOD SED SAME PHYS/QHP EA: CPT | Performed by: SURGERY

## 2024-04-25 PROCEDURE — 36200 PLACE CATHETER IN AORTA: CPT | Performed by: SURGERY

## 2024-04-25 PROCEDURE — B41F1ZZ FLUOROSCOPY OF RIGHT LOWER EXTREMITY ARTERIES USING LOW OSMOLAR CONTRAST: ICD-10-PCS | Performed by: INTERNAL MEDICINE

## 2024-04-25 PROCEDURE — C1887 CATHETER, GUIDING: HCPCS | Performed by: SURGERY

## 2024-04-25 PROCEDURE — 25810000003 SODIUM CHLORIDE 0.9 % SOLUTION: Performed by: SURGERY

## 2024-04-25 PROCEDURE — 82948 REAGENT STRIP/BLOOD GLUCOSE: CPT

## 2024-04-25 PROCEDURE — 85025 COMPLETE CBC W/AUTO DIFF WBC: CPT | Performed by: INTERNAL MEDICINE

## 2024-04-25 PROCEDURE — 99239 HOSP IP/OBS DSCHRG MGMT >30: CPT | Performed by: INTERNAL MEDICINE

## 2024-04-25 PROCEDURE — B41D1ZZ FLUOROSCOPY OF AORTA AND BILATERAL LOWER EXTREMITY ARTERIES USING LOW OSMOLAR CONTRAST: ICD-10-PCS | Performed by: INTERNAL MEDICINE

## 2024-04-25 PROCEDURE — 94799 UNLISTED PULMONARY SVC/PX: CPT

## 2024-04-25 PROCEDURE — 82948 REAGENT STRIP/BLOOD GLUCOSE: CPT | Performed by: STUDENT IN AN ORGANIZED HEALTH CARE EDUCATION/TRAINING PROGRAM

## 2024-04-25 PROCEDURE — 25010000002 FENTANYL CITRATE (PF) 50 MCG/ML SOLUTION: Performed by: SURGERY

## 2024-04-25 PROCEDURE — 75625 CONTRAST EXAM ABDOMINL AORTA: CPT | Performed by: SURGERY

## 2024-04-25 PROCEDURE — 80053 COMPREHEN METABOLIC PANEL: CPT | Performed by: INTERNAL MEDICINE

## 2024-04-25 RX ORDER — AMOXICILLIN 250 MG
1 CAPSULE ORAL 2 TIMES DAILY
Status: DISCONTINUED | OUTPATIENT
Start: 2024-04-25 | End: 2024-04-25 | Stop reason: HOSPADM

## 2024-04-25 RX ORDER — FERRIC CITRATE 210 MG/1
210 TABLET, COATED ORAL
Start: 2024-04-25

## 2024-04-25 RX ORDER — SEVELAMER CARBONATE 800 MG/1
1600 TABLET, FILM COATED ORAL
Qty: 180 TABLET | Refills: 0 | Status: SHIPPED | OUTPATIENT
Start: 2024-04-25 | End: 2024-05-25

## 2024-04-25 RX ORDER — MIDAZOLAM HYDROCHLORIDE 2 MG/2ML
INJECTION, SOLUTION INTRAMUSCULAR; INTRAVENOUS
Status: DISCONTINUED | OUTPATIENT
Start: 2024-04-25 | End: 2024-04-25 | Stop reason: HOSPADM

## 2024-04-25 RX ORDER — SODIUM CHLORIDE 9 MG/ML
50 INJECTION, SOLUTION INTRAVENOUS CONTINUOUS
Status: DISCONTINUED | OUTPATIENT
Start: 2024-04-25 | End: 2024-04-25 | Stop reason: HOSPADM

## 2024-04-25 RX ORDER — ONDANSETRON 2 MG/ML
4 INJECTION INTRAMUSCULAR; INTRAVENOUS EVERY 4 HOURS PRN
Status: DISCONTINUED | OUTPATIENT
Start: 2024-04-25 | End: 2024-04-25 | Stop reason: HOSPADM

## 2024-04-25 RX ORDER — CHOLECALCIFEROL (VITAMIN D3) 125 MCG
5 CAPSULE ORAL NIGHTLY PRN
Status: DISCONTINUED | OUTPATIENT
Start: 2024-04-25 | End: 2024-04-25 | Stop reason: HOSPADM

## 2024-04-25 RX ORDER — ALUMINA, MAGNESIA, AND SIMETHICONE 2400; 2400; 240 MG/30ML; MG/30ML; MG/30ML
15 SUSPENSION ORAL EVERY 6 HOURS PRN
Status: DISCONTINUED | OUTPATIENT
Start: 2024-04-25 | End: 2024-04-25 | Stop reason: HOSPADM

## 2024-04-25 RX ORDER — SACCHAROMYCES BOULARDII 250 MG
250 CAPSULE ORAL 2 TIMES DAILY
Qty: 20 CAPSULE | Refills: 0 | Status: SHIPPED | OUTPATIENT
Start: 2024-04-25 | End: 2024-05-05

## 2024-04-25 RX ORDER — LIDOCAINE 4 G/G
1 PATCH TOPICAL DAILY PRN
Status: DISCONTINUED | OUTPATIENT
Start: 2024-04-25 | End: 2024-04-25 | Stop reason: HOSPADM

## 2024-04-25 RX ORDER — CEFDINIR 300 MG/1
300 CAPSULE ORAL DAILY
Qty: 7 CAPSULE | Refills: 0 | Status: SHIPPED | OUTPATIENT
Start: 2024-04-25 | End: 2024-05-02

## 2024-04-25 RX ORDER — NICOTINE 21 MG/24HR
1 PATCH, TRANSDERMAL 24 HOURS TRANSDERMAL
Qty: 60 EACH | Refills: 1 | Status: SHIPPED | OUTPATIENT
Start: 2024-04-26

## 2024-04-25 RX ORDER — HYDROXYZINE HYDROCHLORIDE 25 MG/1
25 TABLET, FILM COATED ORAL 3 TIMES DAILY PRN
Status: DISCONTINUED | OUTPATIENT
Start: 2024-04-25 | End: 2024-04-25 | Stop reason: HOSPADM

## 2024-04-25 RX ORDER — SACCHAROMYCES BOULARDII 250 MG
250 CAPSULE ORAL 2 TIMES DAILY
Status: DISCONTINUED | OUTPATIENT
Start: 2024-04-25 | End: 2024-04-25 | Stop reason: HOSPADM

## 2024-04-25 RX ORDER — ACETAMINOPHEN 325 MG/1
650 TABLET ORAL EVERY 6 HOURS PRN
Status: DISCONTINUED | OUTPATIENT
Start: 2024-04-25 | End: 2024-04-25 | Stop reason: HOSPADM

## 2024-04-25 RX ORDER — GABAPENTIN 100 MG/1
100 CAPSULE ORAL NIGHTLY
Qty: 30 CAPSULE | Refills: 0 | Status: SHIPPED | OUTPATIENT
Start: 2024-04-25 | End: 2024-05-25

## 2024-04-25 RX ORDER — IODIXANOL 320 MG/ML
INJECTION, SOLUTION INTRAVASCULAR
Status: DISCONTINUED | OUTPATIENT
Start: 2024-04-25 | End: 2024-04-25 | Stop reason: HOSPADM

## 2024-04-25 RX ORDER — AMMONIUM LACTATE 12 G/100G
LOTION TOPICAL 2 TIMES DAILY
Qty: 225 G | Refills: 0 | Status: SHIPPED | OUTPATIENT
Start: 2024-04-25 | End: 2024-05-25

## 2024-04-25 RX ORDER — LIDOCAINE HYDROCHLORIDE 20 MG/ML
INJECTION, SOLUTION INFILTRATION; PERINEURAL
Status: DISCONTINUED | OUTPATIENT
Start: 2024-04-25 | End: 2024-04-25 | Stop reason: HOSPADM

## 2024-04-25 RX ORDER — FENTANYL CITRATE 50 UG/ML
INJECTION, SOLUTION INTRAMUSCULAR; INTRAVENOUS
Status: DISCONTINUED | OUTPATIENT
Start: 2024-04-25 | End: 2024-04-25 | Stop reason: HOSPADM

## 2024-04-25 RX ADMIN — Medication: at 10:35

## 2024-04-25 RX ADMIN — OXYCODONE HYDROCHLORIDE 20 MG: 5 TABLET ORAL at 10:35

## 2024-04-25 RX ADMIN — OXYCODONE HYDROCHLORIDE 20 MG: 5 TABLET ORAL at 01:45

## 2024-04-25 RX ADMIN — Medication 250 MG: at 10:35

## 2024-04-25 RX ADMIN — SODIUM CHLORIDE 50 ML/HR: 9 INJECTION, SOLUTION INTRAVENOUS at 14:55

## 2024-04-25 RX ADMIN — OXYCODONE HYDROCHLORIDE 20 MG: 5 TABLET ORAL at 15:53

## 2024-04-25 RX ADMIN — OXYCODONE HYDROCHLORIDE 20 MG: 5 TABLET ORAL at 06:34

## 2024-04-25 RX ADMIN — NICOTINE 1 PATCH: 21 PATCH, EXTENDED RELEASE TRANSDERMAL at 08:52

## 2024-04-25 RX ADMIN — SERTRALINE HYDROCHLORIDE 100 MG: 100 TABLET, FILM COATED ORAL at 08:52

## 2024-04-25 RX ADMIN — LEVOTHYROXINE SODIUM 75 MCG: 0.07 TABLET ORAL at 06:33

## 2024-04-25 RX ADMIN — Medication 10 ML: at 08:52
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
## 2024-04-25 NOTE — PROGRESS NOTES
"[facility] Clinical Pharmacy Services: Vancomycin Monitoring Note    Chevy Junior is a 58 y.o. male who is on day  of pharmacy to dose vancomycin for Skin and Soft Tissue.    Previous Vancomycin Dose:   750 mg IV administered @  Imaging Reviewed?: Yes  Updated Cultures and Sensitivities:    Blood cx: NGTD   Wound cx, right leg: E.coli, Proteus mirabilis    Vitals/Labs  Ht: 170.2 cm (67\"); Wt: 75.8 kg (167 lb 1.7 oz)   Temp (24hrs), Av.3 °F (36.8 °C), Min:97.6 °F (36.4 °C), Max:98.7 °F (37.1 °C)   Estimated Creatinine Clearance: 15.6 mL/min (A) (by C-G formula based on SCr of 5.46 mg/dL (H)).   Hemodialysis - MWF schedule    Results from last 7 days   Lab Units 24  0557 24  0538 24  0550 24  0518 24  0610 24  0509 24  1432   VANCOMYCIN RM mcg/mL  --  21.19  --  13.50  --  14.36 7.42   CREATININE mg/dL 5.46* 7.06* 5.85* 8.27*   < > 7.50* 6.71*   WBC 10*3/mm3 5.92 5.65 6.31 7.23   < > 6.95 6.63    < > = values in this interval not displayed.     Assessment/Plan  Next Vanc Random ordered for  at AM labs (prior to next anticipated HD session)    We will continue to monitor patient changes and renal function     Thank you for involving pharmacy in this patient's care. Please contact pharmacy with any questions or concerns.    Amandeep Freeman  Clinical Pharmacist  "

## 2024-04-25 NOTE — PLAN OF CARE
Problem: Adult Inpatient Plan of Care  Goal: Plan of Care Review  Outcome: Met  Flowsheets (Taken 4/25/2024 1658)  Progress: no change  Plan of Care Reviewed With: patient  Outcome Evaluation: Patient discharging home.  Goal: Patient-Specific Goal (Individualized)  Outcome: Met  Goal: Absence of Hospital-Acquired Illness or Injury  Outcome: Met  Intervention: Identify and Manage Fall Risk  Recent Flowsheet Documentation  Taken 4/25/2024 1553 by Kristel Joe RN  Safety Promotion/Fall Prevention:   nonskid shoes/slippers when out of bed   safety round/check completed  Taken 4/25/2024 1455 by Kristel Joe RN  Safety Promotion/Fall Prevention:   nonskid shoes/slippers when out of bed   safety round/check completed  Taken 4/25/2024 1151 by Kristel Joe RN  Safety Promotion/Fall Prevention:   nonskid shoes/slippers when out of bed   safety round/check completed  Taken 4/25/2024 1036 by Kristel Joe RN  Safety Promotion/Fall Prevention:   nonskid shoes/slippers when out of bed   safety round/check completed  Taken 4/25/2024 0856 by Kristel Joe RN  Safety Promotion/Fall Prevention:   assistive device/personal items within reach   clutter free environment maintained   fall prevention program maintained   lighting adjusted   nonskid shoes/slippers when out of bed   safety round/check completed   room organization consistent  Taken 4/25/2024 0715 by Kristel Joe RN  Safety Promotion/Fall Prevention:   nonskid shoes/slippers when out of bed   safety round/check completed  Intervention: Prevent Infection  Recent Flowsheet Documentation  Taken 4/25/2024 0856 by Kristel Joe RN  Infection Prevention:   cohorting utilized   environmental surveillance performed   equipment surfaces disinfected   hand hygiene promoted   personal protective equipment utilized   rest/sleep promoted   single patient room provided  Goal: Optimal Comfort and Wellbeing  Outcome: Met  Intervention: Monitor Pain and Promote  Comfort  Recent Flowsheet Documentation  Taken 4/25/2024 1553 by Kristel Joe RN  Pain Management Interventions:   care clustered   pain management plan reviewed with patient/caregiver   quiet environment facilitated   see MAR  Taken 4/25/2024 1036 by Kristel Joe RN  Pain Management Interventions:   care clustered   pain management plan reviewed with patient/caregiver   quiet environment facilitated   see MAR  Intervention: Provide Person-Centered Care  Recent Flowsheet Documentation  Taken 4/25/2024 0855 by Kristel Joe RN  Trust Relationship/Rapport:   care explained   choices provided   emotional support provided   empathic listening provided   questions answered   questions encouraged   reassurance provided   thoughts/feelings acknowledged  Goal: Readiness for Transition of Care  Outcome: Met     Problem: Skin Injury Risk Increased  Goal: Skin Health and Integrity  Outcome: Met  Intervention: Optimize Skin Protection  Recent Flowsheet Documentation  Taken 4/25/2024 1553 by Kristel Joe RN  Head of Bed (HOB) Positioning: HOB elevated  Taken 4/25/2024 1455 by Kristel Joe RN  Head of Bed (HOB) Positioning: HOB elevated  Taken 4/25/2024 1151 by Kristel Joe RN  Head of Bed (HOB) Positioning: HOB elevated  Taken 4/25/2024 1036 by Kristel Joe RN  Head of Bed (HOB) Positioning: HOB elevated  Taken 4/25/2024 0715 by Kristel Joe RN  Head of Bed (HOB) Positioning: HOB elevated     Problem: Pain Acute  Goal: Acceptable Pain Control and Functional Ability  Outcome: Met  Intervention: Prevent or Manage Pain  Recent Flowsheet Documentation  Taken 4/25/2024 0856 by Kristel Joe RN  Medication Review/Management:   medications reviewed   high-risk medications identified  Intervention: Develop Pain Management Plan  Recent Flowsheet Documentation  Taken 4/25/2024 1553 by Kristel Joe RN  Pain Management Interventions:   care clustered   pain management plan reviewed with  patient/caregiver   quiet environment facilitated   see MAR  Taken 4/25/2024 1036 by Kristel Joe RN  Pain Management Interventions:   care clustered   pain management plan reviewed with patient/caregiver   quiet environment facilitated   see MAR     Problem: Infection  Goal: Absence of Infection Signs and Symptoms  Outcome: Met     Problem: Fall Injury Risk  Goal: Absence of Fall and Fall-Related Injury  Outcome: Met  Intervention: Identify and Manage Contributors  Recent Flowsheet Documentation  Taken 4/25/2024 0856 by Kristel Joe RN  Medication Review/Management:   medications reviewed   high-risk medications identified  Intervention: Promote Injury-Free Environment  Recent Flowsheet Documentation  Taken 4/25/2024 1553 by Kristel Joe RN  Safety Promotion/Fall Prevention:   nonskid shoes/slippers when out of bed   safety round/check completed  Taken 4/25/2024 1455 by Kristel Joe RN  Safety Promotion/Fall Prevention:   nonskid shoes/slippers when out of bed   safety round/check completed  Taken 4/25/2024 1151 by Kristel Joe RN  Safety Promotion/Fall Prevention:   nonskid shoes/slippers when out of bed   safety round/check completed  Taken 4/25/2024 1036 by Kristel Joe RN  Safety Promotion/Fall Prevention:   nonskid shoes/slippers when out of bed   safety round/check completed  Taken 4/25/2024 0856 by Kristel Joe RN  Safety Promotion/Fall Prevention:   assistive device/personal items within reach   clutter free environment maintained   fall prevention program maintained   lighting adjusted   nonskid shoes/slippers when out of bed   safety round/check completed   room organization consistent  Taken 4/25/2024 0715 by Kristel Joe RN  Safety Promotion/Fall Prevention:   nonskid shoes/slippers when out of bed   safety round/check completed     Problem: Diabetic Ketoacidosis  Goal: Fluid and Electrolyte Balance with Absence of Ketosis  Outcome: Met     Problem: Device-Related  Complication Risk (Hemodialysis)  Goal: Safe, Effective Therapy Delivery  Outcome: Met  Intervention: Optimize Device Care and Function  Recent Flowsheet Documentation  Taken 4/25/2024 0856 by Kristel Joe, RN  Medication Review/Management:   medications reviewed   high-risk medications identified     Problem: Hemodynamic Instability (Hemodialysis)  Goal: Effective Tissue Perfusion  Outcome: Met     Problem: Infection (Hemodialysis)  Goal: Absence of Infection Signs and Symptoms  Outcome: Met     Problem: Impaired Wound Healing  Goal: Optimal Wound Healing  Outcome: Met  Intervention: Promote Wound Healing  Recent Flowsheet Documentation  Taken 4/25/2024 1553 by Kristel Joe, RN  Pain Management Interventions:   care clustered   pain management plan reviewed with patient/caregiver   quiet environment facilitated   see MAR  Taken 4/25/2024 1036 by Kristel Joe, RN  Pain Management Interventions:   care clustered   pain management plan reviewed with patient/caregiver   quiet environment facilitated   see MAR   Goal Outcome Evaluation:  Plan of Care Reviewed With: patient        Progress: no change  Outcome Evaluation: Patient discharging home.

## 2024-04-25 NOTE — PLAN OF CARE
Goal Outcome Evaluation:  Plan of Care Reviewed With: patient has been up all night on call light for pain medication , PRN pain medication has been giving, plan of care on going.

## 2024-04-25 NOTE — PROGRESS NOTES
Angiogram performed.  Non-reconstructible vascular disease of the right lower extremity.  For details find full report under chart review, cardiology tab.  Patient may need a right hip disarticulation which is not available in our facility.

## 2024-04-25 NOTE — PROGRESS NOTES
[facility]     Progress Note    Patient Name: Chevy Junior  : 1965  MRN: [id number]  Primary Care Physician:  Janeth Le APRN  Date of admission: 2024    Subjective   No major acute events overnight  Patient tolerated hemodialysis well  Plan for possible intervention today    Scheduled Meds:ammonium lactate, , Topical, 2 times per day  aspirin, 81 mg, Oral, Daily  atorvastatin, 40 mg, Oral, Nightly  cefepime, 1,000 mg, Intravenous, Q24H  clopidogrel, 75 mg, Oral, Daily  epoetin jacoby/jacoby-epbx, 10,000 Units, Subcutaneous, Once per day on   gabapentin, 100 mg, Oral, Nightly  heparin (porcine), 5,000 Units, Subcutaneous, Q12H  insulin detemir, 1-200 Units, Subcutaneous, Nightly - Glucommander  insulin lispro, 1-200 Units, Subcutaneous, 4x Daily With Meals & Nightly  levothyroxine, 75 mcg, Oral, Q AM  metoclopramide, 5 mg, Oral, Nightly  [Held by provider] midodrine, 10 mg, Oral, TID AC  nicotine, 1 patch, Transdermal, Q24H  sertraline, 100 mg, Oral, Daily  sevelamer, 1,600 mg, Oral, TID With Meals  sodium chloride, 10 mL, Intravenous, Q12H  Vancomycin Pharmacy Intermittent/Pulse Dosing, , Does not apply, Daily      Continuous Infusions:Pharmacy to Dose Cefepime,   Pharmacy to dose vancomycin,       PRN Meds:.  acetaminophen **OR** acetaminophen **OR** acetaminophen    senna-docusate sodium **AND** polyethylene glycol **AND** bisacodyl **AND** bisacodyl    dextrose    dextrose    glucagon (human recombinant)    insulin lispro    LORazepam    [] HYDROmorphone **AND** naloxone    nicotine polacrilex    ondansetron ODT **OR** ondansetron    oxyCODONE    Pharmacy to Dose Cefepime    Pharmacy to dose vancomycin    sodium chloride    sodium chloride       Review of Systems  Constitutional:        Weakness tiredness fatigue  Eyes:                       No blurry vision, eye discharge, eye irritation, eye pain  HEENT:                   No acute hair loss, earache and  discharge, nasal congestion or discharge, sore throat, postnasal drip  Respiratory:           No shortness of breath coughing sputum production wheezing hemoptysis pleuritic chest pain  Cardiovascular:     No chest pain, orthopnea, PND, dizziness, palpitation, lower extremity edema  Gastrointestinal:   No nausea vomiting diarrhea abdominal pain constipation  Genitourinary:       No urinary incontinence, hesitancy, frequency, urgency, dysuria  Hematologic:         No bruising, bleeding, pallor, lymphadenopathy  Endocrine:            No coldness, hot flashes, polyuria, abnormal hair growth  Musculoskeletal:    No body pains, aches, arthritic pains, muscle pain ,muscle wasting  Psychiatric:          No low or high mood, anxiety, hallucinations, delusions  Skin.                      No rash, ulcers, bruising, itching  Neurological:        No confusion, headache, focal weakness, numbness, dysphasia    Objective   Objective     Vitals:   Temp:  [97.6 °F (36.4 °C)-98.7 °F (37.1 °C)] 98.2 °F (36.8 °C)  Heart Rate:  [] 99  Resp:  [16-18] 18  BP: (126-172)/(64-93) 163/80  Flow (L/min):  [1] 1  Physical Exam    Constitutional: Awake, alert responsive, conversant, no obvious distress              Psychiatric:  Appropriate affect, cooperative   Neurologic:  Awake alert ,oriented x 3, strength symmetric in all extremities, Cranial Nerves grossly intact to confrontation, speech clear   Eyes:   PERRLA, sclerae anicteric, no conjunctival injection   HEENT:  Moist mucous membranes, no nasal or eye discharge, no throat congestion   Neck:   Supple, no thyromegaly, no lymphadenopathy, trachea midline, no elevated JVD   Respiratory:  Clear to auscultation bilaterally, nonlabored respirations    Cardiovascular: RRR, no murmurs, rubs, or gallops, palpable pedal pulses bilaterally, No bilateral ankle edema   Gastrointestinal: Positive bowel sounds, soft, nontender, nondistended, no organomegaly   Musculoskeletal:  No clubbing or  cyanosis to extremities,muscle wasting, joint swelling, muscle weakness             Skin:                      No rashes, bruising, skin ulcers, petechiae or ecchymosis    Result Review    Result Review:  I have personally reviewed the results from the time of this admission to 4/25/2024 07:41 EDT and agree with these findings:  []  Laboratory  []  Microbiology  []  Radiology  []  EKG/Telemetry   []  Cardiology/Vascular   []  Pathology  []  Old records  []  Other:    Assessment & Plan   Assessment / Plan       Active Hospital Problems:  Active Hospital Problems    Diagnosis     **Open leg wound     Open leg wound, unspecified laterality, initial encounter     Amputation stump necrosis     Hyponatremia     ESRD on hemodialysis     Heart failure with preserved left ventricular function (HFpEF)     Current smoker     Type 2 diabetes mellitus, with long-term current use of insulin     Hyperlipidemia LDL goal <70     Coronary artery disease involving native coronary artery of native heart with angina pectoris     Essential hypertension      58-year-old male with past medical history of ESRD on hemodialysis, hypothyroidism, peripheral vascular disease status post amputation, hypothyroidism, hyperlipidemia, GERD, diabetes who was admitted due to infection of his right stump with progressively worsening wound seen on dialysis with large amount of necrotic tissue tunneling and foul drainage.  Wound cultures growing E. coli and Proteus. Plan for angiogram on 4/25    Plan:   Will dialyze the patient on Monday Wednesday Friday.  Plan to dialyze the patient for 2 to 3 L UF  Patient on antibiotics for wound infection  Vascular on board and plan for intervention  Will hold off on midodrine  Renal diet as tolerated  EPO 10K units with dialysis

## 2024-04-25 NOTE — THERAPY EVALUATION
Patient Name: Chevy Junior  : 1965    MRN: [id number]                              Today's Date: 2024       Admit Date: 2024    Visit Dx:     ICD-10-CM ICD-9-CM   1. Open wound of right lower extremity, initial encounter  S81.801A 891.0   2. Difficulty walking  R26.2 719.7     Patient Active Problem List   Diagnosis    Coronary artery disease involving native coronary artery of native heart with angina pectoris    Hyperlipidemia LDL goal <70    Essential hypertension    Type 2 diabetes mellitus, with long-term current use of insulin    Heart failure with preserved left ventricular function (HFpEF)    Current smoker    ESRD on hemodialysis    Abdominal pain    Weight loss    Elevated carcinoembryonic antigen (CEA)    Failure to thrive (child)    Atherosclerosis of native artery of lower extremity with gangrene    Atherosclerosis of native arteries of the extremities with gangrene    Open leg wound, unspecified laterality, initial encounter    Open leg wound    Amputation stump necrosis    Hyponatremia     Past Medical History:   Diagnosis Date    Anemia     Atherosclerosis of native artery of left lower extremity with gangrene     Cellulitis and abscess of buttock     CHF (congestive heart failure)     Chronic pain     COPD (chronic obstructive pulmonary disease)     INHALER    Coronary artery disease involving native coronary artery of native heart with angina pectoris 2022    Diabetes mellitus     ESRD on dialysis     M,W,F-FISTULA LEFT ARM    Heart failure with preserved left ventricular function (HFpEF) 2022    FOLLOWS JUNAID-NO CP    Hyperlipidemia     Hypertension     WIFE STATED B/P HAS BEEN RUNNING LOW LATELY    Infectious viral hepatitis 5 years ago was treated    RIC (obstructive sleep apnea)     Osteoarthritis     Phosphorus metabolic disorder     Sleep apnea     NO MACHINE    Substance abuse 20 years ago from [place]     Past Surgical History:   Procedure  Laterality Date    ABOVE KNEE LEG AMPUTATION Right     CARDIAC CATHETERIZATION      CARDIAC CATHETERIZATION N/A 11/30/2022    Procedure: Percutaneous Coronary Intervention;  Surgeon: River Acevedo IV, MD;  Location: [facility] CATH INVASIVE LOCATION;  Service: Cardiovascular;  Laterality: N/A;    CARDIAC CATHETERIZATION N/A 11/30/2022    Procedure: Optical Coherent Tomography;  Surgeon: River Acevedo IV, MD;  Location:  [place] CATH INVASIVE LOCATION;  Service: Cardiovascular;  Laterality: N/A;    CARDIAC CATHETERIZATION N/A 11/30/2022    Procedure: Stent JOSE coronary;  Surgeon: River Acevedo IV, MD;  Location: [facility] CATH INVASIVE LOCATION;  Service: Cardiovascular;  Laterality: N/A;    CARDIAC CATHETERIZATION Left 11/09/2023    Procedure: Aortogram with left leg angiogram, possible angioplasty or stenting;  Surgeon: Luis Fernando Rawls MD;  Location: [facility] CATH INVASIVE LOCATION;  Service: Vascular;  Laterality: Left;    CATH LAB PROCEDURE      unable to stent    COLONOSCOPY N/A 02/15/2023    Procedure: COLONOSCOPY WITH POLYPECTOMY;  Surgeon: David Glass MD;  Location: [facility] ENDOSCOPY;  Service: Gastroenterology;  Laterality: N/A;  COLON POLYP, MELANOSIS COLI    ENDOSCOPY N/A 02/03/2023    Procedure: ESOPHAGOGASTRODUODENOSCOPY with biopsy;  Surgeon: Gus Whaley MD;  Location: [facility] ENDOSCOPY;  Service: General;  Laterality: N/A;  gastritis,     FEMORAL POPLITEAL BYPASS Left 12/12/2023    Procedure: Left femoral edarterectomy, left femoral to below the knee popliteal bypass graft;  Surgeon: Luis Fernando Rwals MD;  Location: [facility] MAIN OR;  Service: Vascular;  Laterality: Left;    HIP SURGERY      INSERTION PERITONEAL DIALYSIS CATHETER      LEG SURGERY      MANDIBLE FRACTURE SURGERY      REPLACEMENT TOTAL KNEE Right     x3    SHOULDER SURGERY Right     SPINE SURGERY      unknown procudure- completed at [facility] in [place].      General Information       Row Name 04/25/24 0906           OT Time and Intention    Document Type evaluation  -PG     Mode of Treatment occupational therapy;individual therapy  -PG       Row Name 04/25/24 0906          General Information    Patient Profile Reviewed --  Lives with ex-wife.  Patient reports he receives some assistance with his ADLs and transfers.  -PG     Prior Level of Function transfer;ADL's;min assist:  -PG     Existing Precautions/Restrictions fall  -PG     Barriers to Rehab none identified  -PG       Row Name 04/25/24 0906          Occupational Profile    Reason for Services/Referral (Occupational Profile) Patient is a 58-year-old male admitted for open leg wound.  Patient is being evaluated by Occupational Therapy due to recent decline in ADL function.  No previous OT services identified  -PG       Row Name 04/25/24 0906          Living Environment    People in Home other (see comments)  Ex spouse  -PG       Row Name 04/25/24 0906          Cognition    Orientation Status (Cognition) oriented x 3  -PG       Row Name 04/25/24 0906          Safety Issues, Functional Mobility    Impairments Affecting Function (Mobility) endurance/activity tolerance;balance;pain;strength  -PG               User Key  (r) = Recorded By, (t) = Taken By, (c) = Cosigned By      Initials Name Provider Type    PG Adrian Oglesby, OT Occupational Therapist                     Mobility/ADL's       Row Name 04/25/24 0907          Transfers    Comment, (Transfers) Declined TF  -PG       Row Name 04/25/24 0907          Activities of Daily Living    BADL Assessment/Intervention upper body dressing;bathing;lower body dressing;grooming;toileting  -PG       Row Name 04/25/24 0907          Bathing Assessment/Intervention    [place] Level (Bathing) bathing skills;moderate assist (50% patient effort)  -PG       Row Name 04/25/24 0907          Upper Body Dressing Assessment/Training    [place] Level (Upper Body Dressing) upper body dressing skills;set up  -PG       Row Name  04/25/24 0907          Lower Body Dressing Assessment/Training    [place] Level (Lower Body Dressing) lower body dressing skills;dependent (less than 25% patient effort)  -PG       Row Name 04/25/24 0907          Grooming Assessment/Training    [place] Level (Grooming) grooming skills;set up  -PG       Row Name 04/25/24 0907          Toileting Assessment/Training    [place] Level (Toileting) toileting skills;dependent (less than 25% patient effort)  -PG               User Key  (r) = Recorded By, (t) = Taken By, (c) = Cosigned By      Initials Name Provider Type    PG Adrian Oglesby OT Occupational Therapist                   Obj/Interventions       Row Name 04/25/24 0911          Sensory Assessment (Somatosensory)    Sensory Assessment (Somatosensory) sensation intact  -PG       Row Name 04/25/24 0911          Vision Assessment/Intervention    Visual Impairment/Limitations WFL  -PG       Row Name 04/25/24 0911          Range of Motion Comprehensive    General Range of Motion no range of motion deficits identified  -PG       Row Name 04/25/24 0911          Strength Comprehensive (MMT)    General Manual Muscle Testing (MMT) Assessment no strength deficits identified  -PG       Row Name 04/25/24 0911          Motor Skills    Motor Skills coordination;functional endurance  -PG     Coordination WFL  -PG     Functional Endurance Fair  -PG               User Key  (r) = Recorded By, (t) = Taken By, (c) = Cosigned By      Initials Name Provider Type    PG Adrian Oglesby OT Occupational Therapist                   Goals/Plan    No documentation.                  Clinical Impression       Row Name 04/25/24 0911          Pain Assessment    Pretreatment Pain Rating 0/10 - no pain  -PG     Posttreatment Pain Rating 0/10 - no pain  -PG       Row Name 04/25/24 0911          Plan of Care Review    Plan of Care Reviewed With patient  -PG     Progress no change  -PG     Outcome Evaluation Patient currently at his  baseline of function for Occupational Therapy.  Wife assists at home with all self-care and transfers.  Patient agreeable that no additional OT services are necessary at this time  -PG       Row Name 04/25/24 0911          Therapy Assessment/Plan (OT)    Criteria for Skilled Therapeutic Interventions Met (OT) no;does not meet criteria for skilled intervention  -PG     Therapy Frequency (OT) evaluation only  -PG       Row Name 04/25/24 0911          Therapy Plan Review/Discharge Plan (OT)    Anticipated Discharge Disposition (OT) home with assist  -PG               User Key  (r) = Recorded By, (t) = Taken By, (c) = Cosigned By      Initials Name Provider Type    PG Adrian Oglesby, OT Occupational Therapist                   Outcome Measures       Row Name 04/25/24 0912          How much help from another is currently needed...    Putting on and taking off regular lower body clothing? 2  -PG     Bathing (including washing, rinsing, and drying) 2  -PG     Toileting (which includes using toilet bed pan or urinal) 2  -PG     Putting on and taking off regular upper body clothing 3  -PG     Taking care of personal grooming (such as brushing teeth) 3  -PG     Eating meals 4  -PG     AM-PAC 6 Clicks Score (OT) 16  -PG       Row Name 04/24/24 2200          How much help from another person do you currently need...    Turning from your back to your side while in flat bed without using bedrails? 2  -AD     Moving from lying on back to sitting on the side of a flat bed without bedrails? 2  -AD     Moving to and from a bed to a chair (including a wheelchair)? 1  -AD     Standing up from a chair using your arms (e.g., wheelchair, bedside chair)? 1  -AD     Climbing 3-5 steps with a railing? 1  -AD     To walk in hospital room? 1  -AD     AM-PAC 6 Clicks Score (PT) 8  -AD     Highest Level of Mobility Goal 3 --> Sit at edge of bed  -AD       Row Name 04/25/24 0912          Functional Assessment    Outcome Measure Options AM-PAC 6  Clicks Daily Activity (OT);Optimal Instrument  -PG       Row Name 04/25/24 0912          Optimal Instrument    Optimal Instrument Optimal - 3  -PG     Bending/Stooping 5  -PG     Standing 5  -PG     Reaching 1  -PG     From the list, choose the 3 activities you would most like to be able to do without any difficulty Bending/stooping;Standing;Reaching  -PG     Total Score Optimal - 3 11  -PG               User Key  (r) = Recorded By, (t) = Taken By, (c) = Cosigned By      Initials Name Provider Type    Yissel Amaya, RN Registered Nurse    Adrian Ramirez OT Occupational Therapist                      OT Recommendation and Plan  Therapy Frequency (OT): evaluation only  Plan of Care Review  Plan of Care Reviewed With: patient  Progress: no change  Outcome Evaluation: Patient currently at his baseline of function for Occupational Therapy.  Wife assists at home with all self-care and transfers.  Patient agreeable that no additional OT services are necessary at this time     Time Calculation:   Evaluation Complexity (OT)  Review Occupational Profile/Medical/Therapy History Complexity: brief/low complexity  Assessment, Occupational Performance/Identification of Deficit Complexity: 3-5 performance deficits  Clinical Decision Making Complexity (OT): problem focused assessment/low complexity  Overall Complexity of Evaluation (OT): low complexity     Time Calculation- OT       Row Name 04/25/24 0913             Time Calculation- OT    OT Received On 04/25/24  -PG         Untimed Charges    OT Eval/Re-eval Minutes 30  -PG         Total Minutes    Untimed Charges Total Minutes 30  -PG       Total Minutes 30  -PG                User Key  (r) = Recorded By, (t) = Taken By, (c) = Cosigned By      Initials Name Provider Type    Adrian Ramirez OT Occupational Therapist                  Therapy Charges for Today       Code Description Service Date Service Provider Modifiers Qty    [id number]  OT EVAL LOW COMPLEXITY 2  4/25/2024 Adrian Oglesby, OT GO 1                 Adrian Oglesby, OT  4/25/2024

## 2024-04-26 NOTE — OUTREACH NOTE
Prep Survey      Flowsheet Row Responses   [facility] facility patient discharged from? [facility]   Is LACE score < 7 ? No   Eligibility Readm Mgmt   Discharge diagnosis Open leg wound   Does the patient have one of the following disease processes/diagnoses(primary or secondary)? Other   Does the patient have Home health ordered? No   Is there a DME ordered? No   General alerts for this patient ESRD on hemodialysis with poor adherence   Prep survey completed? Yes            DARRELL ESQUEDA - Registered Nurse

## 2024-04-29 ENCOUNTER — READMISSION MANAGEMENT (OUTPATIENT)
Dept: CALL CENTER | Facility: HOSPITAL | Age: 59
End: 2024-04-29
Payer: MEDICAID

## 2024-04-29 NOTE — OUTREACH NOTE
Medical Week 1 Survey      Flowsheet Row Responses   [facility] facility patient discharged from? [facility]   Does the patient have one of the following disease processes/diagnoses(primary or secondary)? Other   Week 1 attempt successful? No   Unsuccessful attempts Attempt 1            Katharina Trejo Registered Nurse

## 2024-05-02 ENCOUNTER — TELEPHONE (OUTPATIENT)
Dept: VASCULAR SURGERY | Facility: HOSPITAL | Age: 59
End: 2024-05-02
Payer: MEDICAID

## 2024-05-02 ENCOUNTER — READMISSION MANAGEMENT (OUTPATIENT)
Dept: CALL CENTER | Facility: HOSPITAL | Age: 59
End: 2024-05-02
Payer: MEDICAID

## 2024-05-02 NOTE — OUTREACH NOTE
Medical Week 1 Survey      Flowsheet Row Responses   [facility] patient discharged from? [facility]   Does the patient have one of the following disease processes/diagnoses(primary or secondary)? Other   Week 1 attempt successful? Yes   Call start time 1338   Call end time 1345   General alerts for this patient ESRD on hemodialysis with poor adherence   Discharge diagnosis Open leg wound   Person spoke with today (if not patient) and relationship Georgia, KRISHNA, ROBERT and ex-wife   Meds reviewed with patient/caregiver? Yes   Is the patient having any side effects they believe may be caused by any medication additions or changes? No   Does the patient have all medications ordered at discharge? Yes   Is the patient taking all medications as directed (includes completed medication regime)? Yes   Medication comments Pt has one more day remaining of oral atbs   Does the patient have a primary care provider?  Yes   Has the patient kept scheduled appointments due by today? Yes   Comments Pt has been compliant [facility] HD appts and has been waiting a call from  regarding surgery scheduling.  Reports he declined transferred during admission but was assured he would be called to arrange appropriate f/u and it has been one week and no arrangements have been made.  POA has called vascular office to discuss and was expecting a call back with referral/assistance but so far no return call,  will send a message to office.   Has home health visited the patient within 72 hours of discharge? N/A   Psychosocial issues? No   Comments Wife is packing his wound daily with betadine   Did the patient receive a copy of their discharge instructions? Yes   Nursing interventions Reviewed instructions with patient   What is the patient's perception of their health status since discharge? Same  [Wife reports wound is red, painful and she has concerns as this is last day of ATBS and no arrangements yet with surgeon in [place] have been made.  Will  send a message.  Wife aware of return precautions,  wife has been in contact w/ vascular & nephro]   Is the patient/caregiver able to teach back signs and symptoms related to disease process for when to call PCP? Yes   Is the patient/caregiver able to teach back signs and symptoms related to disease process for when to call 911? Yes   Week 1 call completed? Yes   Call end time 1345            WILLIAM RAJPUT - Registered Nurse

## 2024-05-02 NOTE — TELEPHONE ENCOUNTER
HELPED DIALYSIS GET PATIENT SCHEDULED TO SEE DR BARRIOS.  DID FAX HOSPITAL DISCHARGE SUMMARY AND XRAY REPORT    MADE DIALYSIS AWARE OF DATE AND TIME FOR HIS APPT.     4/28/24 AT 1415  [address] [facility] [place]

## 2024-05-06 NOTE — TELEPHONE ENCOUNTER
SAVITA CALLED AND EXPLAINED THAT MR FANG COULD NOT BE SEEN FOR ANOTHER MONTH AT PROVIDER WE REFERRED TO. SHE EXPLAINED THAT THE DR THAT DID HIS FIRST AMPUTATION COULD DO THE SURGERY NEEDED. LAST OFFICE NOTE AND ED NOTE SENT TO DR PAREDES OFFICE SO SHE COULD MAKE SOONER ARRAIGNMENTS WITH THEM

## 2024-05-13 RX ORDER — CEFDINIR 300 MG/1
300 CAPSULE ORAL DAILY
Qty: 30 CAPSULE | Refills: 0 | Status: SHIPPED | OUTPATIENT
Start: 2024-05-13

## 2024-05-14 ENCOUNTER — READMISSION MANAGEMENT (OUTPATIENT)
Dept: CALL CENTER | Facility: HOSPITAL | Age: 59
End: 2024-05-14
Payer: MEDICAID

## 2024-05-14 NOTE — OUTREACH NOTE
Medical Week 3 Survey      Flowsheet Row Responses   [facility] patient discharged from? [facility]   Does the patient have one of the following disease processes/diagnoses(primary or secondary)? Other   Week 3 attempt successful? No   Unsuccessful attempts Attempt 1            Kimberly ALMONTE - Licensed Nurse

## 2024-05-16 ENCOUNTER — READMISSION MANAGEMENT (OUTPATIENT)
Dept: CALL CENTER | Facility: HOSPITAL | Age: 59
End: 2024-05-16
Payer: MEDICAID

## 2024-05-16 NOTE — OUTREACH NOTE
Medical Week 3 Survey      Flowsheet Row Responses   [facility] patient discharged from? [place]   Does the patient have one of the following disease processes/diagnoses(primary or secondary)? Other   Week 3 attempt successful? Yes   Call start time 1610   Call end time 1611   General alerts for this patient ESRD on hemodialysis with poor adherence   Discharge diagnosis Open leg wound   Is patient permission given to speak with other caregiver? Yes   List who call center can speak with [place]   Person spoke with today (if not patient) and relationship [place]   Meds reviewed with patient/caregiver? Yes   Is the patient taking all medications as directed (includes completed medication regime)? Yes   Does the patient have a primary care provider?  Yes   Has the patient kept scheduled appointments due by today? Yes   What is the patient's perception of their health status since discharge? Improving   Is the patient/caregiver able to teach back the hierarchy of who to call/visit for symptoms/problems? PCP, Specialist, Home health nurse, Urgent Care, ED, 911 Yes   Week 3 Call Completed? Yes   Graduated Yes   Graduated/Revoked comments Brief call-[place] states they are attending their apts and will continue to do so-no questions during the call   Call end time 1611            Maye SCHROEDER - Registered Nurse

## 2024-08-26 ENCOUNTER — HOSPITAL ENCOUNTER (OUTPATIENT)
Dept: CARDIOLOGY | Facility: HOSPITAL | Age: 59
Discharge: HOME | End: 2024-08-26
Payer: MEDICAID

## 2024-08-26 ENCOUNTER — OFFICE VISIT (OUTPATIENT)
Dept: VASCULAR SURGERY | Facility: HOSPITAL | Age: 59
End: 2024-08-26
Payer: MEDICAID

## 2024-08-26 VITALS
SYSTOLIC BLOOD PRESSURE: 112 MMHG | DIASTOLIC BLOOD PRESSURE: 60 MMHG | RESPIRATION RATE: 18 BRPM | OXYGEN SATURATION: 98 % | TEMPERATURE: 98.9 F | HEART RATE: 60 BPM

## 2024-08-26 DIAGNOSIS — I70.269 ATHEROSCLEROSIS OF NATIVE ARTERY OF LOWER EXTREMITY WITH GANGRENE, UNSPECIFIED LATERALITY: ICD-10-CM

## 2024-08-26 DIAGNOSIS — Z95.828 S/P VASCULAR BYPASS: Primary | ICD-10-CM

## 2024-08-26 DIAGNOSIS — I70.25 ATHEROSCLEROSIS OF NATIVE ARTERIES OF THE EXTREMITIES WITH ULCERATION: ICD-10-CM

## 2024-08-26 DIAGNOSIS — Z95.828 S/P VASCULAR BYPASS: ICD-10-CM

## 2024-08-26 LAB
BH CV GRAFT 1 - DISTAL ANASTAMOSIS PSV-LEFT: 158.51 CM/S
BH CV GRAFT 1 - DISTAL GRAFT PSV-LEFT: 64.26 CM/S
BH CV GRAFT 1 - INFLOW ARTERY PSV- LEFT: 70.34 CM/S
BH CV GRAFT 1 - MID DISTAL GRAFT PSV-LEFT: 42.44 CM/S
BH CV GRAFT 1 - MID GRAFT PSV-LEFT: 74.12 CM/S
BH CV GRAFT 1 - OUTFLOW ARTERY PSV-LEFT: 108.53 CM/S
BH CV GRAFT 1 - PROX GRAFT PSV-LEFT: 78.79 CM/S
BH CV GRAFT 1 - PROX MID GRAFT PSV-LEFT: 72.35 CM/S
BH CV GRAFT 1 - PROXIMAL ANASTAMOSIS PSV-LEFT: 77.52 CM/S
BH CV GRAFT 1- DISTAL ANASTAMOSIS EDV-LEFT: 87.11 CM/S
BH CV GRAFT 1- DISTAL GRAFT EDV-LEFT: 29.75 CM/S
BH CV GRAFT 1- INFLOW ARTERY EDV- LEFT: 29.62 CM/S
BH CV GRAFT 1- MID DISTAL GRAFT EDV-LEFT: 22.21 CM/S
BH CV GRAFT 1- MID GRAFT EDV-LEFT: 42.16 CM/S
BH CV GRAFT 1- OUTFLOW ARTERY EDV-LEFT: 58.55 CM/S
BH CV GRAFT 1- PROX GRAFT EDV-LEFT: 36.44 CM/S
BH CV GRAFT 1- PROX MID GRAFT EDV-LEFT: 43.32 CM/S
BH CV GRAFT 1- PROXIMAL ANASTAMOSIS EDV-LEFT: 40.29 CM/S
BH CV GRAFT BRACHIAL PRESSURE RIGHT: 112 MMHG
BH CV LEA LEFT ANT TIBIAL A DISTAL EDV: 25.27 CM/S
BH CV LEA LEFT ANT TIBIAL A DISTAL PSV: 46.87 CM/S
BH CV LEA LEFT ANT TIBIAL A MID EDV: 30.21 CM/S
BH CV LEA LEFT ANT TIBIAL A MID PSV: 56.57 CM/S
BH CV LEA LEFT ANT TIBIAL A PROX EDV: 22.31 CM/S
BH CV LEA LEFT ANT TIBIAL A PROX PSV: 42.84 CM/S
BH CV LEA LEFT CFA DISTAL EDV: 25.13 CM/S
BH CV LEA LEFT CFA DISTAL PSV: 63.41 CM/S
BH CV LEA LEFT CFA PROX EDV: 28.56 CM/S
BH CV LEA LEFT CFA PROX PSV: 85.11 CM/S
BH CV LEA LEFT DFA PROX EDV: 34.27 CM/S
BH CV LEA LEFT DFA PROX PSV: 73.69 CM/S
BH CV LEA LEFT DPA PRESSURE: 59 MMHG
BH CV LEA LEFT PERONEAL  DISTAL EDV: 24.1 CM/S
BH CV LEA LEFT PERONEAL  DISTAL PSV: 37.04 CM/S
BH CV LEA LEFT PERONEAL  MID EDV: 30.86 CM/S
BH CV LEA LEFT PERONEAL  MID PSV: 54.36 CM/S
BH CV LEA LEFT PERONEAL  PROX EDV: 0 CM/S
BH CV LEA LEFT PERONEAL  PROX PSV: 0 CM/S
BH CV LEA LEFT POPITEAL A  DISTAL EDV: 14.49 CM/S
BH CV LEA LEFT POPITEAL A  DISTAL PSV: 49.26 CM/S
BH CV LEA LEFT POPITEAL A  MID EDV: 16.56 CM/S
BH CV LEA LEFT POPITEAL A  MID PSV: 40.56 CM/S
BH CV LEA LEFT POPITEAL A  PROX EDV: 29.18 CM/S
BH CV LEA LEFT POPITEAL A  PROX PSV: 63.95 CM/S
BH CV LEA LEFT PTA DISTAL EDV: 9.18 CM/S
BH CV LEA LEFT PTA DISTAL PSV: 19.38 CM/S
BH CV LEA LEFT PTA MID EDV: 12.2 CM/S
BH CV LEA LEFT PTA MID PSV: 20.77 CM/S
BH CV LEA LEFT PTA PROX EDV: 26.29 CM/S
BH CV LEA LEFT PTA PROX PSV: 50.77 CM/S
BH CV LEA LEFT SFA DISTAL EDV: 0 CM/S
BH CV LEA LEFT SFA DISTAL PSV: 0 CM/S
BH CV LEA LEFT SFA MID EDV: 0 CM/S
BH CV LEA LEFT SFA MID PSV: 0 CM/S
BH CV LEA LEFT SFA PROX EDV: 9.31 CM/S
BH CV LEA LEFT SFA PROX PSV: 26.7 CM/S
BH CV LEA LEFT TIBEOPERONEAL EDV: 0 CM/S
BH CV LEA LEFT TIBEOPERONEAL PSV: 0 CM/S
BH CV LOWER ARTERIAL LEFT ABI RATIO: 0.53

## 2024-08-26 PROCEDURE — 93926 LOWER EXTREMITY STUDY: CPT

## 2024-08-26 PROCEDURE — 99213 OFFICE O/P EST LOW 20 MIN: CPT | Performed by: SURGERY

## 2024-08-26 PROCEDURE — G0463 HOSPITAL OUTPT CLINIC VISIT: HCPCS | Performed by: SURGERY

## 2024-08-26 NOTE — PROGRESS NOTES
[facility]   Follow up Office    Patient Name: Chevy Junior  : 1965  MRN: [id number]  Primary Care Physician:  Janeth Le APRN      Subjective   Subjective     HPI:    Chevy Junior is a 59 y.o. male here for follow-up after left leg bypass graft 2023.  Doing okay.  He had recently had deterioration of his right above-knee amputation stump and it was felt that he would benefit from a right hip disarticulation which is not offered in our facility.  He was transferred to [facility] where he underwent an endovascular intervention associated with debridement of the AKA stump.  He appears to be doing well from that standpoint.      Objective     Vitals:   Temp:  [98.9 °F (37.2 °C)] 98.9 °F (37.2 °C)  Heart Rate:  [60] 60  Resp:  [18] 18  BP: (112)/(60) 112/60    Physical Exam      General: Alert, no acute distress, sitting in a wheelchair.  HEENT: PERRLA  Abdomen: Benign  Extremities: Status post right AKA.  Neuro: No gross deficits    Diagnostic studies: A duplex of the left leg graft in the office today demonstrates elevated velocities at the distal anastomosis and outflow vessel with a decreased MARIFER down to 0.59.    Assessment & Plan   Assessment / Plan     Diagnoses and all orders for this visit:    1. S/P vascular bypass (Primary)  -     CT angio abdominal aorta bilat iliofem runoff w wo contrast; Future    2. Atherosclerosis of native arteries of the extremities with ulceration  -     CT angio abdominal aorta bilat iliofem runoff w wo contrast; Future       Assessment/Plan:   Mr. Junior's left leg bypass graft appears threatened.  I am recommending that we obtain a CTA of the aorta and runoff.  Follow-up with me after the CTA.        Electronically signed by Luis Fernando Rawls MD, 24, 9:52 AM EDT.

## 2024-09-10 ENCOUNTER — HOSPITAL ENCOUNTER (OUTPATIENT)
Dept: CT IMAGING | Facility: HOSPITAL | Age: 59
Discharge: HOME | End: 2024-09-10
Admitting: SURGERY
Payer: MEDICAID

## 2024-09-10 DIAGNOSIS — I70.25 ATHEROSCLEROSIS OF NATIVE ARTERIES OF THE EXTREMITIES WITH ULCERATION: ICD-10-CM

## 2024-09-10 DIAGNOSIS — Z95.828 S/P VASCULAR BYPASS: ICD-10-CM

## 2024-09-10 PROCEDURE — 75635 CT ANGIO ABDOMINAL ARTERIES: CPT

## 2024-09-10 PROCEDURE — 25510000001 IOPAMIDOL PER 1 ML: Performed by: SURGERY

## 2024-09-10 RX ORDER — IOPAMIDOL 755 MG/ML
100 INJECTION, SOLUTION INTRAVASCULAR
Status: COMPLETED | OUTPATIENT
Start: 2024-09-10 | End: 2024-09-10

## 2024-09-10 RX ADMIN — IOPAMIDOL 100 ML: 755 INJECTION, SOLUTION INTRAVENOUS at 13:40

## 2024-09-23 ENCOUNTER — OFFICE VISIT (OUTPATIENT)
Dept: VASCULAR SURGERY | Facility: HOSPITAL | Age: 59
End: 2024-09-23
Payer: MEDICAID

## 2024-09-23 VITALS
HEART RATE: 90 BPM | DIASTOLIC BLOOD PRESSURE: 72 MMHG | OXYGEN SATURATION: 95 % | TEMPERATURE: 97.2 F | RESPIRATION RATE: 18 BRPM | SYSTOLIC BLOOD PRESSURE: 118 MMHG

## 2024-09-23 DIAGNOSIS — I70.25 ATHEROSCLEROSIS OF NATIVE ARTERIES OF THE EXTREMITIES WITH ULCERATION: Primary | ICD-10-CM

## 2024-09-23 DIAGNOSIS — Z95.828 S/P VASCULAR BYPASS: ICD-10-CM

## 2024-09-23 PROCEDURE — 3074F SYST BP LT 130 MM HG: CPT | Performed by: SURGERY

## 2024-09-23 PROCEDURE — 1159F MED LIST DOCD IN RCRD: CPT | Performed by: SURGERY

## 2024-09-23 PROCEDURE — G0463 HOSPITAL OUTPT CLINIC VISIT: HCPCS | Performed by: SURGERY

## 2024-09-23 PROCEDURE — 3078F DIAST BP <80 MM HG: CPT | Performed by: SURGERY

## 2024-09-23 PROCEDURE — 99213 OFFICE O/P EST LOW 20 MIN: CPT | Performed by: SURGERY

## 2024-09-23 PROCEDURE — 1160F RVW MEDS BY RX/DR IN RCRD: CPT | Performed by: SURGERY

## 2024-10-28 ENCOUNTER — TELEPHONE (OUTPATIENT)
Dept: CARDIOLOGY | Facility: CLINIC | Age: 59
End: 2024-10-28
Payer: MEDICAID

## 2024-10-28 NOTE — TELEPHONE ENCOUNTER
Caller: BRENDA FANG (POA)    Relationship: Emergency Contact    Best call back number: [phone]    What is the best time to reach you: ANY    What was the call regarding: ALL RECORDS ARE IN MEDIA TAB. WIFE IS REQUESTING THOSE BE LOOKED AT BEFORE THE APPT ON 10.31.2024. SHE IS ASKING IF THE PATIENT NEEDS TO BE SEEN BEFORE THE 10.31.24 APPT.     Is it okay if the provider responds through MyChart: NO

## 2024-10-28 NOTE — TELEPHONE ENCOUNTER
They would like the records looked at prior to the appointment if at all possible. She ([place])will take him to the er with any new or worsening symptoms.

## 2024-10-28 NOTE — TELEPHONE ENCOUNTER
Caller: BRENDA FANG (POA)    Relationship to patient: Emergency Contact    Best call back number: [phone]    Chief complaint: CHEST PAIN    Patient directed to call 911 or go to their nearest emergency room.     Patient verbalized understanding: [] Yes  [x] No  If no, why?REFUSED    Additional notes:TRIED TO WT, SHE HUNG UP BEFORE THAT HAPPENED.

## 2024-10-31 ENCOUNTER — OFFICE VISIT (OUTPATIENT)
Dept: CARDIOLOGY | Facility: CLINIC | Age: 59
End: 2024-10-31
Payer: MEDICAID

## 2024-10-31 VITALS
WEIGHT: 170 LBS | BODY MASS INDEX: 26.68 KG/M2 | DIASTOLIC BLOOD PRESSURE: 68 MMHG | HEART RATE: 97 BPM | HEIGHT: 67 IN | SYSTOLIC BLOOD PRESSURE: 108 MMHG | OXYGEN SATURATION: 97 %

## 2024-10-31 DIAGNOSIS — N18.6 ESRD ON HEMODIALYSIS: ICD-10-CM

## 2024-10-31 DIAGNOSIS — I10 ESSENTIAL HYPERTENSION: ICD-10-CM

## 2024-10-31 DIAGNOSIS — I25.119 CORONARY ARTERY DISEASE INVOLVING NATIVE CORONARY ARTERY OF NATIVE HEART WITH ANGINA PECTORIS: ICD-10-CM

## 2024-10-31 DIAGNOSIS — Z95.828 S/P VASCULAR BYPASS: ICD-10-CM

## 2024-10-31 DIAGNOSIS — Z99.2 ESRD ON HEMODIALYSIS: ICD-10-CM

## 2024-10-31 DIAGNOSIS — R42 DIZZY: ICD-10-CM

## 2024-10-31 DIAGNOSIS — H53.9 VISUAL CHANGES: ICD-10-CM

## 2024-10-31 DIAGNOSIS — I47.20 VT (VENTRICULAR TACHYCARDIA): ICD-10-CM

## 2024-10-31 DIAGNOSIS — I50.30 HEART FAILURE WITH PRESERVED LEFT VENTRICULAR FUNCTION (HFPEF): Primary | ICD-10-CM

## 2024-10-31 RX ORDER — NITROGLYCERIN 0.4 MG/1
0.4 TABLET SUBLINGUAL
Qty: 30 TABLET | Refills: 3 | Status: SHIPPED | OUTPATIENT
Start: 2024-10-31

## 2024-10-31 RX ORDER — MELOXICAM 7.5 MG/1
7.5 TABLET ORAL DAILY
COMMUNITY
Start: 2024-09-20

## 2024-10-31 RX ORDER — TENAPANOR 31.9 MG/1
TABLET, FILM COATED ORAL DAILY
COMMUNITY
Start: 2024-10-30

## 2024-10-31 RX ORDER — OXYCODONE HYDROCHLORIDE 30 MG/1
30 TABLET ORAL EVERY 4 HOURS PRN
COMMUNITY

## 2024-10-31 RX ORDER — GABAPENTIN 300 MG/1
300 CAPSULE ORAL 3 TIMES DAILY
COMMUNITY

## 2024-10-31 RX ORDER — CLOPIDOGREL BISULFATE 75 MG/1
75 TABLET ORAL DAILY
Qty: 90 TABLET | Refills: 3 | Status: SHIPPED | OUTPATIENT
Start: 2024-10-31

## 2024-10-31 RX ORDER — MIDODRINE HYDROCHLORIDE 5 MG/1
TABLET ORAL
COMMUNITY
Start: 2024-10-16

## 2024-10-31 RX ORDER — PANTOPRAZOLE SODIUM 40 MG/1
40 TABLET, DELAYED RELEASE ORAL DAILY
COMMUNITY

## 2024-10-31 NOTE — PROGRESS NOTES
Subjective     Chevy Junior is a 59 y.o. male who presents to day for Follow-up (Here for f/u, recent ER visit), Chest Pain, and Shortness of Breath.    CHIEF COMPLIANT  Chief Complaint   Patient presents with    Follow-up     Here for f/u, recent ER visit    Chest Pain    Shortness of Breath       Problem List Items Addressed This Visit          Cardiac and Vasculature    Coronary artery disease involving native coronary artery of native heart with angina pectoris    Overview     Cardiac catherization at [facility] (5/17/2022): Severe 1-vessel CAD with chronic total occlusion of the mid RCA.  Unsuccessful  angioplasty of the mid RCA into the RV marginal branch.  Cardiac cath (11/30/2022): Successful staged PCI of distal RCA  with overlapping JOSE         Relevant Medications    midodrine (PROAMATINE) 5 MG tablet    clopidogrel (PLAVIX) 75 MG tablet    nitroglycerin (NITROSTAT) 0.4 MG SL tablet    Other Relevant Orders    Adult Transthoracic Echo Complete W/ Cont if Necessary Per Protocol    Duplex Carotid Ultrasound CAR    Stress Test With Myocardial Perfusion One Day (Completed)    Essential hypertension    Overview     Target blood pressure <130/80 mmHg         Relevant Medications    midodrine (PROAMATINE) 5 MG tablet    Other Relevant Orders    Adult Transthoracic Echo Complete W/ Cont if Necessary Per Protocol    Duplex Carotid Ultrasound CAR    Stress Test With Myocardial Perfusion One Day (Completed)    Heart failure with preserved left ventricular function (HFpEF) - Primary    Relevant Medications    midodrine (PROAMATINE) 5 MG tablet    clopidogrel (PLAVIX) 75 MG tablet    nitroglycerin (NITROSTAT) 0.4 MG SL tablet    Other Relevant Orders    Adult Transthoracic Echo Complete W/ Cont if Necessary Per Protocol    Duplex Carotid Ultrasound CAR    Stress Test With Myocardial Perfusion One Day (Completed)       Genitourinary and Reproductive     ESRD on hemodialysis    Relevant Orders     Adult Transthoracic Echo Complete W/ Cont if Necessary Per Protocol    Duplex Carotid Ultrasound CAR    Stress Test With Myocardial Perfusion One Day (Completed)     Other Visit Diagnoses       VT (ventricular tachycardia)        Relevant Medications    midodrine (PROAMATINE) 5 MG tablet    clopidogrel (PLAVIX) 75 MG tablet    nitroglycerin (NITROSTAT) 0.4 MG SL tablet    Other Relevant Orders    Adult Transthoracic Echo Complete W/ Cont if Necessary Per Protocol    Duplex Carotid Ultrasound CAR    Stress Test With Myocardial Perfusion One Day (Completed)    S/P vascular bypass        Relevant Orders    Adult Transthoracic Echo Complete W/ Cont if Necessary Per Protocol    Duplex Carotid Ultrasound CAR    Stress Test With Myocardial Perfusion One Day (Completed)    Dizzy        Relevant Orders    Duplex Carotid Ultrasound CAR    Visual changes        Relevant Orders    Duplex Carotid Ultrasound CAR        Problem List :  1.  Chest pain  1.1 Left heart catheterization 5/22: Left main normal, circumflex diffuse irregularities, LAD diffuse irregularities, RCA  collaterals from LAD, PCI of the RCA with residual 50% stenosis  2.  Shortness of breath  2.1 echocardiogram 3/22: EF 55 to 60%, grade 1A diastolic dysfunction, trivial to mild TR and MR, trivial AI, dilated aortic root of 4.1 cm, pulmonary artery systolic pressures high 20s  3.  Hypertension  4.  Fatigue  5.  Chronic renal failure on hemodialysis  Monday Wednesday Friday    HPI    Mr. Chevy Bray is a 58-year-old male patient being followed up today for history of coronary artery disease.    Patient does have a history of coronary artery disease in which he did go under left heart catheterization and had stent placement x 3 to the RCA and May 2022. He is also noted to have diffuse irregularities of the circumflex and LAD with no high-grade stenosis.  He is on high intensity statin therapy of atorvastatin dual antiplatelet therapy of aspirin and  Plavix.    Patient does have a history of heart failure with preserved ejection fraction.  Most recent echocardiogram identified an EF of 55 to 60% with grade 1A diastolic dysfunction.  Trivial to mild TR and MR with trivial AI.  He did have a dilated aortic root at 4.1 cm and pulmonary artery systolic pressures in the high 20s.       Patient does have chronic arterial hypertension in which he was on amlodipine, valsartan, metoprolol, and hydralazine.  Today's blood pressure is 108/68 heart rate of 97.  He has been taken off all of his blood pressure medications and placed on midodrine due to hypotension.    Patient does report chest pain that has been going on for several weeks.  He says that it occurs 2-3 times a night.  He says his back will hurt and then will go through the center of his chest.  Usually heavy to sharp like in sensation.  He said the last 10 to 15 minutes but can last up to 30 minutes.  He does have associated shortness of breath.    Patient does have shortness of breath occurs with activity.  He feels like his dyspnea is getting worse.  This is definitely a neuro symptom for him.  He does have dyspnea at rest wheeze having to take deeper breaths.    He does have fatigue where he gets tired very easily and is pretty much tired all the time.    Patient does have visual changes where he has blurred vision.    Patient is experiencing a significant wound to his stump.  He is being followed by infectious disease for this.  Current Outpatient Medications on File Prior to Visit   Medication Sig Dispense Refill    albuterol sulfate  (90 Base) MCG/ACT inhaler Inhale 1 puff Every 4 (Four) Hours As Needed for Wheezing or Shortness of Air.      aspirin (aspirin) 81 MG EC tablet Take 1 tablet by mouth Daily. 30 tablet 8    atorvastatin (LIPITOR) 40 MG tablet Take 1 tablet by mouth Daily. 90 tablet     Auryxia 1  MG(Fe) tablet Take 1 tablet by mouth Daily With Lunch.      cinacalcet (SENSIPAR) 30  MG tablet Take 1 tablet by mouth Daily.      Diclofenac Sodium (VOLTAREN) 1 % gel gel prn      gabapentin (NEURONTIN) 300 MG capsule Take 1 capsule by mouth 3 (Three) Times a Day.      Heparin Sodium, Porcine, (heparin, porcine,) 1000 UNIT/ML injection Infuse 1 mL into a venous catheter Every Night.      Insulin Glargine (BASAGLAR KWIKPEN) 100 UNIT/ML injection pen Inject 1 Units under the skin into the appropriate area as directed. Per SS      lactulose (CHRONULAC) 10 GM/15ML solution Take 30 mL by mouth 2 (Two) Times a Day As Needed.      levothyroxine (SYNTHROID, LEVOTHROID) 75 MCG tablet Take 1 tablet by mouth Daily.      lidocaine-prilocaine (EMLA) 2.5-2.5 % cream Apply 1 Application topically to the appropriate area as directed As Needed for Injection Site Pain. Apply before dialysis on Monday, Wednesday and Fridays of each week      meloxicam (MOBIC) 7.5 MG tablet Take 1 tablet by mouth Daily.      metoclopramide (REGLAN) 5 MG tablet Take 1 tablet by mouth Every Night.      midodrine (PROAMATINE) 5 MG tablet Prn on M/W/F, dialysis days      naloxone (NARCAN) 4 MG/0.1ML nasal spray Administer 1 spray into the nostril(s) as directed by provider Take As Directed.      nicotine (NICODERM CQ) 21 MG/24HR patch Place 1 patch on the skin as directed by provider Daily. 60 each 1    ondansetron ODT (ZOFRAN-ODT) 4 MG disintegrating tablet Place 1 tablet on the tongue Every 6 (Six) Hours As Needed for Nausea or Vomiting.      oxyCODONE (ROXICODONE) 30 MG immediate release tablet Take 1 tablet by mouth Every 4 (Four) Hours As Needed.      pantoprazole (PROTONIX) 40 MG EC tablet Take 1 tablet by mouth Daily.      sertraline (ZOLOFT) 100 MG tablet Take 1.5 tablets by mouth Daily.      Xphozah 30 MG tablet Daily.       No current facility-administered medications on file prior to visit.       ALLERGIES  Patient has no known allergies.    HISTORY  Past Medical History:   Diagnosis Date    Anemia     Atherosclerosis of native  artery of left lower extremity with gangrene     Cellulitis and abscess of buttock 2023    CHF (congestive heart failure)     Chronic pain     COPD (chronic obstructive pulmonary disease)     INHALER    Coronary artery disease involving native coronary artery of native heart with angina pectoris 09/12/2022    Diabetes mellitus     ESRD on dialysis     M,W,F-FISTULA LEFT ARM    Heart failure with preserved left ventricular function (HFpEF) 09/13/2022    FOLLOWS W/REECE-NO CP    Hyperlipidemia     Hypertension     WIFE STATED B/P HAS BEEN RUNNING LOW LATELY    Infectious viral hepatitis 5 years ago was treated    RIC (obstructive sleep apnea)     Osteoarthritis     Phosphorus metabolic disorder     Sleep apnea     NO MACHINE    Substance abuse 20 years ago from [place]       Social History     Socioeconomic History    Marital status:    Tobacco Use    Smoking status: Every Day     Current packs/day: 2.00     Average packs/day: 2.0 packs/day for 40.0 years (80.0 ttl pk-yrs)     Types: Cigarettes    Smokeless tobacco: Never   Vaping Use    Vaping status: Never Used   Substance and Sexual Activity    Alcohol use: Not Currently     Comment: None    Drug use: Not Currently     Types: Hydrocodone     Comment: pain pills from previous injuries    Sexual activity: Defer       Family History   Problem Relation Age of Onset    Hypertension Mother     Arthritis Mother     Diabetes Father        Review of Systems   Constitutional:  Positive for fatigue.   HENT: Negative.     Eyes:  Positive for visual disturbance (glasses prn).   Respiratory:  Positive for shortness of breath.    Cardiovascular:  Positive for chest pain, palpitations and leg swelling (left).   Gastrointestinal:  Positive for constipation.   Endocrine: Negative.    Genitourinary:  Positive for decreased urine volume.        Dialysis 3 x a week   Musculoskeletal:  Positive for arthralgias, gait problem (in w/c) and myalgias.   Skin: Negative.   "  Allergic/Immunologic: Positive for environmental allergies.   Neurological: Negative.    Hematological: Negative.    Psychiatric/Behavioral: Negative.         Objective     VITALS: /68 (BP Location: Left arm, Patient Position: Sitting)   Pulse 97   Ht 170.2 cm (67.01\")   Wt 77.1 kg (170 lb) Comment: in motorized w/c, unable to stand  SpO2 97%   BMI 26.62 kg/m²     LABS:   Lab Results (most recent)       None            IMAGING:   CT Angio Abdominal Aorta Bilateral Iliofem Runoff    Result Date: 9/10/2024  Impression: 1. The left femoral to below the knee popliteal bypass graft appears patent, without significant stenosis.. There is significant infrapopliteal runoff disease, particularly involving the tibioperoneal trunk and distal anterior tibial artery. Please see above discussion. 2. Bilateral inguinal lymphadenopathy again noted. This may be reactive given lower extremity cutaneous findings. Correlate clinically for infection. 3. Additional vascular and nonvascular findings as given above. Electronically Signed: Rick Puentes MD  9/10/2024 2:35 PM EDT  Workstation ID: [id number]      EXAM:  Physical Exam  Vitals and nursing note reviewed.   Constitutional:       Appearance: He is well-developed.   HENT:      Head: Normocephalic.   Neck:      Thyroid: No thyroid mass.      Vascular: No carotid bruit or JVD.      Trachea: Trachea and phonation normal.   Cardiovascular:      Rate and Rhythm: Normal rate and regular rhythm.      Pulses:           Radial pulses are 2+ on the right side and 2+ on the left side.      Heart sounds: Murmur heard.      No friction rub. No gallop.   Pulmonary:      Effort: Pulmonary effort is normal. No respiratory distress.      Breath sounds: Normal breath sounds. No wheezing or rales.   Musculoskeletal:         General: Swelling present.      Cervical back: Neck supple.   Skin:     General: Skin is warm and dry.      Capillary Refill: Capillary refill takes less than 2 seconds. "      Findings: Lesion (.  To his right stump) present.   Neurological:      Mental Status: He is alert and oriented to person, place, and time.      Gait: Gait abnormal (Wheelchair).   Psychiatric:         Speech: Speech normal.         Behavior: Behavior normal.         Thought Content: Thought content normal.         Judgment: Judgment normal.         Procedure   Procedures       Assessment & Plan    Diagnosis Plan   1. Heart failure with preserved left ventricular function (HFpEF)  Adult Transthoracic Echo Complete W/ Cont if Necessary Per Protocol    Duplex Carotid Ultrasound CAR    Stress Test With Myocardial Perfusion One Day      2. Coronary artery disease involving native coronary artery of native heart with angina pectoris  clopidogrel (PLAVIX) 75 MG tablet    Adult Transthoracic Echo Complete W/ Cont if Necessary Per Protocol    Duplex Carotid Ultrasound CAR    Stress Test With Myocardial Perfusion One Day    nitroglycerin (NITROSTAT) 0.4 MG SL tablet      3. Essential hypertension  Adult Transthoracic Echo Complete W/ Cont if Necessary Per Protocol    Duplex Carotid Ultrasound CAR    Stress Test With Myocardial Perfusion One Day      4. VT (ventricular tachycardia)  Adult Transthoracic Echo Complete W/ Cont if Necessary Per Protocol    Duplex Carotid Ultrasound CAR    Stress Test With Myocardial Perfusion One Day      5. S/P vascular bypass  Adult Transthoracic Echo Complete W/ Cont if Necessary Per Protocol    Duplex Carotid Ultrasound CAR    Stress Test With Myocardial Perfusion One Day      6. ESRD on hemodialysis  Adult Transthoracic Echo Complete W/ Cont if Necessary Per Protocol    Duplex Carotid Ultrasound CAR    Stress Test With Myocardial Perfusion One Day      7. Dizzy  Duplex Carotid Ultrasound CAR      8. Visual changes  Duplex Carotid Ultrasound CAR      1.  Due to patient's chest pain, shortness of breath and increasing fatigue I would like for him to go under repeat ischemic workup  including stress test and echocardiogram.  Patient also had elevated troponins at the hospital going from 209 to 232.    2.  Patient's blood pressure is controlled on current blood pressure medication regimen.  No medication changes are warranted at this time.  Patient advised to monitor blood pressure on a daily basis and report any persistent highs or lows.  Set goal blood pressure for patient at 130/80 or below.    3.  Due to patient's blurred vision and visual changes I would like for him to have a carotid duplex to rule out carotid artery disease as a potential cause of his symptoms.    4.  Informed of signs and symptoms of ACS and advised to seek emergent treatment for any new worsening symptoms.  Patient also advised sooner follow-up as needed.  Also advised to follow-up with family doctor as needed  This note is dictated utilizing voice recognition software.  Although this record has been proof read, transcriptional errors may still be present. If questions occur regarding the content of this record please do not hesitate to call our office.  I have reviewed and confirmed the accuracy of the ROS as documented by the MA/LPN/RN VICKI Ledesma    Return in about 3 months (around 1/31/2025), or if symptoms worsen or fail to improve.    Diagnoses and all orders for this visit:    1. Heart failure with preserved left ventricular function (HFpEF) (Primary)  -     Adult Transthoracic Echo Complete W/ Cont if Necessary Per Protocol; Future  -     Cancel: Stress Test With Myocardial Perfusion One Day; Future  -     Duplex Carotid Ultrasound CAR; Future  -     Stress Test With Myocardial Perfusion One Day; Future    2. Coronary artery disease involving native coronary artery of native heart with angina pectoris  -     clopidogrel (PLAVIX) 75 MG tablet; Take 1 tablet by mouth Daily.  Dispense: 90 tablet; Refill: 3  -     Adult Transthoracic Echo Complete W/ Cont if Necessary Per Protocol; Future  -     Cancel:  Stress Test With Myocardial Perfusion One Day; Future  -     Duplex Carotid Ultrasound CAR; Future  -     Stress Test With Myocardial Perfusion One Day; Future  -     nitroglycerin (NITROSTAT) 0.4 MG SL tablet; Place 1 tablet under the tongue Every 5 (Five) Minutes As Needed for Chest Pain.  Dispense: 30 tablet; Refill: 3    3. Essential hypertension  -     Adult Transthoracic Echo Complete W/ Cont if Necessary Per Protocol; Future  -     Cancel: Stress Test With Myocardial Perfusion One Day; Future  -     Duplex Carotid Ultrasound CAR; Future  -     Stress Test With Myocardial Perfusion One Day; Future    4. VT (ventricular tachycardia)  -     Adult Transthoracic Echo Complete W/ Cont if Necessary Per Protocol; Future  -     Cancel: Stress Test With Myocardial Perfusion One Day; Future  -     Duplex Carotid Ultrasound CAR; Future  -     Stress Test With Myocardial Perfusion One Day; Future    5. S/P vascular bypass  -     Adult Transthoracic Echo Complete W/ Cont if Necessary Per Protocol; Future  -     Cancel: Stress Test With Myocardial Perfusion One Day; Future  -     Duplex Carotid Ultrasound CAR; Future  -     Stress Test With Myocardial Perfusion One Day; Future    6. ESRD on hemodialysis  -     Adult Transthoracic Echo Complete W/ Cont if Necessary Per Protocol; Future  -     Cancel: Stress Test With Myocardial Perfusion One Day; Future  -     Duplex Carotid Ultrasound CAR; Future  -     Stress Test With Myocardial Perfusion One Day; Future    7. Dizzy  -     Duplex Carotid Ultrasound CAR; Future    8. Visual changes  -     Duplex Carotid Ultrasound CAR; Future        Chevy Junior  reports that he has been smoking cigarettes. He has a 80 pack-year smoking history. He has never used smokeless tobacco. I have educated him on the risk of diseases from using tobacco products such as cancer, COPD, and heart disease.     I advised him to quit and he is not willing to quit.    I spent 3  minutes counseling  the patient.           BMI is >= 25 and <30. (Overweight) The following options were offered after discussion;: pcp addressing           MEDS ORDERED DURING VISIT:  New Medications Ordered This Visit   Medications    clopidogrel (PLAVIX) 75 MG tablet     Sig: Take 1 tablet by mouth Daily.     Dispense:  90 tablet     Refill:  3    nitroglycerin (NITROSTAT) 0.4 MG SL tablet     Sig: Place 1 tablet under the tongue Every 5 (Five) Minutes As Needed for Chest Pain.     Dispense:  30 tablet     Refill:  3           This document has been electronically signed by Feng Nielsen Jr., APRN  November 14, 2024 12:50 EST

## 2024-10-31 NOTE — LETTER
November 14, 2024     VICKI Chen  [address] [place] [address]    Patient: Chevy Junior   YOB: 1965   Date of Visit: 10/31/2024     Dear VICKI Chen:       Thank you for referring Chevy Junior to me for evaluation. Below are the relevant portions of my assessment and plan of care.    If you have questions, please do not hesitate to call me. I look forward to following Chevy along with you.         Sincerely,        VICKI Ledesma        CC: No Recipients    Feng Nielsen APRN  11/14/24 1251  Sign when Signing Visit  Subjective    Chevy Junior is a 59 y.o. male who presents to day for Follow-up (Here for f/u, recent ER visit), Chest Pain, and Shortness of Breath.    CHIEF COMPLIANT  Chief Complaint   Patient presents with   • Follow-up     Here for f/u, recent ER visit   • Chest Pain   • Shortness of Breath       Problem List Items Addressed This Visit          Cardiac and Vasculature    Coronary artery disease involving native coronary artery of native heart with angina pectoris    Overview     Cardiac catherization at [facility] (5/17/2022): Severe 1-vessel CAD with chronic total occlusion of the mid RCA.  Unsuccessful  angioplasty of the mid RCA into the RV marginal branch.  Cardiac cath (11/30/2022): Successful staged PCI of distal RCA  with overlapping JOSE         Relevant Medications    midodrine (PROAMATINE) 5 MG tablet    clopidogrel (PLAVIX) 75 MG tablet    nitroglycerin (NITROSTAT) 0.4 MG SL tablet    Other Relevant Orders    Adult Transthoracic Echo Complete W/ Cont if Necessary Per Protocol    Duplex Carotid Ultrasound CAR    Stress Test With Myocardial Perfusion One Day (Completed)    Essential hypertension    Overview     Target blood pressure <130/80 mmHg         Relevant Medications    midodrine (PROAMATINE) 5 MG tablet    Other Relevant Orders    Adult Transthoracic Echo Complete W/ Cont if Necessary Per  Protocol    Duplex Carotid Ultrasound CAR    Stress Test With Myocardial Perfusion One Day (Completed)    Heart failure with preserved left ventricular function (HFpEF) - Primary    Relevant Medications    midodrine (PROAMATINE) 5 MG tablet    clopidogrel (PLAVIX) 75 MG tablet    nitroglycerin (NITROSTAT) 0.4 MG SL tablet    Other Relevant Orders    Adult Transthoracic Echo Complete W/ Cont if Necessary Per Protocol    Duplex Carotid Ultrasound CAR    Stress Test With Myocardial Perfusion One Day (Completed)       Genitourinary and Reproductive     ESRD on hemodialysis    Relevant Orders    Adult Transthoracic Echo Complete W/ Cont if Necessary Per Protocol    Duplex Carotid Ultrasound CAR    Stress Test With Myocardial Perfusion One Day (Completed)     Other Visit Diagnoses       VT (ventricular tachycardia)        Relevant Medications    midodrine (PROAMATINE) 5 MG tablet    clopidogrel (PLAVIX) 75 MG tablet    nitroglycerin (NITROSTAT) 0.4 MG SL tablet    Other Relevant Orders    Adult Transthoracic Echo Complete W/ Cont if Necessary Per Protocol    Duplex Carotid Ultrasound CAR    Stress Test With Myocardial Perfusion One Day (Completed)    S/P vascular bypass        Relevant Orders    Adult Transthoracic Echo Complete W/ Cont if Necessary Per Protocol    Duplex Carotid Ultrasound CAR    Stress Test With Myocardial Perfusion One Day (Completed)    Dizzy        Relevant Orders    Duplex Carotid Ultrasound CAR    Visual changes        Relevant Orders    Duplex Carotid Ultrasound CAR        Problem List :  1.  Chest pain  1.1 Left heart catheterization 5/22: Left main normal, circumflex diffuse irregularities, LAD diffuse irregularities, RCA  collaterals from LAD, PCI of the RCA with residual 50% stenosis  2.  Shortness of breath  2.1 echocardiogram 3/22: EF 55 to 60%, grade 1A diastolic dysfunction, trivial to mild TR and MR, trivial AI, dilated aortic root of 4.1 cm, pulmonary artery systolic pressures high  20s  3.  Hypertension  4.  Fatigue  5.  Chronic renal failure on hemodialysis  Monday Wednesday Friday    HPI    Mr. Chevy Bray is a 58-year-old male patient being followed up today for history of coronary artery disease.    Patient does have a history of coronary artery disease in which he did go under left heart catheterization and had stent placement x 3 to the RCA and May 2022. He is also noted to have diffuse irregularities of the circumflex and LAD with no high-grade stenosis.  He is on high intensity statin therapy of atorvastatin dual antiplatelet therapy of aspirin and Plavix.    Patient does have a history of heart failure with preserved ejection fraction.  Most recent echocardiogram identified an EF of 55 to 60% with grade 1A diastolic dysfunction.  Trivial to mild TR and MR with trivial AI.  He did have a dilated aortic root at 4.1 cm and pulmonary artery systolic pressures in the high 20s.       Patient does have chronic arterial hypertension in which he was on amlodipine, valsartan, metoprolol, and hydralazine.  Today's blood pressure is 108/68 heart rate of 97.  He has been taken off all of his blood pressure medications and placed on midodrine due to hypotension.    Patient does report chest pain that has been going on for several weeks.  He says that it occurs 2-3 times a night.  He says his back will hurt and then will go through the center of his chest.  Usually heavy to sharp like in sensation.  He said the last 10 to 15 minutes but can last up to 30 minutes.  He does have associated shortness of breath.    Patient does have shortness of breath occurs with activity.  He feels like his dyspnea is getting worse.  This is definitely a neuro symptom for him.  He does have dyspnea at rest wheeze having to take deeper breaths.    He does have fatigue where he gets tired very easily and is pretty much tired all the time.    Patient does have visual changes where he has blurred vision.    Patient is  experiencing a significant wound to his stump.  He is being followed by infectious disease for this.  Current Outpatient Medications on File Prior to Visit   Medication Sig Dispense Refill   • albuterol sulfate  (90 Base) MCG/ACT inhaler Inhale 1 puff Every 4 (Four) Hours As Needed for Wheezing or Shortness of Air.     • aspirin (aspirin) 81 MG EC tablet Take 1 tablet by mouth Daily. 30 tablet 8   • atorvastatin (LIPITOR) 40 MG tablet Take 1 tablet by mouth Daily. 90 tablet    • Auryxia 1  MG(Fe) tablet Take 1 tablet by mouth Daily With Lunch.     • cinacalcet (SENSIPAR) 30 MG tablet Take 1 tablet by mouth Daily.     • Diclofenac Sodium (VOLTAREN) 1 % gel gel prn     • gabapentin (NEURONTIN) 300 MG capsule Take 1 capsule by mouth 3 (Three) Times a Day.     • Heparin Sodium, Porcine, (heparin, porcine,) 1000 UNIT/ML injection Infuse 1 mL into a venous catheter Every Night.     • Insulin Glargine (BASAGLAR KWIKPEN) 100 UNIT/ML injection pen Inject 1 Units under the skin into the appropriate area as directed. Per SS     • lactulose (CHRONULAC) 10 GM/15ML solution Take 30 mL by mouth 2 (Two) Times a Day As Needed.     • levothyroxine (SYNTHROID, LEVOTHROID) 75 MCG tablet Take 1 tablet by mouth Daily.     • lidocaine-prilocaine (EMLA) 2.5-2.5 % cream Apply 1 Application topically to the appropriate area as directed As Needed for Injection Site Pain. Apply before dialysis on Monday, Wednesday and Fridays of each week     • meloxicam (MOBIC) 7.5 MG tablet Take 1 tablet by mouth Daily.     • metoclopramide (REGLAN) 5 MG tablet Take 1 tablet by mouth Every Night.     • midodrine (PROAMATINE) 5 MG tablet Prn on M/W/F, dialysis days     • naloxone (NARCAN) 4 MG/0.1ML nasal spray Administer 1 spray into the nostril(s) as directed by provider Take As Directed.     • nicotine (NICODERM CQ) 21 MG/24HR patch Place 1 patch on the skin as directed by provider Daily. 60 each 1   • ondansetron ODT (ZOFRAN-ODT) 4 MG  disintegrating tablet Place 1 tablet on the tongue Every 6 (Six) Hours As Needed for Nausea or Vomiting.     • oxyCODONE (ROXICODONE) 30 MG immediate release tablet Take 1 tablet by mouth Every 4 (Four) Hours As Needed.     • pantoprazole (PROTONIX) 40 MG EC tablet Take 1 tablet by mouth Daily.     • sertraline (ZOLOFT) 100 MG tablet Take 1.5 tablets by mouth Daily.     • Xphozah 30 MG tablet Daily.       No current facility-administered medications on file prior to visit.       ALLERGIES  Patient has no known allergies.    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    • Cellulitis and abscess of buttock 2023   • CHF (congestive heart failure)    • Chronic pain    • COPD (chronic obstructive pulmonary disease)     INHALER   • Coronary artery disease involving native coronary artery of native heart with angina pectoris 09/12/2022   • Diabetes mellitus    • ESRD on dialysis     M,W,F-FISTULA LEFT ARM   • Heart failure with preserved left ventricular function (HFpEF) 09/13/2022    FOLLOWS W/REECE-NO CP   • Hyperlipidemia    • Hypertension     WIFE STATED B/P HAS BEEN RUNNING LOW LATELY   • Infectious viral hepatitis 5 years ago was treated   • RIC (obstructive sleep apnea)    • Osteoarthritis    • Phosphorus metabolic disorder    • Sleep apnea     NO MACHINE   • Substance abuse 20 years ago from [facility]       Social History     Socioeconomic History   • Marital status:    Tobacco Use   • Smoking status: Every Day     Current packs/day: 2.00     Average packs/day: 2.0 packs/day for 40.0 years (80.0 ttl pk-yrs)     Types: Cigarettes   • Smokeless tobacco: Never   Vaping Use   • Vaping status: Never Used   Substance and Sexual Activity   • Alcohol use: Not Currently     Comment: None   • Drug use: Not Currently     Types: Hydrocodone     Comment: pain pills from previous injuries   • Sexual activity: Defer       Family  "History   Problem Relation Age of Onset   • Hypertension Mother    • Arthritis Mother    • Diabetes Father        Review of Systems   Constitutional:  Positive for fatigue.   HENT: Negative.     Eyes:  Positive for visual disturbance (glasses prn).   Respiratory:  Positive for shortness of breath.    Cardiovascular:  Positive for chest pain, palpitations and leg swelling (left).   Gastrointestinal:  Positive for constipation.   Endocrine: Negative.    Genitourinary:  Positive for decreased urine volume.        Dialysis 3 x a week   Musculoskeletal:  Positive for arthralgias, gait problem (in w/c) and myalgias.   Skin: Negative.    Allergic/Immunologic: Positive for environmental allergies.   Neurological: Negative.    Hematological: Negative.    Psychiatric/Behavioral: Negative.         Objective    VITALS: /68 (BP Location: Left arm, Patient Position: Sitting)   Pulse 97   Ht 170.2 cm (67.01\")   Wt 77.1 kg (170 lb) Comment: in motorized w/c, unable to stand  SpO2 97%   BMI 26.62 kg/m²     LABS:   Lab Results (most recent)       None            IMAGING:   CT Angio Abdominal Aorta Bilateral Iliofem Runoff    Result Date: 9/10/2024  Impression: 1. The left femoral to below the knee popliteal bypass graft appears patent, without significant stenosis.. There is significant infrapopliteal runoff disease, particularly involving the tibioperoneal trunk and distal anterior tibial artery. Please see above discussion. 2. Bilateral inguinal lymphadenopathy again noted. This may be reactive given lower extremity cutaneous findings. Correlate clinically for infection. 3. Additional vascular and nonvascular findings as given above. Electronically Signed: Rick Puentes MD  9/10/2024 2:35 PM EDT  Workstation ID: [id number]      EXAM:  Physical Exam  Vitals and nursing note reviewed.   Constitutional:       Appearance: He is well-developed.   HENT:      Head: Normocephalic.   Neck:      Thyroid: No thyroid mass.      " Vascular: No carotid bruit or JVD.      Trachea: Trachea and phonation normal.   Cardiovascular:      Rate and Rhythm: Normal rate and regular rhythm.      Pulses:           Radial pulses are 2+ on the right side and 2+ on the left side.      Heart sounds: Murmur heard.      No friction rub. No gallop.   Pulmonary:      Effort: Pulmonary effort is normal. No respiratory distress.      Breath sounds: Normal breath sounds. No wheezing or rales.   Musculoskeletal:         General: Swelling present.      Cervical back: Neck supple.   Skin:     General: Skin is warm and dry.      Capillary Refill: Capillary refill takes less than 2 seconds.      Findings: Lesion (.  To his right stump) present.   Neurological:      Mental Status: He is alert and oriented to person, place, and time.      Gait: Gait abnormal (Wheelchair).   Psychiatric:         Speech: Speech normal.         Behavior: Behavior normal.         Thought Content: Thought content normal.         Judgment: Judgment normal.         Procedure  Procedures       Assessment & Plan   Diagnosis Plan   1. Heart failure with preserved left ventricular function (HFpEF)  Adult Transthoracic Echo Complete W/ Cont if Necessary Per Protocol    Duplex Carotid Ultrasound CAR    Stress Test With Myocardial Perfusion One Day      2. Coronary artery disease involving native coronary artery of native heart with angina pectoris  clopidogrel (PLAVIX) 75 MG tablet    Adult Transthoracic Echo Complete W/ Cont if Necessary Per Protocol    Duplex Carotid Ultrasound CAR    Stress Test With Myocardial Perfusion One Day    nitroglycerin (NITROSTAT) 0.4 MG SL tablet      3. Essential hypertension  Adult Transthoracic Echo Complete W/ Cont if Necessary Per Protocol    Duplex Carotid Ultrasound CAR    Stress Test With Myocardial Perfusion One Day      4. VT (ventricular tachycardia)  Adult Transthoracic Echo Complete W/ Cont if Necessary Per Protocol    Duplex Carotid Ultrasound CAR    Stress  Test With Myocardial Perfusion One Day      5. S/P vascular bypass  Adult Transthoracic Echo Complete W/ Cont if Necessary Per Protocol    Duplex Carotid Ultrasound CAR    Stress Test With Myocardial Perfusion One Day      6. ESRD on hemodialysis  Adult Transthoracic Echo Complete W/ Cont if Necessary Per Protocol    Duplex Carotid Ultrasound CAR    Stress Test With Myocardial Perfusion One Day      7. Dizzy  Duplex Carotid Ultrasound CAR      8. Visual changes  Duplex Carotid Ultrasound CAR      1.  Due to patient's chest pain, shortness of breath and increasing fatigue I would like for him to go under repeat ischemic workup including stress test and echocardiogram.  Patient also had elevated troponins at the hospital going from 209 to 232.    2.  Patient's blood pressure is controlled on current blood pressure medication regimen.  No medication changes are warranted at this time.  Patient advised to monitor blood pressure on a daily basis and report any persistent highs or lows.  Set goal blood pressure for patient at 130/80 or below.    3.  Due to patient's blurred vision and visual changes I would like for him to have a carotid duplex to rule out carotid artery disease as a potential cause of his symptoms.    4.  Informed of signs and symptoms of ACS and advised to seek emergent treatment for any new worsening symptoms.  Patient also advised sooner follow-up as needed.  Also advised to follow-up with family doctor as needed  This note is dictated utilizing voice recognition software.  Although this record has been proof read, transcriptional errors may still be present. If questions occur regarding the content of this record please do not hesitate to call our office.  I have reviewed and confirmed the accuracy of the ROS as documented by the MA/LPN/RN VICKI Ledesma    Return in about 3 months (around 1/31/2025), or if symptoms worsen or fail to improve.    Diagnoses and all orders for this visit:    1.  Heart failure with preserved left ventricular function (HFpEF) (Primary)  -     Adult Transthoracic Echo Complete W/ Cont if Necessary Per Protocol; Future  -     Cancel: Stress Test With Myocardial Perfusion One Day; Future  -     Duplex Carotid Ultrasound CAR; Future  -     Stress Test With Myocardial Perfusion One Day; Future    2. Coronary artery disease involving native coronary artery of native heart with angina pectoris  -     clopidogrel (PLAVIX) 75 MG tablet; Take 1 tablet by mouth Daily.  Dispense: 90 tablet; Refill: 3  -     Adult Transthoracic Echo Complete W/ Cont if Necessary Per Protocol; Future  -     Cancel: Stress Test With Myocardial Perfusion One Day; Future  -     Duplex Carotid Ultrasound CAR; Future  -     Stress Test With Myocardial Perfusion One Day; Future  -     nitroglycerin (NITROSTAT) 0.4 MG SL tablet; Place 1 tablet under the tongue Every 5 (Five) Minutes As Needed for Chest Pain.  Dispense: 30 tablet; Refill: 3    3. Essential hypertension  -     Adult Transthoracic Echo Complete W/ Cont if Necessary Per Protocol; Future  -     Cancel: Stress Test With Myocardial Perfusion One Day; Future  -     Duplex Carotid Ultrasound CAR; Future  -     Stress Test With Myocardial Perfusion One Day; Future    4. VT (ventricular tachycardia)  -     Adult Transthoracic Echo Complete W/ Cont if Necessary Per Protocol; Future  -     Cancel: Stress Test With Myocardial Perfusion One Day; Future  -     Duplex Carotid Ultrasound CAR; Future  -     Stress Test With Myocardial Perfusion One Day; Future    5. S/P vascular bypass  -     Adult Transthoracic Echo Complete W/ Cont if Necessary Per Protocol; Future  -     Cancel: Stress Test With Myocardial Perfusion One Day; Future  -     Duplex Carotid Ultrasound CAR; Future  -     Stress Test With Myocardial Perfusion One Day; Future    6. ESRD on hemodialysis  -     Adult Transthoracic Echo Complete W/ Cont if Necessary Per Protocol; Future  -     Cancel:  Stress Test With Myocardial Perfusion One Day; Future  -     Duplex Carotid Ultrasound CAR; Future  -     Stress Test With Myocardial Perfusion One Day; Future    7. Dizzy  -     Duplex Carotid Ultrasound CAR; Future    8. Visual changes  -     Duplex Carotid Ultrasound CAR; Future        Chevy Junior  reports that he has been smoking cigarettes. He has a 80 pack-year smoking history. He has never used smokeless tobacco. I have educated him on the risk of diseases from using tobacco products such as cancer, COPD, and heart disease.     I advised him to quit and he is not willing to quit.    I spent 3  minutes counseling the patient.           BMI is >= 25 and <30. (Overweight) The following options were offered after discussion;: pcp addressing           MEDS ORDERED DURING VISIT:  New Medications Ordered This Visit   Medications   • clopidogrel (PLAVIX) 75 MG tablet     Sig: Take 1 tablet by mouth Daily.     Dispense:  90 tablet     Refill:  3   • nitroglycerin (NITROSTAT) 0.4 MG SL tablet     Sig: Place 1 tablet under the tongue Every 5 (Five) Minutes As Needed for Chest Pain.     Dispense:  30 tablet     Refill:  3           This document has been electronically signed by Feng Nielsen Jr., APRN  November 14, 2024 12:50 EST

## 2024-11-04 ENCOUNTER — TELEPHONE (OUTPATIENT)
Dept: CARDIOLOGY | Facility: CLINIC | Age: 59
End: 2024-11-04
Payer: MEDICAID

## 2024-11-04 NOTE — TELEPHONE ENCOUNTER
Hub staff attempted to follow warm transfer process and was unsuccessful     Caller: ANNALISA (BRENDA CAMPOS    Relationship to patient: Emergency Contact    Best call back number: [phone] (home)       Patient is needing: BRENDA FANG WAS ASKING FOR INSTRUCTION ON WHAT TO DO AND NOT TO DO FOR PT BEFORE TESTING 11.5.24. SHE SAYS THEY HAVE LOST THE PAPERWORK THAT GIVES ALL OF THE INSTRUCTIONS FOR TESTING. PLEASE CALL HER BACK WHEN AVAILABLE.

## 2024-11-05 ENCOUNTER — HOSPITAL ENCOUNTER (OUTPATIENT)
Dept: CARDIOLOGY | Facility: HOSPITAL | Age: 59
Discharge: HOME | End: 2024-11-05
Payer: MEDICAID

## 2024-11-05 DIAGNOSIS — I25.119 CORONARY ARTERY DISEASE INVOLVING NATIVE CORONARY ARTERY OF NATIVE HEART WITH ANGINA PECTORIS: ICD-10-CM

## 2024-11-05 DIAGNOSIS — Z95.828 S/P VASCULAR BYPASS: ICD-10-CM

## 2024-11-05 DIAGNOSIS — I47.20 VT (VENTRICULAR TACHYCARDIA): ICD-10-CM

## 2024-11-05 DIAGNOSIS — I10 ESSENTIAL HYPERTENSION: ICD-10-CM

## 2024-11-05 DIAGNOSIS — Z99.2 ESRD ON HEMODIALYSIS: ICD-10-CM

## 2024-11-05 DIAGNOSIS — H53.9 VISUAL CHANGES: ICD-10-CM

## 2024-11-05 DIAGNOSIS — I50.30 HEART FAILURE WITH PRESERVED LEFT VENTRICULAR FUNCTION (HFPEF): ICD-10-CM

## 2024-11-05 DIAGNOSIS — N18.6 ESRD ON HEMODIALYSIS: ICD-10-CM

## 2024-11-05 DIAGNOSIS — R42 DIZZY: ICD-10-CM

## 2024-11-05 LAB
AORTIC DIMENSIONLESS INDEX: 0.6 (DI)
BH CV ECHO MEAS - ACS: 1.64 CM
BH CV ECHO MEAS - AI P1/2T: 269.5 MSEC
BH CV ECHO MEAS - AO MAX PG: 19.6 MMHG
BH CV ECHO MEAS - AO MEAN PG: 10.3 MMHG
BH CV ECHO MEAS - AO ROOT DIAM: 3.9 CM
BH CV ECHO MEAS - AO V2 MAX: 221.3 CM/SEC
BH CV ECHO MEAS - AO V2 VTI: 38.6 CM
BH CV ECHO MEAS - AVA(I,D): 1.93 CM2
BH CV ECHO MEAS - EDV(CUBED): 18 ML
BH CV ECHO MEAS - EDV(MOD-SP4): 44.3 ML
BH CV ECHO MEAS - EF(MOD-SP4): 54.9 %
BH CV ECHO MEAS - EF_3D-VOL: 51 %
BH CV ECHO MEAS - ESV(CUBED): 11.1 ML
BH CV ECHO MEAS - ESV(MOD-SP4): 20 ML
BH CV ECHO MEAS - FS: 14.9 %
BH CV ECHO MEAS - IVS/LVPW: 0.89 CM
BH CV ECHO MEAS - IVSD: 1.49 CM
BH CV ECHO MEAS - LA DIMENSION: 3.7 CM
BH CV ECHO MEAS - LAT PEAK E' VEL: 5.9 CM/SEC
BH CV ECHO MEAS - LV DIASTOLIC VOL/BSA (35-75): 23.5 CM2
BH CV ECHO MEAS - LV MASS(C)D: 147.1 GRAMS
BH CV ECHO MEAS - LV MAX PG: 6.1 MMHG
BH CV ECHO MEAS - LV MEAN PG: 3.3 MMHG
BH CV ECHO MEAS - LV SYSTOLIC VOL/BSA (12-30): 10.6 CM2
BH CV ECHO MEAS - LV V1 MAX: 123.5 CM/SEC
BH CV ECHO MEAS - LV V1 VTI: 22.9 CM
BH CV ECHO MEAS - LVIDD: 2.6 CM
BH CV ECHO MEAS - LVIDS: 2.23 CM
BH CV ECHO MEAS - LVOT AREA: 3.2 CM2
BH CV ECHO MEAS - LVOT DIAM: 2.03 CM
BH CV ECHO MEAS - LVPWD: 1.68 CM
BH CV ECHO MEAS - MED PEAK E' VEL: 7.7 CM/SEC
BH CV ECHO MEAS - MV A MAX VEL: 180.7 CM/SEC
BH CV ECHO MEAS - MV DEC SLOPE: 368.2 CM/SEC2
BH CV ECHO MEAS - MV DEC TIME: 0.51 SEC
BH CV ECHO MEAS - MV E MAX VEL: 129 CM/SEC
BH CV ECHO MEAS - MV E/A: 0.71
BH CV ECHO MEAS - MV MAX PG: 11.7 MMHG
BH CV ECHO MEAS - MV MEAN PG: 5.1 MMHG
BH CV ECHO MEAS - MV P1/2T: 105.1 MSEC
BH CV ECHO MEAS - MV V2 VTI: 42.5 CM
BH CV ECHO MEAS - MVA(P1/2T): 2.09 CM2
BH CV ECHO MEAS - MVA(VTI): 1.75 CM2
BH CV ECHO MEAS - RVDD: 2.7 CM
BH CV ECHO MEAS - SV(LVOT): 74.4 ML
BH CV ECHO MEAS - SV(MOD-SP4): 24.3 ML
BH CV ECHO MEAS - SVI(LVOT): 39.4 ML/M2
BH CV ECHO MEAS - SVI(MOD-SP4): 12.9 ML/M2
BH CV ECHO MEASUREMENTS AVERAGE E/E' RATIO: 18.97
BH CV XLRA MEAS LEFT CAROTID BULB EDV: 21.3 CM/SEC
BH CV XLRA MEAS LEFT CAROTID BULB PSV: 94.9 CM/SEC
BH CV XLRA MEAS LEFT DIST CCA EDV: 14.5 CM/SEC
BH CV XLRA MEAS LEFT DIST CCA PSV: 94.9 CM/SEC
BH CV XLRA MEAS LEFT ICA/CCA RATIO: 1
BH CV XLRA MEAS LEFT MID ICA EDV: -25.6 CM/SEC
BH CV XLRA MEAS LEFT MID ICA PSV: -93.9 CM/SEC
BH CV XLRA MEAS LEFT PROX CCA EDV: 7.8 CM/SEC
BH CV XLRA MEAS LEFT PROX CCA PSV: 81.3 CM/SEC
BH CV XLRA MEAS LEFT PROX ECA PSV: -198 CM/SEC
BH CV XLRA MEAS LEFT PROX ICA EDV: -25.6 CM/SEC
BH CV XLRA MEAS LEFT PROX ICA PSV: -87.8 CM/SEC
BH CV XLRA MEAS LEFT VERTEBRAL A EDV: 21.3 CM/SEC
BH CV XLRA MEAS LEFT VERTEBRAL A PSV: 43.6 CM/SEC
BH CV XLRA MEAS RIGHT CAROTID BULB EDV: 10.6 CM/SEC
BH CV XLRA MEAS RIGHT CAROTID BULB PSV: 87.6 CM/SEC
BH CV XLRA MEAS RIGHT DIST CCA EDV: 8.1 CM/SEC
BH CV XLRA MEAS RIGHT DIST CCA PSV: 98.2 CM/SEC
BH CV XLRA MEAS RIGHT DIST ICA EDV: -10.6 CM/SEC
BH CV XLRA MEAS RIGHT DIST ICA PSV: -45.4 CM/SEC
BH CV XLRA MEAS RIGHT ICA/CCA RATIO: 1.5
BH CV XLRA MEAS RIGHT MID ICA EDV: -30.7 CM/SEC
BH CV XLRA MEAS RIGHT MID ICA PSV: -99.9 CM/SEC
BH CV XLRA MEAS RIGHT PROX CCA EDV: 9.9 CM/SEC
BH CV XLRA MEAS RIGHT PROX CCA PSV: 98.2 CM/SEC
BH CV XLRA MEAS RIGHT PROX ECA PSV: -137 CM/SEC
BH CV XLRA MEAS RIGHT PROX ICA EDV: -35.8 CM/SEC
BH CV XLRA MEAS RIGHT PROX ICA PSV: -143 CM/SEC
BH CV XLRA MEAS RIGHT VERTEBRAL A EDV: 9.7 CM/SEC
BH CV XLRA MEAS RIGHT VERTEBRAL A PSV: 93.9 CM/SEC
LEFT ATRIUM VOLUME INDEX: 29.7 ML/M2

## 2024-11-05 PROCEDURE — A9500 TC99M SESTAMIBI: HCPCS | Performed by: INTERNAL MEDICINE

## 2024-11-05 PROCEDURE — 78452 HT MUSCLE IMAGE SPECT MULT: CPT

## 2024-11-05 PROCEDURE — 93306 TTE W/DOPPLER COMPLETE: CPT

## 2024-11-05 PROCEDURE — 93017 CV STRESS TEST TRACING ONLY: CPT

## 2024-11-05 PROCEDURE — 25010000002 REGADENOSON 0.4 MG/5ML SOLUTION: Performed by: INTERNAL MEDICINE

## 2024-11-05 PROCEDURE — 0 TECHNETIUM SESTAMIBI: Performed by: INTERNAL MEDICINE

## 2024-11-05 PROCEDURE — 93880 EXTRACRANIAL BILAT STUDY: CPT

## 2024-11-05 RX ORDER — REGADENOSON 0.08 MG/ML
0.4 INJECTION, SOLUTION INTRAVENOUS
Status: COMPLETED | OUTPATIENT
Start: 2024-11-05 | End: 2024-11-05

## 2024-11-05 RX ADMIN — REGADENOSON 0.4 MG: 0.08 INJECTION, SOLUTION INTRAVENOUS at 10:55

## 2024-11-05 RX ADMIN — TECHNETIUM TC 99M SESTAMIBI 1 DOSE: 1 INJECTION INTRAVENOUS at 10:55

## 2024-11-05 RX ADMIN — TECHNETIUM TC 99M SESTAMIBI 1 DOSE: 1 INJECTION INTRAVENOUS at 08:44

## 2024-11-09 LAB
BH CV REST NUCLEAR ISOTOPE DOSE: 10 MCI
BH CV STRESS COMMENTS STAGE 1: NORMAL
BH CV STRESS DOSE REGADENOSON STAGE 1: 0.4
BH CV STRESS DURATION MIN STAGE 1: 0
BH CV STRESS DURATION SEC STAGE 1: 10
BH CV STRESS NUCLEAR ISOTOPE DOSE: 30 MCI
BH CV STRESS PROTOCOL 1: NORMAL
BH CV STRESS RECOVERY BP: NORMAL MMHG
BH CV STRESS RECOVERY HR: 101 BPM
BH CV STRESS STAGE 1: 1
MAXIMAL PREDICTED HEART RATE: 160 BPM
PERCENT MAX PREDICTED HR: 64.38 %
STRESS BASELINE BP: NORMAL MMHG
STRESS BASELINE HR: 94 BPM
STRESS PERCENT HR: 76 %
STRESS POST PEAK BP: NORMAL MMHG
STRESS POST PEAK HR: 103 BPM
STRESS TARGET HR: 136 BPM

## 2024-11-11 ENCOUNTER — TELEPHONE (OUTPATIENT)
Dept: CARDIOLOGY | Facility: CLINIC | Age: 59
End: 2024-11-11
Payer: MEDICAID

## 2024-11-11 NOTE — TELEPHONE ENCOUNTER
Relay    I called and left a message on voicemail with stress test results.  Positive stress test 1 week follow-up

## 2024-11-13 ENCOUNTER — TELEPHONE (OUTPATIENT)
Dept: CARDIOLOGY | Facility: CLINIC | Age: 59
End: 2024-11-13

## 2024-11-13 NOTE — TELEPHONE ENCOUNTER
Spoke with patients spouse today on HIPAA, not able to be seen today due to dialysis, Please called to reschedule.----- Message from Loretta MARIA sent at 11/12/2024  9:12 AM EST -----  Please notify pt of appt date and time.  ----- Message -----  From: Feng Nielsen APRN  Sent: 11/11/2024   1:30 PM EST  To: Omayra Rebolledo MA    Positive stress test 1 week follow-up

## 2024-11-19 ENCOUNTER — OFFICE VISIT (OUTPATIENT)
Dept: CARDIOLOGY | Facility: CLINIC | Age: 59
End: 2024-11-19
Payer: MEDICAID

## 2024-11-19 ENCOUNTER — TELEPHONE (OUTPATIENT)
Dept: CARDIOLOGY | Facility: CLINIC | Age: 59
End: 2024-11-19

## 2024-11-19 VITALS
HEART RATE: 101 BPM | SYSTOLIC BLOOD PRESSURE: 111 MMHG | HEIGHT: 67 IN | WEIGHT: 170 LBS | DIASTOLIC BLOOD PRESSURE: 68 MMHG | OXYGEN SATURATION: 94 % | BODY MASS INDEX: 26.68 KG/M2

## 2024-11-19 DIAGNOSIS — I10 ESSENTIAL HYPERTENSION: ICD-10-CM

## 2024-11-19 DIAGNOSIS — I50.30 HEART FAILURE WITH PRESERVED LEFT VENTRICULAR FUNCTION (HFPEF): ICD-10-CM

## 2024-11-19 DIAGNOSIS — Z99.2 ESRD ON HEMODIALYSIS: ICD-10-CM

## 2024-11-19 DIAGNOSIS — E78.5 HYPERLIPIDEMIA LDL GOAL <70: ICD-10-CM

## 2024-11-19 DIAGNOSIS — I25.119 CORONARY ARTERY DISEASE INVOLVING NATIVE CORONARY ARTERY OF NATIVE HEART WITH ANGINA PECTORIS: Primary | ICD-10-CM

## 2024-11-19 DIAGNOSIS — R94.39 POSITIVE CARDIAC STRESS TEST: ICD-10-CM

## 2024-11-19 DIAGNOSIS — N18.6 ESRD ON HEMODIALYSIS: ICD-10-CM

## 2024-11-19 PROCEDURE — 1160F RVW MEDS BY RX/DR IN RCRD: CPT | Performed by: NURSE PRACTITIONER

## 2024-11-19 PROCEDURE — 3074F SYST BP LT 130 MM HG: CPT | Performed by: NURSE PRACTITIONER

## 2024-11-19 PROCEDURE — 3078F DIAST BP <80 MM HG: CPT | Performed by: NURSE PRACTITIONER

## 2024-11-19 PROCEDURE — 1159F MED LIST DOCD IN RCRD: CPT | Performed by: NURSE PRACTITIONER

## 2024-11-19 PROCEDURE — 99214 OFFICE O/P EST MOD 30 MIN: CPT | Performed by: NURSE PRACTITIONER

## 2024-11-19 RX ORDER — ATORVASTATIN CALCIUM 40 MG/1
40 TABLET, FILM COATED ORAL DAILY
Qty: 90 TABLET | Refills: 3
Start: 2024-11-19

## 2024-11-19 NOTE — TELEPHONE ENCOUNTER
Carotid results were discussed in office today .  Diffuse but no high-grade atheromatous change in both carotid systems.  Keep follow-up

## 2024-11-19 NOTE — PROGRESS NOTES
Subjective     Chevy Junior is a 59 y.o. male who presents to day for Abnormal stress test.    CHIEF COMPLIANT  Chief Complaint   Patient presents with    Abnormal stress test       Active Problems:  Problem List Items Addressed This Visit          Cardiac and Vasculature    Coronary artery disease involving native coronary artery of native heart with angina pectoris - Primary    Overview     Cardiac catherization at [facility] (5/17/2022): Severe 1-vessel CAD with chronic total occlusion of the mid RCA.  Unsuccessful  angioplasty of the mid RCA into the RV marginal branch.  Cardiac cath (11/30/2022): Successful staged PCI of distal RCA  with overlapping JOSE         Relevant Orders    Case Request Cath Lab: Left Heart Cath (Completed)    Comprehensive Metabolic Panel    CBC & Differential    Lipid Panel    Hyperlipidemia LDL goal <70    Relevant Medications    atorvastatin (LIPITOR) 40 MG tablet    Other Relevant Orders    Case Request Cath Lab: Left Heart Cath (Completed)    Comprehensive Metabolic Panel    CBC & Differential    Lipid Panel    Essential hypertension    Overview     Target blood pressure <130/80 mmHg         Relevant Orders    Case Request Cath Lab: Left Heart Cath (Completed)    Comprehensive Metabolic Panel    CBC & Differential    Lipid Panel    Heart failure with preserved left ventricular function (HFpEF)    Relevant Orders    Case Request Cath Lab: Left Heart Cath (Completed)    Comprehensive Metabolic Panel    CBC & Differential    Lipid Panel    Positive cardiac stress test    Relevant Orders    Case Request Cath Lab: Left Heart Cath (Completed)    Comprehensive Metabolic Panel    CBC & Differential    Lipid Panel       Genitourinary and Reproductive     ESRD on hemodialysis    Relevant Orders    Case Request Cath Lab: Left Heart Cath (Completed)    Comprehensive Metabolic Panel    CBC & Differential    Lipid Panel         Problem List :  1.  Chest pain  1.1 Left  heart catheterization 5/22: Left main normal, circumflex diffuse irregularities, LAD diffuse irregularities, RCA  collaterals from LAD, PCI of the RCA with residual 50% stenosis  1.2 stress test 11/24: moderately sized moderately dense completely reversible defect involving the mid anterior wall the anterior apical segment and the apex compatible with ischemia.  Additionally there is a moderate size mildly to moderately really dense partially reversible inferior wall defect post-rest EF 56%, elevated transischemic dilation ratio 1.29  2.  Shortness of breath  2.1 echocardiogram 11/24:ejection fraction of 55 to 60% with grade 2 diastolic dysfunction, mild to moderate MR pressure half-time of AI is suggestive of moderate or greater disease  3.  Hypertension  4.  Fatigue  5.  Chronic renal failure on hemodialysis  Monday Wednesday Friday    HPI  HPI  Mr. Chevy Junior is a 59-year-old male patient who is being followed up today for testing results.    Patient to go under stress test that identified a moderately sized moderately dense completely reversible defect involving the mid anterior wall the anterior apical segment and the apex compatible with ischemia.  Additionally there is a moderate size mildly to moderately really dense partially reversible inferior wall defect post-rest EF 56%, elevated transischemic dilation ratio 1.29.  Patient also had a echocardiogram showed EF of 55 to 60%, grade 2 diastolic dysfunction, mild to moderate MR pressure half-time of AI is suggestive of moderate or greater disease, no pericardial effusion.  Diffuse but not on high-grade atheromatous changes in bilateral carotid systems.  We did discuss these in detail.    Patient does have a history of coronary artery disease and in which she did go under left heart catheterization with stent placement x 3 to his right coronary artery due to previous .  He is also noted to have diffuse irregularities of the circumflex and LAD with  no high-grade stenosis.  He is on high intensity statin therapy of atorvastatin dual antiplatelet therapy of aspirin and Plavix.     Patient does have a history of heart failure with preserved ejection fraction.  Most recent echocardiogram identified an an ejection fraction of 55 to 60% with grade 2 diastolic dysfunction, mild to moderate MR pressure half-time of AI is suggestive of moderate or greater disease     Patient does have chronic arterial hypertension in which she is on amlodipine, valsartan, metoprolol, and hydralazine.  He has been running lower on his blood pressure and at times his medications have had to be held.  Today's blood pressure is 111/68 heart rate of 101.    Patient's wife reports that he is continuing to be symptomatic.He is experiencing chest pain that has been going on for quite a while now.  He says it occurs 2-3 times a night he says his back will start hurting then will go through the center of his chest.  He describes it as sharp in characteristic.  He says that it can last anywhere from 10 to 15 minutes per episode but can last as long as 30 minutes.  He does have some associated shortness of breath.    He also has some shortness of breath that occurs with activity but he also has some dyspnea at rest.  He does feel like this is starting to worsen.  He is having to take deeper breaths and is then noticed that he has been wheezing some lately.    Patient does have fatigue where he gets tired very easily and is pretty much tired all the time.    Patient has been experiencing some significant wound issues due to his right stump.  He has been transferred and was sliding out of his wheelchair causing opening of his stump.  He is being treated with wound care and has been on multiple antibiotics.  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  PRIOR MEDS  Current Outpatient Medications on File Prior to Visit   Medication Sig Dispense Refill    albuterol  sulfate  (90 Base) MCG/ACT inhaler Inhale 1 puff Every 4 (Four) Hours As Needed for Wheezing or Shortness of Air.      aspirin (aspirin) 81 MG EC tablet Take 1 tablet by mouth Daily. 30 tablet 8    Auryxia 1  MG(Fe) tablet Take 1 tablet by mouth Daily With Lunch.      cinacalcet (SENSIPAR) 30 MG tablet Take 1 tablet by mouth Daily.      clopidogrel (PLAVIX) 75 MG tablet Take 1 tablet by mouth Daily. 90 tablet 3    Diclofenac Sodium (VOLTAREN) 1 % gel gel prn      gabapentin (NEURONTIN) 300 MG capsule Take 1 capsule by mouth 3 (Three) Times a Day.      Heparin Sodium, Porcine, (heparin, porcine,) 1000 UNIT/ML injection Infuse 1 mL into a venous catheter Every Night.      Insulin Glargine (BASAGLAR KWIKPEN) 100 UNIT/ML injection pen Inject 1 Units under the skin into the appropriate area as directed. Per SS      lactulose (CHRONULAC) 10 GM/15ML solution Take 30 mL by mouth 2 (Two) Times a Day As Needed.      levothyroxine (SYNTHROID, LEVOTHROID) 75 MCG tablet Take 1 tablet by mouth Daily.      lidocaine-prilocaine (EMLA) 2.5-2.5 % cream Apply 1 Application topically to the appropriate area as directed As Needed for Injection Site Pain. Apply before dialysis on Monday, Wednesday and Fridays of each week      meloxicam (MOBIC) 7.5 MG tablet Take 1 tablet by mouth Daily.      metoclopramide (REGLAN) 5 MG tablet Take 1 tablet by mouth Every Night.      midodrine (PROAMATINE) 5 MG tablet Prn on M/W/F, dialysis days      naloxone (NARCAN) 4 MG/0.1ML nasal spray Administer 1 spray into the nostril(s) as directed by provider Take As Directed.      nicotine (NICODERM CQ) 21 MG/24HR patch Place 1 patch on the skin as directed by provider Daily. 60 each 1    nitroglycerin (NITROSTAT) 0.4 MG SL tablet Place 1 tablet under the tongue Every 5 (Five) Minutes As Needed for Chest Pain. 30 tablet 3    ondansetron ODT (ZOFRAN-ODT) 4 MG disintegrating tablet Place 1 tablet on the tongue Every 6 (Six) Hours As Needed for  Nausea or Vomiting.      oxyCODONE (ROXICODONE) 30 MG immediate release tablet Take 1 tablet by mouth Every 4 (Four) Hours As Needed.      pantoprazole (PROTONIX) 40 MG EC tablet Take 1 tablet by mouth Daily.      sertraline (ZOLOFT) 100 MG tablet Take 1.5 tablets by mouth Daily.      Xphozah 30 MG tablet Daily.       No current facility-administered medications on file prior to visit.       ALLERGIES  Patient has no known allergies.    HISTORY  Past Medical History:   Diagnosis Date    Anemia     Atherosclerosis of native artery of left lower extremity with gangrene     Cellulitis and abscess of buttock 2023    CHF (congestive heart failure)     Chronic pain     COPD (chronic obstructive pulmonary disease)     INHALER    Coronary artery disease involving native coronary artery of native heart with angina pectoris 09/12/2022    Diabetes mellitus     ESRD on dialysis     M,W,F-FISTULA LEFT ARM    Heart failure with preserved left ventricular function (HFpEF) 09/13/2022    FOLLOWS DHEERAJ/REECE-NO CP    Hyperlipidemia     Hypertension     WIFE STATED B/P HAS BEEN RUNNING LOW LATELY    Infectious viral hepatitis 5 years ago was treated    RIC (obstructive sleep apnea)     Osteoarthritis     Phosphorus metabolic disorder     Sleep apnea     NO MACHINE    Substance abuse 20 years ago from [facility]       Social History     Socioeconomic History    Marital status:    Tobacco Use    Smoking status: Every Day     Current packs/day: 2.00     Average packs/day: 2.0 packs/day for 40.0 years (80.0 ttl pk-yrs)     Types: Cigarettes    Smokeless tobacco: Never   Vaping Use    Vaping status: Never Used   Substance and Sexual Activity    Alcohol use: Not Currently     Comment: None    Drug use: Not Currently     Types: Hydrocodone     Comment: pain pills from previous injuries    Sexual activity: Defer       Family History   Problem Relation Age of Onset    Hypertension Mother     Arthritis Mother     Diabetes Father        Review  "of Systems   Constitutional:  Negative for chills, fatigue and fever.   HENT:  Positive for congestion and rhinorrhea. Negative for sore throat.    Eyes:  Negative for visual disturbance.   Respiratory:  Positive for apnea (Can not tolerate CPAP) and shortness of breath. Negative for chest tightness.    Cardiovascular:  Positive for chest pain (dull  pain starts in back and goes through to chest) and leg swelling. Negative for palpitations.   Gastrointestinal:  Positive for constipation. Negative for diarrhea and nausea.   Musculoskeletal:  Positive for arthralgias, back pain and neck pain.   Skin:  Positive for wound (wound on right nub is going to wound care). Negative for rash.   Allergic/Immunologic: Negative for environmental allergies and food allergies.   Neurological:  Positive for weakness. Negative for dizziness, syncope and light-headedness.   Hematological:  Does not bruise/bleed easily.   Psychiatric/Behavioral:  Positive for sleep disturbance (SOB at night laex at times).        Objective     VITALS: /68 (BP Location: Right arm, Patient Position: Sitting, Cuff Size: Adult)   Pulse 101   Ht 170.2 cm (67.01\")   Wt 77.1 kg (170 lb)   SpO2 94%   BMI 26.62 kg/m²     LABS:   Lab Results (most recent)       None            IMAGING:   CT Angio Abdominal Aorta Bilateral Iliofem Runoff    Result Date: 9/10/2024  Impression: 1. The left femoral to below the knee popliteal bypass graft appears patent, without significant stenosis.. There is significant infrapopliteal runoff disease, particularly involving the tibioperoneal trunk and distal anterior tibial artery. Please see above discussion. 2. Bilateral inguinal lymphadenopathy again noted. This may be reactive given lower extremity cutaneous findings. Correlate clinically for infection. 3. Additional vascular and nonvascular findings as given above. Electronically Signed: Rick Puentes MD  9/10/2024 2:35 PM EDT  Workstation ID: " [id number]      EXAM:  Physical Exam  Vitals and nursing note reviewed.   Constitutional:       Appearance: He is well-developed.   HENT:      Head: Normocephalic.   Neck:      Thyroid: No thyroid mass.      Vascular: No carotid bruit or JVD.      Trachea: Trachea and phonation normal.   Cardiovascular:      Rate and Rhythm: Normal rate and regular rhythm.      Pulses:           Radial pulses are 2+ on the right side and 2+ on the left side.      Heart sounds: Murmur heard.      No friction rub. No gallop.   Pulmonary:      Effort: Pulmonary effort is normal. No respiratory distress.      Breath sounds: Normal breath sounds. No wheezing or rales.   Musculoskeletal:         General: Signs of injury (Wound to right stump) present. Normal range of motion.      Cervical back: Neck supple.      Left lower leg: Edema (2-3+) present.      Comments: Right above-knee amputation   Skin:     General: Skin is warm and dry.      Capillary Refill: Capillary refill takes less than 2 seconds.      Findings: No rash.   Neurological:      Mental Status: He is alert and oriented to person, place, and time.      Gait: Gait abnormal (Utilizes wheelchair).   Psychiatric:         Speech: Speech normal.         Behavior: Behavior normal.         Thought Content: Thought content normal.         Judgment: Judgment normal.         Procedure   Procedures       Assessment & Plan    Diagnosis Plan   1. Coronary artery disease involving native coronary artery of native heart with angina pectoris  Case Request Cath Lab: Left Heart Cath    Comprehensive Metabolic Panel    CBC & Differential    Lipid Panel      2. Heart failure with preserved left ventricular function (HFpEF)  Case Request Cath Lab: Left Heart Cath    Comprehensive Metabolic Panel    CBC & Differential    Lipid Panel      3. Essential hypertension  Case Request Cath Lab: Left Heart Cath    Comprehensive Metabolic Panel    CBC & Differential    Lipid Panel      4. ESRD on hemodialysis   Case Request Cath Lab: Left Heart Cath    Comprehensive Metabolic Panel    CBC & Differential    Lipid Panel      5. Positive cardiac stress test  Case Request Cath Lab: Left Heart Cath    Comprehensive Metabolic Panel    CBC & Differential    Lipid Panel      6. Hyperlipidemia LDL goal <70  atorvastatin (LIPITOR) 40 MG tablet    Case Request Cath Lab: Left Heart Cath    Comprehensive Metabolic Panel    CBC & Differential    Lipid Panel      1.  Due to patient's abnormal stress test that indicated a moderately sized moderately dense completely reversible defect involving the mid anterior wall the anterior apical segment and the apex compatible with ischemia.  Additionally there is a moderate size mildly to moderately really dense partially reversible inferior wall defect post-rest EF 56%, elevated transischemic dilation ratio 1.29 and worsening symptoms of chest pain shortness of breath and fatigue I do think it is appropriate to have patient further evaluated for ischemia including left heart cath.  There is also concern giving his worsening valvular disease with mild to moderate MR and pressure high times of aortic insufficiency indicating moderate to severe disease.  Patient has opted to move forth with a left heart catheterization.  Benefits and risk of the heart catheterization was explained and patient wishes to continue.    We will also have patient have labs drawn proximately 1 week prior to left heart catheterization for further risk stratification we will need to do this on a Tuesday or Thursday in order for patient to be dialyzed after heart cath.    2.  Patient does have heart failure with reduced ejection fraction which is medication management as Fairly limited due to his end-stage renal failure with dialysis.  He is currently not on any heart failure medications due to hypotension and bradycardia.  He is actually had to be placed on midodrine to assist keep it has been blood pressure within normal  range.    3.  Patient's blood pressure is controlled on current blood pressure medication regimen.  No medication changes are warranted at this time.  Patient advised to monitor blood pressure on a daily basis and report any persistent highs or lows.  Set goal blood pressure for patient at 130/80 or below.    4.  Informed of signs and symptoms of ACS and advised to seek emergent treatment for any new worsening symptoms.  Patient also advised sooner follow-up as needed.  Also advised to follow-up with family doctor as needed  This note is dictated utilizing voice recognition software.  Although this record has been proof read, transcriptional errors may still be present. If questions occur regarding the content of this record please do not hesitate to call our office.  I have reviewed and confirmed the accuracy of the ROS as documented by the MA/LPN/RN VICKI Ledesma  No follow-ups on file.    Diagnoses and all orders for this visit:    1. Coronary artery disease involving native coronary artery of native heart with angina pectoris (Primary)  -     Case Request Cath Lab: Left Heart Cath  -     Comprehensive Metabolic Panel; Future  -     CBC & Differential; Future  -     Lipid Panel; Future    2. Heart failure with preserved left ventricular function (HFpEF)  -     Case Request Cath Lab: Left Heart Cath  -     Comprehensive Metabolic Panel; Future  -     CBC & Differential; Future  -     Lipid Panel; Future    3. Essential hypertension  -     Case Request Cath Lab: Left Heart Cath  -     Comprehensive Metabolic Panel; Future  -     CBC & Differential; Future  -     Lipid Panel; Future    4. ESRD on hemodialysis  -     Case Request Cath Lab: Left Heart Cath  -     Comprehensive Metabolic Panel; Future  -     CBC & Differential; Future  -     Lipid Panel; Future    5. Positive cardiac stress test  -     Case Request Cath Lab: Left Heart Cath  -     Comprehensive Metabolic Panel; Future  -     CBC & Differential;  Future  -     Lipid Panel; Future    6. Hyperlipidemia LDL goal <70  -     atorvastatin (LIPITOR) 40 MG tablet; Take 1 tablet by mouth Daily.  Dispense: 90 tablet; Refill: 3  -     Case Request Cath Lab: Left Heart Cath  -     Comprehensive Metabolic Panel; Future  -     CBC & Differential; Future  -     Lipid Panel; Future        Chevy Junior  reports that he has been smoking cigarettes. He has a 80 pack-year smoking history. He has never used smokeless tobacco. I have educated him on the risk of diseases from using tobacco products. Patient smokes and has had discussion about stopping .    Advance Care Planning   ACP discussion was held with the patient during this visit. Patient has an advance directive in EMR which is still valid.                             MEDS ORDERED DURING VISIT:  New Medications Ordered This Visit   Medications    atorvastatin (LIPITOR) 40 MG tablet     Sig: Take 1 tablet by mouth Daily.     Dispense:  90 tablet     Refill:  3           This document has been electronically signed by Feng Nielsen Jr., APRN  November 20, 2024 17:15 EST

## 2024-11-19 NOTE — H&P (VIEW-ONLY)
Subjective     Chevy Junior is a 59 y.o. male who presents to day for Abnormal stress test.    CHIEF COMPLIANT  Chief Complaint   Patient presents with    Abnormal stress test       Active Problems:  Problem List Items Addressed This Visit          Cardiac and Vasculature    Coronary artery disease involving native coronary artery of native heart with angina pectoris - Primary    Overview     Cardiac catherization at [facility] (5/17/2022): Severe 1-vessel CAD with chronic total occlusion of the mid RCA.  Unsuccessful  angioplasty of the mid RCA into the RV marginal branch.  Cardiac cath (11/30/2022): Successful staged PCI of distal RCA  with overlapping JOSE         Relevant Orders    Case Request Cath Lab: Left Heart Cath (Completed)    Comprehensive Metabolic Panel    CBC & Differential    Lipid Panel    Hyperlipidemia LDL goal <70    Relevant Medications    atorvastatin (LIPITOR) 40 MG tablet    Other Relevant Orders    Case Request Cath Lab: Left Heart Cath (Completed)    Comprehensive Metabolic Panel    CBC & Differential    Lipid Panel    Essential hypertension    Overview     Target blood pressure <130/80 mmHg         Relevant Orders    Case Request Cath Lab: Left Heart Cath (Completed)    Comprehensive Metabolic Panel    CBC & Differential    Lipid Panel    Heart failure with preserved left ventricular function (HFpEF)    Relevant Orders    Case Request Cath Lab: Left Heart Cath (Completed)    Comprehensive Metabolic Panel    CBC & Differential    Lipid Panel    Positive cardiac stress test    Relevant Orders    Case Request Cath Lab: Left Heart Cath (Completed)    Comprehensive Metabolic Panel    CBC & Differential    Lipid Panel       Genitourinary and Reproductive     ESRD on hemodialysis    Relevant Orders    Case Request Cath Lab: Left Heart Cath (Completed)    Comprehensive Metabolic Panel    CBC & Differential    Lipid Panel         Problem List :  1.  Chest pain  1.1 Left  heart catheterization 5/22: Left main normal, circumflex diffuse irregularities, LAD diffuse irregularities, RCA  collaterals from LAD, PCI of the RCA with residual 50% stenosis  1.2 stress test 11/24: moderately sized moderately dense completely reversible defect involving the mid anterior wall the anterior apical segment and the apex compatible with ischemia.  Additionally there is a moderate size mildly to moderately really dense partially reversible inferior wall defect post-rest EF 56%, elevated transischemic dilation ratio 1.29  2.  Shortness of breath  2.1 echocardiogram 11/24:ejection fraction of 55 to 60% with grade 2 diastolic dysfunction, mild to moderate MR pressure half-time of AI is suggestive of moderate or greater disease  3.  Hypertension  4.  Fatigue  5.  Chronic renal failure on hemodialysis  Monday Wednesday Friday    HPI  HPI  Mr. Chevy Junior is a 59-year-old male patient who is being followed up today for testing results.    Patient to go under stress test that identified a moderately sized moderately dense completely reversible defect involving the mid anterior wall the anterior apical segment and the apex compatible with ischemia.  Additionally there is a moderate size mildly to moderately really dense partially reversible inferior wall defect post-rest EF 56%, elevated transischemic dilation ratio 1.29.  Patient also had a echocardiogram showed EF of 55 to 60%, grade 2 diastolic dysfunction, mild to moderate MR pressure half-time of AI is suggestive of moderate or greater disease, no pericardial effusion.  Diffuse but not on high-grade atheromatous changes in bilateral carotid systems.  We did discuss these in detail.    Patient does have a history of coronary artery disease and in which she did go under left heart catheterization with stent placement x 3 to his right coronary artery due to previous .  He is also noted to have diffuse irregularities of the circumflex and LAD with  no high-grade stenosis.  He is on high intensity statin therapy of atorvastatin dual antiplatelet therapy of aspirin and Plavix.     Patient does have a history of heart failure with preserved ejection fraction.  Most recent echocardiogram identified an an ejection fraction of 55 to 60% with grade 2 diastolic dysfunction, mild to moderate MR pressure half-time of AI is suggestive of moderate or greater disease     Patient does have chronic arterial hypertension in which she is on amlodipine, valsartan, metoprolol, and hydralazine.  He has been running lower on his blood pressure and at times his medications have had to be held.  Today's blood pressure is 111/68 heart rate of 101.    Patient's wife reports that he is continuing to be symptomatic.He is experiencing chest pain that has been going on for quite a while now.  He says it occurs 2-3 times a night he says his back will start hurting then will go through the center of his chest.  He describes it as sharp in characteristic.  He says that it can last anywhere from 10 to 15 minutes per episode but can last as long as 30 minutes.  He does have some associated shortness of breath.    He also has some shortness of breath that occurs with activity but he also has some dyspnea at rest.  He does feel like this is starting to worsen.  He is having to take deeper breaths and is then noticed that he has been wheezing some lately.    Patient does have fatigue where he gets tired very easily and is pretty much tired all the time.    Patient has been experiencing some significant wound issues due to his right stump.  He has been transferred and was sliding out of his wheelchair causing opening of his stump.  He is being treated with wound care and has been on multiple antibiotics.  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  PRIOR MEDS  Current Outpatient Medications on File Prior to Visit   Medication Sig Dispense Refill    albuterol  sulfate  (90 Base) MCG/ACT inhaler Inhale 1 puff Every 4 (Four) Hours As Needed for Wheezing or Shortness of Air.      aspirin (aspirin) 81 MG EC tablet Take 1 tablet by mouth Daily. 30 tablet 8    Auryxia 1  MG(Fe) tablet Take 1 tablet by mouth Daily With Lunch.      cinacalcet (SENSIPAR) 30 MG tablet Take 1 tablet by mouth Daily.      clopidogrel (PLAVIX) 75 MG tablet Take 1 tablet by mouth Daily. 90 tablet 3    Diclofenac Sodium (VOLTAREN) 1 % gel gel prn      gabapentin (NEURONTIN) 300 MG capsule Take 1 capsule by mouth 3 (Three) Times a Day.      Heparin Sodium, Porcine, (heparin, porcine,) 1000 UNIT/ML injection Infuse 1 mL into a venous catheter Every Night.      Insulin Glargine (BASAGLAR KWIKPEN) 100 UNIT/ML injection pen Inject 1 Units under the skin into the appropriate area as directed. Per SS      lactulose (CHRONULAC) 10 GM/15ML solution Take 30 mL by mouth 2 (Two) Times a Day As Needed.      levothyroxine (SYNTHROID, LEVOTHROID) 75 MCG tablet Take 1 tablet by mouth Daily.      lidocaine-prilocaine (EMLA) 2.5-2.5 % cream Apply 1 Application topically to the appropriate area as directed As Needed for Injection Site Pain. Apply before dialysis on Monday, Wednesday and Fridays of each week      meloxicam (MOBIC) 7.5 MG tablet Take 1 tablet by mouth Daily.      metoclopramide (REGLAN) 5 MG tablet Take 1 tablet by mouth Every Night.      midodrine (PROAMATINE) 5 MG tablet Prn on M/W/F, dialysis days      naloxone (NARCAN) 4 MG/0.1ML nasal spray Administer 1 spray into the nostril(s) as directed by provider Take As Directed.      nicotine (NICODERM CQ) 21 MG/24HR patch Place 1 patch on the skin as directed by provider Daily. 60 each 1    nitroglycerin (NITROSTAT) 0.4 MG SL tablet Place 1 tablet under the tongue Every 5 (Five) Minutes As Needed for Chest Pain. 30 tablet 3    ondansetron ODT (ZOFRAN-ODT) 4 MG disintegrating tablet Place 1 tablet on the tongue Every 6 (Six) Hours As Needed for  Nausea or Vomiting.      oxyCODONE (ROXICODONE) 30 MG immediate release tablet Take 1 tablet by mouth Every 4 (Four) Hours As Needed.      pantoprazole (PROTONIX) 40 MG EC tablet Take 1 tablet by mouth Daily.      sertraline (ZOLOFT) 100 MG tablet Take 1.5 tablets by mouth Daily.      Xphozah 30 MG tablet Daily.       No current facility-administered medications on file prior to visit.       ALLERGIES  Patient has no known allergies.    HISTORY  Past Medical History:   Diagnosis Date    Anemia     Atherosclerosis of native artery of left lower extremity with gangrene     Cellulitis and abscess of buttock 2023    CHF (congestive heart failure)     Chronic pain     COPD (chronic obstructive pulmonary disease)     INHALER    Coronary artery disease involving native coronary artery of native heart with angina pectoris 09/12/2022    Diabetes mellitus     ESRD on dialysis     M,W,F-FISTULA LEFT ARM    Heart failure with preserved left ventricular function (HFpEF) 09/13/2022    FOLLOWS DHEERAJ/REECE-NO CP    Hyperlipidemia     Hypertension     WIFE STATED B/P HAS BEEN RUNNING LOW LATELY    Infectious viral hepatitis 5 years ago was treated    RIC (obstructive sleep apnea)     Osteoarthritis     Phosphorus metabolic disorder     Sleep apnea     NO MACHINE    Substance abuse 20 years ago from [employer] [facility]formerly [employer])       Social History     Socioeconomic History    Marital status:    Tobacco Use    Smoking status: Every Day     Current packs/day: 2.00     Average packs/day: 2.0 packs/day for 40.0 years (80.0 ttl pk-yrs)     Types: Cigarettes    Smokeless tobacco: Never   Vaping Use    Vaping status: Never Used   Substance and Sexual Activity    Alcohol use: Not Currently     Comment: None    Drug use: Not Currently     Types: Hydrocodone     Comment: pain pills from previous injuries    Sexual activity: Defer       Family History   Problem Relation Age of Onset    Hypertension Mother     Arthritis Mother     Diabetes Father        Review  "of Systems   Constitutional:  Negative for chills, fatigue and fever.   HENT:  Positive for congestion and rhinorrhea. Negative for sore throat.    Eyes:  Negative for visual disturbance.   Respiratory:  Positive for apnea (Can not tolerate CPAP) and shortness of breath. Negative for chest tightness.    Cardiovascular:  Positive for chest pain (dull  pain starts in back and goes through to chest) and leg swelling. Negative for palpitations.   Gastrointestinal:  Positive for constipation. Negative for diarrhea and nausea.   Musculoskeletal:  Positive for arthralgias, back pain and neck pain.   Skin:  Positive for wound (wound on right nub is going to wound care). Negative for rash.   Allergic/Immunologic: Negative for environmental allergies and food allergies.   Neurological:  Positive for weakness. Negative for dizziness, syncope and light-headedness.   Hematological:  Does not bruise/bleed easily.   Psychiatric/Behavioral:  Positive for sleep disturbance (SOB at night alex at times).        Objective     VITALS: /68 (BP Location: Right arm, Patient Position: Sitting, Cuff Size: Adult)   Pulse 101   Ht 170.2 cm (67.01\")   Wt 77.1 kg (170 lb)   SpO2 94%   BMI 26.62 kg/m²     LABS:   Lab Results (most recent)       None            IMAGING:   CT Angio Abdominal Aorta Bilateral Iliofem Runoff    Result Date: 9/10/2024  Impression: 1. The left femoral to below the knee popliteal bypass graft appears patent, without significant stenosis.. There is significant infrapopliteal runoff disease, particularly involving the tibioperoneal trunk and distal anterior tibial artery. Please see above discussion. 2. Bilateral inguinal lymphadenopathy again noted. This may be reactive given lower extremity cutaneous findings. Correlate clinically for infection. 3. Additional vascular and nonvascular findings as given above. Electronically Signed: Rick Puentes MD  9/10/2024 2:35 PM EDT  Workstation ID: " [id number]      EXAM:  Physical Exam  Vitals and nursing note reviewed.   Constitutional:       Appearance: He is well-developed.   HENT:      Head: Normocephalic.   Neck:      Thyroid: No thyroid mass.      Vascular: No carotid bruit or JVD.      Trachea: Trachea and phonation normal.   Cardiovascular:      Rate and Rhythm: Normal rate and regular rhythm.      Pulses:           Radial pulses are 2+ on the right side and 2+ on the left side.      Heart sounds: Murmur heard.      No friction rub. No gallop.   Pulmonary:      Effort: Pulmonary effort is normal. No respiratory distress.      Breath sounds: Normal breath sounds. No wheezing or rales.   Musculoskeletal:         General: Signs of injury (Wound to right stump) present. Normal range of motion.      Cervical back: Neck supple.      Left lower leg: Edema (2-3+) present.      Comments: Right above-knee amputation   Skin:     General: Skin is warm and dry.      Capillary Refill: Capillary refill takes less than 2 seconds.      Findings: No rash.   Neurological:      Mental Status: He is alert and oriented to person, place, and time.      Gait: Gait abnormal (Utilizes wheelchair).   Psychiatric:         Speech: Speech normal.         Behavior: Behavior normal.         Thought Content: Thought content normal.         Judgment: Judgment normal.         Procedure   Procedures       Assessment & Plan    Diagnosis Plan   1. Coronary artery disease involving native coronary artery of native heart with angina pectoris  Case Request Cath Lab: Left Heart Cath    Comprehensive Metabolic Panel    CBC & Differential    Lipid Panel      2. Heart failure with preserved left ventricular function (HFpEF)  Case Request Cath Lab: Left Heart Cath    Comprehensive Metabolic Panel    CBC & Differential    Lipid Panel      3. Essential hypertension  Case Request Cath Lab: Left Heart Cath    Comprehensive Metabolic Panel    CBC & Differential    Lipid Panel      4. ESRD on hemodialysis   Case Request Cath Lab: Left Heart Cath    Comprehensive Metabolic Panel    CBC & Differential    Lipid Panel      5. Positive cardiac stress test  Case Request Cath Lab: Left Heart Cath    Comprehensive Metabolic Panel    CBC & Differential    Lipid Panel      6. Hyperlipidemia LDL goal <70  atorvastatin (LIPITOR) 40 MG tablet    Case Request Cath Lab: Left Heart Cath    Comprehensive Metabolic Panel    CBC & Differential    Lipid Panel      1.  Due to patient's abnormal stress test that indicated a moderately sized moderately dense completely reversible defect involving the mid anterior wall the anterior apical segment and the apex compatible with ischemia.  Additionally there is a moderate size mildly to moderately really dense partially reversible inferior wall defect post-rest EF 56%, elevated transischemic dilation ratio 1.29 and worsening symptoms of chest pain shortness of breath and fatigue I do think it is appropriate to have patient further evaluated for ischemia including left heart cath.  There is also concern giving his worsening valvular disease with mild to moderate MR and pressure high times of aortic insufficiency indicating moderate to severe disease.  Patient has opted to move forth with a left heart catheterization.  Benefits and risk of the heart catheterization was explained and patient wishes to continue.    We will also have patient have labs drawn proximately 1 week prior to left heart catheterization for further risk stratification we will need to do this on a Tuesday or Thursday in order for patient to be dialyzed after heart cath.    2.  Patient does have heart failure with reduced ejection fraction which is medication management as Fairly limited due to his end-stage renal failure with dialysis.  He is currently not on any heart failure medications due to hypotension and bradycardia.  He is actually had to be placed on midodrine to assist keep it has been blood pressure within normal  range.    3.  Patient's blood pressure is controlled on current blood pressure medication regimen.  No medication changes are warranted at this time.  Patient advised to monitor blood pressure on a daily basis and report any persistent highs or lows.  Set goal blood pressure for patient at 130/80 or below.    4.  Informed of signs and symptoms of ACS and advised to seek emergent treatment for any new worsening symptoms.  Patient also advised sooner follow-up as needed.  Also advised to follow-up with family doctor as needed  This note is dictated utilizing voice recognition software.  Although this record has been proof read, transcriptional errors may still be present. If questions occur regarding the content of this record please do not hesitate to call our office.  I have reviewed and confirmed the accuracy of the ROS as documented by the MA/LPN/RN VICKI Ledesma  No follow-ups on file.    Diagnoses and all orders for this visit:    1. Coronary artery disease involving native coronary artery of native heart with angina pectoris (Primary)  -     Case Request Cath Lab: Left Heart Cath  -     Comprehensive Metabolic Panel; Future  -     CBC & Differential; Future  -     Lipid Panel; Future    2. Heart failure with preserved left ventricular function (HFpEF)  -     Case Request Cath Lab: Left Heart Cath  -     Comprehensive Metabolic Panel; Future  -     CBC & Differential; Future  -     Lipid Panel; Future    3. Essential hypertension  -     Case Request Cath Lab: Left Heart Cath  -     Comprehensive Metabolic Panel; Future  -     CBC & Differential; Future  -     Lipid Panel; Future    4. ESRD on hemodialysis  -     Case Request Cath Lab: Left Heart Cath  -     Comprehensive Metabolic Panel; Future  -     CBC & Differential; Future  -     Lipid Panel; Future    5. Positive cardiac stress test  -     Case Request Cath Lab: Left Heart Cath  -     Comprehensive Metabolic Panel; Future  -     CBC & Differential;  Future  -     Lipid Panel; Future    6. Hyperlipidemia LDL goal <70  -     atorvastatin (LIPITOR) 40 MG tablet; Take 1 tablet by mouth Daily.  Dispense: 90 tablet; Refill: 3  -     Case Request Cath Lab: Left Heart Cath  -     Comprehensive Metabolic Panel; Future  -     CBC & Differential; Future  -     Lipid Panel; Future        Chevy Junior  reports that he has been smoking cigarettes. He has a 80 pack-year smoking history. He has never used smokeless tobacco. I have educated him on the risk of diseases from using tobacco products. Patient smokes and has had discussion about stopping .    Advance Care Planning   ACP discussion was held with the patient during this visit. Patient has an advance directive in EMR which is still valid.                             MEDS ORDERED DURING VISIT:  New Medications Ordered This Visit   Medications    atorvastatin (LIPITOR) 40 MG tablet     Sig: Take 1 tablet by mouth Daily.     Dispense:  90 tablet     Refill:  3           This document has been electronically signed by Feng Nielsen Jr., APRN  November 20, 2024 17:15 EST

## 2024-11-25 ENCOUNTER — PREP FOR SURGERY (OUTPATIENT)
Dept: OTHER | Facility: HOSPITAL | Age: 59
End: 2024-11-25
Payer: MEDICAID

## 2024-11-25 ENCOUNTER — TELEPHONE (OUTPATIENT)
Dept: CARDIOLOGY | Facility: CLINIC | Age: 59
End: 2024-11-25
Payer: MEDICAID

## 2024-11-25 DIAGNOSIS — I25.119 CORONARY ARTERY DISEASE INVOLVING NATIVE CORONARY ARTERY OF NATIVE HEART WITH ANGINA PECTORIS: Primary | ICD-10-CM

## 2024-11-25 DIAGNOSIS — I50.30 HEART FAILURE WITH PRESERVED LEFT VENTRICULAR FUNCTION (HFPEF): ICD-10-CM

## 2024-11-25 RX ORDER — SODIUM CHLORIDE 0.9 % (FLUSH) 0.9 %
10 SYRINGE (ML) INJECTION EVERY 12 HOURS SCHEDULED
OUTPATIENT
Start: 2024-11-25

## 2024-11-25 RX ORDER — ASPIRIN 81 MG/1
324 TABLET, CHEWABLE ORAL ONCE
OUTPATIENT
Start: 2024-11-25 | End: 2024-11-25

## 2024-11-25 RX ORDER — SODIUM CHLORIDE 0.9 % (FLUSH) 0.9 %
10 SYRINGE (ML) INJECTION AS NEEDED
OUTPATIENT
Start: 2024-11-25

## 2024-11-25 RX ORDER — ASPIRIN 81 MG/1
81 TABLET ORAL DAILY
OUTPATIENT
Start: 2024-11-26

## 2024-11-25 NOTE — TELEPHONE ENCOUNTER
Caller: ANNALISA (BRENDA CAMPOS    Relationship: Emergency Contact    Best call back number: [phone]    What is the best time to reach you: ANYTIME    What was the call regarding: PATIENT'S POA WOULD LIKE A CALL TO GIVE INFORMATION SO THAT A REFERRAL CAN BE PLACED FOR PATIENT TO HAVE TRANSPORTATION TO [place] FOR HIS HEART CATH. REPORTS THAT THIS SHOULD BE DONE ASAP TO ENSURE HE GETS TRANSPORTATION SINCE THE CATH IS ALREADY SCHEDULED.

## 2024-11-25 NOTE — TELEPHONE ENCOUNTER
Relay...  Wanting to ensure patient was wanting a referral for transportation or needing a letter faxed to [employer] for transportation. Will discuss with provider and place referral if discussed.

## 2024-12-02 ENCOUNTER — REFERRAL TRIAGE (OUTPATIENT)
Age: 59
End: 2024-12-02
Payer: MEDICAID

## 2024-12-02 ENCOUNTER — TELEPHONE (OUTPATIENT)
Dept: CARDIOLOGY | Facility: CLINIC | Age: 59
End: 2024-12-02
Payer: MEDICAID

## 2024-12-02 ENCOUNTER — PATIENT OUTREACH (OUTPATIENT)
Age: 59
End: 2024-12-02
Payer: MEDICAID

## 2024-12-02 NOTE — OUTREACH NOTE
Social Work Assessment  Questions/Answers      Flowsheet Row Most Recent Value   Referral Source physician   Reason for Consult community resources   Preferred Language English   Advance Care Planning Reviewed no concerns identified        SDOH updated and reviewed with the patient during this program:  --     Disabilities: At Risk (4/18/2024)    Disabilities     Concentrating, Remembering, or Making Decisions Difficulty: no     Doing Errands Independently Difficulty: yes      Financial Resource Strain: Low Risk  (5/23/2024)    Received from [facility]    Financial Resource Strain     How hard is it for you to pay for the very basics like food, housing, medical care, and heating?  Would you say it is:: Not hard at all      --     Food Insecurity: No Food Insecurity (5/23/2024)    Received from [facility]    Food Insecurity      Within the past 12 months, you worried that your food would run out before you got money to buy more.: Never true     Within the past 12 months, the food you bought just didn't last and you didn't have money to get more.: Never true      --     Housing Stability: Low Risk  (5/23/2024)    Received from [facility]    Housing Stability     What is your living situation today?: I have a steady place to live     Think about the place you live. Do you have problems with any of the following?: None of the above      --     Transportation Needs: No Transportation Needs (5/23/2024)    Received from [facility]    Transportation Needs     In the past 12 months, has lack of reliable transportation kept you from medical appointments, meetings, work or from getting things needed for daily living?: No      --     Utilities: Low Risk  (5/23/2024)    Received from [facility]    Utilities     In the past 12 months, has the electric, gas, oil, or water company threatened to shut off services in your home?: No       Patient  Outreach    SW contacted [place] KELLER. [place] states that pt needs to have a heart Cath in [place] but the transit company, [employer], will not take him without a form signed. She did request the form be faxed to the office. SW called [facility], they have not received the form. SW requested it be faxed again. Messaged pt doctor.  [facility] -   Ambulatory Case Management    12/2/2024, 12:47 EST

## 2024-12-02 NOTE — TELEPHONE ENCOUNTER
Caller: BRENDA FANG (POA)    Relationship: Emergency Contact    Best call back number: [phone]     What is the best time to reach you: ANYTIME    Who are you requesting to speak with (clinical staff, provider,  specific staff member): PROVIDER    Do you know the name of the person who called: CHARISMA    What was the call regarding:   [facility]  PH: [phone]  NEEDS TRANSPORTATION FORM FAXED OVER TODAY ON 12.2.24, OR ELSE PT WILL HAVE TO CXL AND RES HEART CATH. PLEASE ADVISE.   SHOULD ONLY NEED A SIGNATURE ON THIS FORM AND FAXED BACK OVER FROM WHERE THEY SENT TO OUR OFFICE.     Is it okay if the provider responds through [employer] inkhart: NO

## 2024-12-02 NOTE — TELEPHONE ENCOUNTER
Called caregiver and explained form has not been faxed as I have been awaiting on cath date. Cath date obtained today. Form faxed to [facility] at [phone] and [phone] as both numbers are listed on form.

## 2024-12-02 NOTE — TELEPHONE ENCOUNTER
Adrián from the [facility] called - she said patients wife said the letter was sent to the wrong place -- can you reach back out and see where it needs to go.

## 2024-12-05 ENCOUNTER — HOSPITAL ENCOUNTER (OUTPATIENT)
Facility: HOSPITAL | Age: 59
Setting detail: HOSPITAL OUTPATIENT SURGERY
Discharge: HOME | End: 2024-12-05
Attending: INTERNAL MEDICINE | Admitting: INTERNAL MEDICINE
Payer: MEDICAID

## 2024-12-05 VITALS
HEIGHT: 67 IN | BODY MASS INDEX: 25.5 KG/M2 | RESPIRATION RATE: 16 BRPM | WEIGHT: 162.48 LBS | TEMPERATURE: 97.2 F | OXYGEN SATURATION: 97 % | SYSTOLIC BLOOD PRESSURE: 86 MMHG | DIASTOLIC BLOOD PRESSURE: 41 MMHG | HEART RATE: 89 BPM

## 2024-12-05 DIAGNOSIS — I10 ESSENTIAL HYPERTENSION: ICD-10-CM

## 2024-12-05 DIAGNOSIS — Z99.2 ESRD ON HEMODIALYSIS: ICD-10-CM

## 2024-12-05 DIAGNOSIS — I25.119 CORONARY ARTERY DISEASE INVOLVING NATIVE CORONARY ARTERY OF NATIVE HEART WITH ANGINA PECTORIS: ICD-10-CM

## 2024-12-05 DIAGNOSIS — R94.39 POSITIVE CARDIAC STRESS TEST: ICD-10-CM

## 2024-12-05 DIAGNOSIS — I50.30 HEART FAILURE WITH PRESERVED LEFT VENTRICULAR FUNCTION (HFPEF): ICD-10-CM

## 2024-12-05 DIAGNOSIS — N18.6 ESRD ON HEMODIALYSIS: ICD-10-CM

## 2024-12-05 DIAGNOSIS — E78.5 HYPERLIPIDEMIA LDL GOAL <70: ICD-10-CM

## 2024-12-05 LAB
ANION GAP SERPL CALCULATED.3IONS-SCNC: 13 MMOL/L (ref 5–15)
BUN SERPL-MCNC: 36 MG/DL (ref 8–23)
BUN/CREAT SERPL: 6.7 (ref 7–25)
CALCIUM SPEC-SCNC: 9.8 MG/DL (ref 8.6–10.5)
CHLORIDE SERPL-SCNC: 95 MMOL/L (ref 98–107)
CHOLEST SERPL-MCNC: 115 MG/DL (ref 0–200)
CO2 SERPL-SCNC: 29 MMOL/L (ref 22–29)
CREAT BLDA-MCNC: 6 MG/DL (ref 0.6–1.3)
CREAT SERPL-MCNC: 5.38 MG/DL (ref 0.76–1.27)
DEPRECATED RDW RBC AUTO: 61.6 FL (ref 37–54)
EGFRCR SERPLBLD CKD-EPI 2021: 11.4 ML/MIN/1.73
ERYTHROCYTE [DISTWIDTH] IN BLOOD BY AUTOMATED COUNT: 18.9 % (ref 12.3–15.4)
GLUCOSE SERPL-MCNC: 159 MG/DL (ref 65–99)
HBA1C MFR BLD: 6.5 % (ref 4.8–5.6)
HCT VFR BLD AUTO: 39.7 % (ref 37.5–51)
HDLC SERPL-MCNC: 31 MG/DL (ref 40–60)
HGB BLD-MCNC: 12 G/DL (ref 13–17.7)
LDLC SERPL CALC-MCNC: 65 MG/DL (ref 0–100)
LDLC/HDLC SERPL: 2.05 {RATIO}
MCH RBC QN AUTO: 27.1 PG (ref 26.6–33)
MCHC RBC AUTO-ENTMCNC: 30.2 G/DL (ref 31.5–35.7)
MCV RBC AUTO: 89.8 FL (ref 79–97)
PLATELET # BLD AUTO: 146 10*3/MM3 (ref 140–450)
PMV BLD AUTO: 9.8 FL (ref 6–12)
POTASSIUM SERPL-SCNC: 4.5 MMOL/L (ref 3.5–5.2)
RBC # BLD AUTO: 4.42 10*6/MM3 (ref 4.14–5.8)
SODIUM SERPL-SCNC: 137 MMOL/L (ref 136–145)
TRIGL SERPL-MCNC: 102 MG/DL (ref 0–150)
VLDLC SERPL-MCNC: 19 MG/DL (ref 5–40)
WBC NRBC COR # BLD AUTO: 6.71 10*3/MM3 (ref 3.4–10.8)

## 2024-12-05 PROCEDURE — 80048 BASIC METABOLIC PNL TOTAL CA: CPT | Performed by: HOSPITALIST

## 2024-12-05 PROCEDURE — 93454 CORONARY ARTERY ANGIO S&I: CPT | Performed by: INTERNAL MEDICINE

## 2024-12-05 PROCEDURE — 85027 COMPLETE CBC AUTOMATED: CPT | Performed by: HOSPITALIST

## 2024-12-05 PROCEDURE — 25510000001 IOPAMIDOL PER 1 ML: Performed by: INTERNAL MEDICINE

## 2024-12-05 PROCEDURE — 83036 HEMOGLOBIN GLYCOSYLATED A1C: CPT | Performed by: HOSPITALIST

## 2024-12-05 PROCEDURE — C1887 CATHETER, GUIDING: HCPCS | Performed by: INTERNAL MEDICINE

## 2024-12-05 PROCEDURE — 93799 UNLISTED CV SVC/PROCEDURE: CPT | Performed by: INTERNAL MEDICINE

## 2024-12-05 PROCEDURE — C1894 INTRO/SHEATH, NON-LASER: HCPCS | Performed by: INTERNAL MEDICINE

## 2024-12-05 PROCEDURE — 93572 IV DOP VEL&/PRESS C FLO EA: CPT | Performed by: INTERNAL MEDICINE

## 2024-12-05 PROCEDURE — C1769 GUIDE WIRE: HCPCS | Performed by: INTERNAL MEDICINE

## 2024-12-05 PROCEDURE — 25010000002 HEPARIN (PORCINE) PER 1000 UNITS: Performed by: INTERNAL MEDICINE

## 2024-12-05 PROCEDURE — 93571 IV DOP VEL&/PRESS C FLO 1ST: CPT | Performed by: INTERNAL MEDICINE

## 2024-12-05 PROCEDURE — 82565 ASSAY OF CREATININE: CPT

## 2024-12-05 PROCEDURE — 25010000002 MIDAZOLAM PER 1 MG: Performed by: INTERNAL MEDICINE

## 2024-12-05 PROCEDURE — 25010000002 FENTANYL CITRATE (PF) 50 MCG/ML SOLUTION: Performed by: INTERNAL MEDICINE

## 2024-12-05 PROCEDURE — 80061 LIPID PANEL: CPT | Performed by: HOSPITALIST

## 2024-12-05 PROCEDURE — 25010000002 LIDOCAINE 1 % SOLUTION: Performed by: INTERNAL MEDICINE

## 2024-12-05 RX ORDER — ASPIRIN 81 MG/1
81 TABLET ORAL DAILY
Status: DISCONTINUED | OUTPATIENT
Start: 2024-12-06 | End: 2024-12-05 | Stop reason: HOSPADM

## 2024-12-05 RX ORDER — SODIUM CHLORIDE 0.9 % (FLUSH) 0.9 %
10 SYRINGE (ML) INJECTION AS NEEDED
Status: DISCONTINUED | OUTPATIENT
Start: 2024-12-05 | End: 2024-12-05 | Stop reason: HOSPADM

## 2024-12-05 RX ORDER — MIDAZOLAM HYDROCHLORIDE 1 MG/ML
INJECTION, SOLUTION INTRAMUSCULAR; INTRAVENOUS
Status: DISCONTINUED | OUTPATIENT
Start: 2024-12-05 | End: 2024-12-05 | Stop reason: HOSPADM

## 2024-12-05 RX ORDER — ACETAMINOPHEN 325 MG/1
650 TABLET ORAL EVERY 4 HOURS PRN
Status: DISCONTINUED | OUTPATIENT
Start: 2024-12-05 | End: 2024-12-05 | Stop reason: HOSPADM

## 2024-12-05 RX ORDER — OXYCODONE HYDROCHLORIDE 15 MG/1
30 TABLET ORAL EVERY 4 HOURS PRN
Status: DISCONTINUED | OUTPATIENT
Start: 2024-12-05 | End: 2024-12-05 | Stop reason: HOSPADM

## 2024-12-05 RX ORDER — FENTANYL CITRATE 50 UG/ML
INJECTION, SOLUTION INTRAMUSCULAR; INTRAVENOUS
Status: DISCONTINUED | OUTPATIENT
Start: 2024-12-05 | End: 2024-12-05 | Stop reason: HOSPADM

## 2024-12-05 RX ORDER — IOPAMIDOL 755 MG/ML
INJECTION, SOLUTION INTRAVASCULAR
Status: DISCONTINUED | OUTPATIENT
Start: 2024-12-05 | End: 2024-12-05 | Stop reason: HOSPADM

## 2024-12-05 RX ORDER — RANOLAZINE 500 MG/1
500 TABLET, EXTENDED RELEASE ORAL TAKE AS DIRECTED
Qty: 30 TABLET | Refills: 5 | Status: SHIPPED | OUTPATIENT
Start: 2024-12-05

## 2024-12-05 RX ORDER — NITROGLYCERIN 0.4 MG/1
0.4 TABLET SUBLINGUAL
Status: DISCONTINUED | OUTPATIENT
Start: 2024-12-05 | End: 2024-12-05 | Stop reason: HOSPADM

## 2024-12-05 RX ORDER — LIDOCAINE HYDROCHLORIDE 10 MG/ML
INJECTION, SOLUTION INFILTRATION; PERINEURAL
Status: DISCONTINUED | OUTPATIENT
Start: 2024-12-05 | End: 2024-12-05 | Stop reason: HOSPADM

## 2024-12-05 RX ORDER — SODIUM CHLORIDE 0.9 % (FLUSH) 0.9 %
10 SYRINGE (ML) INJECTION EVERY 12 HOURS SCHEDULED
Status: DISCONTINUED | OUTPATIENT
Start: 2024-12-05 | End: 2024-12-05 | Stop reason: HOSPADM

## 2024-12-05 RX ORDER — ASPIRIN 81 MG/1
324 TABLET, CHEWABLE ORAL ONCE
Status: COMPLETED | OUTPATIENT
Start: 2024-12-05 | End: 2024-12-05

## 2024-12-05 RX ORDER — NICOTINE 21 MG/24HR
1 PATCH, TRANSDERMAL 24 HOURS TRANSDERMAL
Status: DISCONTINUED | OUTPATIENT
Start: 2024-12-05 | End: 2024-12-05 | Stop reason: HOSPADM

## 2024-12-05 RX ORDER — HEPARIN SODIUM 1000 [USP'U]/ML
INJECTION, SOLUTION INTRAVENOUS; SUBCUTANEOUS
Status: DISCONTINUED | OUTPATIENT
Start: 2024-12-05 | End: 2024-12-05 | Stop reason: HOSPADM

## 2024-12-05 RX ADMIN — ASPIRIN 81 MG 243 MG: 81 TABLET ORAL at 10:12

## 2024-12-05 RX ADMIN — OXYCODONE HYDROCHLORIDE 30 MG: 15 TABLET ORAL at 15:23

## 2024-12-05 RX ADMIN — OXYCODONE HYDROCHLORIDE 30 MG: 15 TABLET ORAL at 11:38

## 2024-12-05 NOTE — INTERVAL H&P NOTE
H&P reviewed. The patient was examined and there are no changes to the H&P.  Patient with history of CAD, status post JOSE x 3 to the RCA in 2022.  Also with ESRD on hemodialysis with AV fistula left upper arm, diabetes, hyperlipidemia.  Recently with recurrent chest pains.  Underwent stress test that was abnormal revealing anterior ischemia.  Presents today for left heart catheterization +/- PCI via right radial approach with Dr. Oscar.  Right radial Barbeau type a.  Will review labs when available. Discussed suspicion for complex disease based on history and stress test results.  The risks, benefits, and alternatives of the procedure have been reviewed and the patient wishes to proceed.  Also discussed possibility of today's catheterization being diagnostic only.  Further recommendations to follow procedure.      Electronically signed by VICKI Costello, 12/05/24, 10:28 AM EST.

## 2025-01-02 ENCOUNTER — PREP FOR SURGERY (OUTPATIENT)
Dept: OTHER | Facility: HOSPITAL | Age: 60
End: 2025-01-02
Payer: MEDICAID

## 2025-01-02 DIAGNOSIS — I50.30 HEART FAILURE WITH PRESERVED LEFT VENTRICULAR FUNCTION (HFPEF): Primary | ICD-10-CM

## 2025-01-02 DIAGNOSIS — I25.119 CORONARY ARTERY DISEASE INVOLVING NATIVE CORONARY ARTERY OF NATIVE HEART WITH ANGINA PECTORIS: ICD-10-CM

## 2025-01-02 DIAGNOSIS — I47.20 VT (VENTRICULAR TACHYCARDIA): ICD-10-CM

## 2025-01-02 RX ORDER — ASPIRIN 81 MG/1
324 TABLET, CHEWABLE ORAL ONCE
OUTPATIENT
Start: 2025-01-02 | End: 2025-01-02

## 2025-01-02 RX ORDER — ASPIRIN 81 MG/1
81 TABLET ORAL DAILY
OUTPATIENT
Start: 2025-01-03

## 2025-01-02 RX ORDER — SODIUM CHLORIDE 0.9 % (FLUSH) 0.9 %
10 SYRINGE (ML) INJECTION AS NEEDED
OUTPATIENT
Start: 2025-01-02

## 2025-01-02 RX ORDER — SODIUM CHLORIDE 0.9 % (FLUSH) 0.9 %
10 SYRINGE (ML) INJECTION EVERY 12 HOURS SCHEDULED
OUTPATIENT
Start: 2025-01-02

## 2025-01-09 NOTE — PROGRESS NOTES
Brief postprocedural note  -Long discussion with the patient and his caretaker after the procedure about his coronary anatomy/CAD  -Discussed that optimal treatment for his CAD alone would be multivessel bypass, but given his multitude of other comorbidities, he is at high risk candidate for open heart surgery.  After further consideration, the patient expressed that he would not be willing to undergo open heart surgery given all his other functional limitations and likelihood of poor recovery  -As such, recommended at least PCI of the LAD; high risk features due to anatomy and comorbidities.  Bailout left main to LAD stenting if needed, but will defer for now unless intra-vascular imaging suggests we do otherwise  -If proceeding with PCI, would proceed with a femoral approach under monitored anesthesia by the anesthesia team given his difficulty laying still   -Of note, vessel tortuosity may prohibit access to his LAD without further risking associated complications.  If so, would defer on intervention and consider alternative options, which are unfortunately limited  -The nature of his coronary disease and the inherent risks to PCI in his unique situation were discussed.      Cole Oscar MD, MSc, FACC, [facility]  Interventional Cardiology  [facility]

## 2025-01-22 ENCOUNTER — TELEPHONE (OUTPATIENT)
Dept: CARDIOLOGY | Facility: CLINIC | Age: 60
End: 2025-01-22
Payer: MEDICAID

## 2025-01-22 DIAGNOSIS — I25.119 CORONARY ARTERY DISEASE INVOLVING NATIVE CORONARY ARTERY OF NATIVE HEART WITH ANGINA PECTORIS: Primary | ICD-10-CM

## 2025-01-22 NOTE — TELEPHONE ENCOUNTER
Pt's dialysis center called requesting what labs we would like them to draw. Lab order faxed. They have received my fax number and are to fax lab results back when complete.     Fax: [phone]  Phone: [phone]

## 2025-01-22 NOTE — TELEPHONE ENCOUNTER
Called pt and LVM about lab work. Pt traveling from far and is potentially having high potassium levels. We are in need of labs prior to patient cath on Friday.     Awaiting return call. Dacia to reach out over email/phone again to see if we can find a local lab.

## 2025-01-24 ENCOUNTER — ANESTHESIA (OUTPATIENT)
Dept: CARDIOLOGY | Facility: HOSPITAL | Age: 60
End: 2025-01-24
Payer: MEDICAID

## 2025-01-24 ENCOUNTER — HOSPITAL ENCOUNTER (OUTPATIENT)
Facility: HOSPITAL | Age: 60
Setting detail: HOSPITAL OUTPATIENT SURGERY
Discharge: HOME | End: 2025-01-24
Attending: INTERNAL MEDICINE | Admitting: INTERNAL MEDICINE
Payer: MEDICAID

## 2025-01-24 ENCOUNTER — ANESTHESIA EVENT (OUTPATIENT)
Dept: CARDIOLOGY | Facility: HOSPITAL | Age: 60
End: 2025-01-24
Payer: MEDICAID

## 2025-01-24 DIAGNOSIS — I47.20 VT (VENTRICULAR TACHYCARDIA): ICD-10-CM

## 2025-01-24 DIAGNOSIS — I50.30 HEART FAILURE WITH PRESERVED LEFT VENTRICULAR FUNCTION (HFPEF): ICD-10-CM

## 2025-01-24 DIAGNOSIS — I25.119 CORONARY ARTERY DISEASE INVOLVING NATIVE CORONARY ARTERY OF NATIVE HEART WITH ANGINA PECTORIS: ICD-10-CM

## 2025-01-24 LAB
ANION GAP SERPL CALCULATED.3IONS-SCNC: 17 MMOL/L (ref 5–15)
BUN SERPL-MCNC: 45 MG/DL (ref 8–23)
BUN/CREAT SERPL: 5.9 (ref 7–25)
CALCIUM SPEC-SCNC: 9.6 MG/DL (ref 8.6–10.5)
CHLORIDE SERPL-SCNC: 93 MMOL/L (ref 98–107)
CHOLEST SERPL-MCNC: 113 MG/DL (ref 0–200)
CO2 SERPL-SCNC: 26 MMOL/L (ref 22–29)
CREAT SERPL-MCNC: 7.65 MG/DL (ref 0.76–1.27)
DEPRECATED RDW RBC AUTO: 59.1 FL (ref 37–54)
EGFRCR SERPLBLD CKD-EPI 2021: 7.5 ML/MIN/1.73
ERYTHROCYTE [DISTWIDTH] IN BLOOD BY AUTOMATED COUNT: 18 % (ref 12.3–15.4)
GLUCOSE SERPL-MCNC: 136 MG/DL (ref 65–99)
HCT VFR BLD AUTO: 38.1 % (ref 37.5–51)
HDLC SERPL-MCNC: 29 MG/DL (ref 40–60)
HGB BLD-MCNC: 11.8 G/DL (ref 13–17.7)
LDLC SERPL CALC-MCNC: 57 MG/DL (ref 0–100)
LDLC/HDLC SERPL: 1.82 {RATIO}
MCH RBC QN AUTO: 28 PG (ref 26.6–33)
MCHC RBC AUTO-ENTMCNC: 31 G/DL (ref 31.5–35.7)
MCV RBC AUTO: 90.3 FL (ref 79–97)
PLATELET # BLD AUTO: 140 10*3/MM3 (ref 140–450)
PMV BLD AUTO: 9.5 FL (ref 6–12)
POTASSIUM SERPL-SCNC: 4.3 MMOL/L (ref 3.5–5.2)
RBC # BLD AUTO: 4.22 10*6/MM3 (ref 4.14–5.8)
SODIUM SERPL-SCNC: 136 MMOL/L (ref 136–145)
TRIGL SERPL-MCNC: 156 MG/DL (ref 0–150)
VLDLC SERPL-MCNC: 27 MG/DL (ref 5–40)
WBC NRBC COR # BLD AUTO: 5.75 10*3/MM3 (ref 3.4–10.8)

## 2025-01-24 PROCEDURE — 85027 COMPLETE CBC AUTOMATED: CPT | Performed by: HOSPITALIST

## 2025-01-24 PROCEDURE — 36415 COLL VENOUS BLD VENIPUNCTURE: CPT

## 2025-01-24 PROCEDURE — 80061 LIPID PANEL: CPT | Performed by: INTERNAL MEDICINE

## 2025-01-24 PROCEDURE — 80048 BASIC METABOLIC PNL TOTAL CA: CPT | Performed by: HOSPITALIST

## 2025-01-24 RX ORDER — SODIUM CHLORIDE 0.9 % (FLUSH) 0.9 %
10 SYRINGE (ML) INJECTION EVERY 12 HOURS SCHEDULED
Status: DISCONTINUED | OUTPATIENT
Start: 2025-01-24 | End: 2025-01-24 | Stop reason: HOSPADM

## 2025-01-24 RX ORDER — ASPIRIN 81 MG/1
81 TABLET ORAL DAILY
Status: DISCONTINUED | OUTPATIENT
Start: 2025-01-25 | End: 2025-01-24 | Stop reason: HOSPADM

## 2025-01-24 RX ORDER — ASPIRIN 81 MG/1
324 TABLET, CHEWABLE ORAL ONCE
Status: COMPLETED | OUTPATIENT
Start: 2025-01-24 | End: 2025-01-24

## 2025-01-24 RX ORDER — ONDANSETRON 2 MG/ML
4 INJECTION INTRAMUSCULAR; INTRAVENOUS ONCE AS NEEDED
Status: CANCELLED | OUTPATIENT
Start: 2025-01-24

## 2025-01-24 RX ORDER — SODIUM CHLORIDE 0.9 % (FLUSH) 0.9 %
10 SYRINGE (ML) INJECTION AS NEEDED
Status: DISCONTINUED | OUTPATIENT
Start: 2025-01-24 | End: 2025-01-24 | Stop reason: HOSPADM

## 2025-01-24 RX ORDER — IPRATROPIUM BROMIDE AND ALBUTEROL SULFATE 2.5; .5 MG/3ML; MG/3ML
3 SOLUTION RESPIRATORY (INHALATION) ONCE AS NEEDED
Status: CANCELLED | OUTPATIENT
Start: 2025-01-24

## 2025-01-24 RX ADMIN — ASPIRIN 81 MG CHEWABLE TABLET 324 MG: 81 TABLET CHEWABLE at 12:16

## 2025-01-24 NOTE — ANESTHESIA PREPROCEDURE EVALUATION
Anesthesia Evaluation     Patient summary reviewed and Nursing notes reviewed   NPO Solid Status: > 8 hours  NPO Liquid Status: > 2 hours           Airway   Mallampati: II  TM distance: >3 FB  Neck ROM: full  No difficulty expected  Dental    (+) upper dentures, edentulous and lower dentures    Pulmonary    (+) a smoker Current, cigarettes, COPD moderate,sleep apnea ([place])  Cardiovascular     ECG reviewed    (+) hypertension, CAD, angina, CHF , PVD (stents and bypass and amputation (R)), hyperlipidemia  (-) dysrhythmias, cardiac stents    ROS comment: ECG NSR LAE Prol QT     ECHO 2024 EF >55% concentric LVH LVDD  Grade 2 diastolic dysfunction  MV  abnormal.  thickened sclerotic/mobile projection of the anterior leaflet (into the LV outflow tract-no compromise of the LV outflow tract)   There is mild MS moderate MR.  AV moderate AI      MPS 11/24 multivessel coronary disease  intermediate to high risk study compatible with ischemia in at least 1, and possibly 2 vascular distributions    CATH 12/24 Compared to 2022,   70% [id number] LAD stenosis  RCA chronic in stent total occlusion.  (distal left main and ostial LAD disease appear unchanged.)        Neuro/Psych  (-) seizures, CVA  GI/Hepatic/Renal/Endo    (+) hepatitis, renal disease- ESRD, dialysis and CRI, diabetes mellitus type 2  (-) liver disease (see above)    Musculoskeletal     Abdominal    Substance History      OB/GYN          Other   arthritis,     ROS/Med Hx Other: SP R AKA c stump wound non-healing /slow healing dehiscence     LaBs high creatnine c normal K   dialysis yesterday                    Anesthesia Plan    ASA 4     general and MAC     (PFL sedation vs GA   Clearsight may be helpful)  intravenous induction     Anesthetic plan, risks, benefits, and alternatives have been provided, discussed and informed consent has been obtained with: patient.    Plan discussed with CRNA.        CODE STATUS:

## 2025-01-24 NOTE — H&P
[facility] Cardiology   [address], [address]  [place], [place], [address]    [phone]  WWW.[facility]           HISTORY AND PHYSICAL NOTE    Patient Care Team:  Patient Care Team:  Jeanie Francois APRN as PCP - General (Nurse Practitioner)  Nikita Villasenor MD as Consulting Physician (Nephrology)  Feng Nielsen APRN as Nurse Practitioner (Interventional Cardiology)  Hal Wing MD as Consulting Physician (Cardiology)  Carlos Mcgee MD (General Surgery)  Gus Whaley MD as Consulting Physician (General Surgery)  Luis Fernando Rawls MD as Consulting Physician (Vascular Surgery)      Chief complaint: CAD          Subjective:     Cardiac focused problem list:  CAD   [facility] 5/17/2022:  Severe 1 vessel CAD with  of the mid RCA.  Unsuccessful angioplasty of the mid RCA into the RV marginal branch.   [facility] 11/30/2022:  Successful staged PCI of distal RCA  with overlapping JOSE   Stress test 11/2024:  Moderately sized, moderately dense completely reversible defect involving the mid anterior wall, the anterior apical segment and the apex compatible with ischemia. Additionally there is a reversible inferior wall defect post-rest EF 56%, elevated trans-ischemic dilation ratio 1.29  [facility], 12/05/2024:  50% distal left main stenosis with RVR of 0.84 just distal to the ostium of the LAD, 0.92 in the mid segment of the ramus branch, 0.96 in the proximal segment of the circumflex branch.  70% proximal to mid Lad stenosis with RVR of 0.58. , in-stent, of the proximal RCA with reconstitution of flow in the distal segments by right to right and left to left collaterals.   HFrEF  Echocardiogram 11/2024:  LVEF 55-60% with grade 2 diastolic dysfunction. Mild to moderate MR.  Moderate or greater AI.   Hyperlipidemia   Hypertension   PAD  Status post right AKA   History of left fem-distal bypass  LE arterial duplex, 8/26/2024: results in EPIC  CTA  with runoff 9/2024: results reviewed in EPIC  ESRD on hemodialysis       HPI:      Chevy Junior is a 59 y.o. male.  Patient with history of CAD.  Underwent cardiac cath 12/2024 revealing multivessel CAD.  Optimal treatment for his CAD would be multivessel bypass, but given his multitude of other comorbidities, he is considered a high risk candidate.  As such, PCI of the LAD has been recommended with bailout left main to LAD stenting if needed.  He presents today for staged PCI of the LAD with Dr. Oscar.     Review of Systems:  As noted in the HPI    PFSH:  Patient Active Problem List   Diagnosis    Coronary artery disease involving native coronary artery of native heart with angina pectoris    Hyperlipidemia LDL goal <70    Essential hypertension    Type 2 diabetes mellitus, with long-term current use of insulin    Heart failure with preserved left ventricular function (HFpEF)    Current smoker    ESRD on hemodialysis    Abdominal pain    Weight loss    Elevated carcinoembryonic antigen (CEA)    Failure to thrive (child)    Atherosclerosis of native artery of lower extremity with gangrene    Atherosclerosis of native arteries of the extremities with gangrene    Open leg wound, unspecified laterality, initial encounter    Open leg wound    Amputation stump necrosis    Hyponatremia    Positive cardiac stress test       No current facility-administered medications on file prior to encounter.     Current Outpatient Medications on File Prior to Encounter   Medication Sig Dispense Refill    albuterol sulfate  (90 Base) MCG/ACT inhaler Inhale 1 puff Every 4 (Four) Hours As Needed for Wheezing or Shortness of Air.      aspirin (aspirin) 81 MG EC tablet Take 1 tablet by mouth Daily. 30 tablet 8    atorvastatin (LIPITOR) 40 MG tablet Take 1 tablet by mouth Daily. 90 tablet 3    Auryxia 1  MG(Fe) tablet Take 1 tablet by mouth Daily With Lunch.      cinacalcet (SENSIPAR) 30 MG tablet Take 1 tablet by mouth  Daily.      clopidogrel (PLAVIX) 75 MG tablet Take 1 tablet by mouth Daily. 90 tablet 3    Diclofenac Sodium (VOLTAREN) 1 % gel gel Apply  topically to the appropriate area as directed As Needed. prn      gabapentin (NEURONTIN) 300 MG capsule Take 1 capsule by mouth 2 (Two) Times a Day.      Heparin Sodium, Porcine, (heparin, porcine,) 1000 UNIT/ML injection Infuse 1 mL into a venous catheter Every Night.      Insulin Glargine (BASAGLAR KWIKPEN) 100 UNIT/ML injection pen Inject 5-15 Units under the skin into the appropriate area as directed 3 (Three) Times a Day As Needed. Per SS  5-15 UNITS      lactulose (CHRONULAC) 10 GM/15ML solution Take 30 mL by mouth 2 (Two) Times a Day As Needed.      levothyroxine (SYNTHROID, LEVOTHROID) 75 MCG tablet Take 1 tablet by mouth Daily.      lidocaine-prilocaine (EMLA) 2.5-2.5 % cream Apply 1 Application topically to the appropriate area as directed As Needed for Injection Site Pain. Apply before dialysis on Monday, Wednesday and Fridays of each week      metoclopramide (REGLAN) 5 MG tablet Take 1 tablet by mouth Every Night.      midodrine (PROAMATINE) 5 MG tablet Prn on M/W/F, dialysis days      naloxone (NARCAN) 4 MG/0.1ML nasal spray Administer 1 spray into the nostril(s) as directed by provider Take As Directed.      nicotine (NICODERM CQ) 21 MG/24HR patch Place 1 patch on the skin as directed by provider Daily. 60 each 1    nitroglycerin (NITROSTAT) 0.4 MG SL tablet Place 1 tablet under the tongue Every 5 (Five) Minutes As Needed for Chest Pain. 30 tablet 3    ondansetron ODT (ZOFRAN-ODT) 4 MG disintegrating tablet Place 1 tablet on the tongue Every 6 (Six) Hours As Needed for Nausea or Vomiting.      oxyCODONE (ROXICODONE) 30 MG immediate release tablet Take 1 tablet by mouth Every 4 (Four) Hours.      pantoprazole (PROTONIX) 40 MG EC tablet Take 1 tablet by mouth Daily.      ranolazine (Ranexa) 500 MG 12 hr tablet Take 1 tablet by mouth once daily on days that you are NOT  undergoing dialysis 30 tablet 5    sertraline (ZOLOFT) 100 MG tablet Take 1.5 tablets by mouth Daily.      Xphozah 30 MG tablet Take 30 mg by mouth Daily As Needed.         Social History     Socioeconomic History    Marital status:    Tobacco Use    Smoking status: Every Day     Current packs/day: 2.00     Average packs/day: 2.0 packs/day for 40.0 years (80.0 ttl pk-yrs)     Types: Cigarettes    Smokeless tobacco: Never   Vaping Use    Vaping status: Never Used   Substance and Sexual Activity    Alcohol use: Not Currently     Comment: None    Drug use: Not Currently     Types: Hydrocodone     Comment: pain pills from previous injuries    Sexual activity: Defer            Objective:     Vital Sign Min/Max for last 24 hours  No data recorded   No data recorded   No data recorded   No data recorded   No data recorded   No data recorded    No intake or output data in the 24 hours ending 01/24/25 1131        There were no vitals filed for this visit.  CONSTITUTIONAL: No acute distress  RESPIRATORY: Normal effort.   CARDIOVASCULAR: Regular rate and rhythm   PERIPHERAL VASCULAR:   Normal radial pulse. Right AKA. Left femoral pulse 2+; right fem difficult to palpate.  There is no left lower extremity edema.    Labs and radiologic results:  Today's results were reviewed by myself.    Cardiac Data:    Results for orders placed during the hospital encounter of 11/05/24    Adult Transthoracic Echo Complete W/ Cont if Necessary Per Protocol    Interpretation Summary  Physician interpretation.  Good technical quality.    1.  LV size, function, and wall motion are normal.  Mild to moderate concentric left ventricular hypertrophy.  Visually estimated ejection fraction is 55 to 60%.  Grade 2 diastolic dysfunction.  Mild left atrial enlargement.  Borderline right atrial size.  RV size and function are grossly preserved.  No septal defect or intracavitary mass or thrombus.    2.  The mitral valve is markedly morphologically  abnormal.  Both leaflets are thickened and there is a sclerotic and mobile projection of the anterior mitral leaflet into the LV outflow tract.  This does not have the typical appearance of an acute thrombus or vegetation.  There is no compromise of the LV outflow tract.  There is mild mitral stenosis with a mitral valve area of 1.9 cm² by pressure half-time.  There is trivial MR.  The aortic valve is tricuspid and sclerotic but is minimally calcified.  The annulus is at the upper limits of normal size.  There is no mitral stenosis and there is mild to moderate MR.  Pressure half-time of AI is suggestive of moderate or greater disease.  Right-sided heart valves are unremarkable.    3.  There is no pericardial effusion.  The aortic root is mildly dilated at 3.94 cm with no dissection or aneurysm.    4.  Right heart pressures cannot be calculated.           Assessment and Plan:     Problem list:    Coronary artery disease involving native coronary artery of native heart with angina pectoris      ASSESSMENT:  CAD  [facility] 5/2022 revealing severe 1 vessel CAD with  of the mid RCA, unsuccessful angioplasty of the mid RCA  Status post staged PCI of distal RCA CTP with overlapping stents, 11/2022  [facility] 12/2024 revealing 50% distal left main stenosis, 70% proximal to mid LAD stenosis, CTP pf proximal RCA with collaterals.   PAD  Status post right AKA   HFrEF  Most recent echo 11/2024 with LVEF 55-60%, Grade 2 diastolic dysfunction.   Hypertension   Hyperlipidemia     PLAN:  Plan for PCI of the LAD with anesthesia via right femoral access with Dr. Oscar.  Left femoral pulse 2+.  Will review labs when available.   The risks, benefits, and alternatives of the procedure have been reviewed and the patient wishes to proceed.   Further recommendations to follow procedure.       Electronically signed by VICKI Costello, 01/24/25, 12:21 PM EST.

## 2025-01-24 NOTE — PROGRESS NOTES
Brief cardiology update note  -30 minute preprocedural conversation with patient and his primary caretaker. Call was also made to patient's children to discuss  -Discussed the patient's complex coronary disease again. Focal, calcified LAD lesion in a tortuous segment. Also with distal left main disease at a trifurcation.   -Discussed possible inability to safely stent LAD lesion.  Even if successful, it does not address distal left main lesion or RCA .   -Previously not felt to be a good candidate for CABG due to multiple comorbidities and a persistent wound following right BKA.  Wound is still healing.  At that time, the patient also declined CT surgery involvement/evaluation  -Patient now states that since he is clinically stable from a cardiopulmonary standpoint (albeit with a minimal activity level), he would rather explore his candidacy for surgery first rather than to take on the risk of high risk PCI today. Discussed again that he may also be prohibitively high risk for surgery  -As such, we will cancel today's PCI  -[facility]  -Follow-up with [facility] [place] cardiology as currently planned  -Continue current cardiac related medicines and recommended lifestyle/resector modifications    Cole Oscar MD, MSc, FACC, [facility]  Interventional Cardiology  [facility]

## 2025-01-29 ENCOUNTER — TELEPHONE (OUTPATIENT)
Dept: CARDIOLOGY | Facility: CLINIC | Age: 60
End: 2025-01-29
Payer: MEDICAID

## 2025-01-29 NOTE — TELEPHONE ENCOUNTER
PT has an appt with JR on 02/04/2025 that he is cancelling due to seeing Cardiothoracic surgeon on 2/5 in [place]. Pt still wants to be seen shortly after seeing the surgeon, but can only do Tuesday or Thursday due to having dialysis, he is willing to see any provider. Thank you

## 2025-01-29 NOTE — TELEPHONE ENCOUNTER
Caller: FANG (POA)BRENDA    Relationship: Emergency Contact    Best call back number: [phone]     What is the best time to reach you: ANYTIME    Who are you requesting to speak with (clinical staff, provider,  specific staff member): PROVIDER    Do you know the name of the person who called: NA    What was the call regarding: PT WAS UNABLE TO GET SURGERY FOR STENT PLACEMENT DUE TO 80% BLOCKAGE IN FRONT OF HEART AND 3 STENTS ON THE RIGHT BLOCKED. HIGH RISK SO PT WAS REFERRED TO OPEN HEART SURGEON. REFERRED TO OMKAR BARROSO MD IN CARDIOTHORACIC SURGERY. NO APPTS AVAIL. AFTER 2.5.25 WITH ANY PROVIDER ON DR. POLLOCK'S TEAM. PT WANTED TO RES APPT FOR AFTER 2.5.25 SO THAT THEY COULD FOLLOW UP WITH JER NEWELL NP AFTERWARDS. PLEASE REVIEW NOTES FROM DR. QUIJANO AND ADVISE.     Is it okay if the provider responds through MyChart:
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
## 2025-02-05 ENCOUNTER — OFFICE VISIT (OUTPATIENT)
Dept: CARDIAC SURGERY | Facility: CLINIC | Age: 60
End: 2025-02-05
Payer: MEDICAID

## 2025-02-05 VITALS
HEIGHT: 67 IN | OXYGEN SATURATION: 99 % | WEIGHT: 167 LBS | BODY MASS INDEX: 26.21 KG/M2 | SYSTOLIC BLOOD PRESSURE: 146 MMHG | DIASTOLIC BLOOD PRESSURE: 82 MMHG | TEMPERATURE: 97.3 F | HEART RATE: 94 BPM

## 2025-02-05 DIAGNOSIS — I25.119 CORONARY ARTERY DISEASE INVOLVING NATIVE CORONARY ARTERY OF NATIVE HEART WITH ANGINA PECTORIS: Primary | ICD-10-CM

## 2025-02-11 DIAGNOSIS — I25.119 CORONARY ARTERY DISEASE INVOLVING NATIVE CORONARY ARTERY OF NATIVE HEART WITH ANGINA PECTORIS: Primary | ICD-10-CM

## 2025-03-07 ENCOUNTER — TELEPHONE (OUTPATIENT)
Dept: CARDIAC SURGERY | Facility: CLINIC | Age: 60
End: 2025-03-07

## 2025-03-07 NOTE — TELEPHONE ENCOUNTER
Hub staff attempted to follow warm transfer process and was unsuccessful     Caller: BRENDA FANG (POA)    Relationship to patient: Emergency Contact    Best call back number: [phone]     Patient is needing: PATIENT WOULD LIKE TO GO OVER SURGERY/PAT DATE AND TIMES.

## 2025-03-11 ENCOUNTER — APPOINTMENT (OUTPATIENT)
Dept: GENERAL RADIOLOGY | Facility: HOSPITAL | Age: 60
End: 2025-03-11
Payer: MEDICAID

## 2025-03-11 ENCOUNTER — APPOINTMENT (OUTPATIENT)
Dept: NEUROLOGY | Facility: HOSPITAL | Age: 60
End: 2025-03-11
Payer: MEDICAID

## 2025-03-11 ENCOUNTER — APPOINTMENT (OUTPATIENT)
Dept: CT IMAGING | Facility: HOSPITAL | Age: 60
End: 2025-03-11
Payer: MEDICAID

## 2025-03-11 ENCOUNTER — HOSPITAL ENCOUNTER (INPATIENT)
Facility: HOSPITAL | Age: 60
LOS: 2 days | Discharge: HOME | End: 2025-03-13
Attending: THORACIC SURGERY (CARDIOTHORACIC VASCULAR SURGERY) | Admitting: INTERNAL MEDICINE
Payer: MEDICAID

## 2025-03-11 DIAGNOSIS — I62.00 SUBDURAL HEMATOMA, NONTRAUMATIC: Primary | ICD-10-CM

## 2025-03-11 PROBLEM — I25.10 CORONARY ARTERY DISEASE: Status: ACTIVE | Noted: 2025-03-11

## 2025-03-11 PROBLEM — E03.9 HYPOTHYROIDISM (ACQUIRED): Status: ACTIVE | Noted: 2025-03-11

## 2025-03-11 LAB
ALBUMIN SERPL-MCNC: 3.7 G/DL (ref 3.5–5.2)
ALBUMIN/GLOB SERPL: 0.8 G/DL
ALP SERPL-CCNC: 178 U/L (ref 39–117)
ALT SERPL W P-5'-P-CCNC: 9 U/L (ref 1–41)
ANION GAP SERPL CALCULATED.3IONS-SCNC: 19 MMOL/L (ref 5–15)
APTT PPP: 35.6 SECONDS (ref 22–39)
ARTERIAL PATENCY WRIST A: ABNORMAL
AST SERPL-CCNC: 18 U/L (ref 1–40)
ATMOSPHERIC PRESS: ABNORMAL MM[HG]
BASE EXCESS BLDA CALC-SCNC: 1.9 MMOL/L (ref 0–2)
BASOPHILS # BLD AUTO: 0.04 10*3/MM3 (ref 0–0.2)
BASOPHILS NFR BLD AUTO: 0.7 % (ref 0–1.5)
BDY SITE: ABNORMAL
BILIRUB SERPL-MCNC: 0.3 MG/DL (ref 0–1.2)
BODY TEMPERATURE: 37
BUN SERPL-MCNC: 60 MG/DL (ref 8–23)
BUN/CREAT SERPL: 7.5 (ref 7–25)
CALCIUM SPEC-SCNC: 8.6 MG/DL (ref 8.6–10.5)
CHLORIDE SERPL-SCNC: 94 MMOL/L (ref 98–107)
CHOLEST SERPL-MCNC: 102 MG/DL (ref 0–200)
CO2 BLDA-SCNC: 28.4 MMOL/L (ref 22–33)
CO2 SERPL-SCNC: 24 MMOL/L (ref 22–29)
COHGB MFR BLD: 3.3 % (ref 0–2)
CREAT SERPL-MCNC: 8.03 MG/DL (ref 0.76–1.27)
D-LACTATE SERPL-SCNC: 1.4 MMOL/L (ref 0.5–2)
DEPRECATED RDW RBC AUTO: 56.8 FL (ref 37–54)
EGFRCR SERPLBLD CKD-EPI 2021: 7.1 ML/MIN/1.73
EOSINOPHIL # BLD AUTO: 0.19 10*3/MM3 (ref 0–0.4)
EOSINOPHIL NFR BLD AUTO: 3.2 % (ref 0.3–6.2)
EPAP: 0
ERYTHROCYTE [DISTWIDTH] IN BLOOD BY AUTOMATED COUNT: 16.9 % (ref 12.3–15.4)
FIBRINOGEN PPP-MCNC: 470 MG/DL (ref 203–470)
GLOBULIN UR ELPH-MCNC: 4.7 GM/DL
GLUCOSE BLDC GLUCOMTR-MCNC: 107 MG/DL (ref 70–130)
GLUCOSE BLDC GLUCOMTR-MCNC: 121 MG/DL (ref 70–130)
GLUCOSE BLDC GLUCOMTR-MCNC: 137 MG/DL (ref 70–130)
GLUCOSE BLDC GLUCOMTR-MCNC: 145 MG/DL (ref 70–130)
GLUCOSE SERPL-MCNC: 106 MG/DL (ref 65–99)
HBA1C MFR BLD: 6.5 % (ref 4.8–5.6)
HCO3 BLDA-SCNC: 27 MMOL/L (ref 20–26)
HCT VFR BLD AUTO: 34 % (ref 37.5–51)
HCT VFR BLD CALC: 32.2 % (ref 38–51)
HDLC SERPL-MCNC: 32 MG/DL (ref 40–60)
HGB BLD-MCNC: 10.4 G/DL (ref 13–17.7)
HGB BLDA-MCNC: 10.5 G/DL (ref 13.5–17.5)
IMM GRANULOCYTES # BLD AUTO: 0.02 10*3/MM3 (ref 0–0.05)
IMM GRANULOCYTES NFR BLD AUTO: 0.3 % (ref 0–0.5)
INHALED O2 CONCENTRATION: 21 %
INR PPP: 1.47 (ref 0.89–1.12)
IPAP: 0
LDLC SERPL CALC-MCNC: 51 MG/DL (ref 0–100)
LDLC/HDLC SERPL: 1.57 {RATIO}
LYMPHOCYTES # BLD AUTO: 1.51 10*3/MM3 (ref 0.7–3.1)
LYMPHOCYTES NFR BLD AUTO: 25.8 % (ref 19.6–45.3)
MCH RBC QN AUTO: 28.1 PG (ref 26.6–33)
MCHC RBC AUTO-ENTMCNC: 30.6 G/DL (ref 31.5–35.7)
MCV RBC AUTO: 91.9 FL (ref 79–97)
METHGB BLD QL: 0.5 % (ref 0–1.5)
MODALITY: ABNORMAL
MONOCYTES # BLD AUTO: 0.42 10*3/MM3 (ref 0.1–0.9)
MONOCYTES NFR BLD AUTO: 7.2 % (ref 5–12)
NEUTROPHILS NFR BLD AUTO: 3.67 10*3/MM3 (ref 1.7–7)
NEUTROPHILS NFR BLD AUTO: 62.8 % (ref 42.7–76)
NRBC BLD AUTO-RTO: 0 /100 WBC (ref 0–0.2)
OXYHGB MFR BLDV: 91.1 % (ref 94–99)
PAW @ PEAK INSP FLOW SETTING VENT: 0 CMH2O
PCO2 BLDA: 43.7 MM HG (ref 35–45)
PCO2 TEMP ADJ BLD: 43.7 MM HG (ref 35–48)
PH BLDA: 7.4 PH UNITS (ref 7.35–7.45)
PH, TEMP CORRECTED: 7.4 PH UNITS
PLATELET # BLD AUTO: 148 10*3/MM3 (ref 140–450)
PMV BLD AUTO: 10 FL (ref 6–12)
PO2 BLDA: 85.6 MM HG (ref 83–108)
PO2 TEMP ADJ BLD: 85.6 MM HG (ref 83–108)
POTASSIUM SERPL-SCNC: 4.3 MMOL/L (ref 3.5–5.2)
PROT SERPL-MCNC: 8.4 G/DL (ref 6–8.5)
PROTHROMBIN TIME: 18 SECONDS (ref 12.2–14.5)
RBC # BLD AUTO: 3.7 10*6/MM3 (ref 4.14–5.8)
SODIUM SERPL-SCNC: 137 MMOL/L (ref 136–145)
T4 FREE SERPL-MCNC: 0.85 NG/DL (ref 0.92–1.68)
TOTAL RATE: 0 BREATHS/MINUTE
TRIGL SERPL-MCNC: 99 MG/DL (ref 0–150)
TSH SERPL DL<=0.05 MIU/L-ACNC: 7.17 UIU/ML (ref 0.27–4.2)
VENTILATOR MODE: ABNORMAL
VLDLC SERPL-MCNC: 19 MG/DL (ref 5–40)
WBC NRBC COR # BLD AUTO: 5.85 10*3/MM3 (ref 3.4–10.8)

## 2025-03-11 PROCEDURE — 70496 CT ANGIOGRAPHY HEAD: CPT

## 2025-03-11 PROCEDURE — 93005 ELECTROCARDIOGRAM TRACING: CPT | Performed by: PHYSICIAN ASSISTANT

## 2025-03-11 PROCEDURE — 25010000002 LEVETRIRACETAM PER 10 MG: Performed by: FAMILY MEDICINE

## 2025-03-11 PROCEDURE — 95819 EEG AWAKE AND ASLEEP: CPT

## 2025-03-11 PROCEDURE — 84439 ASSAY OF FREE THYROXINE: CPT | Performed by: NURSE PRACTITIONER

## 2025-03-11 PROCEDURE — 71045 X-RAY EXAM CHEST 1 VIEW: CPT

## 2025-03-11 PROCEDURE — 85025 COMPLETE CBC W/AUTO DIFF WBC: CPT | Performed by: FAMILY MEDICINE

## 2025-03-11 PROCEDURE — 99222 1ST HOSP IP/OBS MODERATE 55: CPT | Performed by: THORACIC SURGERY (CARDIOTHORACIC VASCULAR SURGERY)

## 2025-03-11 PROCEDURE — 99222 1ST HOSP IP/OBS MODERATE 55: CPT | Performed by: HOSPITALIST

## 2025-03-11 PROCEDURE — 82805 BLOOD GASES W/O2 SATURATION: CPT

## 2025-03-11 PROCEDURE — 25510000001 IOPAMIDOL PER 1 ML: Performed by: THORACIC SURGERY (CARDIOTHORACIC VASCULAR SURGERY)

## 2025-03-11 PROCEDURE — 99222 1ST HOSP IP/OBS MODERATE 55: CPT | Performed by: NURSE PRACTITIONER

## 2025-03-11 PROCEDURE — 94799 UNLISTED PULMONARY SVC/PX: CPT

## 2025-03-11 PROCEDURE — 99291 CRITICAL CARE FIRST HOUR: CPT | Performed by: INTERNAL MEDICINE

## 2025-03-11 PROCEDURE — 85384 FIBRINOGEN ACTIVITY: CPT | Performed by: NURSE PRACTITIONER

## 2025-03-11 PROCEDURE — 80061 LIPID PANEL: CPT | Performed by: PHYSICIAN ASSISTANT

## 2025-03-11 PROCEDURE — 85610 PROTHROMBIN TIME: CPT | Performed by: NURSE PRACTITIONER

## 2025-03-11 PROCEDURE — 84443 ASSAY THYROID STIM HORMONE: CPT | Performed by: NURSE PRACTITIONER

## 2025-03-11 PROCEDURE — 80053 COMPREHEN METABOLIC PANEL: CPT | Performed by: PHYSICIAN ASSISTANT

## 2025-03-11 PROCEDURE — 83050 HGB METHEMOGLOBIN QUAN: CPT

## 2025-03-11 PROCEDURE — 95819 EEG AWAKE AND ASLEEP: CPT | Performed by: PSYCHIATRY & NEUROLOGY

## 2025-03-11 PROCEDURE — 93010 ELECTROCARDIOGRAM REPORT: CPT | Performed by: INTERNAL MEDICINE

## 2025-03-11 PROCEDURE — 83036 HEMOGLOBIN GLYCOSYLATED A1C: CPT | Performed by: PHYSICIAN ASSISTANT

## 2025-03-11 PROCEDURE — 83605 ASSAY OF LACTIC ACID: CPT | Performed by: FAMILY MEDICINE

## 2025-03-11 PROCEDURE — 82375 ASSAY CARBOXYHB QUANT: CPT

## 2025-03-11 PROCEDURE — 82948 REAGENT STRIP/BLOOD GLUCOSE: CPT

## 2025-03-11 PROCEDURE — 36600 WITHDRAWAL OF ARTERIAL BLOOD: CPT

## 2025-03-11 PROCEDURE — 85730 THROMBOPLASTIN TIME PARTIAL: CPT | Performed by: NURSE PRACTITIONER

## 2025-03-11 PROCEDURE — 70498 CT ANGIOGRAPHY NECK: CPT

## 2025-03-11 PROCEDURE — 93005 ELECTROCARDIOGRAM TRACING: CPT | Performed by: INTERNAL MEDICINE

## 2025-03-11 PROCEDURE — 70450 CT HEAD/BRAIN W/O DYE: CPT

## 2025-03-11 RX ORDER — IPRATROPIUM BROMIDE AND ALBUTEROL SULFATE 2.5; .5 MG/3ML; MG/3ML
3 SOLUTION RESPIRATORY (INHALATION) EVERY 4 HOURS PRN
Status: DISCONTINUED | OUTPATIENT
Start: 2025-03-11 | End: 2025-03-13 | Stop reason: HOSPADM

## 2025-03-11 RX ORDER — METOCLOPRAMIDE 10 MG/1
5 TABLET ORAL NIGHTLY
Status: DISCONTINUED | OUTPATIENT
Start: 2025-03-11 | End: 2025-03-13 | Stop reason: HOSPADM

## 2025-03-11 RX ORDER — DIAZEPAM 10 MG/2ML
5 INJECTION, SOLUTION INTRAMUSCULAR; INTRAVENOUS EVERY 4 HOURS PRN
Status: DISCONTINUED | OUTPATIENT
Start: 2025-03-11 | End: 2025-03-13 | Stop reason: HOSPADM

## 2025-03-11 RX ORDER — RANOLAZINE 500 MG/1
500 TABLET, EXTENDED RELEASE ORAL EVERY 12 HOURS SCHEDULED
Status: DISCONTINUED | OUTPATIENT
Start: 2025-03-11 | End: 2025-03-13 | Stop reason: HOSPADM

## 2025-03-11 RX ORDER — OXYCODONE HYDROCHLORIDE 10 MG/1
20 TABLET ORAL EVERY 4 HOURS PRN
Refills: 0 | Status: DISCONTINUED | OUTPATIENT
Start: 2025-03-11 | End: 2025-03-13 | Stop reason: HOSPADM

## 2025-03-11 RX ORDER — GABAPENTIN 100 MG/1
100 CAPSULE ORAL EVERY 8 HOURS SCHEDULED
Status: DISCONTINUED | OUTPATIENT
Start: 2025-03-11 | End: 2025-03-13 | Stop reason: HOSPADM

## 2025-03-11 RX ORDER — SODIUM CHLORIDE 9 MG/ML
40 INJECTION, SOLUTION INTRAVENOUS AS NEEDED
Status: DISCONTINUED | OUTPATIENT
Start: 2025-03-11 | End: 2025-03-12

## 2025-03-11 RX ORDER — LABETALOL HYDROCHLORIDE 5 MG/ML
INJECTION, SOLUTION INTRAVENOUS
Status: ACTIVE
Start: 2025-03-11 | End: 2025-03-12

## 2025-03-11 RX ORDER — LEVETIRACETAM 500 MG/5ML
1000 INJECTION, SOLUTION, CONCENTRATE INTRAVENOUS ONCE
Status: DISCONTINUED | OUTPATIENT
Start: 2025-03-11 | End: 2025-03-11

## 2025-03-11 RX ORDER — SODIUM CHLORIDE 0.9 % (FLUSH) 0.9 %
10 SYRINGE (ML) INJECTION AS NEEDED
Status: DISCONTINUED | OUTPATIENT
Start: 2025-03-11 | End: 2025-03-13 | Stop reason: HOSPADM

## 2025-03-11 RX ORDER — OXYCODONE HYDROCHLORIDE 15 MG/1
45 TABLET ORAL EVERY 4 HOURS PRN
Refills: 0 | Status: DISCONTINUED | OUTPATIENT
Start: 2025-03-11 | End: 2025-03-11

## 2025-03-11 RX ORDER — NITROGLYCERIN 0.4 MG/1
0.4 TABLET SUBLINGUAL
Status: DISCONTINUED | OUTPATIENT
Start: 2025-03-11 | End: 2025-03-11 | Stop reason: SDUPTHER

## 2025-03-11 RX ORDER — MIDAZOLAM HYDROCHLORIDE 1 MG/ML
2 INJECTION, SOLUTION INTRAMUSCULAR; INTRAVENOUS
Status: DISCONTINUED | OUTPATIENT
Start: 2025-03-11 | End: 2025-03-13 | Stop reason: HOSPADM

## 2025-03-11 RX ORDER — DIAZEPAM 10 MG/2ML
5 INJECTION, SOLUTION INTRAMUSCULAR; INTRAVENOUS ONCE
Status: DISCONTINUED | OUTPATIENT
Start: 2025-03-11 | End: 2025-03-13

## 2025-03-11 RX ORDER — CLOPIDOGREL BISULFATE 75 MG/1
75 TABLET ORAL DAILY
Status: DISCONTINUED | OUTPATIENT
Start: 2025-03-11 | End: 2025-03-13 | Stop reason: HOSPADM

## 2025-03-11 RX ORDER — SODIUM CHLORIDE 0.9 % (FLUSH) 0.9 %
10 SYRINGE (ML) INJECTION EVERY 12 HOURS SCHEDULED
Status: DISCONTINUED | OUTPATIENT
Start: 2025-03-11 | End: 2025-03-13 | Stop reason: HOSPADM

## 2025-03-11 RX ORDER — ATORVASTATIN CALCIUM 40 MG/1
40 TABLET, FILM COATED ORAL DAILY
Status: DISCONTINUED | OUTPATIENT
Start: 2025-03-11 | End: 2025-03-13 | Stop reason: HOSPADM

## 2025-03-11 RX ORDER — NICOTINE 21 MG/24HR
1 PATCH, TRANSDERMAL 24 HOURS TRANSDERMAL
Status: DISCONTINUED | OUTPATIENT
Start: 2025-03-11 | End: 2025-03-13 | Stop reason: HOSPADM

## 2025-03-11 RX ORDER — ASPIRIN 81 MG/1
81 TABLET ORAL DAILY
Status: DISCONTINUED | OUTPATIENT
Start: 2025-03-11 | End: 2025-03-13 | Stop reason: HOSPADM

## 2025-03-11 RX ORDER — CHLORHEXIDINE GLUCONATE 500 MG/1
CLOTH TOPICAL EVERY 12 HOURS PRN
Status: DISCONTINUED | OUTPATIENT
Start: 2025-03-11 | End: 2025-03-11

## 2025-03-11 RX ORDER — OXYCODONE HYDROCHLORIDE 15 MG/1
30 TABLET ORAL EVERY 12 HOURS PRN
Refills: 0 | Status: DISCONTINUED | OUTPATIENT
Start: 2025-03-11 | End: 2025-03-11

## 2025-03-11 RX ORDER — OXYCODONE HYDROCHLORIDE 15 MG/1
22.5 TABLET ORAL EVERY 8 HOURS PRN
Refills: 0 | Status: DISCONTINUED | OUTPATIENT
Start: 2025-03-11 | End: 2025-03-11

## 2025-03-11 RX ORDER — OXYCODONE HYDROCHLORIDE 15 MG/1
22.5 TABLET ORAL EVERY 12 HOURS PRN
Refills: 0 | Status: DISCONTINUED | OUTPATIENT
Start: 2025-03-11 | End: 2025-03-11

## 2025-03-11 RX ORDER — LABETALOL HYDROCHLORIDE 5 MG/ML
10 INJECTION, SOLUTION INTRAVENOUS ONCE
Status: DISCONTINUED | OUTPATIENT
Start: 2025-03-11 | End: 2025-03-13

## 2025-03-11 RX ORDER — SODIUM CHLORIDE 0.9 % (FLUSH) 0.9 %
10 SYRINGE (ML) INJECTION AS NEEDED
Status: DISCONTINUED | OUTPATIENT
Start: 2025-03-11 | End: 2025-03-12

## 2025-03-11 RX ORDER — CHLORHEXIDINE GLUCONATE ORAL RINSE 1.2 MG/ML
15 SOLUTION DENTAL EVERY 12 HOURS
Status: DISCONTINUED | OUTPATIENT
Start: 2025-03-11 | End: 2025-03-11

## 2025-03-11 RX ORDER — METOPROLOL TARTRATE 25 MG/1
25 TABLET, FILM COATED ORAL EVERY 12 HOURS SCHEDULED
Status: DISCONTINUED | OUTPATIENT
Start: 2025-03-11 | End: 2025-03-13 | Stop reason: HOSPADM

## 2025-03-11 RX ORDER — CINACALCET 30 MG/1
30 TABLET, FILM COATED ORAL DAILY
Status: DISCONTINUED | OUTPATIENT
Start: 2025-03-11 | End: 2025-03-13 | Stop reason: HOSPADM

## 2025-03-11 RX ORDER — RANOLAZINE 500 MG/1
500 TABLET, EXTENDED RELEASE ORAL EVERY 12 HOURS SCHEDULED
Status: DISCONTINUED | OUTPATIENT
Start: 2025-03-11 | End: 2025-03-11

## 2025-03-11 RX ORDER — PANTOPRAZOLE SODIUM 40 MG/1
40 TABLET, DELAYED RELEASE ORAL DAILY
Status: DISCONTINUED | OUTPATIENT
Start: 2025-03-11 | End: 2025-03-13 | Stop reason: HOSPADM

## 2025-03-11 RX ORDER — SODIUM CHLORIDE 9 MG/ML
50 INJECTION, SOLUTION INTRAVENOUS CONTINUOUS
Status: DISCONTINUED | OUTPATIENT
Start: 2025-03-11 | End: 2025-03-11

## 2025-03-11 RX ORDER — IOPAMIDOL 755 MG/ML
100 INJECTION, SOLUTION INTRAVASCULAR
Status: COMPLETED | OUTPATIENT
Start: 2025-03-11 | End: 2025-03-11

## 2025-03-11 RX ORDER — ISOSORBIDE MONONITRATE 30 MG/1
30 TABLET, EXTENDED RELEASE ORAL
Status: DISCONTINUED | OUTPATIENT
Start: 2025-03-11 | End: 2025-03-13 | Stop reason: HOSPADM

## 2025-03-11 RX ORDER — METOPROLOL TARTRATE 1 MG/ML
10 INJECTION, SOLUTION INTRAVENOUS ONCE
Status: DISCONTINUED | OUTPATIENT
Start: 2025-03-11 | End: 2025-03-11

## 2025-03-11 RX ORDER — LEVETIRACETAM 500 MG/5ML
1000 INJECTION, SOLUTION, CONCENTRATE INTRAVENOUS ONCE
Status: COMPLETED | OUTPATIENT
Start: 2025-03-11 | End: 2025-03-11

## 2025-03-11 RX ORDER — SODIUM ZIRCONIUM CYCLOSILICATE 10 G/10G
10 POWDER, FOR SUSPENSION ORAL ONCE
COMMUNITY

## 2025-03-11 RX ORDER — ALBUTEROL SULFATE 0.83 MG/ML
2.5 SOLUTION RESPIRATORY (INHALATION) 4 TIMES DAILY PRN
Status: DISCONTINUED | OUTPATIENT
Start: 2025-03-11 | End: 2025-03-13 | Stop reason: HOSPADM

## 2025-03-11 RX ORDER — NITROGLYCERIN 0.4 MG/1
0.4 TABLET SUBLINGUAL
Status: DISCONTINUED | OUTPATIENT
Start: 2025-03-11 | End: 2025-03-13 | Stop reason: HOSPADM

## 2025-03-11 RX ORDER — LEVETIRACETAM 500 MG/5ML
500 INJECTION, SOLUTION, CONCENTRATE INTRAVENOUS EVERY 12 HOURS SCHEDULED
Status: DISCONTINUED | OUTPATIENT
Start: 2025-03-12 | End: 2025-03-12

## 2025-03-11 RX ORDER — SODIUM CHLORIDE 0.9 % (FLUSH) 0.9 %
10 SYRINGE (ML) INJECTION EVERY 12 HOURS SCHEDULED
Status: DISCONTINUED | OUTPATIENT
Start: 2025-03-11 | End: 2025-03-12

## 2025-03-11 RX ORDER — SODIUM CHLORIDE 9 MG/ML
40 INJECTION, SOLUTION INTRAVENOUS AS NEEDED
Status: DISCONTINUED | OUTPATIENT
Start: 2025-03-11 | End: 2025-03-13 | Stop reason: HOSPADM

## 2025-03-11 RX ORDER — RANOLAZINE 500 MG/1
1000 TABLET, EXTENDED RELEASE ORAL EVERY 12 HOURS SCHEDULED
Status: DISCONTINUED | OUTPATIENT
Start: 2025-03-11 | End: 2025-03-11

## 2025-03-11 RX ORDER — LEVOTHYROXINE SODIUM 75 UG/1
75 TABLET ORAL
Status: DISCONTINUED | OUTPATIENT
Start: 2025-03-11 | End: 2025-03-13 | Stop reason: HOSPADM

## 2025-03-11 RX ADMIN — CLOPIDOGREL BISULFATE 75 MG: 75 TABLET ORAL at 14:47

## 2025-03-11 RX ADMIN — Medication 10 ML: at 22:59

## 2025-03-11 RX ADMIN — OXYCODONE 45 MG: 15 TABLET ORAL at 17:09

## 2025-03-11 RX ADMIN — NICOTINE 1 PATCH: 14 PATCH, EXTENDED RELEASE TRANSDERMAL at 17:51

## 2025-03-11 RX ADMIN — Medication 10 ML: at 22:58

## 2025-03-11 RX ADMIN — GABAPENTIN 100 MG: 100 CAPSULE ORAL at 14:47

## 2025-03-11 RX ADMIN — LEVETIRACETAM 1000 MG: 100 INJECTION, SOLUTION INTRAVENOUS at 22:50

## 2025-03-11 RX ADMIN — IOPAMIDOL 75 ML: 755 INJECTION, SOLUTION INTRAVENOUS at 21:10

## 2025-03-11 RX ADMIN — ISOSORBIDE MONONITRATE 30 MG: 30 TABLET, EXTENDED RELEASE ORAL at 17:08

## 2025-03-11 NOTE — H&P
CTS Consult    Patient Care Team:  Jeanie Francois APRN as PCP - General (Nurse Practitioner)  Nikita Villasenor MD as Consulting Physician (Nephrology)  Feng Nielsen APRN as Nurse Practitioner (Interventional Cardiology)  Hal Wing MD as Consulting Physician (Cardiology)  Carlos Mcgee MD (General Surgery)  Gus Whaley MD as Consulting Physician (General Surgery)  Luis Fernando Rawls MD as Consulting Physician (Vascular Surgery)      Reason for Consult:  Coronary artery disease    HPI  Patient is a 59 y.o. male with a history of COPD, coronary artery disease s/p PCI in 2022 to RCA, active tobacco abuse, type 2 diabetes mellitus, moderate aortic valve insufficiency, hyperlipidemia, hypertension, peripheral vascular disease s/p right AKA and left femoral distal popliteal bypass, ESRD on hemodialysis and history of MRSA infection in right knee prosthesis followed by [facility].  Patient underwent cardiac catheterization on 12/24 which revealed in-stent restenosis of RCA, 70% proximal to mid LAD stenosis and 50% distal left main disease. Complex PCI was contemplated per Dr. Oscar late January but eventually canceled in favor of potential high risk CABG. Patient and caregiver in room report that patient is debilitated and inactive at baseline.      Review of Systems  Constitutional: Positive for malaise/fatigue.  Negative for chills, fever, night sweats and weight loss.  HENT: Negative for hearing loss, odynophagia and sore throat.    Cardiovascular: Negative for claudication and dyspnea on exertion.  Negative for chest pain, leg swelling, orthopnea and palpitations.  Respiratory: Negative for shortness of breath.  Negative for cough and hemoptysis.  Endocrine:  Negative for cold intolerance, heat intolerance, polydipsia, polyphagia and polyuria.  Hematologic/Lymphatic: Negative for easy bruising/bleeding.  Musculoskeletal: Negative for joint pain, joint swelling and myalgias.  Gastrointestinal:  Negative for abdominal pain, constipation, diarrhea, hematemesis, hematochezia, melena, nausea and vomiting.  Genitourinary: Negative for dysuria, frequency and hematuria.  Neurological: Negative for focal weakness, headaches, numbness and seizures.  Psychiatric/Behavioral: Negative for depression and suicidal ideas.  The patient is not nervous/anxious.    All other systems are reviewed and are negative.    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     Cellulitis and abscess of buttock 2023    CHF (congestive heart failure)     Chronic pain     COPD (chronic obstructive pulmonary disease)     INHALER    Coronary artery disease involving native coronary artery of native heart with angina pectoris 09/12/2022    stents x 3    Diabetes mellitus     ESRD on dialysis     M,W,F-FISTULA LEFT ARM    GERD (gastroesophageal reflux disease)     Heart failure with preserved left ventricular function (HFpEF) 09/13/2022    FOLLOWS W/REECE-NO CP    History of blood transfusion     NO ADVERSE REACTIONS    Hyperlipidemia     Hypertension     WIFE STATED B/P HAS BEEN RUNNING LOW LATELY    Infectious viral hepatitis 5 years ago was treated    hx hep c    RIC (obstructive sleep apnea)     Osteoarthritis     Phosphorus metabolic disorder     Sleep apnea     NO MACHINE    Substance abuse 20 years ago from [facility]     Past Surgical History:   Procedure Laterality Date    ABOVE KNEE LEG AMPUTATION Right     CARDIAC CATHETERIZATION      CARDIAC CATHETERIZATION N/A 11/30/2022    Procedure: Percutaneous Coronary Intervention;  Surgeon: River Acevedo IV, MD;  Location:  [facility] CATH INVASIVE LOCATION;  Service: Cardiovascular;  Laterality: N/A;    CARDIAC CATHETERIZATION N/A 11/30/2022    Procedure: Optical Coherent Tomography;  Surgeon: River Acevedo IV, MD;  Location:  [facility] CATH INVASIVE LOCATION;  Service: Cardiovascular;   "Laterality: N/A;    CARDIAC CATHETERIZATION N/A 11/30/2022    Procedure: Stent JOSE coronary;  Surgeon: River Acevedo IV, MD;  Location: [facility] CATH INVASIVE LOCATION;  Service: Cardiovascular;  Laterality: N/A;    CARDIAC CATHETERIZATION Left 11/09/2023    Procedure: Aortogram with left leg angiogram, possible angioplasty or stenting;  Surgeon: Luis Fernando Rawls MD;  Location: [facility] CATH INVASIVE LOCATION;  Service: Vascular;  Laterality: Left;    CARDIAC CATHETERIZATION Right 04/25/2024    Procedure: Right lower extremity angiogram, possible angioplasty or stenting;  Surgeon: Luis Fernando Rawls MD;  Location: [facility] CATH INVASIVE LOCATION;  Service: Vascular;  Laterality: Right;    CARDIAC CATHETERIZATION N/A 12/05/2024    Procedure: Left Heart Cath;  Surgeon: Cole Oscar MD;  Location:  [place] CATH INVASIVE LOCATION;  Service: Cardiovascular;  Laterality: N/A;    CATH LAB PROCEDURE      unable to stent    COLONOSCOPY N/A 02/15/2023    Procedure: COLONOSCOPY WITH POLYPECTOMY;  Surgeon: David Glass MD;  Location: [facility] ENDOSCOPY;  Service: Gastroenterology;  Laterality: N/A;  COLON POLYP, MELANOSIS COLI    ENDOSCOPY N/A 02/03/2023    Procedure: ESOPHAGOGASTRODUODENOSCOPY with biopsy;  Surgeon: Gus Whaley MD;  Location: [facility] ENDOSCOPY;  Service: General;  Laterality: N/A;  gastritis,     FEMORAL POPLITEAL BYPASS Left 12/12/2023    Procedure: Left femoral edarterectomy, left femoral to below the knee popliteal bypass graft;  Surgeon: Luis Fernando Rawls MD;  Location: [facility] MAIN OR;  Service: Vascular;  Laterality: Left;    HIP SURGERY Right     INSERTION PERITONEAL DIALYSIS CATHETER      LEG SURGERY Left     \"jocelyne in place\"    MANDIBLE FRACTURE SURGERY      REPLACEMENT TOTAL KNEE Right     x3    SHOULDER SURGERY Right     SPINE SURGERY      unknown procudure- completed at [facility] in [place].     Family History   Problem Relation Age of Onset    Heart attack Mother     Coronary artery disease " Mother     Hypertension Mother     Arthritis Mother     Hypertension Father     Diabetes Father      Social History     Tobacco Use    Smoking status: Every Day     Current packs/day: 2.00     Average packs/day: 2.0 packs/day for 40.0 years (80.0 ttl pk-yrs)     Types: Cigarettes    Smokeless tobacco: Never    Tobacco comments:     1.5 ppd as of 2/5/25   Vaping Use    Vaping status: Never Used   Substance Use Topics    Alcohol use: Not Currently     Comment: None    Drug use: Not Currently     Types: Hydrocodone     Comment: pain pills from previous injuries     Medications Prior to Admission   Medication Sig Dispense Refill Last Dose/Taking    albuterol sulfate  (90 Base) MCG/ACT inhaler Inhale 1 puff Every 4 (Four) Hours As Needed for Wheezing or Shortness of Air.       aspirin (aspirin) 81 MG EC tablet Take 1 tablet by mouth Daily. 30 tablet 8     atorvastatin (LIPITOR) 40 MG tablet Take 1 tablet by mouth Daily. 90 tablet 3     Auryxia 1  MG(Fe) tablet Take 1 tablet by mouth Daily With Lunch.       cinacalcet (SENSIPAR) 30 MG tablet Take 1 tablet by mouth Daily.       clopidogrel (PLAVIX) 75 MG tablet Take 1 tablet by mouth Daily. (Patient taking differently: Take 1 tablet by mouth Daily. Last dose 3/6/25. Holding prior to CABG) 90 tablet 3     Diclofenac Sodium (VOLTAREN) 1 % gel gel Apply 4 g topically to the appropriate area as directed As Needed. prn       gabapentin (NEURONTIN) 100 MG capsule Take 1 capsule by mouth Daily.       Heparin Sodium, Porcine, (heparin, porcine,) 1000 UNIT/ML injection Infuse 1 mL into a venous catheter. M-W-F with dialysis       Insulin Glargine (BASAGLAR KWIKPEN) 100 UNIT/ML injection pen Inject 5-15 Units under the skin into the appropriate area as directed 3 (Three) Times a Day As Needed. Per SS  5-15 UNITS       lactulose (CHRONULAC) 10 GM/15ML solution Take 30 mL by mouth 2 (Two) Times a Day As Needed.       levothyroxine (SYNTHROID, LEVOTHROID) 75 MCG tablet  Take 1 tablet by mouth Daily.       lidocaine-prilocaine (EMLA) 2.5-2.5 % cream Apply 1 Application topically to the appropriate area as directed As Needed for Injection Site Pain. Apply before dialysis on Monday, Wednesday and Fridays of each week       metoclopramide (REGLAN) 5 MG tablet Take 1 tablet by mouth Every Night.       midodrine (PROAMATINE) 5 MG tablet Take 1 tablet by mouth 3 (Three) Times a Day Before Meals. Prn on M/W/F, dialysis days       naloxone (NARCAN) 4 MG/0.1ML nasal spray Administer 1 spray into the nostril(s) as directed by provider Take As Directed.       nitroglycerin (NITROSTAT) 0.4 MG SL tablet Place 1 tablet under the tongue Every 5 (Five) Minutes As Needed for Chest Pain. 30 tablet 3     ondansetron ODT (ZOFRAN-ODT) 4 MG disintegrating tablet Place 1 tablet on the tongue Every 6 (Six) Hours As Needed for Nausea or Vomiting.       oxyCODONE (ROXICODONE) 30 MG immediate release tablet Take 1.5 tablets by mouth Every 4 (Four) Hours As Needed for Moderate Pain or Severe Pain.       pantoprazole (PROTONIX) 40 MG EC tablet Take 1 tablet by mouth Daily.       ranolazine (Ranexa) 500 MG 12 hr tablet Take 1 tablet by mouth once daily on days that you are NOT undergoing dialysis 30 tablet 5     sertraline (ZOLOFT) 100 MG tablet Take 1.5 tablets by mouth Daily.       sodium zirconium cyclosilicate (Lokelma) 10 g packet Take 10 g by mouth 1 (One) Time. Empty entire contents of the packet(s) into a glass with at least 3 tablespoons (45 mL) of water. Stir well and drink immediately; if powder remains in the glass, add water, stir and drink immediately; repeat until no powder remains. Administer other oral medications at least 2 hours before or 2 hours after dose.       Xphozah 30 MG tablet Take 30 mg by mouth Daily As Needed.        Allergies:  Patient has no known allergies.    Objective    Vital Signs  Temp:  [97.7 °F (36.5 °C)] 97.7 °F (36.5 °C)  Heart Rate:  [92] 92  Resp:  [20] 20  BP:  "(145)/(93) 145/93    Physical Exam:  General Appearance: frail, debilitated, chronically ill appearing  Head: normocephalic, without obvious abnormality and atraumatic  Throat: edentulous, gums healthy and no oral lesions  Neck: no adenopathy, suppple, trachea midline, no thyromegaly, no carotid bruit and no JVD  Lungs: decreased breath sounds and scattered wheeze throughout  Heart: regular rhythm & normal rate, normal S1, S2, no murmur, no sarah, no rub and no click  Abdomen: normal bowel sounds, no masses and soft non-tender  Extremities: right AKA with well healing stump, left lower extremity edema with severe venous stasis disease  Pulses: Pulses palpable and equal bilaterally  Skin: no bleeding, bruising or rash  Neurologic: Mental Status orientated to person, place, time and situation          Data Review:          Coagulation: No results found for: \"INR\", \"APTT\"  Cardiac markers:     ABGs:       Invalid input(s): \"PO2\"      Imaging Results (Last 72 Hours)       ** No results found for the last 72 hours. **              Cardiac catheterization 12/5/24:  Left Main   The vessel is moderate in size. 50% distal segment stenosis. (RFR of 0.84 in the proximal LAD, 0.92 in the mid ramus, 0.96 in the proximal circumflex)      Left Anterior Descending   The vessel is moderate in size. The vessel is tortuous. 50% ostial segment stenosis with an RFR of 0.84 distal to this segment. 70% proximal to mid segment stenosis with an RFR of 0.58 distal to this segment. Small sized diagonal branches.      Ramus Intermedius   The vessel is moderate in size. Ostium not well-visualized but the remainder of this medium sized branch was without disease. RFR with the wire placed in the mid segment was 0.92.      Right Coronary Artery   The vessel is moderate in size. Chronic total occlusion in the proximal segment, within a previously placed stent. Mild right to right and moderate left-to-right collaterals. Medium sized RPLS and RPDA. "          Carotid Duplex 11/5/24:  Technically limited study due to an inability to position the patient appropriately as well as marked mural calcification limiting echo assessment of luminal characteristics.     1.  Both common carotids have nonobstructive atheroma.  This is much more diffuse and abundant on the left.     2.  Moderate bifurcation disease bilaterally with 16 to 49% stenosis by Doppler.  16 to 49% stenosis by Doppler in both internal carotid arteries     3.  The internal carotid arteries are tortuous.  Dopplers compatible with 16 to 49% stenosis bilaterally.     4.  Antegrade flow in both vertebral arteries.    Echo 11/5/24:   Left Ventricle Left ventricular ejection fraction appears to be 51 - 55%.     Normal left ventricular cavity size noted. Left ventricular wall thickness is consistent with mild concentric hypertrophy. Left ventricular diastolic function is consistent with (grade II w/high LAP) pseudonormalization.   Right Ventricle Normal right ventricular cavity size, wall thickness, systolic function and septal motion noted.   Left Atrium The left atrial cavity is mildly dilated.   Right Atrium Normal right atrial cavity size noted.   Aortic Valve The aortic valve is abnormal in structure. There is mild calcification of the aortic valve mainly affecting the non-coronary cusp(s). The aortic valve appears trileaflet. Mild aortic valve regurgitation is present. Mild aortic valve stenosis is present.   Mitral Valve Moderate mitral annular calcification is present. Trace mitral valve regurgitation is present. Mild mitral valve stenosis is present.   Tricuspid Valve The tricuspid valve is structurally normal with no stenosis present. Physiologic tricuspid valve regurgitation is present.   Pulmonic Valve The pulmonic valve is structurally normal with no regurgitation or significant stenosis present.   Greater Vessels Mild dilation of the aortic root is present.   Pericardium The pericardium is  normal. There is no evidence of pericardial effusion. .         Assessment:        Coronary artery disease        Plan:  Patient has multiple comorbidities including ESRD and is s/p right AKA, left femoral popliteal distal bypass with severe venous stasis in LLE.  Patient is inactive at baseline with recent resolution of year long MRSA infection of right knee prosthesis.  He is a prohibitively high risk surgical candidate.  Will discuss with Dr. Oscar conservative medical management versus high risk stenting.      Oly Larsen PA-C  03/11/25  13:27 EDT

## 2025-03-11 NOTE — CONSULTS
[facility] Medicine Services  CONSULT NOTE      Patient Name: Chevy Junior  : 1965  MRN: [id number]    Primary Care Physician: Jeanie Francois APRN  Provider requesting consultation: Gus Lay MD    Subjective   Subjective     Reason for Consultation:  Consult for diabetes management    HPI:  Chevy Junior is a 59 y.o. male past medical history significant for hypothyroidism, debility, COPD, RIC (no CPAP use), CAD, HTN, HLD, CHF, DM type II, ESRD on HD, PVD s/p right AKA and prior left femoropopliteal bypass and severe LLE ch venous stasis, MRSA in the right stump (just finished antibiotics last week after 1 year of tx).  Pt continues to smoke 2 packs/day.  Lives with his ex wife who is his primary caregiver and POA.  Presents to CTS service this morning for planned bypass surgery today.  Was reevaluated by CTS prior to surgery and determined to be prohibitively high risk surgical candidate.  Cardiology consulted for possible high risk stenting versus med management.  Hospital medicine service has been consulted for diabetes management.      Personal History     Past Medical History:   Diagnosis Date    Anemia     Atherosclerosis of native artery of left lower extremity with gangrene     Cellulitis and abscess of buttock     CHF (congestive heart failure)     Chronic pain     COPD (chronic obstructive pulmonary disease)     INHALER    Coronary artery disease involving native coronary artery of native heart with angina pectoris 2022    stents x 3    Diabetes mellitus     ESRD on dialysis     M,W,F-FISTULA LEFT ARM    GERD (gastroesophageal reflux disease)     Heart failure with preserved left ventricular function (HFpEF) 2022    FOLLOWS W/REECE-NO CP    History of blood transfusion     NO ADVERSE REACTIONS    Hyperlipidemia     Hypertension     WIFE STATED B/P HAS BEEN RUNNING LOW LATELY    Hypothyroidism (acquired) 2025    Infectious  viral hepatitis 5 years ago was treated    hx hep c    RIC (obstructive sleep apnea)     Osteoarthritis     Phosphorus metabolic disorder     Sleep apnea     NO MACHINE    Substance abuse 20 years ago from [facility]       Past Surgical History:   Procedure Laterality Date    ABOVE KNEE LEG AMPUTATION Right     CARDIAC CATHETERIZATION      CARDIAC CATHETERIZATION N/A 11/30/2022    Procedure: Percutaneous Coronary Intervention;  Surgeon: River Acevedo IV, MD;  Location: [facility] CATH INVASIVE LOCATION;  Service: Cardiovascular;  Laterality: N/A;    CARDIAC CATHETERIZATION N/A 11/30/2022    Procedure: Optical Coherent Tomography;  Surgeon: River Acevedo IV, MD;  Location: [facility] CATH INVASIVE LOCATION;  Service: Cardiovascular;  Laterality: N/A;    CARDIAC CATHETERIZATION N/A 11/30/2022    Procedure: Stent JOSE coronary;  Surgeon: River Acevedo IV, MD;  Location: [facility] CATH INVASIVE LOCATION;  Service: Cardiovascular;  Laterality: N/A;    CARDIAC CATHETERIZATION Left 11/09/2023    Procedure: Aortogram with left leg angiogram, possible angioplasty or stenting;  Surgeon: Luis Fernando Rawls MD;  Location: [facility] CATH INVASIVE LOCATION;  Service: Vascular;  Laterality: Left;    CARDIAC CATHETERIZATION Right 04/25/2024    Procedure: Right lower extremity angiogram, possible angioplasty or stenting;  Surgeon: Luis Fernando Rawls MD;  Location: [facility] CATH INVASIVE LOCATION;  Service: Vascular;  Laterality: Right;    CARDIAC CATHETERIZATION N/A 12/05/2024    Procedure: Left Heart Cath;  Surgeon: Cole Oscar MD;  Location: [facility] CATH INVASIVE LOCATION;  Service: Cardiovascular;  Laterality: N/A;    CATH LAB PROCEDURE      unable to stent    COLONOSCOPY N/A 02/15/2023    Procedure: COLONOSCOPY WITH POLYPECTOMY;  Surgeon: David Glass MD;  Location: [facility] ENDOSCOPY;  Service: Gastroenterology;  Laterality: N/A;  COLON POLYP, MELANOSIS COLI    ENDOSCOPY N/A 02/03/2023    Procedure:  "ESOPHAGOGASTRODUODENOSCOPY with biopsy;  Surgeon: Gus Whaley MD;  Location: [facility] ENDOSCOPY;  Service: General;  Laterality: N/A;  gastritis,     FEMORAL POPLITEAL BYPASS Left 12/12/2023    Procedure: Left femoral edarterectomy, left femoral to below the knee popliteal bypass graft;  Surgeon: Luis Fernando Rawls MD;  Location: [facility] MAIN OR;  Service: Vascular;  Laterality: Left;    HIP SURGERY Right     INSERTION PERITONEAL DIALYSIS CATHETER      LEG SURGERY Left     \"jocelyne in place\"    MANDIBLE FRACTURE SURGERY      REPLACEMENT TOTAL KNEE Right     x3    SHOULDER SURGERY Right     SPINE SURGERY      unknown procudure- completed at [facility] in [place].       Family History:  family history includes Arthritis in his mother; Coronary artery disease in his mother; Diabetes in his father; Heart attack in his mother; Hypertension in his father and mother. Otherwise pertinent FHx was reviewed and unremarkable.     Social History:  reports that he has been smoking cigarettes. He has a 80 pack-year smoking history. He has never used smokeless tobacco. He reports that he does not currently use alcohol. He reports that he does not currently use drugs after having used the following drugs: Hydrocodone.    Medications:  BASAGLAR KWIKPEN, Diclofenac Sodium, Ferric Citrate, Tenapanor HCl (CKD), albuterol sulfate HFA, aspirin, atorvastatin, cinacalcet, clopidogrel, gabapentin, heparin (porcine), lactulose, levothyroxine, lidocaine-prilocaine, metoclopramide, midodrine, naloxone, nitroglycerin, ondansetron ODT, oxyCODONE, pantoprazole, ranolazine, sertraline, and sodium zirconium cyclosilicate    Scheduled Meds:aspirin, 81 mg, Oral, Daily  atorvastatin, 40 mg, Oral, Daily  cinacalcet, 30 mg, Oral, Daily  clopidogrel, 75 mg, Oral, Daily  gabapentin, 100 mg, Oral, Q8H  isosorbide mononitrate, 30 mg, Oral, Q24H  levothyroxine, 75 mcg, Oral, Q AM  metoclopramide, 5 mg, Oral, Nightly  metoprolol tartrate, 25 mg, Oral, " Q12H  pantoprazole, 40 mg, Oral, Daily  ranolazine, 500 mg, Oral, Q12H  sertraline, 150 mg, Oral, Daily  sodium chloride, 10 mL, Intravenous, Q12H      Continuous Infusions:   PRN Meds:.  albuterol    ipratropium-albuterol    nitroglycerin    oxyCODONE    sodium chloride    sodium chloride    No Known Allergies    Objective   Objective     Vital Signs:   Temp:  [97.7 °F (36.5 °C)-98.2 °F (36.8 °C)] 98.2 °F (36.8 °C)  Heart Rate:  [92] 92  Resp:  [16-20] 16  BP: (135-145)/(79-93) 135/79    Physical Exam  Constitutional: Drowsy but awakens easily to voice.  Resting up in his wheelchair.  Chronically ill, debilitated appearing male.  Wife at bedside.  Eyes: PERRLA, sclerae anicteric, no conjunctival injection  HENT: NCAT, mucous membranes moist  Neck: Supple, no thyromegaly, no lymphadenopathy, trachea midline  Respiratory: Clear to auscultation bilaterally but decreased at bases, nonlabored respirations on room air.  Sats WNL.  Cardiovascular: RRR, no murmurs, rubs, or gallops, palpable pedal pulses bilaterally  Gastrointestinal: Positive bowel sounds, soft, nontender, nondistended.  Musculoskeletal: Old right AKA.  LLE 2+ edema, no clubbing or cyanosis to extremities. TINSLEY spont  Psychiatric: flat affect, cooperative and calm   Neurologic: Oriented x 3, strength symmetric in all extremities but generally weak, Cranial Nerves grossly intact to confrontation, speech clear. Follows commands   Skin: No rashes.  Chronic venous stasis changes to LLE.  Old right AKA stump wound scarred/healed         Result Review:  I have personally reviewed the results from the time of admission and agree with these findings:  [x]  Laboratory  []  Radiology  []  EKG/Telemetry   []  Pathology  [x]  Old records  [x]  Other: Admit labs currently pending   Most notable findings include:     LAB RESULTS:                              Brief Urine Lab Results       None          Microbiology Results (last 10 days)       ** No results found for the  last 240 hours. **            XR Chest 1 View  Result Date: 3/11/2025  XR CHEST 1 VW Date of Exam: 3/11/2025 1:50 PM EDT Indication: preop, coronary artery disease Comparison: [facility] chest x-ray 3/1/2023, 2/1/2023 Findings: Lungs are adequately expanded and appear clear. No pneumothorax or large pleural effusion is seen. Cardiac silhouette remains mildly enlarged. There are old right rib fractures. Postoperative changes of the thoracic spine are new.     Impression: Impression: No acute cardiopulmonary abnormality is identified. Electronically Signed: Princess Reveles  3/11/2025 4:06 PM EDT  Workstation ID: [id number]      Results for orders placed during the hospital encounter of 11/05/24    Adult Transthoracic Echo Complete W/ Cont if Necessary Per Protocol    Interpretation Summary  Physician interpretation.  Good technical quality.    1.  LV size, function, and wall motion are normal.  Mild to moderate concentric left ventricular hypertrophy.  Visually estimated ejection fraction is 55 to 60%.  Grade 2 diastolic dysfunction.  Mild left atrial enlargement.  Borderline right atrial size.  RV size and function are grossly preserved.  No septal defect or intracavitary mass or thrombus.    2.  The mitral valve is markedly morphologically abnormal.  Both leaflets are thickened and there is a sclerotic and mobile projection of the anterior mitral leaflet into the LV outflow tract.  This does not have the typical appearance of an acute thrombus or vegetation.  There is no compromise of the LV outflow tract.  There is mild mitral stenosis with a mitral valve area of 1.9 cm² by pressure half-time.  There is trivial MR.  The aortic valve is tricuspid and sclerotic but is minimally calcified.  The annulus is at the upper limits of normal size.  There is no mitral stenosis and there is mild to moderate MR.  Pressure half-time of AI is suggestive of moderate or greater disease.  Right-sided heart valves are unremarkable.    3.  There  is no pericardial effusion.  The aortic root is mildly dilated at 3.94 cm with no dissection or aneurysm.    4.  Right heart pressures cannot be calculated.      Assessment & Plan   Assessment & Plan     Active Hospital Problems    Diagnosis  POA    **Coronary artery disease [I25.10]  Yes     Priority: High    Hypothyroidism (acquired) [E03.9]  Unknown    Current smoker [F17.200]  Yes    ESRD on hemodialysis [N18.6, Z99.2]  Not Applicable    Heart failure with preserved left ventricular function (HFpEF) [I50.30]  Yes    Essential hypertension [I10]  Yes    Type 2 diabetes mellitus, with long-term current use of insulin [E11.9, Z79.4]  Not Applicable      Resolved Hospital Problems   No resolved problems to display.       Chevy Junior is a 59 y.o. male past medical history significant for hypothyroidism, debility, COPD, RIC (no CPAP use), CAD, HTN, HLD, CHF, DM type II, ESRD on HD, PVD s/p right AKA and prior left femoropopliteal bypass and severe LLE ch venous stasis, MRSA in the right stump (just finished antibiotics last week after 1 year of tx).  Pt continues to smoke 2 packs/day.  Lives with his ex wife who is his primary caregiver and POA.  Presents to CTS service this morning for planned bypass surgery today.  Was reevaluated by CTS prior to surgery and determined to be prohibitively high risk surgical candidate.  Cardiology consulted for possible high risk stenting versus med management.  Hospital medicine service has been consulted for diabetes management.      Assessment/Plan:    CAD s/p prior stenting   HTN/HLD/CHF  -- Initial plans for bypass surgery today.  Now considered to be prohibitively high risk surgical candidate.  -- CTS consulted cardiology to eval for possible high risk stenting versus med management  -- Care per CTS/cards  -- admit labs pending collection.    DM type II (last A1c 6.5 from 12/2024)  -- Uses SSI 3 times daily at home only.  -- Check A1c  -- MD SSI 3 times daily for  now  -- Will be n.p.o. after midnight for possible procedure tomorrow.    ESRD on HD (MWF)  -- Nephrology consulted  -- Dialysis at [facility] in [facility]  -- Totally anuric  -- Monitor labs    COPD/RIC  -- No home oxygen or CPAP use  -- Oxygen as needed  -- Declines CPAP    Recent MRSA infection of right LE stump  -- Was on antibiotics x 1 year followed by ID.  Finished antibiotics last week.    Chronic debility  Poor mobility at baseline  -- Generally wheelchair-bound, stands and pivots to left LE only.  -- Lives with ex-wife who is his POA and caregiver    Hypothyroidism  -- Check TSH  -- Continue Synthroid for now    Disposition: Per primary service    Thank you for allowing [facility] Medicine Service to provide consultative care for your patient, we will continue to follow while clinically appropriate.    Sara Hassan, APRN  03/11/25

## 2025-03-11 NOTE — CONSULTS
[facility] Cardiology   [address], [address]  [place], [place], [address]    [phone]  [facility]           INPATIENT CONSULTATION NOTE    Patient Care Team:  Patient Care Team:  Jeanie Francois APRN as PCP - General (Nurse Practitioner)  Nikita Villasenor MD as Consulting Physician (Nephrology)  Feng Nielsen APRN as Nurse Practitioner (Interventional Cardiology)  Hal Wing MD as Consulting Physician (Cardiology)  Carlos Mcgee MD (General Surgery)  Gus Whaley MD as Consulting Physician (General Surgery)  Luis Fernando Rawls MD as Consulting Physician (Vascular Surgery)    Requesting Provider and Reason for consultation: The patient is being seen today at the request of Gus Lay MD for CAD.     Chief complaint: CAD          Subjective:     Cardiac focused problem list:  CAD   [facility] at [facility] 5/17/2022:  Severe 1 vessel CAD with  of the mid RCA.  Unsuccessful angioplasty of the mid RCA into the RV marginal branch.   [facility] 11/30/2022:  Successful staged PCI of distal RCA  with overlapping JOSE   Stress test 11/2024:  Moderately sized, moderately dense completely reversible defect involving the mid anterior wall, the anterior apical segment and the apex compatible with ischemia. Additionally there is a reversible inferior wall defect post-rest EF 56%, elevated trans-ischemic dilation ratio 1.29  [facility], 12/05/2024:  50% distal left main stenosis with RVR of 0.84 just distal to the ostium of the LAD, 0.92 in the mid segment of the ramus branch, 0.96 in the proximal segment of the circumflex branch.  70% proximal to mid Lad stenosis with RVR of 0.58. , in-stent, of the proximal RCA with reconstitution of flow in the distal segments by right to right and left to left collaterals.   HFrEF  Echocardiogram 11/2024:  LVEF 55-60% with grade 2 diastolic dysfunction. Mild to moderate MR.  Moderate or greater AI.   Hyperlipidemia    Hypertension   PAD  Status post right AKA   History of left fem-distal bypass  LE arterial duplex, 8/26/2024: results in [facility]  CTA with runoff 9/2024: results reviewed in EPIC  ESRD on hemodialysis    HPI:      Chevy Junior is a 59 y.o. male.  Patient with known multivessel CAD.  Last cardiac cath 12/2024 revealing 50% distal left main stenosis with RVR of 0.84 just distal to the ostium of the LAD, 0.92 in the mid segment of the ramus branch, 0.96 in the proximal segment of the circumflex branch.  70% proximal to mid Lad stenosis with RVR of 0.58. , in-stent, of the proximal RCA with reconstitution of flow in the distal segments by right to right and left to left collaterals.  He was previously not felt to be a good candidate for surgery due to multiple co-morbidities and a persistent wound following BKA.  Recently re-evaluated by CT surgery, VICKI Howell and Dr. Lay.  Dr. Lay felt that the patient was an acceptable surgical candidate for CABG although at higher risk due to multiple co-morbidities.  Patient and family were agreeable.      He presented to [facility] for scheduled CABG today.  CT surgery evaluated again today and felt patient is a prohibitively high risk surgical candidate.  Cardiology, Dr. Oscar, consulted to discuss conservative medical management versus high risk PCI.  Patient reports he has episodes of mild to moderate chest pain about once a week, sometimes brief lasting only a few minutes but sometimes can last up to an hour.  Caregiver at bedside and reports that he has had two ED visits at [facility] ED for chest pain in the last tow months.  Currently he denies chest pain, shortness of breath.     Review of Systems:  As noted in the HPI    PFSH:  Patient Active Problem List   Diagnosis    Coronary artery disease involving native coronary artery of native heart with angina pectoris    Hyperlipidemia LDL goal <70    Essential hypertension    Type 2 diabetes mellitus, with  long-term current use of insulin    Heart failure with preserved left ventricular function (HFpEF)    Current smoker    ESRD on hemodialysis    Abdominal pain    Weight loss    Elevated carcinoembryonic antigen (CEA)    Failure to thrive (child)    Atherosclerosis of native artery of lower extremity with gangrene    Atherosclerosis of native arteries of the extremities with gangrene    Open leg wound, unspecified laterality, initial encounter    Open leg wound    Amputation stump necrosis    Hyponatremia    Positive cardiac stress test    Coronary artery disease       No current facility-administered medications on file prior to encounter.     Current Outpatient Medications on File Prior to Encounter   Medication Sig Dispense Refill    albuterol sulfate  (90 Base) MCG/ACT inhaler Inhale 1 puff Every 4 (Four) Hours As Needed for Wheezing or Shortness of Air.      aspirin (aspirin) 81 MG EC tablet Take 1 tablet by mouth Daily. 30 tablet 8    atorvastatin (LIPITOR) 40 MG tablet Take 1 tablet by mouth Daily. 90 tablet 3    Auryxia 1  MG(Fe) tablet Take 1 tablet by mouth Daily With Lunch.      cinacalcet (SENSIPAR) 30 MG tablet Take 1 tablet by mouth Daily.      clopidogrel (PLAVIX) 75 MG tablet Take 1 tablet by mouth Daily. (Patient taking differently: Take 1 tablet by mouth Daily. Last dose 3/6/25. Holding prior to CABG) 90 tablet 3    Diclofenac Sodium (VOLTAREN) 1 % gel gel Apply 4 g topically to the appropriate area as directed As Needed. prn      gabapentin (NEURONTIN) 100 MG capsule Take 1 capsule by mouth Daily.      Heparin Sodium, Porcine, (heparin, porcine,) 1000 UNIT/ML injection Infuse 1 mL into a venous catheter. M-W-F with dialysis      Insulin Glargine (BASAGLAR KWIKPEN) 100 UNIT/ML injection pen Inject 5-15 Units under the skin into the appropriate area as directed 3 (Three) Times a Day As Needed. Per SS  5-15 UNITS      lactulose (CHRONULAC) 10 GM/15ML solution Take 30 mL by mouth 2 (Two)  Times a Day As Needed.      levothyroxine (SYNTHROID, LEVOTHROID) 75 MCG tablet Take 1 tablet by mouth Daily.      lidocaine-prilocaine (EMLA) 2.5-2.5 % cream Apply 1 Application topically to the appropriate area as directed As Needed for Injection Site Pain. Apply before dialysis on Monday, Wednesday and Fridays of each week      metoclopramide (REGLAN) 5 MG tablet Take 1 tablet by mouth Every Night.      midodrine (PROAMATINE) 5 MG tablet Take 1 tablet by mouth 3 (Three) Times a Day Before Meals. Prn on M/W/F, dialysis days      naloxone (NARCAN) 4 MG/0.1ML nasal spray Administer 1 spray into the nostril(s) as directed by provider Take As Directed.      nitroglycerin (NITROSTAT) 0.4 MG SL tablet Place 1 tablet under the tongue Every 5 (Five) Minutes As Needed for Chest Pain. 30 tablet 3    ondansetron ODT (ZOFRAN-ODT) 4 MG disintegrating tablet Place 1 tablet on the tongue Every 6 (Six) Hours As Needed for Nausea or Vomiting.      oxyCODONE (ROXICODONE) 30 MG immediate release tablet Take 1.5 tablets by mouth Every 4 (Four) Hours As Needed for Moderate Pain or Severe Pain.      pantoprazole (PROTONIX) 40 MG EC tablet Take 1 tablet by mouth Daily.      ranolazine (Ranexa) 500 MG 12 hr tablet Take 1 tablet by mouth once daily on days that you are NOT undergoing dialysis 30 tablet 5    sertraline (ZOLOFT) 100 MG tablet Take 1.5 tablets by mouth Daily.      sodium zirconium cyclosilicate (Lokelma) 10 g packet Take 10 g by mouth 1 (One) Time. Empty entire contents of the packet(s) into a glass with at least 3 tablespoons (45 mL) of water. Stir well and drink immediately; if powder remains in the glass, add water, stir and drink immediately; repeat until no powder remains. Administer other oral medications at least 2 hours before or 2 hours after dose.      Xphozah 30 MG tablet Take 30 mg by mouth Daily As Needed.         Social History     Socioeconomic History    Marital status:    Tobacco Use    Smoking  status: Every Day     Current packs/day: 2.00     Average packs/day: 2.0 packs/day for 40.0 years (80.0 ttl pk-yrs)     Types: Cigarettes    Smokeless tobacco: Never    Tobacco comments:     1.5 ppd as of 2/5/25   Vaping Use    Vaping status: Never Used   Substance and Sexual Activity    Alcohol use: Not Currently     Comment: None    Drug use: Not Currently     Types: Hydrocodone     Comment: pain pills from previous injuries    Sexual activity: Defer            Objective:     Vital Sign Min/Max for last 24 hours  Temp  Min: 97.7 °F (36.5 °C)  Max: 97.7 °F (36.5 °C)   BP  Min: 145/93  Max: 145/93   Pulse  Min: 92  Max: 92   Resp  Min: 20  Max: 20   SpO2  Min: 94 %  Max: 94 %   No data recorded    No intake or output data in the 24 hours ending 03/11/25 1432        Vitals:    03/11/25 1228   BP: 145/93   Pulse: 92   Resp: 20   Temp: 97.7 °F (36.5 °C)   SpO2: 94%     CONSTITUTIONAL: No acute distress, frail, chronically ill appearing.   RESPIRATORY: Normal effort. Decreased breath sounds.  On room air.   CARDIOVASCULAR: Regular rate and rhythm with normal S1 and S2. Without murmur.  PERIPHERAL VASCULAR: No carotid bruit bilaterally.  Normal radial pulse. Right AKA with well healing stump, left LE edema with venous stasis    Labs and radiologic results:  Today's results were reviewed by myself.    Cardiac Data:    Results for orders placed during the hospital encounter of 11/05/24    Adult Transthoracic Echo Complete W/ Cont if Necessary Per Protocol    Interpretation Summary  Physician interpretation.  Good technical quality.    1.  LV size, function, and wall motion are normal.  Mild to moderate concentric left ventricular hypertrophy.  Visually estimated ejection fraction is 55 to 60%.  Grade 2 diastolic dysfunction.  Mild left atrial enlargement.  Borderline right atrial size.  RV size and function are grossly preserved.  No septal defect or intracavitary mass or thrombus.    2.  The mitral valve is markedly  morphologically abnormal.  Both leaflets are thickened and there is a sclerotic and mobile projection of the anterior mitral leaflet into the LV outflow tract.  This does not have the typical appearance of an acute thrombus or vegetation.  There is no compromise of the LV outflow tract.  There is mild mitral stenosis with a mitral valve area of 1.9 cm² by pressure half-time.  There is trivial MR.  The aortic valve is tricuspid and sclerotic but is minimally calcified.  The annulus is at the upper limits of normal size.  There is no mitral stenosis and there is mild to moderate MR.  Pressure half-time of AI is suggestive of moderate or greater disease.  Right-sided heart valves are unremarkable.    3.  There is no pericardial effusion.  The aortic root is mildly dilated at 3.94 cm with no dissection or aneurysm.    4.  Right heart pressures cannot be calculated.           Assessment and Plan:     Problem list:    Coronary artery disease      ASSESSMENT:  CAD  [facility] 5/2022 revealing severe 1 vessel CAD with  of the mid RCA, unsuccessful angioplasty of the mid RCA  Status post staged PCI of distal RCA CTP with overlapping stents, 11/2022  [facility] 12/2024 revealing 50% distal left main stenosis, 70% proximal to mid LAD stenosis, CTP pf proximal RCA with collaterals.   Prohibitively high risk for surgery due to multiple co-morbidities.   PAD  Status post right AKA   HFrEF  Most recent echo 11/2024 with LVEF 55-60%, Grade 2 diastolic dysfunction.   Hypertension   Hyperlipidemia     PLAN:  Patient felt to be prohibitively high risk for surgery from CT surgery standpoint.    Has developed new anginal symptoms since his last cath.  Having chest pain about once a week.   Prior cath films from 12/2024 reviewed again with Dr. Oscar.  Treatment options include optimization of medical therapy versus high risk PCI.    Long discussion with patient and caregiver today regarding treatment options.  Patient would like to continue to  consider possible PCI overnight.  Will discuss again tomorrow morning with Dr. Oscar.    Will keep patient npo after midnight for possible high risk PCI with Dr. Oscar.   Adding low dose beta blocker.    Unable to increase Ranexa due to risk of QT prolongation and neurotoxicity in HD patients.  Adding Imdur 30 mg daily.   Continue DAPT, statin.       Assessment and plan discussed with Dr. Oscar and RN today.     Electronically signed by VICKI Costello, 03/11/25, 4:12 PM EDT.

## 2025-03-12 ENCOUNTER — APPOINTMENT (OUTPATIENT)
Dept: MRI IMAGING | Facility: HOSPITAL | Age: 60
End: 2025-03-12
Payer: MEDICAID

## 2025-03-12 ENCOUNTER — TELEPHONE (OUTPATIENT)
Dept: CARDIOLOGY | Facility: CLINIC | Age: 60
End: 2025-03-12
Payer: MEDICAID

## 2025-03-12 ENCOUNTER — APPOINTMENT (OUTPATIENT)
Dept: CT IMAGING | Facility: HOSPITAL | Age: 60
End: 2025-03-12
Payer: MEDICAID

## 2025-03-12 ENCOUNTER — APPOINTMENT (OUTPATIENT)
Dept: NEPHROLOGY | Facility: HOSPITAL | Age: 60
End: 2025-03-12
Payer: MEDICAID

## 2025-03-12 LAB
ANION GAP SERPL CALCULATED.3IONS-SCNC: 17 MMOL/L (ref 5–15)
BUN SERPL-MCNC: 57 MG/DL (ref 8–23)
BUN/CREAT SERPL: 8 (ref 7–25)
CALCIUM SPEC-SCNC: 7.3 MG/DL (ref 8.6–10.5)
CHLORIDE SERPL-SCNC: 102 MMOL/L (ref 98–107)
CHOLEST SERPL-MCNC: 74 MG/DL (ref 0–200)
CO2 SERPL-SCNC: 21 MMOL/L (ref 22–29)
CREAT SERPL-MCNC: 7.09 MG/DL (ref 0.76–1.27)
DEPRECATED RDW RBC AUTO: 54.6 FL (ref 37–54)
EGFRCR SERPLBLD CKD-EPI 2021: 8.2 ML/MIN/1.73
ERYTHROCYTE [DISTWIDTH] IN BLOOD BY AUTOMATED COUNT: 16.7 % (ref 12.3–15.4)
GLUCOSE BLDC GLUCOMTR-MCNC: 111 MG/DL (ref 70–130)
GLUCOSE BLDC GLUCOMTR-MCNC: 129 MG/DL (ref 70–130)
GLUCOSE BLDC GLUCOMTR-MCNC: 192 MG/DL (ref 70–130)
GLUCOSE BLDC GLUCOMTR-MCNC: 204 MG/DL (ref 70–130)
GLUCOSE SERPL-MCNC: 116 MG/DL (ref 65–99)
HCT VFR BLD AUTO: 26.6 % (ref 37.5–51)
HDLC SERPL-MCNC: 24 MG/DL (ref 40–60)
HGB BLD-MCNC: 8.3 G/DL (ref 13–17.7)
LDLC SERPL CALC-MCNC: 33 MG/DL (ref 0–100)
LDLC/HDLC SERPL: 1.43 {RATIO}
MAGNESIUM SERPL-MCNC: 1.8 MG/DL (ref 1.6–2.4)
MCH RBC QN AUTO: 28.1 PG (ref 26.6–33)
MCHC RBC AUTO-ENTMCNC: 31.2 G/DL (ref 31.5–35.7)
MCV RBC AUTO: 90.2 FL (ref 79–97)
PHOSPHATE SERPL-MCNC: 6.1 MG/DL (ref 2.5–4.5)
PLATELET # BLD AUTO: 120 10*3/MM3 (ref 140–450)
PMV BLD AUTO: 10.4 FL (ref 6–12)
POTASSIUM SERPL-SCNC: 4 MMOL/L (ref 3.5–5.2)
QT INTERVAL: 360 MS
QT INTERVAL: 410 MS
QTC INTERVAL: 482 MS
QTC INTERVAL: 515 MS
RBC # BLD AUTO: 2.95 10*6/MM3 (ref 4.14–5.8)
SODIUM SERPL-SCNC: 140 MMOL/L (ref 136–145)
TRIGL SERPL-MCNC: 78 MG/DL (ref 0–150)
VLDLC SERPL-MCNC: 17 MG/DL (ref 5–40)
WBC NRBC COR # BLD AUTO: 5.09 10*3/MM3 (ref 3.4–10.8)

## 2025-03-12 PROCEDURE — 99232 SBSQ HOSP IP/OBS MODERATE 35: CPT

## 2025-03-12 PROCEDURE — 80048 BASIC METABOLIC PNL TOTAL CA: CPT | Performed by: PHYSICIAN ASSISTANT

## 2025-03-12 PROCEDURE — 99233 SBSQ HOSP IP/OBS HIGH 50: CPT | Performed by: PSYCHIATRY & NEUROLOGY

## 2025-03-12 PROCEDURE — 85027 COMPLETE CBC AUTOMATED: CPT | Performed by: PHYSICIAN ASSISTANT

## 2025-03-12 PROCEDURE — 5A1D70Z PERFORMANCE OF URINARY FILTRATION, INTERMITTENT, LESS THAN 6 HOURS PER DAY: ICD-10-PCS | Performed by: INTERNAL MEDICINE

## 2025-03-12 PROCEDURE — 84100 ASSAY OF PHOSPHORUS: CPT | Performed by: NURSE PRACTITIONER

## 2025-03-12 PROCEDURE — 70450 CT HEAD/BRAIN W/O DYE: CPT

## 2025-03-12 PROCEDURE — 83735 ASSAY OF MAGNESIUM: CPT | Performed by: NURSE PRACTITIONER

## 2025-03-12 PROCEDURE — 82948 REAGENT STRIP/BLOOD GLUCOSE: CPT

## 2025-03-12 PROCEDURE — 25010000002 DIAZEPAM PER 5 MG: Performed by: NURSE PRACTITIONER

## 2025-03-12 PROCEDURE — 99291 CRITICAL CARE FIRST HOUR: CPT | Performed by: INTERNAL MEDICINE

## 2025-03-12 PROCEDURE — 63710000001 INSULIN LISPRO (HUMAN) PER 5 UNITS: Performed by: INTERNAL MEDICINE

## 2025-03-12 PROCEDURE — 99253 IP/OBS CNSLTJ NEW/EST LOW 45: CPT

## 2025-03-12 PROCEDURE — 70551 MRI BRAIN STEM W/O DYE: CPT

## 2025-03-12 PROCEDURE — 80061 LIPID PANEL: CPT | Performed by: NURSE PRACTITIONER

## 2025-03-12 PROCEDURE — 99232 SBSQ HOSP IP/OBS MODERATE 35: CPT | Performed by: HOSPITALIST

## 2025-03-12 RX ORDER — INSULIN LISPRO 100 [IU]/ML
2-7 INJECTION, SOLUTION INTRAVENOUS; SUBCUTANEOUS
Status: DISCONTINUED | OUTPATIENT
Start: 2025-03-12 | End: 2025-03-13 | Stop reason: HOSPADM

## 2025-03-12 RX ORDER — IBUPROFEN 600 MG/1
1 TABLET ORAL
Status: DISCONTINUED | OUTPATIENT
Start: 2025-03-12 | End: 2025-03-13 | Stop reason: HOSPADM

## 2025-03-12 RX ORDER — NICOTINE POLACRILEX 4 MG
15 LOZENGE BUCCAL
Status: DISCONTINUED | OUTPATIENT
Start: 2025-03-12 | End: 2025-03-13 | Stop reason: HOSPADM

## 2025-03-12 RX ORDER — LEVETIRACETAM 500 MG/1
500 TABLET ORAL DAILY
Status: DISCONTINUED | OUTPATIENT
Start: 2025-03-13 | End: 2025-03-13 | Stop reason: HOSPADM

## 2025-03-12 RX ORDER — ALBUMIN (HUMAN) 12.5 G/50ML
12.5 SOLUTION INTRAVENOUS AS NEEDED
Status: CANCELLED | OUTPATIENT
Start: 2025-03-12 | End: 2025-03-13

## 2025-03-12 RX ORDER — LEVETIRACETAM 500 MG/1
500 TABLET ORAL EVERY 12 HOURS SCHEDULED
Status: DISCONTINUED | OUTPATIENT
Start: 2025-03-12 | End: 2025-03-12

## 2025-03-12 RX ORDER — DEXTROSE MONOHYDRATE 25 G/50ML
25 INJECTION, SOLUTION INTRAVENOUS
Status: DISCONTINUED | OUTPATIENT
Start: 2025-03-12 | End: 2025-03-13 | Stop reason: HOSPADM

## 2025-03-12 RX ADMIN — GABAPENTIN 100 MG: 100 CAPSULE ORAL at 21:26

## 2025-03-12 RX ADMIN — INSULIN LISPRO 3 UNITS: 100 INJECTION, SOLUTION INTRAVENOUS; SUBCUTANEOUS at 20:57

## 2025-03-12 RX ADMIN — SERTRALINE HYDROCHLORIDE 150 MG: 100 TABLET ORAL at 11:11

## 2025-03-12 RX ADMIN — OXYCODONE HYDROCHLORIDE 20 MG: 10 TABLET ORAL at 11:10

## 2025-03-12 RX ADMIN — OXYCODONE HYDROCHLORIDE 20 MG: 10 TABLET ORAL at 17:30

## 2025-03-12 RX ADMIN — CINACALCET 30 MG: 30 TABLET ORAL at 11:10

## 2025-03-12 RX ADMIN — Medication 10 ML: at 20:54

## 2025-03-12 RX ADMIN — GABAPENTIN 100 MG: 100 CAPSULE ORAL at 15:37

## 2025-03-12 RX ADMIN — METOPROLOL TARTRATE 25 MG: 25 TABLET, FILM COATED ORAL at 20:54

## 2025-03-12 RX ADMIN — ISOSORBIDE MONONITRATE 30 MG: 30 TABLET, EXTENDED RELEASE ORAL at 11:12

## 2025-03-12 RX ADMIN — RANOLAZINE 500 MG: 500 TABLET, FILM COATED, EXTENDED RELEASE ORAL at 11:10

## 2025-03-12 RX ADMIN — METOPROLOL TARTRATE 25 MG: 25 TABLET, FILM COATED ORAL at 11:11

## 2025-03-12 RX ADMIN — PANTOPRAZOLE SODIUM 40 MG: 40 TABLET, DELAYED RELEASE ORAL at 11:11

## 2025-03-12 RX ADMIN — ATORVASTATIN CALCIUM 40 MG: 40 TABLET, FILM COATED ORAL at 11:11

## 2025-03-12 RX ADMIN — NICOTINE 1 PATCH: 14 PATCH, EXTENDED RELEASE TRANSDERMAL at 10:34

## 2025-03-12 RX ADMIN — METOCLOPRAMIDE 5 MG: 10 TABLET ORAL at 20:54

## 2025-03-12 RX ADMIN — OXYCODONE HYDROCHLORIDE 20 MG: 10 TABLET ORAL at 21:26

## 2025-03-12 RX ADMIN — LEVETIRACETAM 500 MG: 500 TABLET, FILM COATED ORAL at 11:10

## 2025-03-12 RX ADMIN — LEVOTHYROXINE SODIUM 75 MCG: 0.07 TABLET ORAL at 04:33

## 2025-03-12 RX ADMIN — DIAZEPAM 5 MG: 10 INJECTION, SOLUTION INTRAMUSCULAR; INTRAVENOUS at 22:52

## 2025-03-12 RX ADMIN — GABAPENTIN 100 MG: 100 CAPSULE ORAL at 04:33

## 2025-03-12 RX ADMIN — RANOLAZINE 500 MG: 500 TABLET, FILM COATED, EXTENDED RELEASE ORAL at 21:26

## 2025-03-12 NOTE — CONSULTS
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 Patient underwent cardiac catheterization on 12/24 which revealed in-stent restenosis of RCA, 70% proximal to mid LAD stenosis and 50% distal left main disease. While in the hospital, he had seizure activity and transferred to ICU. CT scan head showed small subdural hematoma. Last HD was on Monday. He has been cutting short his dialysis time. Nephrology service has been consulted for ESRD management.     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     Cellulitis and abscess of buttock 2023    CHF (congestive heart failure)     Chronic pain     COPD (chronic obstructive pulmonary disease)     INHALER    Coronary artery disease involving native coronary artery of native heart with angina pectoris 09/12/2022    stents x 3    Diabetes mellitus     ESRD on dialysis     M,W,F-FISTULA LEFT ARM    GERD (gastroesophageal reflux disease)     Heart failure with preserved left ventricular function (HFpEF) 09/13/2022    FOLLOWS DHEERAJ/REECE-NO CP    History of blood transfusion     NO ADVERSE REACTIONS    Hyperlipidemia     Hypertension     WIFE STATED B/P HAS BEEN RUNNING LOW LATELY    Hypothyroidism (acquired) 03/11/2025    Infectious viral hepatitis 5 years ago was treated    hx hep c    RIC (obstructive sleep apnea)     Osteoarthritis     Phosphorus metabolic disorder     Sleep apnea     NO MACHINE    Substance abuse 20 years ago from [employer]   ,    Past Surgical History:   Procedure Laterality Date    ABOVE KNEE LEG AMPUTATION Right     CARDIAC CATHETERIZATION      CARDIAC CATHETERIZATION N/A 11/30/2022    Procedure: Percutaneous Coronary Intervention;  Surgeon: River Acevedo IV, MD;  Location:  [facility];  Service: Cardiovascular;  Laterality: N/A;    CARDIAC CATHETERIZATION N/A 11/30/2022    Procedure: Optical Coherent Tomography;  Surgeon: River Acevedo IV, MD;  Location:  [facility];  Service: Cardiovascular;  Laterality: N/A;    CARDIAC CATHETERIZATION N/A 11/30/2022    Procedure: Stent JOSE coronary;  Surgeon: River Acevedo IV, MD;  Location:  [facility];  Service: Cardiovascular;  Laterality: N/A;    CARDIAC CATHETERIZATION Left 11/09/2023    Procedure: Aortogram with left leg angiogram, possible angioplasty or stenting;  Surgeon: Luis Fernando Rawls MD;  Location: [facility] CATH INVASIVE LOCATION;  Service: Vascular;  Laterality: Left;    CARDIAC CATHETERIZATION Right 04/25/2024    Procedure: Right lower extremity angiogram, possible angioplasty or stenting;  Surgeon: Luis Fernando Rawls MD;  Location: [facility] CATH INVASIVE LOCATION;  Service: Vascular;  Laterality: Right;    CARDIAC CATHETERIZATION N/A 12/05/2024    Procedure: Left Heart Cath;  Surgeon: Cole Oscar MD;  Location: [facility] CATH INVASIVE LOCATION;  Service: Cardiovascular;  Laterality: N/A;    CATH LAB PROCEDURE      unable to stent    COLONOSCOPY N/A 02/15/2023    Procedure: COLONOSCOPY WITH POLYPECTOMY;  Surgeon: David Glass MD;  Location: [facility] ENDOSCOPY;  Service: Gastroenterology;  Laterality: N/A;  COLON POLYP, MELANOSIS COLI    ENDOSCOPY N/A 02/03/2023    Procedure: ESOPHAGOGASTRODUODENOSCOPY with biopsy;  Surgeon: Gus Whaley MD;  Location: [facility] ENDOSCOPY;  Service: General;  Laterality: N/A;  gastritis,     FEMORAL POPLITEAL BYPASS Left 12/12/2023    Procedure: Left femoral  "edarterectomy, left femoral to below the knee popliteal bypass graft;  Surgeon: Luis Fernando Rawls MD;  Location: [facility] MAIN OR;  Service: Vascular;  Laterality: Left;    HIP SURGERY Right     INSERTION PERITONEAL DIALYSIS CATHETER      LEG SURGERY Left     \"jocelyne in place\"    MANDIBLE FRACTURE SURGERY      REPLACEMENT TOTAL KNEE Right     x3    SHOULDER SURGERY Right     SPINE SURGERY      unknown procudure- completed at [facility] in [place].   ,   Family History   Problem Relation Age of Onset    Heart attack Mother     Coronary artery disease Mother     Hypertension Mother     Arthritis Mother     Hypertension Father     Diabetes Father    ,   Social History     Socioeconomic History    Marital status:    Tobacco Use    Smoking status: Every Day     Current packs/day: 2.00     Average packs/day: 2.0 packs/day for 40.0 years (80.0 ttl pk-yrs)     Types: Cigarettes    Smokeless tobacco: Never    Tobacco comments:     1.5 ppd as of 2/5/25   Vaping Use    Vaping status: Never Used   Substance and Sexual Activity    Alcohol use: Not Currently     Comment: None    Drug use: Not Currently     Types: Hydrocodone     Comment: pain pills from previous injuries    Sexual activity: Defer     E-cigarette/Vaping    E-cigarette/Vaping Use Never User     Passive Exposure No     Counseling Given No      E-cigarette/Vaping Substances    Nicotine No     THC No     CBD No     Flavoring No      E-cigarette/Vaping Devices    Disposable No     Pre-filled or Refillable Cartridge No     Refillable Tank No     Pre-filled Pod No          ,   Medications Prior to Admission   Medication Sig Dispense Refill Last Dose/Taking    albuterol sulfate  (90 Base) MCG/ACT inhaler Inhale 1 puff Every 4 (Four) Hours As Needed for Wheezing or Shortness of Air.       aspirin (aspirin) 81 MG EC tablet Take 1 tablet by mouth Daily. 30 tablet 8     atorvastatin (LIPITOR) 40 MG tablet Take 1 tablet by mouth Daily. 90 tablet 3     Auryxia 1  " MG(Fe) tablet Take 1 tablet by mouth Daily With Lunch.       cinacalcet (SENSIPAR) 30 MG tablet Take 1 tablet by mouth Daily.       clopidogrel (PLAVIX) 75 MG tablet Take 1 tablet by mouth Daily. (Patient taking differently: Take 1 tablet by mouth Daily. Last dose 3/6/25. Holding prior to CABG) 90 tablet 3     Diclofenac Sodium (VOLTAREN) 1 % gel gel Apply 4 g topically to the appropriate area as directed As Needed. prn       gabapentin (NEURONTIN) 100 MG capsule Take 1 capsule by mouth Daily.       Heparin Sodium, Porcine, (heparin, porcine,) 1000 UNIT/ML injection Infuse 1 mL into a venous catheter. M-W-F with dialysis       Insulin Glargine (BASAGLAR KWIKPEN) 100 UNIT/ML injection pen Inject 5-15 Units under the skin into the appropriate area as directed 3 (Three) Times a Day As Needed. Per SS  5-15 UNITS       lactulose (CHRONULAC) 10 GM/15ML solution Take 30 mL by mouth 2 (Two) Times a Day As Needed.       levothyroxine (SYNTHROID, LEVOTHROID) 75 MCG tablet Take 1 tablet by mouth Daily.       lidocaine-prilocaine (EMLA) 2.5-2.5 % cream Apply 1 Application topically to the appropriate area as directed As Needed for Injection Site Pain. Apply before dialysis on Monday, Wednesday and Fridays of each week       metoclopramide (REGLAN) 5 MG tablet Take 1 tablet by mouth Every Night.       midodrine (PROAMATINE) 5 MG tablet Take 1 tablet by mouth 3 (Three) Times a Day Before Meals. Prn on M/W/F, dialysis days       naloxone (NARCAN) 4 MG/0.1ML nasal spray Administer 1 spray into the nostril(s) as directed by provider Take As Directed.       nitroglycerin (NITROSTAT) 0.4 MG SL tablet Place 1 tablet under the tongue Every 5 (Five) Minutes As Needed for Chest Pain. 30 tablet 3     ondansetron ODT (ZOFRAN-ODT) 4 MG disintegrating tablet Place 1 tablet on the tongue Every 6 (Six) Hours As Needed for Nausea or Vomiting.       oxyCODONE (ROXICODONE) 30 MG immediate release tablet Take 1.5 tablets by mouth Every 4 (Four)  Hours As Needed for Moderate Pain or Severe Pain.       pantoprazole (PROTONIX) 40 MG EC tablet Take 1 tablet by mouth Daily.       ranolazine (Ranexa) 500 MG 12 hr tablet Take 1 tablet by mouth once daily on days that you are NOT undergoing dialysis 30 tablet 5     sertraline (ZOLOFT) 100 MG tablet Take 1.5 tablets by mouth Daily.       sodium zirconium cyclosilicate (Lokelma) 10 g packet Take 10 g by mouth 1 (One) Time. Empty entire contents of the packet(s) into a glass with at least 3 tablespoons (45 mL) of water. Stir well and drink immediately; if powder remains in the glass, add water, stir and drink immediately; repeat until no powder remains. Administer other oral medications at least 2 hours before or 2 hours after dose.       Xphozah 30 MG tablet Take 30 mg by mouth Daily As Needed.      , Scheduled Meds:  [Held by provider] aspirin, 81 mg, Oral, Daily  atorvastatin, 40 mg, Oral, Daily  cinacalcet, 30 mg, Oral, Daily  [Held by provider] clopidogrel, 75 mg, Oral, Daily  diazePAM, 5 mg, Intravenous, Once  gabapentin, 100 mg, Oral, Q8H  isosorbide mononitrate, 30 mg, Oral, Q24H  labetalol, 10 mg, Intravenous, Once  levETIRAcetam, 500 mg, Intravenous, Q12H  levothyroxine, 75 mcg, Oral, Q AM  metoclopramide, 5 mg, Oral, Nightly  metoprolol tartrate, 25 mg, Oral, Q12H  nicotine, 1 patch, Transdermal, Q24H  pantoprazole, 40 mg, Oral, Daily  ranolazine, 500 mg, Oral, Q12H  sertraline, 150 mg, Oral, Daily  sodium chloride, 10 mL, Intravenous, Q12H  sodium chloride, 10 mL, Intravenous, Q12H   , Continuous Infusions:  niCARdipine, 5-15 mg/hr   , PRN Meds:    albuterol    diazePAM    ipratropium-albuterol    midazolam    nitroglycerin    oxyCODONE    sodium chloride    sodium chloride    sodium chloride    sodium chloride, and Allergies:  Patient has no known allergies.    Review of Systems  Pertinent items are noted in HPI    Objective     Vital Signs  Temp:  [97.6 °F (36.4 °C)-98.2 °F (36.8 °C)] 97.9 °F (36.6  "°C)  Heart Rate:  [] 89  Resp:  [16-20] 18  BP: (118-196)/() 142/69    No intake/output data recorded.  I/O last 3 completed shifts:  In: 480 [P.O.:480]  Out: -     Physical Exam:  General Appearance:    Alert, cooperative, in no acute distress   Head:    Normocephalic, without obvious abnormality, atraumatic   Eyes:            Conjunctivae and sclerae normal, no   icterus, no pallor, corneas clear, PERRLA           Neck:   Supple, trachea midline, no thyromegaly,  no JVD       Lungs:     Clear to auscultation,respirations regular, even and               unlabored    Heart:    Regular rhythm and normal rate, normal S1 and S2, no       murmur, no gallop, no rub, no click       Abdomen:     Normal bowel sounds,soft, non-tender, non-distended, no guarding, no rebound tenderness       Extremities:   Moves all extremities well, ++edema, no cyanosis, no         redness   Pulses:   Pulses palpable and equal bilaterally           Neurologic:   Cranial nerves 2 - 12 grossly intact, no focal deficit         Results Review:   I reviewed the patient's new clinical results.    WBC WBC   Date Value Ref Range Status   03/12/2025 5.09 3.40 - 10.80 10*3/mm3 Final   03/11/2025 5.85 3.40 - 10.80 10*3/mm3 Final      HGB Hemoglobin   Date Value Ref Range Status   03/12/2025 8.3 (L) 13.0 - 17.7 g/dL Final   03/11/2025 10.4 (L) 13.0 - 17.7 g/dL Final      HCT Hematocrit   Date Value Ref Range Status   03/12/2025 26.6 (L) 37.5 - 51.0 % Final   03/11/2025 34.0 (L) 37.5 - 51.0 % Final      Platlets No results found for: \"LABPLAT\"   MCV MCV   Date Value Ref Range Status   03/12/2025 90.2 79.0 - 97.0 fL Final   03/11/2025 91.9 79.0 - 97.0 fL Final          Sodium Sodium   Date Value Ref Range Status   03/12/2025 140 136 - 145 mmol/L Final   03/11/2025 137 136 - 145 mmol/L Final      Potassium Potassium   Date Value Ref Range Status   03/12/2025 4.0 3.5 - 5.2 mmol/L Final     Comment:     Slight hemolysis detected by analyzer. " "Result may be falsely elevated.   03/11/2025 4.3 3.5 - 5.2 mmol/L Final     Comment:     Slight hemolysis detected by analyzer. Result may be falsely elevated.      Chloride Chloride   Date Value Ref Range Status   03/12/2025 102 98 - 107 mmol/L Final   03/11/2025 94 (L) 98 - 107 mmol/L Final      CO2 CO2   Date Value Ref Range Status   03/12/2025 21.0 (L) 22.0 - 29.0 mmol/L Final   03/11/2025 24.0 22.0 - 29.0 mmol/L Final      BUN BUN   Date Value Ref Range Status   03/12/2025 57 (H) 8 - 23 mg/dL Final   03/11/2025 60 (H) 8 - 23 mg/dL Final      Creatinine Creatinine   Date Value Ref Range Status   03/12/2025 7.09 (H) 0.76 - 1.27 mg/dL Final   03/11/2025 8.03 (H) 0.76 - 1.27 mg/dL Final      Calcium Calcium   Date Value Ref Range Status   03/12/2025 7.3 (L) 8.6 - 10.5 mg/dL Final   03/11/2025 8.6 8.6 - 10.5 mg/dL Final      PO4 No results found for: \"CAPO4\"   Albumin Albumin   Date Value Ref Range Status   03/11/2025 3.7 3.5 - 5.2 g/dL Final      Magnesium Magnesium   Date Value Ref Range Status   03/12/2025 1.8 1.6 - 2.4 mg/dL Final      Uric Acid No results found for: \"URICACID\"         [Held by provider] aspirin, 81 mg, Oral, Daily  atorvastatin, 40 mg, Oral, Daily  cinacalcet, 30 mg, Oral, Daily  [Held by provider] clopidogrel, 75 mg, Oral, Daily  diazePAM, 5 mg, Intravenous, Once  gabapentin, 100 mg, Oral, Q8H  isosorbide mononitrate, 30 mg, Oral, Q24H  labetalol, 10 mg, Intravenous, Once  levETIRAcetam, 500 mg, Intravenous, Q12H  levothyroxine, 75 mcg, Oral, Q AM  metoclopramide, 5 mg, Oral, Nightly  metoprolol tartrate, 25 mg, Oral, Q12H  nicotine, 1 patch, Transdermal, Q24H  pantoprazole, 40 mg, Oral, Daily  ranolazine, 500 mg, Oral, Q12H  sertraline, 150 mg, Oral, Daily  sodium chloride, 10 mL, Intravenous, Q12H  sodium chloride, 10 mL, Intravenous, Q12H      niCARdipine, 5-15 mg/hr      Results from last 7 days   Lab Units 03/12/25  0439 03/11/25  1948   SODIUM mmol/L 140 137   POTASSIUM mmol/L 4.0 4.3 "   CHLORIDE mmol/L 102 94*   CO2 mmol/L 21.0* 24.0   BUN mg/dL 57* 60*   CREATININE mg/dL 7.09* 8.03*   CALCIUM mg/dL 7.3* 8.6   ALBUMIN g/dL  --  3.7   WBC 10*3/mm3 5.09 5.85   HEMOGLOBIN g/dL 8.3* 10.4*   PLATELETS 10*3/mm3 120* 148           Assessment & Plan       Subdural hematoma, nontraumatic    Essential hypertension    Type 2 diabetes mellitus, with long-term current use of insulin    Heart failure with preserved left ventricular function (HFpEF)    Current smoker    ESRD on hemodialysis    Coronary artery disease    Hypothyroidism (acquired)      1. End-stage renal disease - MWF  2. CAD - cardiology following   3. Anemia  4. Hyponatremia  5. Hypertension  6- Acute left subdural hematoma - Neurology on board.   7- Seizure          Plan:   - HD today, UF as tolerated.   - Adjust electrolytes with dialysis   - Continue all home medications.   - Renal diet    I discussed the patients findings and my recommendations with patient, family, and nursing staff    Kurt Richardson MD  03/12/25

## 2025-03-12 NOTE — PROGRESS NOTES
"Intensive Care Admission Note     Coronary artery disease    History of Present Illness     Chevy Junior is a 59 y.o. male with past medical history of coronary artery disease, ischemic cardiomyopathy EF 50%, chronic combined systolic and diastolic CHF, peripheral vascular disease  s/p  Rt AKA and left fem-pop bypass, Chronic opiate use, ESRD on HD MWF RIC, COPD, hypertension, hyperlipidemia, hypothyroidism, ? Medication compliance, admitted on 3/11 by CTS for evaluation of potential CABG as he has ongoing anginal symptoms.  The patient was evaluated by Dr. Lay and was felt to be poor candidate for undergoing CABG. The recommendation is for either high risk PCI or medical management.      On 3/11/2025, the patient developed acute onset of encephalopathy and a STAT head CT was obtained that showed \"left hemispheric mixed density subdural hematoma with acute and chronic blood products\" measuring 7 mm maximally over the left frontal lobe, 4 mm midline shift.While awaiting his CT the patient had a witnessed GTC seizure lasting < 1 minute.  He was treated w/ valium 5 mg & keppra 1000 mg and EEG has been ordered STAT.    Patient was transferred to ICU for tight BP management and close monitoring of neuro status.  Neurology and neurosurgery teams consulted.    On my evaluation patient is a bit sleepy but wakes up and answers questions.  A bit confused.  Able to answer a few questions.  Acknowledged that he is in the hospital.  Noted that he undergoes dialysis 3 times a week through his left forearm fistula.  Currently denies having any headache.  Positive for nausea.  No chest pain or shortness of breath or any abdominal discomfort.  Able to lift left upper and lower extremities against gravity.    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     Cellulitis and " abscess of buttock 2023    CHF (congestive heart failure)     Chronic pain     COPD (chronic obstructive pulmonary disease)     INHALER    Coronary artery disease involving native coronary artery of native heart with angina pectoris 09/12/2022    stents x 3    Diabetes mellitus     ESRD on dialysis     M,W,F-FISTULA LEFT ARM    GERD (gastroesophageal reflux disease)     Heart failure with preserved left ventricular function (HFpEF) 09/13/2022    FOLLOWS W/REECE-NO CP    History of blood transfusion     NO ADVERSE REACTIONS    Hyperlipidemia     Hypertension     WIFE STATED B/P HAS BEEN RUNNING LOW LATELY    Hypothyroidism (acquired) 03/11/2025    Infectious viral hepatitis 5 years ago was treated    hx hep c    RIC (obstructive sleep apnea)     Osteoarthritis     Phosphorus metabolic disorder     Sleep apnea     NO MACHINE    Substance abuse 20 years ago from [employer]      Past Surgical History:   Procedure Laterality Date    ABOVE KNEE LEG AMPUTATION Right     CARDIAC CATHETERIZATION      CARDIAC CATHETERIZATION N/A 11/30/2022    Procedure: Percutaneous Coronary Intervention;  Surgeon: River Acevedo IV, MD;  Location:  [facility];  Service: Cardiovascular;  Laterality: N/A;    CARDIAC CATHETERIZATION N/A 11/30/2022    Procedure: Optical Coherent Tomography;  Surgeon: River Acevedo IV, MD;  Location:  [facility];  Service: Cardiovascular;  Laterality: N/A;    CARDIAC CATHETERIZATION N/A 11/30/2022    Procedure: Stent JOSE coronary;  Surgeon: River Acevedo IV, MD;  Location:  [facility];  Service: Cardiovascular;  Laterality: N/A;    CARDIAC CATHETERIZATION Left 11/09/2023    Procedure: Aortogram with left leg angiogram, possible angioplasty or stenting;  Surgeon: Luis Fernando Rawls MD;  Location:  [facility];  Service: Vascular;  Laterality: Left;    CARDIAC CATHETERIZATION Right 04/25/2024    Procedure: Right lower  "extremity angiogram, possible angioplasty or stenting;  Surgeon: Luis Fernando Rawls MD;  Location: [facility] CATH INVASIVE LOCATION;  Service: Vascular;  Laterality: Right;    CARDIAC CATHETERIZATION N/A 12/05/2024    Procedure: Left Heart Cath;  Surgeon: Cole Oscar MD;  Location: [facility] CATH INVASIVE [facility];  Service: Cardiovascular;  Laterality: N/A;    CATH LAB PROCEDURE      unable to stent    COLONOSCOPY N/A 02/15/2023    Procedure: COLONOSCOPY WITH POLYPECTOMY;  Surgeon: David Glass MD;  Location: [facility] ENDOSCOPY;  Service: Gastroenterology;  Laterality: N/A;  COLON POLYP, MELANOSIS COLI    ENDOSCOPY N/A 02/03/2023    Procedure: ESOPHAGOGASTRODUODENOSCOPY with biopsy;  Surgeon: Gus Whaley MD;  Location: [facility] ENDOSCOPY;  Service: General;  Laterality: N/A;  gastritis,     FEMORAL POPLITEAL BYPASS Left 12/12/2023    Procedure: Left femoral edarterectomy, left femoral to below the knee popliteal bypass graft;  Surgeon: Luis Fernando Rawls MD;  Location: [facility] MAIN OR;  Service: Vascular;  Laterality: Left;    HIP SURGERY Right     INSERTION PERITONEAL DIALYSIS CATHETER      LEG SURGERY Left     \"jocelyne in place\"    MANDIBLE FRACTURE SURGERY      REPLACEMENT TOTAL KNEE Right     x3    SHOULDER SURGERY Right     SPINE SURGERY      unknown procudure- completed at [facility] in [facility].       No Known Allergies  No current facility-administered medications on file prior to encounter.     Current Outpatient Medications on File Prior to Encounter   Medication Sig    albuterol sulfate  (90 Base) MCG/ACT inhaler Inhale 1 puff Every 4 (Four) Hours As Needed for Wheezing or Shortness of Air.    aspirin (aspirin) 81 MG EC tablet Take 1 tablet by mouth Daily.    atorvastatin (LIPITOR) 40 MG tablet Take 1 tablet by mouth Daily.    Auryxia 1  MG(Fe) tablet Take 1 tablet by mouth Daily With Lunch.    cinacalcet (SENSIPAR) 30 MG tablet Take 1 tablet by mouth Daily.    clopidogrel (PLAVIX) 75 MG tablet Take " 1 tablet by mouth Daily. (Patient taking differently: Take 1 tablet by mouth Daily. Last dose 3/6/25. Holding prior to CABG)    Diclofenac Sodium (VOLTAREN) 1 % gel gel Apply 4 g topically to the appropriate area as directed As Needed. prn    gabapentin (NEURONTIN) 100 MG capsule Take 1 capsule by mouth Daily.    Heparin Sodium, Porcine, (heparin, porcine,) 1000 UNIT/ML injection Infuse 1 mL into a venous catheter. M-W-F with dialysis    Insulin Glargine (BASAGLAR KWIKPEN) 100 UNIT/ML injection pen Inject 5-15 Units under the skin into the appropriate area as directed 3 (Three) Times a Day As Needed. Per SS  5-15 UNITS    lactulose (CHRONULAC) 10 GM/15ML solution Take 30 mL by mouth 2 (Two) Times a Day As Needed.    levothyroxine (SYNTHROID, LEVOTHROID) 75 MCG tablet Take 1 tablet by mouth Daily.    lidocaine-prilocaine (EMLA) 2.5-2.5 % cream Apply 1 Application topically to the appropriate area as directed As Needed for Injection Site Pain. Apply before dialysis on Monday, Wednesday and Fridays of each week    metoclopramide (REGLAN) 5 MG tablet Take 1 tablet by mouth Every Night.    midodrine (PROAMATINE) 5 MG tablet Take 1 tablet by mouth 3 (Three) Times a Day Before Meals. Prn on M/W/F, dialysis days    naloxone (NARCAN) 4 MG/0.1ML nasal spray Administer 1 spray into the nostril(s) as directed by provider Take As Directed.    nitroglycerin (NITROSTAT) 0.4 MG SL tablet Place 1 tablet under the tongue Every 5 (Five) Minutes As Needed for Chest Pain.    ondansetron ODT (ZOFRAN-ODT) 4 MG disintegrating tablet Place 1 tablet on the tongue Every 6 (Six) Hours As Needed for Nausea or Vomiting.    oxyCODONE (ROXICODONE) 30 MG immediate release tablet Take 1.5 tablets by mouth Every 4 (Four) Hours As Needed for Moderate Pain or Severe Pain.    pantoprazole (PROTONIX) 40 MG EC tablet Take 1 tablet by mouth Daily.    ranolazine (Ranexa) 500 MG 12 hr tablet Take 1 tablet by mouth once daily on days that you are NOT  "undergoing dialysis    sertraline (ZOLOFT) 100 MG tablet Take 1.5 tablets by mouth Daily.    sodium zirconium cyclosilicate (Lokelma) 10 g packet Take 10 g by mouth 1 (One) Time. Empty entire contents of the packet(s) into a glass with at least 3 tablespoons (45 mL) of water. Stir well and drink immediately; if powder remains in the glass, add water, stir and drink immediately; repeat until no powder remains. Administer other oral medications at least 2 hours before or 2 hours after dose.    Xphozah 30 MG tablet Take 30 mg by mouth Daily As Needed.     MEDICATION LIST AND ALLERGIES REVIEWED.    Family History   Problem Relation Age of Onset    Heart attack Mother     Coronary artery disease Mother     Hypertension Mother     Arthritis Mother     Hypertension Father     Diabetes Father      Social History     Tobacco Use    Smoking status: Every Day     Current packs/day: 2.00     Average packs/day: 2.0 packs/day for 40.0 years (80.0 ttl pk-yrs)     Types: Cigarettes    Smokeless tobacco: Never    Tobacco comments:     1.5 ppd as of 2/5/25   Vaping Use    Vaping status: Never Used   Substance Use Topics    Alcohol use: Not Currently     Comment: None    Drug use: Not Currently     Types: Hydrocodone     Comment: pain pills from previous injuries     Social History     Social History Narrative    Chronically debilitated.  Poor mobility at baseline.  Wheelchair-bound generally and cannot stand/pivot on left leg.  Lives with his ex-wife who is his POA and caregiver.  No home oxygen use.  No home CPAP use.     FAMILY AND SOCIAL HISTORY REVIEWED.      Physical Exam and Clinical Information   /64 (BP Location: Right arm, Patient Position: Lying)   Pulse 96   Temp 97.9 °F (36.6 °C) (Oral)   Resp 18   Ht 170.2 cm (67\")   Wt 77.6 kg (171 lb)   SpO2 92%   BMI 26.78 kg/m²     Physical exam  General : Aox2. In no acute distress. Well developed no acute distress.  Neuro: CN 2-12 grossly intact, left upper and lower " extremity power 4/5.  Right upper extremity 5/5.  Unable to test right lower extremity given AKA status.  Able to move the stump.  HEENT : AT,NC,PERRLA,No pallor, No Icterus,No thyromegaly. No JVD.  CVS : RRR, No M/R/G.   Resp/Chest: B/l ant VBS+. No wheeze/rales  Abd: Soft, Non distended, No tenderness, No organomegaly. Bowel sounds +  EXT: Left upper extremity AV fistula present.  Right AKA noted.  Left lower extremity pitting edema present.  SKIN : Left lower extremity chronic stasis dermatitis changes noted.        Results from last 7 days   Lab Units 03/11/25 1948   SODIUM mmol/L 137   POTASSIUM mmol/L 4.3   CO2 mmol/L 24.0   BUN mg/dL 60*   CREATININE mg/dL 8.03*   GLUCOSE mg/dL 106*     Estimated Creatinine Clearance: 10.7 mL/min (A) (by C-G formula based on SCr of 8.03 mg/dL (H)).  Results from last 7 days   Lab Units 03/11/25 1948   HEMOGLOBIN A1C % 6.50*     Results from last 7 days   Lab Units 03/11/25 1947   PH, ARTERIAL pH units 7.400   PCO2, ARTERIAL mm Hg 43.7   PO2 ART mm Hg 85.6     Lab Results   Component Value Date    LACTATE 1.4 03/11/2025        Images:     I reviewed the patient's results and images.     Impression     Acute encephalopathy  New onset seizure  Acute to subacute left frontal subdural hematoma  Coronary artery disease  Chronic combined systolic and diastolic congestive heart failure without exacerbation  Ischemic cardiomyopathy  Peripheral vascular disease S/p Ryt Fem-pop Bypass and Rt AKA  Essential hypertension  Mixed hyperlipidemia  History of COPD-currently not in exacerbation  RIC on CPAP ?  Compliance  ESRD on hemodialysis MWF  GERD  Acquired hypothyroidism  Type 2 diabetes mellitus  Chronic pain syndrome on high-dose opiate use  Incidentally noted paravertebral soft tissue thickening    Plan/Recommendations     Neuro  -Acute encephalopathy likely secondary to seizures and postictal phase.  Had witnessed clonic tonic activity predominantly on his right upper and lower  extremity consistent with focus in the left cortex likely corresponding to the area of hemorrhage.  Received 5 mg Valium 1000 mg Keppra.  Started Keppra 500 mg twice daily.  Continue gabapentin 100 mg every 8 hours.  As needed benzodiazepines for refractory seizures.  EEG ordered.  Neurology and neurosurgery consulted.  Maintain seizure precautions.  Neurochecks per protocol.    -Repeat CT head ordered at 6 hours and 24 hours.  MRI brain ordered.  CTA head with no evidence of any vascular abnormalities.    -Continue home antihypertensive medications.  Hold if patient is unable to swallow.  As needed Cardene ordered to maintain systolic blood pressure less than 150 mm hg.    -Hold antiplatelets.  Hold DVT prophylaxis.  Check APTT and INR.  Type and screen.    CVS  -Patient with history of multiple LHC with active coronary artery disease and untreated stenosis.  Deemed not a candidate for CABG.  Cardiology and CT surgery were on board.  Unfortunately at this time need to hold aspirin and Plavix given his head bleed.  Monitor for worsening chest pain symptoms.  Repeat EKG as needed.  -Continue Lipitor.  -Continue metoprolol, ranolazine and Imdur.  -Right AKA stump with no complications.    Pulmonary  -On supplemental oxygen to maintain SpO2 greater than 94.  -As needed CPAP    Renal  -ESRD on hemodialysis.HD per nephrology team.    GI/Liver  Continue PPI    ID  Clinically no signs of infection    Hem-onc  Monitor platelet count and PT and INR.  With signs of any worsening bleeding might need to consider platelet transfusion if less than 100.    Endocrine  Continue levothyroxine.  SSI    Misc  Prevertebral soft tissue thickening with prominence in the right [place] tonsillar region. Underlying tonsillar mass cannot be excluded. No pathologically enlarged cervical lymph nodes. There are however multiple enlarged upper mediastinal lymph   nodes which could be metastatic or reactive in etiology.       Time spent Critical  care 50 min (exclusive of procedure time)  including high complexity decision making to assess, manipulate, and support vital organ system failure in this individual who has impairment of one or more vital organ systems such that there is a high probability of imminent or life threatening deterioration in the patient’s condition.      Gerry Nolasco MD   Critical Care Medicine  03/11/25 20:52 EDT         9 minutes of critical care provided by VICKI Cardoso in addendum accordance with split/shared billing. This time excludes other billable procedures. Time does include preparation of documents, medical consultations, review of old records, and direct bedside care. Patient is at high risk for life-threatening deterioration due to SDH.   Electronically signed by VICKI Zamora, 03/11/25, 8:52 PM EDT.

## 2025-03-12 NOTE — NURSING NOTE
1934 Rapid response called dt AMS. Patient alert to self, answers to all other questions not appropriate. Orders for CBC, CMP, ABG and stat head ct.     Patient unable to do CT. Md stat paged for med orders. Patient taken to a holding room waiting for meds from pharmacy. While in hold room patient had a 1 minute and 15 seconds. A 2nd rapid response was called at this time. Chest xray obtained at this time,     Official ct head results reported to stroke ana maria and hospitalitis. Patient to be transferred to ICU

## 2025-03-12 NOTE — PROGRESS NOTES
Neurology Note    Patient:  Chevy Junior    YOB: 1965    REFERRING PHYSICIAN:  Gus Lay MD    CHIEF COMPLAINT:    Seizures    HISTORY OF PRESENT ILLNESS:   The patient has not had any further seizures, currently back to baseline. His wife reports that he has been having episodes of staring and not responding for over 6 months. She denies him drinking alcohol or remembers him hitting his head.    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     Cellulitis and abscess of buttock 2023    CHF (congestive heart failure)     Chronic pain     COPD (chronic obstructive pulmonary disease)     INHALER    Coronary artery disease involving native coronary artery of native heart with angina pectoris 09/12/2022    stents x 3    Diabetes mellitus     ESRD on dialysis     M,W,F-FISTULA LEFT ARM    GERD (gastroesophageal reflux disease)     Heart failure with preserved left ventricular function (HFpEF) 09/13/2022    FOLLOWS W/REECE-NO CP    History of blood transfusion     NO ADVERSE REACTIONS    Hyperlipidemia     Hypertension     WIFE STATED B/P HAS BEEN RUNNING LOW LATELY    Hypothyroidism (acquired) 03/11/2025    Infectious viral hepatitis 5 years ago was treated    hx hep c    RIC (obstructive sleep apnea)     Osteoarthritis     Phosphorus metabolic disorder     Sleep apnea     NO MACHINE    Substance abuse 20 years ago from [facility]       Past Surgical History:  Past Surgical History:   Procedure Laterality Date    ABOVE KNEE LEG AMPUTATION Right     CARDIAC CATHETERIZATION      CARDIAC CATHETERIZATION N/A 11/30/2022    Procedure: Percutaneous Coronary Intervention;  Surgeon: River Acevedo IV, MD;  Location: [facility] INVASIVE LOCATION;  Service: Cardiovascular;  Laterality: N/A;    CARDIAC CATHETERIZATION N/A 11/30/2022    Procedure: Optical Coherent Tomography;  Surgeon: Curtis  "River DODSON IV, MD;  Location:  [place] CATH INVASIVE LOCATION;  Service: Cardiovascular;  Laterality: N/A;    CARDIAC CATHETERIZATION N/A 11/30/2022    Procedure: Stent JOSE coronary;  Surgeon: River Acevedo IV, MD;  Location:  [place] CATH INVASIVE LOCATION;  Service: Cardiovascular;  Laterality: N/A;    CARDIAC CATHETERIZATION Left 11/09/2023    Procedure: Aortogram with left leg angiogram, possible angioplasty or stenting;  Surgeon: Luis Fernando Rawls MD;  Location: [facility] ED CATH INVASIVE LOCATION;  Service: Vascular;  Laterality: Left;    CARDIAC CATHETERIZATION Right 04/25/2024    Procedure: Right lower extremity angiogram, possible angioplasty or stenting;  Surgeon: Luis Fernando Rawls MD;  Location: [facility] ED CATH INVASIVE LOCATION;  Service: Vascular;  Laterality: Right;    CARDIAC CATHETERIZATION N/A 12/05/2024    Procedure: Left Heart Cath;  Surgeon: Cole Oscar MD;  Location:  [place] CATH INVASIVE LOCATION;  Service: Cardiovascular;  Laterality: N/A;    CATH LAB PROCEDURE      unable to stent    COLONOSCOPY N/A 02/15/2023    Procedure: COLONOSCOPY WITH POLYPECTOMY;  Surgeon: David Glass MD;  Location: [facility] ED ENDOSCOPY;  Service: Gastroenterology;  Laterality: N/A;  COLON POLYP, MELANOSIS COLI    ENDOSCOPY N/A 02/03/2023    Procedure: ESOPHAGOGASTRODUODENOSCOPY with biopsy;  Surgeon: Gus Whaley MD;  Location: [facility] ED ENDOSCOPY;  Service: General;  Laterality: N/A;  gastritis,     FEMORAL POPLITEAL BYPASS Left 12/12/2023    Procedure: Left femoral edarterectomy, left femoral to below the knee popliteal bypass graft;  Surgeon: Luis Fernando Rawls MD;  Location: [facility] ED MAIN OR;  Service: Vascular;  Laterality: Left;    HIP SURGERY Right     INSERTION PERITONEAL DIALYSIS CATHETER      LEG SURGERY Left     \"jocelyne in place\"    MANDIBLE FRACTURE SURGERY      REPLACEMENT TOTAL KNEE Right     x3    SHOULDER SURGERY Right     SPINE SURGERY      unknown procudure- completed at [facility] in [place]. "       Social History:   Social History     Socioeconomic History    Marital status:    Tobacco Use    Smoking status: Every Day     Current packs/day: 2.00     Average packs/day: 2.0 packs/day for 40.0 years (80.0 ttl pk-yrs)     Types: Cigarettes    Smokeless tobacco: Never    Tobacco comments:     1.5 ppd as of 2/5/25   Vaping Use    Vaping status: Never Used   Substance and Sexual Activity    Alcohol use: Not Currently     Comment: None    Drug use: Not Currently     Types: Hydrocodone     Comment: pain pills from previous injuries    Sexual activity: Defer        Family History:   Family History   Problem Relation Age of Onset    Heart attack Mother     Coronary artery disease Mother     Hypertension Mother     Arthritis Mother     Hypertension Father     Diabetes Father        Medications Prior to Admission:    Prior to Admission medications    Medication Sig Start Date End Date Taking? Authorizing Provider   albuterol sulfate  (90 Base) MCG/ACT inhaler Inhale 1 puff Every 4 (Four) Hours As Needed for Wheezing or Shortness of Air.    Janie Lynch MD   aspirin (aspirin) 81 MG EC tablet Take 1 tablet by mouth Daily. 9/13/22   River Acevedo IV, MD   atorvastatin (LIPITOR) 40 MG tablet Take 1 tablet by mouth Daily. 11/19/24   Feng Nielsen APRN   Auryxia 1  MG(Fe) tablet Take 1 tablet by mouth Daily With Lunch. 4/25/24   Aj Chavira MD   cinacalcet (SENSIPAR) 30 MG tablet Take 1 tablet by mouth Daily. 7/5/21   Janie Lynch MD   clopidogrel (PLAVIX) 75 MG tablet Take 1 tablet by mouth Daily.  Patient taking differently: Take 1 tablet by mouth Daily. Last dose 3/6/25. Holding prior to CABG 10/31/24   Feng Nielsen APRN   Diclofenac Sodium (VOLTAREN) 1 % gel gel Apply 4 g topically to the appropriate area as directed As Needed. prn 10/2/24   Janie Lynch MD   gabapentin (NEURONTIN) 100 MG capsule Take 1 capsule by mouth Daily.    Syed  MD Janie   Heparin Sodium, Porcine, (heparin, porcine,) 1000 UNIT/ML injection Infuse 1 mL into a venous catheter. M-W-F with dialysis    Janie Lynch MD   Insulin Glargine (BASAGLAR KWIKPEN) 100 UNIT/ML injection pen Inject 5-15 Units under the skin into the appropriate area as directed 3 (Three) Times a Day As Needed. Per SS  5-15 UNITS 6/16/21   Janie Lynch MD   lactulose (CHRONULAC) 10 GM/15ML solution Take 30 mL by mouth 2 (Two) Times a Day As Needed.    Janie Lynch MD   levothyroxine (SYNTHROID, LEVOTHROID) 75 MCG tablet Take 1 tablet by mouth Daily. 2/2/22   Janie Lynch MD   lidocaine-prilocaine (EMLA) 2.5-2.5 % cream Apply 1 Application topically to the appropriate area as directed As Needed for Injection Site Pain. Apply before dialysis on Monday, Wednesday and Fridays of each week 4/15/24   Janie Lynch MD   metoclopramide (REGLAN) 5 MG tablet Take 1 tablet by mouth Every Night.    Janie Lynch MD   midodrine (PROAMATINE) 5 MG tablet Take 1 tablet by mouth 3 (Three) Times a Day Before Meals. Prn on M/W/F, dialysis days 10/16/24   Janie Lynch MD   naloxone (NARCAN) 4 MG/0.1ML nasal spray Administer 1 spray into the nostril(s) as directed by provider Take As Directed.    Janie Lynch MD   nitroglycerin (NITROSTAT) 0.4 MG SL tablet Place 1 tablet under the tongue Every 5 (Five) Minutes As Needed for Chest Pain. 10/31/24   Feng Nielsen APRN   ondansetron ODT (ZOFRAN-ODT) 4 MG disintegrating tablet Place 1 tablet on the tongue Every 6 (Six) Hours As Needed for Nausea or Vomiting.    Janie Lynch MD   oxyCODONE (ROXICODONE) 30 MG immediate release tablet Take 1.5 tablets by mouth Every 4 (Four) Hours As Needed for Moderate Pain or Severe Pain.    Janie Lynch MD   pantoprazole (PROTONIX) 40 MG EC tablet Take 1 tablet by mouth Daily.    Janie Lynch MD   ranolazine (Ranexa) 500 MG 12 hr tablet Take 1  tablet by mouth once daily on days that you are NOT undergoing dialysis 12/5/24   Cole Oscar MD   sertraline (ZOLOFT) 100 MG tablet Take 1.5 tablets by mouth Daily. 6/16/21   Janie Lynch MD   sodium zirconium cyclosilicate (Lokelma) 10 g packet Take 10 g by mouth 1 (One) Time. Empty entire contents of the packet(s) into a glass with at least 3 tablespoons (45 mL) of water. Stir well and drink immediately; if powder remains in the glass, add water, stir and drink immediately; repeat until no powder remains. Administer other oral medications at least 2 hours before or 2 hours after dose.    Janie Lynch MD   Xphozah 30 MG tablet Take 30 mg by mouth Daily As Needed. 10/30/24   Janie Lynch MD       Allergies:  Patient has no known allergies.      Review of system  Review of Systems   Neurological:  Negative for seizures.   All other systems reviewed and are negative.      Vitals:    03/12/25 1645   BP: 124/97   Pulse: 92   Resp: 18   Temp: 97.5 °F (36.4 °C)   SpO2:        Physical exam  Physical Exam  Eyes:      Extraocular Movements: Extraocular movements intact.   Cardiovascular:      Rate and Rhythm: Normal rate and regular rhythm.   Pulmonary:      Effort: Pulmonary effort is normal.   Neurological:      General: No focal deficit present.      Mental Status: He is alert.      Comments: Oriented to self, town, hospital ([facility]), year, month of January. Speech clear, no facial droop, moves arms well and left leg some, s/p right AKA, DTRs hypoactive.           Lab Results   Component Value Date    WBC 5.09 03/12/2025    HGB 8.3 (L) 03/12/2025    HCT 26.6 (L) 03/12/2025    MCV 90.2 03/12/2025     (L) 03/12/2025     Lab Results   Component Value Date    GLUCOSE 116 (H) 03/12/2025    BUN 57 (H) 03/12/2025    CREATININE 7.09 (H) 03/12/2025    BCR 8.0 03/12/2025    CO2 21.0 (L) 03/12/2025    CALCIUM 7.3 (L) 03/12/2025    ALBUMIN 3.7 03/11/2025    AST 18 03/11/2025    ALT 9 03/11/2025          Radiological Studies:   CT Head Without Contrast  Result Date: 3/12/2025  CT HEAD WO CONTRAST Date of Exam: 3/12/2025 9:22 AM EDT Indication: ICH SDH. Comparison:  3/12/2025 Technique: Axial CT images were obtained of the head without contrast administration.  Automated exposure control and iterative construction methods were used. Findings: Parenchyma:No acute intraparenchymal hemorrhage. No loss of gray-white differentiation to suggest large territory infarct. Mild parenchymal volume loss. Scattered periventricular and subcortical white matter hypodensities, nonspecific, but most often consistent with small vessel ischemic changes. 3 mm of rightward midline shift, previously 4 mm. Ventricles and extra axial spaces:Prominent ventricles and sulci secondary to volume loss. Again seen is a left acute on chronic subdural hematoma measuring up to 1.2 cm in the left frontal convexity, similar to prior examination. Other:Orbits are grossly intact. Scattered paranasal sinus mucosal thickening. Mastoid air cells are clear. Calvarium is intact. Intracranial atherosclerotic calcification is present.     Impression: 1.Redemonstrated acute on chronic left subdural hematoma measuring up to 1.2 cm in the left frontal convexity, similar to prior examination. 2.3 mm of rightward midline shift, previously 4 mm. Electronically Signed: Nicanor Rivers MD  3/12/2025 12:12 PM EDT  Workstation ID: [id number]    CT Head Without Contrast  Result Date: 3/12/2025  CT HEAD WO CONTRAST Date of Exam: 3/12/2025 2:57 AM EDT Indication: stroke SDH. Comparison: CT head without contrast 3/11/2025, CT angiography of the head and neck 3/11/2025 Technique: Axial CT images were obtained of the head without contrast administration.  Automated exposure control and iterative construction methods were used. Findings: Redemonstration of a holohemispheric left-sided acute on chronic subdural hematoma which measures up to 10 mm in thickness abutting  the left frontal lobe, not significantly changed from the prior study when accounting for differences in measurement. There is left to right midline shift which measures 4 mm which is unchanged. No intraventricular hemorrhage. Mild white matter findings of chronic microvascular disease. Posterior fossa without acute abnormality. The mastoid air cells are well-aerated. Visualized sinuses are clear. No calvarial fracture. No hydrocephalus.     Impression: 1. Left-sided acute on chronic holohemispheric subdural hematoma measuring up to 10 mm in thickness with left to right midline shift measuring 4 mm, not significantly changed from prior exam. 2. No hydrocephalus. No intraventricular hemorrhage. 3. Mild white matter findings of chronic microvascular disease. Electronically Signed: Chuy Lawler MD  3/12/2025 8:06 AM EDT  Workstation ID: [id number]    EEG  Result Date: 3/12/2025  Reason for referral: 59 y.o.male with left hemispheric subdural, altered mental status, seizure Technical Summary:  A 19 channel digital EEG was performed using the international 10-20 placement system, including eye leads and EKG leads. Duration: 22 minutes Findings: The patient is drowsy.  The background shows diffuse medium amplitude 2-6 Hz intermixed delta and theta activity present symmetrically over both hemispheres.  A clear posterior rhythm is never seen.  Over the course of the study, no lateralizing features are present.  No epileptiform activity or electrographic seizures are seen.  Photic stimulation does not change the background.  Hyperventilation is not performed. Video: Available Technical quality: Good Rhythm strip: Regular, 70 bpm SUMMARY: Moderate generalized slow No focal features or epileptiform activity are seen     Diffuse cerebral dysfunction of moderate degree, nonspecific No ongoing seizures are present This report is transcribed using the Dragon dictation system.      CT Angiogram Head w AI Analysis of LVO  Result Date:  3/11/2025  CT ANGIOGRAM HEAD W AI ANALYSIS OF LVO, CT ANGIOGRAM NECK Date of Exam: 3/11/2025 8:46 PM EDT Indication: ams. Comparison: CT head 3/11/2025 Technique: CTA of the head and neck was performed after the uneventful intravenous administration of Isovue-370 . Reconstructed coronal and sagittal images were also obtained. In addition, a 3-D volume rendered image was created for interpretation. Automated exposure control and iterative reconstruction methods were used. Findings: CTA neck: There is moderate calcified plaque of the aortic arch and large noncalcified plaque at the origins of the great vessels. Right brachiocephalic artery and right subclavian artery are patent without significant stenosis. The right common carotid artery is also patent without significant stenosis. There is moderate calcified plaque throughout the right common carotid artery. There is a large amount of calcified plaque at the right carotid bifurcation resulting in less than 50% stenosis of the right internal carotid artery. Right internal carotid artery is very tortuous but otherwise patent. Left common carotid artery arises from a common trunk with the right brachiocephalic. There is mild calcified plaque of the left common carotid artery but no significant stenosis. There is a large amount of calcified plaque of the left carotid bifurcation resulting in less than 50% stenosis of the left internal carotid artery and greater 50% stenosis of the left external carotid artery. The left internal carotid artery is very tortuous but otherwise patent. Left subclavian artery contains a large amount of calcified plaque but is patent without evidence of focal stenosis. The right vertebral artery is extremely hypoplastic and may be occluded proximally. There is mild calcified plaque of the bilateral vertebral arteries. There is mediastinal adenopathy with a prevascular node measuring 3.6 x 1.6 cm in size. There also appears to be mild bilateral  hilar adenopathy and borderline size right paratracheal nodes. There is emphysematous change of the visualized portions of the  lungs with coarsened interstitial markings and groundglass airspace disease which may be due to chronic lung disease or acute pneumonia. Clinical correlation recommended with patient's symptoms. There is soft tissue thickening in the prevertebral soft tissues extending from the C2-C4 levels. This may be more centered around the right [place] tonsil an underlying tonsillar mass could potentially give this appearance. Lymphoma would also be within the differential. There are no discrete pathologically enlarged cervical lymph nodes. There is kyphosis of the cervical spine centered at the C5 level with anterior wedging of the C5 and C6 vertebral bodies and loss of the C5/6 disc space. There is marked bony sclerosis of all bony structures which may be related to renal osteodystrophy. Diffuse sclerotic metastatic disease is felt to be less likely. CTA HEAD: There is moderate stenosis of the bilateral intracranial cavernous internal carotid arteries Bilateral anterior cerebral and middle cerebral arteries appear patent without evidence of focal stenosis. No active extra-axial contrast extravasation seen to account for the mixed density left hemispheric subdural hematoma. No anterior circulation aneurysm identified. The vertebral arteries are patent at the skull base. Basilar artery is patent without focal stenosis. Bilateral posterior cerebral arteries appear patent without focal stenosis. No posterior circulation aneurysm. No pathologic intracranial contrast enhancement. Dural venous sinuses appear grossly patent.     Impression: 1. No change in mixed density acute on chronic left hemispheric subdural hematoma. 2. No intracranial vascular stenosis, occlusion, or aneurysm. No active contrast extravasation. Number dural venous sinuses. 4. Prevertebral soft tissue thickening with prominence in the  right [place] tonsillar region. Underlying tonsillar mass cannot be excluded. No pathologically enlarged cervical lymph nodes. There are however multiple enlarged upper mediastinal lymph nodes which could be metastatic or reactive in etiology. 5. Diffuse bony sclerosis which may be due to renal osteodystrophy or less likely diffuse sclerotic bony metastatic disease. Electronically Signed: Pranav Adams MD  3/11/2025 9:57 PM EDT  Workstation ID: [id number]    CT Angiogram Neck  Result Date: 3/11/2025  CT ANGIOGRAM HEAD W AI ANALYSIS OF LVO, CT ANGIOGRAM NECK Date of Exam: 3/11/2025 8:46 PM EDT Indication: ams. Comparison: CT head 3/11/2025 Technique: CTA of the head and neck was performed after the uneventful intravenous administration of Isovue-370 . Reconstructed coronal and sagittal images were also obtained. In addition, a 3-D volume rendered image was created for interpretation. Automated exposure control and iterative reconstruction methods were used. Findings: CTA neck: There is moderate calcified plaque of the aortic arch and large noncalcified plaque at the origins of the great vessels. Right brachiocephalic artery and right subclavian artery are patent without significant stenosis. The right common carotid artery is also patent without significant stenosis. There is moderate calcified plaque throughout the right common carotid artery. There is a large amount of calcified plaque at the right carotid bifurcation resulting in less than 50% stenosis of the right internal carotid artery. Right internal carotid artery is very tortuous but otherwise patent. Left common carotid artery arises from a common trunk with the right brachiocephalic. There is mild calcified plaque of the left common carotid artery but no significant stenosis. There is a large amount of calcified plaque of the left carotid bifurcation resulting in less than 50% stenosis of the left internal carotid artery and greater 50% stenosis of the  left external carotid artery. The left internal carotid artery is very tortuous but otherwise patent. Left subclavian artery contains a large amount of calcified plaque but is patent without evidence of focal stenosis. The right vertebral artery is extremely hypoplastic and may be occluded proximally. There is mild calcified plaque of the bilateral vertebral arteries. There is mediastinal adenopathy with a prevascular node measuring 3.6 x 1.6 cm in size. There also appears to be mild bilateral hilar adenopathy and borderline size right paratracheal nodes. There is emphysematous change of the visualized portions of the  lungs with coarsened interstitial markings and groundglass airspace disease which may be due to chronic lung disease or acute pneumonia. Clinical correlation recommended with patient's symptoms. There is soft tissue thickening in the prevertebral soft tissues extending from the C2-C4 levels. This may be more centered around the right [place] tonsil an underlying tonsillar mass could potentially give this appearance. Lymphoma would also be within the differential. There are no discrete pathologically enlarged cervical lymph nodes. There is kyphosis of the cervical spine centered at the C5 level with anterior wedging of the C5 and C6 vertebral bodies and loss of the C5/6 disc space. There is marked bony sclerosis of all bony structures which may be related to renal osteodystrophy. Diffuse sclerotic metastatic disease is felt to be less likely. CTA HEAD: There is moderate stenosis of the bilateral intracranial cavernous internal carotid arteries Bilateral anterior cerebral and middle cerebral arteries appear patent without evidence of focal stenosis. No active extra-axial contrast extravasation seen to account for the mixed density left hemispheric subdural hematoma. No anterior circulation aneurysm identified. The vertebral arteries are patent at the skull base. Basilar artery is patent without focal  stenosis. Bilateral posterior cerebral arteries appear patent without focal stenosis. No posterior circulation aneurysm. No pathologic intracranial contrast enhancement. Dural venous sinuses appear grossly patent.     Impression: 1. No change in mixed density acute on chronic left hemispheric subdural hematoma. 2. No intracranial vascular stenosis, occlusion, or aneurysm. No active contrast extravasation. Number dural venous sinuses. 4. Prevertebral soft tissue thickening with prominence in the right [place] tonsillar region. Underlying tonsillar mass cannot be excluded. No pathologically enlarged cervical lymph nodes. There are however multiple enlarged upper mediastinal lymph nodes which could be metastatic or reactive in etiology. 5. Diffuse bony sclerosis which may be due to renal osteodystrophy or less likely diffuse sclerotic bony metastatic disease. Electronically Signed: Pranav Adams MD  3/11/2025 9:57 PM EDT  Workstation ID: [id number]    XR Chest 1 View  Result Date: 3/11/2025  XR CHEST 1 VW Date of Exam: 3/11/2025 8:22 PM EDT Indication: ams Comparison: March 11, 2025 Findings: Pedicle screws bridged by rods are seen overlying the thoracic spine. There are interstitial changes involving the lungs with interval change that may reflect edema. There are no large pleural effusions.     Impression: Interstitial changes involving the lungs with interval change that may reflect edema. Electronically Signed: Trav Rasmussen MD  3/11/2025 9:04 PM EDT  Workstation ID: [id number]    CT Head Without Contrast  Result Date: 3/11/2025  CT HEAD WO CONTRAST Date of Exam: 3/11/2025 7:55 PM EDT Indication: ams. Comparison: None available. Technique: Axial CT images were obtained of the head without contrast administration.  Automated exposure control and iterative construction methods were used. Findings: There is mild generalized parenchymal volume loss. There is a thin mixed density left hemispheric subdural hematoma this  measures 7 mm in thickness on coronal image 35 there is 4 mm of midline shift from left to right. No evidence of right-sided subdural  hematoma. No subarachnoid hemorrhage. No intraventricular hemorrhage. No acute infarct. Globes and orbits are within normal limits. Paranasal sinuses and mastoid air cells are clear. No acute skull fracture.     Impression: 1. Left hemispheric mixed density subdural hematoma with acute and chronic blood products. This measures 7 mm maximally over the left frontal lobe. There is 4 mm of midline shift. No hydrocephalus. Findings personally called to patient's nurse Radha at 8:44 p.m. on 3/11/2025 Electronically Signed: Pranav Adams MD  3/11/2025 8:45 PM EDT  Workstation ID: [id number]    XR Chest 1 View  Result Date: 3/11/2025  XR CHEST 1 VW Date of Exam: 3/11/2025 1:50 PM EDT Indication: preop, coronary artery disease Comparison: [facility] chest x-ray 3/1/2023, 2/1/2023 Findings: Lungs are adequately expanded and appear clear. No pneumothorax or large pleural effusion is seen. Cardiac silhouette remains mildly enlarged. There are old right rib fractures. Postoperative changes of the thoracic spine are new.     Impression: No acute cardiopulmonary abnormality is identified. Electronically Signed: Princess Reveles  3/11/2025 4:06 PM EDT  Workstation ID: [id number]          During this visit the following were done:  Labs Reviewed [x]    Labs Ordered []    Radiology Reports Reviewed [x]    Radiology Ordered []    EKG, echo, and/or stress test reviewed [x]    EEG results reviewed  [x]    EEG reviewed and interpreted per myself   []    Discussed case with neurointerventionalist or neuroradiologist []    Referring Provider Records Reviewed []    ER Records Reviewed []    Hospital Records Reviewed []    History Obtained From Family []    Radiological images view and Interpreted per myself [x]    Case Discussed with referring provider []     Decision to obtain and request outside records  []         Assessment and Plan     New onset seizures, suspect that he has been having partial seizures for at least 6 months. Acute on chronic left SDH is the likely culprit. On DAPT at home. Disorientation to place and time suggestive of mild to moderate dementia. CTH stable overnight. EEG is w/o epileptic changes. ESRD, CAD, PVD.    - MRI brain wo if able.   - Holding DAPT.   - Continue Keppra 500 mg q24 qiven ESRD.   - Neurology and NS F/U.   - No driving on discharge (he no longer drives anyway per his wife).              Electronically signed by Donny Park MD on 3/12/2025 at 17:38 EDT

## 2025-03-12 NOTE — PLAN OF CARE
Goal Outcome Evaluation:Scheduled HD completed. Pt tolerated well, goal reached. Blood reinfused to pt. Bedside report given to NABEEL Ruvalcaba      Problem: Hemodialysis  Goal: Safe, Effective Therapy Delivery  Outcome: Progressing  Goal: Effective Tissue Perfusion  Outcome: Progressing  Goal: Absence of Infection Signs and Symptoms  Outcome: Progressing

## 2025-03-12 NOTE — PROGRESS NOTES
[facility] Cardiothoracic Surgery In-Patient Progress Note     LOS: 1 day     Chief Complaint: Coronary artery disease    Subjective  Denies chest pain or dyspnea.  Transferred to ICU due to acute encephalopathy and witnessed seizure. Imaging showed an acute left subdural hematoma. Alert and oriented this morning.      Objective  Vital Signs  Temp:  [97.6 °F (36.4 °C)-98.2 °F (36.8 °C)] 97.9 °F (36.6 °C)  Heart Rate:  [] 89  Resp:  [16-20] 18  BP: (118-196)/() 142/69        Physical Exam:   General Appearance: alert, appears stated age and cooperative   Lungs: clear to auscultation, respirations regular, respirations even, and respirations unlabored   Heart: regular rhythm & normal rate, normal S1, S2, no murmur, no gallop, no rub, and no click     Results     Results from last 7 days   Lab Units 03/12/25  0439   WBC 10*3/mm3 5.09   HEMOGLOBIN g/dL 8.3*   HEMATOCRIT % 26.6*   PLATELETS 10*3/mm3 120*     Results from last 7 days   Lab Units 03/12/25  0439   SODIUM mmol/L 140   POTASSIUM mmol/L 4.0   CHLORIDE mmol/L 102   CO2 mmol/L 21.0*   BUN mg/dL 57*   CREATININE mg/dL 7.09*   GLUCOSE mg/dL 116*   CALCIUM mg/dL 7.3*     Imaging Results (Last 24 Hours)       Procedure Component Value Units Date/Time    CT Head Without Contrast [[id number]] Resulted: 03/12/25 0308     Updated: 03/12/25 0308    CT Angiogram Head w AI Analysis of LVO [[id number]] Collected: 03/11/25 2127     Updated: 03/11/25 2200    Narrative:      CT ANGIOGRAM HEAD W AI ANALYSIS OF LVO, CT ANGIOGRAM NECK    Date of Exam: 3/11/2025 8:46 PM EDT    Indication: ams.    Comparison: CT head 3/11/2025    Technique: CTA of the head and neck was performed after the uneventful intravenous administration of Isovue-370 . Reconstructed coronal and sagittal images were also obtained. In addition, a 3-D volume rendered image was created for interpretation.   Automated exposure control and iterative reconstruction methods were  Subjective     Patient ID: Kingsley Beverly is a 55 y.o. male.    Chief Complaint: Annual Exam    55 years old male came to the clinic for his physical examination.  Patient reports hearing loss associated with tinnitus.  He is requesting evaluation by podiatric medicine for possible nail trimming or removal.  Patient toenail fungal infection.  He was previously diagnosed with vitamin-D deficiency.  Patient using propranolol to help with anxiety.      Review of Systems   Constitutional: Negative.    HENT:  Positive for hearing loss and tinnitus.    Eyes: Negative.    Respiratory: Negative.     Cardiovascular: Negative.    Gastrointestinal: Negative.    Genitourinary: Negative.    Musculoskeletal: Negative.    Integumentary:  Negative.   Neurological: Negative.    Psychiatric/Behavioral:  The patient is nervous/anxious.           Objective     Physical Exam  Vitals and nursing note reviewed.   Constitutional:       General: He is not in acute distress.     Appearance: He is well-developed. He is not diaphoretic.   HENT:      Head: Normocephalic and atraumatic.      Right Ear: External ear normal.      Left Ear: External ear normal.      Nose: Nose normal.      Mouth/Throat:      Pharynx: No oropharyngeal exudate.   Eyes:      General: No scleral icterus.        Right eye: No discharge.         Left eye: No discharge.      Conjunctiva/sclera: Conjunctivae normal.      Pupils: Pupils are equal, round, and reactive to light.   Neck:      Thyroid: No thyromegaly.      Vascular: No JVD.      Trachea: No tracheal deviation.   Cardiovascular:      Rate and Rhythm: Normal rate and regular rhythm.      Heart sounds: Normal heart sounds. No murmur heard.     No friction rub. No gallop.   Pulmonary:      Effort: Pulmonary effort is normal. No respiratory distress.      Breath sounds: Normal breath sounds. No stridor. No wheezing or rales.   Chest:      Chest wall: No tenderness.   Abdominal:      General: Bowel sounds are normal.  There is no distension.      Palpations: Abdomen is soft. There is no mass.      Tenderness: There is no abdominal tenderness. There is no guarding or rebound.   Musculoskeletal:         General: No tenderness. Normal range of motion.      Cervical back: Normal range of motion and neck supple.   Feet:      Right foot:      Toenail Condition: Right toenails are abnormally thick. Fungal disease present.     Left foot:      Toenail Condition: Left toenails are abnormally thick. Fungal disease present.  Lymphadenopathy:      Cervical: No cervical adenopathy.   Skin:     General: Skin is warm and dry.      Coloration: Skin is not pale.      Findings: No erythema or rash.   Neurological:      Mental Status: He is alert and oriented to person, place, and time.      Cranial Nerves: No cranial nerve deficit.      Motor: No abnormal muscle tone.      Coordination: Coordination normal.      Deep Tendon Reflexes: Reflexes are normal and symmetric. Reflexes normal.   Psychiatric:         Behavior: Behavior normal.         Thought Content: Thought content normal.         Judgment: Judgment normal.            Assessment and Plan     1. Annual physical exam  -     Urinalysis; Future  -     Comprehensive Metabolic Panel; Future; Expected date: 09/12/2023  -     Lipid Panel; Future; Expected date: 09/12/2023  -     TSH; Future; Expected date: 09/12/2023  -     CBC Auto Differential; Future; Expected date: 09/12/2023  -     Fecal Immunochemical Test (iFOBT); Future; Expected date: 09/12/2023  -     Hemoglobin A1C; Future; Expected date: 09/12/2023  -     Vitamin D; Future; Expected date: 09/12/2023    2. Tinnitus aurium, bilateral    3. Bilateral hearing loss, unspecified hearing loss type    4. Onychogryphosis  -     Ambulatory referral/consult to Podiatry; Future; Expected date: 09/19/2023    5. Onychomycosis  -     Ambulatory referral/consult to Podiatry; Future; Expected date: 09/19/2023    6. Screen for colon cancer  -     Fecal  used.      Findings:  CTA neck: There is moderate calcified plaque of the aortic arch and large noncalcified plaque at the origins of the great vessels. Right brachiocephalic artery and right subclavian artery are patent without significant stenosis. The right common carotid   artery is also patent without significant stenosis. There is moderate calcified plaque throughout the right common carotid artery.    There is a large amount of calcified plaque at the right carotid bifurcation resulting in less than 50% stenosis of the right internal carotid artery. Right internal carotid artery is very tortuous but otherwise patent.    Left common carotid artery arises from a common trunk with the right brachiocephalic. There is mild calcified plaque of the left common carotid artery but no significant stenosis.    There is a large amount of calcified plaque of the left carotid bifurcation resulting in less than 50% stenosis of the left internal carotid artery and greater 50% stenosis of the left external carotid artery. The left internal carotid artery is very   tortuous but otherwise patent.    Left subclavian artery contains a large amount of calcified plaque but is patent without evidence of focal stenosis.    The right vertebral artery is extremely hypoplastic and may be occluded proximally. There is mild calcified plaque of the bilateral vertebral arteries.    There is mediastinal adenopathy with a prevascular node measuring 3.6 x 1.6 cm in size. There also appears to be mild bilateral hilar adenopathy and borderline size right paratracheal nodes. There is emphysematous change of the visualized portions of the   lungs with coarsened interstitial markings and groundglass airspace disease which may be due to chronic lung disease or acute pneumonia. Clinical correlation recommended with patient's symptoms.    There is soft tissue thickening in the prevertebral soft tissues extending from the C2-C4 levels. This may be more  Immunochemical Test (iFOBT); Future; Expected date: 09/12/2023    7. Vitamin D deficiency  -     Vitamin D; Future; Expected date: 09/12/2023    8. Adult situational stress disorder  -     propranoloL (INDERAL) 10 MG tablet; Take 1 tablet (10 mg total) by mouth once daily.  Dispense: 90 tablet; Refill: 3                 Follow up in about 1 year (around 9/12/2024), or if symptoms worsen or fail to improve.       centered around the right [place] tonsil an underlying tonsillar mass could potentially give this appearance. Lymphoma would also be   within the differential. There are no discrete pathologically enlarged cervical lymph nodes.    There is kyphosis of the cervical spine centered at the C5 level with anterior wedging of the C5 and C6 vertebral bodies and loss of the C5/6 disc space. There is marked bony sclerosis of all bony structures which may be related to renal osteodystrophy.   Diffuse sclerotic metastatic disease is felt to be less likely.        CTA HEAD: There is moderate stenosis of the bilateral intracranial cavernous internal carotid arteries    Bilateral anterior cerebral and middle cerebral arteries appear patent without evidence of focal stenosis. No active extra-axial contrast extravasation seen to account for the mixed density left hemispheric subdural hematoma. No anterior circulation   aneurysm identified.    The vertebral arteries are patent at the skull base. Basilar artery is patent without focal stenosis. Bilateral posterior cerebral arteries appear patent without focal stenosis. No posterior circulation aneurysm.    No pathologic intracranial contrast enhancement. Dural venous sinuses appear grossly patent.        Impression:      Impression:    1. No change in mixed density acute on chronic left hemispheric subdural hematoma.  2. No intracranial vascular stenosis, occlusion, or aneurysm. No active contrast extravasation. Number dural venous sinuses.  4. Prevertebral soft tissue thickening with prominence in the right [place] tonsillar region. Underlying tonsillar mass cannot be excluded. No pathologically enlarged cervical lymph nodes. There are however multiple enlarged upper mediastinal lymph   nodes which could be metastatic or reactive in etiology.  5. Diffuse bony sclerosis which may be due to renal osteodystrophy or less likely diffuse sclerotic bony metastatic  disease.        Electronically Signed: Pranav Adams MD    3/11/2025 9:57 PM EDT    Workstation ID: [id number]    CT Angiogram Neck [[id number]] Collected: 03/11/25 2127     Updated: 03/11/25 2200    Narrative:      CT ANGIOGRAM HEAD W AI ANALYSIS OF LVO, CT ANGIOGRAM NECK    Date of Exam: 3/11/2025 8:46 PM EDT    Indication: ams.    Comparison: CT head 3/11/2025    Technique: CTA of the head and neck was performed after the uneventful intravenous administration of Isovue-370 . Reconstructed coronal and sagittal images were also obtained. In addition, a 3-D volume rendered image was created for interpretation.   Automated exposure control and iterative reconstruction methods were used.      Findings:  CTA neck: There is moderate calcified plaque of the aortic arch and large noncalcified plaque at the origins of the great vessels. Right brachiocephalic artery and right subclavian artery are patent without significant stenosis. The right common carotid   artery is also patent without significant stenosis. There is moderate calcified plaque throughout the right common carotid artery.    There is a large amount of calcified plaque at the right carotid bifurcation resulting in less than 50% stenosis of the right internal carotid artery. Right internal carotid artery is very tortuous but otherwise patent.    Left common carotid artery arises from a common trunk with the right brachiocephalic. There is mild calcified plaque of the left common carotid artery but no significant stenosis.    There is a large amount of calcified plaque of the left carotid bifurcation resulting in less than 50% stenosis of the left internal carotid artery and greater 50% stenosis of the left external carotid artery. The left internal carotid artery is very   tortuous but otherwise patent.    Left subclavian artery contains a large amount of calcified plaque but is patent without evidence of focal stenosis.    The right vertebral artery is  extremely hypoplastic and may be occluded proximally. There is mild calcified plaque of the bilateral vertebral arteries.    There is mediastinal adenopathy with a prevascular node measuring 3.6 x 1.6 cm in size. There also appears to be mild bilateral hilar adenopathy and borderline size right paratracheal nodes. There is emphysematous change of the visualized portions of the   lungs with coarsened interstitial markings and groundglass airspace disease which may be due to chronic lung disease or acute pneumonia. Clinical correlation recommended with patient's symptoms.    There is soft tissue thickening in the prevertebral soft tissues extending from the C2-C4 levels. This may be more centered around the right [place] tonsil an underlying tonsillar mass could potentially give this appearance. Lymphoma would also be   within the differential. There are no discrete pathologically enlarged cervical lymph nodes.    There is kyphosis of the cervical spine centered at the C5 level with anterior wedging of the C5 and C6 vertebral bodies and loss of the C5/6 disc space. There is marked bony sclerosis of all bony structures which may be related to renal osteodystrophy.   Diffuse sclerotic metastatic disease is felt to be less likely.        CTA HEAD: There is moderate stenosis of the bilateral intracranial cavernous internal carotid arteries    Bilateral anterior cerebral and middle cerebral arteries appear patent without evidence of focal stenosis. No active extra-axial contrast extravasation seen to account for the mixed density left hemispheric subdural hematoma. No anterior circulation   aneurysm identified.    The vertebral arteries are patent at the skull base. Basilar artery is patent without focal stenosis. Bilateral posterior cerebral arteries appear patent without focal stenosis. No posterior circulation aneurysm.    No pathologic intracranial contrast enhancement. Dural venous sinuses appear grossly patent.         Impression:      Impression:    1. No change in mixed density acute on chronic left hemispheric subdural hematoma.  2. No intracranial vascular stenosis, occlusion, or aneurysm. No active contrast extravasation. Number dural venous sinuses.  4. Prevertebral soft tissue thickening with prominence in the right [place] tonsillar region. Underlying tonsillar mass cannot be excluded. No pathologically enlarged cervical lymph nodes. There are however multiple enlarged upper mediastinal lymph   nodes which could be metastatic or reactive in etiology.  5. Diffuse bony sclerosis which may be due to renal osteodystrophy or less likely diffuse sclerotic bony metastatic disease.        Electronically Signed: Pranav Adams MD    3/11/2025 9:57 PM EDT    Workstation ID: [id number]    XR Chest 1 View [[id number]] Collected: 03/11/25 2103     Updated: 03/11/25 2107    Narrative:      XR CHEST 1 VW    Date of Exam: 3/11/2025 8:22 PM EDT    Indication: ams    Comparison: March 11, 2025    Findings:  Pedicle screws bridged by rods are seen overlying the thoracic spine. There are interstitial changes involving the lungs with interval change that may reflect edema. There are no large pleural effusions.      Impression:      Impression:  Interstitial changes involving the lungs with interval change that may reflect edema.        Electronically Signed: Trav Rasmussen MD    3/11/2025 9:04 PM EDT    Workstation ID: [id number]    CT Head Without Contrast [[id number]] Collected: 03/11/25 2036     Updated: 03/11/25 2048    Narrative:      CT HEAD WO CONTRAST    Date of Exam: 3/11/2025 7:55 PM EDT    Indication: ams.    Comparison: None available.    Technique: Axial CT images were obtained of the head without contrast administration.  Automated exposure control and iterative construction methods were used.      Findings:  There is mild generalized parenchymal volume loss. There is a thin mixed density left hemispheric subdural hematoma this  measures 7 mm in thickness on coronal image 35 there is 4 mm of midline shift from left to right. No evidence of right-sided subdural   hematoma. No subarachnoid hemorrhage. No intraventricular hemorrhage. No acute infarct.    Globes and orbits are within normal limits.    Paranasal sinuses and mastoid air cells are clear. No acute skull fracture.      Impression:      Impression:    1. Left hemispheric mixed density subdural hematoma with acute and chronic blood products. This measures 7 mm maximally over the left frontal lobe. There is 4 mm of midline shift. No hydrocephalus.      Findings personally called to patient's nurse Radha at 8:44 p.m. on 3/11/2025      Electronically Signed: Pranav Adams MD    3/11/2025 8:45 PM EDT    Workstation ID: [id number]    XR Chest 1 View [[id number]] Collected: 03/11/25 1602     Updated: 03/11/25 1609    Narrative:      XR CHEST 1 VW    Date of Exam: 3/11/2025 1:50 PM EDT    Indication: preop, coronary artery disease    Comparison: [facility] chest x-ray 3/1/2023, 2/1/2023    Findings:  Lungs are adequately expanded and appear clear. No pneumothorax or large pleural effusion is seen. Cardiac silhouette remains mildly enlarged. There are old right rib fractures. Postoperative changes of the thoracic spine are new.      Impression:      Impression:  No acute cardiopulmonary abnormality is identified.        Electronically Signed: Princess Reveles    3/11/2025 4:06 PM EDT    Workstation ID: [id number]            Assessment    Subdural hematoma, nontraumatic    Essential hypertension    Type 2 diabetes mellitus, with long-term current use of insulin    Heart failure with preserved left ventricular function (HFpEF)    Current smoker    ESRD on hemodialysis    Coronary artery disease    Hypothyroidism (acquired)      Plan   No plans for bypass surgery. Cardiology evaluating for PCI. Neurosurgery consulted and recommends holding anticoagulation for 2 to 3 weeks prior to any type of intervention for  his CAD.   Neurology consult for witnessed seizure      Yamini Schwarz, APRN  03/12/25  07:07 EDT

## 2025-03-12 NOTE — PLAN OF CARE
Goal Outcome Evaluation:  Pt VSS throughout shift, complains of back and neck/shoulder pain, PRN pain medicine given X2 this shift. Diet advanced, pt has had adequate PO intake. Abdomen rounded and soft, pt reports his last BM was yesterday. HD this shift, 2700 cc removed, pt tolerated well. Pt oriented to self and situation, unsure of name of hospital and rarely answers date questions correctly.  Neurology consulted, no further seizure like activity noted. Continue plan of care.

## 2025-03-12 NOTE — PLAN OF CARE
Chevy Junior  1965    CT head shows acute L SDH, very mild shift.      MDM:    Hold Plavix  May have ASA  Inform Cardiology of this event and will need to hold on intervention or Heparin at this point.  Repeat CT 0500 or sooner if patient has any neuro decline.  Zina Mancia PA-C

## 2025-03-12 NOTE — PROGRESS NOTES
[facility] Cardiology    Inpatient Progress Note      Chief Complaint/Reason for service:    CAD         Subjective:       Neurologic event overnight.  SAH on intracranial imaging.  Alert and oriented today.  Denies chest pain, shortness of breath.     Past medical, surgical, social and family history reviewed in the patient's electronic medical record.         Objective:      Infusions:  niCARdipine, 5-15 mg/hr         Medications:    Current Facility-Administered Medications:     albuterol (PROVENTIL) nebulizer solution 0.083% 2.5 mg/3mL, 2.5 mg, Nebulization, 4x Daily PRN, Oly Larsen PA-C    [Held by provider] aspirin EC tablet 81 mg, 81 mg, Oral, Daily, Oly Larsen PA-C    atorvastatin (LIPITOR) tablet 40 mg, 40 mg, Oral, Daily, Oly Larsen PA-C    cinacalcet (SENSIPAR) tablet 30 mg, 30 mg, Oral, Daily, Oly Larsen PA-C    [Held by provider] clopidogrel (PLAVIX) tablet 75 mg, 75 mg, Oral, Daily, Oly Larsen PA-C, 75 mg at 03/11/25 1447    diazePAM (VALIUM) injection 5 mg, 5 mg, Intravenous, Once, Ion Kolb MD    diazePAM (VALIUM) injection 5 mg, 5 mg, Intravenous, Q4H PRN, Ankur Arnold APRN    gabapentin (NEURONTIN) capsule 100 mg, 100 mg, Oral, Q8H, Ankur Arnold APRN, 100 mg at 03/12/25 0433    ipratropium-albuterol (DUO-NEB) nebulizer solution 3 mL, 3 mL, Nebulization, Q4H PRN, Oly Larsen PA-C    isosorbide mononitrate (IMDUR) 24 hr tablet 30 mg, 30 mg, Oral, Q24H, Mounika Anderson APRN, 30 mg at 03/11/25 1708    labetalol (NORMODYNE,TRANDATE) injection 10 mg, 10 mg, Intravenous, Once, Ion Kolb MD    labetalol (NORMODYNE,TRANDATE) injection, , , ,     levETIRAcetam (KEPPRA) injection 500 mg, 500 mg, Intravenous, Q12H, Sam, May, APRN    levothyroxine (SYNTHROID, LEVOTHROID) tablet 75 mcg, 75 mcg, Oral, Q AM, Oly Larsen PA-C, 75 mcg at 03/12/25 0433    metoclopramide (REGLAN) tablet 5 mg, 5 mg, Oral, Nightly,  Oly Larsen PA-C    metoprolol tartrate (LOPRESSOR) tablet 25 mg, 25 mg, Oral, Q12H, Mounika Anderson APRN    midazolam (VERSED) injection 2 mg, 2 mg, Intravenous, Q30 Min PRN, Ankur Arnold APRN    niCARdipine (CARDENE) 25mg in 250mL NS infusion, 5-15 mg/hr, Intravenous, Titrated, Ankur Arnold APRN    nicotine (NICODERM CQ) 14 MG/24HR patch 1 patch, 1 patch, Transdermal, Q24H, Mounika Anderson APRN, 1 patch at 03/11/25 1751    nitroglycerin (NITROSTAT) SL tablet 0.4 mg, 0.4 mg, Sublingual, Q5 Min PRN, Ion Kolb MD    oxyCODONE (ROXICODONE) immediate release tablet 20 mg, 20 mg, Oral, Q4H PRN, Ankur Arnold APRN    pantoprazole (PROTONIX) EC tablet 40 mg, 40 mg, Oral, Daily, Oly Larsen PA-C    ranolazine (RANEXA) 12 hr tablet 500 mg, 500 mg, Oral, Q12H, Mounika Anderson APRN    sertraline (ZOLOFT) tablet 150 mg, 150 mg, Oral, Daily, Ankur Arnold APRN    sodium chloride 0.9 % flush 10 mL, 10 mL, Intravenous, Q12H, Oly Larsen PA-C    sodium chloride 0.9 % flush 10 mL, 10 mL, Intravenous, PRN, Oly Larsen PA-C    sodium chloride 0.9 % flush 10 mL, 10 mL, Intravenous, Q12H, Ion Kolb MD, 10 mL at 03/11/25 2258    sodium chloride 0.9 % flush 10 mL, 10 mL, Intravenous, PRNKennedi Samuel, MD    sodium chloride 0.9 % infusion 40 mL, 40 mL, Intravenous, PRN, Oly Larsen PA-C    sodium chloride 0.9 % infusion 40 mL, 40 mL, Intravenous, PRNKennedi Samuel, MD    Vital Sign Min/Max for last 24 hours  Temp  Min: 97.6 °F (36.4 °C)  Max: 98.2 °F (36.8 °C)   BP  Min: 118/55  Max: 196/94   Pulse  Min: 81  Max: 114   Resp  Min: 16  Max: 20   SpO2  Min: 86 %  Max: 100 %   No data recorded      Intake/Output Summary (Last 24 hours) at 3/12/2025 0741  Last data filed at 3/11/2025 1700  Gross per 24 hour   Intake 480 ml   Output --   Net 480 ml           CONSTITUTIONAL: No acute distress      Labs/studies:  Available lab and imaging results were reviewed by myself  today    Results for orders placed during the hospital encounter of 11/05/24    Adult Transthoracic Echo Complete W/ Cont if Necessary Per Protocol    Interpretation Summary  Physician interpretation.  Good technical quality.    1.  LV size, function, and wall motion are normal.  Mild to moderate concentric left ventricular hypertrophy.  Visually estimated ejection fraction is 55 to 60%.  Grade 2 diastolic dysfunction.  Mild left atrial enlargement.  Borderline right atrial size.  RV size and function are grossly preserved.  No septal defect or intracavitary mass or thrombus.    2.  The mitral valve is markedly morphologically abnormal.  Both leaflets are thickened and there is a sclerotic and mobile projection of the anterior mitral leaflet into the LV outflow tract.  This does not have the typical appearance of an acute thrombus or vegetation.  There is no compromise of the LV outflow tract.  There is mild mitral stenosis with a mitral valve area of 1.9 cm² by pressure half-time.  There is trivial MR.  The aortic valve is tricuspid and sclerotic but is minimally calcified.  The annulus is at the upper limits of normal size.  There is no mitral stenosis and there is mild to moderate MR.  Pressure half-time of AI is suggestive of moderate or greater disease.  Right-sided heart valves are unremarkable.    3.  There is no pericardial effusion.  The aortic root is mildly dilated at 3.94 cm with no dissection or aneurysm.    4.  Right heart pressures cannot be calculated.           Assessment/Plan:         Subdural hematoma, nontraumatic    Essential hypertension    Type 2 diabetes mellitus, with long-term current use of insulin    Heart failure with preserved left ventricular function (HFpEF)    Current smoker    ESRD on hemodialysis    Coronary artery disease    Hypothyroidism (acquired)       ASSESSMENT:  CAD  [facility] 5/2022 revealing severe 1 vessel CAD with  of the mid RCA, unsuccessful angioplasty of the mid  RCA  Status post staged PCI of distal RCA CTP with overlapping stents, 11/2022  [facility] 12/2024 revealing 50% distal left main stenosis, 70% proximal to mid LAD stenosis, CTP pf proximal RCA with collaterals.   Prohibitively high risk for surgery due to multiple co-morbidities.   Acute left subdural hematoma  CT showed mixed density left subdural hematoma with 4 mm midline shift.    Repeat CT stable   Tonic clonic seizure  Started on Keppra.   PAD  Status post right AKA   HFrEF  Most recent echo 11/2024 with LVEF 55-60%, Grade 2 diastolic dysfunction.   Hypertension   Hyperlipidemia    PLAN:  No plans for catheter based interventions for CAD at the current time.  Prohibitively high risk.   Recommend continued medical therapy for now.    DAPT on hold per neurosurgery.   Continue statin.   Blood pressure currently stable.  Beta blocker added yesterday.  Previously did not tolerate due to hypotension.  Continue to monitor blood pressure and heart rate.  Titrate antianginals as needed.    Patient to follow up with Dr. Oscar in 6-8 weeks after follow up with neurosurgery and repeat CT.    Cardiology will see on an as needed basis.  Please feel free to reach out with any questions.     Electronically signed by VICKI Costello, 03/12/25, 12:20 PM EDT.

## 2025-03-12 NOTE — CONSULTS
[facility]    Inpatient Neurosurgery Consult  Consult performed by: Madeline Avendano PA-C  Consult ordered by: Rosette Vargas APRN        Name: Chevy Junior  YOB: 1965  MRN: [id number]    Referring Provider: Gus Lay MD     Patient Care Team:  Jeanie Francois APRN as PCP - General (Nurse Practitioner)  Nikita Villasenor MD as Consulting Physician (Nephrology)  Feng Nielsen APRN as Nurse Practitioner (Interventional Cardiology)  Hal Wing MD as Consulting Physician (Cardiology)  Carlos Mcgee MD (General Surgery)  Gus Whaley MD as Consulting Physician (General Surgery)  Luis Fernando Rawls MD as Consulting Physician (Vascular Surgery)    Chief Complaint: No chief complaint on file.      History of Present Illness: This is a 59 y.o. male who initially admitted by cardiology/medicine team for evaluation potential CABG as he ongoing anginal symptoms.  On 3/11/2025, he developed acute encephalopathy and a stat head CT was obtained which showed mixed density subdural hematoma on the left with mild midline shift.  While awaiting his CT scan, patient had a witnessed tonic-clonic seizure lasting around a minute.  He was admitted to ICU for close monitoring of his neurostatus.  He denies hitting his head recently or recent falls.    PMHX  Allergies:  No Known Allergies  Medications    Current Facility-Administered Medications:     albuterol (PROVENTIL) nebulizer solution 0.083% 2.5 mg/3mL, 2.5 mg, Nebulization, 4x Daily PRN, Oly Larsen PA-C    [Held by provider] aspirin EC tablet 81 mg, 81 mg, Oral, Daily, Oly Larsen PA-C    atorvastatin (LIPITOR) tablet 40 mg, 40 mg, Oral, Daily, Oly Larsen PA-C    cinacalcet (SENSIPAR) tablet 30 mg, 30 mg, Oral, Daily, Oly Larsen PA-C    [Held by provider] clopidogrel (PLAVIX) tablet 75 mg, 75 mg, Oral, Daily, Oly Larsen PA-C, 75 mg at 03/11/25  1447    diazePAM (VALIUM) injection 5 mg, 5 mg, Intravenous, Once, Ion Kolb MD    diazePAM (VALIUM) injection 5 mg, 5 mg, Intravenous, Q4H PRN, Ankur Arnold APRN    gabapentin (NEURONTIN) capsule 100 mg, 100 mg, Oral, Q8H, Ankur Arnold APRN, 100 mg at 03/12/25 0433    ipratropium-albuterol (DUO-NEB) nebulizer solution 3 mL, 3 mL, Nebulization, Q4H PRN, Oly Larsen PA-C    isosorbide mononitrate (IMDUR) 24 hr tablet 30 mg, 30 mg, Oral, Q24H, Mounika Anderson APRN, 30 mg at 03/11/25 1708    labetalol (NORMODYNE,TRANDATE) injection 10 mg, 10 mg, Intravenous, Once, Ion Kolb MD    labetalol (NORMODYNE,TRANDATE) injection, , , ,     levETIRAcetam (KEPPRA) injection 500 mg, 500 mg, Intravenous, Q12H, Sam May, APRN    levothyroxine (SYNTHROID, LEVOTHROID) tablet 75 mcg, 75 mcg, Oral, Q AM, Oly Larsen PA-C, 75 mcg at 03/12/25 0433    metoclopramide (REGLAN) tablet 5 mg, 5 mg, Oral, Nightly, Oly Larsen PA-C    metoprolol tartrate (LOPRESSOR) tablet 25 mg, 25 mg, Oral, Q12H, Mounika Anderson APRN    midazolam (VERSED) injection 2 mg, 2 mg, Intravenous, Q30 Min PRN, Ankur Arnold APRN    niCARdipine (CARDENE) 25mg in 250mL NS infusion, 5-15 mg/hr, Intravenous, Titrated, Ankur Arnold APRN    nicotine (NICODERM CQ) 14 MG/24HR patch 1 patch, 1 patch, Transdermal, Q24H, Mounika Anderson APRN, 1 patch at 03/11/25 1751    nitroglycerin (NITROSTAT) SL tablet 0.4 mg, 0.4 mg, Sublingual, Q5 Min PRN, Ion Kolb MD    oxyCODONE (ROXICODONE) immediate release tablet 20 mg, 20 mg, Oral, Q4H PRN, Ankur Arnold APRN    pantoprazole (PROTONIX) EC tablet 40 mg, 40 mg, Oral, Daily, Oly Larsen PA-C    ranolazine (RANEXA) 12 hr tablet 500 mg, 500 mg, Oral, Q12H, Mounika Anderson APRN    sertraline (ZOLOFT) tablet 150 mg, 150 mg, Oral, Daily, Ankur Arnold APRN    sodium chloride 0.9 % flush 10 mL, 10 mL, Intravenous, Q12H, Oly Larsen PA-C     sodium chloride 0.9 % flush 10 mL, 10 mL, Intravenous, PRN, Oly Larsen PA-C    sodium chloride 0.9 % flush 10 mL, 10 mL, Intravenous, Q12H, Ion Kolb MD, 10 mL at 03/11/25 2258    sodium chloride 0.9 % flush 10 mL, 10 mL, Intravenous, PRNKennedi Samuel, MD    sodium chloride 0.9 % infusion 40 mL, 40 mL, Intravenous, PRN, Oly Larsen PA-C    sodium chloride 0.9 % infusion 40 mL, 40 mL, Intravenous, PRNKennedi Samuel, MD  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     Cellulitis and abscess of buttock 2023    CHF (congestive heart failure)     Chronic pain     COPD (chronic obstructive pulmonary disease)     INHALER    Coronary artery disease involving native coronary artery of native heart with angina pectoris 09/12/2022    stents x 3    Diabetes mellitus     ESRD on dialysis     M,W,F-FISTULA LEFT ARM    GERD (gastroesophageal reflux disease)     Heart failure with preserved left ventricular function (HFpEF) 09/13/2022    FOLLOWS DHEERAJ/REECE-NO CP    History of blood transfusion     NO ADVERSE REACTIONS    Hyperlipidemia     Hypertension     WIFE STATED B/P HAS BEEN RUNNING LOW LATELY    Hypothyroidism (acquired) 03/11/2025    Infectious viral hepatitis 5 years ago was treated    hx hep c    RIC (obstructive sleep apnea)     Osteoarthritis     Phosphorus metabolic disorder     Sleep apnea     NO MACHINE    Substance abuse 20 years ago from [facility]     Past Surgical History:  Past Surgical History:   Procedure Laterality Date    ABOVE KNEE LEG AMPUTATION Right     CARDIAC CATHETERIZATION      CARDIAC CATHETERIZATION N/A 11/30/2022    Procedure: Percutaneous Coronary Intervention;  Surgeon: River Acevedo IV, MD;  Location: [facility] CATH INVASIVE LOCATION;  Service: Cardiovascular;  Laterality: N/A;    CARDIAC CATHETERIZATION N/A 11/30/2022    Procedure: Optical Coherent Tomography;   "Surgeon: River Acevedo IV, MD;  Location:  [facility] CATH INVASIVE LOCATION;  Service: Cardiovascular;  Laterality: N/A;    CARDIAC CATHETERIZATION N/A 11/30/2022    Procedure: Stent JOSE coronary;  Surgeon: River Acevedo IV, MD;  Location:  [facility] CATH INVASIVE LOCATION;  Service: Cardiovascular;  Laterality: N/A;    CARDIAC CATHETERIZATION Left 11/09/2023    Procedure: Aortogram with left leg angiogram, possible angioplasty or stenting;  Surgeon: Luis Fernando Rawls MD;  Location: [facility] CATH INVASIVE LOCATION;  Service: Vascular;  Laterality: Left;    CARDIAC CATHETERIZATION Right 04/25/2024    Procedure: Right lower extremity angiogram, possible angioplasty or stenting;  Surgeon: Luis Fernando Rawls MD;  Location: [facility] CATH INVASIVE LOCATION;  Service: Vascular;  Laterality: Right;    CARDIAC CATHETERIZATION N/A 12/05/2024    Procedure: Left Heart Cath;  Surgeon: Cole Oscar MD;  Location:  [facility] CATH INVASIVE LOCATION;  Service: Cardiovascular;  Laterality: N/A;    CATH LAB PROCEDURE      unable to stent    COLONOSCOPY N/A 02/15/2023    Procedure: COLONOSCOPY WITH POLYPECTOMY;  Surgeon: David Glass MD;  Location: [facility] ENDOSCOPY;  Service: Gastroenterology;  Laterality: N/A;  COLON POLYP, MELANOSIS COLI    ENDOSCOPY N/A 02/03/2023    Procedure: ESOPHAGOGASTRODUODENOSCOPY with biopsy;  Surgeon: Gus Whaley MD;  Location: [facility] ENDOSCOPY;  Service: General;  Laterality: N/A;  gastritis,     FEMORAL POPLITEAL BYPASS Left 12/12/2023    Procedure: Left femoral edarterectomy, left femoral to below the knee popliteal bypass graft;  Surgeon: Luis Fernando Rawls MD;  Location: [facility] MAIN OR;  Service: Vascular;  Laterality: Left;    HIP SURGERY Right     INSERTION PERITONEAL DIALYSIS CATHETER      LEG SURGERY Left     \"jocelyne in place\"    MANDIBLE FRACTURE SURGERY      REPLACEMENT TOTAL KNEE Right     x3    SHOULDER SURGERY Right     SPINE SURGERY      unknown procudure- completed at . " [facility] [place].     Social Hx:  Social History     Tobacco Use    Smoking status: Every Day     Current packs/day: 2.00     Average packs/day: 2.0 packs/day for 40.0 years (80.0 ttl pk-yrs)     Types: Cigarettes    Smokeless tobacco: Never    Tobacco comments:     1.5 ppd as of 2/5/25   Vaping Use    Vaping status: Never Used   Substance Use Topics    Alcohol use: Not Currently     Comment: None    Drug use: Not Currently     Types: Hydrocodone     Comment: pain pills from previous injuries     Family Hx:  Family History   Problem Relation Age of Onset    Heart attack Mother     Coronary artery disease Mother     Hypertension Mother     Arthritis Mother     Hypertension Father     Diabetes Father      Review of Systems:        Review of Systems   Neurological:  Positive for seizures. Negative for weakness and headaches.        Physical Exam:   /69 (BP Location: Right arm, Patient Position: Sitting)   Pulse 89   Temp 97.9 °F (36.6 °C) (Oral)   Resp 18   Ht 170.2 cm (67\")   Wt 77.6 kg (171 lb)   SpO2 95%   BMI 26.78 kg/m²   Patient appears comfortable, resting  Awake, alert and oriented to self only  Speech f/c  Opens eyes spontaneously  EOM intact  Face symmetric  Tongue midline  Moves bilateral upper extremities and left lower extremity to command.  Right AKA      Intake/Output:   Intake/Output Summary (Last 24 hours) at 3/12/2025 0831  Last data filed at 3/11/2025 1700  Gross per 24 hour   Intake 480 ml   Output --   Net 480 ml       Current Medications:   Current Facility-Administered Medications:     albuterol (PROVENTIL) nebulizer solution 0.083% 2.5 mg/3mL, 2.5 mg, Nebulization, 4x Daily PRN, Oly Larsen PA-C    [Held by provider] aspirin EC tablet 81 mg, 81 mg, Oral, Daily, Oly Larsen PA-C    atorvastatin (LIPITOR) tablet 40 mg, 40 mg, Oral, Daily, Oly Larsen PA-C    cinacalcet (SENSIPAR) tablet 30 mg, 30 mg, Oral, Daily, Oly Larsen PA-C    [Held by provider] clopidogrel " (PLAVIX) tablet 75 mg, 75 mg, Oral, Daily, Oly Larsen PA-C, 75 mg at 03/11/25 1447    diazePAM (VALIUM) injection 5 mg, 5 mg, Intravenous, Once, Ion Kolb MD    diazePAM (VALIUM) injection 5 mg, 5 mg, Intravenous, Q4H PRN, Ankur Arnold APRN    gabapentin (NEURONTIN) capsule 100 mg, 100 mg, Oral, Q8H, Ankur Arnold APRN, 100 mg at 03/12/25 0433    ipratropium-albuterol (DUO-NEB) nebulizer solution 3 mL, 3 mL, Nebulization, Q4H PRN, Oly Larsen PA-C    isosorbide mononitrate (IMDUR) 24 hr tablet 30 mg, 30 mg, Oral, Q24H, Mounika Anderson APRN, 30 mg at 03/11/25 1708    labetalol (NORMODYNE,TRANDATE) injection 10 mg, 10 mg, Intravenous, Once, Ion Kolb MD    labetalol (NORMODYNE,TRANDATE) injection, , , ,     levETIRAcetam (KEPPRA) injection 500 mg, 500 mg, Intravenous, Q12H, Sam May, APRN    levothyroxine (SYNTHROID, LEVOTHROID) tablet 75 mcg, 75 mcg, Oral, Q AM, Oly Larsen PA-C, 75 mcg at 03/12/25 0433    metoclopramide (REGLAN) tablet 5 mg, 5 mg, Oral, Nightly, Oly Larsen PA-C    metoprolol tartrate (LOPRESSOR) tablet 25 mg, 25 mg, Oral, Q12H, Mounika Anderson APRN    midazolam (VERSED) injection 2 mg, 2 mg, Intravenous, Q30 Min PRN, Ankur Arnold APRN    niCARdipine (CARDENE) 25mg in 250mL NS infusion, 5-15 mg/hr, Intravenous, Titrated, Ankur Arnold APRN    nicotine (NICODERM CQ) 14 MG/24HR patch 1 patch, 1 patch, Transdermal, Q24H, Mounika Anderson APRN, 1 patch at 03/11/25 1751    nitroglycerin (NITROSTAT) SL tablet 0.4 mg, 0.4 mg, Sublingual, Q5 Min PRN, Ion Kolb MD    oxyCODONE (ROXICODONE) immediate release tablet 20 mg, 20 mg, Oral, Q4H PRN, Ankur Arnold APRN    pantoprazole (PROTONIX) EC tablet 40 mg, 40 mg, Oral, Daily, Oly Larsen PA-C    ranolazine (RANEXA) 12 hr tablet 500 mg, 500 mg, Oral, Q12H, Mounika Anderson APRN    sertraline (ZOLOFT) tablet 150 mg, 150 mg, Oral, Daily, Ankur Arnold APRN    sodium  chloride 0.9 % flush 10 mL, 10 mL, Intravenous, Q12H, Chava, Oly, PA-C    sodium chloride 0.9 % flush 10 mL, 10 mL, Intravenous, PRN, Almo, Oly, PA-C    sodium chloride 0.9 % flush 10 mL, 10 mL, Intravenous, Q12H, Ion Kolb MD, 10 mL at 03/11/25 2258    sodium chloride 0.9 % flush 10 mL, 10 mL, Intravenous, PRN, Ion Kolb MD    sodium chloride 0.9 % infusion 40 mL, 40 mL, Intravenous, PRN, Almo, Oly, PA-C    sodium chloride 0.9 % infusion 40 mL, 40 mL, Intravenous, PRNKennedi Samuel, MD     Laboratory Results:      Lab 03/12/25 0439 03/11/25 2216 03/11/25 1948   WBC 5.09  --  5.85   HEMOGLOBIN 8.3*  --  10.4*   HEMATOCRIT 26.6*  --  34.0*   PLATELETS 120*  --  148   NEUTROS ABS  --   --  3.67   IMMATURE GRANS (ABS)  --   --  0.02   LYMPHS ABS  --   --  1.51   MONOS ABS  --   --  0.42   EOS ABS  --   --  0.19   MCV 90.2  --  91.9   LACTATE  --   --  1.4   PROTIME  --  18.0*  --    APTT  --  35.6  --          Lab 03/12/25 0439 03/11/25 1948   SODIUM 140 137   POTASSIUM 4.0 4.3   CHLORIDE 102 94*   CO2 21.0* 24.0   ANION GAP 17.0* 19.0*   BUN 57* 60*   CREATININE 7.09* 8.03*   EGFR 8.2* 7.1*   GLUCOSE 116* 106*   CALCIUM 7.3* 8.6   MAGNESIUM 1.8  --    PHOSPHORUS 6.1*  --    HEMOGLOBIN A1C  --  6.50*   TSH  --  7.170*         Lab 03/11/25 1948   TOTAL PROTEIN 8.4   ALBUMIN 3.7   GLOBULIN 4.7   ALT (SGPT) 9   AST (SGOT) 18   BILIRUBIN 0.3   ALK PHOS 178*         Lab 03/11/25 2216   PROTIME 18.0*   INR 1.47*         Lab 03/12/25 0439 03/11/25 1948   CHOLESTEROL 74 102   LDL CHOL 33 51   HDL CHOL 24* 32*   TRIGLYCERIDES 78 99             Lab 03/11/25 1947   PH, ARTERIAL 7.400   PCO2, ARTERIAL 43.7   PO2 ART 85.6   FIO2 21   HCO3 ART 27.0*   BASE EXCESS ART 1.9   CARBOXYHEMOGLOBIN 3.3*     Brief Urine Lab Results       None          Microbiology Results (last 10 days)       ** No results found for the last 240 hours. **               Diagnostic Imaging: The patient's diagnostic  imaging was independently reviewed and interpreted by myself.    [facility]    Assessment and Plan:    Subdural Hematoma    This is a 59 y.o. male who initially admitted to the hospital due to coronary artery disease and potential CABG, who developed acute encephalopathy on 3/11/2025.  Stat head CT showed mixed density left subdural hematoma with 4 mm midline shift.  Repeat head CT stable.  Patient does not require neurosurgical intervention however would recommend holding anticoagulation for at least 2 to 3 weeks prior to intervention on his coronary artery disease.  This was discussed with Dr. Lay.  Defer to ICU team/neurology for seizure management.  Would recommend follow-up head CT in 3 weeks with outpatient follow-up in the neurosurgery office.  Our office will set this up.  Please call with any questions concerns or neurological decline.    Madeline Avendano PA-C  03/12/25  08:31 EDT

## 2025-03-12 NOTE — PAYOR COMM NOTE
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 [[id number]]  (Normal) Collected: 03/12/25 0529    Specimen: Blood Updated: 03/12/25 0534     Glucose 129 mg/dL     CBC (No Diff) [[id number]]  (Abnormal) Collected: 03/12/25 0439    Specimen: Blood Updated: 03/12/25 0531     WBC 5.09 10*3/mm3      RBC 2.95 10*6/mm3      Hemoglobin 8.3 g/dL      Hematocrit 26.6 %      MCV 90.2 fL      MCH 28.1 pg      MCHC 31.2 g/dL      RDW 16.7 %      RDW-SD 54.6 fl      MPV 10.4 fL      Platelets 120 10*3/mm3     Fibrinogen [[id number]]  (Normal) Collected: 03/11/25 2216    Specimen: Blood Updated: 03/11/25 2313     Fibrinogen 470 mg/dL     aPTT [[id number]]  (Normal) Collected: 03/11/25 2216    Specimen: Blood Updated: 03/11/25 2313     PTT 35.6 seconds     Narrative:      PTT = The equivalent PTT values for the therapeutic range of heparin levels at 0.3 to 0.5 U/ml are 60 to 70 seconds.    Protime-INR [[id number]]  (Abnormal) Collected:  03/11/25 2216    Specimen: Blood Updated: 03/11/25 2313     Protime 18.0 Seconds      INR 1.47    T4, Free [[id number]]  (Abnormal) Collected: 03/11/25 1948    Specimen: Blood Updated: 03/11/25 2054     Free T4 0.85 ng/dL     CBC & Differential [[id number]]  (Abnormal) Collected: 03/11/25 1948    Specimen: Blood Updated: 03/11/25 2053    Narrative:      The following orders were created for panel order CBC & Differential.  Procedure                               Abnormality         Status                     ---------                               -----------         ------                     CBC Auto Differential[[id number]]        Abnormal            Final result               Scan Slide[[id number]]                                                                    Please view results for these tests on the individual orders.    CBC Auto Differential [[id number]]  (Abnormal) Collected: 03/11/25 1948    Specimen: Blood Updated: 03/11/25 2053     WBC 5.85 10*3/mm3      RBC 3.70 10*6/mm3      Hemoglobin 10.4 g/dL      Hematocrit 34.0 %      MCV 91.9 fL      MCH 28.1 pg      MCHC 30.6 g/dL      RDW 16.9 %      RDW-SD 56.8 fl      MPV 10.0 fL      Platelets 148 10*3/mm3      Neutrophil % 62.8 %      Lymphocyte % 25.8 %      Monocyte % 7.2 %      Eosinophil % 3.2 %      Basophil % 0.7 %      Immature Grans % 0.3 %      Neutrophils, Absolute 3.67 10*3/mm3      Lymphocytes, Absolute 1.51 10*3/mm3      Monocytes, Absolute 0.42 10*3/mm3      Eosinophils, Absolute 0.19 10*3/mm3      Basophils, Absolute 0.04 10*3/mm3      Immature Grans, Absolute 0.02 10*3/mm3      nRBC 0.0 /100 WBC     Hemoglobin A1c [[id number]]  (Abnormal) Collected: 03/11/25 1948    Specimen: Blood Updated: 03/11/25 2030     Hemoglobin A1C 6.50 %     Narrative:      Hemoglobin A1C Ranges:    Increased Risk for Diabetes  5.7% to 6.4%  Diabetes                     >= 6.5%  Diabetic Goal                < 7.0%    TSH Rfx On Abnormal To Free T4 [[id number]]   (Abnormal) Collected: 03/11/25 1948    Specimen: Blood Updated: 03/11/25 2029     TSH 7.170 uIU/mL     Comprehensive Metabolic Panel [[id number]]  (Abnormal) Collected: 03/11/25 1948    Specimen: Blood Updated: 03/11/25 2024     Glucose 106 mg/dL      BUN 60 mg/dL      Creatinine 8.03 mg/dL      Sodium 137 mmol/L      Potassium 4.3 mmol/L      Comment: Slight hemolysis detected by analyzer. Result may be falsely elevated.        Chloride 94 mmol/L      CO2 24.0 mmol/L      Calcium 8.6 mg/dL      Total Protein 8.4 g/dL      Albumin 3.7 g/dL      ALT (SGPT) 9 U/L      AST (SGOT) 18 U/L      Alkaline Phosphatase 178 U/L      Total Bilirubin 0.3 mg/dL      Globulin 4.7 gm/dL      Comment: Calculated Result        A/G Ratio 0.8 g/dL      BUN/Creatinine Ratio 7.5     Anion Gap 19.0 mmol/L      eGFR 7.1 mL/min/1.73     Narrative:      GFR Categories in Chronic Kidney Disease (CKD)      GFR Category          GFR (mL/min/1.73)    Interpretation  G1                     90 or greater         Normal or high (1)  G2                      60-89                Mild decrease (1)  G3a                   45-59                Mild to moderate decrease  G3b                   30-44                Moderate to severe decrease  G4                    15-29                Severe decrease  G5                    14 or less           Kidney failure          (1)In the absence of evidence of kidney disease, neither GFR category G1 or G2 fulfill the criteria for CKD.    eGFR calculation 2021 CKD-EPI creatinine equation, which does not include race as a factor    Lipid Panel [[id number]]  (Abnormal) Collected: 03/11/25 1948    Specimen: Blood Updated: 03/11/25 2024     Total Cholesterol 102 mg/dL      Triglycerides 99 mg/dL      HDL Cholesterol 32 mg/dL      LDL Cholesterol  51 mg/dL      VLDL Cholesterol 19 mg/dL      LDL/HDL Ratio 1.57    Narrative:      Cholesterol Reference Ranges  (U.S. Department of Health and Human Services ATP III  Classifications)    Desirable          <200 mg/dL  Borderline High    200-239 mg/dL  High Risk          >240 mg/dL      Triglyceride Reference Ranges  (U.S. Department of Health and Human Services ATP III Classifications)    Normal           <150 mg/dL  Borderline High  150-199 mg/dL  High             200-499 mg/dL  Very High        >500 mg/dL    HDL Reference Ranges  (U.S. Department of Health and Human Services ATP III Classifications)    Low     <40 mg/dl (major risk factor for CHD)  High    >60 mg/dl ('negative' risk factor for CHD)        LDL Reference Ranges  (U.S. Department of Health and Human Services ATP III Classifications)    Optimal          <100 mg/dL  Near Optimal     100-129 mg/dL  Borderline High  130-159 mg/dL  High             160-189 mg/dL  Very High        >189 mg/dL    LDL is calculated using the NIH LDL-C calculation.      Lactic Acid, Plasma [[id number]]  (Normal) Collected: 03/11/25 1948    Specimen: Blood Updated: 03/11/25 2022     Lactate 1.4 mmol/L      Comment: Falsely depressed results may occur on samples drawn from patients receiving N-Acetylcysteine (NAC) or Metamizole.       Blood Gas, Arterial With Co-Ox [[id number]]  (Abnormal) Collected: 03/11/25 1947    Specimen: Arterial Blood Updated: 03/11/25 1948     Site Right Radial     Robb's Test N/A     pH, Arterial 7.400 pH units      pCO2, Arterial 43.7 mm Hg      pO2, Arterial 85.6 mm Hg      HCO3, Arterial 27.0 mmol/L      Base Excess, Arterial 1.9 mmol/L      Hemoglobin, Blood Gas 10.5 g/dL      Comment: 84 Value below reference range        Hematocrit, Blood Gas 32.2 %      Oxyhemoglobin 91.1 %      Comment: 84 Value below reference range        Methemoglobin 0.50 %      Carboxyhemoglobin 3.3 %      Comment: 83 Value above reference range        CO2 Content 28.4 mmol/L      Temperature 37.0     Barometric Pressure for Blood Gas --     Comment: N/A        Modality Room Air     FIO2 21 %      Ventilator Mode --     Rate 0  Breaths/minute      PIP 0 cmH2O      Comment: Meter: [id number]     :  [id number]        IPAP 0     EPAP 0     pH, Temp Corrected 7.400 pH Units      pCO2, Temperature Corrected 43.7 mm Hg      pO2, Temperature Corrected 85.6 mm Hg     POC Glucose Once [[id number]]  (Normal) Collected: 03/11/25 1937    Specimen: Blood Updated: 03/11/25 1938     Glucose 121 mg/dL     POC Glucose Once [[id number]]  (Abnormal) Collected: 03/11/25 1907    Specimen: Blood Updated: 03/11/25 1908     Glucose 137 mg/dL     POC Glucose Once [[id number]]  (Abnormal) Collected: 03/11/25 1726    Specimen: Blood Updated: 03/11/25 1737     Glucose 145 mg/dL     POC Glucose Once [[id number]]  (Normal) Collected: 03/11/25 1338    Specimen: Blood Updated: 03/11/25 1339     Glucose 107 mg/dL           Imaging Results (All)       Procedure Component Value Units Date/Time    CT Head Without Contrast [[id number]] Resulted: 03/12/25 0923     Updated: 03/12/25 0929    CT Head Without Contrast [[id number]] Collected: 03/12/25 0756     Updated: 03/12/25 0809    Narrative:      CT HEAD WO CONTRAST    Date of Exam: 3/12/2025 2:57 AM EDT    Indication: stroke SDH.    Comparison: CT head without contrast 3/11/2025, CT angiography of the head and neck 3/11/2025    Technique: Axial CT images were obtained of the head without contrast administration.  Automated exposure control and iterative construction methods were used.      Findings:  Redemonstration of a holohemispheric left-sided acute on chronic subdural hematoma which measures up to 10 mm in thickness abutting the left frontal lobe, not significantly changed from the prior study when accounting for differences in measurement.   There is left to right midline shift which measures 4 mm which is unchanged. No intraventricular hemorrhage. Mild white matter findings of chronic microvascular disease. Posterior fossa without acute abnormality. The mastoid air cells are well-aerated.   Visualized sinuses are  clear. No calvarial fracture. No hydrocephalus.      Impression:      Impression:  1. Left-sided acute on chronic holohemispheric subdural hematoma measuring up to 10 mm in thickness with left to right midline shift measuring 4 mm, not significantly changed from prior exam.  2. No hydrocephalus. No intraventricular hemorrhage.  3. Mild white matter findings of chronic microvascular disease.            Electronically Signed: Chuy Lawler MD    3/12/2025 8:06 AM EDT    Workstation ID: [id number]    CT Angiogram Head w AI Analysis of LVO [[id number]] Collected: 03/11/25 2127     Updated: 03/11/25 2200    Narrative:      CT ANGIOGRAM HEAD W AI ANALYSIS OF LVO, CT ANGIOGRAM NECK    Date of Exam: 3/11/2025 8:46 PM EDT    Indication: ams.    Comparison: CT head 3/11/2025    Technique: CTA of the head and neck was performed after the uneventful intravenous administration of Isovue-370 . Reconstructed coronal and sagittal images were also obtained. In addition, a 3-D volume rendered image was created for interpretation.   Automated exposure control and iterative reconstruction methods were used.      Findings:  CTA neck: There is moderate calcified plaque of the aortic arch and large noncalcified plaque at the origins of the great vessels. Right brachiocephalic artery and right subclavian artery are patent without significant stenosis. The right common carotid   artery is also patent without significant stenosis. There is moderate calcified plaque throughout the right common carotid artery.    There is a large amount of calcified plaque at the right carotid bifurcation resulting in less than 50% stenosis of the right internal carotid artery. Right internal carotid artery is very tortuous but otherwise patent.    Left common carotid artery arises from a common trunk with the right brachiocephalic. There is mild calcified plaque of the left common carotid artery but no significant stenosis.    There is a large amount of calcified  plaque of the left carotid bifurcation resulting in less than 50% stenosis of the left internal carotid artery and greater 50% stenosis of the left external carotid artery. The left internal carotid artery is very   tortuous but otherwise patent.    Left subclavian artery contains a large amount of calcified plaque but is patent without evidence of focal stenosis.    The right vertebral artery is extremely hypoplastic and may be occluded proximally. There is mild calcified plaque of the bilateral vertebral arteries.    There is mediastinal adenopathy with a prevascular node measuring 3.6 x 1.6 cm in size. There also appears to be mild bilateral hilar adenopathy and borderline size right paratracheal nodes. There is emphysematous change of the visualized portions of the   lungs with coarsened interstitial markings and groundglass airspace disease which may be due to chronic lung disease or acute pneumonia. Clinical correlation recommended with patient's symptoms.    There is soft tissue thickening in the prevertebral soft tissues extending from the C2-C4 levels. This may be more centered around the right [place] tonsil an underlying tonsillar mass could potentially give this appearance. Lymphoma would also be   within the differential. There are no discrete pathologically enlarged cervical lymph nodes.    There is kyphosis of the cervical spine centered at the C5 level with anterior wedging of the C5 and C6 vertebral bodies and loss of the C5/6 disc space. There is marked bony sclerosis of all bony structures which may be related to renal osteodystrophy.   Diffuse sclerotic metastatic disease is felt to be less likely.        CTA HEAD: There is moderate stenosis of the bilateral intracranial cavernous internal carotid arteries    Bilateral anterior cerebral and middle cerebral arteries appear patent without evidence of focal stenosis. No active extra-axial contrast extravasation seen to account for the mixed density  left hemispheric subdural hematoma. No anterior circulation   aneurysm identified.    The vertebral arteries are patent at the skull base. Basilar artery is patent without focal stenosis. Bilateral posterior cerebral arteries appear patent without focal stenosis. No posterior circulation aneurysm.    No pathologic intracranial contrast enhancement. Dural venous sinuses appear grossly patent.        Impression:      Impression:    1. No change in mixed density acute on chronic left hemispheric subdural hematoma.  2. No intracranial vascular stenosis, occlusion, or aneurysm. No active contrast extravasation. Number dural venous sinuses.  4. Prevertebral soft tissue thickening with prominence in the right [place] tonsillar region. Underlying tonsillar mass cannot be excluded. No pathologically enlarged cervical lymph nodes. There are however multiple enlarged upper mediastinal lymph   nodes which could be metastatic or reactive in etiology.  5. Diffuse bony sclerosis which may be due to renal osteodystrophy or less likely diffuse sclerotic bony metastatic disease.        Electronically Signed: Pranav Adams MD    3/11/2025 9:57 PM EDT    Workstation ID: [id number]    CT Angiogram Neck [[id number]] Collected: 03/11/25 2127     Updated: 03/11/25 2200    Narrative:      CT ANGIOGRAM HEAD W AI ANALYSIS OF LVO, CT ANGIOGRAM NECK    Date of Exam: 3/11/2025 8:46 PM EDT    Indication: ams.    Comparison: CT head 3/11/2025    Technique: CTA of the head and neck was performed after the uneventful intravenous administration of Isovue-370 . Reconstructed coronal and sagittal images were also obtained. In addition, a 3-D volume rendered image was created for interpretation.   Automated exposure control and iterative reconstruction methods were used.      Findings:  CTA neck: There is moderate calcified plaque of the aortic arch and large noncalcified plaque at the origins of the great vessels. Right brachiocephalic artery and right  subclavian artery are patent without significant stenosis. The right common carotid   artery is also patent without significant stenosis. There is moderate calcified plaque throughout the right common carotid artery.    There is a large amount of calcified plaque at the right carotid bifurcation resulting in less than 50% stenosis of the right internal carotid artery. Right internal carotid artery is very tortuous but otherwise patent.    Left common carotid artery arises from a common trunk with the right brachiocephalic. There is mild calcified plaque of the left common carotid artery but no significant stenosis.    There is a large amount of calcified plaque of the left carotid bifurcation resulting in less than 50% stenosis of the left internal carotid artery and greater 50% stenosis of the left external carotid artery. The left internal carotid artery is very   tortuous but otherwise patent.    Left subclavian artery contains a large amount of calcified plaque but is patent without evidence of focal stenosis.    The right vertebral artery is extremely hypoplastic and may be occluded proximally. There is mild calcified plaque of the bilateral vertebral arteries.    There is mediastinal adenopathy with a prevascular node measuring 3.6 x 1.6 cm in size. There also appears to be mild bilateral hilar adenopathy and borderline size right paratracheal nodes. There is emphysematous change of the visualized portions of the   lungs with coarsened interstitial markings and groundglass airspace disease which may be due to chronic lung disease or acute pneumonia. Clinical correlation recommended with patient's symptoms.    There is soft tissue thickening in the prevertebral soft tissues extending from the C2-C4 levels. This may be more centered around the right [place] tonsil an underlying tonsillar mass could potentially give this appearance. Lymphoma would also be   within the differential. There are no discrete  pathologically enlarged cervical lymph nodes.    There is kyphosis of the cervical spine centered at the C5 level with anterior wedging of the C5 and C6 vertebral bodies and loss of the C5/6 disc space. There is marked bony sclerosis of all bony structures which may be related to renal osteodystrophy.   Diffuse sclerotic metastatic disease is felt to be less likely.        CTA HEAD: There is moderate stenosis of the bilateral intracranial cavernous internal carotid arteries    Bilateral anterior cerebral and middle cerebral arteries appear patent without evidence of focal stenosis. No active extra-axial contrast extravasation seen to account for the mixed density left hemispheric subdural hematoma. No anterior circulation   aneurysm identified.    The vertebral arteries are patent at the skull base. Basilar artery is patent without focal stenosis. Bilateral posterior cerebral arteries appear patent without focal stenosis. No posterior circulation aneurysm.    No pathologic intracranial contrast enhancement. Dural venous sinuses appear grossly patent.        Impression:      Impression:    1. No change in mixed density acute on chronic left hemispheric subdural hematoma.  2. No intracranial vascular stenosis, occlusion, or aneurysm. No active contrast extravasation. Number dural venous sinuses.  4. Prevertebral soft tissue thickening with prominence in the right [place] tonsillar region. Underlying tonsillar mass cannot be excluded. No pathologically enlarged cervical lymph nodes. There are however multiple enlarged upper mediastinal lymph   nodes which could be metastatic or reactive in etiology.  5. Diffuse bony sclerosis which may be due to renal osteodystrophy or less likely diffuse sclerotic bony metastatic disease.        Electronically Signed: Pranav Adams MD    3/11/2025 9:57 PM EDT    Workstation ID: [id number]    XR Chest 1 View [[id number]] Collected: 03/11/25 2103     Updated: 03/11/25 2107    Narrative:       XR CHEST 1 VW    Date of Exam: 3/11/2025 8:22 PM EDT    Indication: ams    Comparison: March 11, 2025    Findings:  Pedicle screws bridged by rods are seen overlying the thoracic spine. There are interstitial changes involving the lungs with interval change that may reflect edema. There are no large pleural effusions.      Impression:      Impression:  Interstitial changes involving the lungs with interval change that may reflect edema.        Electronically Signed: Trav Rasmussen MD    3/11/2025 9:04 PM EDT    Workstation ID: [id number]    CT Head Without Contrast [[id number]] Collected: 03/11/25 2036     Updated: 03/11/25 2048    Narrative:      CT HEAD WO CONTRAST    Date of Exam: 3/11/2025 7:55 PM EDT    Indication: ams.    Comparison: None available.    Technique: Axial CT images were obtained of the head without contrast administration.  Automated exposure control and iterative construction methods were used.      Findings:  There is mild generalized parenchymal volume loss. There is a thin mixed density left hemispheric subdural hematoma this measures 7 mm in thickness on coronal image 35 there is 4 mm of midline shift from left to right. No evidence of right-sided subdural   hematoma. No subarachnoid hemorrhage. No intraventricular hemorrhage. No acute infarct.    Globes and orbits are within normal limits.    Paranasal sinuses and mastoid air cells are clear. No acute skull fracture.      Impression:      Impression:    1. Left hemispheric mixed density subdural hematoma with acute and chronic blood products. This measures 7 mm maximally over the left frontal lobe. There is 4 mm of midline shift. No hydrocephalus.      Findings personally called to patient's nurse Radha at 8:44 p.m. on 3/11/2025      Electronically Signed: Pranav Adams MD    3/11/2025 8:45 PM EDT    Workstation ID: [id number]    XR Chest 1 View [[id number]] Collected: 03/11/25 1602     Updated: 03/11/25 1609    Narrative:      XR CHEST 1  VW    Date of Exam: 3/11/2025 1:50 PM EDT    Indication: preop, coronary artery disease    Comparison: [facility] chest x-ray 3/1/2023, 2/1/2023    Findings:  Lungs are adequately expanded and appear clear. No pneumothorax or large pleural effusion is seen. Cardiac silhouette remains mildly enlarged. There are old right rib fractures. Postoperative changes of the thoracic spine are new.      Impression:      Impression:  No acute cardiopulmonary abnormality is identified.        Electronically Signed: Princess Reveles    3/11/2025 4:06 PM EDT    Workstation ID: [id number]             Physician Progress Notes (all)        Yamini Schwarz APRN at 03/12/25 0775       Attestation signed by Gus Lay MD at 03/12/25 0990    I have personally evaluated and examined the patient.  The above documentation has been reviewed and I agree.  Patient with seizure yesterday and subdural hematoma on imaging.  He is interactive with no focal deficits.  This reaffirms the patient is not a surgical candidate for CABG.  He will likely be managed medically.  Will ask Dr. Mills to assume care given he is not a surgical patient.                    [facility] Cardiothoracic Surgery In-Patient Progress Note     LOS: 1 day     Chief Complaint: Coronary artery disease    Subjective  Denies chest pain or dyspnea.  Transferred to ICU due to acute encephalopathy and witnessed seizure. Imaging showed an acute left subdural hematoma. Alert and oriented this morning.      Objective  Vital Signs  Temp:  [97.6 °F (36.4 °C)-98.2 °F (36.8 °C)] 97.9 °F (36.6 °C)  Heart Rate:  [] 89  Resp:  [16-20] 18  BP: (118-196)/() 142/69        Physical Exam:   General Appearance: alert, appears stated age and cooperative   Lungs: clear to auscultation, respirations regular, respirations even, and respirations unlabored   Heart: regular rhythm & normal rate, normal S1, S2, no murmur, no gallop, no rub, and no click     Results     Results from last 7 days    Lab Units 03/12/25  0439   WBC 10*3/mm3 5.09   HEMOGLOBIN g/dL 8.3*   HEMATOCRIT % 26.6*   PLATELETS 10*3/mm3 120*     Results from last 7 days   Lab Units 03/12/25  0439   SODIUM mmol/L 140   POTASSIUM mmol/L 4.0   CHLORIDE mmol/L 102   CO2 mmol/L 21.0*   BUN mg/dL 57*   CREATININE mg/dL 7.09*   GLUCOSE mg/dL 116*   CALCIUM mg/dL 7.3*     Imaging Results (Last 24 Hours)       Procedure Component Value Units Date/Time    CT Head Without Contrast [[id number]] Resulted: 03/12/25 0308     Updated: 03/12/25 0308    CT Angiogram Head w AI Analysis of LVO [[id number]] Collected: 03/11/25 2127     Updated: 03/11/25 2200    Narrative:      CT ANGIOGRAM HEAD W AI ANALYSIS OF LVO, CT ANGIOGRAM NECK    Date of Exam: 3/11/2025 8:46 PM EDT    Indication: ams.    Comparison: CT head 3/11/2025    Technique: CTA of the head and neck was performed after the uneventful intravenous administration of Isovue-370 . Reconstructed coronal and sagittal images were also obtained. In addition, a 3-D volume rendered image was created for interpretation.   Automated exposure control and iterative reconstruction methods were used.      Findings:  CTA neck: There is moderate calcified plaque of the aortic arch and large noncalcified plaque at the origins of the great vessels. Right brachiocephalic artery and right subclavian artery are patent without significant stenosis. The right common carotid   artery is also patent without significant stenosis. There is moderate calcified plaque throughout the right common carotid artery.    There is a large amount of calcified plaque at the right carotid bifurcation resulting in less than 50% stenosis of the right internal carotid artery. Right internal carotid artery is very tortuous but otherwise patent.    Left common carotid artery arises from a common trunk with the right brachiocephalic. There is mild calcified plaque of the left common carotid artery but no significant stenosis.    There is a large  amount of calcified plaque of the left carotid bifurcation resulting in less than 50% stenosis of the left internal carotid artery and greater 50% stenosis of the left external carotid artery. The left internal carotid artery is very   tortuous but otherwise patent.    Left subclavian artery contains a large amount of calcified plaque but is patent without evidence of focal stenosis.    The right vertebral artery is extremely hypoplastic and may be occluded proximally. There is mild calcified plaque of the bilateral vertebral arteries.    There is mediastinal adenopathy with a prevascular node measuring 3.6 x 1.6 cm in size. There also appears to be mild bilateral hilar adenopathy and borderline size right paratracheal nodes. There is emphysematous change of the visualized portions of the   lungs with coarsened interstitial markings and groundglass airspace disease which may be due to chronic lung disease or acute pneumonia. Clinical correlation recommended with patient's symptoms.    There is soft tissue thickening in the prevertebral soft tissues extending from the C2-C4 levels. This may be more centered around the right [place] tonsil an underlying tonsillar mass could potentially give this appearance. Lymphoma would also be   within the differential. There are no discrete pathologically enlarged cervical lymph nodes.    There is kyphosis of the cervical spine centered at the C5 level with anterior wedging of the C5 and C6 vertebral bodies and loss of the C5/6 disc space. There is marked bony sclerosis of all bony structures which may be related to renal osteodystrophy.   Diffuse sclerotic metastatic disease is felt to be less likely.        CTA HEAD: There is moderate stenosis of the bilateral intracranial cavernous internal carotid arteries    Bilateral anterior cerebral and middle cerebral arteries appear patent without evidence of focal stenosis. No active extra-axial contrast extravasation seen to account  for the mixed density left hemispheric subdural hematoma. No anterior circulation   aneurysm identified.    The vertebral arteries are patent at the skull base. Basilar artery is patent without focal stenosis. Bilateral posterior cerebral arteries appear patent without focal stenosis. No posterior circulation aneurysm.    No pathologic intracranial contrast enhancement. Dural venous sinuses appear grossly patent.        Impression:      Impression:    1. No change in mixed density acute on chronic left hemispheric subdural hematoma.  2. No intracranial vascular stenosis, occlusion, or aneurysm. No active contrast extravasation. Number dural venous sinuses.  4. Prevertebral soft tissue thickening with prominence in the right [place] tonsillar region. Underlying tonsillar mass cannot be excluded. No pathologically enlarged cervical lymph nodes. There are however multiple enlarged upper mediastinal lymph   nodes which could be metastatic or reactive in etiology.  5. Diffuse bony sclerosis which may be due to renal osteodystrophy or less likely diffuse sclerotic bony metastatic disease.        Electronically Signed: Pranav Adams MD    3/11/2025 9:57 PM EDT    Workstation ID: [id number]    CT Angiogram Neck [[id number]] Collected: 03/11/25 2127     Updated: 03/11/25 2200    Narrative:      CT ANGIOGRAM HEAD W AI ANALYSIS OF LVO, CT ANGIOGRAM NECK    Date of Exam: 3/11/2025 8:46 PM EDT    Indication: ams.    Comparison: CT head 3/11/2025    Technique: CTA of the head and neck was performed after the uneventful intravenous administration of Isovue-370 . Reconstructed coronal and sagittal images were also obtained. In addition, a 3-D volume rendered image was created for interpretation.   Automated exposure control and iterative reconstruction methods were used.      Findings:  CTA neck: There is moderate calcified plaque of the aortic arch and large noncalcified plaque at the origins of the great vessels. Right  brachiocephalic artery and right subclavian artery are patent without significant stenosis. The right common carotid   artery is also patent without significant stenosis. There is moderate calcified plaque throughout the right common carotid artery.    There is a large amount of calcified plaque at the right carotid bifurcation resulting in less than 50% stenosis of the right internal carotid artery. Right internal carotid artery is very tortuous but otherwise patent.    Left common carotid artery arises from a common trunk with the right brachiocephalic. There is mild calcified plaque of the left common carotid artery but no significant stenosis.    There is a large amount of calcified plaque of the left carotid bifurcation resulting in less than 50% stenosis of the left internal carotid artery and greater 50% stenosis of the left external carotid artery. The left internal carotid artery is very   tortuous but otherwise patent.    Left subclavian artery contains a large amount of calcified plaque but is patent without evidence of focal stenosis.    The right vertebral artery is extremely hypoplastic and may be occluded proximally. There is mild calcified plaque of the bilateral vertebral arteries.    There is mediastinal adenopathy with a prevascular node measuring 3.6 x 1.6 cm in size. There also appears to be mild bilateral hilar adenopathy and borderline size right paratracheal nodes. There is emphysematous change of the visualized portions of the   lungs with coarsened interstitial markings and groundglass airspace disease which may be due to chronic lung disease or acute pneumonia. Clinical correlation recommended with patient's symptoms.    There is soft tissue thickening in the prevertebral soft tissues extending from the C2-C4 levels. This may be more centered around the right [place] tonsil an underlying tonsillar mass could potentially give this appearance. Lymphoma would also be   within the  differential. There are no discrete pathologically enlarged cervical lymph nodes.    There is kyphosis of the cervical spine centered at the C5 level with anterior wedging of the C5 and C6 vertebral bodies and loss of the C5/6 disc space. There is marked bony sclerosis of all bony structures which may be related to renal osteodystrophy.   Diffuse sclerotic metastatic disease is felt to be less likely.        CTA HEAD: There is moderate stenosis of the bilateral intracranial cavernous internal carotid arteries    Bilateral anterior cerebral and middle cerebral arteries appear patent without evidence of focal stenosis. No active extra-axial contrast extravasation seen to account for the mixed density left hemispheric subdural hematoma. No anterior circulation   aneurysm identified.    The vertebral arteries are patent at the skull base. Basilar artery is patent without focal stenosis. Bilateral posterior cerebral arteries appear patent without focal stenosis. No posterior circulation aneurysm.    No pathologic intracranial contrast enhancement. Dural venous sinuses appear grossly patent.        Impression:      Impression:    1. No change in mixed density acute on chronic left hemispheric subdural hematoma.  2. No intracranial vascular stenosis, occlusion, or aneurysm. No active contrast extravasation. Number dural venous sinuses.  4. Prevertebral soft tissue thickening with prominence in the right [place] tonsillar region. Underlying tonsillar mass cannot be excluded. No pathologically enlarged cervical lymph nodes. There are however multiple enlarged upper mediastinal lymph   nodes which could be metastatic or reactive in etiology.  5. Diffuse bony sclerosis which may be due to renal osteodystrophy or less likely diffuse sclerotic bony metastatic disease.        Electronically Signed: Pranav Adams MD    3/11/2025 9:57 PM EDT    Workstation ID: [id number]    XR Chest 1 View [[id number]] Collected: 03/11/25 2108      Updated: 03/11/25 2107    Narrative:      XR CHEST 1 VW    Date of Exam: 3/11/2025 8:22 PM EDT    Indication: ams    Comparison: March 11, 2025    Findings:  Pedicle screws bridged by rods are seen overlying the thoracic spine. There are interstitial changes involving the lungs with interval change that may reflect edema. There are no large pleural effusions.      Impression:      Impression:  Interstitial changes involving the lungs with interval change that may reflect edema.        Electronically Signed: Trav Rasmussen MD    3/11/2025 9:04 PM EDT    Workstation ID: [id number]    CT Head Without Contrast [[id number]] Collected: 03/11/25 2036     Updated: 03/11/25 2048    Narrative:      CT HEAD WO CONTRAST    Date of Exam: 3/11/2025 7:55 PM EDT    Indication: ams.    Comparison: None available.    Technique: Axial CT images were obtained of the head without contrast administration.  Automated exposure control and iterative construction methods were used.      Findings:  There is mild generalized parenchymal volume loss. There is a thin mixed density left hemispheric subdural hematoma this measures 7 mm in thickness on coronal image 35 there is 4 mm of midline shift from left to right. No evidence of right-sided subdural   hematoma. No subarachnoid hemorrhage. No intraventricular hemorrhage. No acute infarct.    Globes and orbits are within normal limits.    Paranasal sinuses and mastoid air cells are clear. No acute skull fracture.      Impression:      Impression:    1. Left hemispheric mixed density subdural hematoma with acute and chronic blood products. This measures 7 mm maximally over the left frontal lobe. There is 4 mm of midline shift. No hydrocephalus.      Findings personally called to patient's nurse Radha at 8:44 p.m. on 3/11/2025      Electronically Signed: Pranav Adams MD    3/11/2025 8:45 PM EDT    Workstation ID: [id number]    XR Chest 1 View [[id number]] Collected: 03/11/25 1602     Updated: 03/11/25  1609    Narrative:      XR CHEST 1 VW    Date of Exam: 3/11/2025 1:50 PM EDT    Indication: preop, coronary artery disease    Comparison: [facility] chest x-ray 3/1/2023, 2/1/2023    Findings:  Lungs are adequately expanded and appear clear. No pneumothorax or large pleural effusion is seen. Cardiac silhouette remains mildly enlarged. There are old right rib fractures. Postoperative changes of the thoracic spine are new.      Impression:      Impression:  No acute cardiopulmonary abnormality is identified.        Electronically Signed: Princess Reveles    3/11/2025 4:06 PM EDT    Workstation ID: [id number]            Assessment    Subdural hematoma, nontraumatic    Essential hypertension    Type 2 diabetes mellitus, with long-term current use of insulin    Heart failure with preserved left ventricular function (HFpEF)    Current smoker    ESRD on hemodialysis    Coronary artery disease    Hypothyroidism (acquired)      Plan   No plans for bypass surgery. Cardiology evaluating for PCI. Neurosurgery consulted and recommends holding anticoagulation for 2 to 3 weeks prior to any type of intervention for his CAD.   Neurology consult for witnessed seizure      Yamini Schwarz, VICKI  03/12/25  07:07 EDT    Electronically signed by Gus Lay MD at 03/12/25 0913       Gerry Nolasco MD at 03/11/25 2051            Intensive Care Admission Note     Coronary artery disease    History of Present Illness     Chevy Junior is a 59 y.o. male with past medical history of coronary artery disease, ischemic cardiomyopathy EF 50%, chronic combined systolic and diastolic CHF, peripheral vascular disease  s/p  Rt AKA and left fem-pop bypass, Chronic opiate use, ESRD on HD MWF RIC, COPD, hypertension, hyperlipidemia, hypothyroidism, ? Medication compliance, admitted on 3/11 by CTS for evaluation of potential CABG as he has ongoing anginal symptoms.  The patient was evaluated by Dr. Lay and was felt to be poor candidate for  "undergoing CABG. The recommendation is for either high risk PCI or medical management.      On 3/11/2025, the patient developed acute onset of encephalopathy and a STAT head CT was obtained that showed \"left hemispheric mixed density subdural hematoma with acute and chronic blood products\" measuring 7 mm maximally over the left frontal lobe, 4 mm midline shift.While awaiting his CT the patient had a witnessed GTC seizure lasting < 1 minute.  He was treated w/ valium 5 mg & keppra 1000 mg and EEG has been ordered STAT.    Patient was transferred to ICU for tight BP management and close monitoring of neuro status.  Neurology and neurosurgery teams consulted.    On my evaluation patient is a bit sleepy but wakes up and answers questions.  A bit confused.  Able to answer a few questions.  Acknowledged that he is in the hospital.  Noted that he undergoes dialysis 3 times a week through his left forearm fistula.  Currently denies having any headache.  Positive for nausea.  No chest pain or shortness of breath or any abdominal discomfort.  Able to lift left upper and lower extremities against gravity.    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     Cellulitis and abscess of buttock 2023    CHF (congestive heart failure)     Chronic pain     COPD (chronic obstructive pulmonary disease)     INHALER    Coronary artery disease involving native coronary artery of native heart with angina pectoris 09/12/2022    stents x 3    Diabetes mellitus     ESRD on dialysis     M,W,F-FISTULA LEFT ARM    GERD (gastroesophageal reflux disease)     Heart failure with preserved left ventricular function (HFpEF) 09/13/2022    FOLLOWS DHEERAJ/REECE-NO CP    History of blood transfusion     NO ADVERSE REACTIONS    Hyperlipidemia     Hypertension     WIFE STATED B/P HAS BEEN RUNNING LOW LATELY    " Hypothyroidism (acquired) 03/11/2025    Infectious viral hepatitis 5 years ago was treated    hx hep c    RIC (obstructive sleep apnea)     Osteoarthritis     Phosphorus metabolic disorder     Sleep apnea     NO MACHINE    Substance abuse 20 years ago from [facility]      Past Surgical History:   Procedure Laterality Date    ABOVE KNEE LEG AMPUTATION Right     CARDIAC CATHETERIZATION      CARDIAC CATHETERIZATION N/A 11/30/2022    Procedure: Percutaneous Coronary Intervention;  Surgeon: River Acevedo IV, MD;  Location: [facility] CATH INVASIVE LOCATION;  Service: Cardiovascular;  Laterality: N/A;    CARDIAC CATHETERIZATION N/A 11/30/2022    Procedure: Optical Coherent Tomography;  Surgeon: River Acevedo IV, MD;  Location: [facility] CATH INVASIVE LOCATION;  Service: Cardiovascular;  Laterality: N/A;    CARDIAC CATHETERIZATION N/A 11/30/2022    Procedure: Stent JOSE coronary;  Surgeon: River Acevedo IV, MD;  Location: [facility] CATH INVASIVE LOCATION;  Service: Cardiovascular;  Laterality: N/A;    CARDIAC CATHETERIZATION Left 11/09/2023    Procedure: Aortogram with left leg angiogram, possible angioplasty or stenting;  Surgeon: Luis Fernando Rawls MD;  Location: [facility] CATH INVASIVE LOCATION;  Service: Vascular;  Laterality: Left;    CARDIAC CATHETERIZATION Right 04/25/2024    Procedure: Right lower extremity angiogram, possible angioplasty or stenting;  Surgeon: Luis Fernando Rawls MD;  Location: [facility] CATH INVASIVE LOCATION;  Service: Vascular;  Laterality: Right;    CARDIAC CATHETERIZATION N/A 12/05/2024    Procedure: Left Heart Cath;  Surgeon: Cole Oscar MD;  Location: [facility] CATH INVASIVE LOCATION;  Service: Cardiovascular;  Laterality: N/A;    CATH LAB PROCEDURE      unable to stent    COLONOSCOPY N/A 02/15/2023    Procedure: COLONOSCOPY WITH POLYPECTOMY;  Surgeon: David Glass MD;  Location: [facility] ENDOSCOPY;  Service: Gastroenterology;  Laterality: N/A;  COLON POLYP, MELANOSIS COLI     "ENDOSCOPY N/A 02/03/2023    Procedure: ESOPHAGOGASTRODUODENOSCOPY with biopsy;  Surgeon: Gus Whaley MD;  Location: [facility] ENDOSCOPY;  Service: General;  Laterality: N/A;  gastritis,     FEMORAL POPLITEAL BYPASS Left 12/12/2023    Procedure: Left femoral edarterectomy, left femoral to below the knee popliteal bypass graft;  Surgeon: Luis Fernando Rawls MD;  Location: [facility] MAIN OR;  Service: Vascular;  Laterality: Left;    HIP SURGERY Right     INSERTION PERITONEAL DIALYSIS CATHETER      LEG SURGERY Left     \"jocelyne in place\"    MANDIBLE FRACTURE SURGERY      REPLACEMENT TOTAL KNEE Right     x3    SHOULDER SURGERY Right     SPINE SURGERY      unknown procudure- completed at [facility] in [place].       No Known Allergies  No current facility-administered medications on file prior to encounter.     Current Outpatient Medications on File Prior to Encounter   Medication Sig    albuterol sulfate  (90 Base) MCG/ACT inhaler Inhale 1 puff Every 4 (Four) Hours As Needed for Wheezing or Shortness of Air.    aspirin (aspirin) 81 MG EC tablet Take 1 tablet by mouth Daily.    atorvastatin (LIPITOR) 40 MG tablet Take 1 tablet by mouth Daily.    Auryxia 1  MG(Fe) tablet Take 1 tablet by mouth Daily With Lunch.    cinacalcet (SENSIPAR) 30 MG tablet Take 1 tablet by mouth Daily.    clopidogrel (PLAVIX) 75 MG tablet Take 1 tablet by mouth Daily. (Patient taking differently: Take 1 tablet by mouth Daily. Last dose 3/6/25. Holding prior to CABG)    Diclofenac Sodium (VOLTAREN) 1 % gel gel Apply 4 g topically to the appropriate area as directed As Needed. prn    gabapentin (NEURONTIN) 100 MG capsule Take 1 capsule by mouth Daily.    Heparin Sodium, Porcine, (heparin, porcine,) 1000 UNIT/ML injection Infuse 1 mL into a venous catheter. M-W-F with dialysis    Insulin Glargine (BASAGLAR KWIKPEN) 100 UNIT/ML injection pen Inject 5-15 Units under the skin into the appropriate area as directed 3 (Three) Times a Day As Needed. Per " SS  5-15 UNITS    lactulose (CHRONULAC) 10 GM/15ML solution Take 30 mL by mouth 2 (Two) Times a Day As Needed.    levothyroxine (SYNTHROID, LEVOTHROID) 75 MCG tablet Take 1 tablet by mouth Daily.    lidocaine-prilocaine (EMLA) 2.5-2.5 % cream Apply 1 Application topically to the appropriate area as directed As Needed for Injection Site Pain. Apply before dialysis on Monday, Wednesday and Fridays of each week    metoclopramide (REGLAN) 5 MG tablet Take 1 tablet by mouth Every Night.    midodrine (PROAMATINE) 5 MG tablet Take 1 tablet by mouth 3 (Three) Times a Day Before Meals. Prn on M/W/F, dialysis days    naloxone (NARCAN) 4 MG/0.1ML nasal spray Administer 1 spray into the nostril(s) as directed by provider Take As Directed.    nitroglycerin (NITROSTAT) 0.4 MG SL tablet Place 1 tablet under the tongue Every 5 (Five) Minutes As Needed for Chest Pain.    ondansetron ODT (ZOFRAN-ODT) 4 MG disintegrating tablet Place 1 tablet on the tongue Every 6 (Six) Hours As Needed for Nausea or Vomiting.    oxyCODONE (ROXICODONE) 30 MG immediate release tablet Take 1.5 tablets by mouth Every 4 (Four) Hours As Needed for Moderate Pain or Severe Pain.    pantoprazole (PROTONIX) 40 MG EC tablet Take 1 tablet by mouth Daily.    ranolazine (Ranexa) 500 MG 12 hr tablet Take 1 tablet by mouth once daily on days that you are NOT undergoing dialysis    sertraline (ZOLOFT) 100 MG tablet Take 1.5 tablets by mouth Daily.    sodium zirconium cyclosilicate (Lokelma) 10 g packet Take 10 g by mouth 1 (One) Time. Empty entire contents of the packet(s) into a glass with at least 3 tablespoons (45 mL) of water. Stir well and drink immediately; if powder remains in the glass, add water, stir and drink immediately; repeat until no powder remains. Administer other oral medications at least 2 hours before or 2 hours after dose.    Xphozah 30 MG tablet Take 30 mg by mouth Daily As Needed.     MEDICATION LIST AND ALLERGIES REVIEWED.    Family History  "  Problem Relation Age of Onset    Heart attack Mother     Coronary artery disease Mother     Hypertension Mother     Arthritis Mother     Hypertension Father     Diabetes Father      Social History     Tobacco Use    Smoking status: Every Day     Current packs/day: 2.00     Average packs/day: 2.0 packs/day for 40.0 years (80.0 ttl pk-yrs)     Types: Cigarettes    Smokeless tobacco: Never    Tobacco comments:     1.5 ppd as of 2/5/25   Vaping Use    Vaping status: Never Used   Substance Use Topics    Alcohol use: Not Currently     Comment: None    Drug use: Not Currently     Types: Hydrocodone     Comment: pain pills from previous injuries     Social History     Social History Narrative    Chronically debilitated.  Poor mobility at baseline.  Wheelchair-bound generally and cannot stand/pivot on left leg.  Lives with his ex-wife who is his POA and caregiver.  No home oxygen use.  No home CPAP use.     FAMILY AND SOCIAL HISTORY REVIEWED.      Physical Exam and Clinical Information   /64 (BP Location: Right arm, Patient Position: Lying)   Pulse 96   Temp 97.9 °F (36.6 °C) (Oral)   Resp 18   Ht 170.2 cm (67\")   Wt 77.6 kg (171 lb)   SpO2 92%   BMI 26.78 kg/m²     Physical exam  General : Aox2. In no acute distress. Well developed no acute distress.  Neuro: CN 2-12 grossly intact, left upper and lower extremity power 4/5.  Right upper extremity 5/5.  Unable to test right lower extremity given AKA status.  Able to move the stump.  HEENT : AT,NC,PERRLA,No pallor, No Icterus,No thyromegaly. No JVD.  CVS : RRR, No M/R/G.   Resp/Chest: B/l ant VBS+. No wheeze/rales  Abd: Soft, Non distended, No tenderness, No organomegaly. Bowel sounds +  EXT: Left upper extremity AV fistula present.  Right AKA noted.  Left lower extremity pitting edema present.  SKIN : Left lower extremity chronic stasis dermatitis changes noted.        Results from last 7 days   Lab Units 03/11/25  1948   SODIUM mmol/L 137   POTASSIUM mmol/L " 4.3   CO2 mmol/L 24.0   BUN mg/dL 60*   CREATININE mg/dL 8.03*   GLUCOSE mg/dL 106*     Estimated Creatinine Clearance: 10.7 mL/min (A) (by C-G formula based on SCr of 8.03 mg/dL (H)).  Results from last 7 days   Lab Units 03/11/25 1948   HEMOGLOBIN A1C % 6.50*     Results from last 7 days   Lab Units 03/11/25 1947   PH, ARTERIAL pH units 7.400   PCO2, ARTERIAL mm Hg 43.7   PO2 ART mm Hg 85.6     Lab Results   Component Value Date    LACTATE 1.4 03/11/2025        Images:     I reviewed the patient's results and images.     Impression     Acute encephalopathy  New onset seizure  Acute to subacute left frontal subdural hematoma  Coronary artery disease  Chronic combined systolic and diastolic congestive heart failure without exacerbation  Ischemic cardiomyopathy  Peripheral vascular disease S/p Ryt Fem-pop Bypass and Rt AKA  Essential hypertension  Mixed hyperlipidemia  History of COPD-currently not in exacerbation  RIC on CPAP ?  Compliance  ESRD on hemodialysis MWF  GERD  Acquired hypothyroidism  Type 2 diabetes mellitus  Chronic pain syndrome on high-dose opiate use  Incidentally noted paravertebral soft tissue thickening    Plan/Recommendations     Neuro  -Acute encephalopathy likely secondary to seizures and postictal phase.  Had witnessed clonic tonic activity predominantly on his right upper and lower extremity consistent with focus in the left cortex likely corresponding to the area of hemorrhage.  Received 5 mg Valium 1000 mg Keppra.  Started Keppra 500 mg twice daily.  Continue gabapentin 100 mg every 8 hours.  As needed benzodiazepines for refractory seizures.  EEG ordered.  Neurology and neurosurgery consulted.  Maintain seizure precautions.  Neurochecks per protocol.    -Repeat CT head ordered at 6 hours and 24 hours.  MRI brain ordered.  CTA head with no evidence of any vascular abnormalities.    -Continue home antihypertensive medications.  Hold if patient is unable to swallow.  As needed Cardene  ordered to maintain systolic blood pressure less than 150 mm hg.    -Hold antiplatelets.  Hold DVT prophylaxis.  Check APTT and INR.  Type and screen.    CVS  -Patient with history of multiple LHC with active coronary artery disease and untreated stenosis.  Deemed not a candidate for CABG.  Cardiology and CT surgery were on board.  Unfortunately at this time need to hold aspirin and Plavix given his head bleed.  Monitor for worsening chest pain symptoms.  Repeat EKG as needed.  -Continue Lipitor.  -Continue metoprolol, ranolazine and Imdur.  -Right AKA stump with no complications.    Pulmonary  -On supplemental oxygen to maintain SpO2 greater than 94.  -As needed CPAP    Renal  -ESRD on hemodialysis.HD per nephrology team.    GI/Liver  Continue PPI    ID  Clinically no signs of infection    Hem-onc  Monitor platelet count and PT and INR.  With signs of any worsening bleeding might need to consider platelet transfusion if less than 100.    Endocrine  Continue levothyroxine.  SSI    Misc  Prevertebral soft tissue thickening with prominence in the right [place] tonsillar region. Underlying tonsillar mass cannot be excluded. No pathologically enlarged cervical lymph nodes. There are however multiple enlarged upper mediastinal lymph   nodes which could be metastatic or reactive in etiology.       Time spent Critical care 50 min (exclusive of procedure time)  including high complexity decision making to assess, manipulate, and support vital organ system failure in this individual who has impairment of one or more vital organ systems such that there is a high probability of imminent or life threatening deterioration in the patient’s condition.      Gerry Nolasco MD   Critical Care Medicine  03/11/25 20:52 EDT         9 minutes of critical care provided by VICKI Cardoso in addendum accordance with split/shared billing. This time excludes other billable procedures. Time does include preparation of documents,  medical consultations, review of old records, and direct bedside care. Patient is at high risk for life-threatening deterioration due to SDH.   Electronically signed by VICKI Zamora, 03/11/25, 8:52 PM EDT.           Electronically signed by Gerry Nolasco MD at 03/11/25 6676          Consult Notes (all)        Madeline Avendano PA-C at 03/12/25 0825        Consult Orders    1. Inpatient Neurosurgery Consult [[id number]] ordered by Rosette Vargas APRN              Attestation signed by Reid Mahmood MD at 03/12/25 0840    I have reviewed this documentation and agree.  Patient has a complex medical history and severe cardiac disease.  He had a tonic-clonic seizure yesterday.  Head CT showed a small mixed density left-sided subdural hematoma without significant mass effect or shift.  Repeat head CT this morning is stable.  Neurologically, he has remained stable.  There is no role for intervention on the small subdural hematoma, but I do not think he should receive therapeutic heparin or antiplatelet medications other than aspirin.  I discussed this with Dr. Lay and he agrees and suggested the patient may benefit from palliative treatment.  Would plan to have the patient follow-up with us in 3 weeks with a repeat head CT.  If his subdural hematoma has resolved, he could proceed with cardiac intervention at that time.  We will sign off at this time.  Please call with questions.                  [facility]    Inpatient Neurosurgery Consult  Consult performed by: Madeline Avendano PA-C  Consult ordered by: Rosette Vargas APRN        Name: Chevy Junior  YOB: 1965  MRN: [id number]    Referring Provider: Gus Lay MD     Patient Care Team:  Jeanie Francois APRN as PCP - General (Nurse Practitioner)  Nikita Villasenor MD as Consulting Physician (Nephrology)  Feng Nielsen APRN as Nurse Practitioner  (Interventional Cardiology)  Hal Wing MD as Consulting Physician (Cardiology)  Carlos Mcgee MD (General Surgery)  Gus Whaley MD as Consulting Physician (General Surgery)  Luis Fernando Rawls MD as Consulting Physician (Vascular Surgery)    Chief Complaint: No chief complaint on file.      History of Present Illness: This is a 59 y.o. male who initially admitted by cardiology/medicine team for evaluation potential CABG as he ongoing anginal symptoms.  On 3/11/2025, he developed acute encephalopathy and a stat head CT was obtained which showed mixed density subdural hematoma on the left with mild midline shift.  While awaiting his CT scan, patient had a witnessed tonic-clonic seizure lasting around a minute.  He was admitted to ICU for close monitoring of his neurostatus.  He denies hitting his head recently or recent falls.    PMHX  Allergies:  No Known Allergies  Medications    Current Facility-Administered Medications:     albuterol (PROVENTIL) nebulizer solution 0.083% 2.5 mg/3mL, 2.5 mg, Nebulization, 4x Daily PRN, Oly Larsen PA-C    [Held by provider] aspirin EC tablet 81 mg, 81 mg, Oral, Daily, Oly Larsen PA-C    atorvastatin (LIPITOR) tablet 40 mg, 40 mg, Oral, Daily, Oly Larsen PA-C    cinacalcet (SENSIPAR) tablet 30 mg, 30 mg, Oral, Daily, Oly Larsen PA-C    [Held by provider] clopidogrel (PLAVIX) tablet 75 mg, 75 mg, Oral, Daily, Oly Larsen PA-C, 75 mg at 03/11/25 1447    diazePAM (VALIUM) injection 5 mg, 5 mg, Intravenous, Once, Ion Kolb MD    diazePAM (VALIUM) injection 5 mg, 5 mg, Intravenous, Q4H PRN, Ankur Arnold APRN    gabapentin (NEURONTIN) capsule 100 mg, 100 mg, Oral, Q8H, Ankur Arnold APRN, 100 mg at 03/12/25 0433    ipratropium-albuterol (DUO-NEB) nebulizer solution 3 mL, 3 mL, Nebulization, Q4H PRN, Oly Larsen PA-C    isosorbide mononitrate (IMDUR) 24 hr tablet 30 mg, 30 mg, Oral, Q24H, Mounika Anderson, APRN, 30  mg at 03/11/25 1708    labetalol (NORMODYNE,TRANDATE) injection 10 mg, 10 mg, Intravenous, Once, Ion Kolb MD    labetalol (NORMODYNE,TRANDATE) injection, , , ,     levETIRAcetam (KEPPRA) injection 500 mg, 500 mg, Intravenous, Q12H, Sam, May, APRN    levothyroxine (SYNTHROID, LEVOTHROID) tablet 75 mcg, 75 mcg, Oral, Q AM, Oly Larsen PA-C, 75 mcg at 03/12/25 0433    metoclopramide (REGLAN) tablet 5 mg, 5 mg, Oral, Nightly, Oly Larsen PA-C    metoprolol tartrate (LOPRESSOR) tablet 25 mg, 25 mg, Oral, Q12H, Mounika Anderson APRN    midazolam (VERSED) injection 2 mg, 2 mg, Intravenous, Q30 Min PRN, Ankur Arnold APRN    niCARdipine (CARDENE) 25mg in 250mL NS infusion, 5-15 mg/hr, Intravenous, Titrated, Ankur Arnold APRN    nicotine (NICODERM CQ) 14 MG/24HR patch 1 patch, 1 patch, Transdermal, Q24H, Mounika Anderson APRN, 1 patch at 03/11/25 1751    nitroglycerin (NITROSTAT) SL tablet 0.4 mg, 0.4 mg, Sublingual, Q5 Min PRN, Ion Kolb MD    oxyCODONE (ROXICODONE) immediate release tablet 20 mg, 20 mg, Oral, Q4H PRN, Ankur Arnold APRN    pantoprazole (PROTONIX) EC tablet 40 mg, 40 mg, Oral, Daily, Oly Larsen PA-C    ranolazine (RANEXA) 12 hr tablet 500 mg, 500 mg, Oral, Q12H, Mounika Anderson APRN    sertraline (ZOLOFT) tablet 150 mg, 150 mg, Oral, Daily, Ankur Arnold APRN    sodium chloride 0.9 % flush 10 mL, 10 mL, Intravenous, Q12H, Chava, Oly, PA-C    sodium chloride 0.9 % flush 10 mL, 10 mL, Intravenous, PRN, Chava, Oly, PA-C    sodium chloride 0.9 % flush 10 mL, 10 mL, Intravenous, Q12H, Ion Kolb MD, 10 mL at 03/11/25 2258    sodium chloride 0.9 % flush 10 mL, 10 mL, Intravenous, PRN, Ion Kolb MD    sodium chloride 0.9 % infusion 40 mL, 40 mL, Intravenous, PRN, Oly Larsen, PA-C    sodium chloride 0.9 % infusion 40 mL, 40 mL, Intravenous, PRN, Ion Kolb MD  Past Medical History:  Past Medical History:    Diagnosis Date    Anemia     Atherosclerosis of native artery of left lower extremity with gangrene     Cellulitis and abscess of buttock 2023    CHF (congestive heart failure)     Chronic pain     COPD (chronic obstructive pulmonary disease)     INHALER    Coronary artery disease involving native coronary artery of native heart with angina pectoris 09/12/2022    stents x 3    Diabetes mellitus     ESRD on dialysis     M,W,F-FISTULA LEFT ARM    GERD (gastroesophageal reflux disease)     Heart failure with preserved left ventricular function (HFpEF) 09/13/2022    FOLLOWS W/REECE-NO CP    History of blood transfusion     NO ADVERSE REACTIONS    Hyperlipidemia     Hypertension     WIFE STATED B/P HAS BEEN RUNNING LOW LATELY    Hypothyroidism (acquired) 03/11/2025    Infectious viral hepatitis 5 years ago was treated    hx hep c    RIC (obstructive sleep apnea)     Osteoarthritis     Phosphorus metabolic disorder     Sleep apnea     NO MACHINE    Substance abuse 20 years ago from [employer]     Past Surgical History:  Past Surgical History:   Procedure Laterality Date    ABOVE KNEE LEG AMPUTATION Right     CARDIAC CATHETERIZATION      CARDIAC CATHETERIZATION N/A 11/30/2022    Procedure: Percutaneous Coronary Intervention;  Surgeon: River Acevedo IV, MD;  Location:  [facility] CATH INVASIVE LOCATION;  Service: Cardiovascular;  Laterality: N/A;    CARDIAC CATHETERIZATION N/A 11/30/2022    Procedure: Optical Coherent Tomography;  Surgeon: River Acevedo IV, MD;  Location:  [facility] C[facility] INVASIVE LOCATION;  Service: Cardiovascular;  Laterality: N/A;    CARDIAC CATHETERIZATION N/A 11/30/2022    Procedure: Stent JOSE coronary;  Surgeon: River Acevedo IV, MD;  Location:  [facility] CATH INVASIVE LOCATION;  Service: Cardiovascular;  Laterality: N/A;    CARDIAC CATHETERIZATION Left 11/09/2023    Procedure: Aortogram with left leg angiogram, possible angioplasty or stenting;  Surgeon: Luis Fernando Rawls MD;   "Location: [facility] CATH INVASIVE LOCATION;  Service: Vascular;  Laterality: Left;    CARDIAC CATHETERIZATION Right 04/25/2024    Procedure: Right lower extremity angiogram, possible angioplasty or stenting;  Surgeon: Luis Fernando Rawls MD;  Location: [facility] CATH INVASIVE LOCATION;  Service: Vascular;  Laterality: Right;    CARDIAC CATHETERIZATION N/A 12/05/2024    Procedure: Left Heart Cath;  Surgeon: Cole Oscar MD;  Location: [facility] CATH INVASIVE LOCATION;  Service: Cardiovascular;  Laterality: N/A;    CATH LAB PROCEDURE      unable to stent    COLONOSCOPY N/A 02/15/2023    Procedure: COLONOSCOPY WITH POLYPECTOMY;  Surgeon: David Glass MD;  Location: [facility] ENDOSCOPY;  Service: Gastroenterology;  Laterality: N/A;  COLON POLYP, MELANOSIS COLI    ENDOSCOPY N/A 02/03/2023    Procedure: ESOPHAGOGASTRODUODENOSCOPY with biopsy;  Surgeon: Gus Whaley MD;  Location: [facility] ENDOSCOPY;  Service: General;  Laterality: N/A;  gastritis,     FEMORAL POPLITEAL BYPASS Left 12/12/2023    Procedure: Left femoral edarterectomy, left femoral to below the knee popliteal bypass graft;  Surgeon: Luis Fernando Rawls MD;  Location: [facility] MAIN OR;  Service: Vascular;  Laterality: Left;    HIP SURGERY Right     INSERTION PERITONEAL DIALYSIS CATHETER      LEG SURGERY Left     \"jocelyne in place\"    MANDIBLE FRACTURE SURGERY      REPLACEMENT TOTAL KNEE Right     x3    SHOULDER SURGERY Right     SPINE SURGERY      unknown procudure- completed at [facility] in [facility].     Social Hx:  Social History     Tobacco Use    Smoking status: Every Day     Current packs/day: 2.00     Average packs/day: 2.0 packs/day for 40.0 years (80.0 ttl pk-yrs)     Types: Cigarettes    Smokeless tobacco: Never    Tobacco comments:     1.5 ppd as of 2/5/25   Vaping Use    Vaping status: Never Used   Substance Use Topics    Alcohol use: Not Currently     Comment: None    Drug use: Not Currently     Types: Hydrocodone     Comment: pain pills from previous injuries " "    Family Hx:  Family History   Problem Relation Age of Onset    Heart attack Mother     Coronary artery disease Mother     Hypertension Mother     Arthritis Mother     Hypertension Father     Diabetes Father      Review of Systems:        Review of Systems   Neurological:  Positive for seizures. Negative for weakness and headaches.        Physical Exam:   /69 (BP Location: Right arm, Patient Position: Sitting)   Pulse 89   Temp 97.9 °F (36.6 °C) (Oral)   Resp 18   Ht 170.2 cm (67\")   Wt 77.6 kg (171 lb)   SpO2 95%   BMI 26.78 kg/m²   Patient appears comfortable, resting  Awake, alert and oriented to self only  Speech f/c  Opens eyes spontaneously  EOM intact  Face symmetric  Tongue midline  Moves bilateral upper extremities and left lower extremity to command.  Right AKA      Intake/Output:   Intake/Output Summary (Last 24 hours) at 3/12/2025 0831  Last data filed at 3/11/2025 1700  Gross per 24 hour   Intake 480 ml   Output --   Net 480 ml       Current Medications:   Current Facility-Administered Medications:     albuterol (PROVENTIL) nebulizer solution 0.083% 2.5 mg/3mL, 2.5 mg, Nebulization, 4x Daily PRN, Oly Larsen PA-C    [Held by provider] aspirin EC tablet 81 mg, 81 mg, Oral, Daily, Oly Larsen PA-C    atorvastatin (LIPITOR) tablet 40 mg, 40 mg, Oral, Daily, Oly Larsen PA-C    cinacalcet (SENSIPAR) tablet 30 mg, 30 mg, Oral, Daily, Oly Larsen PA-C    [Held by provider] clopidogrel (PLAVIX) tablet 75 mg, 75 mg, Oral, Daily, Oly Larsen PA-C, 75 mg at 03/11/25 1447    diazePAM (VALIUM) injection 5 mg, 5 mg, Intravenous, Once, Ion Kolb MD    diazePAM (VALIUM) injection 5 mg, 5 mg, Intravenous, Q4H PRN, Ankur Arnold APRN    gabapentin (NEURONTIN) capsule 100 mg, 100 mg, Oral, Q8H, Ankur Arnold APRN, 100 mg at 03/12/25 0433    ipratropium-albuterol (DUO-NEB) nebulizer solution 3 mL, 3 mL, Nebulization, Q4H PRN, Oly Larsen PA-C    isosorbide " mononitrate (IMDUR) 24 hr tablet 30 mg, 30 mg, Oral, Q24H, Mounika Anderson APRN, 30 mg at 03/11/25 1708    labetalol (NORMODYNE,TRANDATE) injection 10 mg, 10 mg, Intravenous, Once, Ion Kolb MD    labetalol (NORMODYNE,TRANDATE) injection, , , ,     levETIRAcetam (KEPPRA) injection 500 mg, 500 mg, Intravenous, Q12H, Sam, May, APRN    levothyroxine (SYNTHROID, LEVOTHROID) tablet 75 mcg, 75 mcg, Oral, Q AM, Oly Larsen PA-C, 75 mcg at 03/12/25 0433    metoclopramide (REGLAN) tablet 5 mg, 5 mg, Oral, Nightly, Oly Larsen PA-C    metoprolol tartrate (LOPRESSOR) tablet 25 mg, 25 mg, Oral, Q12H, Mounika Anderson APRN    midazolam (VERSED) injection 2 mg, 2 mg, Intravenous, Q30 Min PRN, Ankur Arnold APRN    niCARdipine (CARDENE) 25mg in 250mL NS infusion, 5-15 mg/hr, Intravenous, Titrated, Ankur Arnold APRN    nicotine (NICODERM CQ) 14 MG/24HR patch 1 patch, 1 patch, Transdermal, Q24H, Mounika Anderson APRN, 1 patch at 03/11/25 1751    nitroglycerin (NITROSTAT) SL tablet 0.4 mg, 0.4 mg, Sublingual, Q5 Min PRN, Ion Kolb MD    oxyCODONE (ROXICODONE) immediate release tablet 20 mg, 20 mg, Oral, Q4H PRN, Ankur Arnold APRN    pantoprazole (PROTONIX) EC tablet 40 mg, 40 mg, Oral, Daily, Oly Larsen PA-C    ranolazine (RANEXA) 12 hr tablet 500 mg, 500 mg, Oral, Q12H, Mounika Anderson APRN    sertraline (ZOLOFT) tablet 150 mg, 150 mg, Oral, Daily, Ankur Arnold VICKI    sodium chloride 0.9 % flush 10 mL, 10 mL, Intravenous, Q12H, Oly Larsen PA-RANJIT    sodium chloride 0.9 % flush 10 mL, 10 mL, Intravenous, PRN, Oly Larsen, PA-RANJIT    sodium chloride 0.9 % flush 10 mL, 10 mL, Intravenous, Q12H, Ion Kolb MD, 10 mL at 03/11/25 2258    sodium chloride 0.9 % flush 10 mL, 10 mL, Intravenous, PRN, Ion Kolb MD    sodium chloride 0.9 % infusion 40 mL, 40 mL, Intravenous, PRN, Oly Larsen, PA-RANJIT    sodium chloride 0.9 % infusion 40 mL, 40 mL,  Intravenous, PRN, Kolb, MD Ion     Laboratory Results:      Lab 03/12/25 0439 03/11/25 2216 03/11/25 1948   WBC 5.09  --  5.85   HEMOGLOBIN 8.3*  --  10.4*   HEMATOCRIT 26.6*  --  34.0*   PLATELETS 120*  --  148   NEUTROS ABS  --   --  3.67   IMMATURE GRANS (ABS)  --   --  0.02   LYMPHS ABS  --   --  1.51   MONOS ABS  --   --  0.42   EOS ABS  --   --  0.19   MCV 90.2  --  91.9   LACTATE  --   --  1.4   PROTIME  --  18.0*  --    APTT  --  35.6  --          Lab 03/12/25 0439 03/11/25 1948   SODIUM 140 137   POTASSIUM 4.0 4.3   CHLORIDE 102 94*   CO2 21.0* 24.0   ANION GAP 17.0* 19.0*   BUN 57* 60*   CREATININE 7.09* 8.03*   EGFR 8.2* 7.1*   GLUCOSE 116* 106*   CALCIUM 7.3* 8.6   MAGNESIUM 1.8  --    PHOSPHORUS 6.1*  --    HEMOGLOBIN A1C  --  6.50*   TSH  --  7.170*         Lab 03/11/25 1948   TOTAL PROTEIN 8.4   ALBUMIN 3.7   GLOBULIN 4.7   ALT (SGPT) 9   AST (SGOT) 18   BILIRUBIN 0.3   ALK PHOS 178*         Lab 03/11/25 2216   PROTIME 18.0*   INR 1.47*         Lab 03/12/25 0439 03/11/25 1948   CHOLESTEROL 74 102   LDL CHOL 33 51   HDL CHOL 24* 32*   TRIGLYCERIDES 78 99             Lab 03/11/25 1947   PH, ARTERIAL 7.400   PCO2, ARTERIAL 43.7   PO2 ART 85.6   FIO2 21   HCO3 ART 27.0*   BASE EXCESS ART 1.9   CARBOXYHEMOGLOBIN 3.3*     Brief Urine Lab Results       None          Microbiology Results (last 10 days)       ** No results found for the last 240 hours. **               Diagnostic Imaging: The patient's diagnostic imaging was independently reviewed and interpreted by myself.    MDM    Assessment and Plan:    Subdural Hematoma    This is a 59 y.o. male who initially admitted to the hospital due to coronary artery disease and potential CABG, who developed acute encephalopathy on 3/11/2025.  Stat head CT showed mixed density left subdural hematoma with 4 mm midline shift.  Repeat head CT stable.  Patient does not require neurosurgical intervention however would recommend holding anticoagulation  for at least 2 to 3 weeks prior to intervention on his coronary artery disease.  This was discussed with Dr. Lay.  Defer to ICU team/neurology for seizure management.  Would recommend follow-up head CT in 3 weeks with outpatient follow-up in the neurosurgery office.  Our office will set this up.  Please call with any questions concerns or neurological decline.    Madeline Avendano PA-C  25  08:31 EDT        Electronically signed by Reid Mahmood MD at 25 0840       Rosette Vargas APRN at 25 2112        Consult Orders    1. Inpatient Neurology Consult Stroke [[id number]] ordered by Rosette Vargas APRN at 25 1950                 Stroke Consult Note    Patient Name: Chevy Junior   MRN: [id number]  Age: 59 y.o.  Sex: male  : 1965    Primary Care Physician: Jeanie Francois APRN  Referring Physician:      TIME STROKE TEAM CALLED: 2033 EST     TIME PATIENT SEEN: 2033 EST    Handedness: Right  Race: -American    Chief Complaint/Reason for Consultation: Transient altered mental status, seizure    HPI:   Chevy Junior is a 59 y.o. -American male, right-handed with significant medical diagnosis of coronary artery disease, ischemic cardiomyopathy EF 50%, diastolic systolic, peripheral vascular disease  s/p  Rt AKA and left fem-pop bypass, Chronic opiate use, ESRD on HD MWF RIC, COPD, hypertension, hyperlipidemia, hypothyroidism, ongoing tobacco abuse, admitted on 3/11 by cardiology\medicine team for evaluation of potential CABG as he has ongoing anginal symptoms.  The patient was evaluated by Dr. Lay and was felt to be poor candidate for undergoing CABG. The recommendation is for either high risk PCI or medical management.  Code stroke inpatient was alerted secondary to acute onset of encephalopathy alteration of mental status, dysarthria and witnessed onset of seizure breakthrough.  Patient was taken emergently to CT suite, CT head without  contrast revealed subdural hematoma left parietal, left frontal, CTA no LVO.  Neurosurgery was contacted Maria Elena Mancia.  Hemorrhagic order set was placed.  NIH per my exam to 4 patient not alert, drowsy, confused at times however he is oriented to himself place and age.  No current seizure at this time.  Patient is moving all extremities spontaneously and on command, noted right lower extremity AKA.  Patient is ill looking.    Patient is wheelchair dependent, smoke tobacco daily.  No reported trauma to me.  Discussed the case with  my neurology attending as well as Zina Mancia neurosurgery PA will place hemorrhagic order set, will load with Keppra and maintain Keppra IV, EEG in the morning.  Neurology stroke will sign off for now however please reach out for any questions or concerns.    Last Known Normal Date/Time: 1900 EST     Review of Systems   Constitutional:  Positive for activity change.   HENT: Negative.     Eyes: Negative.    Respiratory:  Positive for shortness of breath.    Cardiovascular: Negative.    Gastrointestinal: Negative.    Endocrine: Negative.    Genitourinary: Negative.    Musculoskeletal: Negative.    Skin: Negative.    Neurological:  Positive for seizures and speech difficulty.   Hematological: Negative.    Psychiatric/Behavioral:  Positive for confusion.       Past Medical History:   Diagnosis Date    Anemia     Atherosclerosis of native artery of left lower extremity with gangrene     Cellulitis and abscess of buttock 2023    CHF (congestive heart failure)     Chronic pain     COPD (chronic obstructive pulmonary disease)     INHALER    Coronary artery disease involving native coronary artery of native heart with angina pectoris 09/12/2022    stents x 3    Diabetes mellitus     ESRD on dialysis     M,W,F-FISTULA LEFT ARM    GERD (gastroesophageal reflux disease)     Heart failure with preserved left ventricular function (HFpEF) 09/13/2022    FOLLOWS DHEERAJ/REECE-NO CP    History  of blood transfusion     NO ADVERSE REACTIONS    Hyperlipidemia     Hypertension     WIFE STATED B/P HAS BEEN RUNNING LOW LATELY    Hypothyroidism (acquired) 03/11/2025    Infectious viral hepatitis 5 years ago was treated    hx hep c    RIC (obstructive sleep apnea)     Osteoarthritis     Phosphorus metabolic disorder     Sleep apnea     NO MACHINE    Substance abuse 20 years ago from [facility]     Past Surgical History:   Procedure Laterality Date    ABOVE KNEE LEG AMPUTATION Right     CARDIAC CATHETERIZATION      CARDIAC CATHETERIZATION N/A 11/30/2022    Procedure: Percutaneous Coronary Intervention;  Surgeon: River Acevedo IV, MD;  Location:  [facility] CATH INVASIVE LOCATION;  Service: Cardiovascular;  Laterality: N/A;    CARDIAC CATHETERIZATION N/A 11/30/2022    Procedure: Optical Coherent Tomography;  Surgeon: River Acevedo IV, MD;  Location:  [facility] CATH INVASIVE LOCATION;  Service: Cardiovascular;  Laterality: N/A;    CARDIAC CATHETERIZATION N/A 11/30/2022    Procedure: Stent JOSE coronary;  Surgeon: River Acevedo IV, MD;  Location:  [facility] CATH INVASIVE LOCATION;  Service: Cardiovascular;  Laterality: N/A;    CARDIAC CATHETERIZATION Left 11/09/2023    Procedure: Aortogram with left leg angiogram, possible angioplasty or stenting;  Surgeon: Luis Fernando Rawls MD;  Location:  [facility] CATH INVASIVE LOCATION;  Service: Vascular;  Laterality: Left;    CARDIAC CATHETERIZATION Right 04/25/2024    Procedure: Right lower extremity angiogram, possible angioplasty or stenting;  Surgeon: Luis Fernando Rawls MD;  Location:  [facility] CATH INVASIVE LOCATION;  Service: Vascular;  Laterality: Right;    CARDIAC CATHETERIZATION N/A 12/05/2024    Procedure: Left Heart Cath;  Surgeon: Cole Oscar MD;  Location:  [facility] CATH INVASIVE LOCATION;  Service: Cardiovascular;  Laterality: N/A;    CATH LAB PROCEDURE      unable to stent    COLONOSCOPY N/A 02/15/2023    Procedure: COLONOSCOPY WITH POLYPECTOMY;  Surgeon:  "David Glass MD;  Location: [facility] ENDOSCOPY;  Service: Gastroenterology;  Laterality: N/A;  COLON POLYP, MELANOSIS COLI    ENDOSCOPY N/A 02/03/2023    Procedure: ESOPHAGOGASTRODUODENOSCOPY with biopsy;  Surgeon: Gus Whaley MD;  Location: [facility] ENDOSCOPY;  Service: General;  Laterality: N/A;  gastritis,     FEMORAL POPLITEAL BYPASS Left 12/12/2023    Procedure: Left femoral edarterectomy, left femoral to below the knee popliteal bypass graft;  Surgeon: Luis Fernando Rawls MD;  Location: [facility] MAIN OR;  Service: Vascular;  Laterality: Left;    HIP SURGERY Right     INSERTION PERITONEAL DIALYSIS CATHETER      LEG SURGERY Left     \"jocelyne in place\"    MANDIBLE FRACTURE SURGERY      REPLACEMENT TOTAL KNEE Right     x3    SHOULDER SURGERY Right     SPINE SURGERY      unknown procudure- completed at [facility] in [place].     Family History   Problem Relation Age of Onset    Heart attack Mother     Coronary artery disease Mother     Hypertension Mother     Arthritis Mother     Hypertension Father     Diabetes Father      Social History     Socioeconomic History    Marital status:    Tobacco Use    Smoking status: Every Day     Current packs/day: 2.00     Average packs/day: 2.0 packs/day for 40.0 years (80.0 ttl pk-yrs)     Types: Cigarettes    Smokeless tobacco: Never    Tobacco comments:     1.5 ppd as of 2/5/25   Vaping Use    Vaping status: Never Used   Substance and Sexual Activity    Alcohol use: Not Currently     Comment: None    Drug use: Not Currently     Types: Hydrocodone     Comment: pain pills from previous injuries    Sexual activity: Defer     No Known Allergies  Prior to Admission medications    Medication Sig Start Date End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 oxyCODONE, pantoprazole, ranolazine, sertraline, and sodium zirconium cyclosilicate    Scheduled Meds:aspirin, 81 mg, Oral, Daily  atorvastatin, 40 mg, Oral, Daily  cinacalcet, 30 mg, Oral, Daily  clopidogrel, 75 mg, Oral, Daily  gabapentin, 100 mg, Oral, Q8H  isosorbide mononitrate, 30 mg, Oral, Q24H  levothyroxine, 75 mcg, Oral, Q AM  metoclopramide, 5 mg, Oral, Nightly  metoprolol tartrate, 25 mg, Oral, Q12H  pantoprazole, 40 mg, Oral, Daily  ranolazine, 500 mg, Oral, Q12H  sertraline, 150 mg, Oral, Daily  sodium chloride, 10 mL, Intravenous, Q12H      Continuous Infusions:   PRN Meds:.  albuterol    ipratropium-albuterol    nitroglycerin    oxyCODONE    sodium chloride    sodium chloride    No Known Allergies    Objective   Objective     Vital Signs:   Temp:  [97.7 °F (36.5 °C)-98.2 °F (36.8 °C)] 98.2 °F (36.8 °C)  Heart Rate:  [92] 92  Resp:  [16-20] 16  BP: (135-145)/(79-93) 135/79    Physical Exam  Constitutional: Drowsy but awakens easily to voice.  Resting up in his wheelchair.  Chronically ill, debilitated appearing male.  Wife at bedside.  Eyes: PERRLA, sclerae anicteric, no conjunctival injection  HENT: NCAT, mucous membranes moist  Neck: Supple, no thyromegaly, no  lymphadenopathy, trachea midline  Respiratory: Clear to auscultation bilaterally but decreased at bases, nonlabored respirations on room air.  Sats WNL.  Cardiovascular: RRR, no murmurs, rubs, or gallops, palpable pedal pulses bilaterally  Gastrointestinal: Positive bowel sounds, soft, nontender, nondistended.  Musculoskeletal: Old right AKA.  LLE 2+ edema, no clubbing or cyanosis to extremities. TINSLEY spont  Psychiatric: flat affect, cooperative and calm   Neurologic: Oriented x 3, strength symmetric in all extremities but generally weak, Cranial Nerves grossly intact to confrontation, speech clear. Follows commands   Skin: No rashes.  Chronic venous stasis changes to LLE.  Old right AKA stump wound scarred/healed         Result Review:  I have personally reviewed the results from the time of admission and agree with these findings:  [x]  Laboratory  []  Radiology  []  EKG/Telemetry   []  Pathology  [x]  Old records  [x]  Other: Admit labs currently pending   Most notable findings include:     LAB RESULTS:                              Brief Urine Lab Results       None          Microbiology Results (last 10 days)       ** No results found for the last 240 hours. **            XR Chest 1 View  Result Date: 3/11/2025  XR CHEST 1 VW Date of Exam: 3/11/2025 1:50 PM EDT Indication: preop, coronary artery disease Comparison: [facility] chest x-ray 3/1/2023, 2/1/2023 Findings: Lungs are adequately expanded and appear clear. No pneumothorax or large pleural effusion is seen. Cardiac silhouette remains mildly enlarged. There are old right rib fractures. Postoperative changes of the thoracic spine are new.     Impression: Impression: No acute cardiopulmonary abnormality is identified. Electronically Signed: Princess Reveles  3/11/2025 4:06 PM EDT  Workstation ID: [id number]      Results for orders placed during the hospital encounter of 11/05/24    Adult Transthoracic Echo Complete W/ Cont if Necessary Per Protocol    Interpretation  Summary  Physician interpretation.  Good technical quality.    1.  LV size, function, and wall motion are normal.  Mild to moderate concentric left ventricular hypertrophy.  Visually estimated ejection fraction is 55 to 60%.  Grade 2 diastolic dysfunction.  Mild left atrial enlargement.  Borderline right atrial size.  RV size and function are grossly preserved.  No septal defect or intracavitary mass or thrombus.    2.  The mitral valve is markedly morphologically abnormal.  Both leaflets are thickened and there is a sclerotic and mobile projection of the anterior mitral leaflet into the LV outflow tract.  This does not have the typical appearance of an acute thrombus or vegetation.  There is no compromise of the LV outflow tract.  There is mild mitral stenosis with a mitral valve area of 1.9 cm² by pressure half-time.  There is trivial MR.  The aortic valve is tricuspid and sclerotic but is minimally calcified.  The annulus is at the upper limits of normal size.  There is no mitral stenosis and there is mild to moderate MR.  Pressure half-time of AI is suggestive of moderate or greater disease.  Right-sided heart valves are unremarkable.    3.  There is no pericardial effusion.  The aortic root is mildly dilated at 3.94 cm with no dissection or aneurysm.    4.  Right heart pressures cannot be calculated.      Assessment & Plan   Assessment & Plan     Active Hospital Problems    Diagnosis  POA    **Coronary artery disease [I25.10]  Yes     Priority: High    Hypothyroidism (acquired) [E03.9]  Unknown    Current smoker [F17.200]  Yes    ESRD on hemodialysis [N18.6, Z99.2]  Not Applicable    Heart failure with preserved left ventricular function (HFpEF) [I50.30]  Yes    Essential hypertension [I10]  Yes    Type 2 diabetes mellitus, with long-term current use of insulin [E11.9, Z79.4]  Not Applicable      Resolved Hospital Problems   No resolved problems to display.       Chevy Junior is a 59 y.o. male past  medical history significant for hypothyroidism, debility, COPD, RIC (no CPAP use), CAD, HTN, HLD, CHF, DM type II, ESRD on HD, PVD s/p right AKA and prior left femoropopliteal bypass and severe LLE ch venous stasis, MRSA in the right stump (just finished antibiotics last week after 1 year of tx).  Pt continues to smoke 2 packs/day.  Lives with his ex wife who is his primary caregiver and POA.  Presents to CTS service this morning for planned bypass surgery today.  Was reevaluated by CTS prior to surgery and determined to be prohibitively high risk surgical candidate.  Cardiology consulted for possible high risk stenting versus med management.  Hospital medicine service has been consulted for diabetes management.      Assessment/Plan:    CAD s/p prior stenting   HTN/HLD/CHF  -- Initial plans for bypass surgery today.  Now considered to be prohibitively high risk surgical candidate.  -- CTS consulted cardiology to eval for possible high risk stenting versus med management  -- Care per CTS/cards  -- admit labs pending collection.    DM type II (last A1c 6.5 from 12/2024)  -- Uses SSI 3 times daily at home only.  -- Check A1c  -- MD SSI 3 times daily for now  -- Will be n.p.o. after midnight for possible procedure tomorrow.    ESRD on HD (MWF)  -- Nephrology consulted  -- Dialysis at [facility]  -- Totally anuric  -- Monitor labs    COPD/RIC  -- No home oxygen or CPAP use  -- Oxygen as needed  -- Declines CPAP    Recent MRSA infection of right LE stump  -- Was on antibiotics x 1 year followed by ID.  Finished antibiotics last week.    Chronic debility  Poor mobility at baseline  -- Generally wheelchair-bound, stands and pivots to left LE only.  -- Lives with ex-wife who is his POA and caregiver    Hypothyroidism  -- Check TSH  -- Continue Synthroid for now    Disposition: Per primary service    Thank you for allowing [facility] Medicine Service to provide consultative care for your patient,  we will continue to follow while clinically appropriate.    VICKI Avelar  03/11/25              Electronically signed by Sara Hassan APRN at 03/11/25 1649       Mounika Anderson APRN at 03/11/25 1432        Consult Orders    1. Inpatient Cardiology Consult [[id number]] ordered by Oly Larsen PA-C at 03/11/25 1330                    [facility]   [address], [address]  [place], [place], [id number] [phone]  [email or web address]           INPATIENT CONSULTATION NOTE    Patient Care Team:  Patient Care Team:  Jeanie Francois APRN as PCP - General (Nurse Practitioner)  Nikita Villasenor MD as Consulting Physician (Nephrology)  Feng Nielsen APRN as Nurse Practitioner (Interventional Cardiology)  Hal Wing MD as Consulting Physician (Cardiology)  Carlos Mcgee MD (General Surgery)  Gus Whaley MD as Consulting Physician (General Surgery)  Luis Fernando Rawls MD as Consulting Physician (Vascular Surgery)    Requesting Provider and Reason for consultation: The patient is being seen today at the request of Gus Lay MD for CAD.     Chief complaint: CAD     Subjective     Subjective:     Cardiac focused problem list:  CAD   [facility] 5/17/2022:  Severe 1 vessel CAD with  of the mid RCA.  Unsuccessful angioplasty of the mid RCA into the RV marginal branch.   [facility] 11/30/2022:  Successful staged PCI of distal RCA  with overlapping JOSE   Stress test 11/2024:  Moderately sized, moderately dense completely reversible defect involving the mid anterior wall, the anterior apical segment and the apex compatible with ischemia. Additionally there is a reversible inferior wall defect post-rest EF 56%, elevated trans-ischemic dilation ratio 1.29  [facility], 12/05/2024:  50% distal left main stenosis with RVR of 0.84 just distal to the ostium of the LAD, 0.92 in the mid segment of the ramus branch, 0.96 in  the proximal segment of the circumflex branch.  70% proximal to mid Lad stenosis with RVR of 0.58. , in-stent, of the proximal RCA with reconstitution of flow in the distal segments by right to right and left to left collaterals.   HFrEF  Echocardiogram 11/2024:  LVEF 55-60% with grade 2 diastolic dysfunction. Mild to moderate MR.  Moderate or greater AI.   Hyperlipidemia   Hypertension   PAD  Status post right AKA   History of left fem-distal bypass  LE arterial duplex, 8/26/2024: results in [facility]  CTA with runoff 9/2024: results reviewed in EPIC  ESRD on hemodialysis    HPI:      Chevy Junior is a 59 y.o. male.  Patient with known multivessel CAD.  Last cardiac cath 12/2024 revealing 50% distal left main stenosis with RVR of 0.84 just distal to the ostium of the LAD, 0.92 in the mid segment of the ramus branch, 0.96 in the proximal segment of the circumflex branch.  70% proximal to mid Lad stenosis with RVR of 0.58. , in-stent, of the proximal RCA with reconstitution of flow in the distal segments by right to right and left to left collaterals.  He was previously not felt to be a good candidate for surgery due to multiple co-morbidities and a persistent wound following BKA.  Recently re-evaluated by CT surgery, VICKI Howell and Dr. Lay.  Dr. Lay felt that the patient was an acceptable surgical candidate for CABG although at higher risk due to multiple co-morbidities.  Patient and family were agreeable.      He presented to [facility] for scheduled CABG today.  CT surgery evaluated again today and felt patient is a prohibitively high risk surgical candidate.  Cardiology, Dr. Oscar, consulted to discuss conservative medical management versus high risk PCI.  Patient reports he has episodes of mild to moderate chest pain about once a week, sometimes brief lasting only a few minutes but sometimes can last up to an hour.  Caregiver at bedside and reports that he has had two ED visits at [facility]  ED for chest pain in the last tow months.  Currently he denies chest pain, shortness of breath.     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     Current Outpatient Medications on File Prior to Encounter   Medication Sig Dispense Refill    albuterol sulfate  (90 Base) MCG/ACT inhaler Inhale 1 puff Every 4 (Four) Hours As Needed for Wheezing or Shortness of Air.      aspirin (aspirin) 81 MG EC tablet Take 1 tablet by mouth Daily. 30 tablet 8    atorvastatin (LIPITOR) 40 MG tablet Take 1 tablet by mouth Daily. 90 tablet 3    Auryxia 1  MG(Fe) tablet Take 1 tablet by mouth Daily With Lunch.      cinacalcet (SENSIPAR) 30 MG tablet Take 1 tablet by mouth Daily.      clopidogrel (PLAVIX) 75 MG tablet Take 1 tablet by mouth Daily. (Patient taking differently: Take 1 tablet by mouth Daily. Last dose 3/6/25. Holding prior to CABG) 90 tablet 3    Diclofenac Sodium (VOLTAREN) 1 % gel gel Apply 4 g topically to the appropriate area as directed As Needed. prn      gabapentin (NEURONTIN) 100 MG capsule Take 1 capsule by mouth Daily.      Heparin  Sodium, Porcine, (heparin, porcine,) 1000 UNIT/ML injection Infuse 1 mL into a venous catheter. M-W-F with dialysis      Insulin Glargine (BASAGLAR KWIKPEN) 100 UNIT/ML injection pen Inject 5-15 Units under the skin into the appropriate area as directed 3 (Three) Times a Day As Needed. Per SS  5-15 UNITS      lactulose (CHRONULAC) 10 GM/15ML solution Take 30 mL by mouth 2 (Two) Times a Day As Needed.      levothyroxine (SYNTHROID, LEVOTHROID) 75 MCG tablet Take 1 tablet by mouth Daily.      lidocaine-prilocaine (EMLA) 2.5-2.5 % cream Apply 1 Application topically to the appropriate area as directed As Needed for Injection Site Pain. Apply before dialysis on Monday, Wednesday and Fridays of each week      metoclopramide (REGLAN) 5 MG tablet Take 1 tablet by mouth Every Night.      midodrine (PROAMATINE) 5 MG tablet Take 1 tablet by mouth 3 (Three) Times a Day Before Meals. Prn on M/W/F, dialysis days      naloxone (NARCAN) 4 MG/0.1ML nasal spray Administer 1 spray into the nostril(s) as directed by provider Take As Directed.      nitroglycerin (NITROSTAT) 0.4 MG SL tablet Place 1 tablet under the tongue Every 5 (Five) Minutes As Needed for Chest Pain. 30 tablet 3    ondansetron ODT (ZOFRAN-ODT) 4 MG disintegrating tablet Place 1 tablet on the tongue Every 6 (Six) Hours As Needed for Nausea or Vomiting.      oxyCODONE (ROXICODONE) 30 MG immediate release tablet Take 1.5 tablets by mouth Every 4 (Four) Hours As Needed for Moderate Pain or Severe Pain.      pantoprazole (PROTONIX) 40 MG EC tablet Take 1 tablet by mouth Daily.      ranolazine (Ranexa) 500 MG 12 hr tablet Take 1 tablet by mouth once daily on days that you are NOT undergoing dialysis 30 tablet 5    sertraline (ZOLOFT) 100 MG tablet Take 1.5 tablets by mouth Daily.      sodium zirconium cyclosilicate (Lokelma) 10 g packet Take 10 g by mouth 1 (One) Time. Empty entire contents of the packet(s) into a glass with at least 3 tablespoons (45 mL) of water. Stir  well and drink immediately; if powder remains in the glass, add water, stir and drink immediately; repeat until no powder remains. Administer other oral medications at least 2 hours before or 2 hours after dose.      Xphozah 30 MG tablet Take 30 mg by mouth Daily As Needed.         Social History     Socioeconomic History    Marital status:    Tobacco Use    Smoking status: Every Day     Current packs/day: 2.00     Average packs/day: 2.0 packs/day for 40.0 years (80.0 ttl pk-yrs)     Types: Cigarettes    Smokeless tobacco: Never    Tobacco comments:     1.5 ppd as of 2/5/25   Vaping Use    Vaping status: Never Used   Substance and Sexual Activity    Alcohol use: Not Currently     Comment: None    Drug use: Not Currently     Types: Hydrocodone     Comment: pain pills from previous injuries    Sexual activity: Defer       Objective     Objective:     Vital Sign Min/Max for last 24 hours  Temp  Min: 97.7 °F (36.5 °C)  Max: 97.7 °F (36.5 °C)   BP  Min: 145/93  Max: 145/93   Pulse  Min: 92  Max: 92   Resp  Min: 20  Max: 20   SpO2  Min: 94 %  Max: 94 %   No data recorded    No intake or output data in the 24 hours ending 03/11/25 1432        Vitals:    03/11/25 1228   BP: 145/93   Pulse: 92   Resp: 20   Temp: 97.7 °F (36.5 °C)   SpO2: 94%     CONSTITUTIONAL: No acute distress, frail, chronically ill appearing.   RESPIRATORY: Normal effort. Decreased breath sounds.  On room air.   CARDIOVASCULAR: Regular rate and rhythm with normal S1 and S2. Without murmur.  PERIPHERAL VASCULAR: No carotid bruit bilaterally.  Normal radial pulse. Right AKA with well healing stump, left LE edema with venous stasis    Labs and radiologic results:  Today's results were reviewed by myself.    Cardiac Data:    Results for orders placed during the hospital encounter of 11/05/24    Adult Transthoracic Echo Complete W/ Cont if Necessary Per Protocol    Interpretation Summary  Physician interpretation.  Good technical quality.    1.  LV  size, function, and wall motion are normal.  Mild to moderate concentric left ventricular hypertrophy.  Visually estimated ejection fraction is 55 to 60%.  Grade 2 diastolic dysfunction.  Mild left atrial enlargement.  Borderline right atrial size.  RV size and function are grossly preserved.  No septal defect or intracavitary mass or thrombus.    2.  The mitral valve is markedly morphologically abnormal.  Both leaflets are thickened and there is a sclerotic and mobile projection of the anterior mitral leaflet into the LV outflow tract.  This does not have the typical appearance of an acute thrombus or vegetation.  There is no compromise of the LV outflow tract.  There is mild mitral stenosis with a mitral valve area of 1.9 cm² by pressure half-time.  There is trivial MR.  The aortic valve is tricuspid and sclerotic but is minimally calcified.  The annulus is at the upper limits of normal size.  There is no mitral stenosis and there is mild to moderate MR.  Pressure half-time of AI is suggestive of moderate or greater disease.  Right-sided heart valves are unremarkable.    3.  There is no pericardial effusion.  The aortic root is mildly dilated at 3.94 cm with no dissection or aneurysm.    4.  Right heart pressures cannot be calculated.      Assessment     Assessment and Plan:     Problem list:    Coronary artery disease      ASSESSMENT:  CAD  [facility] 5/2022 revealing severe 1 vessel CAD with  of the mid RCA, unsuccessful angioplasty of the mid RCA  Status post staged PCI of distal RCA CTP with overlapping stents, 11/2022  [facility] 12/2024 revealing 50% distal left main stenosis, 70% proximal to mid LAD stenosis, CTP pf proximal RCA with collaterals.   Prohibitively high risk for surgery due to multiple co-morbidities.   PAD  Status post right AKA   HFrEF  Most recent echo 11/2024 with LVEF 55-60%, Grade 2 diastolic dysfunction.   Hypertension   Hyperlipidemia     PLAN:  Patient felt to be prohibitively high risk for  surgery from CT surgery standpoint.    Has developed new anginal symptoms since his last cath.  Having chest pain about once a week.   Prior cath films from 12/2024 reviewed again with Dr. Oscar.  Treatment options include optimization of medical therapy versus high risk PCI.    Long discussion with patient and caregiver today regarding treatment options.  Patient would like to continue to consider possible PCI overnight.  Will discuss again tomorrow morning with Dr. Oscar.    Will keep patient npo after midnight for possible high risk PCI with Dr. Oscar.   Adding low dose beta blocker.    Unable to increase Ranexa due to risk of QT prolongation and neurotoxicity in HD patients.  Adding Imdur 30 mg daily.   Continue DAPT, statin.       Assessment and plan discussed with Dr. Oscar and RN today.     Electronically signed by VICKI Costello, 03/11/25, 4:12 PM EDT.               Electronically signed by Mounika Anderson APRN at 03/11/25 1131

## 2025-03-12 NOTE — PROGRESS NOTES
Pulmonary/Critical Care Follow-up     LOS: 1 day   Patient Care Team:  Jeanie Francois APRN as PCP - General (Nurse Practitioner)  Nikita Villasenor MD as Consulting Physician (Nephrology)  Feng Nielsen APRN as Nurse Practitioner (Interventional Cardiology)  Hal Wing MD as Consulting Physician (Cardiology)  Carlos Mcgee MD (General Surgery)  Gus Whaley MD as Consulting Physician (General Surgery)  Luis Fernando Rawls MD as Consulting Physician (Vascular Surgery)    Chief Complaint: SDH      Subjective     History reviewed and updated in EMR as indicated.    Interval History:     Patient reports a little bit of a headache.  He is alert and oriented.  He is currently getting hemodialysis.  No fevers or chills.    History taken from: Patient, staff, chart.    PMH/FH/Social History were reviewed and updated appropriately in the electronic medical record.     Review of Systems:    Review of 14 systems was completed with positives and pertinent negatives noted in the subjective section.  All other systems reviewed and are negative.         Objective     Vital Signs  Temp:  [97.6 °F (36.4 °C)-98 °F (36.7 °C)] 98 °F (36.7 °C)  Heart Rate:  [] 90  Resp:  [18-22] 22  BP: (113-196)/() 121/78  03/11 0701 - 03/12 0700  In: 480 [P.O.:480]  Out: -   Body mass index is 26.78 kg/m².     IV drips:  niCARdipine       Physical Exam:     Constitutional:   Alert, in no acute distress   Head:   Normocephalic, atraumatic   Eyes:           Lids and lashes normal, conjunctivae and sclerae normal.  PER   ENMT:  Ears appear intact with no abnormalities noted     Lips normal.     Neck:  Trachea midline, no JVD   Lungs/Resp:    Normal effort, symmetric chest rise, no crepitus, clear to      auscultation bilaterally.               Heart/CV:   Regular rhythm and normal rate, no murmur   Abdomen/GI:    Soft, nontender, nondistended   :    Deferred   Extremities/MSK:  No clubbing or cyanosis.  Positive  edema of left lower extremity.  Status post right AKA.   Pulses:  Pulses palpable and equal bilaterally   Skin:  No bleeding, bruising or rash.  Dry skin left lower extremity.   Heme/Lymph:  No cervical or supraclavicular adenopathy.   Neurologic:    Psychiatric:    Moves all extremities with no obvious focal motor deficit.  Cranial nerves 2 - 12 grossly intact  Non-agitated, normal affect.    The above physical exam findings were reviewed and reflect my exam findings as of today's exam.   Electronically signed by:  Ang Mills MD  03/12/25  16:22 EDT      Results Review:     I reviewed the patient's new clinical results.   Results from last 7 days   Lab Units 03/12/25 0439 03/11/25 1948   SODIUM mmol/L 140 137   POTASSIUM mmol/L 4.0 4.3   CHLORIDE mmol/L 102 94*   CO2 mmol/L 21.0* 24.0   BUN mg/dL 57* 60*   CREATININE mg/dL 7.09* 8.03*   CALCIUM mg/dL 7.3* 8.6   BILIRUBIN mg/dL  --  0.3   ALK PHOS U/L  --  178*   ALT (SGPT) U/L  --  9   AST (SGOT) U/L  --  18   GLUCOSE mg/dL 116* 106*     Results from last 7 days   Lab Units 03/12/25 0439 03/11/25 1948   WBC 10*3/mm3 5.09 5.85   HEMOGLOBIN g/dL 8.3* 10.4*   HEMATOCRIT % 26.6* 34.0*   PLATELETS 10*3/mm3 120* 148     Results from last 7 days   Lab Units 03/11/25 1947   PH, ARTERIAL pH units 7.400   PO2 ART mm Hg 85.6   PCO2, ARTERIAL mm Hg 43.7   HCO3 ART mmol/L 27.0*     Results from last 7 days   Lab Units 03/12/25 0439   MAGNESIUM mg/dL 1.8   PHOSPHORUS mg/dL 6.1*     Results from last 7 days   Lab Units 03/11/25 1948   HEMOGLOBIN A1C % 6.50*         I reviewed the patient's new imaging including images and reports.    CT head 3/12/2025:  Impression:  1.Redemonstrated acute on chronic left subdural hematoma measuring up to 1.2 cm in the left frontal convexity, similar to prior examination.  2.3 mm of rightward midline shift, previously 4 mm.    Medication Review:   [Held by provider] aspirin, 81 mg, Oral, Daily  atorvastatin, 40 mg, Oral,  Daily  cinacalcet, 30 mg, Oral, Daily  [Held by provider] clopidogrel, 75 mg, Oral, Daily  diazePAM, 5 mg, Intravenous, Once  gabapentin, 100 mg, Oral, Q8H  isosorbide mononitrate, 30 mg, Oral, Q24H  labetalol, 10 mg, Intravenous, Once  levETIRAcetam, 500 mg, Oral, Q12H  levothyroxine, 75 mcg, Oral, Q AM  metoclopramide, 5 mg, Oral, Nightly  metoprolol tartrate, 25 mg, Oral, Q12H  nicotine, 1 patch, Transdermal, Q24H  pantoprazole, 40 mg, Oral, Daily  ranolazine, 500 mg, Oral, Q12H  sertraline, 150 mg, Oral, Daily  sodium chloride, 10 mL, Intravenous, Q12H      niCARdipine, 5-15 mg/hr        Assessment & Plan         Subdural hematoma, nontraumatic    Essential hypertension    Type 2 diabetes mellitus, with long-term current use of insulin    Heart failure with preserved left ventricular function (HFpEF)    Current smoker    ESRD on hemodialysis    Coronary artery disease    Hypothyroidism (acquired)    59 y.o. active smoker of 2 packs a day with 80-pack-year history with history of HTN, HLD, hypothyroidism, COPD, RIC, CKD on HD MWF, PAD with prior right AKA and left femoropopliteal bypass, chronic pain/opioid dependent, CAD with ischemic cardiomyopathy, admitted 3/1/2025 by CTS for evaluation of potential CABG due to ongoing anginal symptoms.  He was found to be a poor candidate for CABG and CTS recommended high risk PCI versus medical management.    On 3/11/2025, patient developed acute onset encephalopathy and a stat head CT scan showed left hemispheric mixed density subdural hematoma with acute and chronic blood products.  SDH measured 7 mm maximally over the left frontal lobe.  4 mm of midline shift.  While awaiting CT scan, the patient had a witnessed GTC lasting less than a minute.  He was treated with Valium and Keppra.    Patient was transferred to ICU for tight blood pressure management and close monitoring of neurologic status.  Neurology and neurosurgery have evaluated.  neurosurgery did not want patient  to have anything other than aspirin (no therapeutic heparin or other antiplatelet medication).  They signed off with a plan to follow-up in 3 weeks with repeat head CT scan.  I spoke spoke with the stroke service APRN who recommended a general neurology consultation.    Plan:  1.  For CAD: CT surgery does not feel that the patient is an operative candidate.  Cardiology does not feel that the patient is a candidate for catheter-based intervention and recommends medical management.  2.  For COPD: Albuterol as needed.    3.  For SDH: Neurosurgery has evaluated and signed off.  They want to follow-up in the office in 3 weeks with a repeat head CT scan.  4.  For seizures: Spoke with stroke neurology APRN who recommended a general neurology consultation.  For the time being, I will continue patient on Keppra 500 mg twice daily.  Keep in ICU for now.  5.  For T2DM: Hemoglobin A1c 6.5%.  Add low-dose correction insulin.  6.  For tobacco abuse: Smoking cessation counseling.  Nicotine replacement.  Recommend screening CT scans yearly per current guidelines and smoking history.  7.  For hypothyroidism: Levothyroxine.  8.  For chronic pain: Oxycodone as needed.  9.  For CKD on HD: Intermittent HD per nephrology.    Patient is critically ill secondary to tenuous neurologic status in the setting of seizures and ICH and has high risk of life-threatening decompensation in condition.   As such, the patient requires continuous monitoring and frequent reassessment for consideration of adjustment in management to minimize this risk.  I personally reassessed the patient multiple times today.    Critical care time : 37 minutes spent by me personally and independently.(This excludes time spent performing separately reportable procedures and services).  Critical care time includes high complexity decision making to assess, manipulate, and support vital organ system failure in this individual who has impairment of one or more vital organ  systems such that there is a high probability of imminent or life threatening deterioration in the patient’s condition.    Electronically signed by:    Ang Mills MD  03/12/25  16:22 EDT      *. Please note that portions of this note were completed with [employer] - a voice recognition program.

## 2025-03-12 NOTE — CONSULTS
Stroke Consult Note    Patient Name: Chevy Junior   MRN: [id number]  Age: 59 y.o.  Sex: male  : 1965    Primary Care Physician: Jeanie Francois APRN  Referring Physician:      TIME STROKE TEAM CALLED: 2033 EST     TIME PATIENT SEEN: 2033 EST    Handedness: Right  Race: -American    Chief Complaint/Reason for Consultation: Transient altered mental status, seizure    HPI:   Chevy Junior is a 59 y.o. -American male, right-handed with significant medical diagnosis of coronary artery disease, ischemic cardiomyopathy EF 50%, diastolic systolic, peripheral vascular disease  s/p  Rt AKA and left fem-pop bypass, Chronic opiate use, ESRD on HD MWF RIC, COPD, hypertension, hyperlipidemia, hypothyroidism, ongoing tobacco abuse, admitted on 3/11 by cardiology\medicine team for evaluation of potential CABG as he has ongoing anginal symptoms.  The patient was evaluated by Dr. Lay and was felt to be poor candidate for undergoing CABG. The recommendation is for either high risk PCI or medical management.  Code stroke inpatient was alerted secondary to acute onset of encephalopathy alteration of mental status, dysarthria and witnessed onset of seizure breakthrough.  Patient was taken emergently to CT suite, CT head without contrast revealed subdural hematoma left parietal, left frontal, CTA no LVO.  Neurosurgery was contacted Maria Elena Mancia.  Hemorrhagic order set was placed.  NIH per my exam to 4 patient not alert, drowsy, confused at times however he is oriented to himself place and age.  No current seizure at this time.  Patient is moving all extremities spontaneously and on command, noted right lower extremity AKA.  Patient is ill looking.    Patient is wheelchair dependent, smoke tobacco daily.  No reported trauma to me.  Discussed the case with  my neurology attending as well as Zina Mancia neurosurgery PA will place hemorrhagic order set, will load with Keppra and  maintain Keppra IV, EEG in the morning.  Neurology stroke will sign off for now however please reach out for any questions or concerns.    Last Known Normal Date/Time: 1900 EST     Review of Systems   Constitutional:  Positive for activity change.   HENT: Negative.     Eyes: Negative.    Respiratory:  Positive for shortness of breath.    Cardiovascular: Negative.    Gastrointestinal: Negative.    Endocrine: Negative.    Genitourinary: Negative.    Musculoskeletal: Negative.    Skin: Negative.    Neurological:  Positive for seizures and speech difficulty.   Hematological: Negative.    Psychiatric/Behavioral:  Positive for confusion.       Past Medical History:   Diagnosis Date    Anemia     Atherosclerosis of native artery of left lower extremity with gangrene     Cellulitis and abscess of buttock 2023    CHF (congestive heart failure)     Chronic pain     COPD (chronic obstructive pulmonary disease)     INHALER    Coronary artery disease involving native coronary artery of native heart with angina pectoris 09/12/2022    stents x 3    Diabetes mellitus     ESRD on dialysis     M,W,F-FISTULA LEFT ARM    GERD (gastroesophageal reflux disease)     Heart failure with preserved left ventricular function (HFpEF) 09/13/2022    FOLLOWS W/REECE-NO CP    History of blood transfusion     NO ADVERSE REACTIONS    Hyperlipidemia     Hypertension     WIFE STATED B/P HAS BEEN RUNNING LOW LATELY    Hypothyroidism (acquired) 03/11/2025    Infectious viral hepatitis 5 years ago was treated    hx hep c    RIC (obstructive sleep apnea)     Osteoarthritis     Phosphorus metabolic disorder     Sleep apnea     NO MACHINE    Substance abuse 20 years ago from [email or web address]     Past Surgical History:   Procedure Laterality Date    ABOVE KNEE LEG AMPUTATION Right     CARDIAC CATHETERIZATION      CARDIAC CATHETERIZATION N/A 11/30/2022    Procedure: Percutaneous Coronary Intervention;  Surgeon: River Acevedo IV, MD;  Location: [facility]  "INVASIVE LOCATION;  Service: Cardiovascular;  Laterality: N/A;    CARDIAC CATHETERIZATION N/A 11/30/2022    Procedure: Optical Coherent Tomography;  Surgeon: River Acevedo IV, MD;  Location:  [facility] CATH INVASIVE LOCATION;  Service: Cardiovascular;  Laterality: N/A;    CARDIAC CATHETERIZATION N/A 11/30/2022    Procedure: Stent JOSE coronary;  Surgeon: River Acevedo IV, MD;  Location:  [facility] CATH INVASIVE LOCATION;  Service: Cardiovascular;  Laterality: N/A;    CARDIAC CATHETERIZATION Left 11/09/2023    Procedure: Aortogram with left leg angiogram, possible angioplasty or stenting;  Surgeon: Luis Fernando Rawls MD;  Location: [facility] CATH INVASIVE LOCATION;  Service: Vascular;  Laterality: Left;    CARDIAC CATHETERIZATION Right 04/25/2024    Procedure: Right lower extremity angiogram, possible angioplasty or stenting;  Surgeon: Luis Fernando Rawls MD;  Location: [facility] CATH INVASIVE LOCATION;  Service: Vascular;  Laterality: Right;    CARDIAC CATHETERIZATION N/A 12/05/2024    Procedure: Left Heart Cath;  Surgeon: Cole Oscar MD;  Location: [facility] CATH INVASIVE LOCATION;  Service: Cardiovascular;  Laterality: N/A;    CATH LAB PROCEDURE      unable to stent    COLONOSCOPY N/A 02/15/2023    Procedure: COLONOSCOPY WITH POLYPECTOMY;  Surgeon: David Glass MD;  Location: [facility] ENDOSCOPY;  Service: Gastroenterology;  Laterality: N/A;  COLON POLYP, MELANOSIS COLI    ENDOSCOPY N/A 02/03/2023    Procedure: ESOPHAGOGASTRODUODENOSCOPY with biopsy;  Surgeon: Gus Whaley MD;  Location: [facility] ENDOSCOPY;  Service: General;  Laterality: N/A;  gastritis,     FEMORAL POPLITEAL BYPASS Left 12/12/2023    Procedure: Left femoral edarterectomy, left femoral to below the knee popliteal bypass graft;  Surgeon: Luis Fernando Rawls MD;  Location: [facility] MAIN OR;  Service: Vascular;  Laterality: Left;    HIP SURGERY Right     INSERTION PERITONEAL DIALYSIS CATHETER      LEG SURGERY Left     \"jocelyne in place\"    MANDIBLE " FRACTURE SURGERY      REPLACEMENT TOTAL KNEE Right     x3    SHOULDER SURGERY Right     SPINE SURGERY      unknown procudure- completed at [facility] in [place].     Family History   Problem Relation Age of Onset    Heart attack Mother     Coronary artery disease Mother     Hypertension Mother     Arthritis Mother     Hypertension Father     Diabetes Father      Social History     Socioeconomic History    Marital status:    Tobacco Use    Smoking status: Every Day     Current packs/day: 2.00     Average packs/day: 2.0 packs/day for 40.0 years (80.0 ttl pk-yrs)     Types: Cigarettes    Smokeless tobacco: Never    Tobacco comments:     1.5 ppd as of 2/5/25   Vaping Use    Vaping status: Never Used   Substance and Sexual Activity    Alcohol use: Not Currently     Comment: None    Drug use: Not Currently     Types: Hydrocodone     Comment: pain pills from previous injuries    Sexual activity: Defer     No Known Allergies  Prior to Admission medications    Medication Sig Start Date End Date Taking? Authorizing Provider   albuterol sulfate  (90 Base) MCG/ACT inhaler Inhale 1 puff Every 4 (Four) Hours As Needed for Wheezing or Shortness of Air.    ProviderJanie MD   aspirin (aspirin) 81 MG EC tablet Take 1 tablet by mouth Daily. 9/13/22   River Acevedo IV, MD   atorvastatin (LIPITOR) 40 MG tablet Take 1 tablet by mouth Daily. 11/19/24   Feng Nielsen APRN   Auryxia 1  MG(Fe) tablet Take 1 tablet by mouth Daily With Lunch. 4/25/24   Aj Chavira MD   cinacalcet (SENSIPAR) 30 MG tablet Take 1 tablet by mouth Daily. 7/5/21   Janie Lynch MD   clopidogrel (PLAVIX) 75 MG tablet Take 1 tablet by mouth Daily.  Patient taking differently: Take 1 tablet by mouth Daily. Last dose 3/6/25. Holding prior to CABG 10/31/24   Feng Nielsen APRN   Diclofenac Sodium (VOLTAREN) 1 % gel gel Apply 4 g topically to the appropriate area as directed As Needed. prn 10/2/24   Syed  MD Janie   gabapentin (NEURONTIN) 100 MG capsule Take 1 capsule by mouth Daily.    Janie Lynch MD   Heparin Sodium, Porcine, (heparin, porcine,) 1000 UNIT/ML injection Infuse 1 mL into a venous catheter. M-W-F with dialysis    Janie Lynch MD   Insulin Glargine (BASAGLAR KWIKPEN) 100 UNIT/ML injection pen Inject 5-15 Units under the skin into the appropriate area as directed 3 (Three) Times a Day As Needed. Per SS  5-15 UNITS 6/16/21   Janie Lynch MD   lactulose (CHRONULAC) 10 GM/15ML solution Take 30 mL by mouth 2 (Two) Times a Day As Needed.    Janie Lynch MD   levothyroxine (SYNTHROID, LEVOTHROID) 75 MCG tablet Take 1 tablet by mouth Daily. 2/2/22   Janie Lynch MD   lidocaine-prilocaine (EMLA) 2.5-2.5 % cream Apply 1 Application topically to the appropriate area as directed As Needed for Injection Site Pain. Apply before dialysis on Monday, Wednesday and Fridays of each week 4/15/24   Janie Lynch MD   metoclopramide (REGLAN) 5 MG tablet Take 1 tablet by mouth Every Night.    Janie Lynch MD   midodrine (PROAMATINE) 5 MG tablet Take 1 tablet by mouth 3 (Three) Times a Day Before Meals. Prn on M/W/F, dialysis days 10/16/24   Janie Lynch MD   naloxone (NARCAN) 4 MG/0.1ML nasal spray Administer 1 spray into the nostril(s) as directed by provider Take As Directed.    Janie Lynch MD   nitroglycerin (NITROSTAT) 0.4 MG SL tablet Place 1 tablet under the tongue Every 5 (Five) Minutes As Needed for Chest Pain. 10/31/24   Feng Nielsen APRN   ondansetron ODT (ZOFRAN-ODT) 4 MG disintegrating tablet Place 1 tablet on the tongue Every 6 (Six) Hours As Needed for Nausea or Vomiting.    Janie Lynch MD   oxyCODONE (ROXICODONE) 30 MG immediate release tablet Take 1.5 tablets by mouth Every 4 (Four) Hours As Needed for Moderate Pain or Severe Pain.    Janie Lynch MD   pantoprazole (PROTONIX) 40 MG EC tablet Take 1  tablet by mouth Daily.    Janie Lynch MD   ranolazine (Ranexa) 500 MG 12 hr tablet Take 1 tablet by mouth once daily on days that you are NOT undergoing dialysis 12/5/24   Cole Oscar MD   sertraline (ZOLOFT) 100 MG tablet Take 1.5 tablets by mouth Daily. 6/16/21   Janie Lynch MD   sodium zirconium cyclosilicate (Lokelma) 10 g packet Take 10 g by mouth 1 (One) Time. Empty entire contents of the packet(s) into a glass with at least 3 tablespoons (45 mL) of water. Stir well and drink immediately; if powder remains in the glass, add water, stir and drink immediately; repeat until no powder remains. Administer other oral medications at least 2 hours before or 2 hours after dose.    Janie Lynch MD   Xphozah 30 MG tablet Take 30 mg by mouth Daily As Needed. 10/30/24   Janie Lynch MD         Temp:  [97.7 °F (36.5 °C)-98.2 °F (36.8 °C)] 97.9 °F (36.6 °C)  Heart Rate:  [86-96] 96  Resp:  [16-20] 18  BP: (135-158)/(64-93) 148/64  Neurological Exam  Mental Status  Awake. Oriented only to person and place. Mild dysarthria present. Language is fluent with no aphasia.    Cranial Nerves  CN II: Right visual acuity: Normal. Left visual acuity: Normal. Right normal visual field. Left normal visual field.  CN III, IV, VI: Extraocular movements intact bilaterally. Normal lids and orbits bilaterally. Pupils equal round and reactive to light bilaterally.  CN V: Facial sensation is normal.  CN VII: Full and symmetric facial movement.  CN VIII: Intact hearing bilaterally.  CN IX, X: Palate elevates symmetrically  CN XI: Shoulder shrug strength is normal.  CN XII: Tongue midline without atrophy or fasciculations.    Motor  Decreased muscle bulk throughout. No fasciculations present. Normal muscle tone. No abnormal involuntary movements. Strength is 5/5 throughout all four extremities.  Right lower extremity amputation.    Sensory  Light touch is normal in upper and lower extremities.  No  right-sided hemispatial neglect. No left-sided hemispatial neglect. Right extinction absent: Left extinction absent:    Reflexes  Left Plantar: downgoing    Coordination    Unable to assess.    Gait    Wheelchair-bound.      Physical Exam  Constitutional:       General: He is awake.      Appearance: He is obese. He is ill-appearing.   HENT:      Head: Normocephalic and atraumatic.      Mouth/Throat:      Mouth: Mucous membranes are dry.   Eyes:      General: Lids are normal.      Extraocular Movements: Extraocular movements intact.      Pupils: Pupils are equal, round, and reactive to light.   Cardiovascular:      Rate and Rhythm: Regular rhythm. Tachycardia present.   Pulmonary:      Effort: Respiratory distress present.   Abdominal:      Hernia: A hernia is present.   Musculoskeletal:         General: Swelling and deformity present. Normal range of motion.      Cervical back: Normal range of motion and neck supple.      Left lower leg: Edema present.   Skin:     General: Skin is warm and dry.      Capillary Refill: Capillary refill takes less than 2 seconds.   Neurological:      Mental Status: He is disoriented.      Cranial Nerves: Dysarthria present.      Motor: Motor strength is normal.  Psychiatric:      Comments: Confusion         Acute Stroke Data    Thrombolytic Inclusion / Exclusion Criteria    Time: 21:12 EDT  Person Administering Scale: VICKI Daniel      YES NO INCLUSION CRITERIA CLASS I   [] []   Suspected diagnosis of acute ischemic stroke with measureable neurological deficit.  Low NIHSS with disabling stroke symptoms.   [] []   Onset of stroke symptoms < 3 hours before beginning treatment >/ 18 years old  Stroke symptom onset = time patient was last seen well or without symptoms (LKW)   [] []   Onset of symptoms between 3-4.5 hours: >/= 80 years old (safe Class IIa) with history of   both diabetes and prior CVA  (reasonable Class IIb) AND NIHSS </= 25  *If not eligible for IV  Thrombolytic consider neuro intervention for LKW within 24 hours     YES NO EXCLUSION CRITERIA (CONTRAINDICATIONS) CLASS III EVIDENCE HARM   [] []   Blood pressure >185/110 medically refractory to IV medications   [] []   Active bleeding at a non-compressible site   [x] []   Active intracranial hemorrhage (ICH)   [] []   Symptoms suggestive of subarachnoid hemorrhage (SAH)   [] []   GI bleed within 21 days   [] []   Ischemic stroke within 3 months   [] []   Severe head trauma within 3 months    [] []   Intracranial or intraspinal surgery within 3 months   [] []   Current GI malignancy   [] []   Intracranial neoplasm   [] []   Infective endocarditis   [] []   Aortic arch dissection   [] []   Active coagulopathy with  INR >1.7, platelets <100,000, PTT > 40 sec, PT > 15 sec   *For warfarin, administration can begin before blood tests resulted. Discontinue for above values.    [] []   Treatment dose* of LMWH (Lovenox) in last 24 hours  *prophylactic dosages are not a contraindication   [] []   Concurrent use of antiplatelet agents' glycoprotein inhibitors IIb/IIIa (Integrilin, etc.)   [] []   Thrombin or factor Xa inhibitors (Eliquis, Xarelto, Arixtra) taken in last 48 hours     YES NO CLASS II: AIS WITH THE FOLLOWING CONDITIONS -   TREATMENT RISKS SHOULD BE WEIGHED AGAINST POSSIBLE BENEFITS.    [] []   Major trauma in last 14 days, recent major surgery in last 14 days, intracranial arterial dissection, giant unruptured and unsecured intracranial aneurysm, pericarditis     [] []   The risks and benefits have been discussed with the patient or family related to the administration of IV thrombolytic therapy for stroke symptoms.   [] []   I have discussed and reviewed the patient's case and imaging with the attending prior to IV thrombolytic therapy.   TIME na Time IV thrombolytic administered       Hospital Meds:  Scheduled- [Held by provider] aspirin, 81 mg, Oral, Daily  atorvastatin, 40 mg, Oral, Daily  cinacalcet, 30  mg, Oral, Daily  [Held by provider] clopidogrel, 75 mg, Oral, Daily  diazePAM, 5 mg, Intravenous, Once  [Held by provider] gabapentin, 100 mg, Oral, Q8H  isosorbide mononitrate, 30 mg, Oral, Q24H  labetalol, 10 mg, Intravenous, Once  labetalol, , ,   levETIRAcetam, 1,000 mg, Intravenous, Once  levETIRAcetam, 1,000 mg, Intravenous, Once  levETIRAcetam, 500 mg, Intravenous, Q12H  levothyroxine, 75 mcg, Oral, Q AM  metoclopramide, 5 mg, Oral, Nightly  metoprolol tartrate, 25 mg, Oral, Q12H  nicotine, 1 patch, Transdermal, Q24H  pantoprazole, 40 mg, Oral, Daily  ranolazine, 500 mg, Oral, Q12H  [Held by provider] sertraline, 150 mg, Oral, Daily  sodium chloride, 10 mL, Intravenous, Q12H  sodium chloride, 10 mL, Intravenous, Q12H  sodium chloride, 10 mL, Intravenous, Q12H      Infusions- niCARdipine, 5-15 mg/hr  sodium chloride, 50 mL/hr       PRNs-   albuterol    [Held by provider] diazePAM    ipratropium-albuterol    labetalol    nitroglycerin    [Held by provider] oxyCODONE    sodium chloride    sodium chloride    sodium chloride    sodium chloride    sodium chloride    sodium chloride    Functional Status Prior to Current Stroke/Sidney Score: 3    NIH Stroke Scale  Time: 21:12 EDT  Person Administering Scale: VICKI Daniel    Interval: baseline  1a. Level of Consciousness: 1-->Not alert, but arousable by minor stimulation to obey, answer, or respond  1b. LOC Questions: 0-->Answers both questions correctly  1c. LOC Commands: 0-->Performs both tasks correctly  2. Best Gaze: 0-->Normal  3. Visual: 0-->No visual loss  4. Facial Palsy: 0-->Normal symmetrical movements  5a. Motor Arm, Left: 0-->No drift, limb holds 90 (or 45) degrees for full 10 secs  5b. Motor Arm, Right: 0-->No drift, limb holds 90 (or 45) degrees for full 10 secs  6a. Motor Leg, Left: 0-->No drift, leg holds 30 degree position for full 5 secs  6b. Motor Leg, Right: (UN) Amputation or joint fusion  7. Limb Ataxia: 0-->Absent  8. Sensory:  0-->Normal, no sensory loss  9. Best Language: 0-->No aphasia, normal  10. Dysarthria: 1-->Mild-to-moderate dysarthria, patient slurs at least some words and, at worst, can be understood with some difficulty  11. Extinction and Inattention (formerly Neglect): 0-->No abnormality    Total (NIH Stroke Scale): 2   ICH Score:  1 Point (GCS 5 to 12)  0 Points (ICH volume < 30 cm3)  0 Points (Intraventricular Extension Absent)   0 Points (Infratentorial Origin - No )  0 Points (Age < 80 years)  The total ICH Score for this patient is 1 at 22:26 EDT on 03/11/25     Boyce and Llanes Grade:  The Boyce and Llanes grade for this patient is: Grade 1: Asymptomatic or mild headache and slight nuchal rigidity at 22:26 EDT on 03/11/25.     Results Reviewed:  I have personally reviewed current lab, radiology, and data and agree with results.  ABG compensated respiratory acidosis  Oxyhemoglobin 91.1  H&H 10.5\32.2  Sodium 137  Potassium 4.3  BUN 16  Creatinine 8.03  GFR 7.1  Glucose 106    AST 18  ALT 9  A1c 6.50  TSH 7.170  Free T40.85  HDL 32  LDL 51  WBC 5.85  Platelet 148    Images  CT Angiogram Head w AI Analysis of LVO  Result Date: 3/11/2025  Impression: 1. No change in mixed density acute on chronic left hemispheric subdural hematoma. 2. No intracranial vascular stenosis, occlusion, or aneurysm. No active contrast extravasation. Number dural venous sinuses. 4. Prevertebral soft tissue thickening with prominence in the right [place] tonsillar region. Underlying tonsillar mass cannot be excluded. No pathologically enlarged cervical lymph nodes. There are however multiple enlarged upper mediastinal lymph nodes which could be metastatic or reactive in etiology. 5. Diffuse bony sclerosis which may be due to renal osteodystrophy or less likely diffuse sclerotic bony metastatic disease. Electronically Signed: Pranav Adams MD  3/11/2025 9:57 PM EDT  Workstation ID: [id number]    CT Angiogram Neck  Result Date: 3/11/2025  Impression:  1. No change in mixed density acute on chronic left hemispheric subdural hematoma. 2. No intracranial vascular stenosis, occlusion, or aneurysm. No active contrast extravasation. Number dural venous sinuses. 4. Prevertebral soft tissue thickening with prominence in the right [place] tonsillar region. Underlying tonsillar mass cannot be excluded. No pathologically enlarged cervical lymph nodes. There are however multiple enlarged upper mediastinal lymph nodes which could be metastatic or reactive in etiology. 5. Diffuse bony sclerosis which may be due to renal osteodystrophy or less likely diffuse sclerotic bony metastatic disease. Electronically Signed: Pranav Adams MD  3/11/2025 9:57 PM EDT  Workstation ID: [id number]    XR Chest 1 View  Result Date: 3/11/2025  Impression: Interstitial changes involving the lungs with interval change that may reflect edema. Electronically Signed: Trav Rasmussen MD  3/11/2025 9:04 PM EDT  Workstation ID: [id number]    CT Head Without Contrast  Result Date: 3/11/2025  Impression: 1. Left hemispheric mixed density subdural hematoma with acute and chronic blood products. This measures 7 mm maximally over the left frontal lobe. There is 4 mm of midline shift. No hydrocephalus. Findings personally called to patient's nurse Radha at 8:44 p.m. on 3/11/2025 Electronically Signed: Pranav Adams MD  3/11/2025 8:45 PM EDT  Workstation ID: [id number]    XR Chest 1 View  Result Date: 3/11/2025  Impression: No acute cardiopulmonary abnormality is identified. Electronically Signed: Princess Reveles  3/11/2025 4:06 PM EDT  Workstation ID: [id number]      Results for orders placed during the hospital encounter of 11/05/24    Adult Transthoracic Echo Complete W/ Cont if Necessary Per Protocol    Interpretation Summary  Physician interpretation.  Good technical quality.    1.  LV size, function, and wall motion are normal.  Mild to moderate concentric left ventricular hypertrophy.  Visually estimated ejection  fraction is 55 to 60%.  Grade 2 diastolic dysfunction.  Mild left atrial enlargement.  Borderline right atrial size.  RV size and function are grossly preserved.  No septal defect or intracavitary mass or thrombus.    2.  The mitral valve is markedly morphologically abnormal.  Both leaflets are thickened and there is a sclerotic and mobile projection of the anterior mitral leaflet into the LV outflow tract.  This does not have the typical appearance of an acute thrombus or vegetation.  There is no compromise of the LV outflow tract.  There is mild mitral stenosis with a mitral valve area of 1.9 cm² by pressure half-time.  There is trivial MR.  The aortic valve is tricuspid and sclerotic but is minimally calcified.  The annulus is at the upper limits of normal size.  There is no mitral stenosis and there is mild to moderate MR.  Pressure half-time of AI is suggestive of moderate or greater disease.  Right-sided heart valves are unremarkable.    3.  There is no pericardial effusion.  The aortic root is mildly dilated at 3.94 cm with no dissection or aneurysm.    4.  Right heart pressures cannot be calculated.       Assessment/Plan:    This is a 59-year-old -American male right-handed with multiple vascular risk factor admitted under the hospital medicine team for potential CABG, left heart cath secondary to ongoing anginal symptoms.  Inpatient code stroke was initiated at shift change with last known mild well 1901 patient was noted by staff to be altered mental status was a witnessed seizure.  CT head without contrast was completed and revealed acute subdural hematoma left parietal left frontal.  Patient deemed not to be a candidate for TNK secondary to hemorrhage, patient is not a candidate for mechanical thrombectomy no LVO on CTA head and neck.    Antiplatelet PTA: Plavix 75 mg  Anticoagulant PTA: None        Acute left subdural hemorrhage, etiology unknown  -Hemorrhagic stroke order set has been  initiated  -ICH score 1  -Discussed with Kristin Mancia, neurosurgery PA, patient deemed not to be a candidate for neurosurgical treatment at this time  -Repeat CT in 6 hours, 1 AM, repeat CT head in the a.m. 9 all to evaluate bleed stability  -NPO until bedside nursing dysphagia screen completed  -TTE ordered and pending  -A1c and lipid panel in AM  -Meds: HOLD all antiplatelet and anticoagulants at this time  -Loading dose with Keppra 1000 mg IV x 1, followed by maintenance Keppra 500 mg twice daily.  -EEG in the a.m.  -CK and lactate is recommended and ordered and to be followed on  -Activity as tolerated, fall risk precautions  -PT/OT/SLP evaluation  -Neurology stroke will sign off, neurosurgery will take over.  -Discussed all plan with Kristin Mancia neurosurgery PA    2.  Essential hypertension,   -Strict blood pressure monitoring, please keep SBP <140  -Nicardipine as needed for SBP >140, management by neurosurgery, intensivist    3.  Hyperlipidemia  -Lipid panel recent HDL 32 goal needs to be more than 40, LDL within goal less than 70  -Atorvastatin 80mg nightly for secondary stroke prevention    4.  Diabetes Mellitus type 2,   -A1c 6.5 within goal less than 7  -Maintain euglycemia  -Management per primary team  -Continue diabetic education    5.  ESRD on hemodialysis  -Monday Wednesday Friday  Management by intensivist\medicine team  -Avoid all nephrotoxins  -Patient was reported to be an uric monitor KIN and UOP if applicable.      6.  Acute on chronic diastolic heart failure        Ischemic cardiomyopathy        PVD status post AKA  -Noted left lower extremity edema  -Echo ordered and pending  -Patient is followed by cardiology team    7.  Tobacco abuse, ongoing  -Daily tobacco cessation education to be provided  -Patient may require NRT during admission    8.  Anemia of the chronic disease  -H&H is stable  -Monitor and trending labs.    Plan of care was discussed with patient, primary RN, Kristin Mancia  neurosurgery Lesvia ELIZABETH neurology attending, intensivist are aware.  Stroke neurology will sign off for now.  Please call with any questions or concerns.  Thank you for this consult.           VICKI Daniel  March 11, 2025  21:12 EDT

## 2025-03-13 ENCOUNTER — READMISSION MANAGEMENT (OUTPATIENT)
Dept: CALL CENTER | Facility: HOSPITAL | Age: 60
End: 2025-03-13
Payer: MEDICAID

## 2025-03-13 VITALS
SYSTOLIC BLOOD PRESSURE: 111 MMHG | BODY MASS INDEX: 26.84 KG/M2 | DIASTOLIC BLOOD PRESSURE: 98 MMHG | TEMPERATURE: 98.2 F | OXYGEN SATURATION: 97 % | HEART RATE: 83 BPM | WEIGHT: 171 LBS | HEIGHT: 67 IN | RESPIRATION RATE: 14 BRPM

## 2025-03-13 LAB
ANION GAP SERPL CALCULATED.3IONS-SCNC: 17 MMOL/L (ref 5–15)
BUN SERPL-MCNC: 46 MG/DL (ref 8–23)
BUN/CREAT SERPL: 6.8 (ref 7–25)
CALCIUM SPEC-SCNC: 9.1 MG/DL (ref 8.6–10.5)
CHLORIDE SERPL-SCNC: 95 MMOL/L (ref 98–107)
CO2 SERPL-SCNC: 22 MMOL/L (ref 22–29)
CREAT SERPL-MCNC: 6.75 MG/DL (ref 0.76–1.27)
DEPRECATED RDW RBC AUTO: 57.7 FL (ref 37–54)
EGFRCR SERPLBLD CKD-EPI 2021: 8.7 ML/MIN/1.73
ERYTHROCYTE [DISTWIDTH] IN BLOOD BY AUTOMATED COUNT: 17.1 % (ref 12.3–15.4)
GLUCOSE BLDC GLUCOMTR-MCNC: 133 MG/DL (ref 70–130)
GLUCOSE BLDC GLUCOMTR-MCNC: 140 MG/DL (ref 70–130)
GLUCOSE SERPL-MCNC: 141 MG/DL (ref 65–99)
HAV IGM SERPL QL IA: ABNORMAL
HBV CORE IGM SERPL QL IA: ABNORMAL
HBV SURFACE AG SERPL QL IA: ABNORMAL
HCT VFR BLD AUTO: 34.3 % (ref 37.5–51)
HCV AB SER QL: REACTIVE
HGB BLD-MCNC: 10.3 G/DL (ref 13–17.7)
MCH RBC QN AUTO: 27.9 PG (ref 26.6–33)
MCHC RBC AUTO-ENTMCNC: 30 G/DL (ref 31.5–35.7)
MCV RBC AUTO: 93 FL (ref 79–97)
PLATELET # BLD AUTO: 142 10*3/MM3 (ref 140–450)
PMV BLD AUTO: 10.6 FL (ref 6–12)
POTASSIUM SERPL-SCNC: 5.2 MMOL/L (ref 3.5–5.2)
RBC # BLD AUTO: 3.69 10*6/MM3 (ref 4.14–5.8)
SODIUM SERPL-SCNC: 134 MMOL/L (ref 136–145)
WBC NRBC COR # BLD AUTO: 5.78 10*3/MM3 (ref 3.4–10.8)

## 2025-03-13 PROCEDURE — 80048 BASIC METABOLIC PNL TOTAL CA: CPT | Performed by: INTERNAL MEDICINE

## 2025-03-13 PROCEDURE — 97162 PT EVAL MOD COMPLEX 30 MIN: CPT

## 2025-03-13 PROCEDURE — 99231 SBSQ HOSP IP/OBS SF/LOW 25: CPT | Performed by: PSYCHIATRY & NEUROLOGY

## 2025-03-13 PROCEDURE — 85027 COMPLETE CBC AUTOMATED: CPT | Performed by: INTERNAL MEDICINE

## 2025-03-13 PROCEDURE — 97166 OT EVAL MOD COMPLEX 45 MIN: CPT

## 2025-03-13 PROCEDURE — 99232 SBSQ HOSP IP/OBS MODERATE 35: CPT

## 2025-03-13 PROCEDURE — 82948 REAGENT STRIP/BLOOD GLUCOSE: CPT

## 2025-03-13 PROCEDURE — 80074 ACUTE HEPATITIS PANEL: CPT | Performed by: INTERNAL MEDICINE

## 2025-03-13 PROCEDURE — 99239 HOSP IP/OBS DSCHRG MGMT >30: CPT | Performed by: NURSE PRACTITIONER

## 2025-03-13 RX ORDER — LEVETIRACETAM 500 MG/1
500 TABLET ORAL DAILY
Qty: 90 TABLET | Refills: 0 | Status: SHIPPED | OUTPATIENT
Start: 2025-03-14 | End: 2025-06-12

## 2025-03-13 RX ORDER — METOPROLOL TARTRATE 25 MG/1
25 TABLET, FILM COATED ORAL EVERY 12 HOURS SCHEDULED
Qty: 60 TABLET | Refills: 0 | Status: SHIPPED | OUTPATIENT
Start: 2025-03-13

## 2025-03-13 RX ORDER — ISOSORBIDE MONONITRATE 30 MG/1
30 TABLET, EXTENDED RELEASE ORAL
Qty: 30 TABLET | Refills: 0 | Status: SHIPPED | OUTPATIENT
Start: 2025-03-13

## 2025-03-13 RX ADMIN — PANTOPRAZOLE SODIUM 40 MG: 40 TABLET, DELAYED RELEASE ORAL at 09:33

## 2025-03-13 RX ADMIN — NICOTINE 1 PATCH: 14 PATCH, EXTENDED RELEASE TRANSDERMAL at 09:34

## 2025-03-13 RX ADMIN — GABAPENTIN 100 MG: 100 CAPSULE ORAL at 14:12

## 2025-03-13 RX ADMIN — ATORVASTATIN CALCIUM 40 MG: 40 TABLET, FILM COATED ORAL at 09:33

## 2025-03-13 RX ADMIN — OXYCODONE HYDROCHLORIDE 20 MG: 10 TABLET ORAL at 06:41

## 2025-03-13 RX ADMIN — RANOLAZINE 500 MG: 500 TABLET, FILM COATED, EXTENDED RELEASE ORAL at 09:33

## 2025-03-13 RX ADMIN — OXYCODONE HYDROCHLORIDE 20 MG: 10 TABLET ORAL at 02:11

## 2025-03-13 RX ADMIN — Medication 10 ML: at 09:34

## 2025-03-13 RX ADMIN — CINACALCET 30 MG: 30 TABLET ORAL at 09:33

## 2025-03-13 RX ADMIN — GABAPENTIN 100 MG: 100 CAPSULE ORAL at 06:40

## 2025-03-13 RX ADMIN — METOPROLOL TARTRATE 25 MG: 25 TABLET, FILM COATED ORAL at 09:33

## 2025-03-13 RX ADMIN — LEVETIRACETAM 500 MG: 500 TABLET, FILM COATED ORAL at 09:30

## 2025-03-13 RX ADMIN — LEVOTHYROXINE SODIUM 75 MCG: 0.07 TABLET ORAL at 06:40

## 2025-03-13 RX ADMIN — SERTRALINE HYDROCHLORIDE 150 MG: 100 TABLET ORAL at 09:33

## 2025-03-13 RX ADMIN — OXYCODONE HYDROCHLORIDE 20 MG: 10 TABLET ORAL at 10:47

## 2025-03-13 NOTE — DISCHARGE PLACEMENT REQUEST
"Federica Junior (59 y.o. Male)       Date of Birth   1965    Social Security Number       Address   [address] [place] [id number]    Home Phone   [phone]    MRN   [id number]       [employer]   Patient Refused    Marital Status                               Admission Date   3/11/2025    Admission Type   Urgent    Admitting Provider   Ang Mills MD    Attending Provider   Ang Mills MD    Department, Room/Bed   [facility] 2B ICU, N234/1       Discharge Date       Discharge Disposition   Home or Self Care    Discharge Destination                                 Attending Provider: Ang Mills MD    Allergies: No Known Allergies    Isolation: None   Infection: None   Code Status: CPR    Ht: 170.2 cm (67\")   Wt: 77.6 kg (171 lb)    Admission Cmt: None   Principal Problem: Subdural hematoma, nontraumatic [I62.00]                   Active Insurance as of 3/11/2025       Primary Coverage       Payor Plan Insurance Group Employer/Plan Group    [place]        Payor Plan Address Payor Plan Phone Number Payor Plan Fax Number Effective Dates    [address] [phone]  10/1/2021 - None Entered    [facility] [id number]         Subscriber Name Subscriber Birth Date Member ID       FEDERICA JUNIOR 1965 [id number]                     Emergency Contacts        (Rel.) Home Phone Work Phone Mobile Phone    ANNALISA (BRENDA CAMPOS (Relative) [phone] -- [phone]                 Discharge Summary        Vanessa Morrell APRN at 03/13/25 1355            Date of Discharge:  3/13/2025    Discharge Diagnosis: Ischemic cardiomyopathy, SDH, seizure    Presenting Problem/History of Present Illness    Mr. Junior is a 59 year old male with pmhx of HTN, HLD, Hypothyroidism, COPD, RIC, CKD on HD (MWF), PAD with prior right AKA and left femoropopliteal bypass, chronic pain/opioid dependent, CAD with ischemic " cardiomyopathy who was admitted on 3/1/25 for CTS evaluation for CABG. He was deemed a poor surgical candidate per CTS who recommended medical mgt versus high risk PCI. Cardiology was consulted and felt that he would be best served with medical management. As such, he was started on Imdur. He will follow up with Cardiology in 6-8 weeks as an outpatient.    On 3/11/2025, he developed acute onset encephalopathy and a stat head CT scan showed showed an acute on chronic left SDH.  While awaiting CT scan, he had a witnessed GTC seizure lasting less than a minute.  He was treated with Valium and Keppra. He was subsequently transferred to ICU for tight blood pressure management and close monitoring of neurologic status.  Neurology and neurosurgery evaluated patient.  There was no indication for surgical intervention per neurosurgery. Neurosurgery recommended holding Plavix but okay to resume ASA until his repeat CT H as an outpatient. Patient remained stable from a neurological standpoint and did not have any further seizures. Neurosurgery recommended follow-up with them in 3 weeks with repeat head CT scan.  General neurology recommended continuing Keppra 500 mg daily (ESRD dosing) and to follow up with them in 4 weeks.    Patient was counseled on need for smoking cessation and on driving restriction for at least 90 days due to seizure.     Patient declined home health.     Patient will resume outpatient hemodialysis on his normal schedule.     Condition on Discharge:  stable    Discharge Disposition: home    Consults:   Consults       Date and Time Order Name Status Description    3/12/2025  5:03 PM Inpatient Neurology Consult General Completed     3/11/2025  9:12 PM Inpatient Neurosurgery Consult Completed     3/11/2025  7:50 PM Inpatient Neurology Consult Stroke Completed     3/11/2025  1:30 PM Inpatient Cardiology Consult Completed     3/11/2025 12:45 PM Inpatient Nephrology Consult Completed     3/11/2025 12:42 PM  Inpatient Hospitalist Consult Completed             Vital Signs  Temp:  [97.5 °F (36.4 °C)-98.9 °F (37.2 °C)] 98.2 °F (36.8 °C)  Heart Rate:  [] 83  Resp:  [14-20] 14  BP: ()/(22-98) 111/98    Physical Exam:     General Appearance:  Alert, cooperative, in no acute distress   Head:  Normocephalic, without obvious abnormality, atraumatic   Eyes:  PERRLA   Lungs:  Clear to auscultation, respirations regular, even and unlabored    Heart:  Regular rhythm and normal rate, normal S1 and S2, no murmur, no gallop, no rub   Abdomen:  S/NT/ND. + BS   Extremities:  Edema LLE, s/p R AKA.   Skin:  No bleeding, bruising or rash   Neurologic:  No gross deficits. Ox3       Discharge Medications     Discharge Medications        ASK your doctor about these medications        Instructions Start Date   albuterol sulfate  (90 Base) MCG/ACT inhaler  Commonly known as: PROVENTIL HFA;VENTOLIN HFA;PROAIR HFA   1 puff, Every 4 Hours PRN      aspirin 81 MG EC tablet   81 mg, Oral, Daily      atorvastatin 40 MG tablet  Commonly known as: LIPITOR   40 mg, Oral, Daily      Auryxia 1  MG(Fe) tablet  Generic drug: Ferric Citrate   210 mg, Oral, Daily With Lunch      BASAGLAR KWIKPEN 100 UNIT/ML injection pen   5-15 Units, 3 Times Daily PRN      cinacalcet 30 MG tablet  Commonly known as: SENSIPAR   30 mg, Daily      clopidogrel 75 MG tablet  Commonly known as: PLAVIX   75 mg, Oral, Daily      Diclofenac Sodium 1 % gel gel  Commonly known as: VOLTAREN   4 g, As Needed      gabapentin 100 MG capsule  Commonly known as: NEURONTIN   100 mg, Daily      heparin (porcine) 1000 UNIT/ML injection   1,000 Units      lactulose 10 GM/15ML solution  Commonly known as: CHRONULAC   20 g, 2 Times Daily PRN      levothyroxine 75 MCG tablet  Commonly known as: SYNTHROID, LEVOTHROID   75 mcg, Daily      lidocaine-prilocaine 2.5-2.5 % cream  Commonly known as: EMLA   1 Application, As Needed      Lokelma 10 g packet  Generic drug: sodium  zirconium cyclosilicate   10 g, Oral, Once, Empty entire contents of the packet(s) into a glass with at least 3 tablespoons (45 mL) of water. Stir well and drink immediately; if powder remains in the glass, add water, stir and drink immediately; repeat until no powder remains. Administer other oral medications at least 2 hours before or 2 hours after dose.      metoclopramide 5 MG tablet  Commonly known as: REGLAN   5 mg, Nightly      midodrine 5 MG tablet  Commonly known as: PROAMATINE   5 mg, 3 Times Daily Before Meals      naloxone 4 MG/0.1ML nasal spray  Commonly known as: NARCAN   4 mg, Take As Directed      nitroglycerin 0.4 MG SL tablet  Commonly known as: NITROSTAT   0.4 mg, Sublingual, Every 5 Minutes PRN      ondansetron ODT 4 MG disintegrating tablet  Commonly known as: ZOFRAN-ODT   4 mg, Every 6 Hours PRN      oxyCODONE 30 MG immediate release tablet  Commonly known as: ROXICODONE   1.5 tablets, Every 4 Hours PRN      pantoprazole 40 MG EC tablet  Commonly known as: PROTONIX   40 mg, Daily      ranolazine 500 MG 12 hr tablet  Commonly known as: Ranexa   Take 1 tablet by mouth once daily on days that you are NOT undergoing dialysis      sertraline 100 MG tablet  Commonly known as: ZOLOFT   150 mg, Daily      Xphozah 30 MG tablet  Generic drug: Tenapanor HCl (CKD)   30 mg, Daily PRN               Discharge Diet:   Dietary Orders (From admission, onward)       Start     Ordered    03/12/25 0913  Diet: Cardiac, Diabetic, Renal; Healthy Heart (2-3 Na+); Consistent Carbohydrate; Low Phosphorus; Fluid Consistency: Thin (IDDSI 0)  Diet Effective Now        References:    Diet Order Definitions   Question Answer Comment   Diets: Cardiac    Diets: Diabetic    Diets: Renal    Cardiac Diet: Healthy Heart (2-3 Na+)    Diabetic Diet: Consistent Carbohydrate    Renal Diet: Low Phosphorus    Fluid Consistency: Thin (IDDSI 0)        03/12/25 0912    03/11/25 1314  DIET MESSAGE Please send patient a lunch tray. Thank you   Once        Comments: Please send patient a lunch tray. Thank you    03/11/25 1313                   Activity at Discharge:   Resume normal physical activity.   No driving until cleared by general neurology to do so.     Follow-up Appointments  Future Appointments   Date Time Provider Department Center   3/24/2025  8:15 AM [facility] VAS PAV 2 [facility] OVHD [facility]   3/24/2025  9:45 AM Luis Fernando Rawls MD [facility] VS [facility]   4/10/2025 11:30 AM [place] [facility] 1  [place] [facility]   4/10/2025  1:30 PM Madeline Avendano PA-C [facility] [place]     Additional Instructions for the Follow-ups that You Need to Schedule       CT Head Without Contrast   Apr 02, 2025      Exam reason: f/u SDH   Release to patient: Routine Release                Test Results Pending at Discharge  Pending Labs       Order Current Status    Basic Metabolic Panel In process    Hepatitis Panel, Acute In process             Time: Discharge 35 min Electronically signed by VICKI Oliveros, 03/13/25, 3:53 PM EDT.                Electronically signed by Vanessa Morrell APRN at 03/13/25 1600

## 2025-03-13 NOTE — CASE MANAGEMENT/SOCIAL WORK
Discharge Planning Assessment  [facility]     Patient Name: Chevy Junior  MRN: [id number]  Today's Date: 3/13/2025    Admit Date: 3/11/2025    Plan: Home with Caregiver/POA assist   Discharge Needs Assessment       Row Name 03/13/25 1125       Living Environment    People in Home other (see comments)  Ex wife/POA    Current Living Arrangements home    Potentially Unsafe Housing Conditions none    In the past 12 months has the electric, gas, oil, or water company threatened to shut off services in your home? No    Primary Care Provided by self;other (see comments)  POA/Ex wife    Provides Primary Care For no one, unable/limited ability to care for self    Family Caregiver if Needed other (see comments)  Ex wife/POA/CG - [place]    Quality of Family Relationships helpful;involved;supportive    Able to Return to Prior Arrangements yes       Resource/Environmental Concerns    Resource/Environmental Concerns none    Transportation Concerns no car       Transportation Needs    In the past 12 months, has lack of transportation kept you from medical appointments or from getting medications? no    In the past 12 months, has lack of transportation kept you from meetings, work, or from getting things needed for daily living? No       Food Insecurity    Within the past 12 months, you worried that your food would run out before you got the money to buy more. Never true    Within the past 12 months, the food you bought just didn't last and you didn't have money to get more. Never true       Transition Planning    Patient/Family Anticipates Transition to home with family    Patient/Family Anticipated Services at Transition none    Transportation Anticipated family or friend will provide       Discharge Needs Assessment    Readmission Within the Last 30 Days no previous admission in last 30 days    Equipment Currently Used at Home walker, standard;wheelchair;wheelchair, motorized;hospital bed;bath  bench;commode;glucometer;shower chair    Concerns to be Addressed denies needs/concerns at this time    Do you want help finding or keeping work or a job? I do not need or want help    Do you want help with school or training? For example, starting or completing job training or getting a high school diploma, GED or equivalent No    Anticipated Changes Related to Illness none    Equipment Needed After Discharge none    Current Discharge Risk chronically ill                   Discharge Plan       Row Name 03/13/25 1131       Plan    Plan Home with Caregiver/POA assist    Patient/Family in Agreement with Plan yes    Plan Comments CM spoke with patient at bedside and [place], patient's ex-wife/POA/Caregiver via phone. Patient resides in [facility] [place] [facility] with his ex-wife, [place]. She is his primary caregiver. Patient is dependent with ADL's, uses a motorized wheelchair for mobility assistance and it is at bedside. Patient has a shower chair, walker, bath bench hospital bed in his home. Patient denies any current home health. Patient is on HD: [facility], -[phone]/F: [phone] in [place], [place]. CM will fax DC summary to them @ [place].  Patient has medical insurance, prescription coverage and is able to afford/obtain medications without difficulty. Patient has an advanced directives. Patient will be transported by [facility] (open 24/7) @ [phone] @ [place] to [address] in [place], [place] - over a 2 hour drive from hospital. Case management team will continue to follow plan of care and assist with discharge planning as recommendations are available. Goal is to return back home when he is medically ready. Patient declines home health at this time.     1400: Patient ready for DC today. CM called [facility], spoke with [place] who set up transportation for home. ETA 2 - 2.5 hrs.  will call ICU @ [phone] when they arrive at [address] [facility] Maternity entrance. Updated RN and she notified POA.        Final Discharge Disposition Code 01 - home or self-care                    Expected Discharge Date and Time       Expected Discharge Date Expected Discharge Time    Mar 17, 2025            Demographic Summary       Row Name 03/13/25 1123       General Information    Arrived From home    Referral Source physician    Reason for Consult discharge planning    Preferred Language English       Contact Information    Contact Information BRENDA Dejesus (POA) [phone]                   Functional Status       Row Name 03/13/25 1124       Functional Status    Usual Activity Tolerance fair    Current Activity Tolerance fair       Physical Activity    On average, how many days per week do you engage in moderate to strenuous exercise (like a brisk walk)? 0 days    On average, how many minutes do you engage in exercise at this level? 0 min    Number of minutes of exercise per week 0       Assessment of Health Literacy    How often do you have someone help you read hospital materials? Occasionally    How often do you have problems learning about your medical condition because of difficulty understanding written information? Occasionally    How often do you have a problem understanding what is told to you about your medical condition? Occasionally    How confident are you filling out medical forms by yourself? Somewhat    Health Literacy Moderate       Functional Status, IADL    Medications assistive person    Meal Preparation assistive person    Housekeeping assistive equipment and person    Laundry assistive equipment and person    Shopping assistive equipment and person    If for any reason you need help with day-to-day activities such as bathing, preparing meals, shopping, managing finances, etc., do you get the help you need? I get all the help I need       Mental Status    General Appearance WDL WDL       Mental Status Summary    Recent Changes in Mental Status/Cognitive Functioning no changes        Employment/    Employment Status disabled                   Psychosocial    No documentation.                  Abuse/Neglect    No documentation.                  Legal    No documentation.                  Substance Abuse    No documentation.                  Patient Forms    No documentation.                     Elizabeth Ye RN

## 2025-03-13 NOTE — PLAN OF CARE
Goal Outcome Evaluation:  Plan of Care Reviewed With: patient        Progress: no change  Outcome Evaluation: PT eval complete. Pt presents with mild generalized weakness, decreased activity tolerance, and increased pain warranting IPPT services. Pt able to perform squat/pivot t/f with CGA from EOB>w/c. PT rec home w/ assist and HHPT/OT at d/c.    Anticipated Discharge Disposition (PT): home with assist, home with home health

## 2025-03-13 NOTE — NURSING NOTE
Discharge instructions reviewed with Pt  and also with caitlyn Ho POA via telephone, all questions answered. IVs removed, all belongings placed in belongings bag. Escorted pt out in his personal wheelchair to waiting medical transport van. Pt reported desire to smoke a cigarette so nicotine patch was removed from right shoulder for safety.

## 2025-03-13 NOTE — PROGRESS NOTES
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 hematoma with acute and chronic blood products. This measures 7 mm maximally over the left frontal lobe. There is 4 mm of midline shift. No hydrocephalus. Findings personally called to patient's nurse Radha at 8:44 p.m. on 3/11/2025 Electronically Signed: Pranav Adams MD  3/11/2025 8:45 PM EDT  Workstation ID: [id number]    XR Chest 1 View  Result Date: 3/11/2025  XR CHEST 1 VW Date of Exam: 3/11/2025 1:50 PM EDT Indication: preop, coronary artery disease Comparison: [facility] chest x-ray 3/1/2023, 2/1/2023 Findings: Lungs are adequately expanded and appear clear. No pneumothorax or large pleural effusion is seen. Cardiac silhouette remains mildly enlarged. There are old right rib fractures. Postoperative changes of the thoracic spine are new.     Impression: No acute cardiopulmonary abnormality is identified. Electronically Signed: Princess Reveles  3/11/2025 4:06 PM EDT  Workstation ID: [id number]        During this visit the following were done:  Labs Reviewed [x]    Labs Ordered []    Radiology Reports Reviewed [x]    Radiology Ordered []    EKG, echo, and/or stress  test reviewed []    EEG results reviewed  [x]    EEG reviewed and interpreted per myself   []    Discussed case with neurointerventionalist or neuroradiologist []    Referring Provider Records Reviewed []    ER Records Reviewed []    Hospital Records Reviewed []    History Obtained From Family []    Radiological images view and Interpreted per myself [x]    Case Discussed with referring provider []     Decision to obtain and request outside records  []        Assessment and Plan     New onset seizures, suspect that he has been having partial seizures for at least 6 months. Acute on chronic left SDH is the likely culprit. MRI brain shows a stable left SDH with a mild mass effect. On DAPT at home. Disorientation to place and time suggestive of a mild to moderate dementia. EEG was w/o epileptic changes. ESRD, CAD, PVD.    - Holding DAPT until cleared by NS, outpatient CT head planned..   - Continue Keppra 500 mg q24 qiven ESRD.   - Neurology and NS F/U.   - No driving on discharge (he no longer drives anyway per his wife).    Call for questions, will see prn. Thanks.                       Electronically signed by Donny Park MD on 3/13/2025 at 14:29 EDT

## 2025-03-13 NOTE — DISCHARGE PLACEMENT REQUEST
"Federica Junior (59 y.o. Male)       Date of Birth   1965    Social Security Number       Address   [address]  [facility] [address]    Home Phone   [phone]    MRN   [id number]       [employer]   Patient Refused    Marital Status                               Admission Date   3/11/2025    Admission Type   Urgent    Admitting Provider   Ang Mills MD    Attending Provider   Ang Mills MD    Department, Room/Bed   [facility] 2B ICU, N234/1       Discharge Date       Discharge Disposition   Home or Self Care    Discharge Destination                                 Attending Provider: Ang Mills MD    Allergies: No Known Allergies    Isolation: None   Infection: None   Code Status: CPR    Ht: 170.2 cm (67\")   Wt: 77.6 kg (171 lb)    Admission Cmt: None   Principal Problem: Subdural hematoma, nontraumatic [I62.00]                   Active Insurance as of 3/11/2025       Primary Coverage       Payor Plan Insurance Group Employer/Plan Group    [place]        Payor Plan Address Payor Plan Phone Number Payor Plan Fax Number Effective Dates    [address] [phone]  10/1/2021 - None Entered    [facility] [address]         Subscriber Name Subscriber Birth Date Member ID       FEDERICA JUNIOR 1965 [id number]                     Emergency Contacts        (Rel.) Home Phone Work Phone Mobile Phone    BRENDA JUNIOR (POA) (Relative) [phone] -- [phone]              Insurance Information                  [place] Phone: [phone]    Subscriber: Federica Junior Subscriber#: [id number]    Group#: -- Precert#: [id number]    Authorization#: [id number] Effective Date: --             Physician Progress Notes (last 48 hours)        Kurt Richardson MD at 03/13/25 0919             LOS: 2 days    Patient Care Team:  Jeanie Francois APRN as PCP - General (Nurse " "Practitioner)  Nikita Villasenor MD as Consulting Physician (Nephrology)  Feng Nielsen APRN as Nurse Practitioner (Interventional Cardiology)  Hal Wing MD as Consulting Physician (Cardiology)  Carlos Mcgee MD (General Surgery)  Gus Whaley MD as Consulting Physician (General Surgery)  Luis Fernando Rawls MD as Consulting Physician (Vascular Surgery)    Chief Complaint:  CAD    Subjective     Interval History:     No acute events overnight. No new complaints       Review of Systems:   No CP or SOA , fever, chills, rigors, rash, N/V, Constipation.       Objective     Vital Sign Min/Max for last 24 hours  Temp  Min: 97.5 °F (36.4 °C)  Max: 98.9 °F (37.2 °C)   BP  Min: 92/78  Max: 148/75   Pulse  Min: 82  Max: 100   Resp  Min: 16  Max: 22   SpO2  Min: 90 %  Max: 100 %   Flow (L/min) (Oxygen Therapy)  Min: 2  Max: 2   No data recorded     Flowsheet Rows      Flowsheet Row First Filed Value   Admission Height 170.2 cm (67\") Documented at 03/11/2025 1228   Admission Weight 77.6 kg (171 lb) Documented at 03/11/2025 1228            No intake/output data recorded.  I/O last 3 completed shifts:  In: 960 [P.O.:960]  Out: 2700     Physical Exam:    Gen: Alert, NAD   HENT: NC, AT, EOMI   NECK: Supple, no JVD, Trachea midline   LUNGS: CTA bilaterally, non labored respirtation   CVS: S1/S2 audible, RRR, no murmur   Abd: Soft, NT, ND, BS+   Ext: No pedal edema, no cyanosis   CNS: Alert, No focal deficit noted grossly  Psy: Cooperative  Skin: Warm, dry and intact      WBC WBC   Date Value Ref Range Status   03/12/2025 5.09 3.40 - 10.80 10*3/mm3 Final   03/11/2025 5.85 3.40 - 10.80 10*3/mm3 Final      HGB Hemoglobin   Date Value Ref Range Status   03/12/2025 8.3 (L) 13.0 - 17.7 g/dL Final   03/11/2025 10.4 (L) 13.0 - 17.7 g/dL Final      HCT Hematocrit   Date Value Ref Range Status   03/12/2025 26.6 (L) 37.5 - 51.0 % Final   03/11/2025 34.0 (L) 37.5 - 51.0 % Final      Platlets No results found for: " "\"LABPLAT\"   MCV MCV   Date Value Ref Range Status   03/12/2025 90.2 79.0 - 97.0 fL Final   03/11/2025 91.9 79.0 - 97.0 fL Final          Sodium Sodium   Date Value Ref Range Status   03/12/2025 140 136 - 145 mmol/L Final   03/11/2025 137 136 - 145 mmol/L Final      Potassium Potassium   Date Value Ref Range Status   03/12/2025 4.0 3.5 - 5.2 mmol/L Final     Comment:     Slight hemolysis detected by analyzer. Result may be falsely elevated.   03/11/2025 4.3 3.5 - 5.2 mmol/L Final     Comment:     Slight hemolysis detected by analyzer. Result may be falsely elevated.      Chloride Chloride   Date Value Ref Range Status   03/12/2025 102 98 - 107 mmol/L Final   03/11/2025 94 (L) 98 - 107 mmol/L Final      CO2 CO2   Date Value Ref Range Status   03/12/2025 21.0 (L) 22.0 - 29.0 mmol/L Final   03/11/2025 24.0 22.0 - 29.0 mmol/L Final      BUN BUN   Date Value Ref Range Status   03/12/2025 57 (H) 8 - 23 mg/dL Final   03/11/2025 60 (H) 8 - 23 mg/dL Final      Creatinine Creatinine   Date Value Ref Range Status   03/12/2025 7.09 (H) 0.76 - 1.27 mg/dL Final   03/11/2025 8.03 (H) 0.76 - 1.27 mg/dL Final      Calcium Calcium   Date Value Ref Range Status   03/12/2025 7.3 (L) 8.6 - 10.5 mg/dL Final   03/11/2025 8.6 8.6 - 10.5 mg/dL Final      PO4 No results found for: \"CAPO4\"   Albumin Albumin   Date Value Ref Range Status   03/11/2025 3.7 3.5 - 5.2 g/dL Final      Magnesium Magnesium   Date Value Ref Range Status   03/12/2025 1.8 1.6 - 2.4 mg/dL Final      Uric Acid No results found for: \"URICACID\"        Results Review:     I reviewed the patient's new clinical results.    [Held by provider] aspirin, 81 mg, Oral, Daily  atorvastatin, 40 mg, Oral, Daily  cinacalcet, 30 mg, Oral, Daily  [Held by provider] clopidogrel, 75 mg, Oral, Daily  gabapentin, 100 mg, Oral, Q8H  insulin lispro, 2-7 Units, Subcutaneous, 4x Daily AC & at Bedtime  isosorbide mononitrate, 30 mg, Oral, Q24H  levETIRAcetam, 500 mg, Oral, Daily  levothyroxine, 75 " mcg, Oral, Q AM  metoclopramide, 5 mg, Oral, Nightly  metoprolol tartrate, 25 mg, Oral, Q12H  nicotine, 1 patch, Transdermal, Q24H  pantoprazole, 40 mg, Oral, Daily  ranolazine, 500 mg, Oral, Q12H  sertraline, 150 mg, Oral, Daily  sodium chloride, 10 mL, Intravenous, Q12H         Results from last 7 days   Lab Units 03/12/25  0439 03/11/25  1948   SODIUM mmol/L 140 137   POTASSIUM mmol/L 4.0 4.3   CHLORIDE mmol/L 102 94*   CO2 mmol/L 21.0* 24.0   BUN mg/dL 57* 60*   CREATININE mg/dL 7.09* 8.03*   CALCIUM mg/dL 7.3* 8.6   ALBUMIN g/dL  --  3.7   WBC 10*3/mm3 5.09 5.85   HEMOGLOBIN g/dL 8.3* 10.4*   PLATELETS 10*3/mm3 120* 148           Medication Review: yes    Assessment & Plan       Subdural hematoma, nontraumatic    Essential hypertension    Type 2 diabetes mellitus, with long-term current use of insulin    Heart failure with preserved left ventricular function (HFpEF)    Current smoker    ESRD on hemodialysis    Coronary artery disease    Hypothyroidism (acquired)      1. End-stage renal disease - MWF  2. CAD - cardiology following   3. Anemia  4. Hyponatremia  5. Hypertension  6- Acute left subdural hematoma - Neurology on board.   7- Seizure        Plan:   - HD tomorrow, UF as tolerated.   - Adjust electrolytes with dialysis   - Continue all home medications.   - Renal diet  - NATALIA with HD     Kurt Richardson MD  03/13/25  09:19 EDT        Electronically signed by Kurt Richardson MD at 03/13/25 1212       Yamini Schwarz APRN at 03/13/25 0731       Attestation signed by Gus Lay MD at 03/13/25 9781    I have reviewed this documentation and agree.  Continue medical management.  Patient is not a candidate for CABG.  Will sign off.  Call with questions.                   [facility] Cardiothoracic Surgery In-Patient Progress Note     LOS: 2 days     Chief Complaint: Coronary artery disease    Subjective  No acute events. VSS on 2L NC saturations 98%.     Objective  Vital Signs  Temp:  [97.5 °F  (36.4 °C)-98.9 °F (37.2 °C)] 97.8 °F (36.6 °C)  Heart Rate:  [] 84  Resp:  [16-22] 16  BP: ()/(57-97) 123/75        Physical Exam:   General Appearance: alert, appears stated age and cooperative   Lungs: clear to auscultation, respirations regular, respirations even, and respirations unlabored   Heart: regular rhythm & normal rate, normal S1, S2, no murmur, no gallop, no rub, and no click     Results     Results from last 7 days   Lab Units 03/12/25  0439   WBC 10*3/mm3 5.09   HEMOGLOBIN g/dL 8.3*   HEMATOCRIT % 26.6*   PLATELETS 10*3/mm3 120*     Results from last 7 days   Lab Units 03/12/25  0439   SODIUM mmol/L 140   POTASSIUM mmol/L 4.0   CHLORIDE mmol/L 102   CO2 mmol/L 21.0*   BUN mg/dL 57*   CREATININE mg/dL 7.09*   GLUCOSE mg/dL 116*   CALCIUM mg/dL 7.3*     Imaging Results (Last 24 Hours)       Procedure Component Value Units Date/Time    MRI Brain Without Contrast [[id number]] Collected: 03/12/25 2358     Updated: 03/13/25 0006    Narrative:      MRI BRAIN WO CONTRAST    Date of Exam: 3/12/2025 11:15 PM EDT    Indication: seizures, SDH.     Comparison: None available.    Technique:  Routine multiplanar/multisequence sequence images of the brain were obtained without contrast administration.      Findings:  There is abnormal extra-axial fluid on the left side consistent with known left-sided subdural hematoma. Maximum thickness is in the posterior high parietal region up to about 1.4 cm essentially unchanged. There is 3 mm of left to right shift. There are   no new areas of hemorrhage. There are no vascular territory infarcts. There is no mass. The intracranial flow voids are preserved. The orbital structures are normal. The paranasal sinuses and mastoid air cells are clear.      Impression:      Impression:  Stable left-sided subdural hematoma.            Electronically Signed: Rick Rivas MD    3/13/2025 12:03 AM EDT    Workstation ID: [id number]    CT Head Without Contrast [[id number]]  Collected: 03/12/25 1152     Updated: 03/12/25 1216    Narrative:      CT HEAD WO CONTRAST    Date of Exam: 3/12/2025 9:22 AM EDT    Indication: ICH SDH.    Comparison:  3/12/2025    Technique: Axial CT images were obtained of the head without contrast administration.  Automated exposure control and iterative construction methods were used.      Findings:  Parenchyma:No acute intraparenchymal hemorrhage. No loss of gray-white differentiation to suggest large territory infarct. Mild parenchymal volume loss. Scattered periventricular and subcortical white matter hypodensities, nonspecific, but most often   consistent with small vessel ischemic changes. 3 mm of rightward midline shift, previously 4 mm.    Ventricles and extra axial spaces:Prominent ventricles and sulci secondary to volume loss. Again seen is a left acute on chronic subdural hematoma measuring up to 1.2 cm in the left frontal convexity, similar to prior examination.    Other:Orbits are grossly intact. Scattered paranasal sinus mucosal thickening. Mastoid air cells are clear. Calvarium is intact. Intracranial atherosclerotic calcification is present.      Impression:      Impression:  1.Redemonstrated acute on chronic left subdural hematoma measuring up to 1.2 cm in the left frontal convexity, similar to prior examination.  2.3 mm of rightward midline shift, previously 4 mm.        Electronically Signed: Nicanor Rivers MD    3/12/2025 12:12 PM EDT    Workstation ID: [id number]    CT Head Without Contrast [[id number]] Collected: 03/12/25 0756     Updated: 03/12/25 0809    Narrative:      CT HEAD WO CONTRAST    Date of Exam: 3/12/2025 2:57 AM EDT    Indication: stroke SDH.    Comparison: CT head without contrast 3/11/2025, CT angiography of the head and neck 3/11/2025    Technique: Axial CT images were obtained of the head without contrast administration.  Automated exposure control and iterative construction methods were  used.      Findings:  Redemonstration of a holohemispheric left-sided acute on chronic subdural hematoma which measures up to 10 mm in thickness abutting the left frontal lobe, not significantly changed from the prior study when accounting for differences in measurement.   There is left to right midline shift which measures 4 mm which is unchanged. No intraventricular hemorrhage. Mild white matter findings of chronic microvascular disease. Posterior fossa without acute abnormality. The mastoid air cells are well-aerated.   Visualized sinuses are clear. No calvarial fracture. No hydrocephalus.      Impression:      Impression:  1. Left-sided acute on chronic holohemispheric subdural hematoma measuring up to 10 mm in thickness with left to right midline shift measuring 4 mm, not significantly changed from prior exam.  2. No hydrocephalus. No intraventricular hemorrhage.  3. Mild white matter findings of chronic microvascular disease.            Electronically Signed: Chuy Lawler MD    3/12/2025 8:06 AM EDT    Workstation ID: [id number]            Assessment    Subdural hematoma, nontraumatic    Essential hypertension    Type 2 diabetes mellitus, with long-term current use of insulin    Heart failure with preserved left ventricular function (HFpEF)    Current smoker    ESRD on hemodialysis    Coronary artery disease    Hypothyroidism (acquired)      Plan   Will ask intensivist to take over care for this patient. Nothing to add from our standpoint. Not a surgical candidate.      Yamini Schwarz, VICKI  03/13/25  07:47 EDT    Electronically signed by Gus Lay MD at 03/13/25 1541       Donny Park MD at 03/12/25 4628          Neurology Note    Patient:  Chevy Junior    YOB: 1965    REFERRING PHYSICIAN:  Gus Lay MD    CHIEF COMPLAINT:    Seizures    HISTORY OF PRESENT ILLNESS:   The patient has not had any further seizures, currently back to baseline. His wife reports that  he has been having episodes of staring and not responding for over 6 months. She denies him drinking alcohol or remembers him hitting his head.    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     Cellulitis and abscess of buttock 2023    CHF (congestive heart failure)     Chronic pain     COPD (chronic obstructive pulmonary disease)     INHALER    Coronary artery disease involving native coronary artery of native heart with angina pectoris 09/12/2022    stents x 3    Diabetes mellitus     ESRD on dialysis     M,W,F-FISTULA LEFT ARM    GERD (gastroesophageal reflux disease)     Heart failure with preserved left ventricular function (HFpEF) 09/13/2022    FOLLOWS W/REECE-NO CP    History of blood transfusion     NO ADVERSE REACTIONS    Hyperlipidemia     Hypertension     WIFE STATED B/P HAS BEEN RUNNING LOW LATELY    Hypothyroidism (acquired) 03/11/2025    Infectious viral hepatitis 5 years ago was treated    hx hep c    RIC (obstructive sleep apnea)     Osteoarthritis     Phosphorus metabolic disorder     Sleep apnea     NO MACHINE    Substance abuse 20 years ago from [facility]       Past Surgical History:  Past Surgical History:   Procedure Laterality Date    ABOVE KNEE LEG AMPUTATION Right     CARDIAC CATHETERIZATION      CARDIAC CATHETERIZATION N/A 11/30/2022    Procedure: Percutaneous Coronary Intervention;  Surgeon: River Acevedo IV, MD;  Location:  [facility] C[facility] INVASIVE [facility]ATION;  Service: Cardiovascular;  Laterality: N/A;    CARDIAC CATHETERIZATION N/A 11/30/2022    Procedure: Optical Coherent Tomography;  Surgeon: River Acevedo IV, MD;  Location:  [facility];  Service: Cardiovascular;  Laterality: N/A;    CARDIAC CATHETERIZATION N/A 11/30/2022    Procedure: Stent JOSE coronary;  Surgeon: River Acevedo IV, MD;  Location:  [facility] C[facility] INVASIVE LOCATION;  Service:  "Cardiovascular;  Laterality: N/A;    CARDIAC CATHETERIZATION Left 11/09/2023    Procedure: Aortogram with left leg angiogram, possible angioplasty or stenting;  Surgeon: Luis Fernando Rawls MD;  Location: [facility] CATH INVASIVE LOCATION;  Service: Vascular;  Laterality: Left;    CARDIAC CATHETERIZATION Right 04/25/2024    Procedure: Right lower extremity angiogram, possible angioplasty or stenting;  Surgeon: Luis Fernando Rawls MD;  Location: [facility] CATH INVASIVE LOCATION;  Service: Vascular;  Laterality: Right;    CARDIAC CATHETERIZATION N/A 12/05/2024    Procedure: Left Heart Cath;  Surgeon: Cole Oscar MD;  Location: [facility] CATH INVASIVE LOCATION;  Service: Cardiovascular;  Laterality: N/A;    CATH LAB PROCEDURE      unable to stent    COLONOSCOPY N/A 02/15/2023    Procedure: COLONOSCOPY WITH POLYPECTOMY;  Surgeon: David Glass MD;  Location: [facility] ENDOSCOPY;  Service: Gastroenterology;  Laterality: N/A;  COLON POLYP, MELANOSIS COLI    ENDOSCOPY N/A 02/03/2023    Procedure: ESOPHAGOGASTRODUODENOSCOPY with biopsy;  Surgeon: Gus Whaley MD;  Location: [facility] ENDOSCOPY;  Service: General;  Laterality: N/A;  gastritis,     FEMORAL POPLITEAL BYPASS Left 12/12/2023    Procedure: Left femoral edarterectomy, left femoral to below the knee popliteal bypass graft;  Surgeon: Luis Fernando Rawls MD;  Location: [facility] MAIN OR;  Service: Vascular;  Laterality: Left;    HIP SURGERY Right     INSERTION PERITONEAL DIALYSIS CATHETER      LEG SURGERY Left     \"jocelyne in place\"    MANDIBLE FRACTURE SURGERY      REPLACEMENT TOTAL KNEE Right     x3    SHOULDER SURGERY Right     SPINE SURGERY      unknown procudure- completed at [facility] in [place].       Social History:   Social History     Socioeconomic History    Marital status:    Tobacco Use    Smoking status: Every Day     Current packs/day: 2.00     Average packs/day: 2.0 packs/day for 40.0 years (80.0 ttl pk-yrs)     Types: Cigarettes    Smokeless tobacco: Never    " Tobacco comments:     1.5 ppd as of 2/5/25   Vaping Use    Vaping status: Never Used   Substance and Sexual Activity    Alcohol use: Not Currently     Comment: None    Drug use: Not Currently     Types: Hydrocodone     Comment: pain pills from previous injuries    Sexual activity: Defer        Family History:   Family History   Problem Relation Age of Onset    Heart attack Mother     Coronary artery disease Mother     Hypertension Mother     Arthritis Mother     Hypertension Father     Diabetes Father        Medications Prior to Admission:    Prior to Admission medications    Medication Sig Start Date End Date Taking? Authorizing Provider   albuterol sulfate  (90 Base) MCG/ACT inhaler Inhale 1 puff Every 4 (Four) Hours As Needed for Wheezing or Shortness of Air.    Janie Lynch MD   aspirin (aspirin) 81 MG EC tablet Take 1 tablet by mouth Daily. 9/13/22   River Acevedo IV, MD   atorvastatin (LIPITOR) 40 MG tablet Take 1 tablet by mouth Daily. 11/19/24   Feng Nielsen APRN   Auryxia 1  MG(Fe) tablet Take 1 tablet by mouth Daily With Lunch. 4/25/24   Aj Chavira MD   cinacalcet (SENSIPAR) 30 MG tablet Take 1 tablet by mouth Daily. 7/5/21   Janie Lynch MD   clopidogrel (PLAVIX) 75 MG tablet Take 1 tablet by mouth Daily.  Patient taking differently: Take 1 tablet by mouth Daily. Last dose 3/6/25. Holding prior to CABG 10/31/24   Feng Nielsen APRN   Diclofenac Sodium (VOLTAREN) 1 % gel gel Apply 4 g topically to the appropriate area as directed As Needed. prn 10/2/24   Janie Lynch MD   gabapentin (NEURONTIN) 100 MG capsule Take 1 capsule by mouth Daily.    Janie Lynch MD   Heparin Sodium, Porcine, (heparin, porcine,) 1000 UNIT/ML injection Infuse 1 mL into a venous catheter. M-W-F with dialysis    Janie Lynch MD   Insulin Glargine (BASAGLAR KWIKPEN) 100 UNIT/ML injection pen Inject 5-15 Units under the skin into the appropriate area as  directed 3 (Three) Times a Day As Needed. Per SS  5-15 UNITS 6/16/21   Janie Lynch MD   lactulose (CHRONULAC) 10 GM/15ML solution Take 30 mL by mouth 2 (Two) Times a Day As Needed.    Janie Lynch MD   levothyroxine (SYNTHROID, LEVOTHROID) 75 MCG tablet Take 1 tablet by mouth Daily. 2/2/22   Janie Lynch MD   lidocaine-prilocaine (EMLA) 2.5-2.5 % cream Apply 1 Application topically to the appropriate area as directed As Needed for Injection Site Pain. Apply before dialysis on Monday, Wednesday and Fridays of each week 4/15/24   Janie Lynch MD   metoclopramide (REGLAN) 5 MG tablet Take 1 tablet by mouth Every Night.    Janie Lynch MD   midodrine (PROAMATINE) 5 MG tablet Take 1 tablet by mouth 3 (Three) Times a Day Before Meals. Prn on M/W/F, dialysis days 10/16/24   Janie Lynch MD   naloxone (NARCAN) 4 MG/0.1ML nasal spray Administer 1 spray into the nostril(s) as directed by provider Take As Directed.    Janie Lynch MD   nitroglycerin (NITROSTAT) 0.4 MG SL tablet Place 1 tablet under the tongue Every 5 (Five) Minutes As Needed for Chest Pain. 10/31/24   Feng Nielsen APRN   ondansetron ODT (ZOFRAN-ODT) 4 MG disintegrating tablet Place 1 tablet on the tongue Every 6 (Six) Hours As Needed for Nausea or Vomiting.    Janie Lynch MD   oxyCODONE (ROXICODONE) 30 MG immediate release tablet Take 1.5 tablets by mouth Every 4 (Four) Hours As Needed for Moderate Pain or Severe Pain.    Janie Lynch MD   pantoprazole (PROTONIX) 40 MG EC tablet Take 1 tablet by mouth Daily.    Janie Lynch MD   ranolazine (Ranexa) 500 MG 12 hr tablet Take 1 tablet by mouth once daily on days that you are NOT undergoing dialysis 12/5/24   Cole Oscar MD   sertraline (ZOLOFT) 100 MG tablet Take 1.5 tablets by mouth Daily. 6/16/21   Janie Lynch MD   sodium zirconium cyclosilicate (Lokelma) 10 g packet Take 10 g by mouth 1 (One) Time.  Empty entire contents of the packet(s) into a glass with at least 3 tablespoons (45 mL) of water. Stir well and drink immediately; if powder remains in the glass, add water, stir and drink immediately; repeat until no powder remains. Administer other oral medications at least 2 hours before or 2 hours after dose.    ProviderJanie MD   Xphozah 30 MG tablet Take 30 mg by mouth Daily As Needed. 10/30/24   ProviderJanie MD       Allergies:  Patient has no known allergies.      Review of system  Review of Systems   Neurological:  Negative for seizures.   All other systems reviewed and are negative.      Vitals:    03/12/25 1645   BP: 124/97   Pulse: 92   Resp: 18   Temp: 97.5 °F (36.4 °C)   SpO2:        Physical exam  Physical Exam  Eyes:      Extraocular Movements: Extraocular movements intact.   Cardiovascular:      Rate and Rhythm: Normal rate and regular rhythm.   Pulmonary:      Effort: Pulmonary effort is normal.   Neurological:      General: No focal deficit present.      Mental Status: He is alert.      Comments: Oriented to self, [facility], [facility] year, month of January. Speech clear, no facial droop, moves arms well and left leg some, s/p right AKA, DTRs hypoactive.           Lab Results   Component Value Date    WBC 5.09 03/12/2025    HGB 8.3 (L) 03/12/2025    HCT 26.6 (L) 03/12/2025    MCV 90.2 03/12/2025     (L) 03/12/2025     Lab Results   Component Value Date    GLUCOSE 116 (H) 03/12/2025    BUN 57 (H) 03/12/2025    CREATININE 7.09 (H) 03/12/2025    BCR 8.0 03/12/2025    CO2 21.0 (L) 03/12/2025    CALCIUM 7.3 (L) 03/12/2025    ALBUMIN 3.7 03/11/2025    AST 18 03/11/2025    ALT 9 03/11/2025         Radiological Studies:   CT Head Without Contrast  Result Date: 3/12/2025  CT HEAD WO CONTRAST Date of Exam: 3/12/2025 9:22 AM EDT Indication: ICH SDH. Comparison:  3/12/2025 Technique: Axial CT images were obtained of the head without contrast administration.  Automated exposure  control and iterative construction methods were used. Findings: Parenchyma:No acute intraparenchymal hemorrhage. No loss of gray-white differentiation to suggest large territory infarct. Mild parenchymal volume loss. Scattered periventricular and subcortical white matter hypodensities, nonspecific, but most often consistent with small vessel ischemic changes. 3 mm of rightward midline shift, previously 4 mm. Ventricles and extra axial spaces:Prominent ventricles and sulci secondary to volume loss. Again seen is a left acute on chronic subdural hematoma measuring up to 1.2 cm in the left frontal convexity, similar to prior examination. Other:Orbits are grossly intact. Scattered paranasal sinus mucosal thickening. Mastoid air cells are clear. Calvarium is intact. Intracranial atherosclerotic calcification is present.     Impression: 1.Redemonstrated acute on chronic left subdural hematoma measuring up to 1.2 cm in the left frontal convexity, similar to prior examination. 2.3 mm of rightward midline shift, previously 4 mm. Electronically Signed: Nicanor Rivers MD  3/12/2025 12:12 PM EDT  Workstation ID: [id number]    CT Head Without Contrast  Result Date: 3/12/2025  CT HEAD WO CONTRAST Date of Exam: 3/12/2025 2:57 AM EDT Indication: stroke SDH. Comparison: CT head without contrast 3/11/2025, CT angiography of the head and neck 3/11/2025 Technique: Axial CT images were obtained of the head without contrast administration.  Automated exposure control and iterative construction methods were used. Findings: Redemonstration of a holohemispheric left-sided acute on chronic subdural hematoma which measures up to 10 mm in thickness abutting the left frontal lobe, not significantly changed from the prior study when accounting for differences in measurement. There is left to right midline shift which measures 4 mm which is unchanged. No intraventricular hemorrhage. Mild white matter findings of chronic microvascular disease.  Posterior fossa without acute abnormality. The mastoid air cells are well-aerated. Visualized sinuses are clear. No calvarial fracture. No hydrocephalus.     Impression: 1. Left-sided acute on chronic holohemispheric subdural hematoma measuring up to 10 mm in thickness with left to right midline shift measuring 4 mm, not significantly changed from prior exam. 2. No hydrocephalus. No intraventricular hemorrhage. 3. Mild white matter findings of chronic microvascular disease. Electronically Signed: Chuy Lawler MD  3/12/2025 8:06 AM EDT  Workstation ID: [id number]    EEG  Result Date: 3/12/2025  Reason for referral: 59 y.o.male with left hemispheric subdural, altered mental status, seizure Technical Summary:  A 19 channel digital EEG was performed using the international 10-20 placement system, including eye leads and EKG leads. Duration: 22 minutes Findings: The patient is drowsy.  The background shows diffuse medium amplitude 2-6 Hz intermixed delta and theta activity present symmetrically over both hemispheres.  A clear posterior rhythm is never seen.  Over the course of the study, no lateralizing features are present.  No epileptiform activity or electrographic seizures are seen.  Photic stimulation does not change the background.  Hyperventilation is not performed. Video: Available Technical quality: Good Rhythm strip: Regular, 70 bpm SUMMARY: Moderate generalized slow No focal features or epileptiform activity are seen     Diffuse cerebral dysfunction of moderate degree, nonspecific No ongoing seizures are present This report is transcribed using the Dragon dictation system.      CT Angiogram Head w AI Analysis of LVO  Result Date: 3/11/2025  CT ANGIOGRAM HEAD W AI ANALYSIS OF LVO, CT ANGIOGRAM NECK Date of Exam: 3/11/2025 8:46 PM EDT Indication: ams. Comparison: CT head 3/11/2025 Technique: CTA of the head and neck was performed after the uneventful intravenous administration of Isovue-370 . Reconstructed coronal  and sagittal images were also obtained. In addition, a 3-D volume rendered image was created for interpretation. Automated exposure control and iterative reconstruction methods were used. Findings: CTA neck: There is moderate calcified plaque of the aortic arch and large noncalcified plaque at the origins of the great vessels. Right brachiocephalic artery and right subclavian artery are patent without significant stenosis. The right common carotid artery is also patent without significant stenosis. There is moderate calcified plaque throughout the right common carotid artery. There is a large amount of calcified plaque at the right carotid bifurcation resulting in less than 50% stenosis of the right internal carotid artery. Right internal carotid artery is very tortuous but otherwise patent. Left common carotid artery arises from a common trunk with the right brachiocephalic. There is mild calcified plaque of the left common carotid artery but no significant stenosis. There is a large amount of calcified plaque of the left carotid bifurcation resulting in less than 50% stenosis of the left internal carotid artery and greater 50% stenosis of the left external carotid artery. The left internal carotid artery is very tortuous but otherwise patent. Left subclavian artery contains a large amount of calcified plaque but is patent without evidence of focal stenosis. The right vertebral artery is extremely hypoplastic and may be occluded proximally. There is mild calcified plaque of the bilateral vertebral arteries. There is mediastinal adenopathy with a prevascular node measuring 3.6 x 1.6 cm in size. There also appears to be mild bilateral hilar adenopathy and borderline size right paratracheal nodes. There is emphysematous change of the visualized portions of the  lungs with coarsened interstitial markings and groundglass airspace disease which may be due to chronic lung disease or acute pneumonia. Clinical correlation  recommended with patient's symptoms. There is soft tissue thickening in the prevertebral soft tissues extending from the C2-C4 levels. This may be more centered around the right [place] tonsil an underlying tonsillar mass could potentially give this appearance. Lymphoma would also be within the differential. There are no discrete pathologically enlarged cervical lymph nodes. There is kyphosis of the cervical spine centered at the C5 level with anterior wedging of the C5 and C6 vertebral bodies and loss of the C5/6 disc space. There is marked bony sclerosis of all bony structures which may be related to renal osteodystrophy. Diffuse sclerotic metastatic disease is felt to be less likely. CTA HEAD: There is moderate stenosis of the bilateral intracranial cavernous internal carotid arteries Bilateral anterior cerebral and middle cerebral arteries appear patent without evidence of focal stenosis. No active extra-axial contrast extravasation seen to account for the mixed density left hemispheric subdural hematoma. No anterior circulation aneurysm identified. The vertebral arteries are patent at the skull base. Basilar artery is patent without focal stenosis. Bilateral posterior cerebral arteries appear patent without focal stenosis. No posterior circulation aneurysm. No pathologic intracranial contrast enhancement. Dural venous sinuses appear grossly patent.     Impression: 1. No change in mixed density acute on chronic left hemispheric subdural hematoma. 2. No intracranial vascular stenosis, occlusion, or aneurysm. No active contrast extravasation. Number dural venous sinuses. 4. Prevertebral soft tissue thickening with prominence in the right [place] tonsillar region. Underlying tonsillar mass cannot be excluded. No pathologically enlarged cervical lymph nodes. There are however multiple enlarged upper mediastinal lymph nodes which could be metastatic or reactive in etiology. 5. Diffuse bony sclerosis which may be  due to renal osteodystrophy or less likely diffuse sclerotic bony metastatic disease. Electronically Signed: Pranav Adams MD  3/11/2025 9:57 PM EDT  Workstation ID: [id number]    CT Angiogram Neck  Result Date: 3/11/2025  CT ANGIOGRAM HEAD W AI ANALYSIS OF LVO, CT ANGIOGRAM NECK Date of Exam: 3/11/2025 8:46 PM EDT Indication: ams. Comparison: CT head 3/11/2025 Technique: CTA of the head and neck was performed after the uneventful intravenous administration of Isovue-370 . Reconstructed coronal and sagittal images were also obtained. In addition, a 3-D volume rendered image was created for interpretation. Automated exposure control and iterative reconstruction methods were used. Findings: CTA neck: There is moderate calcified plaque of the aortic arch and large noncalcified plaque at the origins of the great vessels. Right brachiocephalic artery and right subclavian artery are patent without significant stenosis. The right common carotid artery is also patent without significant stenosis. There is moderate calcified plaque throughout the right common carotid artery. There is a large amount of calcified plaque at the right carotid bifurcation resulting in less than 50% stenosis of the right internal carotid artery. Right internal carotid artery is very tortuous but otherwise patent. Left common carotid artery arises from a common trunk with the right brachiocephalic. There is mild calcified plaque of the left common carotid artery but no significant stenosis. There is a large amount of calcified plaque of the left carotid bifurcation resulting in less than 50% stenosis of the left internal carotid artery and greater 50% stenosis of the left external carotid artery. The left internal carotid artery is very tortuous but otherwise patent. Left subclavian artery contains a large amount of calcified plaque but is patent without evidence of focal stenosis. The right vertebral artery is extremely hypoplastic and may be  occluded proximally. There is mild calcified plaque of the bilateral vertebral arteries. There is mediastinal adenopathy with a prevascular node measuring 3.6 x 1.6 cm in size. There also appears to be mild bilateral hilar adenopathy and borderline size right paratracheal nodes. There is emphysematous change of the visualized portions of the  lungs with coarsened interstitial markings and groundglass airspace disease which may be due to chronic lung disease or acute pneumonia. Clinical correlation recommended with patient's symptoms. There is soft tissue thickening in the prevertebral soft tissues extending from the C2-C4 levels. This may be more centered around the right [place] tonsil an underlying tonsillar mass could potentially give this appearance. Lymphoma would also be within the differential. There are no discrete pathologically enlarged cervical lymph nodes. There is kyphosis of the cervical spine centered at the C5 level with anterior wedging of the C5 and C6 vertebral bodies and loss of the C5/6 disc space. There is marked bony sclerosis of all bony structures which may be related to renal osteodystrophy. Diffuse sclerotic metastatic disease is felt to be less likely. CTA HEAD: There is moderate stenosis of the bilateral intracranial cavernous internal carotid arteries Bilateral anterior cerebral and middle cerebral arteries appear patent without evidence of focal stenosis. No active extra-axial contrast extravasation seen to account for the mixed density left hemispheric subdural hematoma. No anterior circulation aneurysm identified. The vertebral arteries are patent at the skull base. Basilar artery is patent without focal stenosis. Bilateral posterior cerebral arteries appear patent without focal stenosis. No posterior circulation aneurysm. No pathologic intracranial contrast enhancement. Dural venous sinuses appear grossly patent.     Impression: 1. No change in mixed density acute on chronic left  hemispheric subdural hematoma. 2. No intracranial vascular stenosis, occlusion, or aneurysm. No active contrast extravasation. Number dural venous sinuses. 4. Prevertebral soft tissue thickening with prominence in the right [place] tonsillar region. Underlying tonsillar mass cannot be excluded. No pathologically enlarged cervical lymph nodes. There are however multiple enlarged upper mediastinal lymph nodes which could be metastatic or reactive in etiology. 5. Diffuse bony sclerosis which may be due to renal osteodystrophy or less likely diffuse sclerotic bony metastatic disease. Electronically Signed: Pranav Adams MD  3/11/2025 9:57 PM EDT  Workstation ID: [id number]    XR Chest 1 View  Result Date: 3/11/2025  XR CHEST 1 VW Date of Exam: 3/11/2025 8:22 PM EDT Indication: ams Comparison: March 11, 2025 Findings: Pedicle screws bridged by rods are seen overlying the thoracic spine. There are interstitial changes involving the lungs with interval change that may reflect edema. There are no large pleural effusions.     Impression: Interstitial changes involving the lungs with interval change that may reflect edema. Electronically Signed: Trav Rasmussen MD  3/11/2025 9:04 PM EDT  Workstation ID: [id number]    CT Head Without Contrast  Result Date: 3/11/2025  CT HEAD WO CONTRAST Date of Exam: 3/11/2025 7:55 PM EDT Indication: ams. Comparison: None available. Technique: Axial CT images were obtained of the head without contrast administration.  Automated exposure control and iterative construction methods were used. Findings: There is mild generalized parenchymal volume loss. There is a thin mixed density left hemispheric subdural hematoma this measures 7 mm in thickness on coronal image 35 there is 4 mm of midline shift from left to right. No evidence of right-sided subdural  hematoma. No subarachnoid hemorrhage. No intraventricular hemorrhage. No acute infarct. Globes and orbits are within normal limits. Paranasal sinuses  and mastoid air cells are clear. No acute skull fracture.     Impression: 1. Left hemispheric mixed density subdural hematoma with acute and chronic blood products. This measures 7 mm maximally over the left frontal lobe. There is 4 mm of midline shift. No hydrocephalus. Findings personally called to patient's nurse Radha at 8:44 p.m. on 3/11/2025 Electronically Signed: Pranav Adams MD  3/11/2025 8:45 PM EDT  Workstation ID: [id number]    XR Chest 1 View  Result Date: 3/11/2025  XR CHEST 1 VW Date of Exam: 3/11/2025 1:50 PM EDT Indication: preop, coronary artery disease Comparison: [facility] chest x-ray 3/1/2023, 2/1/2023 Findings: Lungs are adequately expanded and appear clear. No pneumothorax or large pleural effusion is seen. Cardiac silhouette remains mildly enlarged. There are old right rib fractures. Postoperative changes of the thoracic spine are new.     Impression: No acute cardiopulmonary abnormality is identified. Electronically Signed: Princess Reveles  3/11/2025 4:06 PM EDT  Workstation ID: [id number]          During this visit the following were done:  Labs Reviewed [x]    Labs Ordered []    Radiology Reports Reviewed [x]    Radiology Ordered []    EKG, echo, and/or stress test reviewed [x]    EEG results reviewed  [x]    EEG reviewed and interpreted per myself   []    Discussed case with neurointerventionalist or neuroradiologist []    Referring Provider Records Reviewed []    ER Records Reviewed []    Hospital Records Reviewed []    History Obtained From Family []    Radiological images view and Interpreted per myself [x]    Case Discussed with referring provider []     Decision to obtain and request outside records  []        Assessment and Plan     New onset seizures, suspect that he has been having partial seizures for at least 6 months. Acute on chronic left SDH is the likely culprit. On DAPT at home. Disorientation to place and time suggestive of mild to moderate dementia. CTH stable overnight. EEG is w/o  epileptic changes. ESRD, CAD, PVD.    - MRI brain wo if able.   - Holding DAPT.   - Continue Keppra 500 mg q24 qiven ESRD.   - Neurology and NS F/U.   - No driving on discharge (he no longer drives anyway per his wife).              Electronically signed by Donny Park MD on 3/12/2025 at 17:38 EDT        Electronically signed by Donny Park MD at 03/12/25 2029       Ang Mills MD at 03/12/25 1622          Pulmonary/Critical Care Follow-up     LOS: 1 day   Patient Care Team:  Jeanie Francois APRN as PCP - General (Nurse Practitioner)  Nikita Villasenor MD as Consulting Physician (Nephrology)  Feng Nielsen APRN as Nurse Practitioner (Interventional Cardiology)  Hal Wing MD as Consulting Physician (Cardiology)  Carlos Mcgee MD (General Surgery)  Gus Whaley MD as Consulting Physician (General Surgery)  Luis Fernando Rawls MD as Consulting Physician (Vascular Surgery)    Chief Complaint: SDH      Subjective     History reviewed and updated in EMR as indicated.    Interval History:     Patient reports a little bit of a headache.  He is alert and oriented.  He is currently getting hemodialysis.  No fevers or chills.    History taken from: Patient, staff, chart.    PMH/FH/Social History were reviewed and updated appropriately in the electronic medical record.     Review of Systems:    Review of 14 systems was completed with positives and pertinent negatives noted in the subjective section.  All other systems reviewed and are negative.         Objective     Vital Signs  Temp:  [97.6 °F (36.4 °C)-98 °F (36.7 °C)] 98 °F (36.7 °C)  Heart Rate:  [] 90  Resp:  [18-22] 22  BP: (113-196)/() 121/78  03/11 0701 - 03/12 0700  In: 480 [P.O.:480]  Out: -   Body mass index is 26.78 kg/m².     IV drips:  niCARdipine       Physical Exam:     Constitutional:   Alert, in no acute distress   Head:   Normocephalic, atraumatic   Eyes:           Lids and lashes normal,  conjunctivae and sclerae normal.  PER   ENMT:  Ears appear intact with no abnormalities noted     Lips normal.     Neck:  Trachea midline, no JVD   Lungs/Resp:    Normal effort, symmetric chest rise, no crepitus, clear to      auscultation bilaterally.               Heart/CV:   Regular rhythm and normal rate, no murmur   Abdomen/GI:    Soft, nontender, nondistended   :    Deferred   Extremities/MSK:  No clubbing or cyanosis.  Positive edema of left lower extremity.  Status post right AKA.   Pulses:  Pulses palpable and equal bilaterally   Skin:  No bleeding, bruising or rash.  Dry skin left lower extremity.   Heme/Lymph:  No cervical or supraclavicular adenopathy.   Neurologic:    Psychiatric:    Moves all extremities with no obvious focal motor deficit.  Cranial nerves 2 - 12 grossly intact  Non-agitated, normal affect.    The above physical exam findings were reviewed and reflect my exam findings as of today's exam.   Electronically signed by:  Ang Mills MD  03/12/25  16:22 EDT      Results Review:     I reviewed the patient's new clinical results.   Results from last 7 days   Lab Units 03/12/25 0439 03/11/25 1948   SODIUM mmol/L 140 137   POTASSIUM mmol/L 4.0 4.3   CHLORIDE mmol/L 102 94*   CO2 mmol/L 21.0* 24.0   BUN mg/dL 57* 60*   CREATININE mg/dL 7.09* 8.03*   CALCIUM mg/dL 7.3* 8.6   BILIRUBIN mg/dL  --  0.3   ALK PHOS U/L  --  178*   ALT (SGPT) U/L  --  9   AST (SGOT) U/L  --  18   GLUCOSE mg/dL 116* 106*     Results from last 7 days   Lab Units 03/12/25 0439 03/11/25 1948   WBC 10*3/mm3 5.09 5.85   HEMOGLOBIN g/dL 8.3* 10.4*   HEMATOCRIT % 26.6* 34.0*   PLATELETS 10*3/mm3 120* 148     Results from last 7 days   Lab Units 03/11/25 1947   PH, ARTERIAL pH units 7.400   PO2 ART mm Hg 85.6   PCO2, ARTERIAL mm Hg 43.7   HCO3 ART mmol/L 27.0*     Results from last 7 days   Lab Units 03/12/25 0439   MAGNESIUM mg/dL 1.8   PHOSPHORUS mg/dL 6.1*     Results from last 7 days   Lab Units 03/11/25  1948    HEMOGLOBIN A1C % 6.50*         I reviewed the patient's new imaging including images and reports.    CT head 3/12/2025:  Impression:  1.Redemonstrated acute on chronic left subdural hematoma measuring up to 1.2 cm in the left frontal convexity, similar to prior examination.  2.3 mm of rightward midline shift, previously 4 mm.    Medication Review:   [Held by provider] aspirin, 81 mg, Oral, Daily  atorvastatin, 40 mg, Oral, Daily  cinacalcet, 30 mg, Oral, Daily  [Held by provider] clopidogrel, 75 mg, Oral, Daily  diazePAM, 5 mg, Intravenous, Once  gabapentin, 100 mg, Oral, Q8H  isosorbide mononitrate, 30 mg, Oral, Q24H  labetalol, 10 mg, Intravenous, Once  levETIRAcetam, 500 mg, Oral, Q12H  levothyroxine, 75 mcg, Oral, Q AM  metoclopramide, 5 mg, Oral, Nightly  metoprolol tartrate, 25 mg, Oral, Q12H  nicotine, 1 patch, Transdermal, Q24H  pantoprazole, 40 mg, Oral, Daily  ranolazine, 500 mg, Oral, Q12H  sertraline, 150 mg, Oral, Daily  sodium chloride, 10 mL, Intravenous, Q12H      niCARdipine, 5-15 mg/hr        Assessment & Plan         Subdural hematoma, nontraumatic    Essential hypertension    Type 2 diabetes mellitus, with long-term current use of insulin    Heart failure with preserved left ventricular function (HFpEF)    Current smoker    ESRD on hemodialysis    Coronary artery disease    Hypothyroidism (acquired)    59 y.o. active smoker of 2 packs a day with 80-pack-year history with history of HTN, HLD, hypothyroidism, COPD, RIC, CKD on HD MWF, PAD with prior right AKA and left femoropopliteal bypass, chronic pain/opioid dependent, CAD with ischemic cardiomyopathy, admitted 3/1/2025 by CTS for evaluation of potential CABG due to ongoing anginal symptoms.  He was found to be a poor candidate for CABG and CTS recommended high risk PCI versus medical management.    On 3/11/2025, patient developed acute onset encephalopathy and a stat head CT scan showed left hemispheric mixed density subdural hematoma with  acute and chronic blood products.  SDH measured 7 mm maximally over the left frontal lobe.  4 mm of midline shift.  While awaiting CT scan, the patient had a witnessed GTC lasting less than a minute.  He was treated with Valium and Keppra.    Patient was transferred to ICU for tight blood pressure management and close monitoring of neurologic status.  Neurology and neurosurgery have evaluated.  neurosurgery did not want patient to have anything other than aspirin (no therapeutic heparin or other antiplatelet medication).  They signed off with a plan to follow-up in 3 weeks with repeat head CT scan.  I spoke spoke with the stroke service APRN who recommended a general neurology consultation.    Plan:  1.  For CAD: CT surgery does not feel that the patient is an operative candidate.  Cardiology does not feel that the patient is a candidate for catheter-based intervention and recommends medical management.  2.  For COPD: Albuterol as needed.    3.  For SDH: Neurosurgery has evaluated and signed off.  They want to follow-up in the office in 3 weeks with a repeat head CT scan.  4.  For seizures: Spoke with stroke neurology APRN who recommended a general neurology consultation.  For the time being, I will continue patient on Keppra 500 mg twice daily.  Keep in ICU for now.  5.  For T2DM: Hemoglobin A1c 6.5%.  Add low-dose correction insulin.  6.  For tobacco abuse: Smoking cessation counseling.  Nicotine replacement.  Recommend screening CT scans yearly per current guidelines and smoking history.  7.  For hypothyroidism: Levothyroxine.  8.  For chronic pain: Oxycodone as needed.  9.  For CKD on HD: Intermittent HD per nephrology.    Patient is critically ill secondary to tenuous neurologic status in the setting of seizures and ICH and has high risk of life-threatening decompensation in condition.   As such, the patient requires continuous monitoring and frequent reassessment for consideration of adjustment in management  to minimize this risk.  I personally reassessed the patient multiple times today.    Critical care time : 37 minutes spent by me personally and independently.(This excludes time spent performing separately reportable procedures and services).  Critical care time includes high complexity decision making to assess, manipulate, and support vital organ system failure in this individual who has impairment of one or more vital organ systems such that there is a high probability of imminent or life threatening deterioration in the patient’s condition.    Electronically signed by:    Ang Mills MD  03/12/25  16:22 EDT      *. Please note that portions of this note were completed with [email or web address] voice recognition program.     Electronically signed by Ang Mills MD at 03/12/25 3755       Mounika Anderson APRN at 03/12/25 0714          [facility] Cardiology    Inpatient Progress Note      Chief Complaint/Reason for service:    CAD    Subjective     Subjective:       Neurologic event overnight.  SAH on intracranial imaging.  Alert and oriented today.  Denies chest pain, shortness of breath.     Past medical, surgical, social and family history reviewed in the patient's electronic medical record.    Objective     Objective:      Infusions:  niCARdipine, 5-15 mg/hr         Medications:    Current Facility-Administered Medications:     albuterol (PROVENTIL) nebulizer solution 0.083% 2.5 mg/3mL, 2.5 mg, Nebulization, 4x Daily PRN, Oly Larsen PA-RANJIT    [Held by provider] aspirin EC tablet 81 mg, 81 mg, Oral, Daily, Oly Larsen PA-C    atorvastatin (LIPITOR) tablet 40 mg, 40 mg, Oral, Daily, Oly Larsen PA-C    cinacalcet (SENSIPAR) tablet 30 mg, 30 mg, Oral, Daily, Oly Larsen PA-C    [Held by provider] clopidogrel (PLAVIX) tablet 75 mg, 75 mg, Oral, Daily, Oly Larsen PA-C, 75 mg at 03/11/25 8067    diazePAM (VALIUM) injection 5 mg, 5 mg, Intravenous, Once,  Ion Kolb MD    diazePAM (VALIUM) injection 5 mg, 5 mg, Intravenous, Q4H PRN, Ankur Arnold APRN    gabapentin (NEURONTIN) capsule 100 mg, 100 mg, Oral, Q8H, Ankur Arnold APRN, 100 mg at 03/12/25 0433    ipratropium-albuterol (DUO-NEB) nebulizer solution 3 mL, 3 mL, Nebulization, Q4H PRN, Oly Larsen PA-C    isosorbide mononitrate (IMDUR) 24 hr tablet 30 mg, 30 mg, Oral, Q24H, Mounika Anderson APRN, 30 mg at 03/11/25 1708    labetalol (NORMODYNE,TRANDATE) injection 10 mg, 10 mg, Intravenous, Once, Ion Kolb MD    labetalol (NORMODYNE,TRANDATE) injection, , , ,     levETIRAcetam (KEPPRA) injection 500 mg, 500 mg, Intravenous, Q12H, Sam, May, APRN    levothyroxine (SYNTHROID, LEVOTHROID) tablet 75 mcg, 75 mcg, Oral, Q AM, Oly Larsen PA-C, 75 mcg at 03/12/25 0433    metoclopramide (REGLAN) tablet 5 mg, 5 mg, Oral, Nightly, Oly Larsen PA-C    metoprolol tartrate (LOPRESSOR) tablet 25 mg, 25 mg, Oral, Q12H, Mounika Anderson APRN    midazolam (VERSED) injection 2 mg, 2 mg, Intravenous, Q30 Min PRN, Ankur Arnold APRN    niCARdipine (CARDENE) 25mg in 250mL NS infusion, 5-15 mg/hr, Intravenous, Titrated, Ankur Arnold APRN    nicotine (NICODERM CQ) 14 MG/24HR patch 1 patch, 1 patch, Transdermal, Q24H, Mounika Anderson APRN, 1 patch at 03/11/25 1751    nitroglycerin (NITROSTAT) SL tablet 0.4 mg, 0.4 mg, Sublingual, Q5 Min PRN, Ion Kolb MD    oxyCODONE (ROXICODONE) immediate release tablet 20 mg, 20 mg, Oral, Q4H PRN, Ankur Arnold APRN    pantoprazole (PROTONIX) EC tablet 40 mg, 40 mg, Oral, Daily, Oly Larsen PA-C    ranolazine (RANEXA) 12 hr tablet 500 mg, 500 mg, Oral, Q12H, Mounika Anderson APRN    sertraline (ZOLOFT) tablet 150 mg, 150 mg, Oral, Daily, Ankur Arnold APRN    sodium chloride 0.9 % flush 10 mL, 10 mL, Intravenous, Q12H, Oly Larsen PA-C    sodium chloride 0.9 % flush 10 mL, 10 mL, Intravenous, PRN, Chava,  EARL Aldridge    sodium chloride 0.9 % flush 10 mL, 10 mL, Intravenous, Q12H, Ion Kolb MD, 10 mL at 03/11/25 2258    sodium chloride 0.9 % flush 10 mL, 10 mL, Intravenous, PRN, Ion Kolb MD    sodium chloride 0.9 % infusion 40 mL, 40 mL, Intravenous, PRN, [place], EARL Aldridge    sodium chloride 0.9 % infusion 40 mL, 40 mL, Intravenous, PRN, Ion Kolb MD    Vital Sign Min/Max for last 24 hours  Temp  Min: 97.6 °F (36.4 °C)  Max: 98.2 °F (36.8 °C)   BP  Min: 118/55  Max: 196/94   Pulse  Min: 81  Max: 114   Resp  Min: 16  Max: 20   SpO2  Min: 86 %  Max: 100 %   No data recorded      Intake/Output Summary (Last 24 hours) at 3/12/2025 0741  Last data filed at 3/11/2025 1700  Gross per 24 hour   Intake 480 ml   Output --   Net 480 ml           CONSTITUTIONAL: No acute distress      Labs/studies:  Available lab and imaging results were reviewed by myself today    Results for orders placed during the hospital encounter of 11/05/24    Adult Transthoracic Echo Complete W/ Cont if Necessary Per Protocol    Interpretation Summary  Physician interpretation.  Good technical quality.    1.  LV size, function, and wall motion are normal.  Mild to moderate concentric left ventricular hypertrophy.  Visually estimated ejection fraction is 55 to 60%.  Grade 2 diastolic dysfunction.  Mild left atrial enlargement.  Borderline right atrial size.  RV size and function are grossly preserved.  No septal defect or intracavitary mass or thrombus.    2.  The mitral valve is markedly morphologically abnormal.  Both leaflets are thickened and there is a sclerotic and mobile projection of the anterior mitral leaflet into the LV outflow tract.  This does not have the typical appearance of an acute thrombus or vegetation.  There is no compromise of the LV outflow tract.  There is mild mitral stenosis with a mitral valve area of 1.9 cm² by pressure half-time.  There is trivial MR.  The aortic valve is tricuspid and sclerotic but  is minimally calcified.  The annulus is at the upper limits of normal size.  There is no mitral stenosis and there is mild to moderate MR.  Pressure half-time of AI is suggestive of moderate or greater disease.  Right-sided heart valves are unremarkable.    3.  There is no pericardial effusion.  The aortic root is mildly dilated at 3.94 cm with no dissection or aneurysm.    4.  Right heart pressures cannot be calculated.      Assessment     Assessment/Plan:         Subdural hematoma, nontraumatic    Essential hypertension    Type 2 diabetes mellitus, with long-term current use of insulin    Heart failure with preserved left ventricular function (HFpEF)    Current smoker    ESRD on hemodialysis    Coronary artery disease    Hypothyroidism (acquired)       ASSESSMENT:  CAD  [facility] 5/2022 revealing severe 1 vessel CAD with  of the mid RCA, unsuccessful angioplasty of the mid RCA  Status post staged PCI of distal RCA CTP with overlapping stents, 11/2022  [facility] 12/2024 revealing 50% distal left main stenosis, 70% proximal to mid LAD stenosis, CTP pf proximal RCA with collaterals.   Prohibitively high risk for surgery due to multiple co-morbidities.   Acute left subdural hematoma  CT showed mixed density left subdural hematoma with 4 mm midline shift.    Repeat CT stable   Tonic clonic seizure  Started on Keppra.   PAD  Status post right AKA   HFrEF  Most recent echo 11/2024 with LVEF 55-60%, Grade 2 diastolic dysfunction.   Hypertension   Hyperlipidemia    PLAN:  No plans for catheter based interventions for CAD at the current time.  Prohibitively high risk.   Recommend continued medical therapy for now.    DAPT on hold per neurosurgery.   Continue statin.   Blood pressure currently stable.  Beta blocker added yesterday.  Previously did not tolerate due to hypotension.  Continue to monitor blood pressure and heart rate.  Titrate antianginals as needed.    Patient to follow up with Dr. Oscar in 6-8 weeks after follow up with  neurosurgery and repeat CT.    Cardiology will see on an as needed basis.  Please feel free to reach out with any questions.     Electronically signed by VICKI Costello, 03/12/25, 12:20 PM EDT.          Electronically signed by Mounika Anderson APRN at 03/12/25 1325       Yamini Schwarz APRN at 03/12/25 0707       Attestation signed by Gus Lay MD at 03/12/25 0995    I have personally evaluated and examined the patient.  The above documentation has been reviewed and I agree.  Patient with seizure yesterday and subdural hematoma on imaging.  He is interactive with no focal deficits.  This reaffirms the patient is not a surgical candidate for CABG.  He will likely be managed medically.  Will ask Dr. Mills to assume care given he is not a surgical patient.                    [facility] Cardiothoracic Surgery In-Patient Progress Note     LOS: 1 day     Chief Complaint: Coronary artery disease    Subjective  Denies chest pain or dyspnea.  Transferred to ICU due to acute encephalopathy and witnessed seizure. Imaging showed an acute left subdural hematoma. Alert and oriented this morning.      Objective  Vital Signs  Temp:  [97.6 °F (36.4 °C)-98.2 °F (36.8 °C)] 97.9 °F (36.6 °C)  Heart Rate:  [] 89  Resp:  [16-20] 18  BP: (118-196)/() 142/69        Physical Exam:   General Appearance: alert, appears stated age and cooperative   Lungs: clear to auscultation, respirations regular, respirations even, and respirations unlabored   Heart: regular rhythm & normal rate, normal S1, S2, no murmur, no gallop, no rub, and no click     Results     Results from last 7 days   Lab Units 03/12/25  0439   WBC 10*3/mm3 5.09   HEMOGLOBIN g/dL 8.3*   HEMATOCRIT % 26.6*   PLATELETS 10*3/mm3 120*     Results from last 7 days   Lab Units 03/12/25  0439   SODIUM mmol/L 140   POTASSIUM mmol/L 4.0   CHLORIDE mmol/L 102   CO2 mmol/L 21.0*   BUN mg/dL 57*   CREATININE mg/dL 7.09*   GLUCOSE mg/dL 116*   CALCIUM mg/dL  7.3*     Imaging Results (Last 24 Hours)       Procedure Component Value Units Date/Time    CT Head Without Contrast [[id number]] Resulted: 03/12/25 0308     Updated: 03/12/25 0308    CT Angiogram Head w AI Analysis of LVO [[id number]] Collected: 03/11/25 2127     Updated: 03/11/25 2200    Narrative:      CT ANGIOGRAM HEAD W AI ANALYSIS OF LVO, CT ANGIOGRAM NECK    Date of Exam: 3/11/2025 8:46 PM EDT    Indication: ams.    Comparison: CT head 3/11/2025    Technique: CTA of the head and neck was performed after the uneventful intravenous administration of Isovue-370 . Reconstructed coronal and sagittal images were also obtained. In addition, a 3-D volume rendered image was created for interpretation.   Automated exposure control and iterative reconstruction methods were used.      Findings:  CTA neck: There is moderate calcified plaque of the aortic arch and large noncalcified plaque at the origins of the great vessels. Right brachiocephalic artery and right subclavian artery are patent without significant stenosis. The right common carotid   artery is also patent without significant stenosis. There is moderate calcified plaque throughout the right common carotid artery.    There is a large amount of calcified plaque at the right carotid bifurcation resulting in less than 50% stenosis of the right internal carotid artery. Right internal carotid artery is very tortuous but otherwise patent.    Left common carotid artery arises from a common trunk with the right brachiocephalic. There is mild calcified plaque of the left common carotid artery but no significant stenosis.    There is a large amount of calcified plaque of the left carotid bifurcation resulting in less than 50% stenosis of the left internal carotid artery and greater 50% stenosis of the left external carotid artery. The left internal carotid artery is very   tortuous but otherwise patent.    Left subclavian artery contains a large amount of calcified plaque  but is patent without evidence of focal stenosis.    The right vertebral artery is extremely hypoplastic and may be occluded proximally. There is mild calcified plaque of the bilateral vertebral arteries.    There is mediastinal adenopathy with a prevascular node measuring 3.6 x 1.6 cm in size. There also appears to be mild bilateral hilar adenopathy and borderline size right paratracheal nodes. There is emphysematous change of the visualized portions of the   lungs with coarsened interstitial markings and groundglass airspace disease which may be due to chronic lung disease or acute pneumonia. Clinical correlation recommended with patient's symptoms.    There is soft tissue thickening in the prevertebral soft tissues extending from the C2-C4 levels. This may be more centered around the right [place] tonsil an underlying tonsillar mass could potentially give this appearance. Lymphoma would also be   within the differential. There are no discrete pathologically enlarged cervical lymph nodes.    There is kyphosis of the cervical spine centered at the C5 level with anterior wedging of the C5 and C6 vertebral bodies and loss of the C5/6 disc space. There is marked bony sclerosis of all bony structures which may be related to renal osteodystrophy.   Diffuse sclerotic metastatic disease is felt to be less likely.        CTA HEAD: There is moderate stenosis of the bilateral intracranial cavernous internal carotid arteries    Bilateral anterior cerebral and middle cerebral arteries appear patent without evidence of focal stenosis. No active extra-axial contrast extravasation seen to account for the mixed density left hemispheric subdural hematoma. No anterior circulation   aneurysm identified.    The vertebral arteries are patent at the skull base. Basilar artery is patent without focal stenosis. Bilateral posterior cerebral arteries appear patent without focal stenosis. No posterior circulation aneurysm.    No pathologic  intracranial contrast enhancement. Dural venous sinuses appear grossly patent.        Impression:      Impression:    1. No change in mixed density acute on chronic left hemispheric subdural hematoma.  2. No intracranial vascular stenosis, occlusion, or aneurysm. No active contrast extravasation. Number dural venous sinuses.  4. Prevertebral soft tissue thickening with prominence in the right [place] tonsillar region. Underlying tonsillar mass cannot be excluded. No pathologically enlarged cervical lymph nodes. There are however multiple enlarged upper mediastinal lymph   nodes which could be metastatic or reactive in etiology.  5. Diffuse bony sclerosis which may be due to renal osteodystrophy or less likely diffuse sclerotic bony metastatic disease.        Electronically Signed: Pranav Adams MD    3/11/2025 9:57 PM EDT    Workstation ID: [id number]    CT Angiogram Neck [[id number]] Collected: 03/11/25 2127     Updated: 03/11/25 2200    Narrative:      CT ANGIOGRAM HEAD W AI ANALYSIS OF LVO, CT ANGIOGRAM NECK    Date of Exam: 3/11/2025 8:46 PM EDT    Indication: ams.    Comparison: CT head 3/11/2025    Technique: CTA of the head and neck was performed after the uneventful intravenous administration of Isovue-370 . Reconstructed coronal and sagittal images were also obtained. In addition, a 3-D volume rendered image was created for interpretation.   Automated exposure control and iterative reconstruction methods were used.      Findings:  CTA neck: There is moderate calcified plaque of the aortic arch and large noncalcified plaque at the origins of the great vessels. Right brachiocephalic artery and right subclavian artery are patent without significant stenosis. The right common carotid   artery is also patent without significant stenosis. There is moderate calcified plaque throughout the right common carotid artery.    There is a large amount of calcified plaque at the right carotid bifurcation resulting in less  than 50% stenosis of the right internal carotid artery. Right internal carotid artery is very tortuous but otherwise patent.    Left common carotid artery arises from a common trunk with the right brachiocephalic. There is mild calcified plaque of the left common carotid artery but no significant stenosis.    There is a large amount of calcified plaque of the left carotid bifurcation resulting in less than 50% stenosis of the left internal carotid artery and greater 50% stenosis of the left external carotid artery. The left internal carotid artery is very   tortuous but otherwise patent.    Left subclavian artery contains a large amount of calcified plaque but is patent without evidence of focal stenosis.    The right vertebral artery is extremely hypoplastic and may be occluded proximally. There is mild calcified plaque of the bilateral vertebral arteries.    There is mediastinal adenopathy with a prevascular node measuring 3.6 x 1.6 cm in size. There also appears to be mild bilateral hilar adenopathy and borderline size right paratracheal nodes. There is emphysematous change of the visualized portions of the   lungs with coarsened interstitial markings and groundglass airspace disease which may be due to chronic lung disease or acute pneumonia. Clinical correlation recommended with patient's symptoms.    There is soft tissue thickening in the prevertebral soft tissues extending from the C2-C4 levels. This may be more centered around the right [place] tonsil an underlying tonsillar mass could potentially give this appearance. Lymphoma would also be   within the differential. There are no discrete pathologically enlarged cervical lymph nodes.    There is kyphosis of the cervical spine centered at the C5 level with anterior wedging of the C5 and C6 vertebral bodies and loss of the C5/6 disc space. There is marked bony sclerosis of all bony structures which may be related to renal osteodystrophy.   Diffuse sclerotic  metastatic disease is felt to be less likely.        CTA HEAD: There is moderate stenosis of the bilateral intracranial cavernous internal carotid arteries    Bilateral anterior cerebral and middle cerebral arteries appear patent without evidence of focal stenosis. No active extra-axial contrast extravasation seen to account for the mixed density left hemispheric subdural hematoma. No anterior circulation   aneurysm identified.    The vertebral arteries are patent at the skull base. Basilar artery is patent without focal stenosis. Bilateral posterior cerebral arteries appear patent without focal stenosis. No posterior circulation aneurysm.    No pathologic intracranial contrast enhancement. Dural venous sinuses appear grossly patent.        Impression:      Impression:    1. No change in mixed density acute on chronic left hemispheric subdural hematoma.  2. No intracranial vascular stenosis, occlusion, or aneurysm. No active contrast extravasation. Number dural venous sinuses.  4. Prevertebral soft tissue thickening with prominence in the right [place] tonsillar region. Underlying tonsillar mass cannot be excluded. No pathologically enlarged cervical lymph nodes. There are however multiple enlarged upper mediastinal lymph   nodes which could be metastatic or reactive in etiology.  5. Diffuse bony sclerosis which may be due to renal osteodystrophy or less likely diffuse sclerotic bony metastatic disease.        Electronically Signed: Pranav Adams MD    3/11/2025 9:57 PM EDT    Workstation ID: [id number]    XR Chest 1 View [[id number]] Collected: 03/11/25 2103     Updated: 03/11/25 2107    Narrative:      XR CHEST 1 VW    Date of Exam: 3/11/2025 8:22 PM EDT    Indication: ams    Comparison: March 11, 2025    Findings:  Pedicle screws bridged by rods are seen overlying the thoracic spine. There are interstitial changes involving the lungs with interval change that may reflect edema. There are no large pleural  effusions.      Impression:      Impression:  Interstitial changes involving the lungs with interval change that may reflect edema.        Electronically Signed: Trav Rasmussen MD    3/11/2025 9:04 PM EDT    Workstation ID: [id number]    CT Head Without Contrast [[id number]] Collected: 03/11/25 2036     Updated: 03/11/25 2048    Narrative:      CT HEAD WO CONTRAST    Date of Exam: 3/11/2025 7:55 PM EDT    Indication: ams.    Comparison: None available.    Technique: Axial CT images were obtained of the head without contrast administration.  Automated exposure control and iterative construction methods were used.      Findings:  There is mild generalized parenchymal volume loss. There is a thin mixed density left hemispheric subdural hematoma this measures 7 mm in thickness on coronal image 35 there is 4 mm of midline shift from left to right. No evidence of right-sided subdural   hematoma. No subarachnoid hemorrhage. No intraventricular hemorrhage. No acute infarct.    Globes and orbits are within normal limits.    Paranasal sinuses and mastoid air cells are clear. No acute skull fracture.      Impression:      Impression:    1. Left hemispheric mixed density subdural hematoma with acute and chronic blood products. This measures 7 mm maximally over the left frontal lobe. There is 4 mm of midline shift. No hydrocephalus.      Findings personally called to patient's nurse Radha at 8:44 p.m. on 3/11/2025      Electronically Signed: Pranav Adams MD    3/11/2025 8:45 PM EDT    Workstation ID: [id number]    XR Chest 1 View [[id number]] Collected: 03/11/25 1602     Updated: 03/11/25 1609    Narrative:      XR CHEST 1 VW    Date of Exam: 3/11/2025 1:50 PM EDT    Indication: preop, coronary artery disease    Comparison: [facility] chest x-ray 3/1/2023, 2/1/2023    Findings:  Lungs are adequately expanded and appear clear. No pneumothorax or large pleural effusion is seen. Cardiac silhouette remains mildly enlarged. There are old right rib  "fractures. Postoperative changes of the thoracic spine are new.      Impression:      Impression:  No acute cardiopulmonary abnormality is identified.        Electronically Signed: Princess Reveles    3/11/2025 4:06 PM EDT    Workstation ID: [id number]            Assessment    Subdural hematoma, nontraumatic    Essential hypertension    Type 2 diabetes mellitus, with long-term current use of insulin    Heart failure with preserved left ventricular function (HFpEF)    Current smoker    ESRD on hemodialysis    Coronary artery disease    Hypothyroidism (acquired)      Plan   No plans for bypass surgery. Cardiology evaluating for PCI. Neurosurgery consulted and recommends holding anticoagulation for 2 to 3 weeks prior to any type of intervention for his CAD.   Neurology consult for witnessed seizure      Yamini Schwarz, VICKI  03/12/25  07:07 EDT    Electronically signed by Gus Lay MD at 03/12/25 0913       Gerry Nolasco MD at 03/11/25 2051            Intensive Care Admission Note     Coronary artery disease    History of Present Illness     Chevy Junior is a 59 y.o. male with past medical history of coronary artery disease, ischemic cardiomyopathy EF 50%, chronic combined systolic and diastolic CHF, peripheral vascular disease  s/p  Rt AKA and left fem-pop bypass, Chronic opiate use, ESRD on HD MWF RIC, COPD, hypertension, hyperlipidemia, hypothyroidism, ? Medication compliance, admitted on 3/11 by CTS for evaluation of potential CABG as he has ongoing anginal symptoms.  The patient was evaluated by Dr. Lay and was felt to be poor candidate for undergoing CABG. The recommendation is for either high risk PCI or medical management.      On 3/11/2025, the patient developed acute onset of encephalopathy and a STAT head CT was obtained that showed \"left hemispheric mixed density subdural hematoma with acute and chronic blood products\" measuring 7 mm maximally over the left frontal lobe, 4 mm midline " shift.While awaiting his CT the patient had a witnessed GTC seizure lasting < 1 minute.  He was treated w/ valium 5 mg & keppra 1000 mg and EEG has been ordered STAT.    Patient was transferred to ICU for tight BP management and close monitoring of neuro status.  Neurology and neurosurgery teams consulted.    On my evaluation patient is a bit sleepy but wakes up and answers questions.  A bit confused.  Able to answer a few questions.  Acknowledged that he is in the hospital.  Noted that he undergoes dialysis 3 times a week through his left forearm fistula.  Currently denies having any headache.  Positive for nausea.  No chest pain or shortness of breath or any abdominal discomfort.  Able to lift left upper and lower extremities against gravity.    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     Cellulitis and abscess of buttock 2023    CHF (congestive heart failure)     Chronic pain     COPD (chronic obstructive pulmonary disease)     INHALER    Coronary artery disease involving native coronary artery of native heart with angina pectoris 09/12/2022    stents x 3    Diabetes mellitus     ESRD on dialysis     M,W,F-FISTULA LEFT ARM    GERD (gastroesophageal reflux disease)     Heart failure with preserved left ventricular function (HFpEF) 09/13/2022    FOLLOWS W/REECE-NO CP    History of blood transfusion     NO ADVERSE REACTIONS    Hyperlipidemia     Hypertension     WIFE STATED B/P HAS BEEN RUNNING LOW LATELY    Hypothyroidism (acquired) 03/11/2025    Infectious viral hepatitis 5 years ago was treated    hx hep c    RIC (obstructive sleep apnea)     Osteoarthritis     Phosphorus metabolic disorder     Sleep apnea     NO MACHINE    Substance abuse 20 years ago from [facility]      Past Surgical History:   Procedure Laterality Date    ABOVE KNEE LEG AMPUTATION Right      CARDIAC CATHETERIZATION      CARDIAC CATHETERIZATION N/A 11/30/2022    Procedure: Percutaneous Coronary Intervention;  Surgeon: River Acevedo IV, MD;  Location:  [facility];  Service: Cardiovascular;  Laterality: N/A;    CARDIAC CATHETERIZATION N/A 11/30/2022    Procedure: Optical Coherent Tomography;  Surgeon: River Acevedo IV, MD;  Location:  [facility];  Service: Cardiovascular;  Laterality: N/A;    CARDIAC CATHETERIZATION N/A 11/30/2022    Procedure: Stent JOSE coronary;  Surgeon: River Acevedo IV, MD;  Location:  [facility] CATH INVASIVE LOCATION;  Service: Cardiovascular;  Laterality: N/A;    CARDIAC CATHETERIZATION Left 11/09/2023    Procedure: Aortogram with left leg angiogram, possible angioplasty or stenting;  Surgeon: Luis Fernando Rawls MD;  Location: [facility] CATH INVASIVE LOCATION;  Service: Vascular;  Laterality: Left;    CARDIAC CATHETERIZATION Right 04/25/2024    Procedure: Right lower extremity angiogram, possible angioplasty or stenting;  Surgeon: Luis Fernando Rawls MD;  Location: [facility] CATH INVASIVE LOCATION;  Service: Vascular;  Laterality: Right;    CARDIAC CATHETERIZATION N/A 12/05/2024    Procedure: Left Heart Cath;  Surgeon: Cole Oscar MD;  Location:  [facility] CATH INVASIVE LOCATION;  Service: Cardiovascular;  Laterality: N/A;    CATH LAB PROCEDURE      unable to stent    COLONOSCOPY N/A 02/15/2023    Procedure: COLONOSCOPY WITH POLYPECTOMY;  Surgeon: David Glass MD;  Location: [facility] ENDOSCOPY;  Service: Gastroenterology;  Laterality: N/A;  COLON POLYP, MELANOSIS COLI    ENDOSCOPY N/A 02/03/2023    Procedure: ESOPHAGOGASTRODUODENOSCOPY with biopsy;  Surgeon: Gus Whaley MD;  Location: [facility] ENDOSCOPY;  Service: General;  Laterality: N/A;  gastritis,     FEMORAL POPLITEAL BYPASS Left 12/12/2023    Procedure: Left femoral edarterectomy, left femoral to below the knee popliteal bypass graft;  Surgeon: Luis Fernando Rawls,  "MD;  Location: [facility] MAIN OR;  Service: Vascular;  Laterality: Left;    HIP SURGERY Right     INSERTION PERITONEAL DIALYSIS CATHETER      LEG SURGERY Left     \"jocelyne in place\"    MANDIBLE FRACTURE SURGERY      REPLACEMENT TOTAL KNEE Right     x3    SHOULDER SURGERY Right     SPINE SURGERY      unknown procudure- completed at [facility] in [facility].       No Known Allergies  No current facility-administered medications on file prior to encounter.     Current Outpatient Medications on File Prior to Encounter   Medication Sig    albuterol sulfate  (90 Base) MCG/ACT inhaler Inhale 1 puff Every 4 (Four) Hours As Needed for Wheezing or Shortness of Air.    aspirin (aspirin) 81 MG EC tablet Take 1 tablet by mouth Daily.    atorvastatin (LIPITOR) 40 MG tablet Take 1 tablet by mouth Daily.    Auryxia 1  MG(Fe) tablet Take 1 tablet by mouth Daily With Lunch.    cinacalcet (SENSIPAR) 30 MG tablet Take 1 tablet by mouth Daily.    clopidogrel (PLAVIX) 75 MG tablet Take 1 tablet by mouth Daily. (Patient taking differently: Take 1 tablet by mouth Daily. Last dose 3/6/25. Holding prior to CABG)    Diclofenac Sodium (VOLTAREN) 1 % gel gel Apply 4 g topically to the appropriate area as directed As Needed. prn    gabapentin (NEURONTIN) 100 MG capsule Take 1 capsule by mouth Daily.    Heparin Sodium, Porcine, (heparin, porcine,) 1000 UNIT/ML injection Infuse 1 mL into a venous catheter. M-W-F with dialysis    Insulin Glargine (BASAGLAR KWIKPEN) 100 UNIT/ML injection pen Inject 5-15 Units under the skin into the appropriate area as directed 3 (Three) Times a Day As Needed. Per SS  5-15 UNITS    lactulose (CHRONULAC) 10 GM/15ML solution Take 30 mL by mouth 2 (Two) Times a Day As Needed.    levothyroxine (SYNTHROID, LEVOTHROID) 75 MCG tablet Take 1 tablet by mouth Daily.    lidocaine-prilocaine (EMLA) 2.5-2.5 % cream Apply 1 Application topically to the appropriate area as directed As Needed for Injection Site Pain. Apply before " dialysis on Monday, Wednesday and Fridays of each week    metoclopramide (REGLAN) 5 MG tablet Take 1 tablet by mouth Every Night.    midodrine (PROAMATINE) 5 MG tablet Take 1 tablet by mouth 3 (Three) Times a Day Before Meals. Prn on M/W/F, dialysis days    naloxone (NARCAN) 4 MG/0.1ML nasal spray Administer 1 spray into the nostril(s) as directed by provider Take As Directed.    nitroglycerin (NITROSTAT) 0.4 MG SL tablet Place 1 tablet under the tongue Every 5 (Five) Minutes As Needed for Chest Pain.    ondansetron ODT (ZOFRAN-ODT) 4 MG disintegrating tablet Place 1 tablet on the tongue Every 6 (Six) Hours As Needed for Nausea or Vomiting.    oxyCODONE (ROXICODONE) 30 MG immediate release tablet Take 1.5 tablets by mouth Every 4 (Four) Hours As Needed for Moderate Pain or Severe Pain.    pantoprazole (PROTONIX) 40 MG EC tablet Take 1 tablet by mouth Daily.    ranolazine (Ranexa) 500 MG 12 hr tablet Take 1 tablet by mouth once daily on days that you are NOT undergoing dialysis    sertraline (ZOLOFT) 100 MG tablet Take 1.5 tablets by mouth Daily.    sodium zirconium cyclosilicate (Lokelma) 10 g packet Take 10 g by mouth 1 (One) Time. Empty entire contents of the packet(s) into a glass with at least 3 tablespoons (45 mL) of water. Stir well and drink immediately; if powder remains in the glass, add water, stir and drink immediately; repeat until no powder remains. Administer other oral medications at least 2 hours before or 2 hours after dose.    Xphozah 30 MG tablet Take 30 mg by mouth Daily As Needed.     MEDICATION LIST AND ALLERGIES REVIEWED.    Family History   Problem Relation Age of Onset    Heart attack Mother     Coronary artery disease Mother     Hypertension Mother     Arthritis Mother     Hypertension Father     Diabetes Father      Social History     Tobacco Use    Smoking status: Every Day     Current packs/day: 2.00     Average packs/day: 2.0 packs/day for 40.0 years (80.0 ttl pk-yrs)     Types:  "Cigarettes    Smokeless tobacco: Never    Tobacco comments:     1.5 ppd as of 2/5/25   Vaping Use    Vaping status: Never Used   Substance Use Topics    Alcohol use: Not Currently     Comment: None    Drug use: Not Currently     Types: Hydrocodone     Comment: pain pills from previous injuries     Social History     Social History Narrative    Chronically debilitated.  Poor mobility at baseline.  Wheelchair-bound generally and cannot stand/pivot on left leg.  Lives with his ex-wife who is his POA and caregiver.  No home oxygen use.  No home CPAP use.     FAMILY AND SOCIAL HISTORY REVIEWED.      Physical Exam and Clinical Information   /64 (BP Location: Right arm, Patient Position: Lying)   Pulse 96   Temp 97.9 °F (36.6 °C) (Oral)   Resp 18   Ht 170.2 cm (67\")   Wt 77.6 kg (171 lb)   SpO2 92%   BMI 26.78 kg/m²     Physical exam  General : Aox2. In no acute distress. Well developed no acute distress.  Neuro: CN 2-12 grossly intact, left upper and lower extremity power 4/5.  Right upper extremity 5/5.  Unable to test right lower extremity given AKA status.  Able to move the stump.  HEENT : AT,NC,PERRLA,No pallor, No Icterus,No thyromegaly. No JVD.  CVS : RRR, No M/R/G.   Resp/Chest: B/l ant VBS+. No wheeze/rales  Abd: Soft, Non distended, No tenderness, No organomegaly. Bowel sounds +  EXT: Left upper extremity AV fistula present.  Right AKA noted.  Left lower extremity pitting edema present.  SKIN : Left lower extremity chronic stasis dermatitis changes noted.        Results from last 7 days   Lab Units 03/11/25 1948   SODIUM mmol/L 137   POTASSIUM mmol/L 4.3   CO2 mmol/L 24.0   BUN mg/dL 60*   CREATININE mg/dL 8.03*   GLUCOSE mg/dL 106*     Estimated Creatinine Clearance: 10.7 mL/min (A) (by C-G formula based on SCr of 8.03 mg/dL (H)).  Results from last 7 days   Lab Units 03/11/25 1948   HEMOGLOBIN A1C % 6.50*     Results from last 7 days   Lab Units 03/11/25 1947   PH, ARTERIAL pH units 7.400 "   PCO2, ARTERIAL mm Hg 43.7   PO2 ART mm Hg 85.6     Lab Results   Component Value Date    LACTATE 1.4 03/11/2025        Images:     I reviewed the patient's results and images.     Impression     Acute encephalopathy  New onset seizure  Acute to subacute left frontal subdural hematoma  Coronary artery disease  Chronic combined systolic and diastolic congestive heart failure without exacerbation  Ischemic cardiomyopathy  Peripheral vascular disease S/p Ryt Fem-pop Bypass and Rt AKA  Essential hypertension  Mixed hyperlipidemia  History of COPD-currently not in exacerbation  RIC on CPAP ?  Compliance  ESRD on hemodialysis MWF  GERD  Acquired hypothyroidism  Type 2 diabetes mellitus  Chronic pain syndrome on high-dose opiate use  Incidentally noted paravertebral soft tissue thickening    Plan/Recommendations     Neuro  -Acute encephalopathy likely secondary to seizures and postictal phase.  Had witnessed clonic tonic activity predominantly on his right upper and lower extremity consistent with focus in the left cortex likely corresponding to the area of hemorrhage.  Received 5 mg Valium 1000 mg Keppra.  Started Keppra 500 mg twice daily.  Continue gabapentin 100 mg every 8 hours.  As needed benzodiazepines for refractory seizures.  EEG ordered.  Neurology and neurosurgery consulted.  Maintain seizure precautions.  Neurochecks per protocol.    -Repeat CT head ordered at 6 hours and 24 hours.  MRI brain ordered.  CTA head with no evidence of any vascular abnormalities.    -Continue home antihypertensive medications.  Hold if patient is unable to swallow.  As needed Cardene ordered to maintain systolic blood pressure less than 150 mm hg.    -Hold antiplatelets.  Hold DVT prophylaxis.  Check APTT and INR.  Type and screen.    CVS  -Patient with history of multiple LHC with active coronary artery disease and untreated stenosis.  Deemed not a candidate for CABG.  Cardiology and CT surgery were on board.  Unfortunately at  this time need to hold aspirin and Plavix given his head bleed.  Monitor for worsening chest pain symptoms.  Repeat EKG as needed.  -Continue Lipitor.  -Continue metoprolol, ranolazine and Imdur.  -Right AKA stump with no complications.    Pulmonary  -On supplemental oxygen to maintain SpO2 greater than 94.  -As needed CPAP    Renal  -ESRD on hemodialysis.HD per nephrology team.    GI/Liver  Continue PPI    ID  Clinically no signs of infection    Hem-onc  Monitor platelet count and PT and INR.  With signs of any worsening bleeding might need to consider platelet transfusion if less than 100.    Endocrine  Continue levothyroxine.  SSI    Misc  Prevertebral soft tissue thickening with prominence in the right [place] tonsillar region. Underlying tonsillar mass cannot be excluded. No pathologically enlarged cervical lymph nodes. There are however multiple enlarged upper mediastinal lymph   nodes which could be metastatic or reactive in etiology.       Time spent Critical care 50 min (exclusive of procedure time)  including high complexity decision making to assess, manipulate, and support vital organ system failure in this individual who has impairment of one or more vital organ systems such that there is a high probability of imminent or life threatening deterioration in the patient’s condition.      Gerry Nolasco MD   Critical Care Medicine  03/11/25 20:52 EDT         9 minutes of critical care provided by VICKI Cardoso in addendum accordance with split/shared billing. This time excludes other billable procedures. Time does include preparation of documents, medical consultations, review of old records, and direct bedside care. Patient is at high risk for life-threatening deterioration due to SDH.   Electronically signed by VICKI Zamora, 03/11/25, 8:52 PM EDT.           Electronically signed by Gerry Nolasco MD at 03/11/25 7141          Consult Notes (last 48 hours)        Dylan,  Kurt Desouza MD at 03/12/25 0917        Consult Orders    1. 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 Patient underwent cardiac catheterization on 12/24 which revealed in-stent restenosis of RCA, 70% proximal to mid LAD stenosis and 50% distal left main disease. While in the hospital, he had seizure activity and transferred to ICU. CT scan head showed small subdural hematoma. Last HD was on Monday. He has been cutting short his dialysis time. Nephrology service has been consulted for ESRD management.     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     Cellulitis and abscess of buttock 2023    CHF (congestive heart failure)     Chronic pain     COPD (chronic obstructive pulmonary disease)     INHALER    Coronary artery disease involving native coronary artery of native heart with angina pectoris 09/12/2022    stents x 3    Diabetes mellitus     ESRD on dialysis     M,W,F-FISTULA LEFT ARM    GERD (gastroesophageal reflux disease)     Heart failure with preserved left ventricular function (HFpEF) 09/13/2022    FOLLOWS W/REECE-NO CP    History of blood transfusion     NO ADVERSE REACTIONS    Hyperlipidemia     Hypertension     WIFE STATED B/P HAS BEEN RUNNING LOW LATELY    Hypothyroidism (acquired) 03/11/2025    Infectious viral hepatitis 5 years ago was treated    hx  hep c    RIC (obstructive sleep apnea)     Osteoarthritis     Phosphorus metabolic disorder     Sleep apnea     NO MACHINE    Substance abuse 20 years ago from [facility]   ,   Past Surgical History:   Procedure Laterality Date    ABOVE KNEE LEG AMPUTATION Right     CARDIAC CATHETERIZATION      CARDIAC CATHETERIZATION N/A 11/30/2022    Procedure: Percutaneous Coronary Intervention;  Surgeon: River Acevedo IV, MD;  Location: [facility] CATH INVASIVE LOCATION;  Service: Cardiovascular;  Laterality: N/A;    CARDIAC CATHETERIZATION N/A 11/30/2022    Procedure: Optical Coherent Tomography;  Surgeon: River Acevedo IV, MD;  Location: [facility] CATH INVASIVE LOCATION;  Service: Cardiovascular;  Laterality: N/A;    CARDIAC CATHETERIZATION N/A 11/30/2022    Procedure: Stent JOSE coronary;  Surgeon: River Acevedo IV, MD;  Location: [facility] CATH INVASIVE LOCATION;  Service: Cardiovascular;  Laterality: N/A;    CARDIAC CATHETERIZATION Left 11/09/2023    Procedure: Aortogram with left leg angiogram, possible angioplasty or stenting;  Surgeon: Luis Fernando Rawls MD;  Location: [facility] CATH INVASIVE LOCATION;  Service: Vascular;  Laterality: Left;    CARDIAC CATHETERIZATION Right 04/25/2024    Procedure: Right lower extremity angiogram, possible angioplasty or stenting;  Surgeon: Luis Fernando Rawls MD;  Location: [facility] CATH INVASIVE LOCATION;  Service: Vascular;  Laterality: Right;    CARDIAC CATHETERIZATION N/A 12/05/2024    Procedure: Left Heart Cath;  Surgeon: Cole Oscar MD;  Location: [facility] CATH INVASIVE LOCATION;  Service: Cardiovascular;  Laterality: N/A;    CATH LAB PROCEDURE      unable to stent    COLONOSCOPY N/A 02/15/2023    Procedure: COLONOSCOPY WITH POLYPECTOMY;  Surgeon: David Glass MD;  Location: [facility] ENDOSCOPY;  Service: Gastroenterology;  Laterality: N/A;  COLON POLYP, MELANOSIS COLI    ENDOSCOPY N/A 02/03/2023    Procedure: ESOPHAGOGASTRODUODENOSCOPY with biopsy;  Surgeon:  "Gus Whaley MD;  Location: [facility] ENDOSCOPY;  Service: General;  Laterality: N/A;  gastritis,     FEMORAL POPLITEAL BYPASS Left 12/12/2023    Procedure: Left femoral edarterectomy, left femoral to below the knee popliteal bypass graft;  Surgeon: Luis Fernando Rawls MD;  Location: [facility] MAIN OR;  Service: Vascular;  Laterality: Left;    HIP SURGERY Right     INSERTION PERITONEAL DIALYSIS CATHETER      LEG SURGERY Left     \"jocelyne in place\"    MANDIBLE FRACTURE SURGERY      REPLACEMENT TOTAL KNEE Right     x3    SHOULDER SURGERY Right     SPINE SURGERY      unknown procudure- completed at [facility] in [place].   ,   Family History   Problem Relation Age of Onset    Heart attack Mother     Coronary artery disease Mother     Hypertension Mother     Arthritis Mother     Hypertension Father     Diabetes Father    ,   Social History     Socioeconomic History    Marital status:    Tobacco Use    Smoking status: Every Day     Current packs/day: 2.00     Average packs/day: 2.0 packs/day for 40.0 years (80.0 ttl pk-yrs)     Types: Cigarettes    Smokeless tobacco: Never    Tobacco comments:     1.5 ppd as of 2/5/25   Vaping Use    Vaping status: Never Used   Substance and Sexual Activity    Alcohol use: Not Currently     Comment: None    Drug use: Not Currently     Types: Hydrocodone     Comment: pain pills from previous injuries    Sexual activity: Defer     E-cigarette/Vaping    E-cigarette/Vaping Use Never User     Passive Exposure No     Counseling Given No      E-cigarette/Vaping Substances    Nicotine No     THC No     CBD No     Flavoring No      E-cigarette/Vaping Devices    Disposable No     Pre-filled or Refillable Cartridge No     Refillable Tank No     Pre-filled Pod No          ,   Medications Prior to Admission   Medication Sig Dispense Refill Last Dose/Taking    albuterol sulfate  (90 Base) MCG/ACT inhaler Inhale 1 puff Every 4 (Four) Hours As Needed for Wheezing or Shortness of Air.       aspirin " (aspirin) 81 MG EC tablet Take 1 tablet by mouth Daily. 30 tablet 8     atorvastatin (LIPITOR) 40 MG tablet Take 1 tablet by mouth Daily. 90 tablet 3     Auryxia 1  MG(Fe) tablet Take 1 tablet by mouth Daily With Lunch.       cinacalcet (SENSIPAR) 30 MG tablet Take 1 tablet by mouth Daily.       clopidogrel (PLAVIX) 75 MG tablet Take 1 tablet by mouth Daily. (Patient taking differently: Take 1 tablet by mouth Daily. Last dose 3/6/25. Holding prior to CABG) 90 tablet 3     Diclofenac Sodium (VOLTAREN) 1 % gel gel Apply 4 g topically to the appropriate area as directed As Needed. prn       gabapentin (NEURONTIN) 100 MG capsule Take 1 capsule by mouth Daily.       Heparin Sodium, Porcine, (heparin, porcine,) 1000 UNIT/ML injection Infuse 1 mL into a venous catheter. M-W-F with dialysis       Insulin Glargine (BASAGLAR KWIKPEN) 100 UNIT/ML injection pen Inject 5-15 Units under the skin into the appropriate area as directed 3 (Three) Times a Day As Needed. Per SS  5-15 UNITS       lactulose (CHRONULAC) 10 GM/15ML solution Take 30 mL by mouth 2 (Two) Times a Day As Needed.       levothyroxine (SYNTHROID, LEVOTHROID) 75 MCG tablet Take 1 tablet by mouth Daily.       lidocaine-prilocaine (EMLA) 2.5-2.5 % cream Apply 1 Application topically to the appropriate area as directed As Needed for Injection Site Pain. Apply before dialysis on Monday, Wednesday and Fridays of each week       metoclopramide (REGLAN) 5 MG tablet Take 1 tablet by mouth Every Night.       midodrine (PROAMATINE) 5 MG tablet Take 1 tablet by mouth 3 (Three) Times a Day Before Meals. Prn on M/W/F, dialysis days       naloxone (NARCAN) 4 MG/0.1ML nasal spray Administer 1 spray into the nostril(s) as directed by provider Take As Directed.       nitroglycerin (NITROSTAT) 0.4 MG SL tablet Place 1 tablet under the tongue Every 5 (Five) Minutes As Needed for Chest Pain. 30 tablet 3     ondansetron ODT (ZOFRAN-ODT) 4 MG disintegrating tablet Place 1 tablet  on the tongue Every 6 (Six) Hours As Needed for Nausea or Vomiting.       oxyCODONE (ROXICODONE) 30 MG immediate release tablet Take 1.5 tablets by mouth Every 4 (Four) Hours As Needed for Moderate Pain or Severe Pain.       pantoprazole (PROTONIX) 40 MG EC tablet Take 1 tablet by mouth Daily.       ranolazine (Ranexa) 500 MG 12 hr tablet Take 1 tablet by mouth once daily on days that you are NOT undergoing dialysis 30 tablet 5     sertraline (ZOLOFT) 100 MG tablet Take 1.5 tablets by mouth Daily.       sodium zirconium cyclosilicate (Lokelma) 10 g packet Take 10 g by mouth 1 (One) Time. Empty entire contents of the packet(s) into a glass with at least 3 tablespoons (45 mL) of water. Stir well and drink immediately; if powder remains in the glass, add water, stir and drink immediately; repeat until no powder remains. Administer other oral medications at least 2 hours before or 2 hours after dose.       Xphozah 30 MG tablet Take 30 mg by mouth Daily As Needed.      , Scheduled Meds:  [Held by provider] aspirin, 81 mg, Oral, Daily  atorvastatin, 40 mg, Oral, Daily  cinacalcet, 30 mg, Oral, Daily  [Held by provider] clopidogrel, 75 mg, Oral, Daily  diazePAM, 5 mg, Intravenous, Once  gabapentin, 100 mg, Oral, Q8H  isosorbide mononitrate, 30 mg, Oral, Q24H  labetalol, 10 mg, Intravenous, Once  levETIRAcetam, 500 mg, Intravenous, Q12H  levothyroxine, 75 mcg, Oral, Q AM  metoclopramide, 5 mg, Oral, Nightly  metoprolol tartrate, 25 mg, Oral, Q12H  nicotine, 1 patch, Transdermal, Q24H  pantoprazole, 40 mg, Oral, Daily  ranolazine, 500 mg, Oral, Q12H  sertraline, 150 mg, Oral, Daily  sodium chloride, 10 mL, Intravenous, Q12H  sodium chloride, 10 mL, Intravenous, Q12H   , Continuous Infusions:  niCARdipine, 5-15 mg/hr   , PRN Meds:    albuterol    diazePAM    ipratropium-albuterol    midazolam    nitroglycerin    oxyCODONE    sodium chloride    sodium chloride    sodium chloride    sodium chloride, and Allergies:  Patient has  "no known allergies.    Review of Systems  Pertinent items are noted in HPI    Objective     Vital Signs  Temp:  [97.6 °F (36.4 °C)-98.2 °F (36.8 °C)] 97.9 °F (36.6 °C)  Heart Rate:  [] 89  Resp:  [16-20] 18  BP: (118-196)/() 142/69    No intake/output data recorded.  I/O last 3 completed shifts:  In: 480 [P.O.:480]  Out: -     Physical Exam:  General Appearance:    Alert, cooperative, in no acute distress   Head:    Normocephalic, without obvious abnormality, atraumatic   Eyes:            Conjunctivae and sclerae normal, no   icterus, no pallor, corneas clear, PERRLA           Neck:   Supple, trachea midline, no thyromegaly,  no JVD       Lungs:     Clear to auscultation,respirations regular, even and               unlabored    Heart:    Regular rhythm and normal rate, normal S1 and S2, no       murmur, no gallop, no rub, no click       Abdomen:     Normal bowel sounds,soft, non-tender, non-distended, no guarding, no rebound tenderness       Extremities:   Moves all extremities well, ++edema, no cyanosis, no         redness   Pulses:   Pulses palpable and equal bilaterally           Neurologic:   Cranial nerves 2 - 12 grossly intact, no focal deficit         Results Review:   I reviewed the patient's new clinical results.    WBC WBC   Date Value Ref Range Status   03/12/2025 5.09 3.40 - 10.80 10*3/mm3 Final   03/11/2025 5.85 3.40 - 10.80 10*3/mm3 Final      HGB Hemoglobin   Date Value Ref Range Status   03/12/2025 8.3 (L) 13.0 - 17.7 g/dL Final   03/11/2025 10.4 (L) 13.0 - 17.7 g/dL Final      HCT Hematocrit   Date Value Ref Range Status   03/12/2025 26.6 (L) 37.5 - 51.0 % Final   03/11/2025 34.0 (L) 37.5 - 51.0 % Final      Platlets No results found for: \"LABPLAT\"   MCV MCV   Date Value Ref Range Status   03/12/2025 90.2 79.0 - 97.0 fL Final   03/11/2025 91.9 79.0 - 97.0 fL Final          Sodium Sodium   Date Value Ref Range Status   03/12/2025 140 136 - 145 mmol/L Final   03/11/2025 137 136 - 145 " "mmol/L Final      Potassium Potassium   Date Value Ref Range Status   03/12/2025 4.0 3.5 - 5.2 mmol/L Final     Comment:     Slight hemolysis detected by analyzer. Result may be falsely elevated.   03/11/2025 4.3 3.5 - 5.2 mmol/L Final     Comment:     Slight hemolysis detected by analyzer. Result may be falsely elevated.      Chloride Chloride   Date Value Ref Range Status   03/12/2025 102 98 - 107 mmol/L Final   03/11/2025 94 (L) 98 - 107 mmol/L Final      CO2 CO2   Date Value Ref Range Status   03/12/2025 21.0 (L) 22.0 - 29.0 mmol/L Final   03/11/2025 24.0 22.0 - 29.0 mmol/L Final      BUN BUN   Date Value Ref Range Status   03/12/2025 57 (H) 8 - 23 mg/dL Final   03/11/2025 60 (H) 8 - 23 mg/dL Final      Creatinine Creatinine   Date Value Ref Range Status   03/12/2025 7.09 (H) 0.76 - 1.27 mg/dL Final   03/11/2025 8.03 (H) 0.76 - 1.27 mg/dL Final      Calcium Calcium   Date Value Ref Range Status   03/12/2025 7.3 (L) 8.6 - 10.5 mg/dL Final   03/11/2025 8.6 8.6 - 10.5 mg/dL Final      PO4 No results found for: \"CAPO4\"   Albumin Albumin   Date Value Ref Range Status   03/11/2025 3.7 3.5 - 5.2 g/dL Final      Magnesium Magnesium   Date Value Ref Range Status   03/12/2025 1.8 1.6 - 2.4 mg/dL Final      Uric Acid No results found for: \"URICACID\"         [Held by provider] aspirin, 81 mg, Oral, Daily  atorvastatin, 40 mg, Oral, Daily  cinacalcet, 30 mg, Oral, Daily  [Held by provider] clopidogrel, 75 mg, Oral, Daily  diazePAM, 5 mg, Intravenous, Once  gabapentin, 100 mg, Oral, Q8H  isosorbide mononitrate, 30 mg, Oral, Q24H  labetalol, 10 mg, Intravenous, Once  levETIRAcetam, 500 mg, Intravenous, Q12H  levothyroxine, 75 mcg, Oral, Q AM  metoclopramide, 5 mg, Oral, Nightly  metoprolol tartrate, 25 mg, Oral, Q12H  nicotine, 1 patch, Transdermal, Q24H  pantoprazole, 40 mg, Oral, Daily  ranolazine, 500 mg, Oral, Q12H  sertraline, 150 mg, Oral, Daily  sodium chloride, 10 mL, Intravenous, Q12H  sodium chloride, 10 mL, " Intravenous, Q12H      niCARdipine, 5-15 mg/hr      Results from last 7 days   Lab Units 03/12/25  0439 03/11/25  1948   SODIUM mmol/L 140 137   POTASSIUM mmol/L 4.0 4.3   CHLORIDE mmol/L 102 94*   CO2 mmol/L 21.0* 24.0   BUN mg/dL 57* 60*   CREATININE mg/dL 7.09* 8.03*   CALCIUM mg/dL 7.3* 8.6   ALBUMIN g/dL  --  3.7   WBC 10*3/mm3 5.09 5.85   HEMOGLOBIN g/dL 8.3* 10.4*   PLATELETS 10*3/mm3 120* 148           Assessment & Plan       Subdural hematoma, nontraumatic    Essential hypertension    Type 2 diabetes mellitus, with long-term current use of insulin    Heart failure with preserved left ventricular function (HFpEF)    Current smoker    ESRD on hemodialysis    Coronary artery disease    Hypothyroidism (acquired)      1. End-stage renal disease - MWF  2. CAD - cardiology following   3. Anemia  4. Hyponatremia  5. Hypertension  6- Acute left subdural hematoma - Neurology on board.   7- Seizure          Plan:   - HD today, UF as tolerated.   - Adjust electrolytes with dialysis   - Continue all home medications.   - Renal diet    I discussed the patients findings and my recommendations with patient, family, and nursing staff    Kurt Richardson MD  03/12/25            Electronically signed by Kurt Richardson MD at 03/12/25 1102       Madeline Avendano PA-C at 03/12/25 0825        Consult Orders    1. Inpatient Neurosurgery Consult [[id number]] ordered by Rosette Vargas APRN              Attestation signed by Reid Mahmood MD at 03/12/25 0840    I have reviewed this documentation and agree.  Patient has a complex medical history and severe cardiac disease.  He had a tonic-clonic seizure yesterday.  Head CT showed a small mixed density left-sided subdural hematoma without significant mass effect or shift.  Repeat head CT this morning is stable.  Neurologically, he has remained stable.  There is no role for intervention on the small subdural hematoma, but I do not think he should receive  therapeutic heparin or antiplatelet medications other than aspirin.  I discussed this with Dr. Lay and he agrees and suggested the patient may benefit from palliative treatment.  Would plan to have the patient follow-up with us in 3 weeks with a repeat head CT.  If his subdural hematoma has resolved, he could proceed with cardiac intervention at that time.  We will sign off at this time.  Please call with questions.                  [facility]    Inpatient Neurosurgery Consult  Consult performed by: Madeline Avendano PA-C  Consult ordered by: Rosette Vargas APRN        Name: Chevy Junior  YOB: 1965  MRN: [id number]    Referring Provider: Gus Lay MD     Patient Care Team:  Jeanie Francois APRN as PCP - General (Nurse Practitioner)  Nikita Villasenor MD as Consulting Physician (Nephrology)  Feng Nielsen APRN as Nurse Practitioner (Interventional Cardiology)  Hal Wing MD as Consulting Physician (Cardiology)  Carlos Mcgee MD (General Surgery)  Gus Whaley MD as Consulting Physician (General Surgery)  Luis Fernando Rawls MD as Consulting Physician (Vascular Surgery)    Chief Complaint: No chief complaint on file.      History of Present Illness: This is a 59 y.o. male who initially admitted by cardiology/medicine team for evaluation potential CABG as he ongoing anginal symptoms.  On 3/11/2025, he developed acute encephalopathy and a stat head CT was obtained which showed mixed density subdural hematoma on the left with mild midline shift.  While awaiting his CT scan, patient had a witnessed tonic-clonic seizure lasting around a minute.  He was admitted to ICU for close monitoring of his neurostatus.  He denies hitting his head recently or recent falls.    PMHX  Allergies:  No Known Allergies  Medications    Current Facility-Administered Medications:     albuterol (PROVENTIL) nebulizer solution 0.083%  2.5 mg/3mL, 2.5 mg, Nebulization, 4x Daily PRN, Oly Larsen PA-C    [Held by provider] aspirin EC tablet 81 mg, 81 mg, Oral, Daily, Oly Larsen PA-C    atorvastatin (LIPITOR) tablet 40 mg, 40 mg, Oral, Daily, Oly Larsen PA-C    cinacalcet (SENSIPAR) tablet 30 mg, 30 mg, Oral, Daily, Oly Larsen PA-C    [Held by provider] clopidogrel (PLAVIX) tablet 75 mg, 75 mg, Oral, Daily, Oly Larsen PA-C, 75 mg at 03/11/25 1447    diazePAM (VALIUM) injection 5 mg, 5 mg, Intravenous, Once, Ion Kolb MD    diazePAM (VALIUM) injection 5 mg, 5 mg, Intravenous, Q4H PRN, Ankur Arnold APRN    gabapentin (NEURONTIN) capsule 100 mg, 100 mg, Oral, Q8H, Ankur Arnold APRN, 100 mg at 03/12/25 0433    ipratropium-albuterol (DUO-NEB) nebulizer solution 3 mL, 3 mL, Nebulization, Q4H PRN, Oly Larsen PA-C    isosorbide mononitrate (IMDUR) 24 hr tablet 30 mg, 30 mg, Oral, Q24H, Mounika Anderson APRN, 30 mg at 03/11/25 1708    labetalol (NORMODYNE,TRANDATE) injection 10 mg, 10 mg, Intravenous, Once, Ion Kolb MD    labetalol (NORMODYNE,TRANDATE) injection, , , ,     levETIRAcetam (KEPPRA) injection 500 mg, 500 mg, Intravenous, Q12H, Sam May, APRN    levothyroxine (SYNTHROID, LEVOTHROID) tablet 75 mcg, 75 mcg, Oral, Q AM, Oly Larsen PA-C, 75 mcg at 03/12/25 0433    metoclopramide (REGLAN) tablet 5 mg, 5 mg, Oral, Nightly, Oly Larsen PA-C    metoprolol tartrate (LOPRESSOR) tablet 25 mg, 25 mg, Oral, Q12H, Mounika Anderson APRN    midazolam (VERSED) injection 2 mg, 2 mg, Intravenous, Q30 Min PRN, Ankur Arnold APRN    niCARdipine (CARDENE) 25mg in 250mL NS infusion, 5-15 mg/hr, Intravenous, Titrated, Ankur Arnold APRN    nicotine (NICODERM CQ) 14 MG/24HR patch 1 patch, 1 patch, Transdermal, Q24H, Mounika Anderson APRN, 1 patch at 03/11/25 0661    nitroglycerin (NITROSTAT) SL tablet 0.4 mg, 0.4 mg, Sublingual, Q5 Min PRN, Ion Kolb MD    oxyCODONE  (ROXICODONE) immediate release tablet 20 mg, 20 mg, Oral, Q4H PRN, Ankur Arnold APRN    pantoprazole (PROTONIX) EC tablet 40 mg, 40 mg, Oral, Daily, Oly Larsen PA-C    ranolazine (RANEXA) 12 hr tablet 500 mg, 500 mg, Oral, Q12H, Mounika Anderson APRN    sertraline (ZOLOFT) tablet 150 mg, 150 mg, Oral, Daily, Ankur Arnold APRN    sodium chloride 0.9 % flush 10 mL, 10 mL, Intravenous, Q12H, Oly Larsen PA-C    sodium chloride 0.9 % flush 10 mL, 10 mL, Intravenous, PRN, Oly Larsen PA-C    sodium chloride 0.9 % flush 10 mL, 10 mL, Intravenous, Q12H, Ion Kolb MD, 10 mL at 03/11/25 2258    sodium chloride 0.9 % flush 10 mL, 10 mL, Intravenous, PRNKennedi Samuel, MD    sodium chloride 0.9 % infusion 40 mL, 40 mL, Intravenous, PRN, Oly Larsen PA-C    sodium chloride 0.9 % infusion 40 mL, 40 mL, Intravenous, PRKennedi JAMES Samuel, MD  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     Cellulitis and abscess of buttock 2023    CHF (congestive heart failure)     Chronic pain     COPD (chronic obstructive pulmonary disease)     INHALER    Coronary artery disease involving native coronary artery of native heart with angina pectoris 09/12/2022    stents x 3    Diabetes mellitus     ESRD on dialysis     M,W,F-FISTULA LEFT ARM    GERD (gastroesophageal reflux disease)     Heart failure with preserved left ventricular function (HFpEF) 09/13/2022    FOLLOWS DHEERAJ/REECE-NO CP    History of blood transfusion     NO ADVERSE REACTIONS    Hyperlipidemia     Hypertension     WIFE STATED B/P HAS BEEN RUNNING LOW LATELY    Hypothyroidism (acquired) 03/11/2025    Infectious viral hepatitis 5 years ago was treated    hx hep c    RIC (obstructive sleep apnea)     Osteoarthritis     Phosphorus metabolic disorder     Sleep apnea     NO MACHINE    Substance abuse 20 years ago from [place]     Past Surgical  History:  Past Surgical History:   Procedure Laterality Date    ABOVE KNEE LEG AMPUTATION Right     CARDIAC CATHETERIZATION      CARDIAC CATHETERIZATION N/A 11/30/2022    Procedure: Percutaneous Coronary Intervention;  Surgeon: River Acevedo IV, MD;  Location:  [facility] CATH INVASIVE LOCATION;  Service: Cardiovascular;  Laterality: N/A;    CARDIAC CATHETERIZATION N/A 11/30/2022    Procedure: Optical Coherent Tomography;  Surgeon: River Acevedo IV, MD;  Location:  [facility] CATH INVASIVE LOCATION;  Service: Cardiovascular;  Laterality: N/A;    CARDIAC CATHETERIZATION N/A 11/30/2022    Procedure: Stent JOSE coronary;  Surgeon: River Acevedo IV, MD;  Location:  [facility] CATH INVASIVE LOCATION;  Service: Cardiovascular;  Laterality: N/A;    CARDIAC CATHETERIZATION Left 11/09/2023    Procedure: Aortogram with left leg angiogram, possible angioplasty or stenting;  Surgeon: Luis Fernando Rawls MD;  Location: [facility] CATH INVASIVE LOCATION;  Service: Vascular;  Laterality: Left;    CARDIAC CATHETERIZATION Right 04/25/2024    Procedure: Right lower extremity angiogram, possible angioplasty or stenting;  Surgeon: Luis Fernando Rawls MD;  Location: [facility] CATH INVASIVE LOCATION;  Service: Vascular;  Laterality: Right;    CARDIAC CATHETERIZATION N/A 12/05/2024    Procedure: Left Heart Cath;  Surgeon: Cole Oscar MD;  Location: [facility] CATH INVASIVE LOCATION;  Service: Cardiovascular;  Laterality: N/A;    CATH LAB PROCEDURE      unable to stent    COLONOSCOPY N/A 02/15/2023    Procedure: COLONOSCOPY WITH POLYPECTOMY;  Surgeon: David Glass MD;  Location: [facility] ENDOSCOPY;  Service: Gastroenterology;  Laterality: N/A;  COLON POLYP, MELANOSIS COLI    ENDOSCOPY N/A 02/03/2023    Procedure: ESOPHAGOGASTRODUODENOSCOPY with biopsy;  Surgeon: Gsu Whaley MD;  Location: [facility] ENDOSCOPY;  Service: General;  Laterality: N/A;  gastritis,     FEMORAL POPLITEAL BYPASS Left 12/12/2023    Procedure: Left  "femoral edarterectomy, left femoral to below the knee popliteal bypass graft;  Surgeon: Luis Fernando Rawls MD;  Location: [facility] MAIN OR;  Service: Vascular;  Laterality: Left;    HIP SURGERY Right     INSERTION PERITONEAL DIALYSIS CATHETER      LEG SURGERY Left     \"jocelyne in place\"    MANDIBLE FRACTURE SURGERY      REPLACEMENT TOTAL KNEE Right     x3    SHOULDER SURGERY Right     SPINE SURGERY      unknown procudure- completed at [facility] in [place].     Social Hx:  Social History     Tobacco Use    Smoking status: Every Day     Current packs/day: 2.00     Average packs/day: 2.0 packs/day for 40.0 years (80.0 ttl pk-yrs)     Types: Cigarettes    Smokeless tobacco: Never    Tobacco comments:     1.5 ppd as of 2/5/25   Vaping Use    Vaping status: Never Used   Substance Use Topics    Alcohol use: Not Currently     Comment: None    Drug use: Not Currently     Types: Hydrocodone     Comment: pain pills from previous injuries     Family Hx:  Family History   Problem Relation Age of Onset    Heart attack Mother     Coronary artery disease Mother     Hypertension Mother     Arthritis Mother     Hypertension Father     Diabetes Father      Review of Systems:        Review of Systems   Neurological:  Positive for seizures. Negative for weakness and headaches.        Physical Exam:   /69 (BP Location: Right arm, Patient Position: Sitting)   Pulse 89   Temp 97.9 °F (36.6 °C) (Oral)   Resp 18   Ht 170.2 cm (67\")   Wt 77.6 kg (171 lb)   SpO2 95%   BMI 26.78 kg/m²   Patient appears comfortable, resting  Awake, alert and oriented to self only  Speech f/c  Opens eyes spontaneously  EOM intact  Face symmetric  Tongue midline  Moves bilateral upper extremities and left lower extremity to command.  Right AKA      Intake/Output:   Intake/Output Summary (Last 24 hours) at 3/12/2025 0831  Last data filed at 3/11/2025 1700  Gross per 24 hour   Intake 480 ml   Output --   Net 480 ml       Current Medications:   Current " Facility-Administered Medications:     albuterol (PROVENTIL) nebulizer solution 0.083% 2.5 mg/3mL, 2.5 mg, Nebulization, 4x Daily PRN, Oly Larsen PA-C    [Held by provider] aspirin EC tablet 81 mg, 81 mg, Oral, Daily, Oly Larsen PA-C    atorvastatin (LIPITOR) tablet 40 mg, 40 mg, Oral, Daily, Oly Larsen PA-C    cinacalcet (SENSIPAR) tablet 30 mg, 30 mg, Oral, Daily, Oly Larsen PA-C    [Held by provider] clopidogrel (PLAVIX) tablet 75 mg, 75 mg, Oral, Daily, Oly Larsen PA-C, 75 mg at 03/11/25 1447    diazePAM (VALIUM) injection 5 mg, 5 mg, Intravenous, Once, Ion Kolb MD    diazePAM (VALIUM) injection 5 mg, 5 mg, Intravenous, Q4H PRN, Ankur Arnold APRN    gabapentin (NEURONTIN) capsule 100 mg, 100 mg, Oral, Q8H, Ankur Arnold APRN, 100 mg at 03/12/25 0433    ipratropium-albuterol (DUO-NEB) nebulizer solution 3 mL, 3 mL, Nebulization, Q4H PRN, Oly Larsen PA-C    isosorbide mononitrate (IMDUR) 24 hr tablet 30 mg, 30 mg, Oral, Q24H, Mounika Anderson APRN, 30 mg at 03/11/25 1708    labetalol (NORMODYNE,TRANDATE) injection 10 mg, 10 mg, Intravenous, Once, Ion Kolb MD    labetalol (NORMODYNE,TRANDATE) injection, , , ,     levETIRAcetam (KEPPRA) injection 500 mg, 500 mg, Intravenous, Q12H, Sam, May, APRN    levothyroxine (SYNTHROID, LEVOTHROID) tablet 75 mcg, 75 mcg, Oral, Q AM, Oly Larsen PA-C, 75 mcg at 03/12/25 0433    metoclopramide (REGLAN) tablet 5 mg, 5 mg, Oral, Nightly, Oly Larsen PA-C    metoprolol tartrate (LOPRESSOR) tablet 25 mg, 25 mg, Oral, Q12H, Mounika Anderson APRN    midazolam (VERSED) injection 2 mg, 2 mg, Intravenous, Q30 Min PRN, Ankur Arnold APRN    niCARdipine (CARDENE) 25mg in 250mL NS infusion, 5-15 mg/hr, Intravenous, Titrated, Ankur Arnold APRN    nicotine (NICODERM CQ) 14 MG/24HR patch 1 patch, 1 patch, Transdermal, Q24H, Mounika Anderson APRN, 1 patch at 03/11/25 5351    nitroglycerin  (NITROSTAT) SL tablet 0.4 mg, 0.4 mg, Sublingual, Q5 Min PRN, Ion Kolb MD    oxyCODONE (ROXICODONE) immediate release tablet 20 mg, 20 mg, Oral, Q4H PRN, Ankur Arnold APRN    pantoprazole (PROTONIX) EC tablet 40 mg, 40 mg, Oral, Daily, Oly Larsen PA-C    ranolazine (RANEXA) 12 hr tablet 500 mg, 500 mg, Oral, Q12H, Mounika Anderson APRN    sertraline (ZOLOFT) tablet 150 mg, 150 mg, Oral, Daily, Ankur Arnold APRN    sodium chloride 0.9 % flush 10 mL, 10 mL, Intravenous, Q12H, Oly Larsen PA-C    sodium chloride 0.9 % flush 10 mL, 10 mL, Intravenous, PRN, Oly Larsen PA-C    sodium chloride 0.9 % flush 10 mL, 10 mL, Intravenous, Q12H, Ion Kolb MD, 10 mL at 03/11/25 2258    sodium chloride 0.9 % flush 10 mL, 10 mL, Intravenous, PRN, Ion Kolb MD    sodium chloride 0.9 % infusion 40 mL, 40 mL, Intravenous, PRN, Oly Larsen PA-C    sodium chloride 0.9 % infusion 40 mL, 40 mL, Intravenous, PRN, Ion Kolb MD     Laboratory Results:      Lab 03/12/25 0439 03/11/25 2216 03/11/25 1948   WBC 5.09  --  5.85   HEMOGLOBIN 8.3*  --  10.4*   HEMATOCRIT 26.6*  --  34.0*   PLATELETS 120*  --  148   NEUTROS ABS  --   --  3.67   IMMATURE GRANS (ABS)  --   --  0.02   LYMPHS ABS  --   --  1.51   MONOS ABS  --   --  0.42   EOS ABS  --   --  0.19   MCV 90.2  --  91.9   LACTATE  --   --  1.4   PROTIME  --  18.0*  --    APTT  --  35.6  --          Lab 03/12/25 0439 03/11/25 1948   SODIUM 140 137   POTASSIUM 4.0 4.3   CHLORIDE 102 94*   CO2 21.0* 24.0   ANION GAP 17.0* 19.0*   BUN 57* 60*   CREATININE 7.09* 8.03*   EGFR 8.2* 7.1*   GLUCOSE 116* 106*   CALCIUM 7.3* 8.6   MAGNESIUM 1.8  --    PHOSPHORUS 6.1*  --    HEMOGLOBIN A1C  --  6.50*   TSH  --  7.170*         Lab 03/11/25 1948   TOTAL PROTEIN 8.4   ALBUMIN 3.7   GLOBULIN 4.7   ALT (SGPT) 9   AST (SGOT) 18   BILIRUBIN 0.3   ALK PHOS 178*         Lab 03/11/25  2216   PROTIME 18.0*   INR 1.47*         Lab 03/12/25  8355  25   CHOLESTEROL 74 102   LDL CHOL 33 51   HDL CHOL 24* 32*   TRIGLYCERIDES 78 99             Lab 25   PH, ARTERIAL 7.400   PCO2, ARTERIAL 43.7   PO2 ART 85.6   FIO2 21   HCO3 ART 27.0*   BASE EXCESS ART 1.9   CARBOXYHEMOGLOBIN 3.3*     Brief Urine Lab Results       None          Microbiology Results (last 10 days)       ** No results found for the last 240 hours. **               Diagnostic Imaging: The patient's diagnostic imaging was independently reviewed and interpreted by myself.    MDM    Assessment and Plan:    Subdural Hematoma    This is a 59 y.o. male who initially admitted to the hospital due to coronary artery disease and potential CABG, who developed acute encephalopathy on 3/11/2025.  Stat head CT showed mixed density left subdural hematoma with 4 mm midline shift.  Repeat head CT stable.  Patient does not require neurosurgical intervention however would recommend holding anticoagulation for at least 2 to 3 weeks prior to intervention on his coronary artery disease.  This was discussed with Dr. Lay.  Defer to ICU team/neurology for seizure management.  Would recommend follow-up head CT in 3 weeks with outpatient follow-up in the neurosurgery office.  Our office will set this up.  Please call with any questions concerns or neurological decline.    Madeline Avendano PA-C  25  08:31 EDT        Electronically signed by Reid Mahmood MD at 25 0840       Rosette Vargas APRN at 25 2112        Consult Orders    1. Inpatient Neurology Consult Stroke [[id number]] ordered by Rosette Vargas APRN at 25 1950              Attestation signed by Donny Park MD at 25 1738    I have reviewed this documentation and agree.                  Stroke Consult Note    Patient Name: Chevy Junior   MRN: [id number]  Age: 59 y.o.  Sex: male  : 1965    Primary Care Physician: Jeanie Francois APRN  Referring Physician:      TIME  STROKE TEAM CALLED: 2033 EST     TIME PATIENT SEEN: 2033 EST    Handedness: Right  Race: -American    Chief Complaint/Reason for Consultation: Transient altered mental status, seizure    HPI:   Chevy Junior is a 59 y.o. -American male, right-handed with significant medical diagnosis of coronary artery disease, ischemic cardiomyopathy EF 50%, diastolic systolic, peripheral vascular disease  s/p  Rt AKA and left fem-pop bypass, Chronic opiate use, ESRD on HD MWF RIC, COPD, hypertension, hyperlipidemia, hypothyroidism, ongoing tobacco abuse, admitted on 3/11 by cardiology\medicine team for evaluation of potential CABG as he has ongoing anginal symptoms.  The patient was evaluated by Dr. Lay and was felt to be poor candidate for undergoing CABG. The recommendation is for either high risk PCI or medical management.  Code stroke inpatient was alerted secondary to acute onset of encephalopathy alteration of mental status, dysarthria and witnessed onset of seizure breakthrough.  Patient was taken emergently to CT suite, CT head without contrast revealed subdural hematoma left parietal, left frontal, CTA no LVO.  Neurosurgery was contacted Maria Elena Mancia.  Hemorrhagic order set was placed.  NIH per my exam to 4 patient not alert, drowsy, confused at times however he is oriented to himself place and age.  No current seizure at this time.  Patient is moving all extremities spontaneously and on command, noted right lower extremity AKA.  Patient is ill looking.    Patient is wheelchair dependent, smoke tobacco daily.  No reported trauma to me.  Discussed the case with  my neurology attending as well as Zina Mancia neurosurgery PA will place hemorrhagic order set, will load with Keppra and maintain Keppra IV, EEG in the morning.  Neurology stroke will sign off for now however please reach out for any questions or concerns.    Last Known Normal Date/Time: 1900 EST     Review of Systems    Constitutional:  Positive for activity change.   HENT: Negative.     Eyes: Negative.    Respiratory:  Positive for shortness of breath.    Cardiovascular: Negative.    Gastrointestinal: Negative.    Endocrine: Negative.    Genitourinary: Negative.    Musculoskeletal: Negative.    Skin: Negative.    Neurological:  Positive for seizures and speech difficulty.   Hematological: Negative.    Psychiatric/Behavioral:  Positive for confusion.       Past Medical History:   Diagnosis Date    Anemia     Atherosclerosis of native artery of left lower extremity with gangrene     Cellulitis and abscess of buttock 2023    CHF (congestive heart failure)     Chronic pain     COPD (chronic obstructive pulmonary disease)     INHALER    Coronary artery disease involving native coronary artery of native heart with angina pectoris 09/12/2022    stents x 3    Diabetes mellitus     ESRD on dialysis     M,W,F-FISTULA LEFT ARM    GERD (gastroesophageal reflux disease)     Heart failure with preserved left ventricular function (HFpEF) 09/13/2022    FOLLOWS W/REECE-NO CP    History of blood transfusion     NO ADVERSE REACTIONS    Hyperlipidemia     Hypertension     WIFE STATED B/P HAS BEEN RUNNING LOW LATELY    Hypothyroidism (acquired) 03/11/2025    Infectious viral hepatitis 5 years ago was treated    hx hep c    RIC (obstructive sleep apnea)     Osteoarthritis     Phosphorus metabolic disorder     Sleep apnea     NO MACHINE    Substance abuse 20 years ago from [place]     Past Surgical History:   Procedure Laterality Date    ABOVE KNEE LEG AMPUTATION Right     CARDIAC CATHETERIZATION      CARDIAC CATHETERIZATION N/A 11/30/2022    Procedure: Percutaneous Coronary Intervention;  Surgeon: River Acevedo IV, MD;  Location: [facility] INVASIVE LOC[facility];  Service: Cardiovascular;  Laterality: N/A;    CARDIAC CATHETERIZATION N/A 11/30/2022    Procedure: Optical Coherent Tomography;  Surgeon: River Acevedo IV, MD;   "Location:  [place] CATH INVASIVE LOCATION;  Service: Cardiovascular;  Laterality: N/A;    CARDIAC CATHETERIZATION N/A 11/30/2022    Procedure: Stent JOSE coronary;  Surgeon: River Acevedo IV, MD;  Location:  [place] CATH INVASIVE LOCATION;  Service: Cardiovascular;  Laterality: N/A;    CARDIAC CATHETERIZATION Left 11/09/2023    Procedure: Aortogram with left leg angiogram, possible angioplasty or stenting;  Surgeon: Luis Fernando Rawls MD;  Location: [facility] CATH INVASIVE LOCATION;  Service: Vascular;  Laterality: Left;    CARDIAC CATHETERIZATION Right 04/25/2024    Procedure: Right lower extremity angiogram, possible angioplasty or stenting;  Surgeon: Luis Fernando Rawls MD;  Location: [facility] CATH INVASIVE LOCATION;  Service: Vascular;  Laterality: Right;    CARDIAC CATHETERIZATION N/A 12/05/2024    Procedure: Left Heart Cath;  Surgeon: Cole Oscar MD;  Location:  [place] CATH INVASIVE LOCATION;  Service: Cardiovascular;  Laterality: N/A;    CATH LAB PROCEDURE      unable to stent    COLONOSCOPY N/A 02/15/2023    Procedure: COLONOSCOPY WITH POLYPECTOMY;  Surgeon: David Glass MD;  Location: [facility] ENDOSCOPY;  Service: Gastroenterology;  Laterality: N/A;  COLON POLYP, MELANOSIS COLI    ENDOSCOPY N/A 02/03/2023    Procedure: ESOPHAGOGASTRODUODENOSCOPY with biopsy;  Surgeon: Gus Whaley MD;  Location: [facility] ENDOSCOPY;  Service: General;  Laterality: N/A;  gastritis,     FEMORAL POPLITEAL BYPASS Left 12/12/2023    Procedure: Left femoral edarterectomy, left femoral to below the knee popliteal bypass graft;  Surgeon: Luis Fernando Rawls MD;  Location: [facility] MAIN OR;  Service: Vascular;  Laterality: Left;    HIP SURGERY Right     INSERTION PERITONEAL DIALYSIS CATHETER      LEG SURGERY Left     \"jocelyne in place\"    MANDIBLE FRACTURE SURGERY      REPLACEMENT TOTAL KNEE Right     x3    SHOULDER SURGERY Right     SPINE SURGERY      unknown procudure- completed at [facility] in [place].     Family History   Problem " Relation Age of Onset    Heart attack Mother     Coronary artery disease Mother     Hypertension Mother     Arthritis Mother     Hypertension Father     Diabetes Father      Social History     Socioeconomic History    Marital status:    Tobacco Use    Smoking status: Every Day     Current packs/day: 2.00     Average packs/day: 2.0 packs/day for 40.0 years (80.0 ttl pk-yrs)     Types: Cigarettes    Smokeless tobacco: Never    Tobacco comments:     1.5 ppd as of 2/5/25   Vaping Use    Vaping status: Never Used   Substance and Sexual Activity    Alcohol use: Not Currently     Comment: None    Drug use: Not Currently     Types: Hydrocodone     Comment: pain pills from previous injuries    Sexual activity: Defer     No Known Allergies  Prior to Admission medications    Medication Sig Start Date End Date Taking? Authorizing Provider   albuterol sulfate  (90 Base) MCG/ACT inhaler Inhale 1 puff Every 4 (Four) Hours As Needed for Wheezing or Shortness of Air.    Janie Lynch MD   aspirin (aspirin) 81 MG EC tablet Take 1 tablet by mouth Daily. 9/13/22   River Acevedo IV, MD   atorvastatin (LIPITOR) 40 MG tablet Take 1 tablet by mouth Daily. 11/19/24   Feng Nielsen APRN   Auryxia 1  MG(Fe) tablet Take 1 tablet by mouth Daily With Lunch. 4/25/24   Aj Chavira MD   cinacalcet (SENSIPAR) 30 MG tablet Take 1 tablet by mouth Daily. 7/5/21   Janie Lynch MD   clopidogrel (PLAVIX) 75 MG tablet Take 1 tablet by mouth Daily.  Patient taking differently: Take 1 tablet by mouth Daily. Last dose 3/6/25. Holding prior to CABG 10/31/24   Feng Nielsen APRN   Diclofenac Sodium (VOLTAREN) 1 % gel gel Apply 4 g topically to the appropriate area as directed As Needed. prn 10/2/24   Janie Lynch MD   gabapentin (NEURONTIN) 100 MG capsule Take 1 capsule by mouth Daily.    Janie Lynch MD   Heparin Sodium, Porcine, (heparin, porcine,) 1000 UNIT/ML injection Infuse 1 mL  into a venous catheter. M-W-F with dialysis    Janie Lynch MD   Insulin Glargine (BASAGLAR KWIKPEN) 100 UNIT/ML injection pen Inject 5-15 Units under the skin into the appropriate area as directed 3 (Three) Times a Day As Needed. Per SS  5-15 UNITS 6/16/21   Janie Lynch MD   lactulose (CHRONULAC) 10 GM/15ML solution Take 30 mL by mouth 2 (Two) Times a Day As Needed.    Janie Lynch MD   levothyroxine (SYNTHROID, LEVOTHROID) 75 MCG tablet Take 1 tablet by mouth Daily. 2/2/22   Janie Lynch MD   lidocaine-prilocaine (EMLA) 2.5-2.5 % cream Apply 1 Application topically to the appropriate area as directed As Needed for Injection Site Pain. Apply before dialysis on Monday, Wednesday and Fridays of each week 4/15/24   Janie Lynch MD   metoclopramide (REGLAN) 5 MG tablet Take 1 tablet by mouth Every Night.    Janie Lynch MD   midodrine (PROAMATINE) 5 MG tablet Take 1 tablet by mouth 3 (Three) Times a Day Before Meals. Prn on M/W/F, dialysis days 10/16/24   Janie Lynch MD   naloxone (NARCAN) 4 MG/0.1ML nasal spray Administer 1 spray into the nostril(s) as directed by provider Take As Directed.    Janie Lynch MD   nitroglycerin (NITROSTAT) 0.4 MG SL tablet Place 1 tablet under the tongue Every 5 (Five) Minutes As Needed for Chest Pain. 10/31/24   Feng Nielsen APRN   ondansetron ODT (ZOFRAN-ODT) 4 MG disintegrating tablet Place 1 tablet on the tongue Every 6 (Six) Hours As Needed for Nausea or Vomiting.    Janie Lynch MD   oxyCODONE (ROXICODONE) 30 MG immediate release tablet Take 1.5 tablets by mouth Every 4 (Four) Hours As Needed for Moderate Pain or Severe Pain.    Janie Lynch MD   pantoprazole (PROTONIX) 40 MG EC tablet Take 1 tablet by mouth Daily.    Janie Lynch MD   ranolazine (Ranexa) 500 MG 12 hr tablet Take 1 tablet by mouth once daily on days that you are NOT undergoing dialysis 12/5/24   Cole Oscar,  MD   sertraline (ZOLOFT) 100 MG tablet Take 1.5 tablets by mouth Daily. 6/16/21   Janie Lynch MD   sodium zirconium cyclosilicate (Lokelma) 10 g packet Take 10 g by mouth 1 (One) Time. Empty entire contents of the packet(s) into a glass with at least 3 tablespoons (45 mL) of water. Stir well and drink immediately; if powder remains in the glass, add water, stir and drink immediately; repeat until no powder remains. Administer other oral medications at least 2 hours before or 2 hours after dose.    Janie Lynch MD   Xphozah 30 MG tablet Take 30 mg by mouth Daily As Needed. 10/30/24   Janie Lynch MD         Temp:  [97.7 °F (36.5 °C)-98.2 °F (36.8 °C)] 97.9 °F (36.6 °C)  Heart Rate:  [86-96] 96  Resp:  [16-20] 18  BP: (135-158)/(64-93) 148/64  Neurological Exam  Mental Status  Awake. Oriented only to person and place. Mild dysarthria present. Language is fluent with no aphasia.    Cranial Nerves  CN II: Right visual acuity: Normal. Left visual acuity: Normal. Right normal visual field. Left normal visual field.  CN III, IV, VI: Extraocular movements intact bilaterally. Normal lids and orbits bilaterally. Pupils equal round and reactive to light bilaterally.  CN V: Facial sensation is normal.  CN VII: Full and symmetric facial movement.  CN VIII: Intact hearing bilaterally.  CN IX, X: Palate elevates symmetrically  CN XI: Shoulder shrug strength is normal.  CN XII: Tongue midline without atrophy or fasciculations.    Motor  Decreased muscle bulk throughout. No fasciculations present. Normal muscle tone. No abnormal involuntary movements. Strength is 5/5 throughout all four extremities.  Right lower extremity amputation.    Sensory  Light touch is normal in upper and lower extremities.  No right-sided hemispatial neglect. No left-sided hemispatial neglect. Right extinction absent: Left extinction absent:    Reflexes  Left Plantar: downgoing    Coordination    Unable to  assess.    Gait    Wheelchair-bound.      Physical Exam  Constitutional:       General: He is awake.      Appearance: He is obese. He is ill-appearing.   HENT:      Head: Normocephalic and atraumatic.      Mouth/Throat:      Mouth: Mucous membranes are dry.   Eyes:      General: Lids are normal.      Extraocular Movements: Extraocular movements intact.      Pupils: Pupils are equal, round, and reactive to light.   Cardiovascular:      Rate and Rhythm: Regular rhythm. Tachycardia present.   Pulmonary:      Effort: Respiratory distress present.   Abdominal:      Hernia: A hernia is present.   Musculoskeletal:         General: Swelling and deformity present. Normal range of motion.      Cervical back: Normal range of motion and neck supple.      Left lower leg: Edema present.   Skin:     General: Skin is warm and dry.      Capillary Refill: Capillary refill takes less than 2 seconds.   Neurological:      Mental Status: He is disoriented.      Cranial Nerves: Dysarthria present.      Motor: Motor strength is normal.  Psychiatric:      Comments: Confusion         Acute Stroke Data    Thrombolytic Inclusion / Exclusion Criteria    Time: 21:12 EDT  Person Administering Scale: VICKI Daniel      YES NO INCLUSION CRITERIA CLASS I   [] []   Suspected diagnosis of acute ischemic stroke with measureable neurological deficit.  Low NIHSS with disabling stroke symptoms.   [] []   Onset of stroke symptoms < 3 hours before beginning treatment >/ 18 years old  Stroke symptom onset = time patient was last seen well or without symptoms (LKW)   [] []   Onset of symptoms between 3-4.5 hours: >/= 80 years old (safe Class IIa) with history of   both diabetes and prior CVA  (reasonable Class IIb) AND NIHSS </= 25  *If not eligible for IV Thrombolytic consider neuro intervention for LKW within 24 hours     YES NO EXCLUSION CRITERIA (CONTRAINDICATIONS) CLASS III EVIDENCE HARM   [] []   Blood pressure >185/110 medically refractory  to IV medications   [] []   Active bleeding at a non-compressible site   [x] []   Active intracranial hemorrhage (ICH)   [] []   Symptoms suggestive of subarachnoid hemorrhage (SAH)   [] []   GI bleed within 21 days   [] []   Ischemic stroke within 3 months   [] []   Severe head trauma within 3 months    [] []   Intracranial or intraspinal surgery within 3 months   [] []   Current GI malignancy   [] []   Intracranial neoplasm   [] []   Infective endocarditis   [] []   Aortic arch dissection   [] []   Active coagulopathy with  INR >1.7, platelets <100,000, PTT > 40 sec, PT > 15 sec   *For warfarin, administration can begin before blood tests resulted. Discontinue for above values.    [] []   Treatment dose* of LMWH (Lovenox) in last 24 hours  *prophylactic dosages are not a contraindication   [] []   Concurrent use of antiplatelet agents' glycoprotein inhibitors IIb/IIIa (Integrilin, etc.)   [] []   Thrombin or factor Xa inhibitors (Eliquis, Xarelto, Arixtra) taken in last 48 hours     YES NO CLASS II: AIS WITH THE FOLLOWING CONDITIONS -   TREATMENT RISKS SHOULD BE WEIGHED AGAINST POSSIBLE BENEFITS.    [] []   Major trauma in last 14 days, recent major surgery in last 14 days, intracranial arterial dissection, giant unruptured and unsecured intracranial aneurysm, pericarditis     [] []   The risks and benefits have been discussed with the patient or family related to the administration of IV thrombolytic therapy for stroke symptoms.   [] []   I have discussed and reviewed the patient's case and imaging with the attending prior to IV thrombolytic therapy.   TIME na Time IV thrombolytic administered       Hospital Meds:  Scheduled- [Held by provider] aspirin, 81 mg, Oral, Daily  atorvastatin, 40 mg, Oral, Daily  cinacalcet, 30 mg, Oral, Daily  [Held by provider] clopidogrel, 75 mg, Oral, Daily  diazePAM, 5 mg, Intravenous, Once  [Held by provider] gabapentin, 100 mg, Oral, Q8H  isosorbide mononitrate, 30 mg, Oral,  Q24H  labetalol, 10 mg, Intravenous, Once  labetalol, , ,   levETIRAcetam, 1,000 mg, Intravenous, Once  levETIRAcetam, 1,000 mg, Intravenous, Once  levETIRAcetam, 500 mg, Intravenous, Q12H  levothyroxine, 75 mcg, Oral, Q AM  metoclopramide, 5 mg, Oral, Nightly  metoprolol tartrate, 25 mg, Oral, Q12H  nicotine, 1 patch, Transdermal, Q24H  pantoprazole, 40 mg, Oral, Daily  ranolazine, 500 mg, Oral, Q12H  [Held by provider] sertraline, 150 mg, Oral, Daily  sodium chloride, 10 mL, Intravenous, Q12H  sodium chloride, 10 mL, Intravenous, Q12H  sodium chloride, 10 mL, Intravenous, Q12H      Infusions- niCARdipine, 5-15 mg/hr  sodium chloride, 50 mL/hr       PRNs-   albuterol    [Held by provider] diazePAM    ipratropium-albuterol    labetalol    nitroglycerin    [Held by provider] oxyCODONE    sodium chloride    sodium chloride    sodium chloride    sodium chloride    sodium chloride    sodium chloride    Functional Status Prior to Current Stroke/Vladislav Score: 3    NIH Stroke Scale  Time: 21:12 EDT  Person Administering Scale: VICKI Daniel    Interval: baseline  1a. Level of Consciousness: 1-->Not alert, but arousable by minor stimulation to obey, answer, or respond  1b. LOC Questions: 0-->Answers both questions correctly  1c. LOC Commands: 0-->Performs both tasks correctly  2. Best Gaze: 0-->Normal  3. Visual: 0-->No visual loss  4. Facial Palsy: 0-->Normal symmetrical movements  5a. Motor Arm, Left: 0-->No drift, limb holds 90 (or 45) degrees for full 10 secs  5b. Motor Arm, Right: 0-->No drift, limb holds 90 (or 45) degrees for full 10 secs  6a. Motor Leg, Left: 0-->No drift, leg holds 30 degree position for full 5 secs  6b. Motor Leg, Right: (UN) Amputation or joint fusion  7. Limb Ataxia: 0-->Absent  8. Sensory: 0-->Normal, no sensory loss  9. Best Language: 0-->No aphasia, normal  10. Dysarthria: 1-->Mild-to-moderate dysarthria, patient slurs at least some words and, at worst, can be understood with some  difficulty  11. Extinction and Inattention (formerly Neglect): 0-->No abnormality    Total (NIH Stroke Scale): 2   ICH Score:  1 Point (GCS 5 to 12)  0 Points (ICH volume < 30 cm3)  0 Points (Intraventricular Extension Absent)   0 Points (Infratentorial Origin - No )  0 Points (Age < 80 years)  The total ICH Score for this patient is 1 at 22:26 EDT on 03/11/25     Boyce and Llanes Grade:  The Boyce and Llanes grade for this patient is: Grade 1: Asymptomatic or mild headache and slight nuchal rigidity at 22:26 EDT on 03/11/25.     Results Reviewed:  I have personally reviewed current lab, radiology, and data and agree with results.  ABG compensated respiratory acidosis  Oxyhemoglobin 91.1  H&H 10.5\32.2  Sodium 137  Potassium 4.3  BUN 16  Creatinine 8.03  GFR 7.1  Glucose 106    AST 18  ALT 9  A1c 6.50  TSH 7.170  Free T40.85  HDL 32  LDL 51  WBC 5.85  Platelet 148    Images  CT Angiogram Head w AI Analysis of LVO  Result Date: 3/11/2025  Impression: 1. No change in mixed density acute on chronic left hemispheric subdural hematoma. 2. No intracranial vascular stenosis, occlusion, or aneurysm. No active contrast extravasation. Number dural venous sinuses. 4. Prevertebral soft tissue thickening with prominence in the right [place] tonsillar region. Underlying tonsillar mass cannot be excluded. No pathologically enlarged cervical lymph nodes. There are however multiple enlarged upper mediastinal lymph nodes which could be metastatic or reactive in etiology. 5. Diffuse bony sclerosis which may be due to renal osteodystrophy or less likely diffuse sclerotic bony metastatic disease. Electronically Signed: Pranav Adams MD  3/11/2025 9:57 PM EDT  Workstation ID: [id number]    CT Angiogram Neck  Result Date: 3/11/2025  Impression: 1. No change in mixed density acute on chronic left hemispheric subdural hematoma. 2. No intracranial vascular stenosis, occlusion, or aneurysm. No active contrast extravasation. Number dural  venous sinuses. 4. Prevertebral soft tissue thickening with prominence in the right [place] tonsillar region. Underlying tonsillar mass cannot be excluded. No pathologically enlarged cervical lymph nodes. There are however multiple enlarged upper mediastinal lymph nodes which could be metastatic or reactive in etiology. 5. Diffuse bony sclerosis which may be due to renal osteodystrophy or less likely diffuse sclerotic bony metastatic disease. Electronically Signed: Pranav Adams MD  3/11/2025 9:57 PM EDT  Workstation ID: [id number]    XR Chest 1 View  Result Date: 3/11/2025  Impression: Interstitial changes involving the lungs with interval change that may reflect edema. Electronically Signed: Trav Rasmussen MD  3/11/2025 9:04 PM EDT  Workstation ID: [id number]    CT Head Without Contrast  Result Date: 3/11/2025  Impression: 1. Left hemispheric mixed density subdural hematoma with acute and chronic blood products. This measures 7 mm maximally over the left frontal lobe. There is 4 mm of midline shift. No hydrocephalus. Findings personally called to patient's nurse Radha at 8:44 p.m. on 3/11/2025 Electronically Signed: Pranav Adams MD  3/11/2025 8:45 PM EDT  Workstation ID: [id number]    XR Chest 1 View  Result Date: 3/11/2025  Impression: No acute cardiopulmonary abnormality is identified. Electronically Signed: Princess Reveles  3/11/2025 4:06 PM EDT  Workstation ID: [id number]      Results for orders placed during the hospital encounter of 11/05/24    Adult Transthoracic Echo Complete W/ Cont if Necessary Per Protocol    Interpretation Summary  Physician interpretation.  Good technical quality.    1.  LV size, function, and wall motion are normal.  Mild to moderate concentric left ventricular hypertrophy.  Visually estimated ejection fraction is 55 to 60%.  Grade 2 diastolic dysfunction.  Mild left atrial enlargement.  Borderline right atrial size.  RV size and function are grossly preserved.  No septal defect or  intracavitary mass or thrombus.    2.  The mitral valve is markedly morphologically abnormal.  Both leaflets are thickened and there is a sclerotic and mobile projection of the anterior mitral leaflet into the LV outflow tract.  This does not have the typical appearance of an acute thrombus or vegetation.  There is no compromise of the LV outflow tract.  There is mild mitral stenosis with a mitral valve area of 1.9 cm² by pressure half-time.  There is trivial MR.  The aortic valve is tricuspid and sclerotic but is minimally calcified.  The annulus is at the upper limits of normal size.  There is no mitral stenosis and there is mild to moderate MR.  Pressure half-time of AI is suggestive of moderate or greater disease.  Right-sided heart valves are unremarkable.    3.  There is no pericardial effusion.  The aortic root is mildly dilated at 3.94 cm with no dissection or aneurysm.    4.  Right heart pressures cannot be calculated.       Assessment/Plan:    This is a 59-year-old -American male right-handed with multiple vascular risk factor admitted under the hospital medicine team for potential CABG, left heart cath secondary to ongoing anginal symptoms.  Inpatient code stroke was initiated at shift change with last known mild well 1901 patient was noted by staff to be altered mental status was a witnessed seizure.  CT head without contrast was completed and revealed acute subdural hematoma left parietal left frontal.  Patient deemed not to be a candidate for TNK secondary to hemorrhage, patient is not a candidate for mechanical thrombectomy no LVO on CTA head and neck.    Antiplatelet PTA: Plavix 75 mg  Anticoagulant PTA: None        Acute left subdural hemorrhage, etiology unknown  -Hemorrhagic stroke order set has been initiated  -ICH score 1  -Discussed with Kristin Mancia, neurosurgery PA, patient deemed not to be a candidate for neurosurgical treatment at this time  -Repeat CT in 6 hours, 1 AM, repeat CT  head in the a.m. 9 all to evaluate bleed stability  -NPO until bedside nursing dysphagia screen completed  -TTE ordered and pending  -A1c and lipid panel in AM  -Meds: HOLD all antiplatelet and anticoagulants at this time  -Loading dose with Keppra 1000 mg IV x 1, followed by maintenance Keppra 500 mg twice daily.  -EEG in the a.m.  -CK and lactate is recommended and ordered and to be followed on  -Activity as tolerated, fall risk precautions  -PT/OT/SLP evaluation  -Neurology stroke will sign off, neurosurgery will take over.  -Discussed all plan with Kristin Mancia neurosurgery PA    2.  Essential hypertension,   -Strict blood pressure monitoring, please keep SBP <140  -Nicardipine as needed for SBP >140, management by neurosurgery, intensivist    3.  Hyperlipidemia  -Lipid panel recent HDL 32 goal needs to be more than 40, LDL within goal less than 70  -Atorvastatin 80mg nightly for secondary stroke prevention    4.  Diabetes Mellitus type 2,   -A1c 6.5 within goal less than 7  -Maintain euglycemia  -Management per primary team  -Continue diabetic education    5.  ESRD on hemodialysis  -Monday Wednesday Friday  Management by intensivist\medicine team  -Avoid all nephrotoxins  -Patient was reported to be an uric monitor KIN and UOP if applicable.      6.  Acute on chronic diastolic heart failure        Ischemic cardiomyopathy        PVD status post AKA  -Noted left lower extremity edema  -Echo ordered and pending  -Patient is followed by cardiology team    7.  Tobacco abuse, ongoing  -Daily tobacco cessation education to be provided  -Patient may require NRT during admission    8.  Anemia of the chronic disease  -H&H is stable  -Monitor and trending labs.    Plan of care was discussed with patient, primary RN, Kristin Mancia neurosurgery PA, [facility] neurology attending, intensivist are aware.  Stroke neurology will sign off for now.  Please call with any questions or concerns.  Thank you for this consult.            Rosette Vargas, APRN  March 11, 2025  21:12 EDT     Electronically signed by Donny Park MD at 03/12/25 [id number]

## 2025-03-13 NOTE — THERAPY EVALUATION
Patient Name: Chevy Junior  : 1965    MRN: [id number]                              Today's Date: 3/13/2025       Admit Date: 3/11/2025    Visit Dx:     ICD-10-CM ICD-9-CM   1. Subdural hematoma, nontraumatic  I62.00 432.1     Patient Active Problem List   Diagnosis    Coronary artery disease involving native coronary artery of native heart with angina pectoris    Hyperlipidemia LDL goal <70    Essential hypertension    Type 2 diabetes mellitus, with long-term current use of insulin    Heart failure with preserved left ventricular function (HFpEF)    Current smoker    ESRD on hemodialysis    Abdominal pain    Weight loss    Elevated carcinoembryonic antigen (CEA)    Failure to thrive (child)    Atherosclerosis of native artery of lower extremity with gangrene    Atherosclerosis of native arteries of the extremities with gangrene    Open leg wound, unspecified laterality, initial encounter    Open leg wound    Amputation stump necrosis    Hyponatremia    Positive cardiac stress test    Coronary artery disease    Hypothyroidism (acquired)    Subdural hematoma, nontraumatic     Past Medical History:   Diagnosis Date    Anemia     Atherosclerosis of native artery of left lower extremity with gangrene     Cellulitis and abscess of buttock     CHF (congestive heart failure)     Chronic pain     COPD (chronic obstructive pulmonary disease)     INHALER    Coronary artery disease involving native coronary artery of native heart with angina pectoris 2022    stents x 3    Diabetes mellitus     ESRD on dialysis     M,W,F-FISTULA LEFT ARM    GERD (gastroesophageal reflux disease)     Heart failure with preserved left ventricular function (HFpEF) 2022    FOLLOWS DHEERAJ/REECE-NO CP    History of blood transfusion     NO ADVERSE REACTIONS    Hyperlipidemia     Hypertension     WIFE STATED B/P HAS BEEN RUNNING LOW LATELY    Hypothyroidism (acquired) 2025    Infectious viral hepatitis 5 years ago was  treated    hx hep c    RIC (obstructive sleep apnea)     Osteoarthritis     Phosphorus metabolic disorder     Sleep apnea     NO MACHINE    Substance abuse 20 years ago from [facility]     Past Surgical History:   Procedure Laterality Date    ABOVE KNEE LEG AMPUTATION Right     CARDIAC CATHETERIZATION      CARDIAC CATHETERIZATION N/A 11/30/2022    Procedure: Percutaneous Coronary Intervention;  Surgeon: River Acevedo IV, MD;  Location: [facility] CATH INVASIVE LOCATION;  Service: Cardiovascular;  Laterality: N/A;    CARDIAC CATHETERIZATION N/A 11/30/2022    Procedure: Optical Coherent Tomography;  Surgeon: River Acevedo IV, MD;  Location: [facility] CATH INVASIVE LOCATION;  Service: Cardiovascular;  Laterality: N/A;    CARDIAC CATHETERIZATION N/A 11/30/2022    Procedure: Stent JOSE coronary;  Surgeon: River Acevedo IV, MD;  Location: [facility] CATH INVASIVE LOCATION;  Service: Cardiovascular;  Laterality: N/A;    CARDIAC CATHETERIZATION Left 11/09/2023    Procedure: Aortogram with left leg angiogram, possible angioplasty or stenting;  Surgeon: Luis Fernando Rawls MD;  Location: [facility] CATH INVASIVE LOCATION;  Service: Vascular;  Laterality: Left;    CARDIAC CATHETERIZATION Right 04/25/2024    Procedure: Right lower extremity angiogram, possible angioplasty or stenting;  Surgeon: Luis Fernando Rawls MD;  Location: [facility] CATH INVASIVE LOCATION;  Service: Vascular;  Laterality: Right;    CARDIAC CATHETERIZATION N/A 12/05/2024    Procedure: Left Heart Cath;  Surgeon: Cole Oscar MD;  Location: [facility] CATH INVASIVE LOCATION;  Service: Cardiovascular;  Laterality: N/A;    CATH LAB PROCEDURE      unable to stent    COLONOSCOPY N/A 02/15/2023    Procedure: COLONOSCOPY WITH POLYPECTOMY;  Surgeon: David Glass MD;  Location: [facility] ENDOSCOPY;  Service: Gastroenterology;  Laterality: N/A;  COLON POLYP, MELANOSIS COLI    ENDOSCOPY N/A 02/03/2023    Procedure: ESOPHAGOGASTRODUODENOSCOPY with biopsy;   "Surgeon: Gus Whaley MD;  Location: [facility] ENDOSCOPY;  Service: General;  Laterality: N/A;  gastritis,     FEMORAL POPLITEAL BYPASS Left 12/12/2023    Procedure: Left femoral edarterectomy, left femoral to below the knee popliteal bypass graft;  Surgeon: Luis Fernando Rawls MD;  Location: [facility] MAIN OR;  Service: Vascular;  Laterality: Left;    HIP SURGERY Right     INSERTION PERITONEAL DIALYSIS CATHETER      LEG SURGERY Left     \"jocelyne in place\"    MANDIBLE FRACTURE SURGERY      REPLACEMENT TOTAL KNEE Right     x3    SHOULDER SURGERY Right     SPINE SURGERY      unknown procudure- completed at [facility] in [place].      General Information       Row Name 03/13/25 1312          OT Time and Intention    Document Type evaluation  -CS     Mode of Treatment occupational therapy  -CS       Row Name 03/13/25 1312          General Information    Patient Profile Reviewed yes  -CS     Prior Level of Function independent:;transfer;w/c or scooter;mod assist:;ADL's  -CS     Existing Precautions/Restrictions fall;oxygen therapy device and L/min;seizures  remote R AKA  -CS     Barriers to Rehab medically complex;previous functional deficit  -CS       Row Name 03/13/25 1312          Living Environment    Current Living Arrangements home  -CS     People in Home other (see comments)  ex-wife  -CS       Row Name 03/13/25 1312          Home Main Entrance    Number of Stairs, Main Entrance none  -CS       Row Name 03/13/25 1312          Stairs Within Home, Primary    Number of Stairs, Within Home, Primary none  -CS       Row Name 03/13/25 1312          Cognition    Orientation Status (Cognition) oriented x 3  -CS       Row Name 03/13/25 1312          Safety Issues/Impairments Affecting Functional Mobility    Impairments Affecting Function (Mobility) balance;endurance/activity tolerance;strength;shortness of breath  -CS               User Key  (r) = Recorded By, (t) = Taken By, (c) = Cosigned By      Initials Name Provider Type    CS " Bibiana Munguia, BUDDY Occupational Therapist                     Mobility/ADL's       Row Name 03/13/25 1312          Bed Mobility    Supine-Sit Shepherd (Bed Mobility) contact guard;verbal cues  -CS     Assistive Device (Bed Mobility) head of bed elevated;bed rails  -       Row Name 03/13/25 1312          Activities of Daily Living    BADL Assessment/Intervention lower body dressing;feeding  -       Row Name 03/13/25 1312          Wheelchair Transfer    Type (Wheelchair Transfer) stand pivot/stand step  -     Shepherd Level (Wheelchair Transfer) contact guard;verbal cues  -CS     Assistive Device (Wheelchair Transfer) wheelchair  -CS       Row Name 03/13/25 1312          Lower Body Dressing Assessment/Training    Shepherd Level (Lower Body Dressing) don;socks;dependent (less than 25% patient effort)  -     Position (Lower Body Dressing) supine  -       Row Name 03/13/25 1312          Self-Feeding Assessment/Training    Shepherd Level (Feeding) liquids to mouth;finger foods;independent  -CS     Position (Feeding) supported sitting  -               User Key  (r) = Recorded By, (t) = Taken By, (c) = Cosigned By      Initials Name Provider Type     Bibiana Munguia OT Occupational Therapist                   Obj/Interventions       Row Name 03/13/25 1314          Sensory Assessment (Somatosensory)    Sensory Assessment (Somatosensory) UE sensation intact  -       Row Name 03/13/25 1314          Range of Motion Comprehensive    Comment, General Range of Motion R shldr limited ~50%, remainder grossly WFL  -       Row Name 03/13/25 1314          Strength Comprehensive (MMT)    Comment, General Manual Muscle Testing (MMT) Assessment BUE grossly 3+/5  -       Row Name 03/13/25 1314          Balance    Balance Assessment sitting static balance;sitting dynamic balance;standing static balance;standing dynamic balance  -     Static Sitting Balance standby assist  -     Dynamic Sitting Balance  standby assist  -CS     Position, Sitting Balance sitting edge of bed  -CS     Static Standing Balance contact guard  -CS     Dynamic Standing Balance contact guard  -CS     Position/Device Used, Standing Balance supported  -CS     Balance Interventions sitting;standing;static;dynamic;dynamic reaching;occupation based/functional task;weight shifting activity  -CS               User Key  (r) = Recorded By, (t) = Taken By, (c) = Cosigned By      Initials Name Provider Type    CS Bibiana Munguia, OT Occupational Therapist                   Goals/Plan       Row Name 03/13/25 1319          Transfer Goal 1 (OT)    Activity/Assistive Device (Transfer Goal 1, OT) sit-to-stand/stand-to-sit;toilet  -CS     [place] Level/Cues Needed (Transfer Goal 1, OT) standby assist  -CS     Time Frame (Transfer Goal 1, OT) long term goal (LTG);10 days  -CS     Progress/Outcome (Transfer Goal 1, OT) goal ongoing  -       Row Name 03/13/25 1319          Dressing Goal 1 (OT)    Activity/Device (Dressing Goal 1, OT) lower body dressing  -CS     [place]/Cues Needed (Dressing Goal 1, OT) moderate assist (50-74% patient effort)  -CS     Time Frame (Dressing Goal 1, OT) long term goal (LTG);10 days  -CS     Strategies/Barriers (Dressing Goal 1, OT) don/doff sock  -CS     Progress/Outcome (Dressing Goal 1, OT) goal ongoing  -       Row Name 03/13/25 1319          Toileting Goal 1 (OT)    Activity/Device (Toileting Goal 1, OT) adjust/manage clothing;perform perineal hygiene  -CS     [place] Level/Cues Needed (Toileting Goal 1, OT) minimum assist (75% or more patient effort)  -CS     Time Frame (Toileting Goal 1, OT) short term goal (STG);5 days  -CS     Progress/Outcome (Toileting Goal 1, OT) goal ongoing  -CS       Row Name 03/13/25 1319          Therapy Assessment/Plan (OT)    Planned Therapy Interventions (OT) activity tolerance training;adaptive equipment training;BADL retraining;functional balance retraining;ROM/therapeutic  exercise;occupation/activity based interventions;strengthening exercise;transfer/mobility retraining;patient/caregiver education/training  -CS               User Key  (r) = Recorded By, (t) = Taken By, (c) = Cosigned By      Initials Name Provider Type    CS Bibiana Munguia, OT Occupational Therapist                   Clinical Impression       Row Name 03/13/25 1317          Pain Assessment    Pretreatment Pain Rating 7/10  -CS     Posttreatment Pain Rating 7/10  -CS     Pain Side/Orientation generalized  -CS     Pain Management Interventions activity modification encouraged;positioning techniques utilized;exercise or physical activity utilized  -CS     Response to Pain Interventions intervention effective per patient report;activity participation with tolerable pain  -CS       Row Name 03/13/25 1317          Plan of Care Review    Plan of Care Reviewed With patient  -CS     Progress improving  -CS     Outcome Evaluation OT eval complete. Pt presents w/ generalized weakness and decreased functional endurance warranting cont skilled IPOT POC to promote return to PLOF. Recommend pt DC home w/ assist and HH OT/PT services.  -CS       Row Name 03/13/25 1317          Therapy Assessment/Plan (OT)    Patient/Family Therapy Goal Statement (OT) Return to PLOF  -CS     Rehab Potential (OT) good  -CS     Criteria for Skilled Therapeutic Interventions Met (OT) yes;skilled treatment is necessary  -CS     Therapy Frequency (OT) daily  -CS     Predicted Duration of Therapy Intervention (OT) 10 days  -CS       Row Name 03/13/25 1317          Therapy Plan Review/Discharge Plan (OT)    Anticipated Discharge Disposition (OT) home with assist;home with home health  -       Row Name 03/13/25 1317          Vital Signs    Pre Systolic BP Rehab 116  -CS     Pre Treatment Diastolic BP 78  -CS     Post Systolic BP Rehab 120  -CS     Post Treatment Diastolic BP 90  -CS     Pretreatment Heart Rate (beats/min) 84  -CS     Posttreatment Heart  Rate (beats/min) 82  -CS     Pre SpO2 (%) 97  -CS     O2 Delivery Pre Treatment room air  -CS     Post SpO2 (%) 97  -CS     O2 Delivery Post Treatment room air  -CS     Pre Patient Position Supine  -CS     Intra Patient Position Standing  -CS     Post Patient Position Sitting  -CS       Row Name 03/13/25 1317          Positioning and Restraints    Pre-Treatment Position in bed  -CS     Post Treatment Position wheelchair  -CS     In Wheelchair notified nsg;sitting;call light within reach;encouraged to call for assist;exit alarm on;waffle cushion  RN cleared to leave in w/c, lap belt on and secure  -CS               User Key  (r) = Recorded By, (t) = Taken By, (c) = Cosigned By      Initials Name Provider Type    CS Bibiana Munguia, BUDDY Occupational Therapist                   Outcome Measures       Row Name 03/13/25 1320          How much help from another is currently needed...    Putting on and taking off regular lower body clothing? 2  -CS     Bathing (including washing, rinsing, and drying) 2  -CS     Toileting (which includes using toilet bed pan or urinal) 2  -CS     Putting on and taking off regular upper body clothing 3  -CS     Taking care of personal grooming (such as brushing teeth) 3  -CS     Eating meals 4  -CS     AM-PAC 6 Clicks Score (OT) 16  -CS       Row Name 03/13/25 1145          How much help from another person do you currently need...    Turning from your back to your side while in flat bed without using bedrails? 4  -ES     Moving from lying on back to sitting on the side of a flat bed without bedrails? 3  -ES     Moving to and from a bed to a chair (including a wheelchair)? 3  -ES     Standing up from a chair using your arms (e.g., wheelchair, bedside chair)? 3  -ES     Climbing 3-5 steps with a railing? 1  -ES     To walk in hospital room? 1  -ES     AM-PAC 6 Clicks Score (PT) 15  -ES     Highest Level of Mobility Goal 4 --> Transfer to chair/commode  -ES       Row Name 03/13/25 1320 03/13/25  1145       Modified Vladislav Scale    Pre-Stroke Modified Dale Scale 4 - Moderately severe disability.  Unable to walk without assistance, and unable to attend to own bodily needs without assistance.  -CS 6 - Unable to determine (UTD) from the medical record documentation  -ES    Modified Vladislav Scale 4 - Moderately severe disability.  Unable to walk without assistance, and unable to attend to own bodily needs without assistance.  -CS 3 - Moderate disability.  Requiring some help, but able to walk without assistance.  -      Row Name 03/13/25 1320 03/13/25 1145       Functional Assessment    Outcome Measure Options AM-PAC 6 Clicks Daily Activity (OT)  -CS AM-PAC 6 Clicks Basic Mobility (PT);Modified Dale  -ES              User Key  (r) = Recorded By, (t) = Taken By, (c) = Cosigned By      Initials Name Provider Type    CS Bibiana Munguia, OT Occupational Therapist    ES Suzi Vallejo, PT Physical Therapist                    Occupational Therapy Education       Title: PT OT SLP Therapies (In Progress)       Topic: Occupational Therapy (In Progress)       Point: ADL training (Done)       Learning Progress Summary            Patient Acceptance, E, VU by  at 3/13/2025 1320                      Point: Precautions (Done)       Learning Progress Summary            Patient Acceptance, E, VU by  at 3/13/2025 1320                      Point: Body mechanics (Done)       Learning Progress Summary            Patient Acceptance, E, VU by  at 3/13/2025 1320                                      User Key       Initials Effective Dates Name Provider Type Discipline     09/02/21 -  Bibiana Munguia, OT Occupational Therapist OT                  OT Recommendation and Plan  Planned Therapy Interventions (OT): activity tolerance training, adaptive equipment training, BADL retraining, functional balance retraining, ROM/therapeutic exercise, occupation/activity based interventions, strengthening exercise, transfer/mobility  retraining, patient/caregiver education/training  Therapy Frequency (OT): daily  Plan of Care Review  Plan of Care Reviewed With: patient  Progress: improving  Outcome Evaluation: OT eval complete. Pt presents w/ generalized weakness and decreased functional endurance warranting cont skilled IPOT POC to promote return to PLOF. Recommend pt DC home w/ assist and HH OT/PT services.     Time Calculation:   Evaluation Complexity (OT)  Review Occupational Profile/Medical/Therapy History Complexity: expanded/moderate complexity  Assessment, Occupational Performance/Identification of Deficit Complexity: 5 or more performance deficits  Clinical Decision Making Complexity (OT): detailed assessment/moderate complexity  Overall Complexity of Evaluation (OT): moderate complexity     Time Calculation- OT       Row Name 03/13/25 1321             Time Calculation- OT    OT Start Time 1050  -CS      OT Received On 03/13/25  -CS      OT Goal Re-Cert Due Date 03/23/25  -CS         Untimed Charges    OT Eval/Re-eval Minutes 46  -CS         Total Minutes    Untimed Charges Total Minutes 46  -CS       Total Minutes 46  -CS                User Key  (r) = Recorded By, (t) = Taken By, (c) = Cosigned By      Initials Name Provider Type    CS Bibiana Munguia OT Occupational Therapist                  Therapy Charges for Today       Code Description Service Date Service Provider Modifiers Qty    [id number] HC OT EVAL MOD COMPLEXITY 4 3/13/2025 Bibiana Munguia OT GO 1                 Bibiana Munguia OT  3/13/2025

## 2025-03-13 NOTE — PROGRESS NOTES
[facility] Cardiothoracic Surgery In-Patient Progress Note     LOS: 2 days     Chief Complaint: Coronary artery disease    Subjective  No acute events. VSS on 2L NC saturations 98%.     Objective  Vital Signs  Temp:  [97.5 °F (36.4 °C)-98.9 °F (37.2 °C)] 97.8 °F (36.6 °C)  Heart Rate:  [] 84  Resp:  [16-22] 16  BP: ()/(57-97) 123/75        Physical Exam:   General Appearance: alert, appears stated age and cooperative   Lungs: clear to auscultation, respirations regular, respirations even, and respirations unlabored   Heart: regular rhythm & normal rate, normal S1, S2, no murmur, no gallop, no rub, and no click     Results     Results from last 7 days   Lab Units 03/12/25  0439   WBC 10*3/mm3 5.09   HEMOGLOBIN g/dL 8.3*   HEMATOCRIT % 26.6*   PLATELETS 10*3/mm3 120*     Results from last 7 days   Lab Units 03/12/25  0439   SODIUM mmol/L 140   POTASSIUM mmol/L 4.0   CHLORIDE mmol/L 102   CO2 mmol/L 21.0*   BUN mg/dL 57*   CREATININE mg/dL 7.09*   GLUCOSE mg/dL 116*   CALCIUM mg/dL 7.3*     Imaging Results (Last 24 Hours)       Procedure Component Value Units Date/Time    MRI Brain Without Contrast [[id number]] Collected: 03/12/25 2358     Updated: 03/13/25 0006    Narrative:      MRI BRAIN WO CONTRAST    Date of Exam: 3/12/2025 11:15 PM EDT    Indication: seizures, SDH.     Comparison: None available.    Technique:  Routine multiplanar/multisequence sequence images of the brain were obtained without contrast administration.      Findings:  There is abnormal extra-axial fluid on the left side consistent with known left-sided subdural hematoma. Maximum thickness is in the posterior high parietal region up to about 1.4 cm essentially unchanged. There is 3 mm of left to right shift. There are   no new areas of hemorrhage. There are no vascular territory infarcts. There is no mass. The intracranial flow voids are preserved. The orbital structures are normal. The paranasal sinuses and mastoid air cells are  clear.      Impression:      Impression:  Stable left-sided subdural hematoma.            Electronically Signed: Rick Rivas MD    3/13/2025 12:03 AM EDT    Workstation ID: [id number]    CT Head Without Contrast [[id number]] Collected: 03/12/25 1152     Updated: 03/12/25 1216    Narrative:      CT HEAD WO CONTRAST    Date of Exam: 3/12/2025 9:22 AM EDT    Indication: ICH SDH.    Comparison:  3/12/2025    Technique: Axial CT images were obtained of the head without contrast administration.  Automated exposure control and iterative construction methods were used.      Findings:  Parenchyma:No acute intraparenchymal hemorrhage. No loss of gray-white differentiation to suggest large territory infarct. Mild parenchymal volume loss. Scattered periventricular and subcortical white matter hypodensities, nonspecific, but most often   consistent with small vessel ischemic changes. 3 mm of rightward midline shift, previously 4 mm.    Ventricles and extra axial spaces:Prominent ventricles and sulci secondary to volume loss. Again seen is a left acute on chronic subdural hematoma measuring up to 1.2 cm in the left frontal convexity, similar to prior examination.    Other:Orbits are grossly intact. Scattered paranasal sinus mucosal thickening. Mastoid air cells are clear. Calvarium is intact. Intracranial atherosclerotic calcification is present.      Impression:      Impression:  1.Redemonstrated acute on chronic left subdural hematoma measuring up to 1.2 cm in the left frontal convexity, similar to prior examination.  2.3 mm of rightward midline shift, previously 4 mm.        Electronically Signed: Nicanor Rivers MD    3/12/2025 12:12 PM EDT    Workstation ID: [id number]    CT Head Without Contrast [[id number]] Collected: 03/12/25 0756     Updated: 03/12/25 0809    Narrative:      CT HEAD WO CONTRAST    Date of Exam: 3/12/2025 2:57 AM EDT    Indication: stroke SDH.    Comparison: CT head without contrast 3/11/2025, CT angiography  of the head and neck 3/11/2025    Technique: Axial CT images were obtained of the head without contrast administration.  Automated exposure control and iterative construction methods were used.      Findings:  Redemonstration of a holohemispheric left-sided acute on chronic subdural hematoma which measures up to 10 mm in thickness abutting the left frontal lobe, not significantly changed from the prior study when accounting for differences in measurement.   There is left to right midline shift which measures 4 mm which is unchanged. No intraventricular hemorrhage. Mild white matter findings of chronic microvascular disease. Posterior fossa without acute abnormality. The mastoid air cells are well-aerated.   Visualized sinuses are clear. No calvarial fracture. No hydrocephalus.      Impression:      Impression:  1. Left-sided acute on chronic holohemispheric subdural hematoma measuring up to 10 mm in thickness with left to right midline shift measuring 4 mm, not significantly changed from prior exam.  2. No hydrocephalus. No intraventricular hemorrhage.  3. Mild white matter findings of chronic microvascular disease.            Electronically Signed: Chuy Lawler MD    3/12/2025 8:06 AM EDT    Workstation ID: [id number]            Assessment    Subdural hematoma, nontraumatic    Essential hypertension    Type 2 diabetes mellitus, with long-term current use of insulin    Heart failure with preserved left ventricular function (HFpEF)    Current smoker    ESRD on hemodialysis    Coronary artery disease    Hypothyroidism (acquired)      Plan   Will ask intensivist to take over care for this patient. Nothing to add from our standpoint. Not a surgical candidate.      Yamini Schwarz, VICKI  03/13/25  07:47 EDT

## 2025-03-13 NOTE — THERAPY EVALUATION
Patient Name: Chevy Junior  : 1965    MRN: [id number]                              Today's Date: 3/13/2025       Admit Date: 3/11/2025    Visit Dx:     ICD-10-CM ICD-9-CM   1. Subdural hematoma, nontraumatic  I62.00 432.1     Patient Active Problem List   Diagnosis    Coronary artery disease involving native coronary artery of native heart with angina pectoris    Hyperlipidemia LDL goal <70    Essential hypertension    Type 2 diabetes mellitus, with long-term current use of insulin    Heart failure with preserved left ventricular function (HFpEF)    Current smoker    ESRD on hemodialysis    Abdominal pain    Weight loss    Elevated carcinoembryonic antigen (CEA)    Failure to thrive (child)    Atherosclerosis of native artery of lower extremity with gangrene    Atherosclerosis of native arteries of the extremities with gangrene    Open leg wound, unspecified laterality, initial encounter    Open leg wound    Amputation stump necrosis    Hyponatremia    Positive cardiac stress test    Coronary artery disease    Hypothyroidism (acquired)    Subdural hematoma, nontraumatic     Past Medical History:   Diagnosis Date    Anemia     Atherosclerosis of native artery of left lower extremity with gangrene     Cellulitis and abscess of buttock     CHF (congestive heart failure)     Chronic pain     COPD (chronic obstructive pulmonary disease)     INHALER    Coronary artery disease involving native coronary artery of native heart with angina pectoris 2022    stents x 3    Diabetes mellitus     ESRD on dialysis     M,W,F-FISTULA LEFT ARM    GERD (gastroesophageal reflux disease)     Heart failure with preserved left ventricular function (HFpEF) 2022    FOLLOWS DHEERAJ/REECE-NO CP    History of blood transfusion     NO ADVERSE REACTIONS    Hyperlipidemia     Hypertension     WIFE STATED B/P HAS BEEN RUNNING LOW LATELY    Hypothyroidism (acquired) 2025    Infectious viral hepatitis 5 years ago was  treated    hx hep c    RIC (obstructive sleep apnea)     Osteoarthritis     Phosphorus metabolic disorder     Sleep apnea     NO MACHINE    Substance abuse 20 years ago from [facility]     Past Surgical History:   Procedure Laterality Date    ABOVE KNEE LEG AMPUTATION Right     CARDIAC CATHETERIZATION      CARDIAC CATHETERIZATION N/A 11/30/2022    Procedure: Percutaneous Coronary Intervention;  Surgeon: River Acevedo IV, MD;  Location: [facility] CATH INVASIVE LOCATION;  Service: Cardiovascular;  Laterality: N/A;    CARDIAC CATHETERIZATION N/A 11/30/2022    Procedure: Optical Coherent Tomography;  Surgeon: River Acevedo IV, MD;  Location: [facility] CATH INVASIVE LOCATION;  Service: Cardiovascular;  Laterality: N/A;    CARDIAC CATHETERIZATION N/A 11/30/2022    Procedure: Stent JOSE coronary;  Surgeon: River Acevedo IV, MD;  Location: [facility] CATH INVASIVE LOCATION;  Service: Cardiovascular;  Laterality: N/A;    CARDIAC CATHETERIZATION Left 11/09/2023    Procedure: Aortogram with left leg angiogram, possible angioplasty or stenting;  Surgeon: Luis Fernando Rawls MD;  Location: [facility] CATH INVASIVE LOCATION;  Service: Vascular;  Laterality: Left;    CARDIAC CATHETERIZATION Right 04/25/2024    Procedure: Right lower extremity angiogram, possible angioplasty or stenting;  Surgeon: Luis Fernando Rawls MD;  Location: [facility] CATH INVASIVE LOCATION;  Service: Vascular;  Laterality: Right;    CARDIAC CATHETERIZATION N/A 12/05/2024    Procedure: Left Heart Cath;  Surgeon: Cole Oscar MD;  Location: [facility] CATH INVASIVE LOCATION;  Service: Cardiovascular;  Laterality: N/A;    CATH LAB PROCEDURE      unable to stent    COLONOSCOPY N/A 02/15/2023    Procedure: COLONOSCOPY WITH POLYPECTOMY;  Surgeon: David Glass MD;  Location: [facility] ENDOSCOPY;  Service: Gastroenterology;  Laterality: N/A;  COLON POLYP, MELANOSIS COLI    ENDOSCOPY N/A 02/03/2023    Procedure: ESOPHAGOGASTRODUODENOSCOPY with biopsy;   "Surgeon: Gus Whaley MD;  Location: [facility] ENDOSCOPY;  Service: General;  Laterality: N/A;  gastritis,     FEMORAL POPLITEAL BYPASS Left 12/12/2023    Procedure: Left femoral edarterectomy, left femoral to below the knee popliteal bypass graft;  Surgeon: Luis Fernando Rawls MD;  Location: [facility] MAIN OR;  Service: Vascular;  Laterality: Left;    HIP SURGERY Right     INSERTION PERITONEAL DIALYSIS CATHETER      LEG SURGERY Left     \"jocelyne in place\"    MANDIBLE FRACTURE SURGERY      REPLACEMENT TOTAL KNEE Right     x3    SHOULDER SURGERY Right     SPINE SURGERY      unknown procudure- completed at [facility] in [place].      General Information       Row Name 03/13/25 1138          Physical Therapy Time and Intention    Document Type evaluation  -ES     Mode of Treatment physical therapy  -ES       Row Name 03/13/25 1138          General Information    Patient Profile Reviewed yes  -ES     Prior Level of Function independent:;transfer;w/c or scooter;mod assist:;ADL's  Hx L AKA, uses electric w/c for mobility  -ES     Existing Precautions/Restrictions fall;oxygen therapy device and L/min;seizures  -ES     Barriers to Rehab medically complex;previous functional deficit;physical barrier  -ES       Row Name 03/13/25 1138          Living Environment    Current Living Arrangements home  -ES     People in Home other (see comments)  ex-wife  -ES       Row Name 03/13/25 1138          Home Main Entrance    Number of Stairs, Main Entrance other (see comments)  ramp  -ES       Row Name 03/13/25 1138          Cognition    Orientation Status (Cognition) oriented x 3  -ES       Row Name 03/13/25 1138          Safety Issues/Impairments Affecting Functional Mobility    Safety Issues Affecting Function (Mobility) awareness of need for assistance;insight into deficits/self-awareness;safety precautions follow-through/compliance  -ES     Impairments Affecting Function (Mobility) balance;endurance/activity tolerance;strength;shortness of " breath  -ES               User Key  (r) = Recorded By, (t) = Taken By, (c) = Cosigned By      Initials Name Provider Type    ES Suzi Vallejo PT Physical Therapist                   Mobility       Row Name 03/13/25 1140          Bed Mobility    Bed Mobility supine-sit  -ES     Supine-Sit [place] (Bed Mobility) contact guard  -ES     Assistive Device (Bed Mobility) bed rails  -ES       Row Name 03/13/25 1140          Bed-Chair Transfer    Bed-Chair [place] (Transfers) contact guard  -ES     Comment, (Bed-Chair Transfer) Pt able to squat pivot from EOB>chair w/ CGA. No LOB.  -ES       Row Name 03/13/25 1140          Sit-Stand Transfer    Sit-Stand [place] (Transfers) contact guard;verbal cues  -ES               User Key  (r) = Recorded By, (t) = Taken By, (c) = Cosigned By      Initials Name Provider Type    ES Suzi Vallejo PT Physical Therapist                   Obj/Interventions       Row Name 03/13/25 1141          Range of Motion Comprehensive    General Range of Motion other (see comments)  -ES     Comment, General Range of Motion LLE WFL, R hip ROM WFL  -ES       Row Name 03/13/25 1141          Strength Comprehensive (MMT)    General Manual Muscle Testing (MMT) Assessment lower extremity strength deficits identified  -ES     Comment, General Manual Muscle Testing (MMT) Assessment LLE 4-/5  -ES       Row Name 03/13/25 1141          Balance    Balance Assessment sitting static balance;sitting dynamic balance;standing static balance;standing dynamic balance  -ES     Static Sitting Balance standby assist  -ES     Dynamic Sitting Balance contact guard  -ES     Position, Sitting Balance unsupported;sitting edge of bed;other (see comments)  w/c  -ES     Static Standing Balance contact guard  -ES     Dynamic Standing Balance contact guard  -ES     Position/Device Used, Standing Balance supported  -ES     Balance Interventions sitting;standing;sit to stand;supported;static;dynamic;occupation  based/functional task  -ES       Row Name 03/13/25 1141          Sensory Assessment (Somatosensory)    Sensory Assessment (Somatosensory) LE sensation intact  -ES               User Key  (r) = Recorded By, (t) = Taken By, (c) = Cosigned By      Initials Name Provider Type    ES Suzi Vallejo, PT Physical Therapist                   Goals/Plan       Row Name 03/13/25 1145          Bed Mobility Goal 1 (PT)    Activity/Assistive Device (Bed Mobility Goal 1, PT) sit to supine/supine to sit  -ES     [place] Level/Cues Needed (Bed Mobility Goal 1, PT) supervision required  -ES     Time Frame (Bed Mobility Goal 1, PT) short term goal (STG);5 days  -ES       Row Name 03/13/25 1145          Transfer Goal 1 (PT)    Activity/Assistive Device (Transfer Goal 1, PT) sit-to-stand/stand-to-sit;bed-to-chair/chair-to-bed  -ES     [place] Level/Cues Needed (Transfer Goal 1, PT) supervision required  -ES     Time Frame (Transfer Goal 1, PT) long term goal (LTG);10 days  -ES       Row Name 03/13/25 1145          Therapy Assessment/Plan (PT)    Planned Therapy Interventions (PT) balance training;bed mobility training;gait training;home exercise program;neuromuscular re-education;patient/family education;strengthening;stair training;postural re-education;transfer training;wheelchair management/propulsion training  -ES               User Key  (r) = Recorded By, (t) = Taken By, (c) = Cosigned By      Initials Name Provider Type    ES Suzi Vallejo, PT Physical Therapist                   Clinical Impression       Row Name 03/13/25 1142          Pain    Pretreatment Pain Rating 7/10  -ES     Posttreatment Pain Rating 7/10  -ES     Pain Side/Orientation generalized  -ES     Pre/Posttreatment Pain Comment tolerated  -ES       Row Name 03/13/25 1142          Plan of Care Review    Plan of Care Reviewed With patient  -ES     Progress no change  -ES     Outcome Evaluation PT eval complete. Pt presents with mild generalized  weakness, decreased activity tolerance, and increased pain warranting IPPT services. Pt able to perform squat/pivot t/f with CGA from EOB>w/c. PT rec home w/ assist and HHPT/OT at d/c.  -ES       Row Name 03/13/25 1142          Therapy Assessment/Plan (PT)    Rehab Potential (PT) good  -ES     Criteria for Skilled Interventions Met (PT) yes;meets criteria;skilled treatment is necessary  -ES     Therapy Frequency (PT) daily  -ES     Predicted Duration of Therapy Intervention (PT) 10 days  -ES       Row Name 03/13/25 1142          Vital Signs    Pre Systolic BP Rehab 116  -ES     Pre Treatment Diastolic BP 78  -ES     Post Systolic BP Rehab 120  -ES     Post Treatment Diastolic BP 90  -ES     Pretreatment Heart Rate (beats/min) 86  -ES     Posttreatment Heart Rate (beats/min) 85  -ES     Pre SpO2 (%) 95  -ES     O2 Delivery Pre Treatment room air  -ES     O2 Delivery Intra Treatment room air  -ES     Post SpO2 (%) 96  -ES     O2 Delivery Post Treatment room air  -ES     Pre Patient Position Supine  -ES     Intra Patient Position Standing  -ES     Post Patient Position Sitting  -ES       Row Name 03/13/25 1142          Positioning and Restraints    Pre-Treatment Position in bed  -ES     Post Treatment Position wheelchair  -ES     In Wheelchair notified nsg;sitting;call light within reach;encouraged to call for assist;exit alarm on;waffle cushion;legs elevated  -ES               User Key  (r) = Recorded By, (t) = Taken By, (c) = Cosigned By      Initials Name Provider Type    ES Suzi Vallejo, PT Physical Therapist                   Outcome Measures       Row Name 03/13/25 1145          How much help from another person do you currently need...    Turning from your back to your side while in flat bed without using bedrails? 4  -ES     Moving from lying on back to sitting on the side of a flat bed without bedrails? 3  -ES     Moving to and from a bed to a chair (including a wheelchair)? 3  -ES     Standing up from a  chair using your arms (e.g., wheelchair, bedside chair)? 3  -ES     Climbing 3-5 steps with a railing? 1  -ES     To walk in hospital room? 1  -ES     AM-PAC 6 Clicks Score (PT) 15  -ES     Highest Level of Mobility Goal 4 --> Transfer to chair/commode  -ES       Row Name 03/13/25 1145          Modified [place] Scale    Pre-Stroke Modified [place] Scale 6 - Unable to determine (UTD) from the medical record documentation  -ES     Modified [place] Scale 3 - Moderate disability.  Requiring some help, but able to walk without assistance.  -ES       Row Name 03/13/25 1145          Functional Assessment    Outcome Measure Options AM-PAC 6 Clicks Basic Mobility (PT);Modified [place]  -ES               User Key  (r) = Recorded By, (t) = Taken By, (c) = Cosigned By      Initials Name Provider Type    Suzi Retana PT Physical Therapist                                 Physical Therapy Education       Title: PT OT SLP Therapies (In Progress)       Topic: Physical Therapy (In Progress)       Point: Mobility training (Done)       Learning Progress Summary            Patient Acceptance, E,TB, VU by  at 3/13/2025 1146                      Point: Home exercise program (Not Started)       Learner Progress:  Not documented in this visit.              Point: Body mechanics (Done)       Learning Progress Summary            Patient Acceptance, E,TB, VU by  at 3/13/2025 1146                      Point: Precautions (Done)       Learning Progress Summary            Patient Acceptance, E,TB, VU by ES at 3/13/2025 1146                                      User Key       Initials Effective Dates Name Provider Type Discipline     08/11/22 -  Suzi Vallejo PT Physical Therapist PT                  PT Recommendation and Plan  Planned Therapy Interventions (PT): balance training, bed mobility training, gait training, home exercise program, neuromuscular re-education, patient/family education, strengthening, stair training, postural  re-education, transfer training, wheelchair management/propulsion training  Progress: no change  Outcome Evaluation: PT eval complete. Pt presents with mild generalized weakness, decreased activity tolerance, and increased pain warranting IPPT services. Pt able to perform squat/pivot t/f with CGA from EOB>w/c. PT rec home w/ assist and HHPT/OT at d/c.     Time Calculation:   PT Evaluation Complexity  History, PT Evaluation Complexity: 1-2 personal factors and/or comorbidities  Examination of Body Systems (PT Eval Complexity): total of 3 or more elements  Clinical Presentation (PT Evaluation Complexity): evolving  Clinical Decision Making (PT Evaluation Complexity): moderate complexity  Overall Complexity (PT Evaluation Complexity): moderate complexity     PT Charges       Row Name 03/13/25 1148             Time Calculation    Start Time 1055  -ES      PT Received On 03/13/25  -ES      PT Goal Re-Cert Due Date 03/23/25  -ES         Untimed Charges    PT Eval/Re-eval Minutes 46  -ES         Total Minutes    Untimed Charges Total Minutes 46  -ES       Total Minutes 46  -ES                User Key  (r) = Recorded By, (t) = Taken By, (c) = Cosigned By      Initials Name Provider Type    ES Suzi Vallejo, PT Physical Therapist                  Therapy Charges for Today       Code Description Service Date Service Provider Modifiers Qty    [id number] HC PT EVAL MOD COMPLEXITY 4 3/13/2025 Suzi Vallejo, PT GP 1            PT G-Codes  Outcome Measure Options: AM-PAC 6 Clicks Basic Mobility (PT), Modified Douglas  AM-PAC 6 Clicks Score (PT): 15  Modified [place] Scale: 3 - Moderate disability.  Requiring some help, but able to walk without assistance.  PT Discharge Summary  Anticipated Discharge Disposition (PT): home with assist, home with home health    Suzi Vallejo PT  3/13/2025

## 2025-03-13 NOTE — PROGRESS NOTES
"   LOS: 2 days    Patient Care Team:  Jeanie Francois APRN as PCP - General (Nurse Practitioner)  Nikita Villasenor MD as Consulting Physician (Nephrology)  Feng Nielsen APRN as Nurse Practitioner (Interventional Cardiology)  Hal Wing MD as Consulting Physician (Cardiology)  Carlos Mcgee MD (General Surgery)  Gus Whaley MD as Consulting Physician (General Surgery)  Luis Fernando Rawls MD as Consulting Physician (Vascular Surgery)    Chief Complaint:  CAD    Subjective     Interval History:     No acute events overnight. No new complaints       Review of Systems:   No CP or SOA , fever, chills, rigors, rash, N/V, Constipation.       Objective     Vital Sign Min/Max for last 24 hours  Temp  Min: 97.5 °F (36.4 °C)  Max: 98.9 °F (37.2 °C)   BP  Min: 92/78  Max: 148/75   Pulse  Min: 82  Max: 100   Resp  Min: 16  Max: 22   SpO2  Min: 90 %  Max: 100 %   Flow (L/min) (Oxygen Therapy)  Min: 2  Max: 2   No data recorded     Flowsheet Rows      Flowsheet Row First Filed Value   Admission Height 170.2 cm (67\") Documented at 03/11/2025 1228   Admission Weight 77.6 kg (171 lb) Documented at 03/11/2025 1228            No intake/output data recorded.  I/O last 3 completed shifts:  In: 960 [P.O.:960]  Out: 2700     Physical Exam:    Gen: Alert, NAD   HENT: NC, AT, EOMI   NECK: Supple, no JVD, Trachea midline   LUNGS: CTA bilaterally, non labored respirtation   CVS: S1/S2 audible, RRR, no murmur   Abd: Soft, NT, ND, BS+   Ext: No pedal edema, no cyanosis   CNS: Alert, No focal deficit noted grossly  Psy: Cooperative  Skin: Warm, dry and intact      WBC WBC   Date Value Ref Range Status   03/12/2025 5.09 3.40 - 10.80 10*3/mm3 Final   03/11/2025 5.85 3.40 - 10.80 10*3/mm3 Final      HGB Hemoglobin   Date Value Ref Range Status   03/12/2025 8.3 (L) 13.0 - 17.7 g/dL Final   03/11/2025 10.4 (L) 13.0 - 17.7 g/dL Final      HCT Hematocrit   Date Value Ref Range Status   03/12/2025 26.6 (L) 37.5 - 51.0 % Final " "  03/11/2025 34.0 (L) 37.5 - 51.0 % Final      Platlets No results found for: \"LABPLAT\"   MCV MCV   Date Value Ref Range Status   03/12/2025 90.2 79.0 - 97.0 fL Final   03/11/2025 91.9 79.0 - 97.0 fL Final          Sodium Sodium   Date Value Ref Range Status   03/12/2025 140 136 - 145 mmol/L Final   03/11/2025 137 136 - 145 mmol/L Final      Potassium Potassium   Date Value Ref Range Status   03/12/2025 4.0 3.5 - 5.2 mmol/L Final     Comment:     Slight hemolysis detected by analyzer. Result may be falsely elevated.   03/11/2025 4.3 3.5 - 5.2 mmol/L Final     Comment:     Slight hemolysis detected by analyzer. Result may be falsely elevated.      Chloride Chloride   Date Value Ref Range Status   03/12/2025 102 98 - 107 mmol/L Final   03/11/2025 94 (L) 98 - 107 mmol/L Final      CO2 CO2   Date Value Ref Range Status   03/12/2025 21.0 (L) 22.0 - 29.0 mmol/L Final   03/11/2025 24.0 22.0 - 29.0 mmol/L Final      BUN BUN   Date Value Ref Range Status   03/12/2025 57 (H) 8 - 23 mg/dL Final   03/11/2025 60 (H) 8 - 23 mg/dL Final      Creatinine Creatinine   Date Value Ref Range Status   03/12/2025 7.09 (H) 0.76 - 1.27 mg/dL Final   03/11/2025 8.03 (H) 0.76 - 1.27 mg/dL Final      Calcium Calcium   Date Value Ref Range Status   03/12/2025 7.3 (L) 8.6 - 10.5 mg/dL Final   03/11/2025 8.6 8.6 - 10.5 mg/dL Final      PO4 No results found for: \"CAPO4\"   Albumin Albumin   Date Value Ref Range Status   03/11/2025 3.7 3.5 - 5.2 g/dL Final      Magnesium Magnesium   Date Value Ref Range Status   03/12/2025 1.8 1.6 - 2.4 mg/dL Final      Uric Acid No results found for: \"URICACID\"        Results Review:     I reviewed the patient's new clinical results.    [Held by provider] aspirin, 81 mg, Oral, Daily  atorvastatin, 40 mg, Oral, Daily  cinacalcet, 30 mg, Oral, Daily  [Held by provider] clopidogrel, 75 mg, Oral, Daily  gabapentin, 100 mg, Oral, Q8H  insulin lispro, 2-7 Units, Subcutaneous, 4x Daily AC & at Bedtime  isosorbide " mononitrate, 30 mg, Oral, Q24H  levETIRAcetam, 500 mg, Oral, Daily  levothyroxine, 75 mcg, Oral, Q AM  metoclopramide, 5 mg, Oral, Nightly  metoprolol tartrate, 25 mg, Oral, Q12H  nicotine, 1 patch, Transdermal, Q24H  pantoprazole, 40 mg, Oral, Daily  ranolazine, 500 mg, Oral, Q12H  sertraline, 150 mg, Oral, Daily  sodium chloride, 10 mL, Intravenous, Q12H         Results from last 7 days   Lab Units 03/12/25  0439 03/11/25  1948   SODIUM mmol/L 140 137   POTASSIUM mmol/L 4.0 4.3   CHLORIDE mmol/L 102 94*   CO2 mmol/L 21.0* 24.0   BUN mg/dL 57* 60*   CREATININE mg/dL 7.09* 8.03*   CALCIUM mg/dL 7.3* 8.6   ALBUMIN g/dL  --  3.7   WBC 10*3/mm3 5.09 5.85   HEMOGLOBIN g/dL 8.3* 10.4*   PLATELETS 10*3/mm3 120* 148           Medication Review: yes    Assessment & Plan       Subdural hematoma, nontraumatic    Essential hypertension    Type 2 diabetes mellitus, with long-term current use of insulin    Heart failure with preserved left ventricular function (HFpEF)    Current smoker    ESRD on hemodialysis    Coronary artery disease    Hypothyroidism (acquired)      1. End-stage renal disease - MWF  2. CAD - cardiology following   3. Anemia  4. Hyponatremia  5. Hypertension  6- Acute left subdural hematoma - Neurology on board.   7- Seizure        Plan:   - HD tomorrow, UF as tolerated.   - Adjust electrolytes with dialysis   - Continue all home medications.   - Renal diet  - NATALIA with HD     Kurt Richardson MD  03/13/25  09:19 EDT

## 2025-03-13 NOTE — PLAN OF CARE
Goal Outcome Evaluation:  Plan of Care Reviewed With: patient        Progress: improving  Outcome Evaluation: OT eval complete. Pt presents w/ generalized weakness and decreased functional endurance warranting cont skilled IPOT POC to promote return to PLOF. Recommend pt DC home w/ assist and  OT/PT services.    Anticipated Discharge Disposition (OT): home with assist, home with home health

## 2025-03-13 NOTE — DISCHARGE SUMMARY
Date of Discharge:  3/13/2025    Discharge Diagnosis: Ischemic cardiomyopathy, SDH, seizure    Presenting Problem/History of Present Illness    Mr. Junior is a 59 year old male with pmhx of HTN, HLD, Hypothyroidism, COPD, RIC, CKD on HD (MWF), PAD with prior right AKA and left femoropopliteal bypass, chronic pain/opioid dependent, CAD with ischemic cardiomyopathy who was admitted on 3/1/25 for CTS evaluation for CABG. He was deemed a poor surgical candidate per CTS who recommended medical mgt versus high risk PCI. Cardiology was consulted and felt that he would be best served with medical management. As such, he was started on Imdur. He will follow up with Cardiology in 6-8 weeks as an outpatient.    On 3/11/2025, he developed acute onset encephalopathy and a stat head CT scan showed showed an acute on chronic left SDH.  While awaiting CT scan, he had a witnessed GTC seizure lasting less than a minute.  He was treated with Valium and Keppra. He was subsequently transferred to ICU for tight blood pressure management and close monitoring of neurologic status.  Neurology and neurosurgery evaluated patient.  There was no indication for surgical intervention per neurosurgery. Neurosurgery recommended holding Plavix but okay to resume ASA until his repeat CT H as an outpatient. Patient remained stable from a neurological standpoint and did not have any further seizures. Neurosurgery recommended follow-up with them in 3 weeks with repeat head CT scan.  General neurology recommended continuing Keppra 500 mg daily (ESRD dosing) and to follow up with them in 4 weeks.    Patient was counseled on need for smoking cessation and on driving restriction for at least 90 days due to seizure.     Patient declined home health.     Patient will resume outpatient hemodialysis on his normal schedule.     Condition on Discharge:  stable    Discharge Disposition: home    Consults:   Consults       Date and Time Order Name Status  Description    3/12/2025  5:03 PM Inpatient Neurology Consult General Completed     3/11/2025  9:12 PM Inpatient Neurosurgery Consult Completed     3/11/2025  7:50 PM Inpatient Neurology Consult Stroke Completed     3/11/2025  1:30 PM Inpatient Cardiology Consult Completed     3/11/2025 12:45 PM Inpatient Nephrology Consult Completed     3/11/2025 12:42 PM Inpatient Hospitalist Consult Completed             Vital Signs  Temp:  [97.5 °F (36.4 °C)-98.9 °F (37.2 °C)] 98.2 °F (36.8 °C)  Heart Rate:  [] 83  Resp:  [14-20] 14  BP: ()/(22-98) 111/98    Physical Exam:     General Appearance:  Alert, cooperative, in no acute distress   Head:  Normocephalic, without obvious abnormality, atraumatic   Eyes:  PERRLA   Lungs:  Clear to auscultation, respirations regular, even and unlabored    Heart:  Regular rhythm and normal rate, normal S1 and S2, no murmur, no gallop, no rub   Abdomen:  S/NT/ND. + BS   Extremities:  Edema LLE, s/p R AKA.   Skin:  No bleeding, bruising or rash   Neurologic:  No gross deficits. Ox3       Discharge Medications     Discharge Medications        ASK your doctor about these medications        Instructions Start Date   albuterol sulfate  (90 Base) MCG/ACT inhaler  Commonly known as: PROVENTIL HFA;VENTOLIN HFA;PROAIR HFA   1 puff, Every 4 Hours PRN      aspirin 81 MG EC tablet   81 mg, Oral, Daily      atorvastatin 40 MG tablet  Commonly known as: LIPITOR   40 mg, Oral, Daily      Auryxia 1  MG(Fe) tablet  Generic drug: Ferric Citrate   210 mg, Oral, Daily With Lunch      BASAGLAR KWIKPEN 100 UNIT/ML injection pen   5-15 Units, 3 Times Daily PRN      cinacalcet 30 MG tablet  Commonly known as: SENSIPAR   30 mg, Daily      clopidogrel 75 MG tablet  Commonly known as: PLAVIX   75 mg, Oral, Daily      Diclofenac Sodium 1 % gel gel  Commonly known as: VOLTAREN   4 g, As Needed      gabapentin 100 MG capsule  Commonly known as: NEURONTIN   100 mg, Daily      heparin (porcine) 1000  UNIT/ML injection   1,000 Units      lactulose 10 GM/15ML solution  Commonly known as: CHRONULAC   20 g, 2 Times Daily PRN      levothyroxine 75 MCG tablet  Commonly known as: SYNTHROID, LEVOTHROID   75 mcg, Daily      lidocaine-prilocaine 2.5-2.5 % cream  Commonly known as: EMLA   1 Application, As Needed      Lokelma 10 g packet  Generic drug: sodium zirconium cyclosilicate   10 g, Oral, Once, Empty entire contents of the packet(s) into a glass with at least 3 tablespoons (45 mL) of water. Stir well and drink immediately; if powder remains in the glass, add water, stir and drink immediately; repeat until no powder remains. Administer other oral medications at least 2 hours before or 2 hours after dose.      metoclopramide 5 MG tablet  Commonly known as: REGLAN   5 mg, Nightly      midodrine 5 MG tablet  Commonly known as: PROAMATINE   5 mg, 3 Times Daily Before Meals      naloxone 4 MG/0.1ML nasal spray  Commonly known as: NARCAN   4 mg, Take As Directed      nitroglycerin 0.4 MG SL tablet  Commonly known as: NITROSTAT   0.4 mg, Sublingual, Every 5 Minutes PRN      ondansetron ODT 4 MG disintegrating tablet  Commonly known as: ZOFRAN-ODT   4 mg, Every 6 Hours PRN      oxyCODONE 30 MG immediate release tablet  Commonly known as: ROXICODONE   1.5 tablets, Every 4 Hours PRN      pantoprazole 40 MG EC tablet  Commonly known as: PROTONIX   40 mg, Daily      ranolazine 500 MG 12 hr tablet  Commonly known as: Ranexa   Take 1 tablet by mouth once daily on days that you are NOT undergoing dialysis      sertraline 100 MG tablet  Commonly known as: ZOLOFT   150 mg, Daily      Xphozah 30 MG tablet  Generic drug: Tenapanor HCl (CKD)   30 mg, Daily PRN               Discharge Diet:   Dietary Orders (From admission, onward)       Start     Ordered    03/12/25 0913  Diet: Cardiac, Diabetic, Renal; Healthy Heart (2-3 Na+); Consistent Carbohydrate; Low Phosphorus; Fluid Consistency: Thin (IDDSI 0)  Diet Effective Now         References:    Diet Order Definitions   Question Answer Comment   Diets: Cardiac    Diets: Diabetic    Diets: Renal    Cardiac Diet: Healthy Heart (2-3 Na+)    Diabetic Diet: Consistent Carbohydrate    Renal Diet: Low Phosphorus    Fluid Consistency: Thin (IDDSI 0)        03/12/25 0912    03/11/25 1314  DIET MESSAGE Please send patient a lunch tray. Thank you  Once        Comments: Please send patient a lunch tray. Thank you    03/11/25 1313                   Activity at Discharge:   Resume normal physical activity.   No driving until cleared by general neurology to do so.     Follow-up Appointments  Future Appointments   Date Time Provider Department Center   3/24/2025  8:15 AM [facility] VASC PAV 2 [facility]   3/24/2025  9:45 AM Luis Fernando Rawls MD [facility] VS [facility]   4/10/2025 11:30 AM [place] [facility]  [place] [facility]   4/10/2025  1:30 PM Madeline Avendano, [facility] [place]     Additional Instructions for the Follow-ups that You Need to Schedule       CT Head Without Contrast   Apr 02, 2025      Exam reason: f/u SDH   Release to patient: Routine Release                Test Results Pending at Discharge  Pending Labs       Order Current Status    Basic Metabolic Panel In process    Hepatitis Panel, Acute In process             Time: Discharge 35 min Electronically signed by VICKI Oliveros, 03/13/25, 3:53 PM EDT.

## 2025-03-14 NOTE — OUTREACH NOTE
Prep Survey      Flowsheet Row Responses   [facility] facility patient discharged from? [place]   Is LACE score < 7 ? No   Eligibility Readm Mgmt   Discharge diagnosis Subdural hematoma, nontraumatic   Does the patient have one of the following disease processes/diagnoses(primary or secondary)? Other   Does the patient have Home health ordered? No   Is there a DME ordered? No   Prep survey completed? Yes            DARRELL ESQUEDA - Registered Nurse

## 2025-03-24 ENCOUNTER — HOSPITAL ENCOUNTER (OUTPATIENT)
Dept: CARDIOLOGY | Facility: HOSPITAL | Age: 60
Discharge: HOME | End: 2025-03-24
Admitting: SURGERY
Payer: MEDICAID

## 2025-03-24 ENCOUNTER — OFFICE VISIT (OUTPATIENT)
Age: 60
End: 2025-03-24
Payer: MEDICAID

## 2025-03-24 ENCOUNTER — READMISSION MANAGEMENT (OUTPATIENT)
Dept: CALL CENTER | Facility: HOSPITAL | Age: 60
End: 2025-03-24
Payer: MEDICAID

## 2025-03-24 VITALS
RESPIRATION RATE: 16 BRPM | HEART RATE: 77 BPM | DIASTOLIC BLOOD PRESSURE: 72 MMHG | OXYGEN SATURATION: 97 % | WEIGHT: 171 LBS | HEIGHT: 67 IN | TEMPERATURE: 97.7 F | BODY MASS INDEX: 26.84 KG/M2 | SYSTOLIC BLOOD PRESSURE: 143 MMHG

## 2025-03-24 DIAGNOSIS — I70.25 ATHEROSCLEROSIS OF NATIVE ARTERIES OF THE EXTREMITIES WITH ULCERATION: Primary | ICD-10-CM

## 2025-03-24 DIAGNOSIS — Z95.828 S/P VASCULAR BYPASS: ICD-10-CM

## 2025-03-24 DIAGNOSIS — I70.25 ATHEROSCLEROSIS OF NATIVE ARTERIES OF THE EXTREMITIES WITH ULCERATION: ICD-10-CM

## 2025-03-24 LAB
BH CV GRAFT 1 - DISTAL ANASTAMOSIS PSV-LEFT: 56 CM/S
BH CV GRAFT 1 - DISTAL GRAFT PSV-LEFT: 55 CM/S
BH CV GRAFT 1 - INFLOW ARTERY PSV- LEFT: 76 CM/S
BH CV GRAFT 1 - MID DISTAL GRAFT PSV-LEFT: 39 CM/S
BH CV GRAFT 1 - MID GRAFT PSV-LEFT: 44 CM/S
BH CV GRAFT 1 - OUTFLOW ARTERY PSV-LEFT: 55 CM/S
BH CV GRAFT 1 - PROX GRAFT PSV-LEFT: 57 CM/S
BH CV GRAFT 1 - PROX MID GRAFT PSV-LEFT: 44 CM/S
BH CV GRAFT 1 - PROXIMAL ANASTAMOSIS PSV-LEFT: 50 CM/S
BH CV GRAFT 1- DISTAL ANASTAMOSIS EDV-LEFT: 2 CM/S
BH CV GRAFT 1- DISTAL GRAFT EDV-LEFT: 7 CM/S
BH CV GRAFT 1- INFLOW ARTERY EDV- LEFT: 7 CM/S
BH CV GRAFT 1- MID DISTAL GRAFT EDV-LEFT: 8 CM/S
BH CV GRAFT 1- MID GRAFT EDV-LEFT: 4 CM/S
BH CV GRAFT 1- OUTFLOW ARTERY EDV-LEFT: 8 CM/S
BH CV GRAFT 1- PROX GRAFT EDV-LEFT: 3 CM/S
BH CV GRAFT 1- PROX MID GRAFT EDV-LEFT: 6 CM/S
BH CV GRAFT 1- PROXIMAL ANASTAMOSIS EDV-LEFT: 7 CM/S
BH CV GRAFT BRACHIAL PRESSURE LEFT: NORMAL MMHG
BH CV GRAFT BRACHIAL PRESSURE RIGHT: 142 MMHG
BH CV LEA LEFT ANT TIBIAL A DISTAL EDV: 2.9 CM/S
BH CV LEA LEFT ANT TIBIAL A DISTAL PSV: 56.3 CM/S
BH CV LEA LEFT ANT TIBIAL A MID EDV: 5.2 CM/S
BH CV LEA LEFT ANT TIBIAL A MID PSV: 30.3 CM/S
BH CV LEA LEFT ANT TIBIAL A PROX EDV: 2.21 CM/S
BH CV LEA LEFT ANT TIBIAL A PROX PSV: 15 CM/S
BH CV LEA LEFT CFA DISTAL EDV: 7 CM/S
BH CV LEA LEFT CFA DISTAL PSV: 76 CM/S
BH CV LEA LEFT CFA PROX EDV: 10 CM/S
BH CV LEA LEFT CFA PROX PSV: 158 CM/S
BH CV LEA LEFT DFA PROX EDV: 10 CM/S
BH CV LEA LEFT DFA PROX PSV: 82 CM/S
BH CV LEA LEFT DPA PRESSURE: 158 MMHG
BH CV LEA LEFT PERONEAL  DISTAL EDV: 2.38 CM/S
BH CV LEA LEFT PERONEAL  DISTAL PSV: 13.9 CM/S
BH CV LEA LEFT PERONEAL  MID EDV: 5.6 CM/S
BH CV LEA LEFT PERONEAL  MID PSV: 18.8 CM/S
BH CV LEA LEFT PERONEAL  PROX EDV: 6.7 CM/S
BH CV LEA LEFT PERONEAL  PROX PSV: 12.8 CM/S
BH CV LEA LEFT POPITEAL A  DISTAL EDV: 4.8 CM/S
BH CV LEA LEFT POPITEAL A  DISTAL PSV: 32.1 CM/S
BH CV LEA LEFT POPITEAL A  MID EDV: 8 CM/S
BH CV LEA LEFT POPITEAL A  MID PSV: 28 CM/S
BH CV LEA LEFT POPITEAL A  PROX EDV: 8.7 CM/S
BH CV LEA LEFT POPITEAL A  PROX PSV: 17.9 CM/S
BH CV LEA LEFT PTA DISTAL EDV: 5.7 CM/S
BH CV LEA LEFT PTA DISTAL PSV: 10.1 CM/S
BH CV LEA LEFT PTA MID EDV: 4.5 CM/S
BH CV LEA LEFT PTA MID PSV: 13.6 CM/S
BH CV LEA LEFT PTA PRESSURE: >255 MMHG
BH CV LEA LEFT PTA PROX EDV: 3.2 CM/S
BH CV LEA LEFT PTA PROX PSV: 8.8 CM/S
BH CV LEA LEFT SFA DISTAL EDV: 0 CM/S
BH CV LEA LEFT SFA DISTAL PSV: 0 CM/S
BH CV LEA LEFT SFA MID EDV: 0 CM/S
BH CV LEA LEFT SFA MID PSV: 0 CM/S
BH CV LEA LEFT SFA PROX EDV: 1.24 CM/S
BH CV LEA LEFT SFA PROX PSV: 9.2 CM/S
BH CV LEA LEFT TIBEOPERONEAL EDV: 2 CM/S
BH CV LEA LEFT TIBEOPERONEAL PSV: 12 CM/S
BH CV LOWER ARTERIAL LEFT ABI RATIO: 1.1
BH CV LOWER ARTERIAL RIGHT ABI RATIO: NORMAL
BH CV VAS LEA LEFT HIDDEN GRAFT ALERT: 33
LEFT GROIN CFA SYS: 158 CM/SEC

## 2025-03-24 PROCEDURE — 3077F SYST BP >= 140 MM HG: CPT | Performed by: SURGERY

## 2025-03-24 PROCEDURE — 3078F DIAST BP <80 MM HG: CPT | Performed by: SURGERY

## 2025-03-24 PROCEDURE — 93926 LOWER EXTREMITY STUDY: CPT | Performed by: SURGERY

## 2025-03-24 PROCEDURE — 1160F RVW MEDS BY RX/DR IN RCRD: CPT | Performed by: SURGERY

## 2025-03-24 PROCEDURE — 1159F MED LIST DOCD IN RCRD: CPT | Performed by: SURGERY

## 2025-03-24 PROCEDURE — 99213 OFFICE O/P EST LOW 20 MIN: CPT | Performed by: SURGERY

## 2025-03-24 PROCEDURE — 93926 LOWER EXTREMITY STUDY: CPT

## 2025-03-24 NOTE — OUTREACH NOTE
Medical Week 2 Survey      Flowsheet Row Responses   [facility] [place] patient discharged from? [place]   Does the patient have one of the following disease processes/diagnoses(primary or secondary)? Other   Week 2 attempt successful? Yes   Call start time 1751   Discharge diagnosis Subdural hematoma, nontraumatic   Call end time 1754   Person spoke with today (if not patient) and relationship [place] (KRISHNA)   Does the patient have all medications ordered at discharge? Yes   Is the patient taking all medications as directed (includes completed medication regime)? Yes   Does the patient have a primary care provider?  Yes   Comments regarding PCP Patient will see PCP tomorrow   Comments Patient saw vascular surgeon today   Psychosocial issues? No   What is the patient's perception of their health status since discharge? Improving   Is the patient/caregiver able to teach back signs and symptoms related to disease process for when to call PCP? Yes   Is the patient/caregiver able to teach back signs and symptoms related to disease process for when to call 911? Yes   Is the patient/caregiver able to teach back the hierarchy of who to call/visit for symptoms/problems? PCP, Specialist, Home health nurse, Urgent Care, ED, 911 Yes   If the patient is a current smoker, are they able to teach back resources for cessation? Smoking cessation medications  [Encouraged discussion with PCP tomorrow]   Week 2 Call Completed? Yes   Graduated Yes   Did the patient feel the follow up calls were helpful during their recovery period? Yes   Was the number of calls appropriate? Yes   Is the patient interested in additional calls from an ambulatory ? No   Would this patient benefit from a Referral to Amb Social Work? No   Wrap up additional comments [place] states patient is improving. Patient has multiple upcoming follow up appts. No questions or concerns at this time.   Call end time 1754            KELLY Gaxiola  Nurse

## 2025-03-24 NOTE — PROGRESS NOTES
[facility]   Follow up Office    Patient Name: Chevy Junior  : 1965  MRN: [id number]  Primary Care Physician:  Jeanie Francois APRN      Subjective   Subjective     HPI:    Chevy Junior is a 59 y.o. male here for routine follow-up after a left leg bypass graft 2023.  Doing well from that standpoint.  Was supposed to have some type of cardiac intervention but this had to be put on hold because of he had a brain bleed and a seizure activity.  Now he is considered high risk by CT surgery as well as by interventional cardiology.  No other complaints at this time.      Objective     Vitals:   Temp:  [97.7 °F (36.5 °C)] 97.7 °F (36.5 °C)  Heart Rate:  [77] 77  Resp:  [16] 16  BP: (143)/(72) 143/72    Physical Exam      General: Alert, no acute distress.  HEENT: PERRLA  Abdomen: Benign  Extremities: Status post right AKA.  Neuro: No gross deficits    Diagnostic studies: A left leg duplex in the office today demonstrates a patent bypass graft without any evidence of stenosis.    Assessment & Plan   Assessment / Plan     Diagnoses and all orders for this visit:    1. Atherosclerosis of native arteries of the extremities with ulceration (Primary)  -     Duplex Lower Extremity Art / Grafts - Left CAR; Future    2. S/P vascular bypass  -     Duplex Lower Extremity Art / Grafts - Left CAR; Future       Assessment/Plan:   Satisfactory progress following left leg bypass graft.  Follow-up with a repeat study in 6 months.        Electronically signed by Luis Fernando Rawls MD, 25, 9:39 AM EDT.

## 2025-03-25 ENCOUNTER — TELEPHONE (OUTPATIENT)
Dept: NEUROLOGY | Facility: CLINIC | Age: 60
End: 2025-03-25
Payer: MEDICAID

## 2025-03-25 NOTE — TELEPHONE ENCOUNTER
Caller: BRENDA FANG (POA)    Relationship to patient: Emergency Contact    Best call back number: [phone]    New or established patient?  [x] New  [] Established    Date of discharge: 03/13/25    Facility discharged from: [facility]    Diagnosis/Symptoms: SEIZURES    Length of stay (If applicable): 2 DAYS    Specialty Only: Did you see a [facility] provider?    [x] Yes  [] No  If so, who? WINIFRED WESTBROOK    Additional Details: BRENDA CALLED TO SET UP APPT BUT HAD TO DISCONNECT DUE TO AN INCOMING TELEHEALTH VISIT FOR THE PT.     THEY ASK IF POSSIBLE FOR THIS TO BE TELEHEALTH, ADVISED THE PT MUST BE SEEN IN THE OFFICE FIRST.     SHE STATED SHE W/C BACK TO SCHEDULE

## 2025-03-25 NOTE — TELEPHONE ENCOUNTER
Caller: BRENDA FANG (POA)    Relationship: Emergency Contact    Best call back number: [phone]    What was the call regarding: PT HAS BEEN SCHEDULED FOR HIS 4 WK HOSPITAL F/U ON 7/10/2025 W/ DR. HAMEED AND ADDED TO THE WAITLIST. PLEASE BE AWARE THIS APPT HAS BEEN SCHEDULED OUTSIDE OF THE SUGGESTED F/U TIMEFRAME INDICATED ON DISCHARGE SUMMARY.    Do you require a callback: IF NEEDED.    PLEASE REVIEW AND ADVISE.

## 2025-04-10 ENCOUNTER — OFFICE VISIT (OUTPATIENT)
Dept: NEUROSURGERY | Facility: CLINIC | Age: 60
End: 2025-04-10
Payer: MEDICAID

## 2025-04-10 ENCOUNTER — HOSPITAL ENCOUNTER (OUTPATIENT)
Dept: CT IMAGING | Facility: HOSPITAL | Age: 60
Discharge: HOME | End: 2025-04-10
Payer: MEDICAID

## 2025-04-10 VITALS — WEIGHT: 180 LBS | TEMPERATURE: 97.7 F | HEIGHT: 67 IN | BODY MASS INDEX: 28.25 KG/M2

## 2025-04-10 DIAGNOSIS — I62.00 SUBDURAL HEMATOMA, NONTRAUMATIC: Primary | ICD-10-CM

## 2025-04-10 DIAGNOSIS — I62.00 SUBDURAL HEMATOMA, NONTRAUMATIC: ICD-10-CM

## 2025-04-10 PROCEDURE — 70450 CT HEAD/BRAIN W/O DYE: CPT

## 2025-04-10 NOTE — PROGRESS NOTES
Name: Chevy Junior    : 1965     MRN: [id number]     Primary Care Provider: Jeanie Francois APRN    Chief Complaint  SDH      History of Present Illness:  Chevy Junior is a 59 y.o. male who presents to the clinic today for follow up regarding subdural hematoma.  He is initially evaluated in the hospital on 3/12 for small subdural hematoma on the left.  He was initially set to have intervention on his coronary artery disease with Dr. Lay however this was postponed due to subdural.  This was monitored with repeat imaging which is stable.  He comes in today for routine follow-up.  He denies any new or worsening neurological changes.  He denies any new head trauma.  He continues taking aspirin daily.    PMHX  Allergies:  No Known Allergies  Medications    Current Outpatient Medications:     albuterol sulfate  (90 Base) MCG/ACT inhaler, Inhale 1 puff Every 4 (Four) Hours As Needed for Wheezing or Shortness of Air., Disp: , Rfl:     aspirin (aspirin) 81 MG EC tablet, Take 1 tablet by mouth Daily., Disp: 30 tablet, Rfl: 8    atorvastatin (LIPITOR) 40 MG tablet, Take 1 tablet by mouth Daily., Disp: 90 tablet, Rfl: 3    Auryxia 1  MG(Fe) tablet, Take 1 tablet by mouth Daily With Lunch., Disp: , Rfl:     cinacalcet (SENSIPAR) 30 MG tablet, Take 1 tablet by mouth Daily., Disp: , Rfl:     gabapentin (NEURONTIN) 100 MG capsule, Take 1 capsule by mouth Daily., Disp: , Rfl:     Heparin Sodium, Porcine, (heparin, porcine,) 1000 UNIT/ML injection, Infuse 1 mL into a venous catheter. M-W- with dialysis, Disp: , Rfl:     Insulin Glargine (BASAGLAR KWIKPEN) 100 UNIT/ML injection pen, Inject 5-15 Units under the skin into the appropriate area as directed 3 (Three) Times a Day As Needed. Per SS  5-15 UNITS, Disp: , Rfl:     isosorbide mononitrate (IMDUR) 30 MG 24 hr tablet, Take 1 tablet by mouth Daily., Disp: 30 tablet, Rfl: 0    lactulose (CHRONULAC) 10 GM/15ML solution, Take 30 mL by mouth  2 (Two) Times a Day As Needed., Disp: , Rfl:     levETIRAcetam (KEPPRA) 500 MG tablet, Take 1 tablet by mouth Daily for 90 days., Disp: 90 tablet, Rfl: 0    levothyroxine (SYNTHROID, LEVOTHROID) 75 MCG tablet, Take 1 tablet by mouth Daily., Disp: , Rfl:     lidocaine-prilocaine (EMLA) 2.5-2.5 % cream, Apply 1 Application topically to the appropriate area as directed As Needed for Injection Site Pain. Apply before dialysis on Monday, Wednesday and Fridays of each week, Disp: , Rfl:     metoclopramide (REGLAN) 5 MG tablet, Take 1 tablet by mouth Every Night., Disp: , Rfl:     metoprolol tartrate (LOPRESSOR) 25 MG tablet, Take 1 tablet by mouth Every 12 (Twelve) Hours., Disp: 60 tablet, Rfl: 0    naloxone (NARCAN) 4 MG/0.1ML nasal spray, Administer 1 spray into the nostril(s) as directed by provider Take As Directed., Disp: , Rfl:     nitroglycerin (NITROSTAT) 0.4 MG SL tablet, Place 1 tablet under the tongue Every 5 (Five) Minutes As Needed for Chest Pain., Disp: 30 tablet, Rfl: 3    ondansetron ODT (ZOFRAN-ODT) 4 MG disintegrating tablet, Place 1 tablet on the tongue Every 6 (Six) Hours As Needed for Nausea or Vomiting., Disp: , Rfl:     oxyCODONE (ROXICODONE) 30 MG immediate release tablet, Take 1.5 tablets by mouth Every 4 (Four) Hours As Needed for Moderate Pain or Severe Pain., Disp: , Rfl:     pantoprazole (PROTONIX) 40 MG EC tablet, Take 1 tablet by mouth Daily., Disp: , Rfl:     ranolazine (Ranexa) 500 MG 12 hr tablet, Take 1 tablet by mouth once daily on days that you are NOT undergoing dialysis, Disp: 30 tablet, Rfl: 5    sertraline (ZOLOFT) 100 MG tablet, Take 1.5 tablets by mouth Daily., Disp: , Rfl:     sodium zirconium cyclosilicate (Lokelma) 10 g packet, Take 10 g by mouth 1 (One) Time. Empty entire contents of the packet(s) into a glass with at least 3 tablespoons (45 mL) of water. Stir well and drink immediately; if powder remains in the glass, add water, stir and drink immediately; repeat until no  powder remains. Administer other oral medications at least 2 hours before or 2 hours after dose., Disp: , Rfl:     Xphozah 30 MG tablet, Take 30 mg by mouth Daily As Needed., Disp: , Rfl:   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     Cellulitis and abscess of buttock 2023    CHF (congestive heart failure)     Chronic pain     COPD (chronic obstructive pulmonary disease)     INHALER    Coronary artery disease involving native coronary artery of native heart with angina pectoris 09/12/2022    stents x 3    Diabetes mellitus     ESRD on dialysis     M,W,F-FISTULA LEFT ARM    GERD (gastroesophageal reflux disease)     Heart failure with preserved left ventricular function (HFpEF) 09/13/2022    FOLLOWS W/REECE-NO CP    History of blood transfusion     NO ADVERSE REACTIONS    Hyperlipidemia     Hypertension     WIFE STATED B/P HAS BEEN RUNNING LOW LATELY    Hypothyroidism (acquired) 03/11/2025    Infectious viral hepatitis 5 years ago was treated    hx hep c    RIC (obstructive sleep apnea)     Osteoarthritis     Phosphorus metabolic disorder     Sleep apnea     NO MACHINE    Substance abuse 20 years ago from [facility]     Past Surgical History:  Past Surgical History:   Procedure Laterality Date    ABOVE KNEE LEG AMPUTATION Right     AMPUTATION  2021    CARDIAC CATHETERIZATION      CARDIAC CATHETERIZATION N/A 11/30/2022    Procedure: Percutaneous Coronary Intervention;  Surgeon: River Acevedo IV, MD;  Location:  [facility];  Service: Cardiovascular;  Laterality: N/A;    CARDIAC CATHETERIZATION N/A 11/30/2022    Procedure: Optical Coherent Tomography;  Surgeon: River Acevedo IV, MD;  Location:  [facility];  Service: Cardiovascular;  Laterality: N/A;    CARDIAC CATHETERIZATION N/A 11/30/2022    Procedure: Stent JOSE coronary;  Surgeon: River Acevedo  "MD AUBRIE;  Location:  [place] [facility] INV[facility] LOCATION;  Service: Cardiovascular;  Laterality: N/A;    CARDIAC CATHETERIZATION Left 11/09/2023    Procedure: Aortogram with left leg angiogram, possible angioplasty or stenting;  Surgeon: Luis Fernando Rawls MD;  Location: [facility] CATH INVASIVE LOCATION;  Service: Vascular;  Laterality: Left;    CARDIAC CATHETERIZATION Right 04/25/2024    Procedure: Right lower extremity angiogram, possible angioplasty or stenting;  Surgeon: Luis Fernando Rawls MD;  Location: [facility] CATH INVASIVE LOCATION;  Service: Vascular;  Laterality: Right;    CARDIAC CATHETERIZATION N/A 12/05/2024    Procedure: Left Heart Cath;  Surgeon: Cole Oscar MD;  Location:  [place] CATH INVASIVE LOCATION;  Service: Cardiovascular;  Laterality: N/A;    CATH LAB PROCEDURE      unable to stent    COLONOSCOPY N/A 02/15/2023    Procedure: COLONOSCOPY WITH POLYPECTOMY;  Surgeon: David Glass MD;  Location: [facility] ENDOSCOPY;  Service: Gastroenterology;  Laterality: N/A;  COLON POLYP, MELANOSIS COLI    CORONARY STENT PLACEMENT  2022    ENDOSCOPY N/A 02/03/2023    Procedure: ESOPHAGOGASTRODUODENOSCOPY with biopsy;  Surgeon: Gus Whaley MD;  Location: [facility] ENDOSCOPY;  Service: General;  Laterality: N/A;  gastritis,     FEMORAL POPLITEAL BYPASS Left 12/12/2023    Procedure: Left femoral edarterectomy, left femoral to below the knee popliteal bypass graft;  Surgeon: Luis Fernando Rawls MD;  Location: [facility] MAIN OR;  Service: Vascular;  Laterality: Left;    HIP SURGERY Right     INSERTION PERITONEAL DIALYSIS CATHETER      LEG SURGERY Left     \"jocelyne in place\"    MANDIBLE FRACTURE SURGERY      REPLACEMENT TOTAL KNEE Right     x3    SHOULDER SURGERY Right     SPINE SURGERY      unknown procudure- completed at [facility] in [place].     Social Hx:  Social History     Tobacco Use    Smoking status: Every Day     Current packs/day: 2.00     Average packs/day: 2.0 packs/day for 40.0 years (80.0 ttl pk-yrs)     Types: Cigarettes "    Smokeless tobacco: Never    Tobacco comments:     1.5 ppd as of 2/5/25   Vaping Use    Vaping status: Never Used   Substance Use Topics    Alcohol use: Not Currently     Comment: None    Drug use: Not Currently     Types: Hydrocodone     Comment: pain pills from previous injuries     Family Hx:  Family History   Problem Relation Age of Onset    Heart attack Mother     Coronary artery disease Mother     Hypertension Mother     Arthritis Mother     Hypertension Father     Diabetes Father      Review of Systems:        Review of Systems   Constitutional:  Negative for activity change, appetite change, chills, diaphoresis, fatigue, fever and unexpected weight change.   HENT:  Negative for congestion, dental problem, drooling, ear discharge, ear pain, facial swelling, hearing loss, mouth sores, nosebleeds, postnasal drip, rhinorrhea, sinus pressure, sinus pain, sneezing, sore throat, tinnitus, trouble swallowing and voice change.    Eyes:  Negative for photophobia, pain, discharge, redness, itching and visual disturbance.   Respiratory:  Negative for apnea, cough, choking, chest tightness, shortness of breath, wheezing and stridor.    Cardiovascular:  Negative for chest pain, palpitations and leg swelling.   Gastrointestinal:  Negative for abdominal distention, abdominal pain, anal bleeding, blood in stool, constipation, diarrhea, nausea, rectal pain and vomiting.   Endocrine: Negative for cold intolerance, heat intolerance, polydipsia, polyphagia and polyuria.   Genitourinary:  Negative for decreased urine volume, difficulty urinating, dysuria, enuresis, flank pain, frequency, genital sores, hematuria, penile discharge, penile pain, penile swelling, scrotal swelling, testicular pain and urgency.   Musculoskeletal:  Negative for arthralgias, back pain, gait problem, joint swelling, myalgias, neck pain and neck stiffness.   Skin:  Negative for color change, pallor, rash and wound.   Allergic/Immunologic: Negative for  "environmental allergies, food allergies and immunocompromised state.   Neurological:  Negative for dizziness, tremors, seizures, syncope, facial asymmetry, speech difficulty, weakness, light-headedness, numbness and headaches.   Hematological:  Negative for adenopathy. Does not bruise/bleed easily.   Psychiatric/Behavioral:  Negative for agitation, behavioral problems, confusion, decreased concentration, dysphoric mood, hallucinations, self-injury, sleep disturbance and suicidal ideas. The patient is not nervous/anxious and is not hyperactive.           Vital Signs: Temp 97.7 °F (36.5 °C) (Infrared)   Ht 170.2 cm (67.01\")   Wt 81.6 kg (180 lb)   BMI 28.19 kg/m²      Physical Exam  Awake, alert and oriented x 3  Speech f/c  Opens eyes spontaneously  Pupils 3 mm rx bilaterally  Extraocular muscles intact bilaterally  Face symmetric bilaterally  5/5 in bilateral upper ext  5/5 in left lower ext, Right above the knee amputation.  No pronator drift      Social History    Tobacco Use      Smoking status: Every Day        Packs/day: 2.00        Years: 2.0 packs/day for 40.0 years (80.0 ttl pk-yrs)        Types: Cigarettes      Smokeless tobacco: Never      Tobacco comments: 1.5 ppd as of 2/5/25       Tobacco Use: High Risk (4/10/2025)    Patient History     Smoking Tobacco Use: Every Day     Smokeless Tobacco Use: Never     Passive Exposure: Not on file           STEADI Fall Risk Assessment was completed, and patient is at MODERATE risk for falls. Assessment completed on:4/10/2025      Diagnostic Studies: (All imaging is independently reviewed unless stated otherwise.)  CT of the head from today shows improving subdural hematoma on the left however new mixed density subdural on the right without mass effect or midline shift.      Assessment/Plan    Diagnoses and all orders for this visit:    1. Subdural hematoma, nontraumatic (Primary)  -     CT Head Without Contrast; Future         This is a 59-year-old male who " presents for follow-up regarding left-sided subdural hematoma.  Repeat head CT shows improving left-sided subdural hematoma however new right sided subdural hematoma with mixed density without mass effect or midline shift.  Patient denies any significant headaches, vision changes or weakness on one side of his body or the other.  Denies any new head trauma.  I discussed this case with Dr. Mahmood.  He would a like repeat head CT scan in 4 weeks.  He can remain on baby aspirin daily.  He will follow-up after his head CT. Patient encouraged to contact us if he has any changes in his condition or any concerns.    Any copied data from previous notes included in the (1) HPI, (2) PE, (3) MDM and/or Assessment and Plan has been reviewed and accurate as of 04/10/25.    Madeline Avendano PA-C  04/10/25    I spent at least 30 minutes caring for Chevy Junior on 04/10/25.  This time includes activities preparing for the visit, reviewing test, reviewing history and performing an appropriate examination.  Discussing with the patient and reviewing and independently interpreting the diagnostic studies, communicating that information to the patient along with discussing care coordination and referrals if needed and documenting information in the medical records.

## 2025-04-23 NOTE — PROGRESS NOTES
[facility] Cardiology   [address], [address]  [place], [place], [address]    [phone]  [email or web address]           OUTPATIENT CLINIC FOLLOW UP NOTE    Patient Care Team:  Patient Care Team:  Jeanie Francois APRN as PCP - General (Nurse Practitioner)  Nikita Villasenor MD as Consulting Physician (Nephrology)  Feng Nielsen APRN as Nurse Practitioner (Interventional Cardiology)  Hal Wing MD as Consulting Physician (Cardiology)  Carlos Mcgee MD (General Surgery)  Gus Whaley MD as Consulting Physician (General Surgery)  Luis Fernando Rawls MD as Consulting Physician (Vascular Surgery)    Subjective:   Chief Complaint:   Chief Complaint   Patient presents with    Coronary artery disease involving native coronary artery of         Chevy Junior is a 59 y.o. male.  Cardiac focused, problem list:  CAD   [facility] 5/17/2022:  Severe 1 vessel CAD with  of the mid RCA.  Unsuccessful angioplasty of the mid RCA into the RV marginal branch.   [facility] 11/30/2022:  Successful staged PCI of distal RCA  with overlapping JOSE   Stress test 11/2024:  Moderately sized, moderately dense completely reversible defect involving the mid anterior wall, the anterior apical segment and the apex compatible with ischemia. Additionally there is a reversible inferior wall defect post-rest EF 56%, elevated trans-ischemic dilation ratio 1.29  [facility], 12/05/2024:  50% distal left main stenosis with RVR of 0.84 just distal to the ostium of the LAD, 0.92 in the mid segment of the ramus branch, 0.96 in the proximal segment of the circumflex branch.  70% proximal to mid Lad stenosis with RVR of 0.58. , in-stent, of the proximal RCA with reconstitution of flow in the distal segments by right to right and left to left collaterals.   HFrEF  Echocardiogram 11/2024:  LVEF 55-60% with grade 2 diastolic dysfunction. Mild to moderate MR.  Moderate or greater AI.    Hyperlipidemia   Hypertension   PAD  Status post right AKA   History of left fem-distal bypass, 12/12/2023   LE arterial duplex, 8/26/2024: results in [facility]  CTA with runoff 9/2024: results reviewed in EP[facility]  Subdural hematoma   CTA head, 3/11/2025 showed acute on chronic left SDH  New right sided SDH 4/2025  Seizures  New onset, 3/11/2025  ESRD on hemodialysis  Acute rectal bleed, possible gastritis  Admission at [facility]    HPI:    Recently found new SDH, right sided.  Few days later, presented to an OSH with rectal bleed, anemia. Since then has had worsening fatigue, difficulty concentrating, confusion, no bowel movement in 6 days. Denies chest pain and dyspnea at rest.     Review of Systems:  As noted above in the HPI     PFSH:  Patient Active Problem List   Diagnosis    Coronary artery disease involving native coronary artery of native heart with angina pectoris    Hyperlipidemia LDL goal <70    Essential hypertension    Type 2 diabetes mellitus, with long-term current use of insulin    Heart failure with preserved left ventricular function (HFpEF)    Current smoker    ESRD on hemodialysis    Abdominal pain    Weight loss    Elevated carcinoembryonic antigen (CEA)    Failure to thrive (child)    Atherosclerosis of native artery of lower extremity with gangrene    Atherosclerosis of native arteries of the extremities with gangrene    Open leg wound, unspecified laterality, initial encounter    Open leg wound    Amputation stump necrosis    Hyponatremia    Positive cardiac stress test    Coronary artery disease    Hypothyroidism (acquired)    Subdural hematoma, nontraumatic         Current Outpatient Medications:     albuterol sulfate  (90 Base) MCG/ACT inhaler, Inhale 1 puff Every 4 (Four) Hours As Needed for Wheezing or Shortness of Air., Disp: , Rfl:     aspirin (aspirin) 81 MG EC tablet, Take 1 tablet by mouth Daily., Disp: 30 tablet, Rfl: 8    atorvastatin (LIPITOR) 40 MG tablet, Take 1 tablet by  mouth Daily., Disp: 90 tablet, Rfl: 3    Auryxia 1  MG(Fe) tablet, Take 1 tablet by mouth Daily With Lunch., Disp: , Rfl:     cinacalcet (SENSIPAR) 30 MG tablet, Take 1 tablet by mouth Daily., Disp: , Rfl:     gabapentin (NEURONTIN) 100 MG capsule, Take 1 capsule by mouth Daily., Disp: , Rfl:     Heparin Sodium, Porcine, (heparin, porcine,) 1000 UNIT/ML injection, Infuse 1 mL into a venous catheter. M-W-F with dialysis, Disp: , Rfl:     Insulin Glargine (BASAGLAR KWIKPEN) 100 UNIT/ML injection pen, Inject 5-15 Units under the skin into the appropriate area as directed 3 (Three) Times a Day As Needed. Per SS  5-15 UNITS, Disp: , Rfl:     isosorbide mononitrate (IMDUR) 30 MG 24 hr tablet, Take 1 tablet by mouth Daily., Disp: 90 tablet, Rfl: 3    lactulose (CHRONULAC) 10 GM/15ML solution, Take 30 mL by mouth 2 (Two) Times a Day As Needed., Disp: , Rfl:     levETIRAcetam (KEPPRA) 500 MG tablet, Take 1 tablet by mouth Daily for 90 days., Disp: 90 tablet, Rfl: 0    levothyroxine (SYNTHROID, LEVOTHROID) 75 MCG tablet, Take 1 tablet by mouth Daily., Disp: , Rfl:     lidocaine-prilocaine (EMLA) 2.5-2.5 % cream, Apply 1 Application topically to the appropriate area as directed As Needed for Injection Site Pain. Apply before dialysis on Monday, Wednesday and Fridays of each week, Disp: , Rfl:     metoclopramide (REGLAN) 5 MG tablet, Take 1 tablet by mouth Every Night., Disp: , Rfl:     metoprolol tartrate (LOPRESSOR) 25 MG tablet, Take 1 tablet by mouth Every 12 (Twelve) Hours., Disp: 60 tablet, Rfl: 0    naloxone (NARCAN) 4 MG/0.1ML nasal spray, Administer 1 spray into the nostril(s) as directed by provider Take As Directed., Disp: , Rfl:     nitroglycerin (NITROSTAT) 0.4 MG SL tablet, Place 1 tablet under the tongue Every 5 (Five) Minutes As Needed for Chest Pain., Disp: 30 tablet, Rfl: 3    ondansetron ODT (ZOFRAN-ODT) 4 MG disintegrating tablet, Place 1 tablet on the tongue Every 6 (Six) Hours As Needed for Nausea or  "Vomiting., Disp: , Rfl:     oxyCODONE (ROXICODONE) 30 MG immediate release tablet, Take 1.5 tablets by mouth Every 4 (Four) Hours As Needed for Moderate Pain or Severe Pain., Disp: , Rfl:     pantoprazole (PROTONIX) 40 MG EC tablet, Take 1 tablet by mouth Daily., Disp: , Rfl:     sertraline (ZOLOFT) 100 MG tablet, Take 1.5 tablets by mouth Daily., Disp: , Rfl:     sodium zirconium cyclosilicate (Lokelma) 10 g packet, Take 10 g by mouth 1 (One) Time. Empty entire contents of the packet(s) into a glass with at least 3 tablespoons (45 mL) of water. Stir well and drink immediately; if powder remains in the glass, add water, stir and drink immediately; repeat until no powder remains. Administer other oral medications at least 2 hours before or 2 hours after dose., Disp: , Rfl:     Xphozah 30 MG tablet, Take 30 mg by mouth Daily As Needed., Disp: , Rfl:      reports that he has been smoking cigarettes. He has a 80 pack-year smoking history. He has never used smokeless tobacco.      Objective:   Physical exam:  /84 (BP Location: Right arm, Patient Position: Sitting, Cuff Size: Adult)   Pulse 91   Ht 170.2 cm (67\")   Wt 79.4 kg (175 lb)   SpO2 95%   BMI 27.41 kg/m²   CONSTITUTIONAL: No acute distress  CARDIOVASCULAR:  Regular rate and rhythm with normal S1 and S2. Murmur present     Labs:    Lab Results   Component Value Date    LDL 33 03/12/2025     No components found for: \"LDLDIRECTC\"    Diagnostic Data:    Procedures    Results for orders placed during the hospital encounter of 11/05/24    Adult Transthoracic Echo Complete W/ Cont if Necessary Per Protocol    Interpretation Summary  Physician interpretation.  Good technical quality.    1.  LV size, function, and wall motion are normal.  Mild to moderate concentric left ventricular hypertrophy.  Visually estimated ejection fraction is 55 to 60%.  Grade 2 diastolic dysfunction.  Mild left atrial enlargement.  Borderline right atrial size.  RV size and function " are grossly preserved.  No septal defect or intracavitary mass or thrombus.    2.  The mitral valve is markedly morphologically abnormal.  Both leaflets are thickened and there is a sclerotic and mobile projection of the anterior mitral leaflet into the LV outflow tract.  This does not have the typical appearance of an acute thrombus or vegetation.  There is no compromise of the LV outflow tract.  There is mild mitral stenosis with a mitral valve area of 1.9 cm² by pressure half-time.  There is trivial MR.  The aortic valve is tricuspid and sclerotic but is minimally calcified.  The annulus is at the upper limits of normal size.  There is no mitral stenosis and there is mild to moderate MR.  Pressure half-time of AI is suggestive of moderate or greater disease.  Right-sided heart valves are unremarkable.    3.  There is no pericardial effusion.  The aortic root is mildly dilated at 3.94 cm with no dissection or aneurysm.    4.  Right heart pressures cannot be calculated.      Assessment and Plan:     Coronary artery disease   Essential hypertension   Mixed Hyperlipidemia  Tobacco dependence  - Continues to be a prohibitively high risk patient for CABG and PCI.  - If bleeding diatheses are well-controlled, stable and if he becomes a candidate for full anticoagulation per neurosurgery/GI, could consider high risk PCI of the LAD, but at this stage, unlikely to be a reasonable candidate moving forward  - Continue current cardiac related medications.  Aspirin monotherapy for antiplatelet/anticoagulation strategy due to bleeding diatheses  - Formally discontinuing ranolazine due to potential interaction with other medication    PAD status post bypass  - Followed by vascular surgery    Anemia  Recent SDH, bilateral  Recent GI bleed  ESRD on HD  - Recent acute bleeding diatheses.  - Due to worsening confusion, lethargy, constipation, recent SDH, recent GI bleed, as well as his need for dialysis today, recommend ER visit for  further work-up/management  - Patient's caretaker agrees.  Wishes to be at a facility closer to home    - Return in about 6 months (around 10/28/2025) for Next follow up, or sooner if needed.    MDM: chronic illness with severe exacerbation, decision for hospitalization

## 2025-04-28 ENCOUNTER — APPOINTMENT (OUTPATIENT)
Dept: GENERAL RADIOLOGY | Facility: HOSPITAL | Age: 60
End: 2025-04-28
Payer: MEDICAID

## 2025-04-28 ENCOUNTER — APPOINTMENT (OUTPATIENT)
Dept: CT IMAGING | Facility: HOSPITAL | Age: 60
End: 2025-04-28
Payer: MEDICAID

## 2025-04-28 ENCOUNTER — OFFICE VISIT (OUTPATIENT)
Dept: CARDIOLOGY | Facility: CLINIC | Age: 60
End: 2025-04-28
Payer: MEDICAID

## 2025-04-28 ENCOUNTER — HOSPITAL ENCOUNTER (EMERGENCY)
Facility: HOSPITAL | Age: 60
Discharge: OTHER INSTITUTION | End: 2025-04-28
Attending: EMERGENCY MEDICINE
Payer: MEDICAID

## 2025-04-28 ENCOUNTER — HOSPITAL ENCOUNTER (INPATIENT)
Facility: HOSPITAL | Age: 60
LOS: 5 days | Discharge: HOSPICE | End: 2025-05-03
Attending: INTERNAL MEDICINE | Admitting: HOSPITALIST
Payer: MEDICAID

## 2025-04-28 VITALS
OXYGEN SATURATION: 96 % | TEMPERATURE: 97.3 F | RESPIRATION RATE: 16 BRPM | SYSTOLIC BLOOD PRESSURE: 138 MMHG | BODY MASS INDEX: 25.71 KG/M2 | HEIGHT: 67 IN | DIASTOLIC BLOOD PRESSURE: 76 MMHG | HEART RATE: 89 BPM | WEIGHT: 163.8 LBS

## 2025-04-28 VITALS
SYSTOLIC BLOOD PRESSURE: 136 MMHG | HEIGHT: 67 IN | DIASTOLIC BLOOD PRESSURE: 84 MMHG | WEIGHT: 175 LBS | HEART RATE: 91 BPM | BODY MASS INDEX: 27.47 KG/M2 | OXYGEN SATURATION: 95 %

## 2025-04-28 DIAGNOSIS — S06.5XAA SUBDURAL HEMATOMA: Primary | ICD-10-CM

## 2025-04-28 DIAGNOSIS — S06.5XAA SDH (SUBDURAL HEMATOMA): Primary | ICD-10-CM

## 2025-04-28 DIAGNOSIS — I10 ESSENTIAL HYPERTENSION: ICD-10-CM

## 2025-04-28 DIAGNOSIS — I25.119 CORONARY ARTERY DISEASE INVOLVING NATIVE CORONARY ARTERY OF NATIVE HEART WITH ANGINA PECTORIS: Primary | ICD-10-CM

## 2025-04-28 PROBLEM — G89.4 CHRONIC PAIN SYNDROME: Status: ACTIVE | Noted: 2025-04-28

## 2025-04-28 LAB
ALBUMIN SERPL-MCNC: 3.5 G/DL (ref 3.5–5.2)
ALBUMIN/GLOB SERPL: 0.8 G/DL
ALP SERPL-CCNC: 210 U/L (ref 39–117)
ALT SERPL W P-5'-P-CCNC: <5 U/L (ref 1–41)
ANION GAP SERPL CALCULATED.3IONS-SCNC: 18.5 MMOL/L (ref 5–15)
ANION GAP SERPL CALCULATED.3IONS-SCNC: 24 MMOL/L (ref 5–15)
AST SERPL-CCNC: 10 U/L (ref 1–40)
BASOPHILS # BLD AUTO: 0.07 10*3/MM3 (ref 0–0.2)
BASOPHILS NFR BLD AUTO: 0.9 % (ref 0–1.5)
BILIRUB SERPL-MCNC: 0.5 MG/DL (ref 0–1.2)
BUN SERPL-MCNC: 43 MG/DL (ref 8–23)
BUN SERPL-MCNC: 50 MG/DL (ref 8–23)
BUN/CREAT SERPL: 4.9 (ref 7–25)
BUN/CREAT SERPL: 5.1 (ref 7–25)
CALCIUM SPEC-SCNC: 7.2 MG/DL (ref 8.6–10.5)
CALCIUM SPEC-SCNC: 7.6 MG/DL (ref 8.6–10.5)
CHLORIDE SERPL-SCNC: 96 MMOL/L (ref 98–107)
CHLORIDE SERPL-SCNC: 96 MMOL/L (ref 98–107)
CO2 SERPL-SCNC: 22 MMOL/L (ref 22–29)
CO2 SERPL-SCNC: 23.5 MMOL/L (ref 22–29)
CREAT SERPL-MCNC: 8.84 MG/DL (ref 0.76–1.27)
CREAT SERPL-MCNC: 9.75 MG/DL (ref 0.76–1.27)
DEPRECATED RDW RBC AUTO: 64.2 FL (ref 37–54)
DEPRECATED RDW RBC AUTO: 65.3 FL (ref 37–54)
EGFRCR SERPLBLD CKD-EPI 2021: 5.6 ML/MIN/1.73
EGFRCR SERPLBLD CKD-EPI 2021: 6.3 ML/MIN/1.73
EOSINOPHIL # BLD AUTO: 0.25 10*3/MM3 (ref 0–0.4)
EOSINOPHIL NFR BLD AUTO: 3.2 % (ref 0.3–6.2)
ERYTHROCYTE [DISTWIDTH] IN BLOOD BY AUTOMATED COUNT: 19.3 % (ref 12.3–15.4)
ERYTHROCYTE [DISTWIDTH] IN BLOOD BY AUTOMATED COUNT: 19.3 % (ref 12.3–15.4)
GLOBULIN UR ELPH-MCNC: 4.2 GM/DL
GLUCOSE SERPL-MCNC: 124 MG/DL (ref 65–99)
GLUCOSE SERPL-MCNC: 145 MG/DL (ref 65–99)
HCT VFR BLD AUTO: 26.8 % (ref 37.5–51)
HCT VFR BLD AUTO: 29.4 % (ref 37.5–51)
HGB BLD-MCNC: 8.3 G/DL (ref 13–17.7)
HGB BLD-MCNC: 8.9 G/DL (ref 13–17.7)
HOLD SPECIMEN: NORMAL
HOLD SPECIMEN: NORMAL
IMM GRANULOCYTES # BLD AUTO: 0.03 10*3/MM3 (ref 0–0.05)
IMM GRANULOCYTES NFR BLD AUTO: 0.4 % (ref 0–0.5)
INR PPP: 1.99 (ref 0.89–1.12)
LYMPHOCYTES # BLD AUTO: 1.57 10*3/MM3 (ref 0.7–3.1)
LYMPHOCYTES NFR BLD AUTO: 20.4 % (ref 19.6–45.3)
MAGNESIUM SERPL-MCNC: 2.5 MG/DL (ref 1.6–2.4)
MCH RBC QN AUTO: 30.2 PG (ref 26.6–33)
MCH RBC QN AUTO: 30.9 PG (ref 26.6–33)
MCHC RBC AUTO-ENTMCNC: 30.3 G/DL (ref 31.5–35.7)
MCHC RBC AUTO-ENTMCNC: 31 G/DL (ref 31.5–35.7)
MCV RBC AUTO: 99.6 FL (ref 79–97)
MCV RBC AUTO: 99.7 FL (ref 79–97)
MONOCYTES # BLD AUTO: 0.48 10*3/MM3 (ref 0.1–0.9)
MONOCYTES NFR BLD AUTO: 6.2 % (ref 5–12)
NEUTROPHILS NFR BLD AUTO: 5.31 10*3/MM3 (ref 1.7–7)
NEUTROPHILS NFR BLD AUTO: 68.9 % (ref 42.7–76)
NRBC BLD AUTO-RTO: 0 /100 WBC (ref 0–0.2)
PLATELET # BLD AUTO: 250 10*3/MM3 (ref 140–450)
PLATELET # BLD AUTO: 270 10*3/MM3 (ref 140–450)
PMV BLD AUTO: 10.1 FL (ref 6–12)
PMV BLD AUTO: 10.1 FL (ref 6–12)
POTASSIUM SERPL-SCNC: 4.6 MMOL/L (ref 3.5–5.2)
POTASSIUM SERPL-SCNC: 5.2 MMOL/L (ref 3.5–5.2)
PROT SERPL-MCNC: 7.7 G/DL (ref 6–8.5)
PROTHROMBIN TIME: 24 SECONDS (ref 12.2–15.3)
RBC # BLD AUTO: 2.69 10*6/MM3 (ref 4.14–5.8)
RBC # BLD AUTO: 2.95 10*6/MM3 (ref 4.14–5.8)
SODIUM SERPL-SCNC: 138 MMOL/L (ref 136–145)
SODIUM SERPL-SCNC: 142 MMOL/L (ref 136–145)
TROPONIN T SERPL HS-MCNC: 1359 NG/L
WBC NRBC COR # BLD AUTO: 7.71 10*3/MM3 (ref 3.4–10.8)
WBC NRBC COR # BLD AUTO: 8.35 10*3/MM3 (ref 3.4–10.8)
WHOLE BLOOD HOLD COAG: NORMAL
WHOLE BLOOD HOLD SPECIMEN: NORMAL

## 2025-04-28 PROCEDURE — 70450 CT HEAD/BRAIN W/O DYE: CPT

## 2025-04-28 PROCEDURE — 85025 COMPLETE CBC W/AUTO DIFF WBC: CPT

## 2025-04-28 PROCEDURE — 71045 X-RAY EXAM CHEST 1 VIEW: CPT

## 2025-04-28 PROCEDURE — 36415 COLL VENOUS BLD VENIPUNCTURE: CPT

## 2025-04-28 PROCEDURE — 93005 ELECTROCARDIOGRAM TRACING: CPT | Performed by: EMERGENCY MEDICINE

## 2025-04-28 PROCEDURE — 3079F DIAST BP 80-89 MM HG: CPT | Performed by: INTERNAL MEDICINE

## 2025-04-28 PROCEDURE — 86880 COOMBS TEST DIRECT: CPT | Performed by: NURSE PRACTITIONER

## 2025-04-28 PROCEDURE — 94799 UNLISTED PULMONARY SVC/PX: CPT

## 2025-04-28 PROCEDURE — 99291 CRITICAL CARE FIRST HOUR: CPT | Performed by: INTERNAL MEDICINE

## 2025-04-28 PROCEDURE — 3075F SYST BP GE 130 - 139MM HG: CPT | Performed by: INTERNAL MEDICINE

## 2025-04-28 PROCEDURE — 93010 ELECTROCARDIOGRAM REPORT: CPT | Performed by: INTERNAL MEDICINE

## 2025-04-28 PROCEDURE — 94640 AIRWAY INHALATION TREATMENT: CPT

## 2025-04-28 PROCEDURE — 99291 CRITICAL CARE FIRST HOUR: CPT

## 2025-04-28 PROCEDURE — 25010000002 LEVETRIRACETAM PER 10 MG: Performed by: NURSE PRACTITIONER

## 2025-04-28 PROCEDURE — 85027 COMPLETE CBC AUTOMATED: CPT | Performed by: NURSE PRACTITIONER

## 2025-04-28 PROCEDURE — 86900 BLOOD TYPING SEROLOGIC ABO: CPT | Performed by: NURSE PRACTITIONER

## 2025-04-28 PROCEDURE — 86870 RBC ANTIBODY IDENTIFICATION: CPT | Performed by: NURSE PRACTITIONER

## 2025-04-28 PROCEDURE — 86850 RBC ANTIBODY SCREEN: CPT | Performed by: NURSE PRACTITIONER

## 2025-04-28 PROCEDURE — 74018 RADEX ABDOMEN 1 VIEW: CPT

## 2025-04-28 PROCEDURE — 80053 COMPREHEN METABOLIC PANEL: CPT

## 2025-04-28 PROCEDURE — 86901 BLOOD TYPING SEROLOGIC RH(D): CPT | Performed by: NURSE PRACTITIONER

## 2025-04-28 PROCEDURE — 85610 PROTHROMBIN TIME: CPT | Performed by: NURSE PRACTITIONER

## 2025-04-28 PROCEDURE — 83735 ASSAY OF MAGNESIUM: CPT

## 2025-04-28 PROCEDURE — 99285 EMERGENCY DEPT VISIT HI MDM: CPT

## 2025-04-28 PROCEDURE — 99215 OFFICE O/P EST HI 40 MIN: CPT | Performed by: INTERNAL MEDICINE

## 2025-04-28 PROCEDURE — 84484 ASSAY OF TROPONIN QUANT: CPT | Performed by: EMERGENCY MEDICINE

## 2025-04-28 PROCEDURE — 86860 RBC ANTIBODY ELUTION: CPT | Performed by: NURSE PRACTITIONER

## 2025-04-28 RX ORDER — SODIUM CHLORIDE 0.9 % (FLUSH) 0.9 %
10 SYRINGE (ML) INJECTION AS NEEDED
Status: DISCONTINUED | OUTPATIENT
Start: 2025-04-28 | End: 2025-04-28 | Stop reason: HOSPADM

## 2025-04-28 RX ORDER — NICOTINE POLACRILEX 4 MG
15 LOZENGE BUCCAL
Status: DISCONTINUED | OUTPATIENT
Start: 2025-04-28 | End: 2025-05-03 | Stop reason: HOSPADM

## 2025-04-28 RX ORDER — BISACODYL 10 MG
10 SUPPOSITORY, RECTAL RECTAL DAILY PRN
Status: DISCONTINUED | OUTPATIENT
Start: 2025-04-28 | End: 2025-05-03 | Stop reason: HOSPADM

## 2025-04-28 RX ORDER — NICOTINE 21 MG/24HR
1 PATCH, TRANSDERMAL 24 HOURS TRANSDERMAL
Status: DISCONTINUED | OUTPATIENT
Start: 2025-04-28 | End: 2025-04-28 | Stop reason: HOSPADM

## 2025-04-28 RX ORDER — NICOTINE 21 MG/24HR
1 PATCH, TRANSDERMAL 24 HOURS TRANSDERMAL
Status: DISCONTINUED | OUTPATIENT
Start: 2025-04-29 | End: 2025-05-03 | Stop reason: HOSPADM

## 2025-04-28 RX ORDER — SODIUM CHLORIDE 0.9 % (FLUSH) 0.9 %
10 SYRINGE (ML) INJECTION EVERY 12 HOURS SCHEDULED
Status: DISCONTINUED | OUTPATIENT
Start: 2025-04-28 | End: 2025-05-03 | Stop reason: HOSPADM

## 2025-04-28 RX ORDER — DEXTROSE MONOHYDRATE 25 G/50ML
25 INJECTION, SOLUTION INTRAVENOUS
Status: DISCONTINUED | OUTPATIENT
Start: 2025-04-28 | End: 2025-05-03 | Stop reason: HOSPADM

## 2025-04-28 RX ORDER — ATORVASTATIN CALCIUM 40 MG/1
40 TABLET, FILM COATED ORAL NIGHTLY
Status: DISCONTINUED | OUTPATIENT
Start: 2025-04-28 | End: 2025-05-03 | Stop reason: HOSPADM

## 2025-04-28 RX ORDER — OXYCODONE HYDROCHLORIDE 15 MG/1
15 TABLET ORAL EVERY 4 HOURS PRN
Refills: 0 | Status: DISCONTINUED | OUTPATIENT
Start: 2025-04-28 | End: 2025-05-03 | Stop reason: HOSPADM

## 2025-04-28 RX ORDER — SODIUM CHLORIDE 0.9 % (FLUSH) 0.9 %
10 SYRINGE (ML) INJECTION AS NEEDED
Status: DISCONTINUED | OUTPATIENT
Start: 2025-04-28 | End: 2025-05-03 | Stop reason: HOSPADM

## 2025-04-28 RX ORDER — POLYETHYLENE GLYCOL 3350 17 G/17G
17 POWDER, FOR SOLUTION ORAL DAILY PRN
Status: DISCONTINUED | OUTPATIENT
Start: 2025-04-28 | End: 2025-05-03 | Stop reason: HOSPADM

## 2025-04-28 RX ORDER — NITROGLYCERIN 0.4 MG/1
0.4 TABLET SUBLINGUAL
Status: DISCONTINUED | OUTPATIENT
Start: 2025-04-28 | End: 2025-05-03 | Stop reason: HOSPADM

## 2025-04-28 RX ORDER — PANTOPRAZOLE SODIUM 40 MG/1
40 TABLET, DELAYED RELEASE ORAL
Status: DISCONTINUED | OUTPATIENT
Start: 2025-04-29 | End: 2025-05-03 | Stop reason: HOSPADM

## 2025-04-28 RX ORDER — GABAPENTIN 300 MG/1
300 CAPSULE ORAL NIGHTLY
Status: DISCONTINUED | OUTPATIENT
Start: 2025-04-28 | End: 2025-05-03 | Stop reason: HOSPADM

## 2025-04-28 RX ORDER — BISACODYL 5 MG/1
5 TABLET, DELAYED RELEASE ORAL DAILY PRN
Status: DISCONTINUED | OUTPATIENT
Start: 2025-04-28 | End: 2025-05-03 | Stop reason: HOSPADM

## 2025-04-28 RX ORDER — SODIUM CHLORIDE 9 MG/ML
40 INJECTION, SOLUTION INTRAVENOUS AS NEEDED
Status: DISCONTINUED | OUTPATIENT
Start: 2025-04-28 | End: 2025-05-03 | Stop reason: HOSPADM

## 2025-04-28 RX ORDER — LEVOTHYROXINE SODIUM 75 UG/1
75 TABLET ORAL
Status: DISCONTINUED | OUTPATIENT
Start: 2025-04-29 | End: 2025-05-03 | Stop reason: HOSPADM

## 2025-04-28 RX ORDER — AMOXICILLIN 250 MG
2 CAPSULE ORAL 2 TIMES DAILY
Status: DISCONTINUED | OUTPATIENT
Start: 2025-04-28 | End: 2025-04-29

## 2025-04-28 RX ORDER — ISOSORBIDE MONONITRATE 30 MG/1
30 TABLET, EXTENDED RELEASE ORAL
Qty: 90 TABLET | Refills: 3 | Status: SHIPPED | OUTPATIENT
Start: 2025-04-28 | End: 2025-05-03 | Stop reason: HOSPADM

## 2025-04-28 RX ORDER — LEVETIRACETAM 500 MG/5ML
500 INJECTION, SOLUTION, CONCENTRATE INTRAVENOUS EVERY 24 HOURS
Status: DISCONTINUED | OUTPATIENT
Start: 2025-04-28 | End: 2025-05-01

## 2025-04-28 RX ORDER — LEVETIRACETAM 500 MG/5ML
500 INJECTION, SOLUTION, CONCENTRATE INTRAVENOUS EVERY 12 HOURS SCHEDULED
Status: DISCONTINUED | OUTPATIENT
Start: 2025-04-28 | End: 2025-04-28

## 2025-04-28 RX ORDER — ONDANSETRON 2 MG/ML
4 INJECTION INTRAMUSCULAR; INTRAVENOUS ONCE
Status: DISCONTINUED | OUTPATIENT
Start: 2025-04-28 | End: 2025-04-28 | Stop reason: HOSPADM

## 2025-04-28 RX ORDER — ALBUTEROL SULFATE 0.83 MG/ML
2.5 SOLUTION RESPIRATORY (INHALATION) EVERY 4 HOURS PRN
Status: DISCONTINUED | OUTPATIENT
Start: 2025-04-28 | End: 2025-05-03 | Stop reason: HOSPADM

## 2025-04-28 RX ORDER — IBUPROFEN 600 MG/1
1 TABLET ORAL
Status: DISCONTINUED | OUTPATIENT
Start: 2025-04-28 | End: 2025-05-03 | Stop reason: HOSPADM

## 2025-04-28 RX ADMIN — OXYCODONE HYDROCHLORIDE 15 MG: 15 TABLET ORAL at 23:15

## 2025-04-28 RX ADMIN — Medication 10 ML: at 22:15

## 2025-04-28 RX ADMIN — ALBUTEROL SULFATE 2.5 MG: 2.5 SOLUTION RESPIRATORY (INHALATION) at 22:46

## 2025-04-28 RX ADMIN — NICOTINE 1 PATCH: 21 PATCH, EXTENDED RELEASE TRANSDERMAL at 18:48

## 2025-04-28 RX ADMIN — LEVETIRACETAM 500 MG: 100 INJECTION, SOLUTION INTRAVENOUS at 23:15

## 2025-04-28 RX ADMIN — MUPIROCIN 1 APPLICATION: 20 OINTMENT TOPICAL at 22:15

## 2025-04-28 RX ADMIN — SENNOSIDES AND DOCUSATE SODIUM 2 TABLET: 50; 8.6 TABLET ORAL at 22:15

## 2025-04-28 RX ADMIN — ATORVASTATIN CALCIUM 40 MG: 40 TABLET, FILM COATED ORAL at 22:15
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
## 2025-04-28 NOTE — LETTER
EMS Transport Request  For use at [facility], [place], [place], [place], and [place] only   Patient Name: Chevy Junior : 1965   Weight:71 kg (156 lb 8.4 oz) Pick-up Location: [facility] BLS/ALS: BLS/ALS: BLS   Insurance: [place] MEDICAID Auth End Date:    Pre-Cert #: D/C Summary complete:    Destination: Home How many stairs ramp, Will the patient be on the main level yes, Is there a ramp available yes, Can the patient stand and pivot no, Address [address] [place] [address], and Name/contact number for who will be present [place] [phone]   Contact Precautions: None   Equipment (O2, Fluids, etc.): O2, settings 1 L/NC   Arrive By Date/Time:  today. , after nurse calls that pt is ready for discharge Stretcher/WC: Stretcher   CM Requesting: Elodia Carranza RN Ext: [phone]   Notes/Medical Necessity: Pt non-responsive     ______________________________________________________________________    *Only 2 patient bags OR 1 carry-on size bag are permitted.  Wheelchairs and walkers CANNOT transported with the patient. Acknowledge: Yes

## 2025-04-28 NOTE — ED NOTES
"Ex Wife ask to speak with supervisor and stated pt wants to leave AMA if \"RN doesn't bring in a nicotine patch immediately.\" RN ask another RN to assist with medication while calling report for pt's transfer. Charge RN notified of situation.  "

## 2025-04-29 ENCOUNTER — APPOINTMENT (OUTPATIENT)
Dept: NEPHROLOGY | Facility: HOSPITAL | Age: 60
End: 2025-04-29
Payer: MEDICAID

## 2025-04-29 LAB
ABO GROUP BLD: NORMAL
ALBUMIN SERPL-MCNC: 3.4 G/DL (ref 3.5–5.2)
ALBUMIN/GLOB SERPL: 0.8 G/DL
ALP SERPL-CCNC: 210 U/L (ref 39–117)
ALT SERPL W P-5'-P-CCNC: 5 U/L (ref 1–41)
ANION GAP SERPL CALCULATED.3IONS-SCNC: 25 MMOL/L (ref 5–15)
ANTI-E: NORMAL
ARTERIAL PATENCY WRIST A: ABNORMAL
AST SERPL-CCNC: 31 U/L (ref 1–40)
ATMOSPHERIC PRESS: ABNORMAL MM[HG]
BASE EXCESS BLDA CALC-SCNC: -2.8 MMOL/L (ref 0–2)
BDY SITE: ABNORMAL
BILIRUB SERPL-MCNC: 0.5 MG/DL (ref 0–1.2)
BLD GP AB SCN SERPL QL ELUTION: NEGATIVE
BLD GP AB SCN SERPL QL: POSITIVE
BODY TEMPERATURE: 37
BUN SERPL-MCNC: 55 MG/DL (ref 8–23)
BUN/CREAT SERPL: 5.3 (ref 7–25)
CALCIUM SPEC-SCNC: 7.6 MG/DL (ref 8.6–10.5)
CHLORIDE SERPL-SCNC: 95 MMOL/L (ref 98–107)
CO2 BLDA-SCNC: 22.9 MMOL/L (ref 22–33)
CO2 SERPL-SCNC: 19 MMOL/L (ref 22–29)
COHGB MFR BLD: 1.6 % (ref 0–2)
CREAT SERPL-MCNC: 10.45 MG/DL (ref 0.76–1.27)
DAT IGG GEL: POSITIVE
EGFRCR SERPLBLD CKD-EPI 2021: 5.2 ML/MIN/1.73
GLOBULIN UR ELPH-MCNC: 4.1 GM/DL
GLUCOSE BLDC GLUCOMTR-MCNC: 101 MG/DL (ref 70–130)
GLUCOSE BLDC GLUCOMTR-MCNC: 116 MG/DL (ref 70–130)
GLUCOSE BLDC GLUCOMTR-MCNC: 128 MG/DL (ref 70–130)
GLUCOSE SERPL-MCNC: 118 MG/DL (ref 65–99)
HBA1C MFR BLD: 5.2 % (ref 4.8–5.6)
HCO3 BLDA-SCNC: 21.8 MMOL/L (ref 20–26)
HCT VFR BLD CALC: 28.4 % (ref 38–51)
HGB BLDA-MCNC: 9.3 G/DL (ref 13.5–17.5)
INHALED O2 CONCENTRATION: 28 %
LISS SCREEN: POSITIVE
MAGNESIUM SERPL-MCNC: 2.9 MG/DL (ref 1.6–2.4)
METHGB BLD QL: 0.8 % (ref 0–1.5)
MODALITY: ABNORMAL
NONSPECIFIC ANTIBODY: NORMAL
OXYHGB MFR BLDV: 86.4 % (ref 94–99)
PCO2 BLDA: 36.2 MM HG (ref 35–45)
PCO2 TEMP ADJ BLD: 36.2 MM HG (ref 35–48)
PH BLDA: 7.39 PH UNITS (ref 7.35–7.45)
PH, TEMP CORRECTED: 7.39 PH UNITS
PHOSPHATE SERPL-MCNC: 6.1 MG/DL (ref 2.5–4.5)
PO2 BLDA: 61.2 MM HG (ref 83–108)
PO2 TEMP ADJ BLD: 61.2 MM HG (ref 83–108)
POTASSIUM SERPL-SCNC: 5.8 MMOL/L (ref 3.5–5.2)
PROT SERPL-MCNC: 7.5 G/DL (ref 6–8.5)
PSV: 8 CMH2O
RH BLD: POSITIVE
SODIUM SERPL-SCNC: 139 MMOL/L (ref 136–145)
T&S EXPIRATION DATE: NORMAL
T4 FREE SERPL-MCNC: 1.1 NG/DL (ref 0.92–1.68)
TSH SERPL DL<=0.05 MIU/L-ACNC: 5.45 UIU/ML (ref 0.27–4.2)

## 2025-04-29 PROCEDURE — 25010000002 METHYLNALTREXONE 12 MG/0.6ML SOLUTION: Performed by: INTERNAL MEDICINE

## 2025-04-29 PROCEDURE — 99222 1ST HOSP IP/OBS MODERATE 55: CPT | Performed by: STUDENT IN AN ORGANIZED HEALTH CARE EDUCATION/TRAINING PROGRAM

## 2025-04-29 PROCEDURE — 82375 ASSAY CARBOXYHB QUANT: CPT

## 2025-04-29 PROCEDURE — 84443 ASSAY THYROID STIM HORMONE: CPT | Performed by: NURSE PRACTITIONER

## 2025-04-29 PROCEDURE — 93005 ELECTROCARDIOGRAM TRACING: CPT | Performed by: INTERNAL MEDICINE

## 2025-04-29 PROCEDURE — 25010000002 NICARDIPINE 2.5 MG/ML SOLUTION 10 ML VIAL: Performed by: PHYSICIAN ASSISTANT

## 2025-04-29 PROCEDURE — 84100 ASSAY OF PHOSPHORUS: CPT | Performed by: NURSE PRACTITIONER

## 2025-04-29 PROCEDURE — 80053 COMPREHEN METABOLIC PANEL: CPT | Performed by: NURSE PRACTITIONER

## 2025-04-29 PROCEDURE — 36600 WITHDRAWAL OF ARTERIAL BLOOD: CPT

## 2025-04-29 PROCEDURE — 93010 ELECTROCARDIOGRAM REPORT: CPT | Performed by: INTERNAL MEDICINE

## 2025-04-29 PROCEDURE — 83735 ASSAY OF MAGNESIUM: CPT | Performed by: NURSE PRACTITIONER

## 2025-04-29 PROCEDURE — 83050 HGB METHEMOGLOBIN QUAN: CPT

## 2025-04-29 PROCEDURE — 82805 BLOOD GASES W/O2 SATURATION: CPT

## 2025-04-29 PROCEDURE — 25810000003 SODIUM CHLORIDE 0.9 % SOLUTION 250 ML FLEX CONT: Performed by: PHYSICIAN ASSISTANT

## 2025-04-29 PROCEDURE — 84439 ASSAY OF FREE THYROXINE: CPT | Performed by: NURSE PRACTITIONER

## 2025-04-29 PROCEDURE — 82948 REAGENT STRIP/BLOOD GLUCOSE: CPT

## 2025-04-29 PROCEDURE — 83036 HEMOGLOBIN GLYCOSYLATED A1C: CPT | Performed by: NURSE PRACTITIONER

## 2025-04-29 PROCEDURE — 99291 CRITICAL CARE FIRST HOUR: CPT | Performed by: INTERNAL MEDICINE

## 2025-04-29 RX ORDER — LIDOCAINE 4 G/G
2 PATCH TOPICAL
Status: DISCONTINUED | OUTPATIENT
Start: 2025-04-30 | End: 2025-05-03 | Stop reason: HOSPADM

## 2025-04-29 RX ORDER — BISACODYL 10 MG
10 SUPPOSITORY, RECTAL RECTAL DAILY
Status: DISCONTINUED | OUTPATIENT
Start: 2025-04-29 | End: 2025-05-03 | Stop reason: HOSPADM

## 2025-04-29 RX ORDER — AMOXICILLIN 250 MG
2 CAPSULE ORAL 2 TIMES DAILY
Status: DISCONTINUED | OUTPATIENT
Start: 2025-04-29 | End: 2025-05-03 | Stop reason: HOSPADM

## 2025-04-29 RX ORDER — BISACODYL 5 MG/1
5 TABLET, DELAYED RELEASE ORAL DAILY
Status: DISCONTINUED | OUTPATIENT
Start: 2025-04-29 | End: 2025-05-03 | Stop reason: HOSPADM

## 2025-04-29 RX ORDER — POLYETHYLENE GLYCOL 3350 17 G/17G
17 POWDER, FOR SOLUTION ORAL DAILY
Status: DISCONTINUED | OUTPATIENT
Start: 2025-04-29 | End: 2025-05-03 | Stop reason: HOSPADM

## 2025-04-29 RX ADMIN — SENNOSIDES AND DOCUSATE SODIUM 2 TABLET: 50; 8.6 TABLET ORAL at 08:25

## 2025-04-29 RX ADMIN — Medication 10 ML: at 08:22

## 2025-04-29 RX ADMIN — SODIUM CHLORIDE 5 MG/HR: 9 INJECTION, SOLUTION INTRAVENOUS at 08:04

## 2025-04-29 RX ADMIN — BISACODYL 10 MG: 10 SUPPOSITORY RECTAL at 12:14

## 2025-04-29 RX ADMIN — SENNOSIDES AND DOCUSATE SODIUM 2 TABLET: 50; 8.6 TABLET ORAL at 21:19

## 2025-04-29 RX ADMIN — ATORVASTATIN CALCIUM 40 MG: 40 TABLET, FILM COATED ORAL at 22:20

## 2025-04-29 RX ADMIN — METHYLNALTREXONE BROMIDE 6 MG: 12 INJECTION, SOLUTION SUBCUTANEOUS at 12:16

## 2025-04-29 RX ADMIN — PANTOPRAZOLE SODIUM 40 MG: 40 TABLET, DELAYED RELEASE ORAL at 05:45

## 2025-04-29 RX ADMIN — Medication 10 ML: at 21:18

## 2025-04-29 RX ADMIN — LEVOTHYROXINE SODIUM 75 MCG: 0.07 TABLET ORAL at 05:45

## 2025-04-29 RX ADMIN — NICOTINE 1 PATCH: 14 PATCH, EXTENDED RELEASE TRANSDERMAL at 08:25

## 2025-04-29 RX ADMIN — OXYCODONE HYDROCHLORIDE 15 MG: 15 TABLET ORAL at 22:19

## 2025-04-29 RX ADMIN — BISACODYL 5 MG: 5 TABLET, COATED ORAL at 12:15

## 2025-04-29 RX ADMIN — POLYETHYLENE GLYCOL 3350 17 G: 17 POWDER, FOR SOLUTION ORAL at 12:14

## 2025-04-29 RX ADMIN — SODIUM CHLORIDE 5 MG/HR: 9 INJECTION, SOLUTION INTRAVENOUS at 15:41

## 2025-04-29 RX ADMIN — MUPIROCIN 1 APPLICATION: 20 OINTMENT TOPICAL at 08:25

## 2025-04-29 NOTE — PLAN OF CARE
Problem: Hemodialysis  Goal: Safe, Effective Therapy Delivery  Outcome: Progressing  Goal: Effective Tissue Perfusion  Outcome: Progressing  Goal: Absence of Infection Signs and Symptoms  Outcome: Progressing   Goal Outcome Evaluation:   HD completed. Tolerated well except when Cardene was initiated about 50 minutes to end of HD. BP dropped within 10 minutes, diaphoretic and HR inconsistent from 50's- 110's, EKG done without change from baseline.Treatment completed with 500 mL output/ UF goal reached. Blood returned. Report given to primary RN Joanna.

## 2025-04-29 NOTE — SIGNIFICANT NOTE
"Called to bedside by RN for change in patient's mental status. He is less responsive now than he was earlier in the shift and is diaphoretic with frequent changes in HR. EKG with prolonged QT (known) and no other abnormalities. Dialysis is in progress with only 0.5 L is being taken off in a 4-hour period with no changes in BP. We will check     Per Neurosurgery's note, \"family has decided to move forward with Hospice measures and bring him home;\" however, when I spoke with POA, she stated that she wanted \"everything done to keep him alive,\" including CPR and intubation. I explained that aggressive measures are not in line with a Hospice/comfort plan of care but she would like to proceed if necessary.    Dialysis is completing now. ABG ordered. If no improvement in mental status after dialysis, we will send for STAT head CT.     Amy Person, MSN, APRN, ACNPC-AG  Pulmonary and Critical Care Medicine  Electronically signed by VICKI Martinez, 04/29/25, 4:33 PM EDT.     "

## 2025-04-29 NOTE — CONSULTS
[facility]    Inpatient Neurosurgery Consult  Consult performed by: Madeline Avendano PA-C  Consult ordered by: Ankur Arnold APRN        Name: Chevy Junior  YOB: 1965  MRN: [id number]    Referring Provider: Franklin Segura MD     Patient Care Team:  Jeanie Francois APRN as PCP - General (Nurse Practitioner)  Nikita Villasenor MD as Consulting Physician (Nephrology)  Feng Nielsen APRN as Nurse Practitioner (Interventional Cardiology)  Hal Wing MD as Consulting Physician (Cardiology)  Carlos Mcgee MD (General Surgery)  Gus Whaley MD as Consulting Physician (General Surgery)  Luis Fernando Rawls MD as Consulting Physician (Vascular Surgery)    Chief Complaint: No chief complaint on file.      History of Present Illness: This is a 59 y.o. male with a history of heart disease, hypertension, ESRD on dialysis, who presented to the hospital with 3 days of worsening mental status, slurred speech,  and generalized weakness.  Patient was previously evaluated roughly 2 months ago for a left-sided subdural hematoma.  He was followed up outpatient with repeat head CT which showed improvement in the left-sided subdural however new right sided subdural hematoma without evidence of neurological deficit.  Patient's ex-wife states over the last 3 days, he has had worsening lethargy and generalized weakness and made her concerned for the subdural hematoma therefore she brought him back in.  Repeat head CT showed enlargement of the right sided subdural with right lateral ventricle effacement and midline shift.    PMHX  Allergies:  No Known Allergies  Medications    Current Facility-Administered Medications:     albuterol (PROVENTIL) nebulizer solution 0.083% 2.5 mg/3mL, 2.5 mg, Nebulization, Q4H PRN, Ankur Arnold APRN, 2.5 mg at 04/28/25 2246    atorvastatin (LIPITOR) tablet 40 mg, 40 mg, Oral, Nightly, Catalina  VICKI Hogan, 40 mg at 04/28/25 2215    [DISCONTINUED] sennosides-docusate (PERICOLACE) 8.6-50 MG per tablet 2 tablet, 2 tablet, Oral, BID, 2 tablet at 04/29/25 0825 **AND** polyethylene glycol (MIRALAX) packet 17 g, 17 g, Oral, Daily PRN **AND** bisacodyl (DULCOLAX) EC tablet 5 mg, 5 mg, Oral, Daily PRN **AND** bisacodyl (DULCOLAX) suppository 10 mg, 10 mg, Rectal, Daily PRN, Ankur Arnold APRN    sennosides-docusate (PERICOLACE) 8.6-50 MG per tablet 2 tablet, 2 tablet, Oral, BID **AND** polyethylene glycol (MIRALAX) packet 17 g, 17 g, Oral, Daily **AND** bisacodyl (DULCOLAX) EC tablet 5 mg, 5 mg, Oral, Daily **AND** bisacodyl (DULCOLAX) suppository 10 mg, 10 mg, Rectal, Daily, Portillo Guzman,     dextrose (D50W) (25 g/50 mL) IV injection 25 g, 25 g, Intravenous, Q15 Min PRN, Ankur Arnold APRN    dextrose (GLUTOSE) oral gel 15 g, 15 g, Oral, Q15 Min PRN, Ankur Arnold APRN    gabapentin (NEURONTIN) capsule 300 mg, 300 mg, Oral, Nightly, Ankur Arnold APRN    glucagon (GLUCAGEN) injection 1 mg, 1 mg, Intramuscular, Q15 Min PRN, Ankur Arnold APRN    insulin regular (humuLIN R,novoLIN R) injection 2-7 Units, 2-7 Units, Subcutaneous, Q6H, Ankur Arnold APRN    levETIRAcetam (KEPPRA) injection 500 mg, 500 mg, Intravenous, Q24H, Ankur Arnold APRN, 500 mg at 04/28/25 2315    levothyroxine (SYNTHROID, LEVOTHROID) tablet 75 mcg, 75 mcg, Oral, Q AM, Ankur Arnold, APRN, 75 mcg at 04/29/25 0545    methylnaltrexone (RELISTOR) injection 6 mg, 6 mg, Subcutaneous, Once, Portillo Guzman,     mupirocin (BACTROBAN) 2 % nasal ointment 1 Application, 1 Application, Each Nare, BID, Ankur Arnold, VICKI, 1 Application at 04/29/25 0825    niCARdipine (CARDENE) 25mg in 250mL NS infusion, 5-15 mg/hr, Intravenous, Titrated, Sakina Boyce PA-C, Last Rate: 50 mL/hr at 04/29/25 0804, 5 mg/hr at 04/29/25 0804    nicotine (NICODERM CQ) 14 MG/24HR patch 1  patch, 1 patch, Transdermal, Q24H, Ankur Arnold APRN, 1 patch at 04/29/25 0825    nitroglycerin (NITROSTAT) SL tablet 0.4 mg, 0.4 mg, Sublingual, Q5 Min PRN, Ankur Arnold APRN    oxyCODONE (ROXICODONE) immediate release tablet 15 mg, 15 mg, Oral, Q4H PRN, Ankur Arnold APRN, 15 mg at 04/28/25 2315    pantoprazole (PROTONIX) EC tablet 40 mg, 40 mg, Oral, Q AM, Ankur Arnold APRN, 40 mg at 04/29/25 0545    sodium chloride 0.9 % flush 10 mL, 10 mL, Intravenous, Q12H, Ankur Arnold APRN, 10 mL at 04/29/25 0822    sodium chloride 0.9 % flush 10 mL, 10 mL, Intravenous, PRN, Ankur Arnold APRN    sodium chloride 0.9 % infusion 40 mL, 40 mL, Intravenous, PRN, Ankur Arnold APRN  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     Cellulitis and abscess of buttock 2023    CHF (congestive heart failure)     Chronic pain     COPD (chronic obstructive pulmonary disease)     INHALER    Coronary artery disease involving native coronary artery of native heart with angina pectoris 09/12/2022    stents x 3    Diabetes mellitus     ESRD on dialysis     M,W,F-FISTULA LEFT ARM    GERD (gastroesophageal reflux disease)     Heart failure with preserved left ventricular function (HFpEF) 09/13/2022    FOLLOWS DHEERAJ/REECE-NO CP    History of blood transfusion     NO ADVERSE REACTIONS    Hyperlipidemia     Hypertension     WIFE STATED B/P HAS BEEN RUNNING LOW LATELY    Hypothyroidism (acquired) 03/11/2025    Infectious viral hepatitis 5 years ago was treated    hx hep c    RIC (obstructive sleep apnea)     Osteoarthritis     Phosphorus metabolic disorder     Sleep apnea     NO MACHINE    Substance abuse 20 years ago from [facility]     Past Surgical History:  Past Surgical History:   Procedure Laterality Date    ABOVE KNEE LEG AMPUTATION Right     AMPUTATION  2021    CARDIAC CATHETERIZATION      CARDIAC  CATHETERIZATION N/A 11/30/2022    Procedure: Percutaneous Coronary Intervention;  Surgeon: River Acevedo IV, MD;  Location:  [facility] C[facility] INVASIVE LOCATION;  Service: Cardiovascular;  Laterality: N/A;    CARDIAC CATHETERIZATION N/A 11/30/2022    Procedure: Optical Coherent Tomography;  Surgeon: River Acevedo IV, MD;  Location:  [facility] C[facility] INVASIVE LOCATION;  Service: Cardiovascular;  Laterality: N/A;    CARDIAC CATHETERIZATION N/A 11/30/2022    Procedure: Stent JOSE coronary;  Surgeon: River Acevedo IV, MD;  Location:  [facility] C[facility] INVASIVE LOCATION;  Service: Cardiovascular;  Laterality: N/A;    CARDIAC CATHETERIZATION Left 11/09/2023    Procedure: Aortogram with left leg angiogram, possible angioplasty or stenting;  Surgeon: Luis Fernando Rawls MD;  Location: [facility] CATH INVASIVE LOCATION;  Service: Vascular;  Laterality: Left;    CARDIAC CATHETERIZATION Right 04/25/2024    Procedure: Right lower extremity angiogram, possible angioplasty or stenting;  Surgeon: Luis Fernando Rawls MD;  Location: [facility] CATH INVASIVE LOCATION;  Service: Vascular;  Laterality: Right;    CARDIAC CATHETERIZATION N/A 12/05/2024    Procedure: Left Heart Cath;  Surgeon: Cole Oscar MD;  Location:  [facility] CATH INVASIVE LOCATION;  Service: Cardiovascular;  Laterality: N/A;    CATH LAB PROCEDURE      unable to stent    COLONOSCOPY N/A 02/15/2023    Procedure: COLONOSCOPY WITH POLYPECTOMY;  Surgeon: David Glass MD;  Location: [facility] ENDOSCOPY;  Service: Gastroenterology;  Laterality: N/A;  COLON POLYP, MELANOSIS COLI    CORONARY STENT PLACEMENT  2022    ENDOSCOPY N/A 02/03/2023    Procedure: ESOPHAGOGASTRODUODENOSCOPY with biopsy;  Surgeon: Gus Whaley MD;  Location: [facility] ENDOSCOPY;  Service: General;  Laterality: N/A;  gastritis,     FEMORAL POPLITEAL BYPASS Left 12/12/2023    Procedure: Left femoral edarterectomy, left femoral to below the knee popliteal bypass graft;  Surgeon: Luis Fernando Rawls MD;   "Location: [facility] MAIN OR;  Service: Vascular;  Laterality: Left;    HIP SURGERY Right     INSERTION PERITONEAL DIALYSIS CATHETER      LEG SURGERY Left     \"jocelyne in place\"    MANDIBLE FRACTURE SURGERY      REPLACEMENT TOTAL KNEE Right     x3    SHOULDER SURGERY Right     SPINE SURGERY      unknown procudure- completed at [facility] in [place].     Social Hx:  Social History     Tobacco Use    Smoking status: Every Day     Current packs/day: 2.00     Average packs/day: 2.0 packs/day for 40.0 years (80.0 ttl pk-yrs)     Types: Cigarettes    Smokeless tobacco: Never    Tobacco comments:     1.5 ppd as of 2/5/25   Vaping Use    Vaping status: Never Used   Substance Use Topics    Alcohol use: Not Currently     Comment: None    Drug use: Not Currently     Types: Hydrocodone     Comment: pain pills from previous injuries     Family Hx:  Family History   Problem Relation Age of Onset    Heart attack Mother     Coronary artery disease Mother     Hypertension Mother     Arthritis Mother     Hypertension Father     Diabetes Father      Review of Systems:        Review of Systems   Constitutional:  Positive for fatigue.   Neurological:  Positive for weakness. Negative for tremors and seizures.        Physical Exam:   /72   Pulse 98   Temp 99.9 °F (37.7 °C)   Resp 22   SpO2 100%   Patient asleep but awakens easily however then nods off quickly  Alert and oriented x 2, says incorrect year  Speech f/c  Opens eyes to voice  Pupils equal and reactive to light  Moves bilateral upper extremities with good strength  Moves left lower extremity with good strength, right AKA      Intake/Output: No intake or output data in the 24 hours ending 04/29/25 1011    Current Medications:   Current Facility-Administered Medications:     albuterol (PROVENTIL) nebulizer solution 0.083% 2.5 mg/3mL, 2.5 mg, Nebulization, Q4H PRN, Ankur Arnold, APRN, 2.5 mg at 04/28/25 2246    atorvastatin (LIPITOR) tablet 40 mg, 40 mg, Oral, Nightly, Catalina, " patch, 1 patch, Transdermal, Q24H, Ankur Arnold APRN, 1 patch at 04/29/25 0825    nitroglycerin (NITROSTAT) SL tablet 0.4 mg, 0.4 mg, Sublingual, Q5 Min PRN, Ankur Arnold APRN    oxyCODONE (ROXICODONE) immediate release tablet 15 mg, 15 mg, Oral, Q4H PRN, Ankur Arnold APRN, 15 mg at 04/28/25 2315    pantoprazole (PROTONIX) EC tablet 40 mg, 40 mg, Oral, Q AM, Ankur Arnold APRN, 40 mg at 04/29/25 0545    sodium chloride 0.9 % flush 10 mL, 10 mL, Intravenous, Q12H, Ankur Arnold APRN, 10 mL at 04/29/25 0822    sodium chloride 0.9 % flush 10 mL, 10 mL, Intravenous, PRN, Ankur Arnold APRN    sodium chloride 0.9 % infusion 40 mL, 40 mL, Intravenous, PRN, Ankur Arnold APRN     Laboratory Results:      Lab 04/28/25  2213 04/28/25  1436   WBC 8.35 7.71   HEMOGLOBIN 8.9* 8.3*   HEMATOCRIT 29.4* 26.8*   PLATELETS 270 250   NEUTROS ABS  --  5.31   IMMATURE GRANS (ABS)  --  0.03   LYMPHS ABS  --  1.57   MONOS ABS  --  0.48   EOS ABS  --  0.25   MCV 99.7* 99.6*   PROTIME 24.0*  --          Lab 04/29/25  0429 04/28/25  2213 04/28/25  1436   SODIUM 139 142 138   POTASSIUM 5.8* 5.2 4.6   CHLORIDE 95* 96* 96*   CO2 19.0* 22.0 23.5   ANION GAP 25.0* 24.0* 18.5*   BUN 55* 50* 43*   CREATININE 10.45* 9.75* 8.84*   EGFR 5.2* 5.6* 6.3*   GLUCOSE 118* 124* 145*   CALCIUM 7.6* 7.6* 7.2*   MAGNESIUM 2.9*  --  2.5*   PHOSPHORUS 6.1*  --   --    HEMOGLOBIN A1C 5.20  --   --    TSH 5.450*  --   --          Lab 04/29/25  0429 04/28/25  1436   TOTAL PROTEIN 7.5 7.7   ALBUMIN 3.4* 3.5   GLOBULIN 4.1 4.2   ALT (SGPT) 5 <5   AST (SGOT) 31 10   BILIRUBIN 0.5 0.5   ALK PHOS 210* 210*         Lab 04/28/25  2213 04/28/25  1436   HSTROP T  --  1,359*   PROTIME 24.0*  --    INR 1.99*  --              Lab 04/28/25 2213   ABO TYPING O   RH TYPING Positive   ANTIBODY SCREEN Positive         Brief Urine Lab Results       None          Microbiology Results (last 10 days)       ** No results found for the last  240 hours. **               Diagnostic Imaging: The patient's diagnostic imaging was independently reviewed and interpreted by myself.    MDM    Assessment and Plan:    Subdural hematoma    This is a 59 y.o. male with a complex medical history including extensive cardiovascular disease and end-stage renal disease on dialysis, who presents with worsening mental status, slurred speech and generalized weakness over the last 3 days.  Head CT shows enlarged right sided subdural hematoma measuring 20 mm in thickness with right-to-left midline shift and effacement of the right lateral ventricle.  On exam, patient is oriented to person and place however says incorrect year.  He moves bilateral upper and left lower extremity well.  He may benefit nelia hole craniotomy for evacuation of right subdural hematoma.  Dr. Mahmood will discuss this further with patient's caregiver later today.  Recommend continuing to hold anticoagulation as well as SBP less than 140. Patient can have a diet for now. Please call with questions or concerns.     Madeline Avendano PA-C   04/29/25  10:11 EDT

## 2025-04-29 NOTE — H&P
Intensive Care Admission Note     <principal problem not specified>    History of Present Illness     Chevy Junior is a 59 y.o. male w/ PMHX of ongoing tobacco use, MVCAD, HFrEF, VHD (moderate AI, mild-moderate MR), HTN, HLD, PAD, seizures, ESRD (HD MWF), bilateral SDH ( left SDH 3/11/25) and recent R SDH (04/2025).      The patient is very complicated chronic and recent medical history will be outlined below.  Most all history is obtained through discussion with the patient's power of  and through chart review as the patient is dysarthric and somnolent and demonstrates inconsistent history giving.    The patient was admitted to [facility] in March for evaluation for possible cardiac revascularization.  At that time he was deemed too high risk for CABG and other less invasive interventions are being considered.  While awaiting determination  Patient was inpatient @ [facility] last week for lower GIB.  He reportedly has had generalized weakness and constipation since discharge w/ increased lethargy and slurring of speech x 3 D.    Patient presented to [facility] ED where CT imaging of the head revealed an enlargement of his right SDH (previously 6 mm on 4/10/25) to 20 mm w/ R lateral ventricle effacement, 15 mm R -> L midline shift, and mass effect.           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     Cellulitis and abscess of buttock 2023    CHF (congestive heart failure)     Chronic pain     COPD (chronic obstructive pulmonary disease)     INHALER    Coronary artery disease involving native coronary artery of native heart with angina pectoris 09/12/2022    stents x 3    Diabetes mellitus     ESRD on dialysis     M,W,F-FISTULA LEFT ARM    GERD (gastroesophageal reflux disease)     Heart failure with preserved left ventricular function (HFpEF)  09/13/2022    FOLLOWS JUNAID-NO CP    History of blood transfusion     NO ADVERSE REACTIONS    Hyperlipidemia     Hypertension     WIFE STATED B/P HAS BEEN RUNNING LOW LATELY    Hypothyroidism (acquired) 03/11/2025    Infectious viral hepatitis 5 years ago was treated    hx hep c    RIC (obstructive sleep apnea)     Osteoarthritis     Phosphorus metabolic disorder     Sleep apnea     NO MACHINE    Substance abuse 20 years ago from [facility]      Past Surgical History:   Procedure Laterality Date    ABOVE KNEE LEG AMPUTATION Right     AMPUTATION  2021    CARDIAC CATHETERIZATION      CARDIAC CATHETERIZATION N/A 11/30/2022    Procedure: Percutaneous Coronary Intervention;  Surgeon: River Acevedo IV, MD;  Location:  [facility] CATH INVASIVE LOCATION;  Service: Cardiovascular;  Laterality: N/A;    CARDIAC CATHETERIZATION N/A 11/30/2022    Procedure: Optical Coherent Tomography;  Surgeon: River Acevedo IV, MD;  Location:  [facility] CATH INVASIVE LOCATION;  Service: Cardiovascular;  Laterality: N/A;    CARDIAC CATHETERIZATION N/A 11/30/2022    Procedure: Stent JOSE coronary;  Surgeon: River Acevedo IV, MD;  Location:  [facility] CATH INVASIVE LOCATION;  Service: Cardiovascular;  Laterality: N/A;    CARDIAC CATHETERIZATION Left 11/09/2023    Procedure: Aortogram with left leg angiogram, possible angioplasty or stenting;  Surgeon: Luis Fernando Rawls MD;  Location: [facility] CATH INVASIVE LOCATION;  Service: Vascular;  Laterality: Left;    CARDIAC CATHETERIZATION Right 04/25/2024    Procedure: Right lower extremity angiogram, possible angioplasty or stenting;  Surgeon: Luis Fernando Rwals MD;  Location:  [facility] CATH INVASIVE LOCATION;  Service: Vascular;  Laterality: Right;    CARDIAC CATHETERIZATION N/A 12/05/2024    Procedure: Left Heart Cath;  Surgeon: Cole Oscar MD;  Location:  [facility] CATH INVASIVE LOCATION;  Service: Cardiovascular;  Laterality: N/A;    CATH LAB PROCEDURE      unable to stent    COLONOSCOPY  "N/A 02/15/2023    Procedure: COLONOSCOPY WITH POLYPECTOMY;  Surgeon: David Glass MD;  Location: [facility] ENDOSCOPY;  Service: Gastroenterology;  Laterality: N/A;  COLON POLYP, MELANOSIS COLI    CORONARY STENT PLACEMENT  2022    ENDOSCOPY N/A 02/03/2023    Procedure: ESOPHAGOGASTRODUODENOSCOPY with biopsy;  Surgeon: Gus Whaley MD;  Location: [facility] ENDOSCOPY;  Service: General;  Laterality: N/A;  gastritis,     FEMORAL POPLITEAL BYPASS Left 12/12/2023    Procedure: Left femoral edarterectomy, left femoral to below the knee popliteal bypass graft;  Surgeon: Luis Fernando Rawls MD;  Location: [facility] MAIN OR;  Service: Vascular;  Laterality: Left;    HIP SURGERY Right     INSERTION PERITONEAL DIALYSIS CATHETER      LEG SURGERY Left     \"jocelyne in place\"    MANDIBLE FRACTURE SURGERY      REPLACEMENT TOTAL KNEE Right     x3    SHOULDER SURGERY Right     SPINE SURGERY      unknown procudure- completed at [facility] in [facility].       No Known Allergies  Current Facility-Administered Medications on File Prior to Encounter   Medication    [DISCONTINUED] nicotine (NICODERM CQ) 21 MG/24HR patch 1 patch    [DISCONTINUED] ondansetron (ZOFRAN) injection 4 mg    [DISCONTINUED] sodium chloride 0.9 % flush 10 mL     Current Outpatient Medications on File Prior to Encounter   Medication Sig    albuterol sulfate  (90 Base) MCG/ACT inhaler Inhale 1 puff Every 4 (Four) Hours As Needed for Wheezing or Shortness of Air.    aspirin (aspirin) 81 MG EC tablet Take 1 tablet by mouth Daily.    atorvastatin (LIPITOR) 40 MG tablet Take 1 tablet by mouth Daily.    Auryxia 1  MG(Fe) tablet Take 1 tablet by mouth Daily With Lunch.    cinacalcet (SENSIPAR) 30 MG tablet Take 1 tablet by mouth Daily.    gabapentin (NEURONTIN) 100 MG capsule Take 1 capsule by mouth Daily.    Heparin Sodium, Porcine, (heparin, porcine,) 1000 UNIT/ML injection Infuse 1 mL into a venous catheter. M-W-F with dialysis    Insulin Glargine (BASAGLAR KWIKPEN) " 100 UNIT/ML injection pen Inject 5-15 Units under the skin into the appropriate area as directed 3 (Three) Times a Day As Needed. Per SS  5-15 UNITS    isosorbide mononitrate (IMDUR) 30 MG 24 hr tablet Take 1 tablet by mouth Daily.    lactulose (CHRONULAC) 10 GM/15ML solution Take 30 mL by mouth 2 (Two) Times a Day As Needed.    levETIRAcetam (KEPPRA) 500 MG tablet Take 1 tablet by mouth Daily for 90 days.    levothyroxine (SYNTHROID, LEVOTHROID) 75 MCG tablet Take 1 tablet by mouth Daily.    lidocaine-prilocaine (EMLA) 2.5-2.5 % cream Apply 1 Application topically to the appropriate area as directed As Needed for Injection Site Pain. Apply before dialysis on Monday, Wednesday and Fridays of each week    metoclopramide (REGLAN) 5 MG tablet Take 1 tablet by mouth Every Night.    metoprolol tartrate (LOPRESSOR) 25 MG tablet Take 1 tablet by mouth Every 12 (Twelve) Hours.    naloxone (NARCAN) 4 MG/0.1ML nasal spray Administer 1 spray into the nostril(s) as directed by provider Take As Directed.    nitroglycerin (NITROSTAT) 0.4 MG SL tablet Place 1 tablet under the tongue Every 5 (Five) Minutes As Needed for Chest Pain.    ondansetron ODT (ZOFRAN-ODT) 4 MG disintegrating tablet Place 1 tablet on the tongue Every 6 (Six) Hours As Needed for Nausea or Vomiting.    oxyCODONE (ROXICODONE) 30 MG immediate release tablet Take 1.5 tablets by mouth Every 4 (Four) Hours As Needed for Moderate Pain or Severe Pain.    pantoprazole (PROTONIX) 40 MG EC tablet Take 1 tablet by mouth Daily.    sertraline (ZOLOFT) 100 MG tablet Take 1.5 tablets by mouth Daily.    sodium zirconium cyclosilicate (Lokelma) 10 g packet Take 10 g by mouth 1 (One) Time. Empty entire contents of the packet(s) into a glass with at least 3 tablespoons (45 mL) of water. Stir well and drink immediately; if powder remains in the glass, add water, stir and drink immediately; repeat until no powder remains. Administer other oral medications at least 2 hours before  or 2 hours after dose.    Xphozah 30 MG tablet Take 30 mg by mouth Daily As Needed.    [DISCONTINUED] isosorbide mononitrate (IMDUR) 30 MG 24 hr tablet Take 1 tablet by mouth Daily.    [DISCONTINUED] ranolazine (Ranexa) 500 MG 12 hr tablet Take 1 tablet by mouth once daily on days that you are NOT undergoing dialysis     MEDICATION LIST AND ALLERGIES REVIEWED.    Family History   Problem Relation Age of Onset    Heart attack Mother     Coronary artery disease Mother     Hypertension Mother     Arthritis Mother     Hypertension Father     Diabetes Father      Social History     Tobacco Use    Smoking status: Every Day     Current packs/day: 2.00     Average packs/day: 2.0 packs/day for 40.0 years (80.0 ttl pk-yrs)     Types: Cigarettes    Smokeless tobacco: Never    Tobacco comments:     1.5 ppd as of 2/5/25   Vaping Use    Vaping status: Never Used   Substance Use Topics    Alcohol use: Not Currently     Comment: None    Drug use: Not Currently     Types: Hydrocodone     Comment: pain pills from previous injuries     Social History     Social History Narrative    Chronically debilitated.  Poor mobility at baseline.  Wheelchair-bound generally and cannot stand/pivot on left leg.  Lives with his ex-wife who is his POA and caregiver.  No home oxygen use.  No home CPAP use.     FAMILY AND SOCIAL HISTORY REVIEWED.    Review of Systems   Unable to perform ROS: Mental status change   Obtained through discussion with patient's POA  General: Appetite diminished  Skin: Recently had wound VAC at right stump site removed  Eyes: No visual changes including diplopia or blurring  Pulmonary: No shortness of breath at rest, significant cough, audible wheeze; patient continues to smoke; does not utilize home oxygen; does have RIC but is noncompliant  Cardiac: No chest pain or palpitations; otherwise as above  Gastrointestinal: Presently no nausea, vomiting, abdominal pain; reports no bowel movement since recent hospital discharge;  otherwise as above  Genitourinary: Anuric on dialysis; no HD administered today  Musculoskeletal: As above  Neurologic: As above    ALL OTHER SYSTEMS REVIEWED AND ARE NEGATIVE.    Physical Exam and Clinical Information   There were no vitals taken for this visit.  Physical Exam  General: Laying in bed in room 238 in no general or respiratory distress; somnolent but does awaken; speech somewhat slurred  Skin: No rash, erythema, cyanosis, jaundice, pallor; right lower extremity stump site noted without drainage, fluctuance, bleeding, discharge; heaped up brawny changes and left lower extremity  Eyes: EOMI, PERRLA; eyes track appropriately; sclerae anicteric  ENT: Mouth agape with tongue favoring the left side; mucous membranes are moist; oropharynx crowded but clear without erythema, exudates or thrush; nares are patent without significant discharge  Pulmonary: Fair effort and air entry bilaterally; bilateral basilar crackles posterior fields; no wheeze; some coarseness of areas of cough   Cardiac: Regular rate and rhythm; normal S1 and S2; no S3, S4; 1/6 to 2/6 systolic murmur appreciated  Abdomen: Protuberant, soft, nontender, nondistended; bowel sounds are present x 4; there are no masses or hepatosplenomegaly appreciated  Genitourinary: No suprapubic tenderness to palpation flank pain to percussion  Musculoskeletal: Right AKA site noted; left lower extremity with 3+ edema; left pedal pulse poorly palpable; left upper extremity fistula palpable with thrill  Neurologic: Exam somewhat limited due to the patient level of consciousness as well as due to significant chronic musculoskeletal pain; cranial nerves are grossly intact; sensation is grossly intact; strength is grossly intact; range of motion is grossly intact; the patient is alert and oriented to person and knows he is in a hospital; not oriented to time; the patient is a poor history giver; saw the left are downgoing; follows simple  commands  Hematology/oncology: No evidence of extensive bruising or active bleeding    Results from last 7 days   Lab Units 04/28/25  1436   WBC 10*3/mm3 7.71   HEMOGLOBIN g/dL 8.3*   PLATELETS 10*3/mm3 250     Results from last 7 days   Lab Units 04/28/25  1436   SODIUM mmol/L 138   POTASSIUM mmol/L 4.6   CO2 mmol/L 23.5   BUN mg/dL 43*   CREATININE mg/dL 8.84*   MAGNESIUM mg/dL 2.5*   GLUCOSE mg/dL 145*     Estimated Creatinine Clearance: 9.3 mL/min (A) (by C-G formula based on SCr of 8.84 mg/dL (H)).          Lab Results   Component Value Date    LACTATE 1.4 03/11/2025        Images:     I reviewed the patient's results and images.     Impression     Medical Problems      Plan/Recommendations   Assessment And Plan:    1: Pulmonary: History of tobacco abuse; history of RIC without compliance with noninvasive ventilation as an outpatient  -Patient presently oxygenating well and appears able to protect his airway  -X-ray shows bilateral pattern suggestive of edema; is not surprising of the fact that the patient was due dialysis today; anticipate dialysis tomorrow    2: Cardiovascular: History of coronary artery disease, biventricular failure; HFpEF;  - Echocardiogram from 11/24 shows preserved ejection fraction with diastolic dysfunction 2  - Patient is presently hemodynamically stable  -Monitor for now  -Hold outpatient medications that may alter cardiovascular stability until further data can be reviewed    3: Infectious disease: History of MRSA status post extended course of treatment for MRSA wound  - Patient afebrile and white blood cell count is within normal limits  -Monitor off antibiotics for the time being    4: Renal/genitourinary/acid-base: End-stage renal disease on Monday/Wednesday/Friday dialysis; high anion gap metabolic acidosis  - Consult nephrology for hemodialysis   -Her I's and O's and labs closely    5: Gastrointestinal: History of GERD; status post recent GI bleed (suspect upper GI source?)  -  Continue PPI  -N.p.o. for now  -LFTs unremarkable    6: Hematology/oncology: History of anemia; reportedly difficult typed and cross   - Hemoglobin low but near usual range  -Platelets stable  -Monitor CBC    7: Endocrine: History of diabetes mellitus; history of hypothyroidism  -Diabetes mellitus: Hold outpatient regimen for now; will monitor POC glucose & sliding scale insulin only for now  -Thyroid disease: Resume Synthroid once cleared to take p.o.  -Systemic steroids: Not taking    8: Neuropsychiatric: Evolving right subdural hematoma with midline shift; improving left subdural hematoma; recent new onset seizure  - Check MRI brain  -Neurochecks  -Seizure precautions  -Continuous EEG  - Consult neurosurgery    9: Fluids/electrolytes/nutrition:  - N.p.o. for now  - Electrolytes are in reasonable range; monitor closely as the patient is overdue for dialysis  -I's and O's goal for the next 24 hours = even (volume removal once hemodialysis performed)    10: Prophylactic measures:  -PUD = Protonix  -DVT = SCD; not a candidate for anticoagulation given expanding subdural hematoma          Time spent Critical care 45 min (exclusive of procedure time)  including high complexity decision making to assess, manipulate, and support vital organ system failure in this individual who has impairment of one or more vital organ systems such that there is a high probability of imminent or life threatening deterioration in the patient’s condition.      Wilber Pozo MD  04/28/25 21:11 EDT     CC: Jeanie Francois APRN    9 minutes of critical care provided by VICKI Cardoso in addendum accordance with split/shared billing. This time excludes other billable procedures. Time does include preparation of documents, medical consultations, review of old records, and direct bedside care. Patient is at high risk for life-threatening deterioration due to SDH.   Electronically signed by VICKI Zamora, 04/28/25, 9:11 PM  EDT.

## 2025-04-29 NOTE — CONSULTS
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 Family states that patient was discharged from [facility] last week after being admitted for subdural hematomas and a GI bleed. Last HD done on last Friday. HD not done at the clinic because of his clinical condition and directed to ED. Nephrology service has been consulted for ESRD management.        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     Cellulitis and abscess of buttock 2023    CHF (congestive heart failure)     Chronic pain     COPD (chronic obstructive pulmonary disease)     INHALER    Coronary artery disease involving native coronary artery of native heart with angina pectoris 09/12/2022    stents x 3    Diabetes mellitus     ESRD on dialysis     M,W,F-FISTULA LEFT ARM    GERD (gastroesophageal reflux disease)     Heart failure with preserved left ventricular function (HFpEF) 09/13/2022    FOLLOWS W/REECE-NO CP    History of blood transfusion     NO ADVERSE REACTIONS    Hyperlipidemia     Hypertension     WIFE STATED B/P HAS BEEN RUNNING LOW LATELY    Hypothyroidism (acquired) 03/11/2025    Infectious viral hepatitis 5 years ago was treated    hx hep c    RIC (obstructive sleep apnea)     Osteoarthritis     Phosphorus metabolic disorder     Sleep apnea     NO MACHINE    Substance abuse 20 years ago from [facility]   ,   Past Surgical History:   Procedure Laterality Date    ABOVE KNEE LEG AMPUTATION Right     AMPUTATION  2021    CARDIAC  CATHETERIZATION      CARDIAC CATHETERIZATION N/A 11/30/2022    Procedure: Percutaneous Coronary Intervention;  Surgeon: River Acevedo IV, MD;  Location:  [facility] CATH INVASIVE LOCATION;  Service: Cardiovascular;  Laterality: N/A;    CARDIAC CATHETERIZATION N/A 11/30/2022    Procedure: Optical Coherent Tomography;  Surgeon: River Acevedo IV, MD;  Location:  [facility] CATH INVASIVE LOCATION;  Service: Cardiovascular;  Laterality: N/A;    CARDIAC CATHETERIZATION N/A 11/30/2022    Procedure: Stent JOSE coronary;  Surgeon: River Acevedo IV, MD;  Location:  [facility] CATH INVASIVE LOCATION;  Service: Cardiovascular;  Laterality: N/A;    CARDIAC CATHETERIZATION Left 11/09/2023    Procedure: Aortogram with left leg angiogram, possible angioplasty or stenting;  Surgeon: Luis Fernando Rawls MD;  Location: [facility] CATH INVASIVE LOCATION;  Service: Vascular;  Laterality: Left;    CARDIAC CATHETERIZATION Right 04/25/2024    Procedure: Right lower extremity angiogram, possible angioplasty or stenting;  Surgeon: Luis Fernando Rawls MD;  Location: [facility] CATH INVASIVE LOCATION;  Service: Vascular;  Laterality: Right;    CARDIAC CATHETERIZATION N/A 12/05/2024    Procedure: Left Heart Cath;  Surgeon: Cole Oscar MD;  Location:  [facility] CATH INVASIVE LOCATION;  Service: Cardiovascular;  Laterality: N/A;    CATH LAB PROCEDURE      unable to stent    COLONOSCOPY N/A 02/15/2023    Procedure: COLONOSCOPY WITH POLYPECTOMY;  Surgeon: David Glass MD;  Location: [facility] ENDOSCOPY;  Service: Gastroenterology;  Laterality: N/A;  COLON POLYP, MELANOSIS COLI    CORONARY STENT PLACEMENT  2022    ENDOSCOPY N/A 02/03/2023    Procedure: ESOPHAGOGASTRODUODENOSCOPY with biopsy;  Surgeon: Gus Whaley MD;  Location: [facility] ENDOSCOPY;  Service: General;  Laterality: N/A;  gastritis,     FEMORAL POPLITEAL BYPASS Left 12/12/2023    Procedure: Left femoral edarterectomy, left femoral to below the knee popliteal bypass graft;   "Surgeon: Luis Fernando Rawls MD;  Location: [facility] MAIN OR;  Service: Vascular;  Laterality: Left;    HIP SURGERY Right     INSERTION PERITONEAL DIALYSIS CATHETER      LEG SURGERY Left     \"jocelyne in place\"    MANDIBLE FRACTURE SURGERY      REPLACEMENT TOTAL KNEE Right     x3    SHOULDER SURGERY Right     SPINE SURGERY      unknown procudure- completed at [facility] in [place].   ,   Family History   Problem Relation Age of Onset    Heart attack Mother     Coronary artery disease Mother     Hypertension Mother     Arthritis Mother     Hypertension Father     Diabetes Father    ,   Social History     Socioeconomic History    Marital status:    Tobacco Use    Smoking status: Every Day     Current packs/day: 2.00     Average packs/day: 2.0 packs/day for 40.0 years (80.0 ttl pk-yrs)     Types: Cigarettes    Smokeless tobacco: Never    Tobacco comments:     1.5 ppd as of 2/5/25   Vaping Use    Vaping status: Never Used   Substance and Sexual Activity    Alcohol use: Not Currently     Comment: None    Drug use: Not Currently     Types: Hydrocodone     Comment: pain pills from previous injuries    Sexual activity: Defer     E-cigarette/Vaping    E-cigarette/Vaping Use Never User     Passive Exposure No     Counseling Given No      E-cigarette/Vaping Substances    Nicotine No     THC No     CBD No     Flavoring No      E-cigarette/Vaping Devices    Disposable No     Pre-filled or Refillable Cartridge No     Refillable Tank No     Pre-filled Pod No          ,   Medications Prior to Admission   Medication Sig Dispense Refill Last Dose/Taking    albuterol sulfate  (90 Base) MCG/ACT inhaler Inhale 1 puff Every 4 (Four) Hours As Needed for Wheezing or Shortness of Air.       aspirin (aspirin) 81 MG EC tablet Take 1 tablet by mouth Daily. 30 tablet 8     atorvastatin (LIPITOR) 40 MG tablet Take 1 tablet by mouth Daily. 90 tablet 3     Auryxia 1  MG(Fe) tablet Take 1 tablet by mouth Daily With Lunch.       cinacalcet " (SENSIPAR) 30 MG tablet Take 1 tablet by mouth Daily.       gabapentin (NEURONTIN) 100 MG capsule Take 1 capsule by mouth Daily.       Heparin Sodium, Porcine, (heparin, porcine,) 1000 UNIT/ML injection Infuse 1 mL into a venous catheter. M-W-F with dialysis       Insulin Glargine (BASAGLAR KWIKPEN) 100 UNIT/ML injection pen Inject 5-15 Units under the skin into the appropriate area as directed 3 (Three) Times a Day As Needed. Per SS  5-15 UNITS       isosorbide mononitrate (IMDUR) 30 MG 24 hr tablet Take 1 tablet by mouth Daily. 90 tablet 3     lactulose (CHRONULAC) 10 GM/15ML solution Take 30 mL by mouth 2 (Two) Times a Day As Needed.       levETIRAcetam (KEPPRA) 500 MG tablet Take 1 tablet by mouth Daily for 90 days. 90 tablet 0     levothyroxine (SYNTHROID, LEVOTHROID) 75 MCG tablet Take 1 tablet by mouth Daily.       lidocaine-prilocaine (EMLA) 2.5-2.5 % cream Apply 1 Application topically to the appropriate area as directed As Needed for Injection Site Pain. Apply before dialysis on Monday, Wednesday and Fridays of each week       metoclopramide (REGLAN) 5 MG tablet Take 1 tablet by mouth Every Night.       metoprolol tartrate (LOPRESSOR) 25 MG tablet Take 1 tablet by mouth Every 12 (Twelve) Hours. 60 tablet 0     naloxone (NARCAN) 4 MG/0.1ML nasal spray Administer 1 spray into the nostril(s) as directed by provider Take As Directed.       nitroglycerin (NITROSTAT) 0.4 MG SL tablet Place 1 tablet under the tongue Every 5 (Five) Minutes As Needed for Chest Pain. 30 tablet 3     ondansetron ODT (ZOFRAN-ODT) 4 MG disintegrating tablet Place 1 tablet on the tongue Every 6 (Six) Hours As Needed for Nausea or Vomiting.       oxyCODONE (ROXICODONE) 30 MG immediate release tablet Take 1.5 tablets by mouth Every 4 (Four) Hours As Needed for Moderate Pain or Severe Pain.       pantoprazole (PROTONIX) 40 MG EC tablet Take 1 tablet by mouth Daily.       sertraline (ZOLOFT) 100 MG tablet Take 1.5 tablets by mouth Daily.        sodium zirconium cyclosilicate (Lokelma) 10 g packet Take 10 g by mouth 1 (One) Time. Empty entire contents of the packet(s) into a glass with at least 3 tablespoons (45 mL) of water. Stir well and drink immediately; if powder remains in the glass, add water, stir and drink immediately; repeat until no powder remains. Administer other oral medications at least 2 hours before or 2 hours after dose.       Xphozah 30 MG tablet Take 30 mg by mouth Daily As Needed.      , Scheduled Meds:  atorvastatin, 40 mg, Oral, Nightly  gabapentin, 300 mg, Oral, Nightly  insulin regular, 2-7 Units, Subcutaneous, Q6H  levETIRAcetam, 500 mg, Intravenous, Q24H  levothyroxine, 75 mcg, Oral, Q AM  mupirocin, 1 Application, Each Nare, BID  nicotine, 1 patch, Transdermal, Q24H  pantoprazole, 40 mg, Oral, Q AM  senna-docusate sodium, 2 tablet, Oral, BID  sodium chloride, 10 mL, Intravenous, Q12H   , Continuous Infusions:  niCARdipine, 5-15 mg/hr, Last Rate: 5 mg/hr (04/29/25 0804)   , PRN Meds:    Albuterol Sulfate NEB Orderable    senna-docusate sodium **AND** polyethylene glycol **AND** bisacodyl **AND** bisacodyl    dextrose    dextrose    glucagon (human recombinant)    nitroglycerin    oxyCODONE    sodium chloride    sodium chloride, and Allergies:  Patient has no known allergies.    Review of Systems  Pertinent items are noted in HPI    Objective     Vital Signs  Temp:  [97.1 °F (36.2 °C)-99.9 °F (37.7 °C)] 99.9 °F (37.7 °C)  Heart Rate:  [84-98] 98  Resp:  [16-22] 22  BP: (116-155)/() 155/72    No intake/output data recorded.  No intake/output data recorded.    Physical Exam:  General Appearance:    Alert, cooperative, in no acute distress   Head:    Normocephalic, without obvious abnormality, atraumatic   Eyes:            Conjunctivae and sclerae normal, no   icterus, no pallor, corneas clear, PERRLA           Neck:   Supple, trachea midline, no thyromegaly,  no JVD       Lungs:     Clear to auscultation,respirations  "regular, even and               unlabored    Heart:    Regular rhythm and normal rate, normal S1 and S2, no       murmur, no gallop, no rub, no click       Abdomen:     Normal bowel sounds,soft, non-tender, non-distended, no guarding, no rebound tenderness       Extremities:   Moves all extremities well, no edema, no cyanosis, no         redness   Pulses:   Pulses palpable and equal bilaterally           Neurologic:   Cranial nerves 2 - 12 grossly intact, no focal deficit         Results Review:   I reviewed the patient's new clinical results.    WBC WBC   Date Value Ref Range Status   04/28/2025 8.35 3.40 - 10.80 10*3/mm3 Final   04/28/2025 7.71 3.40 - 10.80 10*3/mm3 Final      HGB Hemoglobin   Date Value Ref Range Status   04/28/2025 8.9 (L) 13.0 - 17.7 g/dL Final   04/28/2025 8.3 (L) 13.0 - 17.7 g/dL Final      HCT Hematocrit   Date Value Ref Range Status   04/28/2025 29.4 (L) 37.5 - 51.0 % Final   04/28/2025 26.8 (L) 37.5 - 51.0 % Final      Platlets No results found for: \"LABPLAT\"   MCV MCV   Date Value Ref Range Status   04/28/2025 99.7 (H) 79.0 - 97.0 fL Final   04/28/2025 99.6 (H) 79.0 - 97.0 fL Final          Sodium Sodium   Date Value Ref Range Status   04/29/2025 139 136 - 145 mmol/L Final   04/28/2025 142 136 - 145 mmol/L Final   04/28/2025 138 136 - 145 mmol/L Final      Potassium Potassium   Date Value Ref Range Status   04/29/2025 5.8 (H) 3.5 - 5.2 mmol/L Final     Comment:     Specimen hemolyzed.  Result may be falsely elevated.   04/28/2025 5.2 3.5 - 5.2 mmol/L Final   04/28/2025 4.6 3.5 - 5.2 mmol/L Final      Chloride Chloride   Date Value Ref Range Status   04/29/2025 95 (L) 98 - 107 mmol/L Final   04/28/2025 96 (L) 98 - 107 mmol/L Final   04/28/2025 96 (L) 98 - 107 mmol/L Final      CO2 CO2   Date Value Ref Range Status   04/29/2025 19.0 (L) 22.0 - 29.0 mmol/L Final   04/28/2025 22.0 22.0 - 29.0 mmol/L Final   04/28/2025 23.5 22.0 - 29.0 mmol/L Final      BUN BUN   Date Value Ref Range Status " "  04/29/2025 55 (H) 8 - 23 mg/dL Final   04/28/2025 50 (H) 8 - 23 mg/dL Final   04/28/2025 43 (H) 8 - 23 mg/dL Final      Creatinine Creatinine   Date Value Ref Range Status   04/29/2025 10.45 (H) 0.76 - 1.27 mg/dL Final   04/28/2025 9.75 (H) 0.76 - 1.27 mg/dL Final   04/28/2025 8.84 (H) 0.76 - 1.27 mg/dL Final      Calcium Calcium   Date Value Ref Range Status   04/29/2025 7.6 (L) 8.6 - 10.5 mg/dL Final   04/28/2025 7.6 (L) 8.6 - 10.5 mg/dL Final   04/28/2025 7.2 (L) 8.6 - 10.5 mg/dL Final      PO4 No results found for: \"CAPO4\"   Albumin Albumin   Date Value Ref Range Status   04/29/2025 3.4 (L) 3.5 - 5.2 g/dL Final   04/28/2025 3.5 3.5 - 5.2 g/dL Final      Magnesium Magnesium   Date Value Ref Range Status   04/29/2025 2.9 (H) 1.6 - 2.4 mg/dL Final   04/28/2025 2.5 (H) 1.6 - 2.4 mg/dL Final      Uric Acid No results found for: \"URICACID\"         atorvastatin, 40 mg, Oral, Nightly  gabapentin, 300 mg, Oral, Nightly  insulin regular, 2-7 Units, Subcutaneous, Q6H  levETIRAcetam, 500 mg, Intravenous, Q24H  levothyroxine, 75 mcg, Oral, Q AM  mupirocin, 1 Application, Each Nare, BID  nicotine, 1 patch, Transdermal, Q24H  pantoprazole, 40 mg, Oral, Q AM  senna-docusate sodium, 2 tablet, Oral, BID  sodium chloride, 10 mL, Intravenous, Q12H      niCARdipine, 5-15 mg/hr, Last Rate: 5 mg/hr (04/29/25 0804)        Assessment & Plan       SDH (subdural hematoma)    Coronary artery disease involving native coronary artery of native heart with angina pectoris    Hyperlipidemia LDL goal <70    Essential hypertension    Type 2 diabetes mellitus, with long-term current use of insulin    Heart failure with preserved left ventricular function (HFpEF)    Current smoker    ESRD on hemodialysis    Coronary artery disease    Hypothyroidism (acquired)    Chronic pain syndrome; scheduled narcotics and gabapentin      1- ESRD -MWF   2-HTN   3- Anemia of chronic disease   4- Subdural hematoma - chronic and on recent imaging increase in size " noted  5- Hyperkalemia     Plan:  - HD today- limit UF and low blood flow   - Renal diet   - Electrolytes will be corrected with HD       I discussed the patients findings and my recommendations with patient and nursing staff    Kurt Richardson MD  04/29/25

## 2025-04-29 NOTE — NURSING NOTE
Called into patient's room at 1530, d/t tech reporting patient looked diaphoretic and weak. Dialysis RN still at bedside. Dialysis in process. Vitals stable on monitor, but noted patients heart rate changing from 60s-120s, previously has been 80-90 consistently. Blood pressures labile (lowest 90s sys up to 160s sys)  Patient diaphoretic, tachypnic, and drowsy. Patient awakens to voice and is still able to follow commands and move extremities, but overall looks unwell. EKG obtained, unchanged from prior. ICU team to bedside. Abg ordered. ICU team called patients family member to update her on status of patient. Patient remains a full code at this time, but a palliative consult was placed. Palliative team paged, but are gone for the day and will see patient tomorrow morning. Dialysis completed. Family member now at bedside. Patient's vitals remain stable at this time.    Joanna Duke RN

## 2025-04-29 NOTE — PROGRESS NOTES
Intensive Care Follow-up     Hospital:  LOS: 1 day   Mr. Chevy Junior, 59 y.o. male is followed for:   SDH (subdural hematoma)            History of present illness:   Chevy Junior is a 59 y.o. male w/ PMHX of ongoing tobacco use, MVCAD, HFrEF, VHD (moderate AI, mild-moderate MR), HTN, HLD, PAD, seizures, ESRD (HD MWF), bilateral SDH ( left SDH 3/11/25) and recent R SDH (04/2025).       The patient is very complicated chronic and recent medical history will be outlined below.  Most all history is obtained through discussion with the patient's power of  and through chart review as the patient is dysarthric and somnolent and demonstrates inconsistent history giving.     The patient was admitted to [facility] in March for evaluation for possible cardiac revascularization.  At that time he was deemed too high risk for CABG and other less invasive interventions are being considered.  While awaiting determination  Patient was inpatient @ [facility] last week for lower GIB.  He reportedly has had generalized weakness and constipation since discharge w/ increased lethargy and slurring of speech x 3 D.     Patient presented to [facility] ED where CT imaging of the head revealed an enlargement of his right SDH (previously 6 mm on 4/10/25) to 20 mm w/ R lateral ventricle effacement, 15 mm R -> L midline shift, and mass effect.       Subjective   Interval History:  Patient has worsening subdural hematoma.  Is fairly constipated this morning.  Is high risk for any surgery per cardiology.  I did discuss the case with neurosurgery he does not think the patient is a good surgical candidate unfortunately the patient's prognosis is very grim.             The patient's past medical, surgical and social history were reviewed and updated in Epic as appropriate.       Objective     Infusions:  niCARdipine, 5-15 mg/hr, Last Rate: 5 mg/hr (04/29/25 0804)      Medications:  atorvastatin, 40 mg, Oral,  Nightly  senna-docusate sodium, 2 tablet, Oral, BID   And  polyethylene glycol, 17 g, Oral, Daily   And  bisacodyl, 5 mg, Oral, Daily   And  bisacodyl, 10 mg, Rectal, Daily  gabapentin, 300 mg, Oral, Nightly  insulin regular, 2-7 Units, Subcutaneous, Q6H  levETIRAcetam, 500 mg, Intravenous, Q24H  levothyroxine, 75 mcg, Oral, Q AM  methylnaltrexone, 6 mg, Subcutaneous, Once  mupirocin, 1 Application, Each Nare, BID  nicotine, 1 patch, Transdermal, Q24H  pantoprazole, 40 mg, Oral, Q AM  sodium chloride, 10 mL, Intravenous, Q12H      I reviewed the patient's medications.    Vital Sign Min/Max for last 24 hours  Temp  Min: 97.1 °F (36.2 °C)  Max: 99.9 °F (37.7 °C)   BP  Min: 116/41  Max: 155/72   Pulse  Min: 84  Max: 98   Resp  Min: 16  Max: 22   SpO2  Min: 92 %  Max: 100 %   Flow (L/min) (Oxygen Therapy)  Min: 2  Max: 2       Input/Output for last 24 hour shift  No intake/output data recorded.      GENERAL : NAD, conversant  RESPIRATORY/THORAX : normal respiratory effort and no intercostal retractions, Coarse crackles bilaterally  CARDIOVASCULAR : Normal S1/S2, RRR. 1+ lower ext edema.  GASTROINTESTINAL : Soft, NT/ND. BS x 4 normoactive. No hepatosplenomegaly.  MUSCULOSKELETAL : No cyanosis, clubbing, or ischemia  NEUROLOGICAL: alert and oriented to person, place and time  PSYCHOLOGICAL : Appropriate affect    Results from last 7 days   Lab Units 04/28/25 2213 04/28/25  1436   WBC 10*3/mm3 8.35 7.71   HEMOGLOBIN g/dL 8.9* 8.3*   PLATELETS 10*3/mm3 270 250     Results from last 7 days   Lab Units 04/29/25  0429 04/28/25 2213 04/28/25  1436   SODIUM mmol/L 139 142 138   POTASSIUM mmol/L 5.8* 5.2 4.6   CO2 mmol/L 19.0* 22.0 23.5   BUN mg/dL 55* 50* 43*   CREATININE mg/dL 10.45* 9.75* 8.84*   MAGNESIUM mg/dL 2.9*  --  2.5*   PHOSPHORUS mg/dL 6.1*  --   --    GLUCOSE mg/dL 118* 124* 145*     Estimated Creatinine Clearance: 7.9 mL/min (A) (by C-G formula based on SCr of 10.45 mg/dL (H)).          I reviewed the patient's  new clinical results.  I reviewed the patient's new imaging results/reports including actual images and agree with reports.           Assessment & Plan   Impression        SDH (subdural hematoma)    Coronary artery disease involving native coronary artery of native heart with angina pectoris    Hyperlipidemia LDL goal <70    Essential hypertension    Type 2 diabetes mellitus, with long-term current use of insulin    Heart failure with preserved left ventricular function (HFpEF)    Current smoker    ESRD on hemodialysis    Coronary artery disease    Hypothyroidism (acquired)    Chronic pain syndrome; scheduled narcotics and gabapentin       Plan        59 y.o. male w/ PMHX of ongoing tobacco use, MVCAD, HFrEF, VHD (moderate AI, mild-moderate MR), HTN, HLD, PAD, seizures, ESRD (HD MWF), bilateral SDH ( left SDH 3/11/25) and recent R SDH (04/2025).  Patient mated to the ICU with a worsening subdural hematoma.  Unlikely to be a surgical candidate due to his severe level of chronic comorbidities and his very bad coronary artery disease.    -Subdural hematoma management per neurosurgery  - Holding all anticoagulation  - Dialysis per nephrology  - Start bowel regimen and give a dose of Relistor for constipation  - Diuresis as needed  - Nicardipine for blood pressure control  - P.o. diet after speech evaluation  - A.m. labs    I conducted multidisciplinary rounds and the plan of care was discussed with the multidisciplinary team at that time. In attendance at multidisciplinary rounds was clinical pharmacist, dietitian, nursing staff, and case management.    I discussed the patient's findings and my recommendations with patient, family, and nursing staff     Critical Care time spent in direct patient care: 35 minutes (excluding procedure time, if applicable) including high complexity decision making to assess, manipulate, and support vital organ system failure in this individual who has impairment of one or more vital organ  systems such that there is a high probability of imminent or life threatening deterioration in the patient's condition.      Ar Guzman DO  Pulmonary, Critical care and Sleep Medicine

## 2025-04-29 NOTE — CASE MANAGEMENT/SOCIAL WORK
Discharge Planning Assessment  [facility]     Patient Name: Chevy Junior  MRN: [id number]  Today's Date: 4/29/2025    Admit Date: 4/28/2025    Plan: Home with Home health   Discharge Needs Assessment       Row Name 04/29/25 1211       Living Environment    People in Home other relative(s)    Name(s) of People in Home [place] Junior-Ex Wife/POA/Caregiver    Current Living Arrangements home    Potentially Unsafe Housing Conditions unable to assess    In the past 12 months has the electric, gas, oil, or water company threatened to shut off services in your home? No    Primary Care Provided by other (see comments)    Provides Primary Care For no one, unable/limited ability to care for self    Family Caregiver if Needed other relative(s)    Family Caregiver Names [place] Junior- Ex wife/POA/Caregiver    Quality of Family Relationships helpful;involved;supportive    Able to Return to Prior Arrangements yes       Transportation Needs    In the past 12 months, has lack of transportation kept you from medical appointments or from getting medications? no    In the past 12 months, has lack of transportation kept you from meetings, work, or from getting things needed for daily living? No       Food Insecurity    Within the past 12 months, you worried that your food would run out before you got the money to buy more. Never true    Within the past 12 months, the food you bought just didn't last and you didn't have money to get more. Never true       Transition Planning    Patient/Family Anticipates Transition to home with help/services    Patient/Family Anticipated Services at Transition none    Transportation Anticipated family or friend will provide       Discharge Needs Assessment    Readmission Within the Last 30 Days current reason for admission unrelated to previous admission    Equipment Currently Used at Home bath bench;wheelchair, motorized;hospital bed;commode;shower chair    Concerns to be Addressed discharge  planning    Do you want help finding or keeping work or a job? I do not need or want help    Do you want help with school or training? For example, starting or completing job training or getting a high school diploma, GED or equivalent No    Anticipated Changes Related to Illness inability to care for self    Outpatient/Agency/Support Group Needs homecare agency;outpatient hemodialysis    Discharge Facility/Level of Care Needs home with home health    Current Discharge Risk chronically ill;dependent with mobility/activities of daily living;physical impairment                   Discharge Plan       Row Name 04/29/25 2448       Plan    Plan Home with Home health    Patient/Family in Agreement with Plan yes    Plan Comments I have spoken with Georgia, patient's ex-wife/POA/Caregiver via phone. Patient resides in [facility] with his ex-wife, [place]. She is his primary caregiver. Patient is dependent with ADL's, uses a motorized wheelchair for mobility assistance. Patient has a shower chair, walker, bath bench hospital bed in his home. Patient denies any current home health. [facility] HD[facility], MWF [address] [phone]/F: [phone] in [place], [place].  Patient has medical insurance, prescription coverage and is able to afford/obtain medications without difficulty. Patient has an advanced directives. Patient receives transportation services  by [facility] (open 24/7) @ [phone]. Case management  will continue to follow plan of care and assist with discharge planning as recommendations are available. Goal is to return back home when he is medically ready.    Final Discharge Disposition Code 06 - home with home health care             Continued Care and Services - Admitted Since 4/28/2025       Dialysis/Infusion       Service Provider Request Status Services Address Phone Fax Patient Preferred    [facility]  Selected Dialysis [address], [facility] [id number]  [phone] -- --                          Demographic Summary       Row Name 04/29/25 1203       General Information    Admission Type inpatient    Arrived From home    Referral Source admission list    Reason for Consult discharge planning    Preferred Language English       Contact Information    Permission Granted to Share Info With                    Functional Status       Row Name 04/29/25 1204       Physical Activity    On average, how many days per week do you engage in moderate to strenuous exercise (like a brisk walk)? 0 days    On average, how many minutes do you engage in exercise at this level? 0 min    Number of minutes of exercise per week 0       Functional Status, IADL    Medications assistive person    Meal Preparation completely dependent    Housekeeping completely dependent    Laundry completely dependent    Shopping completely dependent    If for any reason you need help with day-to-day activities such as bathing, preparing meals, shopping, managing finances, etc., do you get the help you need? I get all the help I need       Employment/    Employment Status disabled                 Greta Hoskins, RN

## 2025-04-29 NOTE — PLAN OF CARE
Problem: Adult Inpatient Plan of Care  Goal: Plan of Care Review  Outcome: Progressing     Problem: Skin Injury Risk Increased  Goal: Skin Health and Integrity  Outcome: Progressing  Intervention: Optimize Skin Protection  Recent Flowsheet Documentation  Taken 4/29/2025 1400 by Joanna Duke RN  Pressure Reduction Techniques:   weight shift assistance provided   frequent weight shift encouraged  Head of Bed (HOB) Positioning: HOB at 45 degrees  Pressure Reduction Devices: specialty bed utilized  Skin Protection:   transparent dressing maintained   silicone foam dressing in place   incontinence pads utilized  Taken 4/29/2025 1200 by Joanna Duke RN  Pressure Reduction Techniques:   weight shift assistance provided   frequent weight shift encouraged  Head of Bed (HOB) Positioning: HOB at 45 degrees  Pressure Reduction Devices:   specialty bed utilized   pressure-redistributing mattress utilized  Skin Protection:   transparent dressing maintained   silicone foam dressing in place   incontinence pads utilized  Taken 4/29/2025 1000 by Joanna Duke RN  Activity Management: bedrest  Pressure Reduction Techniques:   weight shift assistance provided   frequent weight shift encouraged  Head of Bed (HOB) Positioning: HOB at 45 degrees  Pressure Reduction Devices:   specialty bed utilized   pressure-redistributing mattress utilized  Skin Protection:   transparent dressing maintained   silicone foam dressing in place   incontinence pads utilized  Taken 4/29/2025 0800 by Joanna Duke RN  Activity Management: bedrest  Pressure Reduction Techniques: weight shift assistance provided  Head of Bed (HOB) Positioning: HOB at 30-45 degrees  Pressure Reduction Devices: specialty bed utilized  Skin Protection: transparent dressing maintained     Problem: Adult Inpatient Plan of Care  Goal: Absence of Hospital-Acquired Illness or Injury  Intervention: Identify and Manage Fall Risk  Recent Flowsheet  Documentation  Taken 4/29/2025 1400 by Joanna Duke RN  Safety Promotion/Fall Prevention: safety round/check completed  Taken 4/29/2025 1200 by Joanna Duke RN  Safety Promotion/Fall Prevention: safety round/check completed  Taken 4/29/2025 1000 by Joanna Duke RN  Safety Promotion/Fall Prevention: safety round/check completed  Taken 4/29/2025 0800 by Joanna Duke RN  Safety Promotion/Fall Prevention: safety round/check completed  Intervention: Prevent Skin Injury  Recent Flowsheet Documentation  Taken 4/29/2025 1400 by Joanna Duke RN  Body Position: position changed independently  Skin Protection:   transparent dressing maintained   silicone foam dressing in place   incontinence pads utilized  Taken 4/29/2025 1200 by Joanna Duke RN  Body Position: position changed independently  Skin Protection:   transparent dressing maintained   silicone foam dressing in place   incontinence pads utilized  Taken 4/29/2025 1000 by Joanna Duke RN  Body Position: position changed independently  Skin Protection:   transparent dressing maintained   silicone foam dressing in place   incontinence pads utilized  Taken 4/29/2025 0800 by Joanna Duke RN  Body Position: position changed independently  Skin Protection: transparent dressing maintained  Intervention: Prevent and Manage VTE (Venous Thromboembolism) Risk  Recent Flowsheet Documentation  Taken 4/29/2025 1400 by Joanna Duke RN  VTE Prevention/Management:   left   SCDs (sequential compression devices) on  Taken 4/29/2025 1200 by Joanna Duke RN  VTE Prevention/Management:   left   SCDs (sequential compression devices) on  Taken 4/29/2025 1000 by Joanna Duke RN  VTE Prevention/Management:   left   SCDs (sequential compression devices) on  Taken 4/29/2025 0800 by Joanna Duke RN  VTE Prevention/Management:   SCDs (sequential compression devices) on    left  Intervention: Prevent Infection  Recent Flowsheet Documentation  Taken 4/29/2025 1400 by Joanna Duke, RN  Infection Prevention:   rest/sleep promoted   hand hygiene promoted  Taken 4/29/2025 1200 by Joanna Duke RN  Infection Prevention: environmental surveillance performed  Taken 4/29/2025 1000 by Joanna Duke RN  Infection Prevention: environmental surveillance performed  Taken 4/29/2025 0800 by Joanna Duke RN  Infection Prevention: environmental surveillance performed  Goal: Optimal Comfort and Wellbeing  Intervention: Monitor Pain and Promote Comfort  Recent Flowsheet Documentation  Taken 4/29/2025 0800 by Joanna Duke RN  Pain Management Interventions:   pain management plan reviewed with patient/caregiver   position adjusted   quiet environment facilitated   relaxation techniques promoted  Intervention: Provide Person-Centered Care  Recent Flowsheet Documentation  Taken 4/29/2025 1200 by Joanna Duke RN  Trust Relationship/Rapport:   care explained   choices provided   reassurance provided   thoughts/feelings acknowledged  Taken 4/29/2025 1000 by Joanna Duke RN  Trust Relationship/Rapport: care explained  Taken 4/29/2025 0800 by Joanna Duke RN  Trust Relationship/Rapport:   care explained   choices provided   emotional support provided   thoughts/feelings acknowledged   Goal Outcome Evaluation:

## 2025-04-30 LAB
ANION GAP SERPL CALCULATED.3IONS-SCNC: 24 MMOL/L (ref 5–15)
BASOPHILS # BLD AUTO: 0.07 10*3/MM3 (ref 0–0.2)
BASOPHILS NFR BLD AUTO: 0.7 % (ref 0–1.5)
BUN SERPL-MCNC: 38 MG/DL (ref 8–23)
BUN/CREAT SERPL: 5.4 (ref 7–25)
CALCIUM SPEC-SCNC: 8.3 MG/DL (ref 8.6–10.5)
CHLORIDE SERPL-SCNC: 95 MMOL/L (ref 98–107)
CO2 SERPL-SCNC: 17 MMOL/L (ref 22–29)
CREAT SERPL-MCNC: 7.1 MG/DL (ref 0.76–1.27)
DEPRECATED RDW RBC AUTO: 64.9 FL (ref 37–54)
EGFRCR SERPLBLD CKD-EPI 2021: 8.2 ML/MIN/1.73
EOSINOPHIL # BLD AUTO: 0.14 10*3/MM3 (ref 0–0.4)
EOSINOPHIL NFR BLD AUTO: 1.5 % (ref 0.3–6.2)
ERYTHROCYTE [DISTWIDTH] IN BLOOD BY AUTOMATED COUNT: 19.3 % (ref 12.3–15.4)
GLUCOSE BLDC GLUCOMTR-MCNC: 116 MG/DL (ref 70–130)
GLUCOSE BLDC GLUCOMTR-MCNC: 122 MG/DL (ref 70–130)
GLUCOSE BLDC GLUCOMTR-MCNC: 128 MG/DL (ref 70–130)
GLUCOSE BLDC GLUCOMTR-MCNC: 133 MG/DL (ref 70–130)
GLUCOSE SERPL-MCNC: 103 MG/DL (ref 65–99)
HCT VFR BLD AUTO: 29 % (ref 37.5–51)
HGB BLD-MCNC: 9 G/DL (ref 13–17.7)
IMM GRANULOCYTES # BLD AUTO: 0.06 10*3/MM3 (ref 0–0.05)
IMM GRANULOCYTES NFR BLD AUTO: 0.6 % (ref 0–0.5)
LYMPHOCYTES # BLD AUTO: 1.88 10*3/MM3 (ref 0.7–3.1)
LYMPHOCYTES NFR BLD AUTO: 19.6 % (ref 19.6–45.3)
MAGNESIUM SERPL-MCNC: 2.4 MG/DL (ref 1.6–2.4)
MCH RBC QN AUTO: 30.2 PG (ref 26.6–33)
MCHC RBC AUTO-ENTMCNC: 31 G/DL (ref 31.5–35.7)
MCV RBC AUTO: 97.3 FL (ref 79–97)
MONOCYTES # BLD AUTO: 0.69 10*3/MM3 (ref 0.1–0.9)
MONOCYTES NFR BLD AUTO: 7.2 % (ref 5–12)
NEUTROPHILS NFR BLD AUTO: 6.74 10*3/MM3 (ref 1.7–7)
NEUTROPHILS NFR BLD AUTO: 70.4 % (ref 42.7–76)
NRBC BLD AUTO-RTO: 0.5 /100 WBC (ref 0–0.2)
PHOSPHATE SERPL-MCNC: 5.3 MG/DL (ref 2.5–4.5)
PLATELET # BLD AUTO: 231 10*3/MM3 (ref 140–450)
PMV BLD AUTO: 10.1 FL (ref 6–12)
POTASSIUM SERPL-SCNC: 5.1 MMOL/L (ref 3.5–5.2)
RBC # BLD AUTO: 2.98 10*6/MM3 (ref 4.14–5.8)
SODIUM SERPL-SCNC: 136 MMOL/L (ref 136–145)
WBC NRBC COR # BLD AUTO: 9.58 10*3/MM3 (ref 3.4–10.8)

## 2025-04-30 PROCEDURE — 80048 BASIC METABOLIC PNL TOTAL CA: CPT | Performed by: INTERNAL MEDICINE

## 2025-04-30 PROCEDURE — 84100 ASSAY OF PHOSPHORUS: CPT | Performed by: INTERNAL MEDICINE

## 2025-04-30 PROCEDURE — 82948 REAGENT STRIP/BLOOD GLUCOSE: CPT

## 2025-04-30 PROCEDURE — 25010000002 LEVETRIRACETAM PER 10 MG: Performed by: NURSE PRACTITIONER

## 2025-04-30 PROCEDURE — 83735 ASSAY OF MAGNESIUM: CPT | Performed by: INTERNAL MEDICINE

## 2025-04-30 PROCEDURE — 85025 COMPLETE CBC W/AUTO DIFF WBC: CPT | Performed by: INTERNAL MEDICINE

## 2025-04-30 PROCEDURE — 99232 SBSQ HOSP IP/OBS MODERATE 35: CPT | Performed by: INTERNAL MEDICINE

## 2025-04-30 RX ADMIN — MUPIROCIN 1 APPLICATION: 20 OINTMENT TOPICAL at 08:52

## 2025-04-30 RX ADMIN — Medication 10 ML: at 08:59

## 2025-04-30 RX ADMIN — NICOTINE 1 PATCH: 14 PATCH, EXTENDED RELEASE TRANSDERMAL at 08:51

## 2025-04-30 RX ADMIN — SENNOSIDES AND DOCUSATE SODIUM 2 TABLET: 50; 8.6 TABLET ORAL at 20:13

## 2025-04-30 RX ADMIN — LEVETIRACETAM 500 MG: 100 INJECTION, SOLUTION INTRAVENOUS at 23:33

## 2025-04-30 RX ADMIN — BISACODYL 5 MG: 5 TABLET, COATED ORAL at 08:52

## 2025-04-30 RX ADMIN — POLYETHYLENE GLYCOL 3350 17 G: 17 POWDER, FOR SOLUTION ORAL at 08:59

## 2025-04-30 RX ADMIN — SENNOSIDES AND DOCUSATE SODIUM 2 TABLET: 50; 8.6 TABLET ORAL at 08:52

## 2025-04-30 RX ADMIN — MUPIROCIN 1 APPLICATION: 20 OINTMENT TOPICAL at 20:13

## 2025-04-30 RX ADMIN — GABAPENTIN 300 MG: 300 CAPSULE ORAL at 20:15

## 2025-04-30 RX ADMIN — LIDOCAINE 2 PATCH: 4 PATCH TOPICAL at 08:59

## 2025-04-30 RX ADMIN — LEVETIRACETAM 500 MG: 100 INJECTION, SOLUTION INTRAVENOUS at 00:09

## 2025-04-30 RX ADMIN — OXYCODONE HYDROCHLORIDE 15 MG: 15 TABLET ORAL at 20:13

## 2025-04-30 RX ADMIN — Medication 10 ML: at 20:20

## 2025-04-30 RX ADMIN — PANTOPRAZOLE SODIUM 40 MG: 40 TABLET, DELAYED RELEASE ORAL at 08:52

## 2025-04-30 RX ADMIN — ATORVASTATIN CALCIUM 40 MG: 40 TABLET, FILM COATED ORAL at 20:15

## 2025-04-30 RX ADMIN — LEVOTHYROXINE SODIUM 75 MCG: 0.07 TABLET ORAL at 08:52

## 2025-04-30 RX ADMIN — BISACODYL 10 MG: 10 SUPPOSITORY RECTAL at 08:59

## 2025-04-30 NOTE — CONSULTS
" visited patient per palliative spiritual care consult.  Patient's two brothers and his  were leaving as I entered, praying with the patient.  The patient and family are [employer], their brayden is very meaningful and gives them strength.  I visited alone with the patient, helped him eat some bites of lunch. Shared with me that [place] was a [place].   He told me the doctor's news \"was not good\" but has no worries, is in God's hands.  We prayed together.  "

## 2025-04-30 NOTE — PROGRESS NOTES
LOS: 2 days   Patient Care Team:  Jeanie Francois APRN as PCP - General (Nurse Practitioner)  Nikita Villasenor MD as Consulting Physician (Nephrology)  Feng Nielsen APRN as Nurse Practitioner (Interventional Cardiology)  Hal Wing MD as Consulting Physician (Cardiology)  Carlos Mcgee MD (General Surgery)  Gus Whaley MD as Consulting Physician (General Surgery)  Luis Fernando Rawls MD as Consulting Physician (Vascular Surgery)    Chief Complaint: ESRD    Subjective         Subjective:  Symptoms:  Stable.  No shortness of breath or chest pain.        History taken from: patient    Objective     Vital Sign Min/Max for last 24 hours  Temp  Min: 97.7 °F (36.5 °C)  Max: 98.8 °F (37.1 °C)   BP  Min: 93/75  Max: 193/68   Pulse  Min: 56  Max: 116   Resp  Min: 16  Max: 25   SpO2  Min: 78 %  Max: 100 %   Flow (L/min) (Oxygen Therapy)  Min: 2  Max: 2   No data recorded         I/O this shift:  In: 120 [P.O.:120]  Out: 0   I/O last 3 completed shifts:  In: 240 [P.O.:240]  Out: 500     Objective:  General Appearance:  Ill-appearing.    Vital signs: (most recent): Blood pressure 107/66, pulse 79, temperature 98.7 °F (37.1 °C), temperature source Oral, resp. rate 20, SpO2 95%.  Vital signs are normal.    Output: Minimal urine output.    HEENT: Normal HEENT exam.    Lungs:  Normal effort and normal respiratory rate.  Breath sounds clear to auscultation.  He is not in respiratory distress.  No decreased breath sounds or wheezes.    Heart: Normal rate.  Regular rhythm.  S1 normal and S2 normal.    Extremities: Normal range of motion.  There is no dependent edema or local swelling.    Pulses: Distal pulses are intact.    Neurological: Patient is alert.                Results Review:     I reviewed the patient's new clinical results.    WBC WBC   Date Value Ref Range Status   04/30/2025 9.58 3.40 - 10.80 10*3/mm3 Final   04/28/2025 8.35 3.40 - 10.80 10*3/mm3 Final   04/28/2025 7.71 3.40 - 10.80 10*3/mm3  "Final      HGB Hemoglobin   Date Value Ref Range Status   04/30/2025 9.0 (L) 13.0 - 17.7 g/dL Final   04/28/2025 8.9 (L) 13.0 - 17.7 g/dL Final   04/28/2025 8.3 (L) 13.0 - 17.7 g/dL Final      HCT Hematocrit   Date Value Ref Range Status   04/30/2025 29.0 (L) 37.5 - 51.0 % Final   04/28/2025 29.4 (L) 37.5 - 51.0 % Final   04/28/2025 26.8 (L) 37.5 - 51.0 % Final      Platlets No results found for: \"LABPLAT\"   MCV MCV   Date Value Ref Range Status   04/30/2025 97.3 (H) 79.0 - 97.0 fL Final   04/28/2025 99.7 (H) 79.0 - 97.0 fL Final   04/28/2025 99.6 (H) 79.0 - 97.0 fL Final          Sodium Sodium   Date Value Ref Range Status   04/30/2025 136 136 - 145 mmol/L Final   04/29/2025 139 136 - 145 mmol/L Final   04/28/2025 142 136 - 145 mmol/L Final   04/28/2025 138 136 - 145 mmol/L Final      Potassium Potassium   Date Value Ref Range Status   04/30/2025 5.1 3.5 - 5.2 mmol/L Final   04/29/2025 5.8 (H) 3.5 - 5.2 mmol/L Final     Comment:     Specimen hemolyzed.  Result may be falsely elevated.   04/28/2025 5.2 3.5 - 5.2 mmol/L Final   04/28/2025 4.6 3.5 - 5.2 mmol/L Final      Chloride Chloride   Date Value Ref Range Status   04/30/2025 95 (L) 98 - 107 mmol/L Final   04/29/2025 95 (L) 98 - 107 mmol/L Final   04/28/2025 96 (L) 98 - 107 mmol/L Final   04/28/2025 96 (L) 98 - 107 mmol/L Final      CO2 CO2   Date Value Ref Range Status   04/30/2025 17.0 (L) 22.0 - 29.0 mmol/L Final   04/29/2025 19.0 (L) 22.0 - 29.0 mmol/L Final   04/28/2025 22.0 22.0 - 29.0 mmol/L Final   04/28/2025 23.5 22.0 - 29.0 mmol/L Final      BUN BUN   Date Value Ref Range Status   04/30/2025 38 (H) 8 - 23 mg/dL Final   04/29/2025 55 (H) 8 - 23 mg/dL Final   04/28/2025 50 (H) 8 - 23 mg/dL Final   04/28/2025 43 (H) 8 - 23 mg/dL Final      Creatinine Creatinine   Date Value Ref Range Status   04/30/2025 7.10 (H) 0.76 - 1.27 mg/dL Final   04/29/2025 10.45 (H) 0.76 - 1.27 mg/dL Final   04/28/2025 9.75 (H) 0.76 - 1.27 mg/dL Final   04/28/2025 8.84 (H) 0.76 - " "1.27 mg/dL Final      Calcium Calcium   Date Value Ref Range Status   04/30/2025 8.3 (L) 8.6 - 10.5 mg/dL Final   04/29/2025 7.6 (L) 8.6 - 10.5 mg/dL Final   04/28/2025 7.6 (L) 8.6 - 10.5 mg/dL Final   04/28/2025 7.2 (L) 8.6 - 10.5 mg/dL Final      PO4 No results found for: \"CAPO4\"   Albumin Albumin   Date Value Ref Range Status   04/29/2025 3.4 (L) 3.5 - 5.2 g/dL Final   04/28/2025 3.5 3.5 - 5.2 g/dL Final      Magnesium Magnesium   Date Value Ref Range Status   04/30/2025 2.4 1.6 - 2.4 mg/dL Final   04/29/2025 2.9 (H) 1.6 - 2.4 mg/dL Final   04/28/2025 2.5 (H) 1.6 - 2.4 mg/dL Final      Uric Acid No results found for: \"URICACID\"     Medication Review: Yes    Assessment & Plan       SDH (subdural hematoma)    Coronary artery disease involving native coronary artery of native heart with angina pectoris    Hyperlipidemia LDL goal <70    Essential hypertension    Type 2 diabetes mellitus, with long-term current use of insulin    Heart failure with preserved left ventricular function (HFpEF)    Current smoker    ESRD on hemodialysis    Coronary artery disease    Hypothyroidism (acquired)    Chronic pain syndrome; scheduled narcotics and gabapentin      Assessment & Plan    1- ESRD -MWF. Currently on TTS [place] on this visit .   2-HTN   3- Anemia of chronic disease   4- Subdural hematoma - chronic and on recent imaging increase in size with midline shift noted.   5- Hyperkalemia      Plan:  - Patient has large subdural hematoma with midline shift. He is DNR/DNI. Unclear on decision for dialysis. Will discuss with POA  - Renal diet   - Electrolytes will be corrected with HD     George Hernandez MD  04/30/25  13:05 EDT            "

## 2025-04-30 NOTE — PROGRESS NOTES
Clinical Nutrition Assessment     Patient Name: Chevy Junior  YOB: 1965  MRN: [id number]  Date of Encounter: 04/30/25 14:28 EDT  Admission date: 4/28/2025  Reason for Visit: MST score 2+, Unintentional weight loss, Reduced oral intake    Assessment   Nutrition Assessment   Admission Diagnosis:  SDH (subdural hematoma) [S06.5XAA]    Problem List:    SDH (subdural hematoma)    Coronary artery disease involving native coronary artery of native heart with angina pectoris    Hyperlipidemia LDL goal <70    Essential hypertension    Type 2 diabetes mellitus, with long-term current use of insulin    Heart failure with preserved left ventricular function (HFpEF)    Current smoker    ESRD on hemodialysis    Coronary artery disease    Hypothyroidism (acquired)    Chronic pain syndrome; scheduled narcotics and gabapentin      PMH:   He  has a past medical history of Anemia, Atherosclerosis of native artery of left lower extremity with gangrene, Cellulitis and abscess of buttock (2023), CHF (congestive heart failure), Chronic pain, COPD (chronic obstructive pulmonary disease), Coronary artery disease involving native coronary artery of native heart with angina pectoris (09/12/2022), Diabetes mellitus, ESRD on dialysis, GERD (gastroesophageal reflux disease), Heart failure with preserved left ventricular function (HFpEF) (09/13/2022), History of blood transfusion, Hyperlipidemia, Hypertension, Hypothyroidism (acquired) (03/11/2025), Infectious viral hepatitis (5 years ago was treated), RIC (obstructive sleep apnea), Osteoarthritis, Phosphorus metabolic disorder, Sleep apnea, and Substance abuse (20 years ago from [facility]).    PSH:  He  has a past surgical history that includes Replacement total knee (Right); Hip surgery (Right); Leg Surgery (Left); Mandible fracture surgery; Shoulder surgery (Right); Peritoneal Dialysis Catheter; Above knee leg amputaton (Right); cath lab procedure; Cardiac  catheterization; Cardiac catheterization (N/A, 11/30/2022); Cardiac catheterization (N/A, 11/30/2022); Cardiac catheterization (N/A, 11/30/2022); Esophagogastroduodenoscopy (N/A, 02/03/2023); Colonoscopy (N/A, 02/15/2023); Cardiac catheterization (Left, 11/09/2023); Spine surgery; femoral popliteal bypass (Left, 12/12/2023); Cardiac catheterization (Right, 04/25/2024); Cardiac catheterization (N/A, 12/05/2024); Coronary stent placement (2022); and Amputation (2021).    Applicable Nutrition History:     Anthropometrics     Height:  170 cm  Last Filed Weight:  74 kg  Method:    BMI:      UBW:     Weight       Weight (kg) Weight (lbs) Weight Method Visit Report   12/5/2024 73.7 kg  162 lb 7.7 oz  Bed scale     2/5/2025 75.751 kg  167 lb   --    3/10/2025 77.111 kg  170 lb  Stated     3/11/2025 77.565 kg  171 lb  Bed scale     3/24/2025 77.565 kg  171 lb   --    4/10/2025 81.647 kg  180 lb   --    4/28/2025 79.379 kg  175 lb   --     74.3 kg  163 lb 12.8 oz   --    4/30/2025 71 kg  156 lb 8.4 oz  Bed scale         Weight change: weight loss of 15 lbs (8.8%) over 1 month(s)    Significant?  Yes    Nutrition Focused Physical Exam    Date:  4/30       Unable to perform due to Pt unable to participate at time of visit     Subjective   Reported/Observed/Food/Nutrition Related History:     Patient resting at multiple attempts visits does not awaken to name. Eating very little per documentation. Appears to have had significant weight loss per EMR. NSGY recommended palliative approach 2/2 SDH, palliative is following with discussions GOC ongoing.     Current Nutrition Prescription   PO: Diet: Cardiac; Healthy Heart (2-3 Na+); Texture: Mechanical Ground (NDD 2); Fluid Consistency: Thin (IDDSI 0)  Oral Nutrition Supplement:   Intake: Insufficient data only bites recorded    Assessment & Plan   Nutrition Diagnosis   Date:  4/30            Updated:    Problem Inadequate energy intake    Etiology SDH   Signs/Symptoms Minimal po  intakes thus far in admission   Status: New    Date:  4/30    Updated:     Problem Unintended weight loss   Etiology ?effects of chronic disease   Signs/Symptoms Loss 8.8% body weight X past month   Status: New    Goal:   Nutrition to support treatment and Increase intake    Nutrition Intervention      Follow treatment progress, Care plan reviewed, Menu provided, Encourage intake, Supplement provided    Encourage PO as tolerated, will send boost.    Unsure if meets malnutrition criteria at this time, suspect so, will complete full assessment as feasible.     Monitoring/Evaluation:   Per protocol, PO intake, Supplement intake, Weight, Symptoms, POC/GOC    Amy Chris RD, CNSC  Time Spent: 25m

## 2025-04-30 NOTE — CONSULTS
"Palliative Care Initial Consult   Attending Physician: Portillo Guzman*  Referring Provider: VICKI Oates    Reason for Referral:  assistance with clarification of goals of care and hospice referral or discussion    Code Status:   Code Status and Medical Interventions: CPR (Attempt to Resuscitate); Full Support   Ordered at: 04/28/25 2125     Code Status (Patient has no pulse and is not breathing):    CPR (Attempt to Resuscitate)     Medical Interventions (Patient has pulse or is breathing):    Full Support      Advanced Directives: Advance Directive Status: Patient has advance directive, copy requested   Family/Support: Georgia Junior (POA/HCS)  Goals of Care: TBD.    HPI: Chevy Junior is a 59 y.o. 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 Patient presented to [facility] ED on 4/28 for increased lethargy and slurring of speech. Work up revealed enlargement of right SDH with midline shift and mass effect. Transferred to [facility] for higher level of care. Neurosurgery not recommending surgical intervention due to overall poor prognosis in light of comorbidities. Palliative Care consulted for GOC in the context of complex medical decision making.        Georgia (POA/[facility]) at bedside. Patient drowsy, difficulty answering questions, falls back asleep quickly. He does endorse pain, \"all over\". Georgia reports patient with chronic pain  to right shoulder, neck, and left knee and follows with Palliative in [place] for symptom management. Per review of PDMP taking Oxycodone 30mg (up to 9 doses/day), Neurontin 300mg PO TID. Patient uses a power chair at baseline.     ROS: +pain, \"all over\". ROS limited by AMS/condition.       Past Medical History:   Diagnosis Date    Anemia     Atherosclerosis of native artery of left lower extremity with gangrene     " Cellulitis and abscess of buttock 2023    CHF (congestive heart failure)     Chronic pain     COPD (chronic obstructive pulmonary disease)     INHALER    Coronary artery disease involving native coronary artery of native heart with angina pectoris 09/12/2022    stents x 3    Diabetes mellitus     ESRD on dialysis     M,W,F-FISTULA LEFT ARM    GERD (gastroesophageal reflux disease)     Heart failure with preserved left ventricular function (HFpEF) 09/13/2022    FOLLOWS W/REECE-NO CP    History of blood transfusion     NO ADVERSE REACTIONS    Hyperlipidemia     Hypertension     WIFE STATED B/P HAS BEEN RUNNING LOW LATELY    Hypothyroidism (acquired) 03/11/2025    Infectious viral hepatitis 5 years ago was treated    hx hep c    RIC (obstructive sleep apnea)     Osteoarthritis     Phosphorus metabolic disorder     Sleep apnea     NO MACHINE    Substance abuse 20 years ago from [employer]     Past Surgical History:   Procedure Laterality Date    ABOVE KNEE LEG AMPUTATION Right     AMPUTATION  2021    CARDIAC CATHETERIZATION      CARDIAC CATHETERIZATION N/A 11/30/2022    Procedure: Percutaneous Coronary Intervention;  Surgeon: River Acevedo IV, MD;  Location:  [facility] CATH INVASIVE LOCATION;  Service: Cardiovascular;  Laterality: N/A;    CARDIAC CATHETERIZATION N/A 11/30/2022    Procedure: Optical Coherent Tomography;  Surgeon: River Acevedo IV, MD;  Location:  [facility] CATH INVASIVE LOCATION;  Service: Cardiovascular;  Laterality: N/A;    CARDIAC CATHETERIZATION N/A 11/30/2022    Procedure: Stent JOSE coronary;  Surgeon: Rivre Acevedo IV, MD;  Location:  [facility] CATH INVASIVE LOCATION;  Service: Cardiovascular;  Laterality: N/A;    CARDIAC CATHETERIZATION Left 11/09/2023    Procedure: Aortogram with left leg angiogram, possible angioplasty or stenting;  Surgeon: Luis Fernando Rawls MD;  Location:  [facility] CATH INVASIVE LOCATION;  Service: Vascular;  Laterality: Left;    CARDIAC CATHETERIZATION Right  "04/25/2024    Procedure: Right lower extremity angiogram, possible angioplasty or stenting;  Surgeon: Luis Fernando Rawls MD;  Location: [facility] CATH INVASIVE LOCATION;  Service: Vascular;  Laterality: Right;    CARDIAC CATHETERIZATION N/A 12/05/2024    Procedure: Left Heart Cath;  Surgeon: Cole Oscar MD;  Location: [facility] CATH INVASIVE LOCATION;  Service: Cardiovascular;  Laterality: N/A;    CATH LAB PROCEDURE      unable to stent    COLONOSCOPY N/A 02/15/2023    Procedure: COLONOSCOPY WITH POLYPECTOMY;  Surgeon: David Glass MD;  Location: [facility] ENDOSCOPY;  Service: Gastroenterology;  Laterality: N/A;  COLON POLYP, MELANOSIS COLI    CORONARY STENT PLACEMENT  2022    ENDOSCOPY N/A 02/03/2023    Procedure: ESOPHAGOGASTRODUODENOSCOPY with biopsy;  Surgeon: Gus Whaley MD;  Location: [facility] ENDOSCOPY;  Service: General;  Laterality: N/A;  gastritis,     FEMORAL POPLITEAL BYPASS Left 12/12/2023    Procedure: Left femoral edarterectomy, left femoral to below the knee popliteal bypass graft;  Surgeon: Luis Fernando Rawls MD;  Location: [facility] MAIN OR;  Service: Vascular;  Laterality: Left;    HIP SURGERY Right     INSERTION PERITONEAL DIALYSIS CATHETER      LEG SURGERY Left     \"jocelyne in place\"    MANDIBLE FRACTURE SURGERY      REPLACEMENT TOTAL KNEE Right     x3    SHOULDER SURGERY Right     SPINE SURGERY      unknown procudure- completed at [facility] in [facility].     Social History     Socioeconomic History    Marital status:    Tobacco Use    Smoking status: Every Day     Current packs/day: 2.00     Average packs/day: 2.0 packs/day for 40.0 years (80.0 ttl pk-yrs)     Types: Cigarettes    Smokeless tobacco: Never    Tobacco comments:     1.5 ppd as of 2/5/25   Vaping Use    Vaping status: Never Used   Substance and Sexual Activity    Alcohol use: Not Currently     Comment: None    Drug use: Not Currently     Types: Hydrocodone     Comment: pain pills from previous injuries    Sexual activity: Defer "     Family History   Problem Relation Age of Onset    Heart attack Mother     Coronary artery disease Mother     Hypertension Mother     Arthritis Mother     Hypertension Father     Diabetes Father        No Known Allergies    Current medication reviewed for route, type, dose and frequency and are current per MAR at time of dictation.    Palliative Performance Scale Score:  30%    /94   Pulse 105   Temp 98.5 °F (36.9 °C) (Axillary)   Resp 16   SpO2 100%     Intake/Output Summary (Last 24 hours) at 4/30/2025 0838  Last data filed at 4/29/2025 1800  Gross per 24 hour   Intake 120 ml   Output 500 ml   Net -380 ml       Physical Exam:    General Appearance:    Patient laying in bed, awakens to verbal/physical stimuli, drowsy, A/C ill appearing, frail, cooperative, NAD   HEENT:    NC/AT, EOMI, anicteric, MMM, face relaxed   Neck:   supple, trachea midline, no JVD   Lungs:     CTA bilat, diminished in bases; respirations regular, even and unlabored; RR 16-18 on exam, 2LNC    Heart:        Abdomen:     Normal bowel sounds, soft, nontender, nondistended   G/U:   Deferred   MSK/Extremities:  R AKA,     Pulses:   Pulses palpable and equal bilaterally   Skin:   Warm, dry   Neurologic:   Drowsy,   Psych:   Calm, appropriate         Labs:   Results from last 7 days   Lab Units 04/30/25  0437   WBC 10*3/mm3 9.58   HEMOGLOBIN g/dL 9.0*   HEMATOCRIT % 29.0*   PLATELETS 10*3/mm3 231     Results from last 7 days   Lab Units 04/30/25  0437   SODIUM mmol/L 136   POTASSIUM mmol/L 5.1   CHLORIDE mmol/L 95*   CO2 mmol/L 17.0*   BUN mg/dL 38*   CREATININE mg/dL 7.10*   GLUCOSE mg/dL 103*   CALCIUM mg/dL 8.3*     Results from last 7 days   Lab Units 04/30/25  0437 04/29/25  0429   SODIUM mmol/L 136 139   POTASSIUM mmol/L 5.1 5.8*   CHLORIDE mmol/L 95* 95*   CO2 mmol/L 17.0* 19.0*   BUN mg/dL 38* 55*   CREATININE mg/dL 7.10* 10.45*   CALCIUM mg/dL 8.3* 7.6*   BILIRUBIN mg/dL  --  0.5   ALK PHOS U/L  --  210*   ALT (SGPT) U/L   --  5   AST (SGOT) U/L  --  31   GLUCOSE mg/dL 103* 118*     Imaging Results (Last 72 Hours)       ** No results found for the last 72 hours. **              Lab 04/29/25  0429   HEMOGLOBIN A1C 5.20         Diagnostics: Reviewed    A:   SDH (subdural hematoma)    Coronary artery disease involving native coronary artery of native heart with angina pectoris    Hyperlipidemia LDL goal <70    Essential hypertension    Type 2 diabetes mellitus, with long-term current use of insulin    Heart failure with preserved left ventricular function (HFpEF)    Current smoker    ESRD on hemodialysis    Coronary artery disease    Hypothyroidism (acquired)    Chronic pain syndrome; scheduled narcotics and gabapentin     59 y.o. male with SDH, ESRD on HD, CAD, chronic pain syndrome.   S/S:   Pain -chronic pain  -Neurontin 300mg PO nightly  -Lidocaine patches  -Oxycodone 15mg PO q 4 hours prn moderate pain    2. AMS -worsening SDH    3. Debility -w/c bound, RAKA    4. Dysphagia -SLP evaluation pending  -do not recommend feeding tube due to poor prognosis    5. GOC -DNR/DNI -per discussion with Georgia (HCS/POA)  -long discussion regarding patient's condition and chronic co-morbidities, discussed prognosis of weeks to months, if stopping dialysis then days to weeks  -Georgia is in agreement with DNR/DNI but wanting to continue ongoing full treatment at this time  -will continue to address GOC    P:  Introduced Palliative Care and services to patient and Georgia at bedside. Reviewed symptoms, functional status at baseline, and patient's condition as well as patient is not a candidate for surgical intervention. Reviewed code status and Georgia electing DNR/DNI. Reviewed options for comfort focused plan of care in light of prognosis. Georgia is electing ongoing treatment at this time with DNR/DNI. Will continue to follow and address GOC. Emotional support provided throughout discussion. Georgia appropriately tearful when discussing prognosis.    Thank you for this consult and allowing us to participate in patient's plan of care. Palliative Care Team will continue to follow patient. Please do not hesitate to contact us regarding further symptom management or goals of care needs.  Time: 45 minutes spent reviewing medical and medication records, assessing and examining patient, discussing with family, answering questions, providing some guidance about a plan and documentation of care, and coordinating care with other healthcare members, with > 50% time spent face to face.         Eileen Ralph, APRN  4/30/2025

## 2025-04-30 NOTE — PLAN OF CARE
Goal Outcome Evaluation:  Plan of Care Reviewed With: patient        Progress: no change  Outcome Evaluation: Palliative RN saw pt. at 1131.  New palliative consult for assistance with GOC/hospice conversation per VICKI Trevino.  DPOA, HCS is [place] Junior.  Pt. sitting up in bed on RA.  He endorsed mild back discomfort.  He denied nausea and shortness of breath.  No family at BS.  Left palliative brochure and meal discount card at BS.  VICKI Keys saw pt. for initial provider visit.  Discussed GOC and addressed code status with [facility] (see note).  Palliative care to continue to follow for support and ongoing GOC.       Problem: Palliative Care  Goal: Enhanced Quality of Life  Intervention: Promote Advance Care Planning  Flowsheets (Taken 4/30/2025 1552)  Life Transition/Adjustment:   palliative care initiated   palliative care discussed         0930 Palliative IDT meeting: MD; VICKI ; MDiv; RNs;LCSW   After hours, weekends and holidays, contact Palliative Provider by calling [phone].                            \"Have you been to the ER, urgent care clinic since your last visit?  Hospitalized since your last visit?\"    NO    “Have you seen or consulted any other health care providers outside of [facility] since your last visit?”    NO            Click Here for Release of Records Request

## 2025-04-30 NOTE — PROGRESS NOTES
Intensive Care Follow-up     Hospital:  LOS: 2 days   Mr. Chevy Junior, 59 y.o. male is followed for:   SDH (subdural hematoma)            History of present illness:   Chevy Junior is a 59 y.o. male w/ PMHX of ongoing tobacco use, MVCAD, HFrEF, VHD (moderate AI, mild-moderate MR), HTN, HLD, PAD, seizures, ESRD (HD MWF), bilateral SDH ( left SDH 3/11/25) and recent R SDH (04/2025).       The patient is very complicated chronic and recent medical history will be outlined below.  Most all history is obtained through discussion with the patient's power of  and through chart review as the patient is dysarthric and somnolent and demonstrates inconsistent history giving.     The patient was admitted to [facility] in March for evaluation for possible cardiac revascularization.  At that time he was deemed too high risk for CABG and other less invasive interventions are being considered.  While awaiting determination  Patient was inpatient @ [facility] last week for lower GIB.  He reportedly has had generalized weakness and constipation since discharge w/ increased lethargy and slurring of speech x 3 D.     Patient presented to [facility] ED where CT imaging of the head revealed an enlargement of his right SDH (previously 6 mm on 4/10/25) to 20 mm w/ R lateral ventricle effacement, 15 mm R -> L midline shift, and mass effect.       Subjective   Interval History:  Patient with some intermittent worsening mental status over the last 24 hours.  Ultimately had conversations with neurosurgery who pain is a very grim prognosis is now a DNR/DNI.             The patient's past medical, surgical and social history were reviewed and updated in Epic as appropriate.       Objective     Infusions:  niCARdipine, 5-15 mg/hr, Last Rate: Stopped (04/29/25 1550)      Medications:  atorvastatin, 40 mg, Oral, Nightly  senna-docusate sodium, 2 tablet, Oral, BID   And  polyethylene glycol, 17 g, Oral, Daily    And  bisacodyl, 5 mg, Oral, Daily   And  bisacodyl, 10 mg, Rectal, Daily  gabapentin, 300 mg, Oral, Nightly  insulin regular, 2-7 Units, Subcutaneous, Q6H  levETIRAcetam, 500 mg, Intravenous, Q24H  levothyroxine, 75 mcg, Oral, Q AM  Lidocaine, 2 patch, Transdermal, Q24H  mupirocin, 1 Application, Each Nare, BID  nicotine, 1 patch, Transdermal, Q24H  pantoprazole, 40 mg, Oral, Q AM  sodium chloride, 10 mL, Intravenous, Q12H      I reviewed the patient's medications.    Vital Sign Min/Max for last 24 hours  Temp  Min: 97.7 °F (36.5 °C)  Max: 98.8 °F (37.1 °C)   BP  Min: 93/75  Max: 193/68   Pulse  Min: 56  Max: 116   Resp  Min: 16  Max: 25   SpO2  Min: 78 %  Max: 100 %   Flow (L/min) (Oxygen Therapy)  Min: 2  Max: 2       Input/Output for last 24 hour shift  04/29 0701 - 04/30 0700  In: 240 [P.O.:240]  Out: 500       GENERAL : NAD, conversant, ill-appearing  RESPIRATORY/THORAX : normal respiratory effort and no intercostal retractions, CTAB  CARDIOVASCULAR : Normal S1/S2, RRR. no lower ext edema.  GASTROINTESTINAL : Soft, NT/ND. BS x 4 normoactive. No hepatosplenomegaly.  MUSCULOSKELETAL : No cyanosis, clubbing, or ischemia  NEUROLOGICAL: alert and oriented to person, place and time  PSYCHOLOGICAL : Appropriate affect    Results from last 7 days   Lab Units 04/30/25  0437 04/28/25 2213 04/28/25  1436   WBC 10*3/mm3 9.58 8.35 7.71   HEMOGLOBIN g/dL 9.0* 8.9* 8.3*   PLATELETS 10*3/mm3 231 270 250     Results from last 7 days   Lab Units 04/30/25  0437 04/29/25  0429 04/28/25 2213 04/28/25  1436   SODIUM mmol/L 136 139 142 138   POTASSIUM mmol/L 5.1 5.8* 5.2 4.6   CO2 mmol/L 17.0* 19.0* 22.0 23.5   BUN mg/dL 38* 55* 50* 43*   CREATININE mg/dL 7.10* 10.45* 9.75* 8.84*   MAGNESIUM mg/dL 2.4 2.9*  --  2.5*   PHOSPHORUS mg/dL 5.3* 6.1*  --   --    GLUCOSE mg/dL 103* 118* 124* 145*     Estimated Creatinine Clearance: 11.6 mL/min (A) (by C-G formula based on SCr of 7.1 mg/dL (H)).    Results from last 7 days   Lab Units  04/29/25  1655   PH, ARTERIAL pH units 7.388   PCO2, ARTERIAL mm Hg 36.2   PO2 ART mm Hg 61.2*       I reviewed the patient's new clinical results.  I reviewed the patient's new imaging results/reports including actual images and agree with reports.         Assessment & Plan   Impression        SDH (subdural hematoma)    Coronary artery disease involving native coronary artery of native heart with angina pectoris    Hyperlipidemia LDL goal <70    Essential hypertension    Type 2 diabetes mellitus, with long-term current use of insulin    Heart failure with preserved left ventricular function (HFpEF)    Current smoker    ESRD on hemodialysis    Coronary artery disease    Hypothyroidism (acquired)    Chronic pain syndrome; scheduled narcotics and gabapentin       Plan        59 y.o. male w/ PMHX of ongoing tobacco use, MVCAD, HFrEF, VHD (moderate AI, mild-moderate MR), HTN, HLD, PAD, seizures, ESRD (HD MWF), bilateral SDH ( left SDH 3/11/25) and recent R SDH (04/2025).  Patient mated to the ICU with a worsening subdural hematoma.  Unlikely to be a surgical candidate due to his severe level of chronic comorbidities and his very bad coronary artery disease.     - Neurosurgery recommended a palliative approach with a subdural hematoma, palliative care is following.  - Holding all anticoagulation, with worsening subdural hematoma.  - Dialysis per nephrology  - Continue bowel regimen  - Diuresis as needed  - P.o. diet after speech evaluation  - A.m. labs  - Patient okay to be transferred to the floor.  Ultimately future plans would be to go home with hospice, family is still trying to grasp the situation.    I conducted multidisciplinary rounds and the plan of care was discussed with the multidisciplinary team at that time. In attendance at multidisciplinary rounds was clinical pharmacist, dietitian, nursing staff, and case management.    I discussed the patient's findings and my recommendations with patient, family, and  nursing staff       Ar Guzman,   Pulmonary, Critical care and Sleep Medicine

## 2025-05-01 ENCOUNTER — APPOINTMENT (OUTPATIENT)
Dept: MRI IMAGING | Facility: HOSPITAL | Age: 60
End: 2025-05-01
Payer: MEDICAID

## 2025-05-01 ENCOUNTER — APPOINTMENT (OUTPATIENT)
Dept: CT IMAGING | Facility: HOSPITAL | Age: 60
End: 2025-05-01
Payer: MEDICAID

## 2025-05-01 ENCOUNTER — APPOINTMENT (OUTPATIENT)
Dept: NEPHROLOGY | Facility: HOSPITAL | Age: 60
End: 2025-05-01
Payer: MEDICAID

## 2025-05-01 ENCOUNTER — APPOINTMENT (OUTPATIENT)
Dept: NEUROLOGY | Facility: HOSPITAL | Age: 60
End: 2025-05-01
Payer: MEDICAID

## 2025-05-01 LAB
AMMONIA BLD-SCNC: 18 UMOL/L (ref 16–60)
ANION GAP SERPL CALCULATED.3IONS-SCNC: 22 MMOL/L (ref 5–15)
ARTERIAL PATENCY WRIST A: ABNORMAL
ATMOSPHERIC PRESS: ABNORMAL MM[HG]
BASE EXCESS BLDA CALC-SCNC: -2.9 MMOL/L (ref 0–2)
BASOPHILS # BLD AUTO: 0.07 10*3/MM3 (ref 0–0.2)
BASOPHILS NFR BLD AUTO: 0.8 % (ref 0–1.5)
BDY SITE: ABNORMAL
BODY TEMPERATURE: 37
BUN SERPL-MCNC: 54 MG/DL (ref 8–23)
BUN/CREAT SERPL: 6.6 (ref 7–25)
CALCIUM SPEC-SCNC: 8 MG/DL (ref 8.6–10.5)
CHLORIDE SERPL-SCNC: 98 MMOL/L (ref 98–107)
CHOLEST SERPL-MCNC: 122 MG/DL (ref 0–200)
CO2 BLDA-SCNC: 22.5 MMOL/L (ref 22–33)
CO2 SERPL-SCNC: 18 MMOL/L (ref 22–29)
COHGB MFR BLD: 1 % (ref 0–2)
CREAT SERPL-MCNC: 8.21 MG/DL (ref 0.76–1.27)
D-LACTATE SERPL-SCNC: 1.1 MMOL/L (ref 0.5–2)
DEPRECATED RDW RBC AUTO: 70.2 FL (ref 37–54)
EGFRCR SERPLBLD CKD-EPI 2021: 6.9 ML/MIN/1.73
EOSINOPHIL # BLD AUTO: 0.19 10*3/MM3 (ref 0–0.4)
EOSINOPHIL NFR BLD AUTO: 2.1 % (ref 0.3–6.2)
EPAP: 0
ERYTHROCYTE [DISTWIDTH] IN BLOOD BY AUTOMATED COUNT: 20 % (ref 12.3–15.4)
GLUCOSE BLDC GLUCOMTR-MCNC: 113 MG/DL (ref 70–130)
GLUCOSE BLDC GLUCOMTR-MCNC: 118 MG/DL (ref 70–130)
GLUCOSE BLDC GLUCOMTR-MCNC: 118 MG/DL (ref 70–130)
GLUCOSE BLDC GLUCOMTR-MCNC: 154 MG/DL (ref 70–130)
GLUCOSE SERPL-MCNC: 107 MG/DL (ref 65–99)
HCO3 BLDA-SCNC: 21.5 MMOL/L (ref 20–26)
HCT VFR BLD AUTO: 30.4 % (ref 37.5–51)
HCT VFR BLD CALC: 28 % (ref 38–51)
HDLC SERPL-MCNC: 27 MG/DL (ref 40–60)
HGB BLD-MCNC: 8.7 G/DL (ref 13–17.7)
HGB BLDA-MCNC: 9.1 G/DL (ref 13.5–17.5)
IMM GRANULOCYTES # BLD AUTO: 0.07 10*3/MM3 (ref 0–0.05)
IMM GRANULOCYTES NFR BLD AUTO: 0.8 % (ref 0–0.5)
INHALED O2 CONCENTRATION: 26 %
IPAP: 0
LDLC SERPL CALC-MCNC: 67 MG/DL (ref 0–100)
LDLC/HDLC SERPL: 2.32 {RATIO}
LYMPHOCYTES # BLD AUTO: 1.33 10*3/MM3 (ref 0.7–3.1)
LYMPHOCYTES NFR BLD AUTO: 14.8 % (ref 19.6–45.3)
MAGNESIUM SERPL-MCNC: 2.4 MG/DL (ref 1.6–2.4)
MCH RBC QN AUTO: 29.7 PG (ref 26.6–33)
MCHC RBC AUTO-ENTMCNC: 28.6 G/DL (ref 31.5–35.7)
MCV RBC AUTO: 103.8 FL (ref 79–97)
METHGB BLD QL: 0.7 % (ref 0–1.5)
MODALITY: ABNORMAL
MONOCYTES # BLD AUTO: 0.61 10*3/MM3 (ref 0.1–0.9)
MONOCYTES NFR BLD AUTO: 6.8 % (ref 5–12)
NEUTROPHILS NFR BLD AUTO: 6.73 10*3/MM3 (ref 1.7–7)
NEUTROPHILS NFR BLD AUTO: 74.7 % (ref 42.7–76)
NRBC BLD AUTO-RTO: 0.3 /100 WBC (ref 0–0.2)
NT-PROBNP SERPL-MCNC: ABNORMAL PG/ML (ref 0–900)
OXYHGB MFR BLDV: 88.6 % (ref 94–99)
PAW @ PEAK INSP FLOW SETTING VENT: 0 CMH2O
PCO2 BLDA: 34.8 MM HG (ref 35–45)
PCO2 TEMP ADJ BLD: 34.8 MM HG (ref 35–48)
PH BLDA: 7.4 PH UNITS (ref 7.35–7.45)
PH, TEMP CORRECTED: 7.4 PH UNITS
PHOSPHATE SERPL-MCNC: 5.7 MG/DL (ref 2.5–4.5)
PLATELET # BLD AUTO: 189 10*3/MM3 (ref 140–450)
PMV BLD AUTO: 9.9 FL (ref 6–12)
PO2 BLDA: 67 MM HG (ref 83–108)
PO2 TEMP ADJ BLD: 67 MM HG (ref 83–108)
POTASSIUM SERPL-SCNC: 5.1 MMOL/L (ref 3.5–5.2)
QT INTERVAL: 438 MS
QTC INTERVAL: 530 MS
RBC # BLD AUTO: 2.93 10*6/MM3 (ref 4.14–5.8)
SODIUM SERPL-SCNC: 138 MMOL/L (ref 136–145)
TOTAL RATE: 0 BREATHS/MINUTE
TRIGL SERPL-MCNC: 162 MG/DL (ref 0–150)
VENTILATOR MODE: ABNORMAL
VLDLC SERPL-MCNC: 28 MG/DL (ref 5–40)
WBC NRBC COR # BLD AUTO: 9 10*3/MM3 (ref 3.4–10.8)

## 2025-05-01 PROCEDURE — 82948 REAGENT STRIP/BLOOD GLUCOSE: CPT

## 2025-05-01 PROCEDURE — 80061 LIPID PANEL: CPT | Performed by: NURSE PRACTITIONER

## 2025-05-01 PROCEDURE — 82805 BLOOD GASES W/O2 SATURATION: CPT

## 2025-05-01 PROCEDURE — 83880 ASSAY OF NATRIURETIC PEPTIDE: CPT | Performed by: NURSE PRACTITIONER

## 2025-05-01 PROCEDURE — 94799 UNLISTED PULMONARY SVC/PX: CPT

## 2025-05-01 PROCEDURE — 80048 BASIC METABOLIC PNL TOTAL CA: CPT | Performed by: NURSE PRACTITIONER

## 2025-05-01 PROCEDURE — 80177 DRUG SCRN QUAN LEVETIRACETAM: CPT | Performed by: NURSE PRACTITIONER

## 2025-05-01 PROCEDURE — 95819 EEG AWAKE AND ASLEEP: CPT | Performed by: PSYCHIATRY & NEUROLOGY

## 2025-05-01 PROCEDURE — 36600 WITHDRAWAL OF ARTERIAL BLOOD: CPT

## 2025-05-01 PROCEDURE — 25010000002 LEVETRIRACETAM PER 10 MG: Performed by: NURSE PRACTITIONER

## 2025-05-01 PROCEDURE — 95819 EEG AWAKE AND ASLEEP: CPT

## 2025-05-01 PROCEDURE — 93005 ELECTROCARDIOGRAM TRACING: CPT | Performed by: NURSE PRACTITIONER

## 2025-05-01 PROCEDURE — 83050 HGB METHEMOGLOBIN QUAN: CPT

## 2025-05-01 PROCEDURE — 70450 CT HEAD/BRAIN W/O DYE: CPT

## 2025-05-01 PROCEDURE — 99232 SBSQ HOSP IP/OBS MODERATE 35: CPT | Performed by: STUDENT IN AN ORGANIZED HEALTH CARE EDUCATION/TRAINING PROGRAM

## 2025-05-01 PROCEDURE — 84100 ASSAY OF PHOSPHORUS: CPT | Performed by: INTERNAL MEDICINE

## 2025-05-01 PROCEDURE — 99222 1ST HOSP IP/OBS MODERATE 55: CPT | Performed by: NURSE PRACTITIONER

## 2025-05-01 PROCEDURE — 82375 ASSAY CARBOXYHB QUANT: CPT

## 2025-05-01 PROCEDURE — 82140 ASSAY OF AMMONIA: CPT | Performed by: NURSE PRACTITIONER

## 2025-05-01 PROCEDURE — 85025 COMPLETE CBC W/AUTO DIFF WBC: CPT | Performed by: NURSE PRACTITIONER

## 2025-05-01 PROCEDURE — 83735 ASSAY OF MAGNESIUM: CPT | Performed by: INTERNAL MEDICINE

## 2025-05-01 PROCEDURE — 93010 ELECTROCARDIOGRAM REPORT: CPT | Performed by: INTERNAL MEDICINE

## 2025-05-01 PROCEDURE — 83605 ASSAY OF LACTIC ACID: CPT | Performed by: NURSE PRACTITIONER

## 2025-05-01 RX ORDER — LEVETIRACETAM 500 MG/5ML
750 INJECTION, SOLUTION, CONCENTRATE INTRAVENOUS EVERY 24 HOURS
Status: DISCONTINUED | OUTPATIENT
Start: 2025-05-01 | End: 2025-05-03 | Stop reason: HOSPADM

## 2025-05-01 RX ORDER — SODIUM CHLORIDE 0.9 % (FLUSH) 0.9 %
10 SYRINGE (ML) INJECTION EVERY 12 HOURS SCHEDULED
Status: DISCONTINUED | OUTPATIENT
Start: 2025-05-01 | End: 2025-05-03 | Stop reason: HOSPADM

## 2025-05-01 RX ORDER — SODIUM CHLORIDE 9 MG/ML
40 INJECTION, SOLUTION INTRAVENOUS AS NEEDED
Status: DISCONTINUED | OUTPATIENT
Start: 2025-05-01 | End: 2025-05-03 | Stop reason: HOSPADM

## 2025-05-01 RX ORDER — SODIUM CHLORIDE 0.9 % (FLUSH) 0.9 %
10 SYRINGE (ML) INJECTION AS NEEDED
Status: DISCONTINUED | OUTPATIENT
Start: 2025-05-01 | End: 2025-05-03 | Stop reason: HOSPADM

## 2025-05-01 RX ADMIN — LIDOCAINE 2 PATCH: 4 PATCH TOPICAL at 13:58

## 2025-05-01 RX ADMIN — Medication 10 ML: at 08:00

## 2025-05-01 RX ADMIN — MUPIROCIN 1 APPLICATION: 20 OINTMENT TOPICAL at 13:58

## 2025-05-01 RX ADMIN — LEVETIRACETAM 750 MG: 100 INJECTION, SOLUTION INTRAVENOUS at 22:36

## 2025-05-01 RX ADMIN — ATORVASTATIN CALCIUM 40 MG: 40 TABLET, FILM COATED ORAL at 20:44

## 2025-05-01 RX ADMIN — SENNOSIDES AND DOCUSATE SODIUM 1 TABLET: 50; 8.6 TABLET ORAL at 20:44

## 2025-05-01 RX ADMIN — OXYCODONE HYDROCHLORIDE 15 MG: 15 TABLET ORAL at 03:23

## 2025-05-01 RX ADMIN — MUPIROCIN 1 APPLICATION: 20 OINTMENT TOPICAL at 20:44

## 2025-05-01 RX ADMIN — Medication 10 ML: at 20:44

## 2025-05-01 NOTE — PROGRESS NOTES
Palliative Care Daily Progress Note     C/C: Patient lethargic and does not opens eyes throughout discussion.     S: Medical record reviewed. Follow-up visit for GOC and symptom management. Events noted. Patient lethargic at time of visit. Met with KRISHNA Ho, in conjunction with Dr. Beard.     ROS: ROS limited by condition/AMS.     O: Code Status:   Code Status and Medical Interventions: No CPR (Do Not Attempt to Resuscitate); Limited Support; No intubation (DNI)   Ordered at: 04/30/25 0932     Code Status (Patient has no pulse and is not breathing):    No CPR (Do Not Attempt to Resuscitate)     Medical Interventions (Patient has pulse or is breathing):    Limited Support     Medical Intervention Limits:    No intubation (DNI)     Level Of Support Discussed With:    Health Care Surrogate      Advanced Directives: Advance Directive Status: Patient has advance directive, copy in chart   Goals of Care: Ongoing.   Palliative Performance Scale Score: 20%     /90   Pulse 92   Temp 97.6 °F (36.4 °C) (Axillary)   Resp 22   Wt 71 kg (156 lb 8.4 oz)   SpO2 100%   BMI 24.52 kg/m²     Intake/Output Summary (Last 24 hours) at 5/1/2025 1654  Last data filed at 5/1/2025 1200  Gross per 24 hour   Intake 300 ml   Output 510 ml   Net -210 ml       PE:  General Appearance:    Patient laying in bed, lethargic, A/C ill appearing, frail, NAD   HEENT:    NC/AT, MMM, face relaxed   Neck:   supple, trachea midline, no JVD   Lungs:     CTA bilat, diminished in bases; respirations regular, even and unlabored; RR 16-18 on exam, 3LNC    Heart:        Abdomen:     Normal bowel sounds, soft, nontender, nondistended   G/U:   Deferred   MSK/Extremities:  R AKA,     Pulses:   Pulses palpable and equal bilaterally   Skin:   Warm, dry   Neurologic:   lethargic   Psych:   lethargic     Meds: Reviewed and changes noted    Labs:   Results from last 7 days   Lab Units 05/01/25  0719   WBC 10*3/mm3 9.00   HEMOGLOBIN g/dL 8.7*    HEMATOCRIT % 30.4*   PLATELETS 10*3/mm3 189     Results from last 7 days   Lab Units 05/01/25  0717   SODIUM mmol/L 138   POTASSIUM mmol/L 5.1   CHLORIDE mmol/L 98   CO2 mmol/L 18.0*   BUN mg/dL 54*   CREATININE mg/dL 8.21*   GLUCOSE mg/dL 107*   CALCIUM mg/dL 8.0*     Results from last 7 days   Lab Units 05/01/25  0717 04/30/25  0437 04/29/25  0429   SODIUM mmol/L 138   < > 139   POTASSIUM mmol/L 5.1   < > 5.8*   CHLORIDE mmol/L 98   < > 95*   CO2 mmol/L 18.0*   < > 19.0*   BUN mg/dL 54*   < > 55*   CREATININE mg/dL 8.21*   < > 10.45*   CALCIUM mg/dL 8.0*   < > 7.6*   BILIRUBIN mg/dL  --   --  0.5   ALK PHOS U/L  --   --  210*   ALT (SGPT) U/L  --   --  5   AST (SGOT) U/L  --   --  31   GLUCOSE mg/dL 107*   < > 118*    < > = values in this interval not displayed.     Imaging Results (Last 72 Hours)       Procedure Component Value Units Date/Time    CT Head Without Contrast Stroke Protocol [[id number]] Collected: 05/01/25 0403     Updated: 05/01/25 0424    Narrative:      CT HEAD WO CONTRAST STROKE PROTOCOL    Date of Exam: 5/1/2025 3:51 AM EDT    Indication: CODE stroke.    Comparison: 4/28/2025.    Technique: Axial CT images were obtained of the head without contrast administration.  Reconstructed coronal images were also obtained. Automated exposure control and iterative construction methods were used.    Scan Time: 0355 hours  Results discussed with stroke team at 0415 hours.      Findings:  There is a large right-sided subdural hematoma. The hematoma covers the right convexity and measures up to 25 mm thick in the coronal plane, previously 24 mm measured at the same coronal slice. There is stable leftward shift of midline structures up to   15 mm with subfalcine and right-sided uncal herniation. There is downward transtentorial herniation on the right. There is significant mass effect exerted upon the midbrain. No new hemorrhage. No new hypoattenuating lesion in the brain. Gray-white   differentiation appears  intact. There is near complete effacement of the right lateral ventricle and effacement of the third ventricle. The left ventricle is mildly enlarged with increased size of the temporal horn raising some concern for entrapment.   Cerebellum and carmen appear unremarkable. There are extensive intracranial vascular calcifications. The calvarium is intact. Paranasal sinuses and mastoid air cells appear well aerated.      Impression:      Impression:  1.Stable size of large right-sided subdural hematoma with stable leftward shift of midline structures, subfalcine and right-sided uncal herniation and downward transtentorial herniation.  2.Mild enlargement of the left lateral ventricle with increased size of the temporal horn raising some concern for entrapment.            Electronically Signed: Aj Howell MD    5/1/2025 4:21 AM EDT    Workstation ID: [id number]              Lab 04/29/25  0429   HEMOGLOBIN A1C 5.20         Diagnostics: Reviewed    A:   SDH (subdural hematoma)    Coronary artery disease involving native coronary artery of native heart with angina pectoris    Hyperlipidemia LDL goal <70    Essential hypertension    Type 2 diabetes mellitus, with long-term current use of insulin    Heart failure with preserved left ventricular function (HFpEF)    Current smoker    ESRD on hemodialysis    Coronary artery disease    Hypothyroidism (acquired)    Chronic pain syndrome; scheduled narcotics and gabapentin     59 y.o. male with SDH, ESRD on HD, CAD, chronic pain syndrome.   S/S:   Pain -chronic pain  -Neurontin 300mg PO nightly  -Lidocaine patches  -Oxycodone 15mg PO q 4 hours prn moderate pain     2. AMS -worsening SDH     3. Debility -w/c bound, RAKA     4. Dysphagia -SLP evaluation pending  -do not recommend feeding tube due to poor prognosis     5. GOC -DNR/DNI -per discussion with Georgia (HCS/POA)  4/30: long discussion regarding patient's condition and chronic co-morbidities, discussed prognosis of weeks to  months, if stopping dialysis then days to weeks  4/30: Georgia is in agreement with DNR/DNI but wanting to continue ongoing full treatment at this time  5/1: Met with Georgia in conjunction with Dr. Beard. Reviewed patient's poor prognosis and multi-system organ failure. Spouse verbalizes understanding that patient is at end of life. She would like to remain in hospital for support as she wants their children to come see patient in hospital this weekend. She would like Providers to update children when they come on Saturday regarding patient's poor prognosis prior to her making any changes in his plan of care.     P:  Follow up visit in conjunction with Dr. Beard. Georgia would like to remain in hospital and wait for their children visit prior to making any changes in his plan of care. She verbalizes understanding of his poor prognosis. Emotional support provided throughout discussion.   Palliative Care Team will continue to follow patient. Please do not hesitate to contact us regarding further sx mgmt or GOC needs.  Eileen Ralph, APRN  5/1/2025  Time spent: 30 minutes

## 2025-05-01 NOTE — PROGRESS NOTES
[facility] Medicine Services  PROGRESS NOTE    Patient Name: Chevy Junior  : 1965  MRN: [id number]    Date of Admission: 2025  Primary Care Physician: Jeanie Francois APRN    Subjective   Subjective     CC:  AMS    HPI:  Unable to obtain much from patient. D/w dialysis nurse. Code stroke called earlier for inc lethargy      Objective   Objective     Vital Signs:   Temp:  [97.3 °F (36.3 °C)-99 °F (37.2 °C)] 97.6 °F (36.4 °C)  Heart Rate:  [] 92  Resp:  [20-23] 22  BP: (110-179)/() 145/90  Flow (L/min) (Oxygen Therapy):  [1.5-3] 3     Physical Exam:  Constitutional: chronically ill appearing, moans intermittently  HENT: NCAT, mucous membranes moist  Respiratory:coarse anterior rhonchi  Cardiovascular: RRR, no murmurs, rubs, or gallops  Gastrointestinal: Positive bowel sounds, soft, nontender, nondistended  Musculoskeletal: R AKA  Psychiatric: IWONA  Neurologic: non verbal, only moans. No real following of commands but does spontaneously move all extremities. Extremely drowsy  Skin: LLE dry necrotic looking skin changes      Results Reviewed:  LAB RESULTS:      Lab 25  0725 25  0719 25  0437 25  2213 25  1436   WBC  --  9.00 9.58 8.35 7.71   HEMOGLOBIN  --  8.7* 9.0* 8.9* 8.3*   HEMATOCRIT  --  30.4* 29.0* 29.4* 26.8*   PLATELETS  --  189 231 270 250   NEUTROS ABS  --  6.73 6.74  --  5.31   IMMATURE GRANS (ABS)  --  0.07* 0.06*  --  0.03   LYMPHS ABS  --  1.33 1.88  --  1.57   MONOS ABS  --  0.61 0.69  --  0.48   EOS ABS  --  0.19 0.14  --  0.25   MCV  --  103.8* 97.3* 99.7* 99.6*   LACTATE 1.1  --   --   --   --    PROTIME  --   --   --  24.0*  --    HSTROP T  --   --   --   --  1,359*         Lab 25  0717 25  0437 25  0429 25  2213 25  1436   SODIUM 138 136 139 142 138   POTASSIUM 5.1 5.1 5.8* 5.2 4.6   CHLORIDE 98 95* 95* 96* 96*   CO2 18.0* 17.0* 19.0* 22.0 23.5   ANION GAP 22.0* 24.0* 25.0*  24.0* 18.5*   BUN 54* 38* 55* 50* 43*   CREATININE 8.21* 7.10* 10.45* 9.75* 8.84*   EGFR 6.9* 8.2* 5.2* 5.6* 6.3*   GLUCOSE 107* 103* 118* 124* 145*   CALCIUM 8.0* 8.3* 7.6* 7.6* 7.2*   MAGNESIUM 2.4 2.4 2.9*  --  2.5*   PHOSPHORUS 5.7* 5.3* 6.1*  --   --    HEMOGLOBIN A1C  --   --  5.20  --   --    TSH  --   --  5.450*  --   --          Lab 04/29/25  0429 04/28/25  1436   TOTAL PROTEIN 7.5 7.7   ALBUMIN 3.4* 3.5   GLOBULIN 4.1 4.2   ALT (SGPT) 5 <5   AST (SGOT) 31 10   BILIRUBIN 0.5 0.5   ALK PHOS 210* 210*         Lab 05/01/25  0717 04/28/25  2213 04/28/25  1436   PROBNP 35,686.0*  --   --    HSTROP T  --   --  1,359*   PROTIME  --  24.0*  --    INR  --  1.99*  --          Lab 05/01/25  0717   CHOLESTEROL 122   LDL CHOL 67   HDL CHOL 27*   TRIGLYCERIDES 162*         Lab 04/28/25  2213   ABO TYPING O   RH TYPING Positive   ANTIBODY SCREEN Positive         Lab 05/01/25  0458 04/29/25  1655   PH, ARTERIAL 7.399 7.388   PCO2, ARTERIAL 34.8* 36.2   PO2 ART 67.0* 61.2*   FIO2 26 28   HCO3 ART 21.5 21.8   BASE EXCESS ART -2.9* -2.8*   CARBOXYHEMOGLOBIN 1.0 1.6     Brief Urine Lab Results       None            Microbiology Results Abnormal       None            EEG  Result Date: 5/1/2025  Reason for referral: 59 y.o.male with altered mental status, right-sided subdural hematoma, consideration of seizures Technical Summary:  A 19 channel digital EEG was performed using the international 10-20 placement system, including eye leads and EKG leads. Duration: 21 minutes Findings: The patient is resting quietly in bed.  Medium amplitude 3-7 Hz intermixed delta and theta activity is seen over the left hemisphere.  By contrast, faster theta frequencies are suppressed over the right hemisphere, and slower 2-4 delta activity is prominent.  There is an increase in faster theta frequencies over the left hemisphere but not the right when the patient is stimulated.  Photic stimulation does not change the background.  Hyperventilation is  not performed.  No epileptiform activity or electrographic seizures are seen. Video: Available Technical quality: Good Rhythm strip: Regular, 80 bpm SUMMARY: Moderate generalized slow, greater over the right hemisphere Right hemispheric suppression No epileptiform activity is present     Impression: Focal cerebral dysfunction affecting the right hemisphere to moderate degree, superimposed upon underlying diffuse cerebral dysfunction.  These findings are consistent with a large right hemispheric subdural hematoma Ongoing seizures are not seen This report is transcribed using the Dragon dictation system.      CT Head Without Contrast Stroke Protocol  Result Date: 5/1/2025  CT HEAD WO CONTRAST STROKE PROTOCOL Date of Exam: 5/1/2025 3:51 AM EDT Indication: CODE stroke. Comparison: 4/28/2025. Technique: Axial CT images were obtained of the head without contrast administration.  Reconstructed coronal images were also obtained. Automated exposure control and iterative construction methods were used. Scan Time: 0355 hours Results discussed with stroke team at 0415 hours. Findings: There is a large right-sided subdural hematoma. The hematoma covers the right convexity and measures up to 25 mm thick in the coronal plane, previously 24 mm measured at the same coronal slice. There is stable leftward shift of midline structures up to 15 mm with subfalcine and right-sided uncal herniation. There is downward transtentorial herniation on the right. There is significant mass effect exerted upon the midbrain. No new hemorrhage. No new hypoattenuating lesion in the brain. Gray-white differentiation appears intact. There is near complete effacement of the right lateral ventricle and effacement of the third ventricle. The left ventricle is mildly enlarged with increased size of the temporal horn raising some concern for entrapment. Cerebellum and carmen appear unremarkable. There are extensive intracranial vascular calcifications. The  calvarium is intact. Paranasal sinuses and mastoid air cells appear well aerated.     Impression: Impression: 1.Stable size of large right-sided subdural hematoma with stable leftward shift of midline structures, subfalcine and right-sided uncal herniation and downward transtentorial herniation. 2.Mild enlargement of the left lateral ventricle with increased size of the temporal horn raising some concern for entrapment. Electronically Signed: Aj Howell MD  5/1/2025 4:21 AM EDT  Workstation ID: [id number]      Results for orders placed during the hospital encounter of 11/05/24    Adult Transthoracic Echo Complete W/ Cont if Necessary Per Protocol    Interpretation Summary  Physician interpretation.  Good technical quality.    1.  LV size, function, and wall motion are normal.  Mild to moderate concentric left ventricular hypertrophy.  Visually estimated ejection fraction is 55 to 60%.  Grade 2 diastolic dysfunction.  Mild left atrial enlargement.  Borderline right atrial size.  RV size and function are grossly preserved.  No septal defect or intracavitary mass or thrombus.    2.  The mitral valve is markedly morphologically abnormal.  Both leaflets are thickened and there is a sclerotic and mobile projection of the anterior mitral leaflet into the LV outflow tract.  This does not have the typical appearance of an acute thrombus or vegetation.  There is no compromise of the LV outflow tract.  There is mild mitral stenosis with a mitral valve area of 1.9 cm² by pressure half-time.  There is trivial MR.  The aortic valve is tricuspid and sclerotic but is minimally calcified.  The annulus is at the upper limits of normal size.  There is no mitral stenosis and there is mild to moderate MR.  Pressure half-time of AI is suggestive of moderate or greater disease.  Right-sided heart valves are unremarkable.    3.  There is no pericardial effusion.  The aortic root is mildly dilated at 3.94 cm with no dissection or  aneurysm.    4.  Right heart pressures cannot be calculated.      Current medications:  Scheduled Meds:atorvastatin, 40 mg, Oral, Nightly  senna-docusate sodium, 2 tablet, Oral, BID   And  polyethylene glycol, 17 g, Oral, Daily   And  bisacodyl, 5 mg, Oral, Daily   And  bisacodyl, 10 mg, Rectal, Daily  gabapentin, 300 mg, Oral, Nightly  insulin regular, 2-7 Units, Subcutaneous, Q6H  levETIRAcetam, 750 mg, Intravenous, Q24H  levothyroxine, 75 mcg, Oral, Q AM  Lidocaine, 2 patch, Transdermal, Q24H  mupirocin, 1 Application, Each Nare, BID  nicotine, 1 patch, Transdermal, Q24H  pantoprazole, 40 mg, Oral, Q AM  sodium chloride, 10 mL, Intravenous, Q12H  sodium chloride, 10 mL, Intravenous, Q12H      Continuous Infusions:niCARdipine, 5-15 mg/hr, Last Rate: Stopped (04/29/25 1550)      PRN Meds:.  Albuterol Sulfate NEB Orderable    [DISCONTINUED] senna-docusate sodium **AND** polyethylene glycol **AND** bisacodyl **AND** bisacodyl    dextrose    dextrose    glucagon (human recombinant)    nitroglycerin    oxyCODONE    sodium chloride    sodium chloride    sodium chloride    sodium chloride    Assessment & Plan   Assessment & Plan     Active Hospital Problems    Diagnosis  POA    **SDH (subdural hematoma) [S06.5XAA]  Yes    Chronic pain syndrome; scheduled narcotics and gabapentin [G89.4]  Unknown    Coronary artery disease [I25.10]  Yes    Hypothyroidism (acquired) [E03.9]  Yes    Heart failure with preserved left ventricular function (HFpEF) [I50.30]  Yes    ESRD on hemodialysis [N18.6, Z99.2]  Not Applicable    Current smoker [F17.200]  Yes    Hyperlipidemia LDL goal <70 [E78.5]  Yes    Type 2 diabetes mellitus, with long-term current use of insulin [E11.9, Z79.4]  Not Applicable    Essential hypertension [I10]  Yes    Coronary artery disease involving native coronary artery of native heart with angina pectoris [I25.119]  Yes      Resolved Hospital Problems   No resolved problems to display.        Brief Hospital Course  to date:  Chevy Junior is a 59 y.o. male with history of MV CAD, HFrEF, valvular heart disease with moderate AI and moderate MR, hypertension, hyperlipidemia, PAD with right AKA, bilateral SDH, recent right SDH who was originally admitted to the ICU with a worsening subdural hematoma.      He is an acute on chronically ill patient.  He was recently admitted in March for cardiac revascularization however at that time the surgical team thought he was too high risk for a CABG and recommended less invasive options.  Since that time he was also admitted for a lower GI bleed and then presented here with worsening mental status where CT head imaging revealed revealed that his known right subdural hematoma was enlarged.    Given his severe underlying medical conditions neurosurgery does not feel that he is a good surgical candidate at this time, multiple specialties have recommended a more palliative approach and focus for comfort care.  In discussions with POA with assistance of palliative      SDH  Hx of sz  --imaging as above  --code stroke called earlier today 5/1; showing subfalcine and R sided uncal herniation and downward transtentorial herniation. This is a very poor prognosis. D/w NSGY and palliative. Plan to meet w POA today to discuss guarded prognosis      MVCAD  Valvular heart disease  HFpEF        ESRD  --HD per nephrology. Could not complete full sessions recently  --severe underlying medical issues pose issue on continuing HD      HTN  HLD      Expected Discharge Location and Transportation: TBD  Expected Discharge TBD  Expected discharge date/ time has not been documented.     VTE Prophylaxis:  Mechanical VTE prophylaxis orders are present.         AM-PAC 6 Clicks Score (PT): 7 (04/30/25 2000)    CODE STATUS:   Code Status and Medical Interventions: No CPR (Do Not Attempt to Resuscitate); Limited Support; No intubation (DNI)   Ordered at: 04/30/25 0932     Code Status (Patient has no pulse and is  not breathing):    No CPR (Do Not Attempt to Resuscitate)     Medical Interventions (Patient has pulse or is breathing):    Limited Support     Medical Intervention Limits:    No intubation (DNI)     Level Of Support Discussed With:    Health Care Surrogate       Milly Beard MD  05/01/25

## 2025-05-01 NOTE — SIGNIFICANT NOTE
05/01/25 0847   SLP Deferred Reason   SLP Deferred Reason   (Attempted to see. Pt  is not in room.  SLP to return.)
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
## 2025-05-01 NOTE — SIGNIFICANT NOTE
"   05/01/25 1416   SLP Deferred Reason   SLP Deferred Reason   (Attempted to see again. In dialysis all AM and now palliative meeting in progress.  SLP to f/u tomorrow re: GOC-pt has remained NPO despite \"passing\" nursing screen.  RN endorses pt is severely dysarthric.  SLP assessment pending GOC plan.)       "

## 2025-05-01 NOTE — PLAN OF CARE
Problem: Hemodialysis  Goal: Safe, Effective Therapy Delivery  Outcome: Progressing  Goal: Effective Tissue Perfusion  Outcome: Progressing  Goal: Absence of Infection Signs and Symptoms  Outcome: Progressing   Goal Outcome Evaluation:               HD completed. , goal met. VS stable during tx, some brief periods of apneic breathing observed, but O2 sats stable.  Access functioned well. Dr. Beard to unit to round and discussed pain medications vs increased sedation. Dr. Beard stated she would contact palliative/hospice. Blood returned to pt. Report to NABEEL Lara

## 2025-05-01 NOTE — CONSULTS
Diabetes Education    Patient Name:  Chevy Junior  YOB: 1965  MRN: [id number]  Admit Date:  4/28/2025        Total time spent reviewing chart, preparing education/materials, providing education at bedside, and coordinating care approx 15 minutes.    Consult for diabetes education received per stroke protocol. Chart reviewed. Pt was seen at bedside today, pt drowsy. Wife, [place], shares he is tired from dialysis. Permission given for visit by wife.     Discussed pt current A1c 5.2 with her. She reports his diabetes mgmt regimen includes her checking his blood sugar 3 times per day and she gives insulin based off his readings. Typically does not require any insulin and his blood sugar is normally around 127. Sometimes gets over 140 if eating too many sweets or a large meal. She denies any hypoglycemic events. Is able to describe appropriate treatment of low blood sugar using rule of 15s. She is able to describe foods pt can tolerate that help him manage his diabetes and also fit into meal plan per his dialysis dietitian, who she states they talk with periodically.     Denies any additional needs at this time. Encouraged him to reach out via primary RN if any questions or concerns arise.     Thank you for this consult.     Patient does not qualify for the follow up stroke class based on the exclusion criteria of BMI 24.5 (less than 25)    Electronically signed by:  Hina Dupree RN, [facility]  05/01/25 15:23 EDT

## 2025-05-01 NOTE — CASE MANAGEMENT/SOCIAL WORK
Continued Stay Note  [facility]     Patient Name: Chevy Junior  MRN: [id number]  Today's Date: 5/1/2025    Admit Date: 4/28/2025    Plan: Home with ex-wife and caregiver   Discharge Plan       Row Name 05/01/25 0917       Plan    Plan Home with ex-wife and caregiver    Patient/Family in Agreement with Plan yes    Plan Comments Plan is Home with ex-wife and caregiver CM will continue to follow.    Final Discharge Disposition Code 01 - home or self-care                   Discharge Codes    No documentation.                       Win Reyna RN

## 2025-05-01 NOTE — PLAN OF CARE
Patient has been made an AND.  HD clifford 500ml off this am.  AV Fistula with T&B.  SLP unable to see today.  MD cleared for pureed diet.  MRI canceled due to change in patients status.  Plan is to transfer home with hospice.  POA in agreement with plan.  Allevyn in place on heels and sacrum.  NSR on tele.

## 2025-05-01 NOTE — PLAN OF CARE
Problem: Palliative Care  Goal: Enhanced Quality of Life  Intervention: Optimize Psychosocial Wellbeing  Flowsheets (Taken 5/1/2025 1403)  Supportive Measures: active listening utilized   Goal Outcome Evaluation:           Progress: no change  Outcome Evaluation: Pt will arouse easily and greet you and then falls back to sleep. Pt hcs [place] to arrive later today with plans for more discussion regarding goals of care. Pt is currently npo. speech eval ordered but don't know how pt will be able to participate due to lethargy. Pt has MRI ordered but no completed.  Palliative iDT: rn, VICKI,MD,   After hours# [phone]

## 2025-05-01 NOTE — PROGRESS NOTES
LOS: 3 days   Patient Care Team:  Jeanie Francois APRN as PCP - General (Nurse Practitioner)  Nikita Villasenor MD as Consulting Physician (Nephrology)  Feng Nielsen APRN as Nurse Practitioner (Interventional Cardiology)  Hal Wing MD as Consulting Physician (Cardiology)  Carlos Mcgee MD (General Surgery)  Gus Whaley MD as Consulting Physician (General Surgery)  Luis Fernando Rawls MD as Consulting Physician (Vascular Surgery)    Chief Complaint: ESRD    Subjective     HD today tolerated well. C/o back pain.     Subjective:  Symptoms:  Stable.  No shortness of breath or chest pain.        History taken from: patient    Objective     Vital Sign Min/Max for last 24 hours  Temp  Min: 97.3 °F (36.3 °C)  Max: 99 °F (37.2 °C)   BP  Min: 110/45  Max: 179/72   Pulse  Min: 73  Max: 116   Resp  Min: 20  Max: 23   SpO2  Min: 94 %  Max: 100 %   Flow (L/min) (Oxygen Therapy)  Min: 1.5  Max: 3   No data recorded         I/O this shift:  In: -   Out: 510   I/O last 3 completed shifts:  In: 540 [P.O.:540]  Out: 0     Objective:  General Appearance:  Ill-appearing.    Vital signs: (most recent): Blood pressure 145/90, pulse 92, temperature 97.6 °F (36.4 °C), temperature source Axillary, resp. rate 22, weight 71 kg (156 lb 8.4 oz), SpO2 100%.  Vital signs are normal.    Output: Minimal urine output.    HEENT: Normal HEENT exam.    Lungs:  Normal effort and normal respiratory rate.  Breath sounds clear to auscultation.  He is not in respiratory distress.  No decreased breath sounds or wheezes.    Heart: Normal rate.  Regular rhythm.  S1 normal and S2 normal.    Extremities: Normal range of motion.  There is no dependent edema or local swelling.    Pulses: Distal pulses are intact.    Neurological: Patient is alert.                Results Review:     I reviewed the patient's new clinical results.    WBC WBC   Date Value Ref Range Status   05/01/2025 9.00 3.40 - 10.80 10*3/mm3 Final   04/30/2025 9.58 3.40  "- 10.80 10*3/mm3 Final   04/28/2025 8.35 3.40 - 10.80 10*3/mm3 Final   04/28/2025 7.71 3.40 - 10.80 10*3/mm3 Final      HGB Hemoglobin   Date Value Ref Range Status   05/01/2025 8.7 (L) 13.0 - 17.7 g/dL Final   04/30/2025 9.0 (L) 13.0 - 17.7 g/dL Final   04/28/2025 8.9 (L) 13.0 - 17.7 g/dL Final   04/28/2025 8.3 (L) 13.0 - 17.7 g/dL Final      HCT Hematocrit   Date Value Ref Range Status   05/01/2025 30.4 (L) 37.5 - 51.0 % Final   04/30/2025 29.0 (L) 37.5 - 51.0 % Final   04/28/2025 29.4 (L) 37.5 - 51.0 % Final   04/28/2025 26.8 (L) 37.5 - 51.0 % Final      Platlets No results found for: \"LABPLAT\"   MCV MCV   Date Value Ref Range Status   05/01/2025 103.8 (H) 79.0 - 97.0 fL Final   04/30/2025 97.3 (H) 79.0 - 97.0 fL Final   04/28/2025 99.7 (H) 79.0 - 97.0 fL Final   04/28/2025 99.6 (H) 79.0 - 97.0 fL Final          Sodium Sodium   Date Value Ref Range Status   05/01/2025 138 136 - 145 mmol/L Final   04/30/2025 136 136 - 145 mmol/L Final   04/29/2025 139 136 - 145 mmol/L Final   04/28/2025 142 136 - 145 mmol/L Final   04/28/2025 138 136 - 145 mmol/L Final      Potassium Potassium   Date Value Ref Range Status   05/01/2025 5.1 3.5 - 5.2 mmol/L Final   04/30/2025 5.1 3.5 - 5.2 mmol/L Final   04/29/2025 5.8 (H) 3.5 - 5.2 mmol/L Final     Comment:     Specimen hemolyzed.  Result may be falsely elevated.   04/28/2025 5.2 3.5 - 5.2 mmol/L Final   04/28/2025 4.6 3.5 - 5.2 mmol/L Final      Chloride Chloride   Date Value Ref Range Status   05/01/2025 98 98 - 107 mmol/L Final   04/30/2025 95 (L) 98 - 107 mmol/L Final   04/29/2025 95 (L) 98 - 107 mmol/L Final   04/28/2025 96 (L) 98 - 107 mmol/L Final   04/28/2025 96 (L) 98 - 107 mmol/L Final      CO2 CO2   Date Value Ref Range Status   05/01/2025 18.0 (L) 22.0 - 29.0 mmol/L Final   04/30/2025 17.0 (L) 22.0 - 29.0 mmol/L Final   04/29/2025 19.0 (L) 22.0 - 29.0 mmol/L Final   04/28/2025 22.0 22.0 - 29.0 mmol/L Final   04/28/2025 23.5 22.0 - 29.0 mmol/L Final      BUN BUN   Date " "Value Ref Range Status   05/01/2025 54 (H) 8 - 23 mg/dL Final   04/30/2025 38 (H) 8 - 23 mg/dL Final   04/29/2025 55 (H) 8 - 23 mg/dL Final   04/28/2025 50 (H) 8 - 23 mg/dL Final   04/28/2025 43 (H) 8 - 23 mg/dL Final      Creatinine Creatinine   Date Value Ref Range Status   05/01/2025 8.21 (H) 0.76 - 1.27 mg/dL Final   04/30/2025 7.10 (H) 0.76 - 1.27 mg/dL Final   04/29/2025 10.45 (H) 0.76 - 1.27 mg/dL Final   04/28/2025 9.75 (H) 0.76 - 1.27 mg/dL Final   04/28/2025 8.84 (H) 0.76 - 1.27 mg/dL Final      Calcium Calcium   Date Value Ref Range Status   05/01/2025 8.0 (L) 8.6 - 10.5 mg/dL Final   04/30/2025 8.3 (L) 8.6 - 10.5 mg/dL Final   04/29/2025 7.6 (L) 8.6 - 10.5 mg/dL Final   04/28/2025 7.6 (L) 8.6 - 10.5 mg/dL Final   04/28/2025 7.2 (L) 8.6 - 10.5 mg/dL Final      PO4 No results found for: \"CAPO4\"   Albumin Albumin   Date Value Ref Range Status   04/29/2025 3.4 (L) 3.5 - 5.2 g/dL Final   04/28/2025 3.5 3.5 - 5.2 g/dL Final      Magnesium Magnesium   Date Value Ref Range Status   05/01/2025 2.4 1.6 - 2.4 mg/dL Final   04/30/2025 2.4 1.6 - 2.4 mg/dL Final   04/29/2025 2.9 (H) 1.6 - 2.4 mg/dL Final   04/28/2025 2.5 (H) 1.6 - 2.4 mg/dL Final      Uric Acid No results found for: \"URICACID\"     Medication Review: Yes    Assessment & Plan       SDH (subdural hematoma)    Coronary artery disease involving native coronary artery of native heart with angina pectoris    Hyperlipidemia LDL goal <70    Essential hypertension    Type 2 diabetes mellitus, with long-term current use of insulin    Heart failure with preserved left ventricular function (HFpEF)    Current smoker    ESRD on hemodialysis    Coronary artery disease    Hypothyroidism (acquired)    Chronic pain syndrome; scheduled narcotics and gabapentin      Assessment & Plan    1- ESRD -MWF. Currently on TTS [place] on this visit .   2-HTN   3- Anemia of chronic disease   4- Subdural hematoma - chronic and on recent imaging increase in size with midline shift noted. "   5- Hyperkalemia      Plan:  - Patient has large subdural hematoma with midline shift. He is DNR/DNI. On going GOC discussion with family and palliative team  - Renal diet   - Electrolytes will be corrected with HD     George Hernandez MD  05/01/25  14:23 EDT

## 2025-05-02 VITALS
TEMPERATURE: 99.1 F | RESPIRATION RATE: 16 BRPM | WEIGHT: 156.53 LBS | HEART RATE: 99 BPM | OXYGEN SATURATION: 100 % | BODY MASS INDEX: 24.52 KG/M2 | DIASTOLIC BLOOD PRESSURE: 84 MMHG | SYSTOLIC BLOOD PRESSURE: 147 MMHG

## 2025-05-02 LAB
GLUCOSE BLDC GLUCOMTR-MCNC: 107 MG/DL (ref 70–130)
GLUCOSE BLDC GLUCOMTR-MCNC: 115 MG/DL (ref 70–130)
GLUCOSE BLDC GLUCOMTR-MCNC: 123 MG/DL (ref 70–130)
GLUCOSE BLDC GLUCOMTR-MCNC: 132 MG/DL (ref 70–130)
QT INTERVAL: 390 MS
QTC INTERVAL: 505 MS

## 2025-05-02 PROCEDURE — 82948 REAGENT STRIP/BLOOD GLUCOSE: CPT

## 2025-05-02 PROCEDURE — 99232 SBSQ HOSP IP/OBS MODERATE 35: CPT | Performed by: HOSPITALIST

## 2025-05-02 RX ORDER — DIAZEPAM 2 MG/1
2 TABLET ORAL 2 TIMES DAILY PRN
Qty: 5 TABLET | Refills: 0 | Status: SHIPPED | OUTPATIENT
Start: 2025-05-02 | End: 2025-05-02

## 2025-05-02 RX ORDER — DIAZEPAM 2 MG/1
2 TABLET ORAL 2 TIMES DAILY PRN
Qty: 5 TABLET | Refills: 0 | Status: SHIPPED | OUTPATIENT
Start: 2025-05-02

## 2025-05-02 RX ORDER — MORPHINE SULFATE 100 MG/5ML
5 SOLUTION ORAL EVERY 4 HOURS PRN
Qty: 5 ML | Refills: 0 | Status: SHIPPED | OUTPATIENT
Start: 2025-05-02 | End: 2025-05-02

## 2025-05-02 RX ORDER — MORPHINE SULFATE 100 MG/5ML
5 SOLUTION ORAL EVERY 4 HOURS PRN
Qty: 30 ML | Refills: 0 | Status: SHIPPED | OUTPATIENT
Start: 2025-05-02 | End: 2025-05-02

## 2025-05-02 RX ORDER — MORPHINE SULFATE 100 MG/5ML
5 SOLUTION ORAL EVERY 4 HOURS PRN
Qty: 30 ML | Refills: 0 | Status: SHIPPED | OUTPATIENT
Start: 2025-05-02

## 2025-05-02 RX ADMIN — LIDOCAINE 2 PATCH: 4 PATCH TOPICAL at 10:16

## 2025-05-02 RX ADMIN — Medication 10 ML: at 10:16

## 2025-05-02 RX ADMIN — MUPIROCIN 1 APPLICATION: 20 OINTMENT TOPICAL at 10:16
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
## 2025-05-02 NOTE — CONSULTS
Continued Stay Note  [facility]     Patient Name: Chevy Junior  MRN: [id number]  Today's Date: 5/2/2025    Admit Date: 4/28/2025    Plan: Home with [facility]   Discharge Plan       Row Name 05/02/25 1925       Plan    Plan Home with [facility]    Final Note 1530-Telephone call from Eileen COHEN, palliative care, stating has placed a hospice consult, family is wanting to take pt home as soon as possible. Visit made to pt, multiple family members present. Pt non-responsive. Pt's wife stated wants to take pt home with hospice, stated pt is currently a palliative pt with [facility]. Wife stated pt does not want to die in the hospital. Telephone call to [facility], informing pt is with palliative, wife wants to change to hospice and bring pt home this evening. Was informed that a hospice nurse will not be available for the admission until after midnight or at 1400 tomorrow. Discussed with wife that pt has the potential of dying in the ambulance or once home before hospice can come admit pt, should that happen the  will have to pronounce the pt's death as opposed to hospice. Wife verbalized understanding and wants to proceed with the discharge tonight if able. Wife signed the EMS/DNR form, one for the ambulance and one for home. Telephone call to Dr. Peralta regarding wife's request for discharge today. Dr. Mtz stated pt may discharge though will not beable to writer the discharge for another 2 hours. Telephone call to [facility], regarding ambulance transportation today. [facility] stated for the staff nurse to call [facility], ext [phone],when pt is ready for discharge. Informed staff nurse Jane. Dr. Mills, Palliative Care, informed of the discharge plans and sent scripts to meds to bed. Hospice referral information faxed to [facility]. Provided wife with the [facility] number to call when pt arrives home. Spoke with meds to bed, offered to  the meds if the pharmacy staff is  able to deliver before closing. Informed that the medications have not bee reconciled so pharmacy cannot file the medications. Dr. Mtz informed, Dr. Mtz will send scripts to [facility]. Informed pt's wife of need to  the medications. EMS/DNR form on pt's chartlet, PCS form completed.                   Discharge Codes    No documentation.                 Expected Discharge Date and Time       Expected Discharge Date Expected Discharge Time    May 2, 2025               Elodia Carranza RN

## 2025-05-02 NOTE — PROGRESS NOTES
[facility] Medicine Services  PROGRESS NOTE    Patient Name: Chevy Junior  : 1965  MRN: [id number]    Date of Admission: 2025  Primary Care Physician: Jeanie Francois APRN    Subjective   Subjective     CC:  F/U AMS    HPI:  Seen this morning with Palliative Care. He has had a decline in mental status since yesterday, not able to take any meds, minimally responsive. Wife was here this morning but left to go home.      Objective   Objective     Vital Signs:   Temp:  [97.8 °F (36.6 °C)-98.5 °F (36.9 °C)] 98.2 °F (36.8 °C)  Heart Rate:  [] 85  Resp:  [18-22] 18  BP: (138-173)/(65-91) 140/73  Flow (L/min) (Oxygen Therapy):  [1] 1     Physical Exam:  Gen-chronically ill appearing  CV-RRR, S1 S2 normal, no m/r/g  Resp-diminished bilaterally, no wheezes, nonlabored respirations   Abd-soft, ND, +BS  Ext-right AKA  Neuro-only minimally responsive to sternal rub      Results Reviewed:  LAB RESULTS:      Lab 25  0725 25  0719 25  0437 25  2213 25  1436   WBC  --  9.00 9.58 8.35 7.71   HEMOGLOBIN  --  8.7* 9.0* 8.9* 8.3*   HEMATOCRIT  --  30.4* 29.0* 29.4* 26.8*   PLATELETS  --  189 231 270 250   NEUTROS ABS  --  6.73 6.74  --  5.31   IMMATURE GRANS (ABS)  --  0.07* 0.06*  --  0.03   LYMPHS ABS  --  1.33 1.88  --  1.57   MONOS ABS  --  0.61 0.69  --  0.48   EOS ABS  --  0.19 0.14  --  0.25   MCV  --  103.8* 97.3* 99.7* 99.6*   LACTATE 1.1  --   --   --   --    PROTIME  --   --   --  24.0*  --    HSTROP T  --   --   --   --  1,359*         Lab 05/01/25  0717 25  0437 25  0429 25  2213 25  1436   SODIUM 138 136 139 142 138   POTASSIUM 5.1 5.1 5.8* 5.2 4.6   CHLORIDE 98 95* 95* 96* 96*   CO2 18.0* 17.0* 19.0* 22.0 23.5   ANION GAP 22.0* 24.0* 25.0* 24.0* 18.5*   BUN 54* 38* 55* 50* 43*   CREATININE 8.21* 7.10* 10.45* 9.75* 8.84*   EGFR 6.9* 8.2* 5.2* 5.6* 6.3*   GLUCOSE 107* 103* 118* 124* 145*   CALCIUM 8.0* 8.3* 7.6*  7.6* 7.2*   MAGNESIUM 2.4 2.4 2.9*  --  2.5*   PHOSPHORUS 5.7* 5.3* 6.1*  --   --    HEMOGLOBIN A1C  --   --  5.20  --   --    TSH  --   --  5.450*  --   --          Lab 04/29/25  0429 04/28/25  1436   TOTAL PROTEIN 7.5 7.7   ALBUMIN 3.4* 3.5   GLOBULIN 4.1 4.2   ALT (SGPT) 5 <5   AST (SGOT) 31 10   BILIRUBIN 0.5 0.5   ALK PHOS 210* 210*         Lab 05/01/25  0717 04/28/25  2213 04/28/25  1436   PROBNP 35,686.0*  --   --    HSTROP T  --   --  1,359*   PROTIME  --  24.0*  --    INR  --  1.99*  --          Lab 05/01/25  0717   CHOLESTEROL 122   LDL CHOL 67   HDL CHOL 27*   TRIGLYCERIDES 162*         Lab 04/28/25 2213   ABO TYPING O   RH TYPING Positive   ANTIBODY SCREEN Positive         Lab 05/01/25  0458 04/29/25  1655   PH, ARTERIAL 7.399 7.388   PCO2, ARTERIAL 34.8* 36.2   PO2 ART 67.0* 61.2*   FIO2 26 28   HCO3 ART 21.5 21.8   BASE EXCESS ART -2.9* -2.8*   CARBOXYHEMOGLOBIN 1.0 1.6     Brief Urine Lab Results       None            Microbiology Results Abnormal       None            EEG  Result Date: 5/1/2025  Reason for referral: 59 y.o.male with altered mental status, right-sided subdural hematoma, consideration of seizures Technical Summary:  A 19 channel digital EEG was performed using the international 10-20 placement system, including eye leads and EKG leads. Duration: 21 minutes Findings: The patient is resting quietly in bed.  Medium amplitude 3-7 Hz intermixed delta and theta activity is seen over the left hemisphere.  By contrast, faster theta frequencies are suppressed over the right hemisphere, and slower 2-4 delta activity is prominent.  There is an increase in faster theta frequencies over the left hemisphere but not the right when the patient is stimulated.  Photic stimulation does not change the background.  Hyperventilation is not performed.  No epileptiform activity or electrographic seizures are seen. Video: Available Technical quality: Good Rhythm strip: Regular, 80 bpm SUMMARY: Moderate  generalized slow, greater over the right hemisphere Right hemispheric suppression No epileptiform activity is present     Impression: Focal cerebral dysfunction affecting the right hemisphere to moderate degree, superimposed upon underlying diffuse cerebral dysfunction.  These findings are consistent with a large right hemispheric subdural hematoma Ongoing seizures are not seen This report is transcribed using the Dragon dictation system.      CT Head Without Contrast Stroke Protocol  Result Date: 5/1/2025  CT HEAD WO CONTRAST STROKE PROTOCOL Date of Exam: 5/1/2025 3:51 AM EDT Indication: CODE stroke. Comparison: 4/28/2025. Technique: Axial CT images were obtained of the head without contrast administration.  Reconstructed coronal images were also obtained. Automated exposure control and iterative construction methods were used. Scan Time: 0355 hours Results discussed with stroke team at 0415 hours. Findings: There is a large right-sided subdural hematoma. The hematoma covers the right convexity and measures up to 25 mm thick in the coronal plane, previously 24 mm measured at the same coronal slice. There is stable leftward shift of midline structures up to 15 mm with subfalcine and right-sided uncal herniation. There is downward transtentorial herniation on the right. There is significant mass effect exerted upon the midbrain. No new hemorrhage. No new hypoattenuating lesion in the brain. Gray-white differentiation appears intact. There is near complete effacement of the right lateral ventricle and effacement of the third ventricle. The left ventricle is mildly enlarged with increased size of the temporal horn raising some concern for entrapment. Cerebellum and carmen appear unremarkable. There are extensive intracranial vascular calcifications. The calvarium is intact. Paranasal sinuses and mastoid air cells appear well aerated.     Impression: Impression: 1.Stable size of large right-sided subdural hematoma with  stable leftward shift of midline structures, subfalcine and right-sided uncal herniation and downward transtentorial herniation. 2.Mild enlargement of the left lateral ventricle with increased size of the temporal horn raising some concern for entrapment. Electronically Signed: Aj Howell MD  5/1/2025 4:21 AM EDT  Workstation ID: [id number]      Results for orders placed during the hospital encounter of 11/05/24    Adult Transthoracic Echo Complete W/ Cont if Necessary Per Protocol    Interpretation Summary  Physician interpretation.  Good technical quality.    1.  LV size, function, and wall motion are normal.  Mild to moderate concentric left ventricular hypertrophy.  Visually estimated ejection fraction is 55 to 60%.  Grade 2 diastolic dysfunction.  Mild left atrial enlargement.  Borderline right atrial size.  RV size and function are grossly preserved.  No septal defect or intracavitary mass or thrombus.    2.  The mitral valve is markedly morphologically abnormal.  Both leaflets are thickened and there is a sclerotic and mobile projection of the anterior mitral leaflet into the LV outflow tract.  This does not have the typical appearance of an acute thrombus or vegetation.  There is no compromise of the LV outflow tract.  There is mild mitral stenosis with a mitral valve area of 1.9 cm² by pressure half-time.  There is trivial MR.  The aortic valve is tricuspid and sclerotic but is minimally calcified.  The annulus is at the upper limits of normal size.  There is no mitral stenosis and there is mild to moderate MR.  Pressure half-time of AI is suggestive of moderate or greater disease.  Right-sided heart valves are unremarkable.    3.  There is no pericardial effusion.  The aortic root is mildly dilated at 3.94 cm with no dissection or aneurysm.    4.  Right heart pressures cannot be calculated.      Current medications:  Scheduled Meds:atorvastatin, 40 mg, Oral, Nightly  senna-docusate sodium, 2 tablet,  Oral, BID   And  polyethylene glycol, 17 g, Oral, Daily   And  bisacodyl, 5 mg, Oral, Daily   And  bisacodyl, 10 mg, Rectal, Daily  gabapentin, 300 mg, Oral, Nightly  insulin regular, 2-7 Units, Subcutaneous, Q6H  levETIRAcetam, 750 mg, Intravenous, Q24H  levothyroxine, 75 mcg, Oral, Q AM  Lidocaine, 2 patch, Transdermal, Q24H  mupirocin, 1 Application, Each Nare, BID  nicotine, 1 patch, Transdermal, Q24H  pantoprazole, 40 mg, Oral, Q AM  sodium chloride, 10 mL, Intravenous, Q12H  sodium chloride, 10 mL, Intravenous, Q12H      Continuous Infusions:   PRN Meds:.  Albuterol Sulfate NEB Orderable    [DISCONTINUED] senna-docusate sodium **AND** polyethylene glycol **AND** bisacodyl **AND** bisacodyl    dextrose    dextrose    glucagon (human recombinant)    nitroglycerin    oxyCODONE    sodium chloride    sodium chloride    sodium chloride    sodium chloride    Assessment & Plan   Assessment & Plan     Active Hospital Problems    Diagnosis  POA    **SDH (subdural hematoma) [S06.5XAA]  Yes    Chronic pain syndrome; scheduled narcotics and gabapentin [G89.4]  Unknown    Coronary artery disease [I25.10]  Yes    Hypothyroidism (acquired) [E03.9]  Yes    Heart failure with preserved left ventricular function (HFpEF) [I50.30]  Yes    ESRD on hemodialysis [N18.6, Z99.2]  Not Applicable    Current smoker [F17.200]  Yes    Hyperlipidemia LDL goal <70 [E78.5]  Yes    Type 2 diabetes mellitus, with long-term current use of insulin [E11.9, Z79.4]  Not Applicable    Essential hypertension [I10]  Yes    Coronary artery disease involving native coronary artery of native heart with angina pectoris [I25.119]  Yes      Resolved Hospital Problems   No resolved problems to display.        Brief Hospital Course to date:  Chevy Junior is a 59 y.o. male with history of MV CAD, HFrEF, valvular heart disease with moderate AI and moderate MR, hypertension, hyperlipidemia, ESRD on MWF HD, PAD with right AKA, and known bilateral subdural  "hematomas who was originally admitted to the ICU with worsening right subdural hematoma. Had presented due to worsening lethargy and weakness x 3 days.     He had been evaluated 3/11/25 (while admitted for CABG evaluation - felt to be poor candidate), for acute encephalopathy and STAT CT head showed acute on chronic left SDH. He had a witnessed seizure while awaiting the CT scan. Managed in the ICU, Neurosurgery felt no indication for surgical intervention - was told to hold Plavix but okay to resume ASA until repeat CT head as outpatient. Discharged on Keppra. Repeat outpatient CT head 4/10/25 showed improving left sided SDH but a NEW right sided SDH without mass effect or midline shift. Patient denied any new symptoms or head trauma. Plan was to continue ASA, and repeat CT head again in 4 weeks. He was admitted a few days after that initial outpatient Neurosurgery appointment to an OSH with a lower GI bleed (details unclear).     Given his severe underlying medical conditions, Neurosurgery does not feel that he is a good surgical candidate at this time. Multiple specialties have recommended a more palliative approach and focus for comfort care. Palliative Care following.      This patient's problems and plans were partially entered by my partner and updated as appropriate by me 05/02/25. Copied text in this note has been reviewed and is accurate as of today's date.      AMS secondary to acute on chronic right SDH  Chronic left SDH  Hx of seizure  --CT head on admission showed \"Enlarging right-sided subdural hematoma now measuring up to 20 mm in thickness. There is resultant 15 mm of right to left midline shift with effacement of the right lateral ventricle.\"  --Evaluated by Neurosurgery, not felt to be a good surgical candidate given extensive comorbidities   --Code stroke called 5/1/25: CT head showing subfalcine and R sided uncal herniation and downward transtentorial herniation. Very poor prognosis per " partner's discussion with Neurosurgery.  --Palliative Care following, we have discussed with ex-wife and daughter by phone this morning 5/2/25, given his continued clinical decline that he is likely at end of life - they are trying to get both son and daughter to come to the hospital by tomorrow to see him, explained that he may not make it through the night and they expressed understanding.   --DNR/DNI, no further dialysis/interventions/escalation of care, continue meds as tolerated - okay to continue IV Keppra for now     MVCAD  HFpEF / valvular heart disease  HTN  HLD  --Home meds as tolerated     ESRD  --HD per Nephrology, on MWF schedule as outpatient, has been doing TTS here (last dialysis 5/1/25)  --Could not complete full sessions recently  --No further dialysis at this time given ongoing clinical decline         Expected Discharge Location and Transportation: D  Expected Discharge   Expected Discharge Date: 5/3/2025; Expected Discharge Time:      VTE Prophylaxis:  Mechanical VTE prophylaxis orders are present.         AM-PAC 6 Clicks Score (PT): 6 (05/01/25 2043)    CODE STATUS:   Code Status and Medical Interventions: No CPR (Do Not Attempt to Resuscitate); Limited Support; No intubation (DNI)   Ordered at: 04/30/25 5672     Code Status (Patient has no pulse and is not breathing):    No CPR (Do Not Attempt to Resuscitate)     Medical Interventions (Patient has pulse or is breathing):    Limited Support     Medical Intervention Limits:    No intubation (DNI)     Level Of Support Discussed With:    Health Care Surrogate       Shahida Mtz MD  05/02/25

## 2025-05-02 NOTE — PROGRESS NOTES
LOS: 4 days   Patient Care Team:  Jeanie Francois APRN as PCP - General (Nurse Practitioner)  Nikita Villasenor MD as Consulting Physician (Nephrology)  Feng Nielsen APRN as Nurse Practitioner (Interventional Cardiology)  Hal Wing MD as Consulting Physician (Cardiology)  Carlos Mcgee MD (General Surgery)  Gus Whaley MD as Consulting Physician (General Surgery)  Luis Fernando Rawls MD as Consulting Physician (Vascular Surgery)    Chief Complaint: ESRD    Subjective     Progressive worsening of mentation     Subjective:  Symptoms:  Stable.  No shortness of breath or chest pain.        History taken from: patient    Objective     Vital Sign Min/Max for last 24 hours  Temp  Min: 97.8 °F (36.6 °C)  Max: 99.1 °F (37.3 °C)   BP  Min: 138/76  Max: 162/91   Pulse  Min: 85  Max: 111   Resp  Min: 16  Max: 22   SpO2  Min: 99 %  Max: 100 %   Flow (L/min) (Oxygen Therapy)  Min: 1  Max: 1   No data recorded         No intake/output data recorded.  I/O last 3 completed shifts:  In: 60 [P.O.:60]  Out: 510     Objective:  General Appearance:  Ill-appearing.    Vital signs: (most recent): Blood pressure 147/84, pulse 99, temperature 99.1 °F (37.3 °C), temperature source Oral, resp. rate 16, weight 71 kg (156 lb 8.4 oz), SpO2 100%.  Vital signs are normal.    Output: Minimal urine output.    HEENT: Normal HEENT exam.    Lungs:  Normal effort and normal respiratory rate.  Breath sounds clear to auscultation.  He is not in respiratory distress.  No decreased breath sounds or wheezes.    Heart: Normal rate.  Regular rhythm.  S1 normal and S2 normal.    Extremities: Normal range of motion.  There is no dependent edema or local swelling.    Pulses: Distal pulses are intact.    Neurological: Patient is alert.                Results Review:     I reviewed the patient's new clinical results.    WBC WBC   Date Value Ref Range Status   05/01/2025 9.00 3.40 - 10.80 10*3/mm3 Final   04/30/2025 9.58 3.40 - 10.80  "10*3/mm3 Final      HGB Hemoglobin   Date Value Ref Range Status   05/01/2025 8.7 (L) 13.0 - 17.7 g/dL Final   04/30/2025 9.0 (L) 13.0 - 17.7 g/dL Final      HCT Hematocrit   Date Value Ref Range Status   05/01/2025 30.4 (L) 37.5 - 51.0 % Final   04/30/2025 29.0 (L) 37.5 - 51.0 % Final      Platlets No results found for: \"LABPLAT\"   MCV MCV   Date Value Ref Range Status   05/01/2025 103.8 (H) 79.0 - 97.0 fL Final   04/30/2025 97.3 (H) 79.0 - 97.0 fL Final          Sodium Sodium   Date Value Ref Range Status   05/01/2025 138 136 - 145 mmol/L Final   04/30/2025 136 136 - 145 mmol/L Final      Potassium Potassium   Date Value Ref Range Status   05/01/2025 5.1 3.5 - 5.2 mmol/L Final   04/30/2025 5.1 3.5 - 5.2 mmol/L Final      Chloride Chloride   Date Value Ref Range Status   05/01/2025 98 98 - 107 mmol/L Final   04/30/2025 95 (L) 98 - 107 mmol/L Final      CO2 CO2   Date Value Ref Range Status   05/01/2025 18.0 (L) 22.0 - 29.0 mmol/L Final   04/30/2025 17.0 (L) 22.0 - 29.0 mmol/L Final      BUN BUN   Date Value Ref Range Status   05/01/2025 54 (H) 8 - 23 mg/dL Final   04/30/2025 38 (H) 8 - 23 mg/dL Final      Creatinine Creatinine   Date Value Ref Range Status   05/01/2025 8.21 (H) 0.76 - 1.27 mg/dL Final   04/30/2025 7.10 (H) 0.76 - 1.27 mg/dL Final      Calcium Calcium   Date Value Ref Range Status   05/01/2025 8.0 (L) 8.6 - 10.5 mg/dL Final   04/30/2025 8.3 (L) 8.6 - 10.5 mg/dL Final      PO4 No results found for: \"CAPO4\"   Albumin No results found for: \"ALBUMIN\"     Magnesium Magnesium   Date Value Ref Range Status   05/01/2025 2.4 1.6 - 2.4 mg/dL Final   04/30/2025 2.4 1.6 - 2.4 mg/dL Final      Uric Acid No results found for: \"URICACID\"     Medication Review: Yes    Assessment & Plan       SDH (subdural hematoma)    Coronary artery disease involving native coronary artery of native heart with angina pectoris    Hyperlipidemia LDL goal <70    Essential hypertension    Type 2 diabetes mellitus, with long-term " current use of insulin    Heart failure with preserved left ventricular function (HFpEF)    Current smoker    ESRD on hemodialysis    Coronary artery disease    Hypothyroidism (acquired)    Chronic pain syndrome; scheduled narcotics and gabapentin      Assessment & Plan    1- ESRD -MWF. Currently on TTS [place] on this visit .   2-HTN   3- Anemia of chronic disease   4- Subdural hematoma - chronic and on recent imaging increase in size with midline shift noted.   5- Hyperkalemia      Plan:  - Patient has large subdural hematoma with midline shift. He is DNR/DNI. On going GOC discussion with family and palliative team.No dialysis for now  George Hernandez MD  05/02/25  17:17 EDT

## 2025-05-02 NOTE — PROGRESS NOTES
Called to bedside. KRISHNA Ho, and Adrián, daughter, asking to take patient home with hospice. Outpatient hospice consult placed.

## 2025-05-02 NOTE — PLAN OF CARE
Goal Outcome Evaluation:           Progress: declining  Outcome Evaluation: Pt responds to pain only with movement. pt is having cheyne elise breathing with periods of apnea.  Pt is actively dying. Pt's [facility] was here earlier today.  Dialysis on hold.  Awaiting more family to come visit.  Pt is no cpr/intubation  Palliative IDT: Rn, MD VICKI,   After hours# [phone]

## 2025-05-02 NOTE — CASE MANAGEMENT/SOCIAL WORK
Continued Stay Note  [facility]     Patient Name: Chevy Junior  MRN: [id number]  Today's Date: 5/2/2025    Admit Date: 4/28/2025    Plan: TBD   Discharge Plan       Row Name 05/02/25 1223       Plan    Plan TBD    Patient/Family in Agreement with Plan yes    Plan Comments Plan is TBD. Patient is approaching end of life. Palliative and Hospice are following. CM will continue to follow.                   Discharge Codes    No documentation.                       Win Reyna RN

## 2025-05-02 NOTE — PLAN OF CARE
Goal Outcome Evaluation:  Plan of Care Reviewed With: patient, spouse        Progress: declining  Outcome Evaluation: Pt did not engage in conversation (only open eyes for seconds then closed eyes and won't respond anything), attempted to give night meds, only able to get pt to swallow 1 pill, refused to swallow the remaining pills. Might benefits IV meds instead of PO d/t pt congnitively unable to swallow (follow command) now. Unable to assess [facility] accurately d/t lack of participations.

## 2025-05-02 NOTE — PROGRESS NOTES
Palliative Care Daily Progress Note     C/C: Patient unresponsive to voice, moves eyes with sternal rub.     S: Medical record reviewed. Follow-up visit for GOC and symptom management. Events noted. Patient continues to decline, unresponsive except to sternal run. Unable to swallow medications or take PO.     ROS: ROS limited by condition/AMS.     O: Code Status:   Code Status and Medical Interventions: No CPR (Do Not Attempt to Resuscitate); Limited Support; No intubation (DNI)   Ordered at: 04/30/25 0932     Code Status (Patient has no pulse and is not breathing):    No CPR (Do Not Attempt to Resuscitate)     Medical Interventions (Patient has pulse or is breathing):    Limited Support     Medical Intervention Limits:    No intubation (DNI)     Level Of Support Discussed With:    Health Care Surrogate      Advanced Directives: Advance Directive Status: Patient has advance directive, copy in chart   Goals of Care: Ongoing.   Palliative Performance Scale Score: 10%     /73 (BP Location: Right arm, Patient Position: Lying)   Pulse 85   Temp 98.2 °F (36.8 °C) (Axillary)   Resp 18   Wt 71 kg (156 lb 8.4 oz)   SpO2 100%   BMI 24.52 kg/m²     Intake/Output Summary (Last 24 hours) at 5/2/2025 1116  Last data filed at 5/2/2025 0903  Gross per 24 hour   Intake 0 ml   Output 510 ml   Net -510 ml       PE:  General Appearance:    Patient laying in bed, minimal response only to sternal rub, A/C ill appearing, frail, NAD   HEENT:    NC/AT, MMM, face relaxed   Neck:   supple, trachea midline, no JVD   Lungs:     CTA bilat, diminished in bases; respirations regular, even and unlabored; RR 16-18 on exam, 1LNC    Heart:     HR 85   Abdomen:     Normal bowel sounds, soft, nontender, nondistended   G/U:   Deferred   MSK/Extremities:  R AKA,     Pulses:   Pulses palpable and equal bilaterally   Skin:   Warm, dry   Neurologic:   Minimal response to sternal rub   Psych:   Minimal response to sternal rub     Meds: Reviewed  and changes noted    Labs:   Results from last 7 days   Lab Units 05/01/25  0719   WBC 10*3/mm3 9.00   HEMOGLOBIN g/dL 8.7*   HEMATOCRIT % 30.4*   PLATELETS 10*3/mm3 189     Results from last 7 days   Lab Units 05/01/25  0717   SODIUM mmol/L 138   POTASSIUM mmol/L 5.1   CHLORIDE mmol/L 98   CO2 mmol/L 18.0*   BUN mg/dL 54*   CREATININE mg/dL 8.21*   GLUCOSE mg/dL 107*   CALCIUM mg/dL 8.0*     Results from last 7 days   Lab Units 05/01/25  0717 04/30/25  0437 04/29/25  0429   SODIUM mmol/L 138   < > 139   POTASSIUM mmol/L 5.1   < > 5.8*   CHLORIDE mmol/L 98   < > 95*   CO2 mmol/L 18.0*   < > 19.0*   BUN mg/dL 54*   < > 55*   CREATININE mg/dL 8.21*   < > 10.45*   CALCIUM mg/dL 8.0*   < > 7.6*   BILIRUBIN mg/dL  --   --  0.5   ALK PHOS U/L  --   --  210*   ALT (SGPT) U/L  --   --  5   AST (SGOT) U/L  --   --  31   GLUCOSE mg/dL 107*   < > 118*    < > = values in this interval not displayed.     Imaging Results (Last 72 Hours)       Procedure Component Value Units Date/Time    CT Head Without Contrast Stroke Protocol [[id number]] Collected: 05/01/25 0403     Updated: 05/01/25 0424    Narrative:      CT HEAD WO CONTRAST STROKE PROTOCOL    Date of Exam: 5/1/2025 3:51 AM EDT    Indication: CODE stroke.    Comparison: 4/28/2025.    Technique: Axial CT images were obtained of the head without contrast administration.  Reconstructed coronal images were also obtained. Automated exposure control and iterative construction methods were used.    Scan Time: 0355 hours  Results discussed with stroke team at 0415 hours.      Findings:  There is a large right-sided subdural hematoma. The hematoma covers the right convexity and measures up to 25 mm thick in the coronal plane, previously 24 mm measured at the same coronal slice. There is stable leftward shift of midline structures up to   15 mm with subfalcine and right-sided uncal herniation. There is downward transtentorial herniation on the right. There is significant mass effect  exerted upon the midbrain. No new hemorrhage. No new hypoattenuating lesion in the brain. Gray-white   differentiation appears intact. There is near complete effacement of the right lateral ventricle and effacement of the third ventricle. The left ventricle is mildly enlarged with increased size of the temporal horn raising some concern for entrapment.   Cerebellum and carmen appear unremarkable. There are extensive intracranial vascular calcifications. The calvarium is intact. Paranasal sinuses and mastoid air cells appear well aerated.      Impression:      Impression:  1.Stable size of large right-sided subdural hematoma with stable leftward shift of midline structures, subfalcine and right-sided uncal herniation and downward transtentorial herniation.  2.Mild enlargement of the left lateral ventricle with increased size of the temporal horn raising some concern for entrapment.            Electronically Signed: Aj Howell MD    5/1/2025 4:21 AM EDT    Workstation ID: [id number]              Lab 04/29/25  0429   HEMOGLOBIN A1C 5.20         Diagnostics: Reviewed    A:   SDH (subdural hematoma)    Coronary artery disease involving native coronary artery of native heart with angina pectoris    Hyperlipidemia LDL goal <70    Essential hypertension    Type 2 diabetes mellitus, with long-term current use of insulin    Heart failure with preserved left ventricular function (HFpEF)    Current smoker    ESRD on hemodialysis    Coronary artery disease    Hypothyroidism (acquired)    Chronic pain syndrome; scheduled narcotics and gabapentin     59 y.o. male with SDH, ESRD on HD, CAD, chronic pain syndrome.   S/S:   Pain -chronic pain  -Neurontin 300mg PO nightly  -Lidocaine patches  -Oxycodone 15mg PO q 4 hours prn moderate pain     2. AMS -worsening SDH     3. Debility -w/c bound, RAKA     4. Dysphagia -SLP unable to be completed due to unresponsive  -no oral intake, unable to swallow medications  -do not recommend feeding  tube due to poor prognosis     5. GOC -DNR/DNI -per discussion with [place] (HCS/POA)  4/30: long discussion regarding patient's condition and chronic co-morbidities, discussed prognosis of weeks to months, if stopping dialysis then days to weeks  4/30: [place] is in agreement with DNR/DNI but wanting to continue ongoing full treatment at this time  5/1: Met with [place] in conjunction with Dr. Berad. Reviewed patient's poor prognosis and multi-system organ failure. Spouse verbalizes understanding that patient is at end of life. She would like to remain in hospital for support as she wants their children to come see patient in hospital this weekend. She would like Providers to update children when they come on Saturday regarding patient's poor prognosis prior to her making any changes in his plan of care.   5/2: Patient with minimal response to sternal rub. No family at bedside. Discussed with KRISHNA Ho, on speaker phone in conjunction with Dr. Mtz. Reviewed patient's condition and continued decline. Discussed patient is at end of life. Encouraged family to come to bedside. Wife asking about home with hospice services, discussed may not survive ambulance ride home. [place] requested this Provider to call daughter Adrián and update on condition. Called daughter, Adrián, on speaker phone in conjunction with Dr. Mtz. Updated on condition and that patient appears to be at end of life. Encouraged visiting as we do not know how much time he has left. Daughter verbalizes understanding of patient's poor prognosis.     P:  Follow up visit in conjunction with Dr. Mtz. Spoke with [place] and then daughter, Adrián, on speaker phone to discuss patient declining and at end of life. DNR/DNI. Family encouraged to come to bedside. Emotional support provided throughout discussion.   Palliative Care Team will continue to follow patient. Please do not hesitate to contact us regarding further sx mgmt or GOC needs.  Eileen Ralph,  APRN  5/2/2025  Time spent: 40 minutes

## 2025-05-03 LAB
LEVETIRACETAM SERPL-MCNC: 24.8 UG/ML (ref 10–40)
QT INTERVAL: 446 MS
QTC INTERVAL: 578 MS

## 2025-05-03 NOTE — PLAN OF CARE
Patient has transitioned to comfort care.  Currently waiting for medication to be confirmed at pharmacy.   Next shift informed of next steps to get patient home.  POA at bedside.

## 2025-05-03 NOTE — NURSING NOTE
Ambulance arrived to transport pt home Spouse left via private vehicle. Paperwork completed.    No medications given d/t pt unable to swallow.    Lidocaine patch x2 removed.

## (undated) DEVICE — GW AMPLTZ SUPERSTIFF STR .035IN 180CM

## (undated) DEVICE — SUT MNCRYL PLS ANTIB UD 4/0 PS2 18IN

## (undated) DEVICE — GW STARTER JB STR .035 15X180CM

## (undated) DEVICE — MODEL AT P65, P/N 701554-001KIT CONTENTS: HAND CONTROLLER, 3-WAY HIGH-PRESSURE STOPCOCK WITH ROTATING END AND PREMIUM HIGH-PRESSURE TUBING: Brand: ANGIOTOUCH® KIT

## (undated) DEVICE — Device

## (undated) DEVICE — TOTAL TRAY, 16FR 10ML SIL FOLEY, URN: Brand: MEDLINE

## (undated) DEVICE — STPCK LP 1WY RA 200PSI

## (undated) DEVICE — Device: Brand: ASAHI SION BLUE

## (undated) DEVICE — TREK CORONARY DILATATION CATHETER 3.0 MM X 15 MM / RAPID-EXCHANGE: Brand: TREK

## (undated) DEVICE — XIENCE SKYPOINT™ EVEROLIMUS ELUTING CORONARY STENT SYSTEM 4.00 MM X 28 MM / RAPID-EXCHANGE
Type: IMPLANTABLE DEVICE | Site: HEART | Status: NON-FUNCTIONAL
Brand: XIENCE SKYPOINT™

## (undated) DEVICE — ABDOMINAL VASCULAR-LF: Brand: MEDLINE INDUSTRIES, INC.

## (undated) DEVICE — CATH OMNI FLUSH 5FR

## (undated) DEVICE — GAUZE,SPONGE,4"X4",16PLY,STRL,LF,10/TRAY: Brand: MEDLINE

## (undated) DEVICE — SOL IRRG H2O PL/BG 1000ML STRL

## (undated) DEVICE — CONN JET HYDRA H20 AUXILIARY DISP

## (undated) DEVICE — RADIFOCUS GLIDECATH: Brand: GLIDECATH

## (undated) DEVICE — SUT PROLN 6/0 C1 D/A 30IN 8706H

## (undated) DEVICE — NC TREK NEO™ CORONARY DILATATION CATHETER 4.00 MM X 20 MM / RAPID-EXCHANGE: Brand: NC TREK NEO™

## (undated) DEVICE — GUIDE CATHETER: Brand: MACH1™

## (undated) DEVICE — SNAR E/S POLYP SNAREMASTER OVL/10MM 2.8X2300MM YEL

## (undated) DEVICE — NC TREK NEO™ CORONARY DILATATION CATHETER 3.25 MM X 20 MM / RAPID-EXCHANGE: Brand: NC TREK NEO™

## (undated) DEVICE — SUT SILK 3/0 TIES 18IN A184H

## (undated) DEVICE — KT CATH IMG DRAGONFLY OPTIS 2.7F 135CM

## (undated) DEVICE — BLCK/BITE BLOX WO/DENTL/RIM W/STRAP 54F

## (undated) DEVICE — GW PERIPH GUIDERIGHT STD/EXCHNG/J/TIP SS 0.035IN 5X260CM

## (undated) DEVICE — MODEL BT2000 P/N 700287-012KIT CONTENTS: MANIFOLD WITH SALINE AND CONTRAST PORTS, SALINE TUBING WITH SPIKE AND HAND SYRINGE, TRANSDUCER: Brand: BT2000 AUTOMATED MANIFOLD KIT

## (undated) DEVICE — STCKNT IMPERV 9X36IN STRL

## (undated) DEVICE — NC TREK NEO™ CORONARY DILATATION CATHETER 3.50 MM X 25 MM / RAPID-EXCHANGE: Brand: NC TREK NEO™

## (undated) DEVICE — INTRO SHEATH PRELUDE PRO MARKRTP GW/0.035MM 5F 11CM

## (undated) DEVICE — GW PERIPH VASC ADX J/TP SS .035 150CM 3MM

## (undated) DEVICE — KT CATH TAL VENATRAC PALINDROM INSRT STY 23CM

## (undated) DEVICE — TOWEL,OR,DSP,ST,BLUE,STD,4/PK,20PK/CS: Brand: MEDLINE

## (undated) DEVICE — GLV SURG SENSICARE PI ORTHO SZ7.5 LF STRL

## (undated) DEVICE — TR BAND RADIAL ARTERY COMPRESSION DEVICE: Brand: TR BAND

## (undated) DEVICE — LINER SURG CANSTR SXN S/RIGD 1500CC

## (undated) DEVICE — SINGLE-USE BIOPSY FORCEPS: Brand: RADIAL JAW 4

## (undated) DEVICE — BIOPATCH™ ANTIMICROBIAL DRESSING WITH CHLORHEXIDINE GLUCONATE IS A HYDROPHILLIC POLYURETHANE ABSORPTIVE FOAM WITH CHLORHEXIDINE GLUCONATE (CHG) WHICH INHIBITS BACTERIAL GROWTH UNDER THE DRESSING. THE DRESSING IS INTENDED TO BE USED TO ABSORB EXUDATE, COVER A WOUND CAUSED BY VASCULAR AND NONVASCULAR PERCUTANEOUS MEDICAL DEVICES DURING SURGERY, AS WELL AS REDUCE LOCAL INFECTION AND COLONIZATION OF MICROORGANISMS.: Brand: BIOPATCH

## (undated) DEVICE — CVR TRANSD FLX 3DIMEN 14X29.2CM LF STRL

## (undated) DEVICE — SOLIDIFIER LIQLOC PLS 1500CC BT

## (undated) DEVICE — NDL PERC 1PRT THNWALL W/BASEPLT 18G 7CM

## (undated) DEVICE — BNDG ELAS CO-FLEX SLF ADHR 4IN5YD LF STRL

## (undated) DEVICE — CATH DIAG EXPO M/ PK 5F FL4/FR4 PIG

## (undated) DEVICE — ST ACC MICROPUNCTURE STFF .018 ECHO/PLDM/TP 5F/10CM 21G/7CM

## (undated) DEVICE — ST ACC MICROPUNCTURE .018 TRANSLSS/SS/TP 5F/10CM 21G/7CM

## (undated) DEVICE — ADHS SKIN SURG TISS VISC PREMIERPRO EXOFIN HI/VISC FAST/DRY

## (undated) DEVICE — CATH DIAG EXPO M/ PK 6FR FL4/FR4 PIG 3PK

## (undated) DEVICE — PINNACLE INTRODUCER SHEATH: Brand: PINNACLE

## (undated) DEVICE — ST ACC MICROPUNCTURE STFF .018 ECHO/PLAT/TP 4F/10CM 21G/7CM

## (undated) DEVICE — KT MANIFLD EP

## (undated) DEVICE — GW PRESSUREWIRE X WIRELESS FFR 175CM

## (undated) DEVICE — SMALL (GREEN) FOR GRAFTS UP TO 8 MM, 20 1/2" / 52 CM (1/PKG): Brand: SCANLAN® VASCULAR TUNNELER SHEATHS AND BULLET TIPS

## (undated) DEVICE — SOL IRR NACL 0.9PCT BT 1000ML

## (undated) DEVICE — CLAMP INSERT: Brand: STEALTH® CLAMP INSERT

## (undated) DEVICE — SUT PROLN 2/0 8685H

## (undated) DEVICE — PK CATH CARD 10

## (undated) DEVICE — SUT VIC 3/0 SH 27IN J416H

## (undated) DEVICE — INTRO SHEATH PRELUDE/PRO .035 5F 11X50CM GRY LF

## (undated) DEVICE — SUT ETHLN 3/0 FS1 663G

## (undated) DEVICE — DEV INFL MONARCH 25W

## (undated) DEVICE — ELECTRD BLD EDGE COAT 3IN

## (undated) DEVICE — STERILE POLYISOPRENE POWDER-FREE SURGICAL GLOVES: Brand: PROTEXIS

## (undated) DEVICE — SUT SILK 2/0 TIES 18IN A185H

## (undated) DEVICE — CVR PROB ULTRASND/TRANSD W/GEL 7X11IN STRL

## (undated) DEVICE — SOL NS 500ML

## (undated) DEVICE — GLV SURG SENSICARE PI ORTHO SZ8 LF STRL

## (undated) DEVICE — CATH DIAG EXPO .045 FL3  5F 100CM

## (undated) DEVICE — MINI TREK CORONARY DILATATION CATHETER 2.0 MM X 15 MM / RAPID-EXCHANGE: Brand: MINI TREK

## (undated) DEVICE — THE SINGLE USE ETRAP – POLYP TRAP IS USED FOR SUCTION RETRIEVAL OF ENDOSCOPICALLY REMOVED POLYPS.: Brand: ETRAP

## (undated) DEVICE — 3M™ IOBAN™ 2 ANTIMICROBIAL INCISE DRAPE 6651EZ: Brand: IOBAN™ 2

## (undated) DEVICE — SUT PROLN 5/0 C1 DA 24IN 8725H

## (undated) DEVICE — GUIDELINER CATHETERS ARE INTENDED TO BE USED IN CONJUNCTION WITH GUIDE CATHETERS TO ACCESS DISCRETE REGIONS OF THE CORONARY AND/OR PERIPHERAL VASCULATURE, AND TO FACILITATE PLACEMENT OF INTERVENTIONAL DEVICES.: Brand: GUIDELINER® V3 CATHETER

## (undated) DEVICE — ANTIBACTERIAL UNDYED BRAIDED (POLYGLACTIN 910), SYNTHETIC ABSORBABLE SUTURE: Brand: COATED VICRYL

## (undated) DEVICE — RADIFOCUS GLIDEWIRE: Brand: GLIDEWIRE

## (undated) DEVICE — Device: Brand: DEFENDO AIR/WATER/SUCTION AND BIOPSY VALVE

## (undated) DEVICE — BLANKT WARM PACU MISTRAL/AIR PREM REFL A/ 85.8X50IN

## (undated) DEVICE — COPILOT BLEEDBACK CONTROL VALVE: Brand: COPILOT

## (undated) DEVICE — ADULT, W/LG. BACK PAD, RADIOTRANSPARENT ELEMENT AND LEAD WIRE COMPATIBLE W/: Brand: DEFIBRILLATION ELECTRODES

## (undated) DEVICE — GLIDESHEATH SLENDER STAINLESS STEEL KIT: Brand: GLIDESHEATH SLENDER

## (undated) DEVICE — PROXIMATE RH ROTATING HEAD SKIN STAPLERS (35 WIDE) CONTAINS 35 STAINLESS STEEL STAPLES: Brand: PROXIMATE

## (undated) DEVICE — SUT PROLN 7/0 BV1 D/A 24IN 8702H

## (undated) DEVICE — XIENCE SKYPOINT™ EVEROLIMUS ELUTING CORONARY STENT SYSTEM 3.50 MM X 23 MM / RAPID-EXCHANGE
Type: IMPLANTABLE DEVICE | Status: NON-FUNCTIONAL
Brand: XIENCE SKYPOINT™

## (undated) DEVICE — GLV SURG SENSICARE PI ORTHO PF SZ7 LF STRL

## (undated) DEVICE — KT INTRO MIC VSI SMOTH REG 4F 40CM 7CM

## (undated) DEVICE — PENCL E/S SMOKEEVAC W/TELESCP CANN